# Patient Record
Sex: MALE | Race: WHITE | NOT HISPANIC OR LATINO | Employment: OTHER | URBAN - METROPOLITAN AREA
[De-identification: names, ages, dates, MRNs, and addresses within clinical notes are randomized per-mention and may not be internally consistent; named-entity substitution may affect disease eponyms.]

---

## 2017-03-13 PROBLEM — M17.0 PRIMARY OSTEOARTHRITIS OF BOTH KNEES: Status: ACTIVE | Noted: 2017-03-13

## 2017-03-22 DIAGNOSIS — I10 BENIGN ESSENTIAL HYPERTENSION: Primary | ICD-10-CM

## 2017-03-22 NOTE — TELEPHONE ENCOUNTER
FUROSEMIDE 20MG TAB      Last Written Prescription Date: ?  Last Fill Quantity: ?, # refills: ?  [place] [facility] [place] with [facility] or [facility] prescribing provider: 11/18/2016       Potassium   Date Value Ref Range Status   11/18/2016 4.5 3.4 - 5.3 mmol/L Final     Creatinine   Date Value Ref Range Status   11/18/2016 2.19 (H) 0.66 - 1.25 mg/dL Final     BP Readings from Last 3 Encounters:   11/18/16 111/62   10/13/16 111/58   09/29/16 117/58

## 2017-03-22 NOTE — TELEPHONE ENCOUNTER
Routing refill request to provider for review/approval because:  Labs/Historical  Carly Robins RN

## 2017-03-24 RX ORDER — FUROSEMIDE 20 MG
TABLET ORAL
Qty: 90 TABLET | Refills: 3 | Status: SHIPPED | OUTPATIENT
Start: 2017-03-24 | End: 2017-10-04

## 2017-07-18 ENCOUNTER — OFFICE VISIT (OUTPATIENT)
Dept: FAMILY MEDICINE | Facility: CLINIC | Age: 82
End: 2017-07-18
Payer: MEDICARE

## 2017-07-18 VITALS
WEIGHT: 170 LBS | HEIGHT: 69 IN | DIASTOLIC BLOOD PRESSURE: 55 MMHG | TEMPERATURE: 97.8 F | SYSTOLIC BLOOD PRESSURE: 132 MMHG | HEART RATE: 46 BPM | OXYGEN SATURATION: 97 % | BODY MASS INDEX: 25.18 KG/M2

## 2017-07-18 DIAGNOSIS — E78.5 HYPERLIPIDEMIA LDL GOAL <100: ICD-10-CM

## 2017-07-18 DIAGNOSIS — M1A.09X0 IDIOPATHIC CHRONIC GOUT OF MULTIPLE SITES WITHOUT TOPHUS: ICD-10-CM

## 2017-07-18 DIAGNOSIS — I10 BENIGN ESSENTIAL HYPERTENSION: ICD-10-CM

## 2017-07-18 DIAGNOSIS — I70.90 ATHEROSCLEROTIC VASCULAR DISEASE: ICD-10-CM

## 2017-07-18 DIAGNOSIS — M15.0 PRIMARY OSTEOARTHRITIS INVOLVING MULTIPLE JOINTS: ICD-10-CM

## 2017-07-18 DIAGNOSIS — N18.30 CKD (CHRONIC KIDNEY DISEASE) STAGE 3, GFR 30-59 ML/MIN (H): Primary | ICD-10-CM

## 2017-07-18 DIAGNOSIS — M51.379 DEGENERATION OF LUMBAR OR LUMBOSACRAL INTERVERTEBRAL DISC: ICD-10-CM

## 2017-07-18 DIAGNOSIS — S16.1XXA STRAIN OF NECK MUSCLE, INITIAL ENCOUNTER: ICD-10-CM

## 2017-07-18 DIAGNOSIS — I35.0 AORTIC VALVE STENOSIS, UNSPECIFIED ETIOLOGY: ICD-10-CM

## 2017-07-18 PROCEDURE — 99214 OFFICE O/P EST MOD 30 MIN: CPT | Performed by: INTERNAL MEDICINE

## 2017-07-18 RX ORDER — CYCLOBENZAPRINE HCL 10 MG
TABLET ORAL
Qty: 7 TABLET | Refills: 1 | Status: SHIPPED | OUTPATIENT
Start: 2017-07-18 | End: 2017-08-21

## 2017-07-18 RX ORDER — METHYLPREDNISOLONE 4 MG
TABLET, DOSE PACK ORAL
Qty: 21 TABLET | Refills: 0 | Status: SHIPPED | OUTPATIENT
Start: 2017-07-18 | End: 2017-08-21

## 2017-07-18 NOTE — MR AVS SNAPSHOT
After Visit Summary   7/18/2017    Trevor Soni    MRN: [id number]           Patient Information     Date Of Birth          9/3/1933        Visit Information        Provider Department      7/18/2017 11:30 AM Warren Lovelace MD [facility]        Today's Diagnoses     CKD (chronic kidney disease) stage 3, GFR 30-59 ml/min    -  1    Aortic valve stenosis, unspecified etiology        Hyperlipidemia LDL goal <100        Benign essential hypertension        Primary osteoarthritis involving multiple joints        Idiopathic chronic gout of multiple sites without tophus        Degeneration of lumbar or lumbosacral intervertebral disc        Strain of neck muscle, initial encounter        Atherosclerotic vascular disease           Follow-ups after your visit        Your next 10 appointments already scheduled     Aug 21, 2017 10:30 AM CDT   Office Visit with Warren Lovelace MD   [facility] ([facility])    [address] [address] [phone]           Bring a current list of meds and any records pertaining to this visit.  For Physicals, please bring immunization records and any forms needing to be filled out.  Please arrive 10 minutes early to complete paperwork.              Who to contact     If you have questions or need follow up information about today's clinic visit or your schedule please contact [facility] directly at [phone].  Normal or non-critical lab and imaging results will be communicated to you by MyChart, letter or phone within 4 business days after the clinic has received the results. If you do not hear from us within 7 days, please contact the clinic through MyChart or phone. If you have a critical or abnormal lab result, we will notify you by phone as soon as possible.  Submit refill requests through [employer] or call your pharmacy and they will forward the refill request to us. Please allow 3 business days for your  "refill to be completed.          Additional Information About Your Visit        [employer] Information     [employer] lets you send messages to your doctor, view your test results, renew your prescriptions, schedule appointments and more. To sign up, go to [email or web address] . Click on \"Log in\" on the left side of the screen, which will take you to the Welcome page. Then click on \"Sign up Now\" on the right side of the page.     You will be asked to enter the access code listed below, as well as some personal information. Please follow the directions to create your username and password.     Your access code is: [email or web address]  Expires: 10/17/2017  6:21 AM     Your access code will  in 90 days. If you need help or a new code, please call your [place] clinic or [phone].        Care EveryWhere ID     This is your Care EveryWhere ID. This could be used by other organizations to access your [place] medical records  [id number]        Your Vitals Were     Pulse Temperature Height Pulse Oximetry BMI (Body Mass Index)       46 97.8  F (36.6  C) (Oral) 5' 9\" (1.753 m) 97% 25.1 kg/m2        Blood Pressure from Last 3 Encounters:   17 132/55   16 111/62   10/13/16 111/58    Weight from Last 3 Encounters:   17 170 lb (77.1 kg)   16 167 lb (75.8 kg)   10/13/16 166 lb (75.3 kg)              Today, you had the following     No orders found for display         Today's Medication Changes          These changes are accurate as of: 17 11:59 PM.  If you have any questions, ask your nurse or doctor.               Start taking these medicines.        Dose/Directions    cyclobenzaprine 10 MG tablet   Commonly known as:  FLEXERIL   Used for:  Strain of neck muscle, initial encounter   Started by:  Warren Lovelace MD        Si/2 to 1 tab po qhs   Quantity:  7 tablet   Refills:  1       methylPREDNISolone 4 MG tablet   Commonly known as:  MEDROL DOSEPAK   Used for:  Degeneration of lumbar or " lumbosacral intervertebral disc   Started by:  Warren Lovelace MD        Follow package instructions   Quantity:  21 tablet   Refills:  0            Where to get your medicines      [facility] [address] [place] [address] [address] AT [address] [place] [address] & [facility]  [address] [place] [address]    Hours:  24-hours Phone:  [phone]     cyclobenzaprine 10 MG tablet    methylPREDNISolone 4 MG tablet                Primary Care Provider Office Phone # Fax #    Warren Lovelace [phone]       [facility] [address]  [facility] [address]        Equal Access to Services     [facility]: Hadii aad ku hadasho Soomaali, waaxda luqadaha, qaybta kaalmada adeegyada, waxay idiin hayjosen cesar andre . So [facility] [phone].    ATENCIÓN: Si habla español, tiene a mendoza disposición servicios gratuitos de asistencia lingüística. [facility] [phone].    We comply with applicable federal civil rights laws and [place] laws. We do not discriminate on the basis of race, color, national origin, age, disability sex, sexual orientation or gender identity.            Thank you!     Thank you for choosing [facility]  for your care. Our goal is always to provide you with excellent care. Hearing back from our patients is one way we can continue to improve our services. Please take a few minutes to complete the written survey that you may receive in the mail after your visit with us. Thank you!             Your Updated Medication List - Protect others around you: Learn how to safely use, store and throw away your medicines at www.disposemymeds.org.          This list is accurate as of: 7/18/17 11:59 PM.  Always use your most recent med list.                   Brand Name Dispense Instructions for use Diagnosis    allopurinol 100 MG tablet    ZYLOPRIM    180 tablet    Take 2 tablets by mouth  every day    Hyperlipidemia LDL goal <100, Benign  essential hypertension       amLODIPine 5 MG tablet    NORVASC    180 tablet    Take 1 tablet by mouth  twice a day    Hyperlipidemia LDL goal <100, Benign essential hypertension       ASPIRIN PO      Take 325 mg by mouth daily.        atenolol 50 MG tablet    TENORMIN    90 tablet    Take 1 tablet by mouth  every day    Hyperlipidemia LDL goal <100, Benign essential hypertension       atorvastatin 20 MG tablet    LIPITOR    90 tablet    Take 1 tablet by mouth  every day    Hyperlipidemia LDL goal <100, Benign essential hypertension       cyclobenzaprine 10 MG tablet    FLEXERIL    7 tablet    Si/2 to 1 tab po qhs    Strain of neck muscle, initial encounter       doxycycline 100 MG capsule    VIBRAMYCIN    14 capsule    Take 1 capsule (100 mg) by mouth every 12 hours    Community acquired pneumonia       fenofibrate 160 MG tablet     90 tablet    Take 1 tablet by mouth  every day    Hyperlipidemia LDL goal <100, Benign essential hypertension       furosemide 20 MG tablet    LASIX    90 tablet    Take 1 tablet by mouth  every day as directed    Benign essential hypertension       * HYDRALAZINE HCL PO      Take 25 mg by mouth 2 times daily Morning & Lunch        * HYDRALAZINE HCL PO      Take 50 mg by mouth 2 times daily Dinner and Bedtime        * hydrALAZINE 50 MG tablet    APRESOLINE    270 tablet    Take 1 tablet by mouth 3  times a day    Hyperlipidemia LDL goal <100, Benign essential hypertension       methylPREDNISolone 4 MG tablet    MEDROL DOSEPAK    21 tablet    Follow package instructions    Degeneration of lumbar or lumbosacral intervertebral disc       VITAMIN D3 PO      Take 1 tablet by mouth daily        ZANTAC PO      Take 150 mg by mouth daily as needed        * Notice:  This list has 3 medication(s) that are the same as other medications prescribed for you. Read the directions carefully, and ask your doctor or other care provider to review them with you.

## 2017-07-18 NOTE — NURSING NOTE
"Chief Complaint   Patient presents with     Recheck Medication       Initial /55 (BP Location: Right arm, Patient Position: Sitting, Cuff Size: Adult Regular)  Pulse (!) 46  Temp 97.8  F (36.6  C) (Oral)  Ht 5' 9\" (1.753 m)  Wt 170 lb (77.1 kg)  SpO2 97%  BMI 25.1 kg/m2 Estimated body mass index is 25.1 kg/(m^2) as calculated from the following:    Height as of this encounter: 5' 9\" (1.753 m).    Weight as of this encounter: 170 lb (77.1 kg).  Medication Reconciliation: complete   Lina Faust- GAGAN      "

## 2017-07-18 NOTE — PROGRESS NOTES
"  SUBJECTIVE:                                                    Trevor Soni is a 83 year old male who presents to clinic today for the following health issues:    Medication Followup of Atorvastatin and furosemide    Taking Medication as prescribed: yes    Side Effects:  Unsure- x 1 week ago had a rash all over upper torso and back.    Medication Helping Symptoms:  yes     Mr. Soni presents to the clinic for follow up on hyperlipidemia and hypertension. He notes possible medication side effect of pruritis     He notes constant left neck pain for the last 2 weeks. He notes lifting a 36-pack of soda and pain began later that day. He has been using a heat pack and Tylenol without help. Denies weakness, numbness and tingling. Patient states pain causes loss of sleep    Patient also complains of intermittent lower back pain with radiation on the medial right leg to the lower leg which has been present for more than 2 weeks. Aggravation of symptoms is caused by climbing stairs and walking. Deneis pain when getting out of a vehicle. When patient lays he notes pain decreases.    Patient also notes a \"lump\" on the left shoulder which increases     Problem list and histories reviewed & adjusted, as indicated.  Additional history: as documented    Current Outpatient Prescriptions   Medication Sig Dispense Refill     methylPREDNISolone (MEDROL DOSEPAK) 4 MG tablet Follow package instructions 21 tablet 0     cyclobenzaprine (FLEXERIL) 10 MG tablet Si/2 to 1 tab po qhs 7 tablet 1     furosemide (LASIX) 20 MG tablet Take 1 tablet by mouth  every day as directed 90 tablet 3     atorvastatin (LIPITOR) 20 MG tablet Take 1 tablet by mouth  every day 90 tablet 3     hydrALAZINE (APRESOLINE) 50 MG tablet Take 1 tablet by mouth 3  times a day 270 tablet 3     allopurinol (ZYLOPRIM) 100 MG tablet Take 2 tablets by mouth  every day 180 tablet 3     amLODIPine (NORVASC) 5 MG tablet Take 1 tablet by mouth  twice a day 180 tablet " "3     fenofibrate 160 MG tablet Take 1 tablet by mouth  every day 90 tablet 3     atenolol (TENORMIN) 50 MG tablet Take 1 tablet by mouth  every day 90 tablet 3     doxycycline (VIBRAMYCIN) 100 MG capsule Take 1 capsule (100 mg) by mouth every 12 hours 14 capsule 0     HYDRALAZINE HCL PO Take 50 mg by mouth 2 times daily Dinner and Bedtime       Cholecalciferol (VITAMIN D3 PO) Take 1 tablet by mouth daily       HYDRALAZINE HCL PO Take 25 mg by mouth 2 times daily Morning & Lunch       ASPIRIN PO Take 325 mg by mouth daily.       Ranitidine HCl (ZANTAC PO) Take 150 mg by mouth daily as needed        Allergies   Allergen Reactions     Avocado      Ciprofloxacin Itching     Penicillins Itching     Reviewed and updated as needed this visit by clinical staff  Tobacco  Allergies  Meds  Soc Hx      Reviewed and updated as needed this visit by Provider       ROS:  Constitutional, HEENT, cardiovascular, pulmonary, gi and gu systems are negative, except as otherwise noted.    This document serves as a record of the services and decisions personally performed and made by Warren Lovelace MD. It was created on his/her behalf by Alicja Prince, a trained medical scribe. The creation of this document is based the provider's statements to the medical scribe.  Scribgarrison Prince 11:42 AM, July 18, 2017     OBJECTIVE:     /55 (BP Location: Right arm, Patient Position: Sitting, Cuff Size: Adult Regular)  Pulse (!) 46  Temp 97.8  F (36.6  C) (Oral)  Ht 1.753 m (5' 9\")  Wt 77.1 kg (170 lb)  SpO2 97%  BMI 25.1 kg/m2  Body mass index is 25.1 kg/(m^2).  Neck was supple without adenopathy or thyromegaly his carotids were normal without bruits. Has tightness of the right trapezius. Shoulder is post surgical and has reduced internal and external rotation  Chest clear to auscultation and percussion  Cardiovascular S1 and S2 are physiologic without murmurs or gallops  Abdomen bowel sounds were normal.  There is no " palpable mass or organomegaly  Extremities nontender without any edema  Back Has tenderness of the lower lumbar spine at or below the areas of the SI joints. Straight leg raise negative. DTRs show absent patellar on the right and 2+ on the left. Achilles were absent bilaterally. Sensory normal to confrontation. Motor intact and bilaterally symmetrical   Pulses pedal pulses are as described otherwise his pulses are bilaterally symmetrical throughout without bruits  Skin without significant abnormality     ASSESSMENT/PLAN:   1. CKD (chronic kidney disease) stage 3, GFR 30-59 ml/min    2. Aortic valve stenosis, unspecified etiology    3. Hyperlipidemia LDL goal <100    4. Benign essential hypertension    5. Primary osteoarthritis involving multiple joints    6. Idiopathic chronic gout of multiple sites without tophus    7. Degeneration of lumbar or lumbosacral intervertebral disc  - methylPREDNISolone (MEDROL DOSEPAK) 4 MG tablet; Follow package instructions  Dispense: 21 tablet; Refill: 0    8. Strain of neck muscle, initial encounter  - cyclobenzaprine (FLEXERIL) 10 MG tablet; Si/2 to 1 tab po qhs  Dispense: 7 tablet; Refill: 1    He is planning on reevaluation his carotid stenosis with echo from one year ago and repeat his US for his aneurysmal dilation.    Follow up in one week with progress report or return to clinic depending on the outcome of his medicine trial.    Warren Lovelace MD  [facility]    The information in this document, created by the medical scribe for me, accurately reflects the services I personally performed and the decisions made by me. I have reviewed and approved this document for accuracy prior to leaving the patient care area.  Warren Lovelace MD  11:42 AM, 17

## 2017-08-18 NOTE — PROGRESS NOTES
SUBJECTIVE:   Trevor Soni is a 83 year old male who presents to clinic today for the following health issues:    Patient with history of CKD presents to the clinic for a follow up. He states that he feels better today than he did during his last office visit. He reports that this is due to his improved neck and back pain. He states he can now move his neck and denies any stiffness. He manages his pain with medication. Patients breathing is also going well. He does not do any regular walking for exercise, but does do yard work and other activities daily. His knees are still get sore, and it is exacerbated by climbing stairs. He denies angina and dyspnea.      Problem list and histories reviewed & adjusted, as indicated.  Additional history: as documented    Current Outpatient Prescriptions   Medication Sig Dispense Refill     furosemide (LASIX) 20 MG tablet Take 1 tablet by mouth  every day as directed 90 tablet 3     atorvastatin (LIPITOR) 20 MG tablet Take 1 tablet by mouth  every day 90 tablet 3     hydrALAZINE (APRESOLINE) 50 MG tablet Take 1 tablet by mouth 3  times a day 270 tablet 3     allopurinol (ZYLOPRIM) 100 MG tablet Take 2 tablets by mouth  every day 180 tablet 3     amLODIPine (NORVASC) 5 MG tablet Take 1 tablet by mouth  twice a day 180 tablet 3     fenofibrate 160 MG tablet Take 1 tablet by mouth  every day 90 tablet 3     atenolol (TENORMIN) 50 MG tablet Take 1 tablet by mouth  every day 90 tablet 3     Cholecalciferol (VITAMIN D3 PO) Take 1 tablet by mouth daily       ASPIRIN PO Take 325 mg by mouth daily.       Ranitidine HCl (ZANTAC PO) Take 150 mg by mouth daily as needed        doxycycline (VIBRAMYCIN) 100 MG capsule Take 1 capsule (100 mg) by mouth every 12 hours (Patient not taking: Reported on 8/21/2017) 14 capsule 0     [DISCONTINUED] HYDRALAZINE HCL PO Take 50 mg by mouth 2 times daily Dinner and Bedtime       [DISCONTINUED] HYDRALAZINE HCL PO Take 25 mg by mouth 2 times daily  "Morning & Lunch       Allergies   Allergen Reactions     Avocado      Ciprofloxacin Itching     Penicillins Itching       Reviewed and updated as needed this visit by clinical staff  Tobacco  Allergies  Meds  Problems  Med Hx  Surg Hx  Fam Hx  Soc Hx        Reviewed and updated as needed this visit by Provider         ROS:  Constitutional, HEENT, cardiovascular, pulmonary, gi and gu systems are negative, except as otherwise noted.    This document serves as a record of the services and decisions personally performed and made by Warren Lovelace MD. It was created on his/her behalf by Chemo Gibbs, a trained medical scribe. The creation of this document is based the provider's statements to the medical scribe.  Mor Gibbs 10:22 AM, August 21, 2017    OBJECTIVE:   /60  Pulse 55  Temp 98.1  F (36.7  C) (Oral)  Ht 1.753 m (5' 9\")  Wt 75.8 kg (167 lb)  SpO2 97%  BMI 24.66 kg/m2  Body mass index is 24.66 kg/(m^2).    Neck was supple without adenopathy or thyromegaly his carotids were normal without bruits  Chest clear to auscultation and percussion  Cardiovascular S1 and S2 are physiologic with grade 3/6 systolic ejection murmur radiating across the precordium to the carotids or gallops  Abdomen bowel sounds were normal.  There is no palpable mass or organomegaly  Extremities nontender without any edema  Pulses pedal pulses are as described otherwise his pulses are bilaterally symmetrical throughout without bruits  Skin without significant abnormality      Diagnostic Test Results:  No results found for this or any previous visit (from the past 24 hour(s)).    ASSESSMENT/PLAN:     1. CKD (chronic kidney disease) stage 3, GFR 30-59 ml/min  - Basic metabolic panel  - CBC with platelets    2. Abdominal aortic aneurysm (AAA) without rupture (H)  Schedule follow-up ultrasound  3. Aortic valve stenosis, unspecified etiology  -He is scheduled for an aortic ultrasound.    4. Hyperlipidemia LDL " goal <100  -Status quo    5. Benign essential hypertension  -Well managed with current therapies.  Encouraged increase activity including plans for the winter season    6. Primary osteoarthritis involving multiple joints    7. Idiopathic chronic gout of multiple sites without tophus    8. Bilateral carotid artery stenosis  Scheduled follow-up carotid ultrasound      The information in this document, created by the medical scribe for me, accurately reflects the services I personally performed and the decisions made by me. I have reviewed and approved this document for accuracy prior to leaving the patient care area.  Warren Lovelace MD  10:42 AM, 08/21/17    Warren Lovelace MD  [facility]

## 2017-08-21 ENCOUNTER — OFFICE VISIT (OUTPATIENT)
Dept: FAMILY MEDICINE | Facility: CLINIC | Age: 82
End: 2017-08-21
Payer: MEDICARE

## 2017-08-21 VITALS
BODY MASS INDEX: 24.73 KG/M2 | WEIGHT: 167 LBS | HEART RATE: 55 BPM | TEMPERATURE: 98.1 F | OXYGEN SATURATION: 97 % | SYSTOLIC BLOOD PRESSURE: 128 MMHG | DIASTOLIC BLOOD PRESSURE: 60 MMHG | HEIGHT: 69 IN

## 2017-08-21 DIAGNOSIS — I65.23 BILATERAL CAROTID ARTERY STENOSIS: ICD-10-CM

## 2017-08-21 DIAGNOSIS — M15.0 PRIMARY OSTEOARTHRITIS INVOLVING MULTIPLE JOINTS: ICD-10-CM

## 2017-08-21 DIAGNOSIS — I10 BENIGN ESSENTIAL HYPERTENSION: ICD-10-CM

## 2017-08-21 DIAGNOSIS — I71.40 ABDOMINAL AORTIC ANEURYSM (AAA) WITHOUT RUPTURE (H): ICD-10-CM

## 2017-08-21 DIAGNOSIS — M1A.09X0 IDIOPATHIC CHRONIC GOUT OF MULTIPLE SITES WITHOUT TOPHUS: ICD-10-CM

## 2017-08-21 DIAGNOSIS — E78.5 HYPERLIPIDEMIA LDL GOAL <100: ICD-10-CM

## 2017-08-21 DIAGNOSIS — I35.0 AORTIC VALVE STENOSIS, UNSPECIFIED ETIOLOGY: ICD-10-CM

## 2017-08-21 DIAGNOSIS — N18.30 CKD (CHRONIC KIDNEY DISEASE) STAGE 3, GFR 30-59 ML/MIN (H): Primary | ICD-10-CM

## 2017-08-21 LAB
ERYTHROCYTE [DISTWIDTH] IN BLOOD BY AUTOMATED COUNT: 14.4 % (ref 10–15)
HCT VFR BLD AUTO: 37.8 % (ref 40–53)
HGB BLD-MCNC: 12.4 G/DL (ref 13.3–17.7)
MCH RBC QN AUTO: 30.7 PG (ref 26.5–33)
MCHC RBC AUTO-ENTMCNC: 32.8 G/DL (ref 31.5–36.5)
MCV RBC AUTO: 94 FL (ref 78–100)
PLATELET # BLD AUTO: 217 10E9/L (ref 150–450)
RBC # BLD AUTO: 4.04 10E12/L (ref 4.4–5.9)
WBC # BLD AUTO: 7.6 10E9/L (ref 4–11)

## 2017-08-21 PROCEDURE — 36415 COLL VENOUS BLD VENIPUNCTURE: CPT | Performed by: INTERNAL MEDICINE

## 2017-08-21 PROCEDURE — 80048 BASIC METABOLIC PNL TOTAL CA: CPT | Performed by: INTERNAL MEDICINE

## 2017-08-21 PROCEDURE — 99214 OFFICE O/P EST MOD 30 MIN: CPT | Performed by: INTERNAL MEDICINE

## 2017-08-21 PROCEDURE — 85027 COMPLETE CBC AUTOMATED: CPT | Performed by: INTERNAL MEDICINE

## 2017-08-21 NOTE — MR AVS SNAPSHOT
"              After Visit Summary   8/21/2017    Trevor Soni    MRN: [id number]           Patient Information     Date Of Birth          9/3/1933        Visit Information        Provider Department      8/21/2017 10:30 AM Warren Lovelace MD [facility]        Today's Diagnoses     CKD (chronic kidney disease) stage 3, GFR 30-59 ml/min    -  1    Abdominal aortic aneurysm (AAA) without rupture (H)        Aortic valve stenosis, unspecified etiology        Hyperlipidemia LDL goal <100        Benign essential hypertension        Primary osteoarthritis involving multiple joints        Idiopathic chronic gout of multiple sites without tophus        Bilateral carotid artery stenosis           Follow-ups after your visit        Who to contact     If you have questions or need follow up information about today's clinic visit or your schedule please contact [facility] directly at [phone].  Normal or non-critical lab and imaging results will be communicated to you by MyChart, letter or phone within 4 business days after the clinic has received the results. If you do not hear from us within 7 days, please contact the clinic through MyChart or phone. If you have a critical or abnormal lab result, we will notify you by phone as soon as possible.  Submit refill requests through ScreenTag or call your pharmacy and they will forward the refill request to us. Please allow 3 business days for your refill to be completed.          Additional Information About Your Visit        MyChart Information     ScreenTag lets you send messages to your doctor, view your test results, renew your prescriptions, schedule appointments and more. To sign up, go to [email or web address] . Click on \"Log in\" on the left side of the screen, which will take you to the Welcome page. Then click on \"Sign up Now\" on the right side of the page.     You will be asked to enter the access code listed below, as well as some personal " "information. Please follow the directions to create your username and password.     Your access code is: [email or web address]  Expires: 10/17/2017  6:21 AM     Your access code will  in 90 days. If you need help or a new code, please call your [facility] clinic or [phone].        Care EveryWhere ID     This is your Care EveryWhere ID. This could be used by other organizations to access your [facility] medical records  [id number]        Your Vitals Were     Pulse Temperature Height Pulse Oximetry BMI (Body Mass Index)       55 98.1  F (36.7  C) (Oral) 5' 9\" (1.753 m) 97% 24.66 kg/m2        Blood Pressure from Last 3 Encounters:   17 128/60   17 132/55   16 111/62    Weight from Last 3 Encounters:   17 167 lb (75.8 kg)   17 170 lb (77.1 kg)   16 167 lb (75.8 kg)              We Performed the Following     Basic metabolic panel     CBC with platelets          Today's Medication Changes          These changes are accurate as of: 17 11:59 PM.  If you have any questions, ask your nurse or doctor.               These medicines have changed or have updated prescriptions.        Dose/Directions    hydrALAZINE 50 MG tablet   Commonly known as:  APRESOLINE   This may have changed:  Another medication with the same name was removed. Continue taking this medication, and follow the directions you see here.   Used for:  Hyperlipidemia LDL goal <100, Benign essential hypertension   Changed by:  Warren Lovelace MD        Take 1 tablet by mouth 3  times a day   Quantity:  270 tablet   Refills:  3         Stop taking these medicines if you haven't already. Please contact your care team if you have questions.     cyclobenzaprine 10 MG tablet   Commonly known as:  FLEXERIL   Stopped by:  Warren Lovelace MD           methylPREDNISolone 4 MG tablet   Commonly known as:  MEDROL DOSEPAK   Stopped by:  Warren Lovelace MD                    Primary Care Provider Office Phone # Fax #    Warren HAYES " MD [place] [phone]       [address] [facility] [address]  [facility] [address]        Equal Access to Services     [facility] : Hadii aad ku hadsarahikaren Charlinemukesh, wanathalyda luqadaha, qaybta kaalmada sade, mercedez aubriekirk fam lalauracarolyn christopher. So [facility] [phone].    ATENCIÓN: Si habla español, tiene a mendoza disposición servicios gratuitos de asistencia lingüística. Llame al [phone].    We comply with applicable federal civil rights laws and [place] laws. We do not discriminate on the basis of race, color, national origin, age, disability sex, sexual orientation or gender identity.            Thank you!     Thank you for choosing [facility]  for your care. Our goal is always to provide you with excellent care. Hearing back from our patients is one way we can continue to improve our services. Please take a few minutes to complete the written survey that you may receive in the mail after your visit with us. Thank you!             Your Updated Medication List - Protect others around you: Learn how to safely use, store and throw away your medicines at www.disposemymeds.org.          This list is accurate as of: 8/21/17 11:59 PM.  Always use your most recent med list.                   Brand Name Dispense Instructions for use Diagnosis    allopurinol 100 MG tablet    ZYLOPRIM    180 tablet    Take 2 tablets by mouth  every day    Hyperlipidemia LDL goal <100, Benign essential hypertension       amLODIPine 5 MG tablet    NORVASC    180 tablet    Take 1 tablet by mouth  twice a day    Hyperlipidemia LDL goal <100, Benign essential hypertension       ASPIRIN PO      Take 325 mg by mouth daily.        atenolol 50 MG tablet    TENORMIN    90 tablet    Take 1 tablet by mouth  every day    Hyperlipidemia LDL goal <100, Benign essential hypertension       atorvastatin 20 MG tablet    LIPITOR    90 tablet    Take 1 tablet by mouth  every day    Hyperlipidemia LDL goal <100, Benign essential  hypertension       doxycycline 100 MG capsule    VIBRAMYCIN    14 capsule    Take 1 capsule (100 mg) by mouth every 12 hours    Community acquired pneumonia       fenofibrate 160 MG tablet     90 tablet    Take 1 tablet by mouth  every day    Hyperlipidemia LDL goal <100, Benign essential hypertension       furosemide 20 MG tablet    LASIX    90 tablet    Take 1 tablet by mouth  every day as directed    Benign essential hypertension       hydrALAZINE 50 MG tablet    APRESOLINE    270 tablet    Take 1 tablet by mouth 3  times a day    Hyperlipidemia LDL goal <100, Benign essential hypertension       VITAMIN D3 PO      Take 1 tablet by mouth daily        ZANTAC PO      Take 150 mg by mouth daily as needed

## 2017-08-21 NOTE — NURSING NOTE
"Chief Complaint   Patient presents with     Follow Up For     CKD       Initial /60  Pulse 55  Temp 98.1  F (36.7  C) (Oral)  Ht 5' 9\" (1.753 m)  Wt 167 lb (75.8 kg)  SpO2 97%  BMI 24.66 kg/m2 Estimated body mass index is 24.66 kg/(m^2) as calculated from the following:    Height as of this encounter: 5' 9\" (1.753 m).    Weight as of this encounter: 167 lb (75.8 kg).  Medication Reconciliation: ambrose ALEX CMA      "

## 2017-08-22 ENCOUNTER — TELEPHONE (OUTPATIENT)
Dept: FAMILY MEDICINE | Facility: CLINIC | Age: 82
End: 2017-08-22

## 2017-08-22 DIAGNOSIS — I65.29 STENOSIS OF CAROTID ARTERY, UNSPECIFIED LATERALITY: ICD-10-CM

## 2017-08-22 DIAGNOSIS — I70.1 RENAL ARTERY STENOSIS (H): ICD-10-CM

## 2017-08-22 DIAGNOSIS — I71.40 ABDOMINAL AORTIC ANEURYSM (AAA) WITHOUT RUPTURE (H): Primary | ICD-10-CM

## 2017-08-22 LAB
ANION GAP SERPL CALCULATED.3IONS-SCNC: 8 MMOL/L (ref 3–14)
BUN SERPL-MCNC: 38 MG/DL (ref 7–30)
CALCIUM SERPL-MCNC: 9.1 MG/DL (ref 8.5–10.1)
CHLORIDE SERPL-SCNC: 108 MMOL/L (ref 94–109)
CO2 SERPL-SCNC: 23 MMOL/L (ref 20–32)
CREAT SERPL-MCNC: 2.33 MG/DL (ref 0.66–1.25)
GFR SERPL CREATININE-BSD FRML MDRD: 27 ML/MIN/1.7M2
GLUCOSE SERPL-MCNC: 104 MG/DL (ref 70–99)
POTASSIUM SERPL-SCNC: 4.2 MMOL/L (ref 3.4–5.3)
SODIUM SERPL-SCNC: 139 MMOL/L (ref 133–144)

## 2017-08-22 NOTE — PROGRESS NOTES
I called the patient and informed of results. The following changes were made to treatment:   Because of his changing renal function I recommended holding the Lasix and scheduled a renal artery duplex in addition to his abdominal aortic aneurysm ultrasound and his carotid artery ultrasound.  He will return to clinic in 5 days for follow-up of his pending reports and to reevaluate his blood pressure and renal function.    Patient voices understanding and will contact me with any further questions.     Warren Lovelace MD

## 2017-08-25 ENCOUNTER — HOSPITAL ENCOUNTER (OUTPATIENT)
Dept: ULTRASOUND IMAGING | Facility: CLINIC | Age: 82
End: 2017-08-25
Attending: INTERNAL MEDICINE
Payer: MEDICARE

## 2017-08-25 ENCOUNTER — HOSPITAL ENCOUNTER (OUTPATIENT)
Dept: ULTRASOUND IMAGING | Facility: CLINIC | Age: 82
Discharge: HOME | End: 2017-08-25
Attending: INTERNAL MEDICINE | Admitting: INTERNAL MEDICINE
Payer: MEDICARE

## 2017-08-25 DIAGNOSIS — I65.29 STENOSIS OF CAROTID ARTERY, UNSPECIFIED LATERALITY: ICD-10-CM

## 2017-08-25 DIAGNOSIS — I71.40 ABDOMINAL AORTIC ANEURYSM (AAA) WITHOUT RUPTURE (H): ICD-10-CM

## 2017-08-25 DIAGNOSIS — I70.1 RENAL ARTERY STENOSIS (H): ICD-10-CM

## 2017-08-25 PROCEDURE — 76775 US EXAM ABDO BACK WALL LIM: CPT

## 2017-08-25 PROCEDURE — 93880 EXTRACRANIAL BILAT STUDY: CPT | Mod: XS

## 2017-08-25 PROCEDURE — 93975 VASCULAR STUDY: CPT | Mod: TC

## 2017-08-25 NOTE — PROGRESS NOTES
SUBJECTIVE:   Trevor Soni is a 83 year old male who presents to clinic today for the following health issues:    Patient with a history of chronic renal failure and arthrosclerosis presents to the clinic for a follow up. Has not noticed any changes since discontinuing lasix, no increase in pretibial edema.  His labs were and scan were reviewed with the patient and they showed.  Right carotid shows some slight narrowing, between 50 and 60% narrowed which is in good range, left carotid shows greater than 70% narrowing which is unchanged  He has had stents placed in both of his renal arteries, good circulation and both kidneys are getting good blood flow, right kidney slightly smaller and has several simple cysts.    Goals:   Make sure he isn't taking any medications that can damage his kidneys      Problem list and histories reviewed & adjusted, as indicated.  Additional history: as documented    Current Outpatient Prescriptions   Medication Sig Dispense Refill     atorvastatin (LIPITOR) 20 MG tablet Take 1 tablet by mouth  every day 90 tablet 3     hydrALAZINE (APRESOLINE) 50 MG tablet Take 1 tablet by mouth 3  times a day 270 tablet 3     allopurinol (ZYLOPRIM) 100 MG tablet Take 2 tablets by mouth  every day 180 tablet 3     amLODIPine (NORVASC) 5 MG tablet Take 1 tablet by mouth  twice a day 180 tablet 3     fenofibrate 160 MG tablet Take 1 tablet by mouth  every day 90 tablet 3     atenolol (TENORMIN) 50 MG tablet Take 1 tablet by mouth  every day 90 tablet 3     doxycycline (VIBRAMYCIN) 100 MG capsule Take 1 capsule (100 mg) by mouth every 12 hours 14 capsule 0     Cholecalciferol (VITAMIN D3 PO) Take 1 tablet by mouth daily       ASPIRIN PO Take 325 mg by mouth daily.       Ranitidine HCl (ZANTAC PO) Take 150 mg by mouth daily as needed        furosemide (LASIX) 20 MG tablet Take 1 tablet by mouth  every day as directed (Patient not taking: Reported on 8/28/2017) 90 tablet 3     Allergies   Allergen  "Reactions     Avocado      Ciprofloxacin Itching     Penicillins Itching     Reviewed and updated as needed this visit by clinical staff  Tobacco  Allergies  Meds  Problems  Med Hx  Surg Hx  Fam Hx  Soc Hx        Reviewed and updated as needed this visit by Provider         ROS:  Constitutional, HEENT, cardiovascular, pulmonary, gi and gu systems are negative, except as otherwise noted.    This document serves as a record of the services and decisions personally performed and made by Warren Lovelace MD. It was created on his/her behalf by Chemo Gibbs, a trained medical scribe. The creation of this document is based the provider's statements to the medical scribe.  Scribgarrison Gibbs 10:11 AM, August 28, 2017    OBJECTIVE:   /63  Pulse 61  Temp 97.9  F (36.6  C) (Oral)  Ht 1.753 m (5' 9\")  Wt 76.2 kg (168 lb)  SpO2 97%  BMI 24.81 kg/m2  Body mass index is 24.81 kg/(m^2).    Neck was supple without adenopathy or thyromegaly his carotids were normal without bruits  Chest clear to auscultation and percussion  Cardiovascular S1 and S2 are physiologic with grade 3/6 systolic ejection murmur radiating across precordium to carotids  Abdomen bowel sounds were normal.  There is no palpable mass or organomegaly  Extremities nontender with trace to 1+ edema left greater than right, chronic venus stasis changes on the left  Pulses pedal pulses are as described otherwise his pulses are bilaterally symmetrical throughout without bruits  Skin without significant abnormality    Diagnostic Test Results:  No results found for this or any previous visit (from the past 24 hour(s)).    ASSESSMENT/PLAN:     1. CKD (chronic kidney disease) stage 3, GFR 30-59 ml/min  - Basic metabolic panel    2. Abdominal aortic aneurysm (AAA) without rupture (H)    3. Stenosis of carotid artery, unspecified laterality  -Waiting on final radiologist report, will follow up.    4. Idiopathic chronic gout of multiple sites " without tophus  - Uric acid    5. Primary osteoarthritis of both knees  -Status quo    6. Aortic valve stenosis, unspecified etiology    7. Benign essential hypertension  -Well managed with current therapies    8. Hyperlipidemia LDL goal <100  -Will be evaluated at preventive health visit.    -Call patient with final radiologist report of left carotid artery.    Warren Lovelace MD  [facility]    The information in this document, created by the medical scribe for me, accurately reflects the services I personally performed and the decisions made by me. I have reviewed and approved this document for accuracy prior to leaving the patient care area.  Warren Lovelace MD  10:22 AM, 08/28/17

## 2017-08-28 ENCOUNTER — OFFICE VISIT (OUTPATIENT)
Dept: FAMILY MEDICINE | Facility: CLINIC | Age: 82
End: 2017-08-28
Payer: MEDICARE

## 2017-08-28 VITALS
OXYGEN SATURATION: 97 % | TEMPERATURE: 97.9 F | WEIGHT: 168 LBS | BODY MASS INDEX: 24.88 KG/M2 | HEIGHT: 69 IN | HEART RATE: 61 BPM | SYSTOLIC BLOOD PRESSURE: 124 MMHG | DIASTOLIC BLOOD PRESSURE: 60 MMHG

## 2017-08-28 DIAGNOSIS — I10 BENIGN ESSENTIAL HYPERTENSION: ICD-10-CM

## 2017-08-28 DIAGNOSIS — E78.5 HYPERLIPIDEMIA LDL GOAL <100: ICD-10-CM

## 2017-08-28 DIAGNOSIS — N18.30 CKD (CHRONIC KIDNEY DISEASE) STAGE 3, GFR 30-59 ML/MIN (H): Primary | ICD-10-CM

## 2017-08-28 DIAGNOSIS — M15.0 PRIMARY OSTEOARTHRITIS INVOLVING MULTIPLE JOINTS: ICD-10-CM

## 2017-08-28 DIAGNOSIS — M17.0 PRIMARY OSTEOARTHRITIS OF BOTH KNEES: ICD-10-CM

## 2017-08-28 DIAGNOSIS — I65.29 STENOSIS OF CAROTID ARTERY, UNSPECIFIED LATERALITY: ICD-10-CM

## 2017-08-28 DIAGNOSIS — I71.40 ABDOMINAL AORTIC ANEURYSM (AAA) WITHOUT RUPTURE (H): ICD-10-CM

## 2017-08-28 DIAGNOSIS — M1A.09X0 IDIOPATHIC CHRONIC GOUT OF MULTIPLE SITES WITHOUT TOPHUS: ICD-10-CM

## 2017-08-28 DIAGNOSIS — I35.0 AORTIC VALVE STENOSIS, UNSPECIFIED ETIOLOGY: ICD-10-CM

## 2017-08-28 PROCEDURE — 99214 OFFICE O/P EST MOD 30 MIN: CPT | Performed by: INTERNAL MEDICINE

## 2017-08-28 PROCEDURE — 36415 COLL VENOUS BLD VENIPUNCTURE: CPT | Performed by: INTERNAL MEDICINE

## 2017-08-28 PROCEDURE — 84550 ASSAY OF BLOOD/URIC ACID: CPT | Performed by: INTERNAL MEDICINE

## 2017-08-28 PROCEDURE — 80048 BASIC METABOLIC PNL TOTAL CA: CPT | Performed by: INTERNAL MEDICINE

## 2017-08-28 NOTE — NURSING NOTE
"No chief complaint on file.      Initial /63  Pulse 61  Temp 97.9  F (36.6  C) (Oral)  Ht 5' 9\" (1.753 m)  Wt 168 lb (76.2 kg)  SpO2 97%  BMI 24.81 kg/m2 Estimated body mass index is 24.81 kg/(m^2) as calculated from the following:    Height as of this encounter: 5' 9\" (1.753 m).    Weight as of this encounter: 168 lb (76.2 kg).  Medication Reconciliation: ambrose ALEX CMA      "

## 2017-08-28 NOTE — MR AVS SNAPSHOT
"              After Visit Summary   8/28/2017    Trevor Soni    MRN: [id number]           Patient Information     Date Of Birth          9/3/1933        Visit Information        Provider Department      8/28/2017 10:00 AM Warren Lovelace MD [facility]        Today's Diagnoses     CKD (chronic kidney disease) stage 3, GFR 30-59 ml/min    -  1    Abdominal aortic aneurysm (AAA) without rupture (H)        Stenosis of carotid artery, unspecified laterality        Idiopathic chronic gout of multiple sites without tophus        Primary osteoarthritis of both knees        Aortic valve stenosis, unspecified etiology        Benign essential hypertension        Hyperlipidemia LDL goal <100        Primary osteoarthritis involving multiple joints           Follow-ups after your visit        Who to contact     If you have questions or need follow up information about today's clinic visit or your schedule please contact [facility] directly at [phone].  Normal or non-critical lab and imaging results will be communicated to you by MyChart, letter or phone within 4 business days after the clinic has received the results. If you do not hear from us within 7 days, please contact the clinic through MyChart or phone. If you have a critical or abnormal lab result, we will notify you by phone as soon as possible.  Submit refill requests through [employer] or call your pharmacy and they will forward the refill request to us. Please allow 3 business days for your refill to be completed.          Additional Information About Your Visit        MyChart Information     [employer] lets you send messages to your doctor, view your test results, renew your prescriptions, schedule appointments and more. To sign up, go to [email or web address] . Click on \"Log in\" on the left side of the screen, which will take you to the Welcome page. Then click on \"Sign up Now\" on the right side of the page.     You will be asked to " "enter the access code listed below, as well as some personal information. Please follow the directions to create your username and password.     Your access code is: [email or web address]  Expires: 10/17/2017  6:21 AM     Your access code will  in 90 days. If you need help or a new code, please call your [facility] clinic or [phone].        Care EveryWhere ID     This is your Care EveryWhere ID. This could be used by other organizations to access your [facility] medical records  [id number]        Your Vitals Were     Pulse Temperature Height Pulse Oximetry BMI (Body Mass Index)       61 97.9  F (36.6  C) (Oral) 5' 9\" (1.753 m) 97% 24.81 kg/m2        Blood Pressure from Last 3 Encounters:   17 124/60   17 128/60   17 132/55    Weight from Last 3 Encounters:   17 168 lb (76.2 kg)   17 167 lb (75.8 kg)   17 170 lb (77.1 kg)              We Performed the Following     Basic metabolic panel     Uric acid        Primary Care Provider Office Phone # Fax #    Warren Lovelace [phone] [address] [address]        Equal Access to Services     [facility]: Hadii aad ku hadasho Soomaali, waaxda luqadaha, qaybta kaalmada adeegyada, waxay nahid haymolly andre . So [facility] [phone].    ATENCIÓN: Si habla español, tiene a mendoza disposición servicios gratuitos de asistencia lingüística. Llame al [phone].    We comply with applicable federal civil rights laws and [place] laws. We do not discriminate on the basis of race, color, national origin, age, disability sex, sexual orientation or gender identity.            Thank you!     Thank you for choosing [facility]  for your care. Our goal is always to provide you with excellent care. Hearing back from our patients is one way we can continue to improve our services. Please take a few minutes to complete the written survey that you may receive in the mail after your " visit with us. Thank you!             Your Updated Medication List - Protect others around you: Learn how to safely use, store and throw away your medicines at www.disposemymeds.org.          This list is accurate as of: 8/28/17 11:59 PM.  Always use your most recent med list.                   Brand Name Dispense Instructions for use Diagnosis    allopurinol 100 MG tablet    ZYLOPRIM    180 tablet    Take 2 tablets by mouth  every day    Hyperlipidemia LDL goal <100, Benign essential hypertension       amLODIPine 5 MG tablet    NORVASC    180 tablet    Take 1 tablet by mouth  twice a day    Hyperlipidemia LDL goal <100, Benign essential hypertension       ASPIRIN PO      Take 325 mg by mouth daily.        atenolol 50 MG tablet    TENORMIN    90 tablet    Take 1 tablet by mouth  every day    Hyperlipidemia LDL goal <100, Benign essential hypertension       atorvastatin 20 MG tablet    LIPITOR    90 tablet    Take 1 tablet by mouth  every day    Hyperlipidemia LDL goal <100, Benign essential hypertension       doxycycline 100 MG capsule    VIBRAMYCIN    14 capsule    Take 1 capsule (100 mg) by mouth every 12 hours    Community acquired pneumonia       fenofibrate 160 MG tablet     90 tablet    Take 1 tablet by mouth  every day    Hyperlipidemia LDL goal <100, Benign essential hypertension       furosemide 20 MG tablet    LASIX    90 tablet    Take 1 tablet by mouth  every day as directed    Benign essential hypertension       hydrALAZINE 50 MG tablet    APRESOLINE    270 tablet    Take 1 tablet by mouth 3  times a day    Hyperlipidemia LDL goal <100, Benign essential hypertension       VITAMIN D3 PO      Take 1 tablet by mouth daily        ZANTAC PO      Take 150 mg by mouth daily as needed

## 2017-08-29 LAB
ANION GAP SERPL CALCULATED.3IONS-SCNC: 4 MMOL/L (ref 3–14)
BUN SERPL-MCNC: 22 MG/DL (ref 7–30)
CALCIUM SERPL-MCNC: 8.9 MG/DL (ref 8.5–10.1)
CHLORIDE SERPL-SCNC: 110 MMOL/L (ref 94–109)
CO2 SERPL-SCNC: 27 MMOL/L (ref 20–32)
CREAT SERPL-MCNC: 1.88 MG/DL (ref 0.66–1.25)
GFR SERPL CREATININE-BSD FRML MDRD: 34 ML/MIN/1.7M2
GLUCOSE SERPL-MCNC: 107 MG/DL (ref 70–99)
POTASSIUM SERPL-SCNC: 4.4 MMOL/L (ref 3.4–5.3)
SODIUM SERPL-SCNC: 141 MMOL/L (ref 133–144)
URATE SERPL-MCNC: 4.3 MG/DL (ref 3.5–7.2)

## 2017-08-29 NOTE — PROGRESS NOTES
I called the patient and informed of results. No changes made to treatment. Patient voices understanding and will contact me with any further questions.   RTC 6 wks.  Warren Lovelace MD

## 2017-10-04 ENCOUNTER — OFFICE VISIT (OUTPATIENT)
Dept: FAMILY MEDICINE | Facility: CLINIC | Age: 82
End: 2017-10-04
Payer: MEDICARE

## 2017-10-04 VITALS
TEMPERATURE: 97.8 F | BODY MASS INDEX: 25.18 KG/M2 | WEIGHT: 170 LBS | DIASTOLIC BLOOD PRESSURE: 60 MMHG | OXYGEN SATURATION: 97 % | SYSTOLIC BLOOD PRESSURE: 120 MMHG | HEART RATE: 52 BPM | HEIGHT: 69 IN

## 2017-10-04 DIAGNOSIS — E78.5 HYPERLIPIDEMIA LDL GOAL <100: ICD-10-CM

## 2017-10-04 DIAGNOSIS — I71.40 ABDOMINAL AORTIC ANEURYSM (AAA) WITHOUT RUPTURE (H): ICD-10-CM

## 2017-10-04 DIAGNOSIS — M15.0 PRIMARY OSTEOARTHRITIS INVOLVING MULTIPLE JOINTS: ICD-10-CM

## 2017-10-04 DIAGNOSIS — I65.29 STENOSIS OF CAROTID ARTERY, UNSPECIFIED LATERALITY: ICD-10-CM

## 2017-10-04 DIAGNOSIS — S56.912A STRAIN OF LEFT FOREARM, INITIAL ENCOUNTER: ICD-10-CM

## 2017-10-04 DIAGNOSIS — M1A.09X0 IDIOPATHIC CHRONIC GOUT OF MULTIPLE SITES WITHOUT TOPHUS: ICD-10-CM

## 2017-10-04 DIAGNOSIS — I10 BENIGN ESSENTIAL HYPERTENSION: ICD-10-CM

## 2017-10-04 DIAGNOSIS — Z23 NEED FOR PROPHYLACTIC VACCINATION AND INOCULATION AGAINST INFLUENZA: ICD-10-CM

## 2017-10-04 DIAGNOSIS — N18.30 CKD (CHRONIC KIDNEY DISEASE) STAGE 3, GFR 30-59 ML/MIN (H): Primary | ICD-10-CM

## 2017-10-04 DIAGNOSIS — I35.0 AORTIC VALVE STENOSIS, UNSPECIFIED ETIOLOGY: ICD-10-CM

## 2017-10-04 PROCEDURE — 36415 COLL VENOUS BLD VENIPUNCTURE: CPT | Performed by: INTERNAL MEDICINE

## 2017-10-04 PROCEDURE — 99214 OFFICE O/P EST MOD 30 MIN: CPT | Mod: 25 | Performed by: INTERNAL MEDICINE

## 2017-10-04 PROCEDURE — G0008 ADMIN INFLUENZA VIRUS VAC: HCPCS | Performed by: INTERNAL MEDICINE

## 2017-10-04 PROCEDURE — 90662 IIV NO PRSV INCREASED AG IM: CPT | Performed by: INTERNAL MEDICINE

## 2017-10-04 PROCEDURE — 80048 BASIC METABOLIC PNL TOTAL CA: CPT | Performed by: INTERNAL MEDICINE

## 2017-10-04 NOTE — NURSING NOTE
"Chief Complaint   Patient presents with     RECHECK       Initial /69 (BP Location: Right arm, Patient Position: Sitting, Cuff Size: Adult Regular)  Pulse 52  Temp 97.8  F (36.6  C) (Tympanic)  Ht 5' 9\" (1.753 m)  Wt 170 lb (77.1 kg)  SpO2 97%  BMI 25.1 kg/m2 Estimated body mass index is 25.1 kg/(m^2) as calculated from the following:    Height as of this encounter: 5' 9\" (1.753 m).    Weight as of this encounter: 170 lb (77.1 kg).  Medication Reconciliation: complete   Lina Lintoning- CMA      "

## 2017-10-04 NOTE — PROGRESS NOTES
SUBJECTIVE:   Trevor Soni is a 84 year old male who presents to clinic today for the following health issues:    Patient has a history of osteoarthitis and notes stiffness of multiple joints greatest in the hands. He reports having difficulty sleeping one bight due to pain which was resolved after Tylenol.    While patient weed-wacking, he notes developing severe left forearm pain which prevented him form finishing.    Patient has a history of CKD and reports improved, but continued ankle swelling. Patient also has a history of hypertension and monitors his blood pressure at home routinely with averages in the 130s    Chronic Kidney Disease Follow-up      Current NSAID use?  No    Amount of exercise or physical activity: 2-3 days/week for an average of 30mins yard work etc    Problems taking medications regularly: No    Medication side effects: none  Diet: regular (no restrictions) and low salt    Problem list and histories reviewed & adjusted, as indicated.  Additional history: as documented    Current Outpatient Prescriptions   Medication Sig Dispense Refill     atorvastatin (LIPITOR) 20 MG tablet Take 1 tablet by mouth  every day 90 tablet 3     hydrALAZINE (APRESOLINE) 50 MG tablet Take 1 tablet by mouth 3  times a day 270 tablet 3     allopurinol (ZYLOPRIM) 100 MG tablet Take 2 tablets by mouth  every day 180 tablet 3     amLODIPine (NORVASC) 5 MG tablet Take 1 tablet by mouth  twice a day 180 tablet 3     fenofibrate 160 MG tablet Take 1 tablet by mouth  every day 90 tablet 3     atenolol (TENORMIN) 50 MG tablet Take 1 tablet by mouth  every day 90 tablet 3     Cholecalciferol (VITAMIN D3 PO) Take 1 tablet by mouth daily       ASPIRIN PO Take 325 mg by mouth daily.       Ranitidine HCl (ZANTAC PO) Take 150 mg by mouth daily as needed        Allergies   Allergen Reactions     Avocado      Ciprofloxacin Itching     Penicillins Itching       Reviewed and updated as needed this visit by clinical  "staffTobacco  Allergies  Soc Hx      Reviewed and updated as needed this visit by Provider         ROS:  Constitutional, HEENT, cardiovascular, pulmonary, gi and gu systems are negative, except as otherwise noted.    This document serves as a record of the services and decisions personally performed and made by Warren Lovelace MD. It was created on his/her behalf by Alicja Prince, a trained medical scribe. The creation of this document is based the provider's statements to the medical scribe.  Scribe Alicja Prince 5:09 PM, October 4, 2017     OBJECTIVE:   /69 (BP Location: Right arm, Patient Position: Sitting, Cuff Size: Adult Regular)  Pulse 52  Temp 97.8  F (36.6  C) (Tympanic)  Ht 1.753 m (5' 9\")  Wt 77.1 kg (170 lb)  SpO2 97%  BMI 25.1 kg/m2  Body mass index is 25.1 kg/(m^2).  Neck was supple without adenopathy or thyromegaly his carotids were normal without bruits  Chest clear to auscultation and percussion  Cardiovascular S1 and S2 are physiologic without gallops. Grade 3/6 systolic ejection radiating across the precordium to he carotids.   Abdomen bowel sounds were normal.  There is no palpable mass or organomegaly. Aorta nontender.   Extremities nontender with 1+ pretibial edema  Pulses pedal pulses are as described otherwise his pulses are bilaterally symmetrical throughout without bruits  Skin without significant abnormality     ASSESSMENT/PLAN:   1. CKD (chronic kidney disease) stage 3, GFR 30-59 ml/min  - Basic metabolic panel    2. Abdominal aortic aneurysm (AAA) without rupture (H)    3. Aortic valve stenosis, unspecified etiology    4. Hyperlipidemia LDL goal <100    5. Benign essential hypertension  Adequate control repeat blood pressure 130/70  6. Primary osteoarthritis involving multiple joints    7. Idiopathic chronic gout of multiple sites without tophus    8. Stenosis of carotid artery, unspecified laterality    9. Need for prophylactic vaccination and inoculation against " influenza    - FLU VACCINE, INCREASED ANTIGEN, PRESV FREE, AGE 65+ [11511]  - ADMIN INFLUENZA (For MEDICARE Patients ONLY) []    10. Strain of left forearm, initial encounter  Suspect epicondylitis due to over vigorous over use while doing lawn work. Recommended wearing a forearm splint during the day.    Follow up dependent on labs unless his forearm is not better in 2-3 weeks    Warren Lovelace MD  [facility]    The information in this document, created by the medical scribe for me, accurately reflects the services I personally performed and the decisions made by me. I have reviewed and approved this document for accuracy prior to leaving the patient care area.  Warren Lovelace MD  5:05 PM, 10/04/17       Injectable Influenza Immunization Documentation    1.  Is the person to be vaccinated sick today?   No    2. Does the person to be vaccinated have an allergy to a component   of the vaccine?   No    3. Has the person to be vaccinated ever had a serious reaction   to influenza vaccine in the past?   No    4. Has the person to be vaccinated ever had Guillain-Barré syndrome?   No    Form completed by Lina Alfredo CMA  Prior to injection verified patient identity using patient's name and date of birth.  Lina Alfredo CMA

## 2017-10-04 NOTE — MR AVS SNAPSHOT
"              After Visit Summary   10/4/2017    Trevor Soni    MRN: [id number]           Patient Information     Date Of Birth          9/3/1933        Visit Information        Provider Department      10/4/2017 4:30 PM Warren Lovelace MD [facility]        Today's Diagnoses     CKD (chronic kidney disease) stage 3, GFR 30-59 ml/min    -  1    Abdominal aortic aneurysm (AAA) without rupture (H)        Aortic valve stenosis, unspecified etiology        Hyperlipidemia LDL goal <100        Benign essential hypertension        Primary osteoarthritis involving multiple joints        Idiopathic chronic gout of multiple sites without tophus        Stenosis of carotid artery, unspecified laterality        Need for prophylactic vaccination and inoculation against influenza        Strain of left forearm, initial encounter           Follow-ups after your visit        Who to contact     If you have questions or need follow up information about today's clinic visit or your schedule please contact [facility] directly at [phone].  Normal or non-critical lab and imaging results will be communicated to you by MyChart, letter or phone within 4 business days after the clinic has received the results. If you do not hear from us within 7 days, please contact the clinic through [employer] or phone. If you have a critical or abnormal lab result, we will notify you by phone as soon as possible.  Submit refill requests through [employer] or call your pharmacy and they will forward the refill request to us. Please allow 3 business days for your refill to be completed.          Additional Information About Your Visit        MyChart Information     [employer] lets you send messages to your doctor, view your test results, renew your prescriptions, schedule appointments and more. To sign up, go to [email or web address] . Click on \"Log in\" on the left side of the screen, which will take you to the Welcome page. Then " "click on \"Sign up Now\" on the right side of the page.     You will be asked to enter the access code listed below, as well as some personal information. Please follow the directions to create your username and password.     Your access code is: [email or web address]  Expires: 10/17/2017  6:21 AM     Your access code will  in 90 days. If you need help or a new code, please call your [facility] clinic or [phone].        Care EveryWhere ID     This is your Care EveryWhere ID. This could be used by other organizations to access your [facility] medical records  [id number]        Your Vitals Were     Pulse Temperature Height Pulse Oximetry BMI (Body Mass Index)       52 97.8  F (36.6  C) (Tympanic) 5' 9\" (1.753 m) 97% 25.1 kg/m2        Blood Pressure from Last 3 Encounters:   10/04/17 120/60   17 124/60   17 128/60    Weight from Last 3 Encounters:   10/04/17 170 lb (77.1 kg)   17 168 lb (76.2 kg)   17 167 lb (75.8 kg)              We Performed the Following     ADMIN INFLUENZA (For MEDICARE Patients ONLY) []     Basic metabolic panel     FLU VACCINE, INCREASED ANTIGEN, PRESV FREE, AGE 65+ [19850]        Primary Care Provider Office Phone # Fax #    Warren Lovelace [phone] [address] [address]        Equal Access to Services     [facility] AH: Hadii aad ku hadasho Soomaali, waaxda luqadaha, qaybta kaalmada adeegyada, mercedez andre . So [facility] [phone].    ATENCIÓN: Si habla español, tiene a mendoza disposición servicios gratuitos de asistencia lingüística. Llame al [phone].    We comply with applicable federal civil rights laws and [place] laws. We do not discriminate on the basis of race, color, national origin, age, disability, sex, sexual orientation, or gender identity.            Thank you!     Thank you for choosing [facility]  for your care. Our goal is always to provide you with excellent " care. Hearing back from our patients is one way we can continue to improve our services. Please take a few minutes to complete the written survey that you may receive in the mail after your visit with us. Thank you!             Your Updated Medication List - Protect others around you: Learn how to safely use, store and throw away your medicines at www.disposemymeds.org.          This list is accurate as of: 10/4/17 11:59 PM.  Always use your most recent med list.                   Brand Name Dispense Instructions for use Diagnosis    allopurinol 100 MG tablet    ZYLOPRIM    180 tablet    Take 2 tablets by mouth  every day    Hyperlipidemia LDL goal <100, Benign essential hypertension       amLODIPine 5 MG tablet    NORVASC    180 tablet    Take 1 tablet by mouth  twice a day    Hyperlipidemia LDL goal <100, Benign essential hypertension       ASPIRIN PO      Take 325 mg by mouth daily.        atenolol 50 MG tablet    TENORMIN    90 tablet    Take 1 tablet by mouth  every day    Hyperlipidemia LDL goal <100, Benign essential hypertension       atorvastatin 20 MG tablet    LIPITOR    90 tablet    Take 1 tablet by mouth  every day    Hyperlipidemia LDL goal <100, Benign essential hypertension       fenofibrate 160 MG tablet     90 tablet    Take 1 tablet by mouth  every day    Hyperlipidemia LDL goal <100, Benign essential hypertension       hydrALAZINE 50 MG tablet    APRESOLINE    270 tablet    Take 1 tablet by mouth 3  times a day    Hyperlipidemia LDL goal <100, Benign essential hypertension       VITAMIN D3 PO      Take 1 tablet by mouth daily        ZANTAC PO      Take 150 mg by mouth daily as needed

## 2017-10-05 LAB
ANION GAP SERPL CALCULATED.3IONS-SCNC: 10 MMOL/L (ref 3–14)
BUN SERPL-MCNC: 28 MG/DL (ref 7–30)
CALCIUM SERPL-MCNC: 9.3 MG/DL (ref 8.5–10.1)
CHLORIDE SERPL-SCNC: 107 MMOL/L (ref 94–109)
CO2 SERPL-SCNC: 23 MMOL/L (ref 20–32)
CREAT SERPL-MCNC: 2.11 MG/DL (ref 0.66–1.25)
GFR SERPL CREATININE-BSD FRML MDRD: 30 ML/MIN/1.7M2
GLUCOSE SERPL-MCNC: 94 MG/DL (ref 70–99)
POTASSIUM SERPL-SCNC: 4.6 MMOL/L (ref 3.4–5.3)
SODIUM SERPL-SCNC: 140 MMOL/L (ref 133–144)

## 2017-10-10 NOTE — PROGRESS NOTES
I called the patient and informed of results. The following changes were made to treatment:   Concerned about prerenal?Rec: 6-8 glasses of francisco j.RTC 1 wk.    Patient voices understanding and will contact me with any further questions.     Warren Lovelace MD

## 2017-10-18 ENCOUNTER — OFFICE VISIT (OUTPATIENT)
Dept: FAMILY MEDICINE | Facility: CLINIC | Age: 82
End: 2017-10-18
Payer: MEDICARE

## 2017-10-18 VITALS
OXYGEN SATURATION: 95 % | TEMPERATURE: 98 F | HEIGHT: 69 IN | DIASTOLIC BLOOD PRESSURE: 50 MMHG | SYSTOLIC BLOOD PRESSURE: 114 MMHG | BODY MASS INDEX: 25.62 KG/M2 | HEART RATE: 56 BPM | WEIGHT: 173 LBS

## 2017-10-18 DIAGNOSIS — M25.562 ACUTE PAIN OF LEFT KNEE: ICD-10-CM

## 2017-10-18 DIAGNOSIS — M1A.09X0 IDIOPATHIC CHRONIC GOUT OF MULTIPLE SITES WITHOUT TOPHUS: ICD-10-CM

## 2017-10-18 DIAGNOSIS — E78.5 HYPERLIPIDEMIA LDL GOAL <100: ICD-10-CM

## 2017-10-18 DIAGNOSIS — M17.32 POST-TRAUMATIC OSTEOARTHRITIS OF LEFT KNEE: ICD-10-CM

## 2017-10-18 DIAGNOSIS — N18.30 CKD (CHRONIC KIDNEY DISEASE) STAGE 3, GFR 30-59 ML/MIN (H): Primary | ICD-10-CM

## 2017-10-18 DIAGNOSIS — I35.0 AORTIC VALVE STENOSIS, UNSPECIFIED ETIOLOGY: ICD-10-CM

## 2017-10-18 DIAGNOSIS — M15.0 PRIMARY OSTEOARTHRITIS INVOLVING MULTIPLE JOINTS: ICD-10-CM

## 2017-10-18 DIAGNOSIS — I10 BENIGN ESSENTIAL HYPERTENSION: ICD-10-CM

## 2017-10-18 PROCEDURE — 99214 OFFICE O/P EST MOD 30 MIN: CPT | Mod: 25 | Performed by: INTERNAL MEDICINE

## 2017-10-18 PROCEDURE — 36415 COLL VENOUS BLD VENIPUNCTURE: CPT | Performed by: INTERNAL MEDICINE

## 2017-10-18 PROCEDURE — 80048 BASIC METABOLIC PNL TOTAL CA: CPT | Performed by: INTERNAL MEDICINE

## 2017-10-18 PROCEDURE — 20610 DRAIN/INJ JOINT/BURSA W/O US: CPT | Performed by: INTERNAL MEDICINE

## 2017-10-18 NOTE — NURSING NOTE
"Chief Complaint   Patient presents with     RECHECK     kidney function, patient has been trying to keep himself well hydrated since last visit     Knee left     might have sustained injury to L knee while mowing lawn over the weekend       Initial /50  Pulse 56  Temp 98  F (36.7  C) (Oral)  Ht 5' 9\" (1.753 m)  Wt 173 lb (78.5 kg)  SpO2 95%  BMI 25.55 kg/m2 Estimated body mass index is 25.55 kg/(m^2) as calculated from the following:    Height as of this encounter: 5' 9\" (1.753 m).    Weight as of this encounter: 173 lb (78.5 kg).  Medication Reconciliation: complete     Jorje rTevino, GAGAN     "

## 2017-10-18 NOTE — PROGRESS NOTES
SUBJECTIVE:   Trevor Soni is a 84 year old male who presents to clinic today for the following health issues:    Chief Complaint   Patient presents with     RECHECK     kidney function, patient has been trying to keep himself well hydrated since last visit     Knee left     might have sustained injury to L knee while mowing lawn over the weekend     Patient complains of 4 days of left knee pain, most significantly on the medial aspect, which radiates from the groin to the medial aspect of the mid-calf. Endorses swelling of the knee. patient has been taking 650 mg acetaminophen once with mild pain relief. Patient also used heat.    Patient has a history of CKD and was instructed to stay well hydrated at last appointment. As he was logging his daily drinking habits, he stated his routine was within the recommended amount of beverages and did not alter the amount of liquid he consumes.    He has a history of left lateral epicondylitis and reports he has been wearing an elbow brace, though he thinks he has strained his wrist as he is being cautious with his elbow. Denies swelling of the wrist and elbow.    Problem list and histories reviewed & adjusted, as indicated.  Additional history: as documented    Current Outpatient Prescriptions   Medication Sig Dispense Refill     atorvastatin (LIPITOR) 20 MG tablet Take 1 tablet by mouth  every day 90 tablet 3     hydrALAZINE (APRESOLINE) 50 MG tablet Take 1 tablet by mouth 3  times a day 270 tablet 3     allopurinol (ZYLOPRIM) 100 MG tablet Take 2 tablets by mouth  every day 180 tablet 3     amLODIPine (NORVASC) 5 MG tablet Take 1 tablet by mouth  twice a day 180 tablet 3     fenofibrate 160 MG tablet Take 1 tablet by mouth  every day 90 tablet 3     atenolol (TENORMIN) 50 MG tablet Take 1 tablet by mouth  every day 90 tablet 3     Cholecalciferol (VITAMIN D3 PO) Take 1 tablet by mouth daily       ASPIRIN PO Take 325 mg by mouth daily.       Ranitidine HCl (ZANTAC PO)  "Take 150 mg by mouth daily as needed        Allergies   Allergen Reactions     Avocado      Ciprofloxacin Itching     Penicillins Itching     Reviewed and updated as needed this visit by clinical staff  Tobacco  Allergies  Meds       Reviewed and updated as needed this visit by Provider       ROS:  Constitutional, HEENT, cardiovascular, pulmonary, gi and gu systems are negative, except as otherwise noted.    This document serves as a record of the services and decisions personally performed and made by Warren Lovelace MD. It was created on his/her behalf by Alicja Prince, a trained medical scribe. The creation of this document is based the provider's statements to the medical scribe.  Scribe Alicja Prince 8:04 AM, October 18, 2017     OBJECTIVE:   /50  Pulse 56  Temp 98  F (36.7  C) (Oral)  Ht 1.753 m (5' 9\")  Wt 78.5 kg (173 lb)  SpO2 95%  BMI 25.55 kg/m2  Body mass index is 25.55 kg/(m^2).  Neck was supple without adenopathy or thyromegaly his carotids were normal without bruits  Chest clear to auscultation and percussion  Cardiovascular S1 and S2 are physiologic without murmurs or gallops. Grade 3/9 systolic ejection radiating across the precordium to the carotids   Abdomen bowel sounds were normal.  There is no palpable mass or organomegaly  Extremities nontender with trace-1+ pretibial edema on the right, 1+ on the left.  Left knee moderate effusion including a Baker's cyst, his ligaments intact, ACL maneuver was negative, drawer sign negative   Pulses pedal pulses are as described otherwise his pulses are bilaterally symmetrical throughout without bruits  Skin without significant abnormality     Procedure:The superior lateral aspect of the left knee was sterilely prepped and we withdrew 20 cc of serosanguinous without difficulty. Using the same 21 gauge needle, we injected 60 mg of medrol with lido and marcaine without difficulty.    ASSESSMENT/PLAN:   1. CKD (chronic kidney disease) " stage 3, GFR 30-59 ml/min  Follow up on renal function.  - Basic metabolic panel    2. Aortic valve stenosis, unspecified etiology    3. Hyperlipidemia LDL goal <100  Under good control and I remain stable  4. Benign essential hypertension  Medications reviewed to avoid complications associated with number one and reviewed required floors per day  5. Primary osteoarthritis involving multiple joints  Monitoring avoidance and overuse  6. Idiopathic chronic gout of multiple sites without tophus  Continue same prophylaxis  7. Post-traumatic osteoarthritis of left knee  - METHYLPREDNISOLONE 80 MG INJ  - DRAIN/INJECT LARGE JOINT/BURSA    8. Acute pain of left knee  Maintaining a low level of activity. Recommended using heat on the left knee BID. Continue taking acetaminophen if needed.    Warren Lovelace MD  [facility]    The information in this document, created by the medical scribe for me, accurately reflects the services I personally performed and the decisions made by me. I have reviewed and approved this document for accuracy prior to leaving the patient care area.  Warren Lovelace MD  8:04 AM, 10/18/17

## 2017-10-18 NOTE — MR AVS SNAPSHOT
"              After Visit Summary   10/18/2017    Trevor Soni    MRN: [id number]           Patient Information     Date Of Birth          9/3/1933        Visit Information        Provider Department      10/18/2017 8:00 AM Warren Lovelace MD [facility]        Today's Diagnoses     CKD (chronic kidney disease) stage 3, GFR 30-59 ml/min    -  1    Aortic valve stenosis, unspecified etiology        Hyperlipidemia LDL goal <100        Benign essential hypertension        Primary osteoarthritis involving multiple joints        Idiopathic chronic gout of multiple sites without tophus        Post-traumatic osteoarthritis of left knee        Acute pain of left knee           Follow-ups after your visit        Who to contact     If you have questions or need follow up information about today's clinic visit or your schedule please contact [facility] directly at [phone].  Normal or non-critical lab and imaging results will be communicated to you by MyChart, letter or phone within 4 business days after the clinic has received the results. If you do not hear from us within 7 days, please contact the clinic through MyChart or phone. If you have a critical or abnormal lab result, we will notify you by phone as soon as possible.  Submit refill requests through [employer] or call your pharmacy and they will forward the refill request to us. Please allow 3 business days for your refill to be completed.          Additional Information About Your Visit        MyChart Information     [employer] lets you send messages to your doctor, view your test results, renew your prescriptions, schedule appointments and more. To sign up, go to [email or web address] . Click on \"Log in\" on the left side of the screen, which will take you to the Welcome page. Then click on \"Sign up Now\" on the right side of the page.     You will be asked to enter the access code listed below, as well as some personal information. Please " "follow the directions to create your username and password.     Your access code is: [email or web address]  Expires: 2018  4:51 AM     Your access code will  in 90 days. If you need help or a new code, please call your [facility] clinic or [phone].        Care EveryWhere ID     This is your Care EveryWhere ID. This could be used by other organizations to access your [facility] medical records  [id number]        Your Vitals Were     Pulse Temperature Height Pulse Oximetry BMI (Body Mass Index)       56 98  F (36.7  C) (Oral) 5' 9\" (1.753 m) 95% 25.55 kg/m2        Blood Pressure from Last 3 Encounters:   10/18/17 114/50   10/04/17 120/60   17 124/60    Weight from Last 3 Encounters:   10/18/17 173 lb (78.5 kg)   10/04/17 170 lb (77.1 kg)   17 168 lb (76.2 kg)              We Performed the Following     Basic metabolic panel     DRAIN/INJECT LARGE JOINT/BURSA     METHYLPREDNISOLONE 80 MG INJ        Primary Care Provider Office Phone # Fax #    Warren Lovelace [phone] [address] [address]        Equal Access to Services     [facility] : Hadii aad ku hadasho Soomaali, waaxda luqadaha, qaybta kaalmada adeegyada, waxay nahid haymolly andre . So [facility] [phone].    ATENCIÓN: Si habla español, tiene a mendoza disposición servicios gratuitos de asistencia lingüística. Llame al [phone].    We comply with applicable federal civil rights laws and [place] laws. We do not discriminate on the basis of race, color, national origin, age, disability, sex, sexual orientation, or gender identity.            Thank you!     Thank you for choosing [facility]  for your care. Our goal is always to provide you with excellent care. Hearing back from our patients is one way we can continue to improve our services. Please take a few minutes to complete the written survey that you may receive in the mail after your visit with us. Thank you!      "        Your Updated Medication List - Protect others around you: Learn how to safely use, store and throw away your medicines at www.disposemymeds.org.          This list is accurate as of: 10/18/17 11:59 PM.  Always use your most recent med list.                   Brand Name Dispense Instructions for use Diagnosis    allopurinol 100 MG tablet    ZYLOPRIM    180 tablet    Take 2 tablets by mouth  every day    Hyperlipidemia LDL goal <100, Benign essential hypertension       amLODIPine 5 MG tablet    NORVASC    180 tablet    Take 1 tablet by mouth  twice a day    Hyperlipidemia LDL goal <100, Benign essential hypertension       ASPIRIN PO      Take 325 mg by mouth daily.        atenolol 50 MG tablet    TENORMIN    90 tablet    Take 1 tablet by mouth  every day    Hyperlipidemia LDL goal <100, Benign essential hypertension       atorvastatin 20 MG tablet    LIPITOR    90 tablet    Take 1 tablet by mouth  every day    Hyperlipidemia LDL goal <100, Benign essential hypertension       fenofibrate 160 MG tablet     90 tablet    Take 1 tablet by mouth  every day    Hyperlipidemia LDL goal <100, Benign essential hypertension       hydrALAZINE 50 MG tablet    APRESOLINE    270 tablet    Take 1 tablet by mouth 3  times a day    Hyperlipidemia LDL goal <100, Benign essential hypertension       VITAMIN D3 PO      Take 1 tablet by mouth daily        ZANTAC PO      Take 150 mg by mouth daily as needed

## 2017-10-19 LAB
ANION GAP SERPL CALCULATED.3IONS-SCNC: 12 MMOL/L (ref 3–14)
BUN SERPL-MCNC: 30 MG/DL (ref 7–30)
CALCIUM SERPL-MCNC: 9.1 MG/DL (ref 8.5–10.1)
CHLORIDE SERPL-SCNC: 109 MMOL/L (ref 94–109)
CO2 SERPL-SCNC: 21 MMOL/L (ref 20–32)
CREAT SERPL-MCNC: 2 MG/DL (ref 0.66–1.25)
GFR SERPL CREATININE-BSD FRML MDRD: 32 ML/MIN/1.7M2
GLUCOSE SERPL-MCNC: 86 MG/DL (ref 70–99)
POTASSIUM SERPL-SCNC: 4.3 MMOL/L (ref 3.4–5.3)
SODIUM SERPL-SCNC: 142 MMOL/L (ref 133–144)

## 2017-11-01 ENCOUNTER — RADIANT APPOINTMENT (OUTPATIENT)
Dept: GENERAL RADIOLOGY | Facility: CLINIC | Age: 82
End: 2017-11-01
Attending: INTERNAL MEDICINE
Payer: MEDICARE

## 2017-11-01 ENCOUNTER — OFFICE VISIT (OUTPATIENT)
Dept: FAMILY MEDICINE | Facility: CLINIC | Age: 82
End: 2017-11-01
Payer: MEDICARE

## 2017-11-01 VITALS
HEART RATE: 53 BPM | DIASTOLIC BLOOD PRESSURE: 53 MMHG | WEIGHT: 170 LBS | HEIGHT: 69 IN | TEMPERATURE: 97.4 F | SYSTOLIC BLOOD PRESSURE: 115 MMHG | OXYGEN SATURATION: 95 % | BODY MASS INDEX: 25.18 KG/M2

## 2017-11-01 DIAGNOSIS — M1A.09X0 IDIOPATHIC CHRONIC GOUT OF MULTIPLE SITES WITHOUT TOPHUS: ICD-10-CM

## 2017-11-01 DIAGNOSIS — E78.5 HYPERLIPIDEMIA LDL GOAL <100: ICD-10-CM

## 2017-11-01 DIAGNOSIS — I10 BENIGN ESSENTIAL HYPERTENSION: ICD-10-CM

## 2017-11-01 DIAGNOSIS — M17.32 POST-TRAUMATIC OSTEOARTHRITIS OF LEFT KNEE: Primary | ICD-10-CM

## 2017-11-01 DIAGNOSIS — I71.40 ABDOMINAL AORTIC ANEURYSM (AAA) WITHOUT RUPTURE (H): ICD-10-CM

## 2017-11-01 DIAGNOSIS — M17.32 POST-TRAUMATIC OSTEOARTHRITIS OF LEFT KNEE: ICD-10-CM

## 2017-11-01 DIAGNOSIS — N18.30 CKD (CHRONIC KIDNEY DISEASE) STAGE 3, GFR 30-59 ML/MIN (H): ICD-10-CM

## 2017-11-01 DIAGNOSIS — I35.0 AORTIC VALVE STENOSIS, UNSPECIFIED ETIOLOGY: ICD-10-CM

## 2017-11-01 PROBLEM — N18.4 CHRONIC KIDNEY DISEASE, STAGE 4 (SEVERE) (H): Status: RESOLVED | Noted: 2017-11-01 | Resolved: 2017-11-01

## 2017-11-01 PROBLEM — N18.4 CHRONIC KIDNEY DISEASE, STAGE 4 (SEVERE) (H): Status: ACTIVE | Noted: 2017-11-01

## 2017-11-01 PROCEDURE — 90471 IMMUNIZATION ADMIN: CPT | Performed by: INTERNAL MEDICINE

## 2017-11-01 PROCEDURE — 90715 TDAP VACCINE 7 YRS/> IM: CPT | Performed by: INTERNAL MEDICINE

## 2017-11-01 PROCEDURE — 73560 X-RAY EXAM OF KNEE 1 OR 2: CPT | Mod: LT

## 2017-11-01 PROCEDURE — 99213 OFFICE O/P EST LOW 20 MIN: CPT | Mod: 25 | Performed by: INTERNAL MEDICINE

## 2017-11-01 NOTE — PROGRESS NOTES
SUBJECTIVE:   Trevor Soni is a 84 year old male who presents to clinic today for the following health issues:    Musculoskeletal problem/pain      Duration: ongoing, has been worse since cortisone injection    Description  Location: left knee    Intensity:  severe    Accompanying signs and symptoms: radiation of pain to shin    History  Previous similar problem: YES  Previous evaluation:  none    Precipitating or alleviating factors:  Trauma or overuse: no   Aggravating factors include: standing, walking and climbing stairs    Therapies tried and outcome: rest/inactivity, heat, ice and acetaminophen      Patient presents to the clinic to follow up on his left knee pain. He states that the symptoms began approximately one week ago. He describes the pain as a sharp pain that is exacerbated by movement such as walking, standing, and climbing stairs. The only time he does not have pain is when he is inactive. Heat alleviates the pain. He is also using ice and tylenol with limited improvement. He is unsure of any precipitating injuries. Of note, patient had a cortisone shot in the knee three weeks ago without any improvement of the knee.      Problem list and histories reviewed & adjusted, as indicated.  Additional history: as documented    Current Outpatient Prescriptions   Medication Sig Dispense Refill     atorvastatin (LIPITOR) 20 MG tablet Take 1 tablet by mouth  every day 90 tablet 3     hydrALAZINE (APRESOLINE) 50 MG tablet Take 1 tablet by mouth 3  times a day 270 tablet 3     allopurinol (ZYLOPRIM) 100 MG tablet Take 2 tablets by mouth  every day 180 tablet 3     amLODIPine (NORVASC) 5 MG tablet Take 1 tablet by mouth  twice a day 180 tablet 3     fenofibrate 160 MG tablet Take 1 tablet by mouth  every day 90 tablet 3     atenolol (TENORMIN) 50 MG tablet Take 1 tablet by mouth  every day 90 tablet 3     Cholecalciferol (VITAMIN D3 PO) Take 1 tablet by mouth daily       ASPIRIN PO Take 325 mg by mouth  "daily.       Ranitidine HCl (ZANTAC PO) Take 150 mg by mouth daily as needed        Allergies   Allergen Reactions     Avocado      Ciprofloxacin Itching     Penicillins Itching       Reviewed and updated as needed this visit by clinical staffTobacco  Allergies  Meds  Problems  Med Hx  Surg Hx  Fam Hx  Soc Hx        Reviewed and updated as needed this visit by Provider         ROS:  Constitutional, HEENT, cardiovascular, pulmonary, gi and gu systems are negative, except as otherwise noted.    This document serves as a record of the services and decisions personally performed and made by Warren Lovelace MD. It was created on his/her behalf by Chemo Gibbs, a trained medical scribe. The creation of this document is based the provider's statements to the medical scribe.  Scribgarrison Gibbs 8:32 AM, November 1, 2017    OBJECTIVE:   /53 (BP Location: Right arm, Cuff Size: Adult Regular)  Pulse 53  Temp 97.4  F (36.3  C) (Oral)  Ht 1.753 m (5' 9\")  Wt 77.1 kg (170 lb)  SpO2 95%  BMI 25.1 kg/m2  Body mass index is 25.1 kg/(m^2).    Left Knee: has a moderate effusion, he has tenderness along the medial joint line, the tendons were intact, his drawer's sign was negative, his ACL test was negative, he has a baker's cyst  Right knee: without effusion, and was non tender  Extremities: 1-2+ pretibial edema on the left, trace on the right    Procedure:  After sterily preparing his knee, from a lateral superior approach we withdrew 28 ccs of serosanguinous fluid, and injected 60 MG of medrol with marcaine and lidocaine  Diagnostic Test Results:  No results found for this or any previous visit (from the past 24 hour(s)).  Xray - Shows significant arthritic changes of his left knee.    ASSESSMENT/PLAN:     1. Post-traumatic osteoarthritis of left knee  - TDAP VACCINE (ADACEL)  - XR Knee Standing Left 2 Views; Future  - diclofenac (VOLTAREN) 1 % GEL topical gel; Apply 4 grams to knees  four times daily " using enclosed dosing card.  Dispense: 100 g; Refill: 3    -Recommended removing fluid in the joint and trying another cortisone shot. Will reevaluate the effectiveness of cortisone and discussed using Synvisc in the future if cortisone is ineffective.  -Recommended using topical Voltaren gel prn.  1. Post-traumatic osteoarthritis of left knee    - TDAP VACCINE (ADACEL)  - XR Knee Standing Left 2 Views; Future  - diclofenac (VOLTAREN) 1 % GEL topical gel; Apply 4 grams to knees  four times daily using enclosed dosing card.  Dispense: 100 g; Refill: 3    2. CKD (chronic kidney disease) stage 3, GFR 30-59 ml/min      3. Abdominal aortic aneurysm (AAA) without rupture (H)      4. Aortic valve stenosis, unspecified etiology      5. Hyperlipidemia LDL goal <100      6. Benign essential hypertension      7. Idiopathic chronic gout of multiple sites without tophus      Warren Lovelace MD  [facility]    The information in this document, created by the medical scribe for me, accurately reflects the services I personally performed and the decisions made by me. I have reviewed and approved this document for accuracy prior to leaving the patient care area.  Warren Lovelace MD  10:16 AM, 11/01/17

## 2017-11-01 NOTE — MR AVS SNAPSHOT
"              After Visit Summary   11/1/2017    Trevor Soni    MRN: [id number]           Patient Information     Date Of Birth          9/3/1933        Visit Information        Provider Department      11/1/2017 8:30 AM Warren Lovelace MD [facility]        Today's Diagnoses     Post-traumatic osteoarthritis of left knee    -  1    CKD (chronic kidney disease) stage 3, GFR 30-59 ml/min        Abdominal aortic aneurysm (AAA) without rupture (H)        Aortic valve stenosis, unspecified etiology        Hyperlipidemia LDL goal <100        Benign essential hypertension        Idiopathic chronic gout of multiple sites without tophus           Follow-ups after your visit        Who to contact     If you have questions or need follow up information about today's clinic visit or your schedule please contact [facility] directly at [phone].  Normal or non-critical lab and imaging results will be communicated to you by MyChart, letter or phone within 4 business days after the clinic has received the results. If you do not hear from us within 7 days, please contact the clinic through MyChart or phone. If you have a critical or abnormal lab result, we will notify you by phone as soon as possible.  Submit refill requests through [employer] or call your pharmacy and they will forward the refill request to us. Please allow 3 business days for your refill to be completed.          Additional Information About Your Visit        MyChart Information     [employer] lets you send messages to your doctor, view your test results, renew your prescriptions, schedule appointments and more. To sign up, go to [email or web address] . Click on \"Log in\" on the left side of the screen, which will take you to the Welcome page. Then click on \"Sign up Now\" on the right side of the page.     You will be asked to enter the access code listed below, as well as some personal information. Please follow the directions to " "create your username and password.     Your access code is: [email or web address]  Expires: 2018  3:51 AM     Your access code will  in 90 days. If you need help or a new code, please call your [facility] clinic or [phone].        Care EveryWhere ID     This is your Care EveryWhere ID. This could be used by other organizations to access your [facility] medical records  [id number]        Your Vitals Were     Pulse Temperature Height Pulse Oximetry BMI (Body Mass Index)       53 97.4  F (36.3  C) (Oral) 5' 9\" (1.753 m) 95% 25.1 kg/m2        Blood Pressure from Last 3 Encounters:   17 115/53   10/18/17 114/50   10/04/17 120/60    Weight from Last 3 Encounters:   17 170 lb (77.1 kg)   10/18/17 173 lb (78.5 kg)   10/04/17 170 lb (77.1 kg)              We Performed the Following     TDAP VACCINE (ADACEL)          Today's Medication Changes          These changes are accurate as of: 17 11:59 PM.  If you have any questions, ask your nurse or doctor.               Start taking these medicines.        Dose/Directions    diclofenac 1 % Gel topical gel   Commonly known as:  VOLTAREN   Used for:  Post-traumatic osteoarthritis of left knee   Started by:  Warren Lovelace MD        Apply 4 grams to knees  four times daily using enclosed dosing card.   Quantity:  100 g   Refills:  3            Where to get your medicines      These medications were sent to [facility] [address] [place] [address] [place], [place] - [address] AT [address] [place] [address]  [address] [place] [address]    Hours:  24-hours Phone:  [phone]     diclofenac 1 % Gel topical gel                Primary Care Provider Office Phone # Fax #    Warren Lovelace [phone] [address]  [place] [address]        Equal Access to Services     SHIRA BEST AH: Hadii tahira Lozano, waaxda lugraciela, qaybta kaalmada ademercedez hunt. [facility] [phone].    ATENCIÓN: Si " shawna aguirre, tiene a mendoza disposición servicios gratuitos de asistencia lingüística. Giuseppe castrejon [phone].    We comply with applicable federal civil rights laws and [place] laws. We do not discriminate on the basis of race, color, national origin, age, disability, sex, sexual orientation, or gender identity.            Thank you!     Thank you for choosing [facility]  for your care. Our goal is always to provide you with excellent care. Hearing back from our patients is one way we can continue to improve our services. Please take a few minutes to complete the written survey that you may receive in the mail after your visit with us. Thank you!             Your Updated Medication List - Protect others around you: Learn how to safely use, store and throw away your medicines at www.disposemymeds.org.          This list is accurate as of: 11/1/17 11:59 PM.  Always use your most recent med list.                   Brand Name Dispense Instructions for use Diagnosis    allopurinol 100 MG tablet    ZYLOPRIM    180 tablet    Take 2 tablets by mouth  every day    Hyperlipidemia LDL goal <100, Benign essential hypertension       amLODIPine 5 MG tablet    NORVASC    180 tablet    Take 1 tablet by mouth  twice a day    Hyperlipidemia LDL goal <100, Benign essential hypertension       ASPIRIN PO      Take 325 mg by mouth daily.        atenolol 50 MG tablet    TENORMIN    90 tablet    Take 1 tablet by mouth  every day    Hyperlipidemia LDL goal <100, Benign essential hypertension       atorvastatin 20 MG tablet    LIPITOR    90 tablet    Take 1 tablet by mouth  every day    Hyperlipidemia LDL goal <100, Benign essential hypertension       diclofenac 1 % Gel topical gel    VOLTAREN    100 g    Apply 4 grams to knees  four times daily using enclosed dosing card.    Post-traumatic osteoarthritis of left knee       fenofibrate 160 MG tablet     90 tablet    Take 1 tablet by mouth  every day    Hyperlipidemia LDL goal  <100, Benign essential hypertension       hydrALAZINE 50 MG tablet    APRESOLINE    270 tablet    Take 1 tablet by mouth 3  times a day    Hyperlipidemia LDL goal <100, Benign essential hypertension       VITAMIN D3 PO      Take 1 tablet by mouth daily        ZANTAC PO      Take 150 mg by mouth daily as needed

## 2018-03-29 DIAGNOSIS — E78.5 HYPERLIPIDEMIA LDL GOAL <100: ICD-10-CM

## 2018-03-29 DIAGNOSIS — I10 BENIGN ESSENTIAL HYPERTENSION: ICD-10-CM

## 2018-03-29 NOTE — TELEPHONE ENCOUNTER
Routing refill request to provider for review/approval because:  1) Fenofibrate - due for fasting lipids  2) Allopurinol - due for ALT; last uric acid and creatinine elevated   3) Hydralazine - no protocol information   4) Amlodipine - last creatinine elevated     PCP - do you want pt to come in for OV or does he just need fasting lab visit (would need orders in chart)?        Mckayla ALEX RN

## 2018-03-29 NOTE — TELEPHONE ENCOUNTER
"Requested Prescriptions   Pending Prescriptions Disp Refills     hydrALAZINE (APRESOLINE) 50 MG tablet [Pharmacy Med Name: HYDRALAZINE  50MG  TAB] 270 tablet      Sig: TAKE 1 TABLET BY MOUTH 3  TIMES A DAY    There is no refill protocol information for this order         Last Written Prescription Date:  2/08/17  Last Fill Quantity: 270 tablet,   # refills: 3  Last Office Visit: 11/01/2017 ([place])  Future Office visit:       Routing refill request to provider for review/approval because:  Drug not on the [facility], [facility] or [facility] refill protocol or controlled substance       allopurinol (ZYLOPRIM) 100 MG tablet [Pharmacy Med Name: ALLOPURINOL  100MG  TAB] 180 tablet      Sig: TAKE 2 TABLETS BY MOUTH  EVERY DAY    Gout Agents Protocol Failed    3/29/2018  4:27 AM       Failed - ALT on file in past 12 months    Recent Labs   Lab Test  06/10/16   1250   ALT  18            Failed - Uric acid greater than or equal to 6 on file in past 12 months    Recent Labs   Lab Test  08/28/17   1036   URIC  4.3     If level is 6mg/dL or greater, ok to refill one time and refer to provider.          Failed - Normal serum creatinine on file in the past 12 months    Recent Labs   Lab Test  10/18/17   0831   CR  2.00*            Passed - CBC on file in past 12 months    Recent Labs   Lab Test  08/21/17   1041   WBC  7.6   RBC  4.04*   HGB  12.4*   HCT  37.8*   PLT  217            Passed - Recent (12 mo) or future (30 days) visit within the authorizing provider's specialty    Patient had office visit in the last 12 months or has a visit in the next 30 days with authorizing provider or within the authorizing provider's specialty.  See \"Patient Info\" tab in inbasket, or \"Choose Columns\" in Meds & Orders section of the refill encounter.           Passed - Patient is age 18 or older     Last Written Prescription Date:  2/08/17  Last Fill Quantity: 180 tablet,  # refills: 3   Last office visit: 11/1/2017 with prescribing provider:  [place] " "  Future Office Visit:         fenofibrate 160 MG tablet [Pharmacy Med Name: FENOFIBRATE  160MG  TAB] 90 tablet      Sig: TAKE 1 TABLET BY MOUTH  EVERY DAY    Fibrates Failed    3/29/2018  4:27 AM       Failed - Lipid panel on file in past 12 months    Recent Labs   Lab Test 05/26/16   CHOL  97*   TRIG  86   HDL  30*   LDL  50   NHDL  67              Passed - No abnormal creatine kinase in past 12 months    No lab results found.            Passed - Recent (12 mo) or future (30 days) visit within the authorizing provider's specialty    Patient had office visit in the last 12 months or has a visit in the next 30 days with authorizing provider or within the authorizing provider's specialty.  See \"Patient Info\" tab in inbasket, or \"Choose Columns\" in Meds & Orders section of the refill encounter.           Passed - Patient is age 18 or older     Last Written Prescription Date:  2/08/17  Last Fill Quantity: 90 tablet,  # refills: 3   Last office visit: 11/1/2017 with prescribing provider:  [place]   Future Office Visit:         amLODIPine (NORVASC) 5 MG tablet [Pharmacy Med Name: AMLODIPINE  5MG  TAB] 180 tablet      Sig: TAKE 1 TABLET BY MOUTH  TWICE A DAY    Calcium Channel Blockers Protocol  Failed    3/29/2018  4:27 AM       Failed - Normal serum creatinine on file in past 12 months    Recent Labs   Lab Test  10/18/17   0831   CR  2.00*            Passed - Blood pressure under 140/90 in past 12 months    BP Readings from Last 3 Encounters:   11/01/17 115/53   10/18/17 114/50   10/04/17 120/60                Passed - Recent (12 mo) or future (30 days) visit within the authorizing provider's specialty    Patient had office visit in the last 12 months or has a visit in the next 30 days with authorizing provider or within the authorizing provider's specialty.  See \"Patient Info\" tab in inbasket, or \"Choose Columns\" in Meds & Orders section of the refill encounter.           Passed - Patient is age 18 or older     Last " Written Prescription Date:  2/08/17  Last Fill Quantity: 180 tablet,  # refills: 3   Last office visit: 11/1/2017 with prescribing provider:  [place] Office Visit:

## 2018-03-30 RX ORDER — HYDRALAZINE HYDROCHLORIDE 50 MG/1
TABLET, FILM COATED ORAL
Qty: 270 TABLET | Refills: 0 | Status: SHIPPED | OUTPATIENT
Start: 2018-03-30 | End: 2018-06-24

## 2018-03-30 RX ORDER — AMLODIPINE BESYLATE 5 MG/1
TABLET ORAL
Qty: 180 TABLET | Refills: 0 | Status: SHIPPED | OUTPATIENT
Start: 2018-03-30 | End: 2018-06-24

## 2018-03-30 RX ORDER — ALLOPURINOL 100 MG/1
TABLET ORAL
Qty: 180 TABLET | Refills: 0 | Status: SHIPPED | OUTPATIENT
Start: 2018-03-30 | End: 2018-06-24

## 2018-03-30 RX ORDER — FENOFIBRATE 160 MG/1
TABLET ORAL
Qty: 90 TABLET | Refills: 0 | Status: SHIPPED | OUTPATIENT
Start: 2018-03-30 | End: 2018-06-24

## 2018-04-25 ENCOUNTER — OFFICE VISIT (OUTPATIENT)
Dept: FAMILY MEDICINE | Facility: CLINIC | Age: 83
End: 2018-04-25
Payer: MEDICARE

## 2018-04-25 VITALS
HEIGHT: 69 IN | OXYGEN SATURATION: 97 % | HEART RATE: 46 BPM | SYSTOLIC BLOOD PRESSURE: 140 MMHG | TEMPERATURE: 97.3 F | BODY MASS INDEX: 25.03 KG/M2 | WEIGHT: 169 LBS | DIASTOLIC BLOOD PRESSURE: 60 MMHG

## 2018-04-25 DIAGNOSIS — I10 BENIGN ESSENTIAL HYPERTENSION: ICD-10-CM

## 2018-04-25 DIAGNOSIS — I71.40 ABDOMINAL AORTIC ANEURYSM (AAA) WITHOUT RUPTURE (H): ICD-10-CM

## 2018-04-25 DIAGNOSIS — R53.83 FATIGUE, UNSPECIFIED TYPE: ICD-10-CM

## 2018-04-25 DIAGNOSIS — I35.0 AORTIC VALVE STENOSIS, UNSPECIFIED ETIOLOGY: ICD-10-CM

## 2018-04-25 DIAGNOSIS — Z00.00 ENCOUNTER FOR ROUTINE ADULT HEALTH EXAMINATION WITHOUT ABNORMAL FINDINGS: Primary | ICD-10-CM

## 2018-04-25 DIAGNOSIS — E55.9 VITAMIN D DEFICIENCY: ICD-10-CM

## 2018-04-25 DIAGNOSIS — B35.4 TINEA CORPORIS: ICD-10-CM

## 2018-04-25 DIAGNOSIS — E78.5 HYPERLIPIDEMIA LDL GOAL <100: ICD-10-CM

## 2018-04-25 DIAGNOSIS — N18.30 CKD (CHRONIC KIDNEY DISEASE) STAGE 3, GFR 30-59 ML/MIN (H): ICD-10-CM

## 2018-04-25 DIAGNOSIS — M15.0 PRIMARY OSTEOARTHRITIS INVOLVING MULTIPLE JOINTS: ICD-10-CM

## 2018-04-25 DIAGNOSIS — M1A.09X0 IDIOPATHIC CHRONIC GOUT OF MULTIPLE SITES WITHOUT TOPHUS: ICD-10-CM

## 2018-04-25 LAB
KOH PREP SPEC: NORMAL
SPECIMEN SOURCE: NORMAL

## 2018-04-25 PROCEDURE — 87220 TISSUE EXAM FOR FUNGI: CPT | Performed by: INTERNAL MEDICINE

## 2018-04-25 NOTE — PROGRESS NOTES
SUBJECTIVE:   Trevor Soni is a 84 year old male who presents for Preventive Visit.    Are you in the first 12 months of your Medicare Part B coverage?  No    Healthy Habits:    Do you get at least three servings of calcium containing foods daily (dairy, green leafy vegetables, etc.)? yes    Amount of exercise or daily activities, outside of work: minimal    Problems taking medications regularly No    Medication side effects: No    Have you had an eye exam in the past two years? yes    Do you see a dentist twice per year? yes    Do you have sleep apnea, excessive snoring or daytime drowsiness?yes      Ability to successfully perform activities of daily living: Yes, no assistance needed    Home safety:  none identified     Hearing impairment: Yes, Feel that people are mumbling or not speaking clearly.    Fall risk:  Fallen 2 or more times in the past year?: No  Any fall with injury in the past year?: No      COGNITIVE SCREEN  1) Repeat 3 items (Banana, Sunrise, Chair)    2) Clock draw: NORMAL  3) 3 item recall: Recalls 3 objects  Results: 3 items recalled: COGNITIVE IMPAIRMENT LESS LIKELY    Mini-CogTM Copyright CARMEN Whitten. Licensed by the author for use in [facility] [employer]; reprinted with permission (meaghan@[facility]). All rights reserved.      The patient presents to the clinic for a wellness exam. He reports having difficulty falling asleep. He also has 3-4 episodes of nocturia a night. After urinating, he will have difficulty falling back asleep.     The patient reports feeling tired and needing to stop and rest after walking about 50 feet. After regaining his strength, he is able to walk another 50 feet. He also has bilateral knee pain which makes walking more difficult. He had been applying diclofenac gel to the affected area.     The patient has a history of hypertension managed with amlodipine and atenolol. He has been self monitoring his blood pressures and gets readings of 130s-170s/80s-90s. He notes  that his pulse is typically in the 40s.     The patient reports having heartburn symptoms about twice a week. He will take Zantac when he is having the symptoms. He also feels like food gets stuck when swallowing. He has this sensation just superior to his clavicle.     He denies vision changes, neck problems, back problems, headaches, sinus pressure, chest pain, chest discomfort, heart palpitations, stomach problems, hematochezia, diarrhea, constipation, urination problems, skin changes, rashes, bone pain, muscle pain, joint pain, and weight changes.      Reviewed and updated as needed this visit by clinical staff  Tobacco  Allergies  Meds  Problems  Med Hx  Surg Hx  Fam Hx  Soc Hx          Reviewed and updated as needed this visit by Provider        Social History   Substance Use Topics     Smoking status: Former Smoker     Types: Cigars     Quit date: 1/1/1980     Smokeless tobacco: Never Used     Alcohol use 0.0 oz/week     0 Standard drinks or equivalent per week      Comment: rarely       If you drink alcohol do you typically have >3 drinks per day or >7 drinks per week? No                        Today's PHQ-2 Score:   PHQ-2 ( 1999 [employer]) 4/25/2018 10/4/2017   Q1: Little interest or pleasure in doing things 0 0   Q2: Feeling down, depressed or hopeless 0 0   PHQ-2 Score 0 0       Do you feel safe in your environment - Yes    Do you have a Health Care Directive?: No: Advance care planning reviewed with patient; information given to patient to review.    Current providers sharing in care for this patient include:   Patient Care Team:  Warren Lovelace MD as PCP - General (Internal Medicine)    The following health maintenance items are reviewed in Epic and correct as of today:  Health Maintenance   Topic Date Due     ADVANCE DIRECTIVE PLANNING Q5 YRS  09/03/1988     FALL RISK ASSESSMENT  10/04/2018     TETANUS IMMUNIZATION (SYSTEM ASSIGNED)  11/01/2027     PNEUMOCOCCAL  Completed     INFLUENZA VACCINE   Completed     BP Readings from Last 3 Encounters:   04/25/18 134/68   11/01/17 115/53   10/18/17 114/50    Wt Readings from Last 3 Encounters:   04/25/18 76.7 kg (169 lb)   11/01/17 77.1 kg (170 lb)   10/18/17 78.5 kg (173 lb)                  Current Outpatient Prescriptions   Medication Sig Dispense Refill     allopurinol (ZYLOPRIM) 100 MG tablet TAKE 2 TABLETS BY MOUTH  EVERY  tablet 0     amLODIPine (NORVASC) 5 MG tablet TAKE 1 TABLET BY MOUTH  TWICE A  tablet 0     ASPIRIN PO Take 325 mg by mouth daily.       atenolol (TENORMIN) 50 MG tablet TAKE 1 TABLET BY MOUTH  EVERY DAY 90 tablet 2     atorvastatin (LIPITOR) 20 MG tablet TAKE 1 TABLET BY MOUTH  EVERY DAY 90 tablet 3     Cholecalciferol (VITAMIN D3 PO) Take 1 tablet by mouth daily       diclofenac (VOLTAREN) 1 % GEL topical gel Apply 4 grams to knees  four times daily using enclosed dosing card. 100 g 3     fenofibrate 160 MG tablet TAKE 1 TABLET BY MOUTH  EVERY DAY 90 tablet 0     hydrALAZINE (APRESOLINE) 50 MG tablet TAKE 1 TABLET BY MOUTH 3  TIMES A  tablet 0     Ranitidine HCl (ZANTAC PO) Take 150 mg by mouth daily as needed          ROS:  CONSTITUTIONAL: NEGATIVE for fever, chills, change in weight  INTEGUMENTARY/SKIN: NEGATIVE for worrisome rashes, moles or lesions  EYES: NEGATIVE for vision changes or irritation  ENT/MOUTH: NEGATIVE for ear, mouth and throat problems  RESP: NEGATIVE for significant cough or SOB  BREAST: NEGATIVE for masses, tenderness or discharge  CV: NEGATIVE for chest pain, palpitations or peripheral edema  GI: NEGATIVE for nausea, abdominal pain, heartburn, or change in bowel habits  : NEGATIVE for frequency, dysuria, or hematuria  MUSCULOSKELETAL: NEGATIVE for significant arthralgias or myalgia  NEURO: NEGATIVE for weakness, dizziness or paresthesias  ENDOCRINE: NEGATIVE for temperature intolerance, skin/hair changes  HEME: NEGATIVE for bleeding problems  PSYCHIATRIC: NEGATIVE for changes in mood or  "affect  POSITIVE Nocturia x3-4, knee pain, shoulder pain, heartburn    This document serves as a record of the services and decisions personally performed and made by Warren Lovelace MD. It was created on his behalf by Patricia Peoples, a trained medical scribe. The creation of this document is based the provider's statements to the medical scribe. 4/25/2018  OBJECTIVE:   /68 (BP Location: Left arm, Cuff Size: Adult Large)  Pulse (!) 46  Temp 97.3  F (36.3  C) (Oral)  Ht 1.753 m (5' 9\")  Wt 76.7 kg (169 lb)  SpO2 97%  BMI 24.96 kg/m2 Estimated body mass index is 24.96 kg/(m^2) as calculated from the following:    Height as of this encounter: 1.753 m (5' 9\").    Weight as of this encounter: 76.7 kg (169 lb).  EXAM:   GENERAL: healthy, alert and no distress  EYES: Eyes grossly normal to inspection, PERRL and conjunctivae and sclerae normal  HENT: TMs are dull without light reflex bilaterally, partially edentulous otherwise ear canals and TM's normal, nose and mouth without ulcers or lesions  NECK: no adenopathy, no asymmetry, masses, or scars and thyroid normal to palpation  RESP: lungs clear to auscultation - no rales, rhonchi or wheezes  BREAST: normal without masses, tenderness or nipple discharge and no palpable axillary masses or adenopathy  CV: regular rate and rhythm, normal S1 S2, no S3 or S4, grade 3/6 systolic ejection murmur radiating across the precordium to the carotids, no click or rub, no peripheral edema and peripheral pulses strong  ABDOMEN: soft, nontender, no hepatosplenomegaly, no masses and bowel sounds normal   (male): normal male genitalia without lesions or urethral discharge, no hernia  RECTAL: normal sphincter tone, no rectal masses, prostate 1+, smooth, nontender without nodules or masses  MS: no gross musculoskeletal defects noted, no edema  Right knee has minor flexion contracture, tenderness along the medial joint line  Left knee has crepitation with flexion and extension, " arthritic pain seems to be below the patella  Shoulders have normal flexion and extension, external rotation limited, internal rotation normal  SKIN: no suspicious lesions or rashes except eczematous rash of lower legs (KOH was negative)   NEURO: Normal strength and tone, mentation intact and speech normal  PSYCH: mentation appears normal, affect normal/bright  LYMPH: no cervical, supraclavicular, axillary, or inguinal adenopathy    Diagnostic Results:  Pending     Patient will followup with fasting labs     EKG:  HR 47  Marked sinus  Bradycardia  -First degree A-V block   Parvez = 280  -  Diffuse nonspecific T-abnormality.     ASSESSMENT / PLAN:   1. Encounter for routine adult health examination without abnormal findings  Patient will followup for fasting labs tomorrow Up to date on immunizations.    2. Abdominal aortic aneurysm (AAA) without rupture (H)    3. CKD (chronic kidney disease) stage 3, GFR 30-59 ml/min    4. Aortic valve stenosis, unspecified etiology  - EKG 12-lead complete w/read - Clinics    5. Hyperlipidemia LDL goal <100  Controlled with atorvastatin and fenofibrate.     6. Benign essential hypertension  Controlled with atenolol and amlodipine.     7. Primary osteoarthritis involving multiple joints    8. Idiopathic chronic gout of multiple sites without tophus  Controlled with allopurinol.     9. Tinea corporis  KOH negative.   - KOH prep (skin, hair or nails only)    End of Life Planning:  Patient currently has an advanced directive: No.  I have verified the patient's ablity to prepare an advanced directive/make health care decisions.  Literature was provided to assist patient in preparing an advanced directive.    COUNSELING:  Reviewed preventive health counseling, as reflected in patient instructions       Regular exercise       Healthy diet/nutrition       Vision screening       Hearing screening       Dental care    Estimated body mass index is 24.96 kg/(m^2) as calculated from the following:    " Height as of this encounter: 1.753 m (5' 9\").    Weight as of this encounter: 76.7 kg (169 lb).     reports that he quit smoking about 38 years ago. His smoking use included Cigars. He has never used smokeless tobacco.    Appropriate preventive services were discussed with this patient, including applicable screening as appropriate for cardiovascular disease, diabetes, osteopenia/osteoporosis, and glaucoma.  As appropriate for age/gender, discussed screening for colorectal cancer, prostate cancer, breast cancer, and cervical cancer. Checklist reviewing preventive services available has been given to the patient.    Reviewed patients plan of care and provided an AVS. The Basic Care Plan (routine screening as documented in Health Maintenance) for Trevor meets the Care Plan requirement. This Care Plan has been established and reviewed with the Patient.    Counseling Resources:  ATP IV Guidelines  Pooled Cohorts Equation Calculator  Breast Cancer Risk Calculator  FRAX Risk Assessment  ICSI Preventive Guidelines  Dietary Guidelines for Americans, 2010  [employer] MyPlate  ASA Prophylaxis  Lung CA Screening    Warren Lovelace MD  [facility]    The information in this document, created by the medical scribe for me, accurately reflects the services I personally performed and the decisions made by me. I have reviewed and approved this document for accuracy prior to leaving the patient care area.  Warren Lovelace MD  1:59 PM, 04/25/18    "

## 2018-04-25 NOTE — MR AVS SNAPSHOT
After Visit Summary   4/25/2018    Trevor Soni    MRN: [id number]           Patient Information     Date Of Birth          9/3/1933        Visit Information        Provider Department      4/25/2018 1:30 PM Warren Lovelace MD [facility]        Today's Diagnoses     Encounter for routine adult health examination without abnormal findings    -  1    Abdominal aortic aneurysm (AAA) without rupture (H)        CKD (chronic kidney disease) stage 3, GFR 30-59 ml/min        Aortic valve stenosis, unspecified etiology        Hyperlipidemia LDL goal <100        Benign essential hypertension        Primary osteoarthritis involving multiple joints        Idiopathic chronic gout of multiple sites without tophus        Tinea corporis        Vitamin D deficiency        Fatigue, unspecified type          Care Instructions      Preventive Health Recommendations:       Male Ages 65 and over    Yearly exam:             See your health care provider every year in order to  o   Review health changes.   o   Discuss preventive care.    o   Review your medicines if your doctor has prescribed any.    Talk with your health care provider about whether you should have a test to screen for prostate cancer (PSA).    Every 3 years, have a diabetes test (fasting glucose). If you are at risk for diabetes, you should have this test more often.    Every 5 years, have a cholesterol test. Have this test more often if you are at risk for high cholesterol or heart disease.     Every 10 years, have a colonoscopy. Or, have a yearly FIT test (stool test). These exams will check for colon cancer.    Talk to with your health care provider about screening for Abdominal Aortic Aneurysm if you have a family history of AAA or have a history of smoking.  Shots:     Get a flu shot each year.     Get a tetanus shot every 10 years.     Talk to your doctor about your pneumonia vaccines. There are now two you should receive - Pneumovax  "(PPSV 23) and Prevnar (PCV 13).    Talk to your doctor about a shingles vaccine.     Talk to your doctor about the hepatitis B vaccine.  Nutrition:     Eat at least 5 servings of fruits and vegetables each day.     Eat whole-grain bread, whole-wheat pasta and brown rice instead of white grains and rice.     Talk to your doctor about Calcium and Vitamin D.   Lifestyle    Exercise for at least 150 minutes a week (30 minutes a day, 5 days a week). This will help you control your weight and prevent disease.     Limit alcohol to one drink per day.     No smoking.     Wear sunscreen to prevent skin cancer.     See your dentist every six months for an exam and cleaning.     See your eye doctor every 1 to 2 years to screen for conditions such as glaucoma, macular degeneration and cataracts.          Follow-ups after your visit        Who to contact     If you have questions or need follow up information about today's clinic visit or your schedule please contact [facility] directly at [phone].  Normal or non-critical lab and imaging results will be communicated to you by Personal MedSystemshart, letter or phone within 4 business days after the clinic has received the results. If you do not hear from us within 7 days, please contact the clinic through [employer] or phone. If you have a critical or abnormal lab result, we will notify you by phone as soon as possible.  Submit refill requests through [employer] or call your pharmacy and they will forward the refill request to us. Please allow 3 business days for your refill to be completed.          Additional Information About Your Visit        FiPath Information     FiPath lets you send messages to your doctor, view your test results, renew your prescriptions, schedule appointments and more. To sign up, go to [email or web address] . Click on \"Log in\" on the left side of the screen, which will take you to the Welcome page. Then click on \"Sign up Now\" on the right side of the " "page.     You will be asked to enter the access code listed below, as well as some personal information. Please follow the directions to create your username and password.     Your access code is: [email or web address]  Expires: 2018  7:15 AM     Your access code will  in 90 days. If you need help or a new code, please call your [facility] clinic or [phone].        Care EveryWhere ID     This is your Care EveryWhere ID. This could be used by other organizations to access your [facility] medical records  [id number]        Your Vitals Were     Pulse Temperature Height Pulse Oximetry BMI (Body Mass Index)       46 97.3  F (36.3  C) (Oral) 5' 9\" (1.753 m) 97% 24.96 kg/m2        Blood Pressure from Last 3 Encounters:   18 140/60   17 115/53   10/18/17 114/50    Weight from Last 3 Encounters:   18 169 lb (76.7 kg)   17 170 lb (77.1 kg)   10/18/17 173 lb (78.5 kg)              We Performed the Following     EKG 12-lead complete w/read - Clinics     KOH prep (skin, hair or nails only)     OFFICE/OUTPT VISIT,EST,LEVL III     Uric acid        Primary Care Provider Office Phone # Fax #    Warren Lovelace [phone] [address]  [facility] [address]        Equal Access to Services     SHIRA BEST : Hadii aad ku hadasho Soomaali, waaxda luqadaha, qaybta kaalmada sade, mercedez andre . So [facility] [phone].    ATENCIÓN: Si lisala carla, tiene a mendoza disposición servicios gratuitos de asistencia lingüística. Llame al [phone].    We comply with applicable federal civil rights laws and [place] laws. We do not discriminate on the basis of race, color, national origin, age, disability, sex, sexual orientation, or gender identity.            Thank you!     Thank you for choosing [facility]  for your care. Our goal is always to provide you with excellent care. Hearing back from our patients is one way we can continue to " improve our services. Please take a few minutes to complete the written survey that you may receive in the mail after your visit with us. Thank you!             Your Updated Medication List - Protect others around you: Learn how to safely use, store and throw away your medicines at www.disposemymeds.org.          This list is accurate as of 4/25/18 11:59 PM.  Always use your most recent med list.                   Brand Name Dispense Instructions for use Diagnosis    allopurinol 100 MG tablet    ZYLOPRIM    180 tablet    TAKE 2 TABLETS BY MOUTH  EVERY DAY    Hyperlipidemia LDL goal <100, Benign essential hypertension       amLODIPine 5 MG tablet    NORVASC    180 tablet    TAKE 1 TABLET BY MOUTH  TWICE A DAY    Hyperlipidemia LDL goal <100, Benign essential hypertension       ASPIRIN PO      Take 325 mg by mouth daily.        atenolol 50 MG tablet    TENORMIN    90 tablet    TAKE 1 TABLET BY MOUTH  EVERY DAY    Hyperlipidemia LDL goal <100, Benign essential hypertension       atorvastatin 20 MG tablet    LIPITOR    90 tablet    TAKE 1 TABLET BY MOUTH  EVERY DAY    Hyperlipidemia LDL goal <100, Benign essential hypertension       diclofenac 1 % Gel topical gel    VOLTAREN    100 g    Apply 4 grams to knees  four times daily using enclosed dosing card.    Post-traumatic osteoarthritis of left knee       fenofibrate 160 MG tablet     90 tablet    TAKE 1 TABLET BY MOUTH  EVERY DAY    Hyperlipidemia LDL goal <100, Benign essential hypertension       hydrALAZINE 50 MG tablet    APRESOLINE    270 tablet    TAKE 1 TABLET BY MOUTH 3  TIMES A DAY    Hyperlipidemia LDL goal <100, Benign essential hypertension       VITAMIN D3 PO      Take 1 tablet by mouth daily        ZANTAC PO      Take 150 mg by mouth daily as needed

## 2018-04-25 NOTE — NURSING NOTE
"Chief Complaint   Patient presents with     Wellness Visit       Initial /68 (BP Location: Left arm, Cuff Size: Adult Large)  Pulse (!) 46  Temp 97.3  F (36.3  C) (Oral)  Ht 5' 9\" (1.753 m)  Wt 169 lb (76.7 kg)  SpO2 97%  BMI 24.96 kg/m2 Estimated body mass index is 24.96 kg/(m^2) as calculated from the following:    Height as of this encounter: 5' 9\" (1.753 m).    Weight as of this encounter: 169 lb (76.7 kg).  Medication Reconciliation: complete     Charisse Gupta MA    "

## 2018-04-26 ENCOUNTER — APPOINTMENT (OUTPATIENT)
Dept: LAB | Facility: CLINIC | Age: 83
End: 2018-04-26
Payer: MEDICARE

## 2018-04-26 LAB — URATE SERPL-MCNC: 4.9 MG/DL (ref 3.5–7.2)

## 2018-04-26 PROCEDURE — G0439 PPPS, SUBSEQ VISIT: HCPCS | Performed by: INTERNAL MEDICINE

## 2018-04-26 PROCEDURE — 84550 ASSAY OF BLOOD/URIC ACID: CPT | Performed by: INTERNAL MEDICINE

## 2018-04-26 PROCEDURE — 93000 ELECTROCARDIOGRAM COMPLETE: CPT | Performed by: INTERNAL MEDICINE

## 2018-04-26 PROCEDURE — 36415 COLL VENOUS BLD VENIPUNCTURE: CPT | Performed by: INTERNAL MEDICINE

## 2018-05-17 NOTE — NURSING NOTE
"Chief Complaint   Patient presents with     RECHECK     knee pain       Initial /53 (BP Location: Right arm, Cuff Size: Adult Regular)  Pulse 53  Temp 97.4  F (36.3  C) (Oral)  Ht 5' 9\" (1.753 m)  Wt 170 lb (77.1 kg)  SpO2 95%  BMI 25.1 kg/m2 Estimated body mass index is 25.1 kg/(m^2) as calculated from the following:    Height as of this encounter: 5' 9\" (1.753 m).    Weight as of this encounter: 170 lb (77.1 kg).  Medication Reconciliation: complete     Charisse Gupta MA    "
Screening Questionnaire for Adult Immunization    Are you sick today?   No   Do you have allergies to medications, food, a vaccine component or latex?   No   Have you ever had a serious reaction after receiving a vaccination?   No   Do you have a long-term health problem with heart disease, lung disease, asthma, kidney disease, metabolic disease (e.g. diabetes), anemia, or other blood disorder?   No   Do you have cancer, leukemia, HIV/AIDS, or any other immune system problem?   No   In the past 3 months, have you taken medications that affect  your immune system, such as prednisone, other steroids, or anticancer drugs; drugs for the treatment of rheumatoid arthritis, Crohn s disease, or psoriasis; or have you had radiation treatments?   No   Have you had a seizure, or a brain or other nervous system problem?   No   During the past year, have you received a transfusion of blood or blood     products, or been given immune (gamma) globulin or antiviral drug?   No   For women: Are you pregnant or is there a chance you could become        pregnant during the next month?   No   Have you received any vaccinations in the past 4 weeks?   No     Immunization questionnaire answers were all negative.        Per orders of Dr. Lovelace, injection of Tdap given by Charisse Corey. Patient instructed to remain in clinic for 15 minutes afterwards, and to report any adverse reaction to me immediately.       Screening performed by Charisse Corey on 11/1/2017 at 8:45 AM.  
ICU

## 2018-05-21 ENCOUNTER — OFFICE VISIT (OUTPATIENT)
Dept: FAMILY MEDICINE | Facility: CLINIC | Age: 83
End: 2018-05-21
Payer: MEDICARE

## 2018-05-21 VITALS
DIASTOLIC BLOOD PRESSURE: 60 MMHG | WEIGHT: 169 LBS | BODY MASS INDEX: 25.03 KG/M2 | OXYGEN SATURATION: 96 % | HEIGHT: 69 IN | HEART RATE: 50 BPM | TEMPERATURE: 97.8 F | SYSTOLIC BLOOD PRESSURE: 133 MMHG

## 2018-05-21 DIAGNOSIS — L30.9 DERMATITIS: ICD-10-CM

## 2018-05-21 DIAGNOSIS — M1A.09X0 IDIOPATHIC CHRONIC GOUT OF MULTIPLE SITES WITHOUT TOPHUS: ICD-10-CM

## 2018-05-21 DIAGNOSIS — I10 BENIGN ESSENTIAL HYPERTENSION: ICD-10-CM

## 2018-05-21 DIAGNOSIS — M17.0 PRIMARY OSTEOARTHRITIS OF BOTH KNEES: ICD-10-CM

## 2018-05-21 DIAGNOSIS — M15.0 PRIMARY OSTEOARTHRITIS INVOLVING MULTIPLE JOINTS: ICD-10-CM

## 2018-05-21 DIAGNOSIS — R53.83 FATIGUE, UNSPECIFIED TYPE: ICD-10-CM

## 2018-05-21 DIAGNOSIS — E55.9 VITAMIN D DEFICIENCY: ICD-10-CM

## 2018-05-21 DIAGNOSIS — I35.0 AORTIC VALVE STENOSIS, UNSPECIFIED ETIOLOGY: ICD-10-CM

## 2018-05-21 DIAGNOSIS — I71.40 ABDOMINAL AORTIC ANEURYSM (AAA) WITHOUT RUPTURE (H): ICD-10-CM

## 2018-05-21 DIAGNOSIS — N18.30 CKD (CHRONIC KIDNEY DISEASE) STAGE 3, GFR 30-59 ML/MIN (H): Primary | ICD-10-CM

## 2018-05-21 DIAGNOSIS — Z00.00 ENCOUNTER FOR ROUTINE ADULT HEALTH EXAMINATION WITHOUT ABNORMAL FINDINGS: ICD-10-CM

## 2018-05-21 DIAGNOSIS — M19.019 ACROMIOCLAVICULAR JOINT ARTHRITIS: ICD-10-CM

## 2018-05-21 DIAGNOSIS — I65.29 STENOSIS OF CAROTID ARTERY, UNSPECIFIED LATERALITY: ICD-10-CM

## 2018-05-21 DIAGNOSIS — E78.5 HYPERLIPIDEMIA LDL GOAL <100: ICD-10-CM

## 2018-05-21 LAB
ALBUMIN SERPL-MCNC: 4 G/DL (ref 3.4–5)
ALBUMIN UR-MCNC: 30 MG/DL
ALP SERPL-CCNC: 75 U/L (ref 40–150)
ALT SERPL W P-5'-P-CCNC: 22 U/L (ref 0–70)
ANION GAP SERPL CALCULATED.3IONS-SCNC: 12 MMOL/L (ref 3–14)
APPEARANCE UR: CLEAR
AST SERPL W P-5'-P-CCNC: 18 U/L (ref 0–45)
BILIRUB SERPL-MCNC: 0.5 MG/DL (ref 0.2–1.3)
BILIRUB UR QL STRIP: NEGATIVE
BUN SERPL-MCNC: 33 MG/DL (ref 7–30)
CALCIUM SERPL-MCNC: 9 MG/DL (ref 8.5–10.1)
CHLORIDE SERPL-SCNC: 108 MMOL/L (ref 94–109)
CHOLEST SERPL-MCNC: 88 MG/DL
CO2 SERPL-SCNC: 19 MMOL/L (ref 20–32)
COLOR UR AUTO: YELLOW
CREAT SERPL-MCNC: 2.12 MG/DL (ref 0.66–1.25)
DEPRECATED CALCIDIOL+CALCIFEROL SERPL-MC: 35 UG/L (ref 20–75)
ERYTHROCYTE [DISTWIDTH] IN BLOOD BY AUTOMATED COUNT: 14.5 % (ref 10–15)
GFR SERPL CREATININE-BSD FRML MDRD: 30 ML/MIN/1.7M2
GLUCOSE SERPL-MCNC: 106 MG/DL (ref 70–99)
GLUCOSE UR STRIP-MCNC: NEGATIVE MG/DL
HCT VFR BLD AUTO: 36.3 % (ref 40–53)
HDLC SERPL-MCNC: 33 MG/DL
HGB BLD-MCNC: 11.7 G/DL (ref 13.3–17.7)
HGB UR QL STRIP: NEGATIVE
KETONES UR STRIP-MCNC: NEGATIVE MG/DL
LDLC SERPL CALC-MCNC: 43 MG/DL
LEUKOCYTE ESTERASE UR QL STRIP: NEGATIVE
MCH RBC QN AUTO: 30.7 PG (ref 26.5–33)
MCHC RBC AUTO-ENTMCNC: 32.2 G/DL (ref 31.5–36.5)
MCV RBC AUTO: 95 FL (ref 78–100)
NITRATE UR QL: NEGATIVE
NONHDLC SERPL-MCNC: 55 MG/DL
PH UR STRIP: 5.5 PH (ref 5–7)
PLATELET # BLD AUTO: 222 10E9/L (ref 150–450)
POTASSIUM SERPL-SCNC: 4.5 MMOL/L (ref 3.4–5.3)
PROT SERPL-MCNC: 7.5 G/DL (ref 6.8–8.8)
RBC # BLD AUTO: 3.81 10E12/L (ref 4.4–5.9)
RBC #/AREA URNS AUTO: NORMAL /HPF
SODIUM SERPL-SCNC: 139 MMOL/L (ref 133–144)
SOURCE: ABNORMAL
SP GR UR STRIP: 1.02 (ref 1–1.03)
TRIGL SERPL-MCNC: 59 MG/DL
TSH SERPL DL<=0.005 MIU/L-ACNC: 2.07 MU/L (ref 0.4–4)
UROBILINOGEN UR STRIP-ACNC: 0.2 EU/DL (ref 0.2–1)
WBC # BLD AUTO: 7.5 10E9/L (ref 4–11)
WBC #/AREA URNS AUTO: NORMAL /HPF

## 2018-05-21 PROCEDURE — 20600 DRAIN/INJ JOINT/BURSA W/O US: CPT | Performed by: INTERNAL MEDICINE

## 2018-05-21 PROCEDURE — 81001 URINALYSIS AUTO W/SCOPE: CPT | Performed by: INTERNAL MEDICINE

## 2018-05-21 PROCEDURE — 80061 LIPID PANEL: CPT | Performed by: INTERNAL MEDICINE

## 2018-05-21 PROCEDURE — 99213 OFFICE O/P EST LOW 20 MIN: CPT | Mod: 25 | Performed by: INTERNAL MEDICINE

## 2018-05-21 PROCEDURE — 80053 COMPREHEN METABOLIC PANEL: CPT | Performed by: INTERNAL MEDICINE

## 2018-05-21 PROCEDURE — 36415 COLL VENOUS BLD VENIPUNCTURE: CPT | Performed by: INTERNAL MEDICINE

## 2018-05-21 PROCEDURE — 85027 COMPLETE CBC AUTOMATED: CPT | Performed by: INTERNAL MEDICINE

## 2018-05-21 PROCEDURE — 82306 VITAMIN D 25 HYDROXY: CPT | Performed by: INTERNAL MEDICINE

## 2018-05-21 PROCEDURE — 84443 ASSAY THYROID STIM HORMONE: CPT | Performed by: INTERNAL MEDICINE

## 2018-05-21 RX ORDER — TRIAMCINOLONE ACETONIDE 1 MG/G
CREAM TOPICAL
Qty: 30 G | Refills: 1 | Status: SHIPPED | OUTPATIENT
Start: 2018-05-21 | End: 2020-02-24

## 2018-05-21 NOTE — MR AVS SNAPSHOT
After Visit Summary   5/21/2018    Trevor Soni    MRN: [id number]           Patient Information     Date Of Birth          9/3/1933        Visit Information        Provider Department      5/21/2018 10:00 AM Warren Lovelace MD [facility]        Today's Diagnoses     CKD (chronic kidney disease) stage 3, GFR 30-59 ml/min    -  1    Abdominal aortic aneurysm (AAA) without rupture (H)        Aortic valve stenosis, unspecified etiology        Hyperlipidemia LDL goal <100        Benign essential hypertension        Primary osteoarthritis involving multiple joints        Primary osteoarthritis of both knees        Idiopathic chronic gout of multiple sites without tophus        Stenosis of carotid artery, unspecified laterality        Dermatitis        Encounter for routine adult health examination without abnormal findings        Fatigue, unspecified type        Vitamin D deficiency        Acromioclavicular joint arthritis           Follow-ups after your visit        Your next 10 appointments already scheduled     May 29, 2018  1:30 PM CDT   Office Visit with Warren Lovelace MD   [facility] ([facility])    [address] [facility] [address] [phone]           Bring a current list of meds and any records pertaining to this visit. For Physicals, please bring immunization records and any forms needing to be filled out. Please arrive 10 minutes early to complete paperwork.              Who to contact     If you have questions or need follow up information about today's clinic visit or your schedule please contact [facility] directly at [phone].  Normal or non-critical lab and imaging results will be communicated to you by MyChart, letter or phone within 4 business days after the clinic has received the results. If you do not hear from us within 7 days, please contact the clinic through MyChart or phone. If you have a critical or abnormal  "lab result, we will notify you by phone as soon as possible.  Submit refill requests through Hibernater or call your pharmacy and they will forward the refill request to us. Please allow 3 business days for your refill to be completed.          Additional Information About Your Visit        Eqvilibriahart Information     Hibernater lets you send messages to your doctor, view your test results, renew your prescriptions, schedule appointments and more. To sign up, go to [email or web address] . Click on \"Log in\" on the left side of the screen, which will take you to the Welcome page. Then click on \"Sign up Now\" on the right side of the page.     You will be asked to enter the access code listed below, as well as some personal information. Please follow the directions to create your username and password.     Your access code is: [email or web address]  Expires: 2018  7:15 AM     Your access code will  in 90 days. If you need help or a new code, please call your [facility] clinic or [phone].        Care EveryWhere ID     This is your Care EveryWhere ID. This could be used by other organizations to access your [facility] medical records  [id number]        Your Vitals Were     Pulse Temperature Height Pulse Oximetry BMI (Body Mass Index)       50 97.8  F (36.6  C) (Oral) 5' 9\" (1.753 m) 96% 24.96 kg/m2        Blood Pressure from Last 3 Encounters:   18 133/60   18 140/60   17 115/53    Weight from Last 3 Encounters:   18 169 lb (76.7 kg)   18 169 lb (76.7 kg)   17 170 lb (77.1 kg)              We Performed the Following     **CBC with platelets FUTURE anytime     **Comprehensive metabolic panel FUTURE anytime     **TSH with free T4 reflex FUTURE anytime     **UA reflex to Microscopic FUTURE anytime     **Vitamin D Deficiency FUTURE anytime     DRAIN/INJECT SMALL JOINT/BURSA     Lipid panel reflex to direct LDL Fasting     METHYLPREDNISOLONE 40 MG INJ     Urine Microscopic          Today's " Medication Changes          These changes are accurate as of 5/21/18 11:59 PM.  If you have any questions, ask your nurse or doctor.               Start taking these medicines.        Dose/Directions    triamcinolone 0.1 % cream   Commonly known as:  KENALOG   Used for:  Dermatitis   Started by:  Warren Lovelace MD        Apply sparingly to affected area three times daily for 14 days.   Quantity:  30 g   Refills:  1            Where to get your medicines      These medications were sent to [facility] [address] [place] [address] [address] AT [address] [place] [address] & [facility]  [address], [place] [address]    Hours:  24-hours Phone:  [phone]     triamcinolone 0.1 % cream                Primary Care Provider Office Phone # Fax #    Warren Lovelace [phone] [address]  [facility] [address]        Equal Access to Services     [facility] AH: Hadii aad ku hadasho Soomaali, waaxda luqadaha, qaybta kaalmada adeegyada, waxay aubriein hayjosen cesar andre . So [facility] [phone].    ATENCIÓN: Si shawna aguirre, tiene a mendoza disposición servicios gratuitos de asistencia lingüística. Llame al [phone].    We comply with applicable federal civil rights laws and [place] laws. We do not discriminate on the basis of race, color, national origin, age, disability, sex, sexual orientation, or gender identity.            Thank you!     Thank you for choosing [facility]  for your care. Our goal is always to provide you with excellent care. Hearing back from our patients is one way we can continue to improve our services. Please take a few minutes to complete the written survey that you may receive in the mail after your visit with us. Thank you!             Your Updated Medication List - Protect others around you: Learn how to safely use, store and throw away your medicines at www.disposemymeds.org.          This list is accurate as of 5/21/18 11:59 PM.  Always use  your most recent med list.                   Brand Name Dispense Instructions for use Diagnosis    allopurinol 100 MG tablet    ZYLOPRIM    180 tablet    TAKE 2 TABLETS BY MOUTH  EVERY DAY    Hyperlipidemia LDL goal <100, Benign essential hypertension       amLODIPine 5 MG tablet    NORVASC    180 tablet    TAKE 1 TABLET BY MOUTH  TWICE A DAY    Hyperlipidemia LDL goal <100, Benign essential hypertension       ASPIRIN PO      Take 325 mg by mouth daily.        atenolol 50 MG tablet    TENORMIN    90 tablet    TAKE 1 TABLET BY MOUTH  EVERY DAY    Hyperlipidemia LDL goal <100, Benign essential hypertension       atorvastatin 20 MG tablet    LIPITOR    90 tablet    TAKE 1 TABLET BY MOUTH  EVERY DAY    Hyperlipidemia LDL goal <100, Benign essential hypertension       diclofenac 1 % Gel topical gel    VOLTAREN    100 g    Apply 4 grams to knees  four times daily using enclosed dosing card.    Post-traumatic osteoarthritis of left knee       fenofibrate 160 MG tablet     90 tablet    TAKE 1 TABLET BY MOUTH  EVERY DAY    Hyperlipidemia LDL goal <100, Benign essential hypertension       hydrALAZINE 50 MG tablet    APRESOLINE    270 tablet    TAKE 1 TABLET BY MOUTH 3  TIMES A DAY    Hyperlipidemia LDL goal <100, Benign essential hypertension       triamcinolone 0.1 % cream    KENALOG    30 g    Apply sparingly to affected area three times daily for 14 days.    Dermatitis       VITAMIN D3 PO      Take 1 tablet by mouth daily        ZANTAC PO      Take 150 mg by mouth daily as needed

## 2018-05-21 NOTE — PROGRESS NOTES
SUBJECTIVE:   Trevor Soni is a 84 year old male who presents to clinic today for the following health issues:    Patient presents to the clinic to follow up on his lab reports. He states he needs a refill for his Kenalog cream. He is interested in other options for his knee pain. We discussed Synvisc.      Problem list and histories reviewed & adjusted, as indicated.  Additional history: as documented    Current Outpatient Prescriptions   Medication Sig Dispense Refill     allopurinol (ZYLOPRIM) 100 MG tablet TAKE 2 TABLETS BY MOUTH  EVERY  tablet 0     amLODIPine (NORVASC) 5 MG tablet TAKE 1 TABLET BY MOUTH  TWICE A  tablet 0     ASPIRIN PO Take 325 mg by mouth daily.       atenolol (TENORMIN) 50 MG tablet TAKE 1 TABLET BY MOUTH  EVERY DAY 90 tablet 2     atorvastatin (LIPITOR) 20 MG tablet TAKE 1 TABLET BY MOUTH  EVERY DAY 90 tablet 3     Cholecalciferol (VITAMIN D3 PO) Take 1 tablet by mouth daily       diclofenac (VOLTAREN) 1 % GEL topical gel Apply 4 grams to knees  four times daily using enclosed dosing card. 100 g 3     fenofibrate 160 MG tablet TAKE 1 TABLET BY MOUTH  EVERY DAY 90 tablet 0     hydrALAZINE (APRESOLINE) 50 MG tablet TAKE 1 TABLET BY MOUTH 3  TIMES A  tablet 0     Ranitidine HCl (ZANTAC PO) Take 150 mg by mouth daily as needed        Allergies   Allergen Reactions     Avocado      Ciprofloxacin Itching     Penicillins Itching       Reviewed and updated as needed this visit by clinical staff  Tobacco  Allergies  Meds  Problems  Med Hx  Surg Hx  Fam Hx  Soc Hx        Reviewed and updated as needed this visit by Provider         ROS:  Constitutional, HEENT, cardiovascular, pulmonary, gi and gu systems are negative, except as otherwise noted.    This document serves as a record of the services and decisions personally performed and made by Warren Lovelace MD. It was created on his/her behalf by Chemo Gibbs, a trained medical scribe. The creation of this  "document is based the provider's statements to the medical scribe.  Mor Sofiakain 10:07 AM, May 21, 2018    OBJECTIVE:     /60  Pulse 50  Temp 97.8  F (36.6  C) (Oral)  Ht 1.753 m (5' 9\")  Wt 76.7 kg (169 lb)  SpO2 96%  BMI 24.96 kg/m2  Body mass index is 24.96 kg/(m^2).    Shoulder: his coracoid clavicular joint had obvious swelling    Procedure:  The joint was clean and prepped, and with a  22 gauge needle 2 cc of serosanguinous fluid was removed and 40 MG of medrol with marcaine and lidocaine was injected without difficulty    Diagnostic Test Results:  No results found for this or any previous visit (from the past 24 hour(s)).    ASSESSMENT/PLAN:         -He will return next week to receive Synvisc injection and to follow up on his lab reports.  1. CKD (chronic kidney disease) stage 3, GFR 30-59 ml/min  - **Comprehensive metabolic panel FUTURE anytime  - Urine Microscopic    2. Abdominal aortic aneurysm (AAA) without rupture (H)      3. Aortic valve stenosis, unspecified etiology      4. Hyperlipidemia LDL goal <100    - **TSH with free T4 reflex FUTURE anytime    5. Benign essential hypertension    - **Comprehensive metabolic panel FUTURE anytime  - **CBC with platelets FUTURE anytime  - Lipid panel reflex to direct LDL Fasting    6. Primary osteoarthritis involving multiple joints  Acromioclavicular arthritis with slow resolution after activities over the last two weeks    7. Primary osteoarthritis of both knees      8. Idiopathic chronic gout of multiple sites without tophus      9. Stenosis of carotid artery, unspecified laterality      10. Dermatitis    - triamcinolone (KENALOG) 0.1 % cream; Apply sparingly to affected area three times daily for 14 days.  Dispense: 30 g; Refill: 1    11. Encounter for routine adult health examination without abnormal findings    - **Comprehensive metabolic panel FUTURE anytime  - **CBC with platelets FUTURE anytime  - Lipid panel reflex to direct LDL " Fasting  - **TSH with free T4 reflex FUTURE anytime  - **UA reflex to Microscopic FUTURE anytime  - **Vitamin D Deficiency FUTURE anytime    12. Fatigue, unspecified type  **TSH with free T4 reflex FUTURE anytime    13. Vitamin D deficiency    - **Vitamin D Deficiency FUTURE anytime    Warren Lovelace MD  [facility]    The information in this document, created by the medical scribe for me, accurately reflects the services I personally performed and the decisions made by me. I have reviewed and approved this document for accuracy prior to leaving the patient care area.  Warren Lovelace MD  10:45 AM, 05/21/18

## 2018-05-29 ENCOUNTER — OFFICE VISIT (OUTPATIENT)
Dept: FAMILY MEDICINE | Facility: CLINIC | Age: 83
End: 2018-05-29
Payer: MEDICARE

## 2018-05-29 VITALS
SYSTOLIC BLOOD PRESSURE: 116 MMHG | HEART RATE: 56 BPM | TEMPERATURE: 98 F | HEIGHT: 69 IN | DIASTOLIC BLOOD PRESSURE: 52 MMHG | WEIGHT: 167.5 LBS | BODY MASS INDEX: 24.81 KG/M2 | OXYGEN SATURATION: 98 %

## 2018-05-29 DIAGNOSIS — I10 BENIGN ESSENTIAL HYPERTENSION: ICD-10-CM

## 2018-05-29 DIAGNOSIS — M15.0 PRIMARY OSTEOARTHRITIS INVOLVING MULTIPLE JOINTS: ICD-10-CM

## 2018-05-29 DIAGNOSIS — I65.29 STENOSIS OF CAROTID ARTERY, UNSPECIFIED LATERALITY: ICD-10-CM

## 2018-05-29 DIAGNOSIS — E78.5 HYPERLIPIDEMIA LDL GOAL <100: ICD-10-CM

## 2018-05-29 DIAGNOSIS — M1A.09X0 IDIOPATHIC CHRONIC GOUT OF MULTIPLE SITES WITHOUT TOPHUS: ICD-10-CM

## 2018-05-29 DIAGNOSIS — N18.30 CKD (CHRONIC KIDNEY DISEASE) STAGE 3, GFR 30-59 ML/MIN (H): Primary | ICD-10-CM

## 2018-05-29 DIAGNOSIS — I35.0 AORTIC VALVE STENOSIS, UNSPECIFIED ETIOLOGY: ICD-10-CM

## 2018-05-29 DIAGNOSIS — M17.0 PRIMARY OSTEOARTHRITIS OF BOTH KNEES: ICD-10-CM

## 2018-05-29 DIAGNOSIS — I71.40 ABDOMINAL AORTIC ANEURYSM (AAA) WITHOUT RUPTURE (H): ICD-10-CM

## 2018-05-29 PROCEDURE — 99214 OFFICE O/P EST MOD 30 MIN: CPT | Mod: 25 | Performed by: INTERNAL MEDICINE

## 2018-05-29 PROCEDURE — 20610 DRAIN/INJ JOINT/BURSA W/O US: CPT | Performed by: INTERNAL MEDICINE

## 2018-05-29 NOTE — MR AVS SNAPSHOT
"              After Visit Summary   5/29/2018    Trevor Soni    MRN: [id number]           Patient Information     Date Of Birth          9/3/1933        Visit Information        Provider Department      5/29/2018 1:30 PM Warren Lovelace MD [facility]        Today's Diagnoses     CKD (chronic kidney disease) stage 3, GFR 30-59 ml/min    -  1    Abdominal aortic aneurysm (AAA) without rupture (H)        Aortic valve stenosis, unspecified etiology        Hyperlipidemia LDL goal <100        Benign essential hypertension        Primary osteoarthritis involving multiple joints        Idiopathic chronic gout of multiple sites without tophus        Stenosis of carotid artery, unspecified laterality        Primary osteoarthritis of both knees           Follow-ups after your visit        Who to contact     If you have questions or need follow up information about today's clinic visit or your schedule please contact [facility] directly at [phone].  Normal or non-critical lab and imaging results will be communicated to you by MyChart, letter or phone within 4 business days after the clinic has received the results. If you do not hear from us within 7 days, please contact the clinic through MyChart or phone. If you have a critical or abnormal lab result, we will notify you by phone as soon as possible.  Submit refill requests through [email or web address] or call your pharmacy and they will forward the refill request to us. Please allow 3 business days for your refill to be completed.          Additional Information About Your Visit        Care EveryWhere ID     This is your Care EveryWhere ID. This could be used by other organizations to access your [facility] medical records  [id number]        Your Vitals Were     Pulse Temperature Height Pulse Oximetry BMI (Body Mass Index)       56 98  F (36.7  C) (Oral) 5' 9\" (1.753 m) 98% 24.74 kg/m2        Blood Pressure from Last 3 Encounters:   05/29/18 116/52 "   05/21/18 133/60   04/25/18 140/60    Weight from Last 3 Encounters:   05/29/18 167 lb 8 oz (76 kg)   05/21/18 169 lb (76.7 kg)   04/25/18 169 lb (76.7 kg)              We Performed the Following     C SYNVISC PER 1 MG     C SYNVISC PER 1 MG        Primary Care Provider Office Phone # Fax #    Warren Lovelace [phone] [address] [facility] [address]  [facility] [address]        Equal Access to Services     [facility]: Hadii aad ku hadasho Soomaali, waaxda luqadaha, qaybta kaalmada adeegyada, mercedez andre . So [facility] [phone].    ATENCIÓN: Si habla español, tiene a mendoza disposición servicios gratuitos de asistencia lingüística. Ll[facility] [phone].    We comply with applicable federal civil rights laws and [place] laws. We do not discriminate on the basis of race, color, national origin, age, disability, sex, sexual orientation, or gender identity.            Thank you!     Thank you for choosing [facility]  for your care. Our goal is always to provide you with excellent care. Hearing back from our patients is one way we can continue to improve our services. Please take a few minutes to complete the written survey that you may receive in the mail after your visit with us. Thank you!             Your Updated Medication List - Protect others around you: Learn how to safely use, store and throw away your medicines at www.disposemymeds.org.          This list is accurate as of 5/29/18 11:59 PM.  Always use your most recent med list.                   Brand Name Dispense Instructions for use Diagnosis    allopurinol 100 MG tablet    ZYLOPRIM    180 tablet    TAKE 2 TABLETS BY MOUTH  EVERY DAY    Hyperlipidemia LDL goal <100, Benign essential hypertension       amLODIPine 5 MG tablet    NORVASC    180 tablet    TAKE 1 TABLET BY MOUTH  TWICE A DAY    Hyperlipidemia LDL goal <100, Benign essential hypertension       ASPIRIN PO      Take 325 mg by mouth daily.         atenolol 50 MG tablet    TENORMIN    90 tablet    TAKE 1 TABLET BY MOUTH  EVERY DAY    Hyperlipidemia LDL goal <100, Benign essential hypertension       atorvastatin 20 MG tablet    LIPITOR    90 tablet    TAKE 1 TABLET BY MOUTH  EVERY DAY    Hyperlipidemia LDL goal <100, Benign essential hypertension       diclofenac 1 % Gel topical gel    VOLTAREN    100 g    Apply 4 grams to knees  four times daily using enclosed dosing card.    Post-traumatic osteoarthritis of left knee       fenofibrate 160 MG tablet     90 tablet    TAKE 1 TABLET BY MOUTH  EVERY DAY    Hyperlipidemia LDL goal <100, Benign essential hypertension       hydrALAZINE 50 MG tablet    APRESOLINE    270 tablet    TAKE 1 TABLET BY MOUTH 3  TIMES A DAY    Hyperlipidemia LDL goal <100, Benign essential hypertension       triamcinolone 0.1 % cream    KENALOG    30 g    Apply sparingly to affected area three times daily for 14 days.    Dermatitis       VITAMIN D3 PO      Take 1 tablet by mouth daily        ZANTAC PO      Take 150 mg by mouth daily as needed

## 2018-05-29 NOTE — PROGRESS NOTES
SUBJECTIVE:   Trevor Soni is a 84 year old male who presents to clinic today for the following health issues:    Chief Complaint   Patient presents with     Knee Pain     Lab Result Notice     Patient presents to follow up on his lab reports from last week. We will also be giving patient Synvisc injections into both of his knees.      Problem list and histories reviewed & adjusted, as indicated.  Additional history: as documented    Current Outpatient Prescriptions   Medication Sig Dispense Refill     allopurinol (ZYLOPRIM) 100 MG tablet TAKE 2 TABLETS BY MOUTH  EVERY  tablet 0     amLODIPine (NORVASC) 5 MG tablet TAKE 1 TABLET BY MOUTH  TWICE A  tablet 0     ASPIRIN PO Take 325 mg by mouth daily.       atenolol (TENORMIN) 50 MG tablet TAKE 1 TABLET BY MOUTH  EVERY DAY 90 tablet 2     atorvastatin (LIPITOR) 20 MG tablet TAKE 1 TABLET BY MOUTH  EVERY DAY 90 tablet 3     Cholecalciferol (VITAMIN D3 PO) Take 1 tablet by mouth daily       diclofenac (VOLTAREN) 1 % GEL topical gel Apply 4 grams to knees  four times daily using enclosed dosing card. 100 g 3     fenofibrate 160 MG tablet TAKE 1 TABLET BY MOUTH  EVERY DAY 90 tablet 0     hydrALAZINE (APRESOLINE) 50 MG tablet TAKE 1 TABLET BY MOUTH 3  TIMES A  tablet 0     Ranitidine HCl (ZANTAC PO) Take 150 mg by mouth daily as needed        triamcinolone (KENALOG) 0.1 % cream Apply sparingly to affected area three times daily for 14 days. 30 g 1     Allergies   Allergen Reactions     Avocado      Ciprofloxacin Itching     Penicillins Itching       Reviewed and updated as needed this visit by clinical staff  Tobacco  Allergies  Meds       Reviewed and updated as needed this visit by Provider         ROS:  Constitutional, HEENT, cardiovascular, pulmonary, gi and gu systems are negative, except as otherwise noted.    This document serves as a record of the services and decisions personally performed and made by Warren Lovelace MD. It was  "created on his/her behalf by Chemo Gibbs, a trained medical scribe. The creation of this document is based the provider's statements to the medical scribe.  Mor Gibbs 2:01 PM, May 29, 2018    OBJECTIVE:     /52  Pulse 56  Temp 98  F (36.7  C) (Oral)  Ht 1.753 m (5' 9\")  Wt 76 kg (167 lb 8 oz)  SpO2 98%  BMI 24.74 kg/m2  Body mass index is 24.74 kg/(m^2).  Reviewed and discussed laboratory reports along with ongoing problem list  25 minutes  Procedure:  From a latera superior approach Synvisc was injected into his right and left knee without difficulty.    Diagnostic Test Results:  No results found for this or any previous visit (from the past 24 hour(s)).    ASSESSMENT/PLAN:     1. Hyperlipidemia LDL goal <100  -Encouraged patient do more aerobic activity that was easy on his knees.    2. Benign essential hypertension  -Well controlled with current therapies.    3. Primary osteoarthritis involving multiple joints  -Synvisc was injected into both of his knee joints.      -Recommend he better manage his carbohydrate intakes, also recommend he do some aerobic activity that is easy on his knees.    Warren Lovelace MD  [facility]    The information in this document, created by the medical scribe for me, accurately reflects the services I personally performed and the decisions made by me. I have reviewed and approved this document for accuracy prior to leaving the patient care area.  Warren Lovelace MD  2:24 PM, 05/29/18        "

## 2018-06-24 DIAGNOSIS — I10 BENIGN ESSENTIAL HYPERTENSION: ICD-10-CM

## 2018-06-24 DIAGNOSIS — E78.5 HYPERLIPIDEMIA LDL GOAL <100: ICD-10-CM

## 2018-06-25 NOTE — TELEPHONE ENCOUNTER
"Requested Prescriptions   Pending Prescriptions Disp Refills     hydrALAZINE (APRESOLINE) 50 MG tablet [Pharmacy Med Name: HYDRALAZINE  50MG  TAB] 270 tablet     Last Written Prescription Date:  3/30/18  Last Fill Quantity: 270,  # refills: 0   Last office visit: 5/29/2018 with prescribing provider:     Future Office Visit:   Next 5 appointments (look out 90 days)     Jun 27, 2018 12:00 PM CDT   [facility]    [address] [address]   [phone]                  Sig: TAKE 1 TABLET BY MOUTH 3  TIMES A DAY    There is no refill protocol information for this order        fenofibrate 160 MG tablet [Pharmacy Med Name: FENOFIBRATE  160MG  TAB] 90 tablet     Last Written Prescription Date:  3/30/18  Last Fill Quantity: 90,  # refills: 0   Last office visit: 5/29/2018 with prescribing provider:     Future Office Visit:   Next 5 appointments (look out 90 days)     Jun 27, 2018 12:00 PM CDT   [facility]    [address] [address]   [phone]                  Sig: TAKE 1 TABLET BY MOUTH  EVERY DAY    Fibrates Passed    6/24/2018  3:48 AM       Passed - Lipid panel on file in past 12 months    Recent Labs   Lab Test  05/21/18   1042   CHOL  88   TRIG  59   HDL  33*   LDL  43   NHDL  55              Passed - No abnormal creatine kinase in past 12 months    No lab results found.            Passed - Recent (12 mo) or future (30 days) visit within the authorizing provider's specialty    Patient had office visit in the last 12 months or has a visit in the next 30 days with authorizing provider or within the authorizing provider's specialty.  See \"Patient Info\" tab in inbasket, or \"Choose Columns\" in Meds & Orders section of the refill encounter.           Passed - Patient is age 18 or older        allopurinol (ZYLOPRIM) 100 MG tablet " "[Pharmacy Med Name: ALLOPURINOL  100MG  TAB] 180 tablet     Last Written Prescription Date:  3/30/18  Last Fill Quantity: 180,  # refills: 0   Last office visit: 5/29/2018 with prescribing provider:     Future Office Visit:   Next 5 appointments (look out 90 days)     Jun 27, 2018 12:00 PM CDT   Office Visit with Warren Lovelace MD   [facility]    [address] [facility] [address] [phone]                  Sig: TAKE 2 TABLETS BY MOUTH  EVERY DAY    Gout Agents Protocol Failed    6/24/2018  3:48 AM       Failed - Normal serum creatinine on file in the past 12 months    Recent Labs   Lab Test  05/21/18   1042   CR  2.12*            Passed - CBC on file in past 12 months    Recent Labs   Lab Test  05/21/18   1042   WBC  7.5   RBC  3.81*   HGB  11.7*   HCT  36.3*   PLT  222       For [facility] ONLY: [id number] = WBC, CGBM134 = RBC         Passed - ALT on file in past 12 months    Recent Labs   Lab Test  05/21/18   1042   ALT  22            Passed - Has Uric Acid on file in past 12 months and value is less than 6    Recent Labs   Lab Test  04/26/18   0927   URIC  4.9     If level is 6mg/dL or greater, ok to refill one time and refer to provider.          Passed - Recent (12 mo) or future (30 days) visit within the authorizing provider's specialty    Patient had office visit in the last 12 months or has a visit in the next 30 days with authorizing provider or within the authorizing provider's specialty.  See \"Patient Info\" tab in inbasket, or \"Choose Columns\" in Meds & Orders section of the refill encounter.           Passed - Patient is age 18 or older        amLODIPine (NORVASC) 5 MG tablet [Pharmacy Med Name: AMLODIPINE  5MG  TAB] 180 tablet     Last Written Prescription Date:  3/30/18  Last Fill Quantity: 180,  # refills: 0   Last office visit: 5/29/2018 with prescribing provider:     Future Office Visit:   Next 5 appointments (look out 90 days)     Jun 27, 2018 12:00 PM CDT " "  Office Visit with Warren Lovelace MD   [facility]    [address] [facility] [address] [phone]                  Sig: TAKE 1 TABLET BY MOUTH  TWICE A DAY    Calcium Channel Blockers Protocol  Failed    6/24/2018  3:48 AM       Failed - Normal serum creatinine on file in past 12 months    Recent Labs   Lab Test  05/21/18   1042   CR  2.12*            Passed - Blood pressure under 140/90 in past 12 months    BP Readings from Last 3 Encounters:   05/29/18 116/52   05/21/18 133/60   04/25/18 140/60                Passed - Recent (12 mo) or future (30 days) visit within the authorizing provider's specialty    Patient had office visit in the last 12 months or has a visit in the next 30 days with authorizing provider or within the authorizing provider's specialty.  See \"Patient Info\" tab in inbasket, or \"Choose Columns\" in Meds & Orders section of the refill encounter.           Passed - Patient is age 18 or older          "

## 2018-06-26 RX ORDER — AMLODIPINE BESYLATE 5 MG/1
TABLET ORAL
Qty: 180 TABLET | Refills: 3 | Status: SHIPPED | OUTPATIENT
Start: 2018-06-26 | End: 2019-05-23

## 2018-06-26 RX ORDER — FENOFIBRATE 160 MG/1
TABLET ORAL
Qty: 90 TABLET | Refills: 3 | Status: SHIPPED | OUTPATIENT
Start: 2018-06-26 | End: 2019-08-11

## 2018-06-26 RX ORDER — HYDRALAZINE HYDROCHLORIDE 50 MG/1
TABLET, FILM COATED ORAL
Qty: 270 TABLET | Refills: 3 | Status: SHIPPED | OUTPATIENT
Start: 2018-06-26 | End: 2019-08-28

## 2018-06-26 RX ORDER — ALLOPURINOL 100 MG/1
TABLET ORAL
Qty: 180 TABLET | Refills: 3 | Status: SHIPPED | OUTPATIENT
Start: 2018-06-26 | End: 2019-08-28

## 2018-06-26 NOTE — TELEPHONE ENCOUNTER
Routing refill request to provider for review/approval because:  Labs out of range     Is scheduled for apt tomorrow.  Please authorize if appropriate.  Thanks,  Kierra Anderson RN

## 2018-06-27 ENCOUNTER — RADIANT APPOINTMENT (OUTPATIENT)
Dept: GENERAL RADIOLOGY | Facility: CLINIC | Age: 83
End: 2018-06-27
Attending: INTERNAL MEDICINE
Payer: MEDICARE

## 2018-06-27 ENCOUNTER — OFFICE VISIT (OUTPATIENT)
Dept: FAMILY MEDICINE | Facility: CLINIC | Age: 83
End: 2018-06-27
Payer: MEDICARE

## 2018-06-27 VITALS
BODY MASS INDEX: 24.44 KG/M2 | OXYGEN SATURATION: 96 % | DIASTOLIC BLOOD PRESSURE: 50 MMHG | SYSTOLIC BLOOD PRESSURE: 118 MMHG | WEIGHT: 165 LBS | HEIGHT: 69 IN | TEMPERATURE: 98.2 F | HEART RATE: 48 BPM

## 2018-06-27 DIAGNOSIS — I65.29 STENOSIS OF CAROTID ARTERY, UNSPECIFIED LATERALITY: ICD-10-CM

## 2018-06-27 DIAGNOSIS — I71.40 ABDOMINAL AORTIC ANEURYSM (AAA) WITHOUT RUPTURE (H): ICD-10-CM

## 2018-06-27 DIAGNOSIS — N18.30 CKD (CHRONIC KIDNEY DISEASE) STAGE 3, GFR 30-59 ML/MIN (H): ICD-10-CM

## 2018-06-27 DIAGNOSIS — M17.0 PRIMARY OSTEOARTHRITIS OF BOTH KNEES: ICD-10-CM

## 2018-06-27 DIAGNOSIS — I10 BENIGN ESSENTIAL HYPERTENSION: ICD-10-CM

## 2018-06-27 DIAGNOSIS — E78.5 HYPERLIPIDEMIA LDL GOAL <100: ICD-10-CM

## 2018-06-27 DIAGNOSIS — M1A.09X0 IDIOPATHIC CHRONIC GOUT OF MULTIPLE SITES WITHOUT TOPHUS: ICD-10-CM

## 2018-06-27 DIAGNOSIS — M19.012 PRIMARY OSTEOARTHRITIS OF LEFT SHOULDER: ICD-10-CM

## 2018-06-27 DIAGNOSIS — I35.0 AORTIC VALVE STENOSIS, UNSPECIFIED ETIOLOGY: ICD-10-CM

## 2018-06-27 DIAGNOSIS — M19.012 PRIMARY OSTEOARTHRITIS OF LEFT SHOULDER: Primary | ICD-10-CM

## 2018-06-27 DIAGNOSIS — M15.0 PRIMARY OSTEOARTHRITIS INVOLVING MULTIPLE JOINTS: ICD-10-CM

## 2018-06-27 PROCEDURE — 99213 OFFICE O/P EST LOW 20 MIN: CPT | Mod: 25 | Performed by: INTERNAL MEDICINE

## 2018-06-27 PROCEDURE — 20610 DRAIN/INJ JOINT/BURSA W/O US: CPT | Mod: LT | Performed by: INTERNAL MEDICINE

## 2018-06-27 PROCEDURE — 73030 X-RAY EXAM OF SHOULDER: CPT | Mod: LT

## 2018-06-27 NOTE — PROGRESS NOTES
SUBJECTIVE:   Trevor Soni is a 84 year old male who presents to clinic today for the following health issues:    Lump on left shoulder      Duration: 1 year    Description (location/character/radiation): left shoulder    Intensity:  moderate    Accompanying signs and symptoms: None    History (similar episodes/previous evaluation): None    Precipitating or alleviating factors: None    Therapies tried and outcome: None     The patient has had a lump on his left shoulder for the past year. He notes that the lump is painful with complete flexion of the shoulder.       Problem list and histories reviewed & adjusted, as indicated.  Additional history: as documented    Current Outpatient Prescriptions   Medication Sig Dispense Refill     allopurinol (ZYLOPRIM) 100 MG tablet TAKE 2 TABLETS BY MOUTH  EVERY  tablet 3     amLODIPine (NORVASC) 5 MG tablet TAKE 1 TABLET BY MOUTH  TWICE A  tablet 3     ASPIRIN PO Take 325 mg by mouth daily.       atenolol (TENORMIN) 50 MG tablet TAKE 1 TABLET BY MOUTH  EVERY DAY 90 tablet 2     atorvastatin (LIPITOR) 20 MG tablet TAKE 1 TABLET BY MOUTH  EVERY DAY 90 tablet 3     Cholecalciferol (VITAMIN D3 PO) Take 1 tablet by mouth daily       diclofenac (VOLTAREN) 1 % GEL topical gel Apply 4 grams to knees  four times daily using enclosed dosing card. 100 g 3     fenofibrate 160 MG tablet TAKE 1 TABLET BY MOUTH  EVERY DAY 90 tablet 3     hydrALAZINE (APRESOLINE) 50 MG tablet TAKE 1 TABLET BY MOUTH 3  TIMES A  tablet 3     Ranitidine HCl (ZANTAC PO) Take 150 mg by mouth daily as needed        triamcinolone (KENALOG) 0.1 % cream Apply sparingly to affected area three times daily for 14 days. 30 g 1     BP Readings from Last 3 Encounters:   06/27/18 118/50   05/29/18 116/52   05/21/18 133/60    Wt Readings from Last 3 Encounters:   06/27/18 74.8 kg (165 lb)   05/29/18 76 kg (167 lb 8 oz)   05/21/18 76.7 kg (169 lb)               Reviewed and updated as needed this  "visit by clinical staff       Reviewed and updated as needed this visit by Provider         ROS:  Constitutional, HEENT, cardiovascular, pulmonary, GI, , musculoskeletal, neuro, skin, endocrine and psych systems are negative, except as otherwise noted.    This document serves as a record of the services and decisions personally performed and made by Warren Lovelace MD. It was created on his behalf by Patricia Peoples, a trained medical scribe. The creation of this document is based the provider's statements to the medical scribe. 6/27/2018  OBJECTIVE:   /50 (BP Location: Left arm, Patient Position: Sitting, Cuff Size: Adult Regular)  Pulse (!) 48  Temp 98.2  F (36.8  C) (Oral)  Ht 1.753 m (5' 9\")  Wt 74.8 kg (165 lb)  SpO2 96%  BMI 24.37 kg/m2  Body mass index is 24.37 kg/(m^2).  Neck was supple without adenopathy or thyromegaly his carotids were normal without bruits  Chest clear to auscultation and percussion  Cardiovascular S1 and S2 are physiologic without murmurs or gallops  Abdomen bowel sounds were normal.  There is no palpable mass or organomegaly  Extremities nontender without any edema  Left Shoulder: large nodular at the region of the coracoclavicular joint without effusion, good flexion and extension, difficulty with raising arm or abducting it, impingement sign was negative, external rotation limited to less that 45 degrees  Pulses pedal pulses are as described otherwise his pulses are bilaterally symmetrical throughout without bruits  Skin without significant abnormality    Procedure:  The left shoulder was cleaned and prepped. From a posterior approach, using sterile technique and a 21 gauge 1.5-in needle, 60 mg of medrol with lidocaine and marcaine were injected without difficulty.     Diagnostic Test Results:  X-Ray Left Shoulder: possible callus/spur at the coracoclavicular joint  ASSESSMENT/PLAN:   1. Primary osteoarthritis of left shoulder  Steroid injection given in the clinic. " Apply heat to the affected area for 20 minutes for three days to help with the absorption.  - XR Shoulder Left G/E 3 Views; Future    2. Primary osteoarthritis involving multiple joints    3. Idiopathic chronic gout of multiple sites without tophus  Controlled with allopurinol.     4. Hyperlipidemia LDL goal <100  Controlled with atorvastatin.     5. Abdominal aortic aneurysm (AAA) without rupture (H)    6. Aortic valve stenosis, unspecified etiology    7. Stenosis of carotid artery, unspecified laterality    8. Benign essential hypertension  Controlled with amlodipine, atenolol, and hydralazine.     9. Primary osteoarthritis of both knees    10. CKD (chronic kidney disease) stage 3, GFR 30-59 ml/min    Warren Lovelace MD  [facility]    The information in this document, created by the medical scribe for me, accurately reflects the services I personally performed and the decisions made by me. I have reviewed and approved this document for accuracy prior to leaving the patient care area.  Warren Lovelace MD  12:47 PM, 06/27/18

## 2018-06-27 NOTE — MR AVS SNAPSHOT
"              After Visit Summary   6/27/2018    Trevor Soni    MRN: [id number]           Patient Information     Date Of Birth          9/3/1933        Visit Information        Provider Department      6/27/2018 12:00 PM Warren Lovelace MD [facility]        Today's Diagnoses     Primary osteoarthritis of left shoulder    -  1    Primary osteoarthritis involving multiple joints        Idiopathic chronic gout of multiple sites without tophus        Hyperlipidemia LDL goal <100        Abdominal aortic aneurysm (AAA) without rupture (H)        Aortic valve stenosis, unspecified etiology        Stenosis of carotid artery, unspecified laterality        Benign essential hypertension        Primary osteoarthritis of both knees        CKD (chronic kidney disease) stage 3, GFR 30-59 ml/min           Follow-ups after your visit        Who to contact     If you have questions or need follow up information about today's clinic visit or your schedule please contact [facility] directly at [phone].  Normal or non-critical lab and imaging results will be communicated to you by MyChart, letter or phone within 4 business days after the clinic has received the results. If you do not hear from us within 7 days, please contact the clinic through MyChart or phone. If you have a critical or abnormal lab result, we will notify you by phone as soon as possible.  Submit refill requests through [employer] or call your pharmacy and they will forward the refill request to us. Please allow 3 business days for your refill to be completed.          Additional Information About Your Visit        Care EveryWhere ID     This is your Care EveryWhere ID. This could be used by other organizations to access your [facility] medical records  [id number]        Your Vitals Were     Pulse Temperature Height Pulse Oximetry BMI (Body Mass Index)       48 98.2  F (36.8  C) (Oral) 5' 9\" (1.753 m) 96% 24.37 kg/m2        Blood " Pressure from Last 3 Encounters:   06/27/18 118/50   05/29/18 116/52   05/21/18 133/60    Weight from Last 3 Encounters:   06/27/18 165 lb (74.8 kg)   05/29/18 167 lb 8 oz (76 kg)   05/21/18 169 lb (76.7 kg)              We Performed the Following     DRAIN/INJECT LARGE JOINT/BURSA     METHYLPREDNISOLONE 80 MG INJ        Primary Care Provider Office Phone # Fax #    Warren Lovelace [phone] [address] [facility] [address]  [facility] [address]        Equal Access to Services     [facility]: Hadii aad ku hadasho Soomaali, waaxda luqadaha, qaybta kaalmada adeegyada, mercedez andre . So [facility] [phone].    ATENCIÓN: Si habla español, tiene a mendoza disposición servicios gratuitos de asistencia lingüística. Llame al [phone].    We comply with applicable federal civil rights laws and [place] laws. We do not discriminate on the basis of race, color, national origin, age, disability, sex, sexual orientation, or gender identity.            Thank you!     Thank you for choosing [facility]  for your care. Our goal is always to provide you with excellent care. Hearing back from our patients is one way we can continue to improve our services. Please take a few minutes to complete the written survey that you may receive in the mail after your visit with us. Thank you!             Your Updated Medication List - Protect others around you: Learn how to safely use, store and throw away your medicines at www.disposemymeds.org.          This list is accurate as of 6/27/18 11:59 PM.  Always use your most recent med list.                   Brand Name Dispense Instructions for use Diagnosis    allopurinol 100 MG tablet    ZYLOPRIM    180 tablet    TAKE 2 TABLETS BY MOUTH  EVERY DAY    Hyperlipidemia LDL goal <100, Benign essential hypertension       amLODIPine 5 MG tablet    NORVASC    180 tablet    TAKE 1 TABLET BY MOUTH  TWICE A DAY    Hyperlipidemia LDL goal <100, Benign  essential hypertension       ASPIRIN PO      Take 325 mg by mouth daily.        atenolol 50 MG tablet    TENORMIN    90 tablet    TAKE 1 TABLET BY MOUTH  EVERY DAY    Hyperlipidemia LDL goal <100, Benign essential hypertension       atorvastatin 20 MG tablet    LIPITOR    90 tablet    TAKE 1 TABLET BY MOUTH  EVERY DAY    Hyperlipidemia LDL goal <100, Benign essential hypertension       diclofenac 1 % Gel topical gel    VOLTAREN    100 g    Apply 4 grams to knees  four times daily using enclosed dosing card.    Post-traumatic osteoarthritis of left knee       fenofibrate 160 MG tablet     90 tablet    TAKE 1 TABLET BY MOUTH  EVERY DAY    Hyperlipidemia LDL goal <100, Benign essential hypertension       hydrALAZINE 50 MG tablet    APRESOLINE    270 tablet    TAKE 1 TABLET BY MOUTH 3  TIMES A DAY    Hyperlipidemia LDL goal <100, Benign essential hypertension       triamcinolone 0.1 % cream    KENALOG    30 g    Apply sparingly to affected area three times daily for 14 days.    Dermatitis       VITAMIN D3 PO      Take 1 tablet by mouth daily        ZANTAC PO      Take 150 mg by mouth daily as needed

## 2018-09-20 ENCOUNTER — OFFICE VISIT (OUTPATIENT)
Dept: FAMILY MEDICINE | Facility: CLINIC | Age: 83
End: 2018-09-20
Payer: MEDICARE

## 2018-09-20 VITALS
BODY MASS INDEX: 24.14 KG/M2 | HEART RATE: 52 BPM | HEIGHT: 69 IN | OXYGEN SATURATION: 96 % | SYSTOLIC BLOOD PRESSURE: 128 MMHG | WEIGHT: 163 LBS | DIASTOLIC BLOOD PRESSURE: 58 MMHG | TEMPERATURE: 98 F

## 2018-09-20 DIAGNOSIS — M17.0 PRIMARY OSTEOARTHRITIS OF BOTH KNEES: ICD-10-CM

## 2018-09-20 DIAGNOSIS — I10 BENIGN ESSENTIAL HYPERTENSION: ICD-10-CM

## 2018-09-20 DIAGNOSIS — Z23 NEED FOR PROPHYLACTIC VACCINATION AND INOCULATION AGAINST INFLUENZA: Primary | ICD-10-CM

## 2018-09-20 DIAGNOSIS — I35.0 AORTIC VALVE STENOSIS, UNSPECIFIED ETIOLOGY: ICD-10-CM

## 2018-09-20 DIAGNOSIS — E78.5 HYPERLIPIDEMIA LDL GOAL <100: ICD-10-CM

## 2018-09-20 DIAGNOSIS — I71.40 ABDOMINAL AORTIC ANEURYSM (AAA) WITHOUT RUPTURE (H): ICD-10-CM

## 2018-09-20 PROCEDURE — 90662 IIV NO PRSV INCREASED AG IM: CPT | Performed by: INTERNAL MEDICINE

## 2018-09-20 PROCEDURE — 99214 OFFICE O/P EST MOD 30 MIN: CPT | Mod: 25 | Performed by: INTERNAL MEDICINE

## 2018-09-20 PROCEDURE — G0008 ADMIN INFLUENZA VIRUS VAC: HCPCS | Performed by: INTERNAL MEDICINE

## 2018-09-20 NOTE — PROGRESS NOTES
SUBJECTIVE:   Trevor Soni is a 85 year old male who presents to clinic today for the following health issues:    Follow-up on osteoarthritis/ Synvisc injection    Patient had synvisc injections done in both knee joints back in May 2018. The right knee has been bothering him lately. When the knee gets sore he also gets a pain in the right hip that radiates to the tailbone. At times the knees are very fine, he can walk around for a half hour, but then they go and cause him pain again. Walking around is the only exercise he can manage at this time. When he puts too much pressure on it they cause him more pain. He does note that getting up out of chairs has been easier since the injection.       Problem list and histories reviewed & adjusted, as indicated.  Additional history: as documented    Current Outpatient Prescriptions   Medication Sig Dispense Refill     allopurinol (ZYLOPRIM) 100 MG tablet TAKE 2 TABLETS BY MOUTH  EVERY  tablet 3     amLODIPine (NORVASC) 5 MG tablet TAKE 1 TABLET BY MOUTH  TWICE A  tablet 3     ASPIRIN PO Take 325 mg by mouth daily.       atenolol (TENORMIN) 50 MG tablet TAKE 1 TABLET BY MOUTH  EVERY DAY 90 tablet 2     atorvastatin (LIPITOR) 20 MG tablet TAKE 1 TABLET BY MOUTH  EVERY DAY 90 tablet 3     Cholecalciferol (VITAMIN D3 PO) Take 1 tablet by mouth daily       diclofenac (VOLTAREN) 1 % GEL topical gel Apply 4 grams to knees  four times daily using enclosed dosing card. 100 g 3     fenofibrate 160 MG tablet TAKE 1 TABLET BY MOUTH  EVERY DAY 90 tablet 3     hydrALAZINE (APRESOLINE) 50 MG tablet TAKE 1 TABLET BY MOUTH 3  TIMES A  tablet 3     Ranitidine HCl (ZANTAC PO) Take 150 mg by mouth daily as needed        triamcinolone (KENALOG) 0.1 % cream Apply sparingly to affected area three times daily for 14 days. 30 g 1     Allergies   Allergen Reactions     Avocado      Ciprofloxacin Itching     Penicillins Itching       Reviewed and updated as needed this visit  "by clinical staff       Reviewed and updated as needed this visit by Provider         ROS:  Constitutional, HEENT, cardiovascular, pulmonary, gi and gu systems are negative, except as otherwise noted.    This document serves as a record of the services and decisions personally performed and made by Warren Lovelace MD. It was created on his behalf by Chana Snider, a trained medical scribe. The creation of this document is based on the observations of the medical scribe, and the provider's statements to the medical scribe.  Chana Snider September 20, 2018 5:12 PM    OBJECTIVE:     /58 (BP Location: Right arm, Patient Position: Sitting, Cuff Size: Adult Regular)  Pulse 52  Temp 98  F (36.7  C) (Oral)  Ht 1.753 m (5' 9\")  Wt 73.9 kg (163 lb)  SpO2 96%  BMI 24.07 kg/m2  Body mass index is 24.07 kg/(m^2).  Neck was supple without adenopathy or thyromegaly his carotids were normal without bruits  Chest clear to auscultation and percussion  Cardiovascular S1 and S2 are physiologic he has a Grade 2/6 systolic ejection murmur radiating across the precordium to the carotids  Abdomen bowel sounds were normal.  There is no palpable mass or organomegaly. He had no tenderness and his AAA was not palpable.   Extremities nontender without any edema  His hips had good flexion bilaterally, external rot was >45 degrees bilaterally without pain.   Knees right knee had no tenderness along the medial joint line the ligaments were intact, drawer sign negative and he does have a small bakers cyst  Left knee no medial joint tenderness, ligaments intact, drawer sign negative  Pulses pedal pulses are as described otherwise his pulses are bilaterally symmetrical throughout without bruits  Skin without significant abnormality      Diagnostic Test Results:  No results found for this or any previous visit (from the past 24 hour(s)).    ASSESSMENT/PLAN:     There are no diagnoses linked to this encounter.  (Z23) Need for prophylactic " vaccination and inoculation against influenza  (primary encounter diagnosis)  Comment:   Plan: FLU VACCINE, INCREASED ANTIGEN, PRESV FREE, AGE        65+ [10279], ADMIN INFLUENZA (For MEDICARE         Patients ONLY) []            (I35.0) Aortic valve stenosis, unspecified etiology  Comment: Continue current therapy will reevaluate at wellness exam      (E78.5) Hyperlipidemia LDL goal <100  Comment: Continue same medications  (I10) Benign essential hypertension  Comment: Well-controlled on current treatment, follow-up at wellness exa    (M17.0) Primary osteoarthritis of both knees  Comment: Synvisc was not completely successful as patient is having pain 3-4 months after his injection.  Recommend trying a knee sleeve to evaluate improved comfort with activities.  Continue symptomatic treatment as per med list.      (I71.4) Abdominal aortic aneurysm (AAA) without rupture (H)  Comment scheduled for follow-up ultrasound   med    Warren Lovelace MD  [facility]    The information in this document, created by the medical scribe for me, accurately reflects the services I personally performed and the decisions made by me. I have reviewed and approved this document for accuracy prior to leaving the patient care area.  Warren Lovelace MD  5:38 PM September 20, 2018

## 2018-09-20 NOTE — MR AVS SNAPSHOT
"              After Visit Summary   9/20/2018    Trevor Soni    MRN: [id number]           Patient Information     Date Of Birth          9/3/1933        Visit Information        Provider Department      9/20/2018 4:30 PM Warren Lovelace MD [facility]        Today's Diagnoses     Need for prophylactic vaccination and inoculation against influenza    -  1    Aortic valve stenosis, unspecified etiology        Hyperlipidemia LDL goal <100        Benign essential hypertension        Primary osteoarthritis of both knees        Abdominal aortic aneurysm (AAA) without rupture (H)           Follow-ups after your visit        Who to contact     If you have questions or need follow up information about today's clinic visit or your schedule please contact [facility] directly at [phone].  Normal or non-critical lab and imaging results will be communicated to you by MyChart, letter or phone within 4 business days after the clinic has received the results. If you do not hear from us within 7 days, please contact the clinic through MyChart or phone. If you have a critical or abnormal lab result, we will notify you by phone as soon as possible.  Submit refill requests through [employer] or call your pharmacy and they will forward the refill request to us. Please allow 3 business days for your refill to be completed.          Additional Information About Your Visit        MyChart Information     [employer] lets you send messages to your doctor, view your test results, renew your prescriptions, schedule appointments and more. To sign up, go to [email or web address] . Click on \"Log in\" on the left side of the screen, which will take you to the Welcome page. Then click on \"Sign up Now\" on the right side of the page.     You will be asked to enter the access code listed below, as well as some personal information. Please follow the directions to create your username and password.     Your access code is: " "[email or web address]  Expires: 2018  4:21 AM     Your access code will  in 90 days. If you need help or a new code, please call your [place] clinic or [phone].        Care EveryWhere ID     This is your Care EveryWhere ID. This could be used by other organizations to access your [place] medical records  [id number]        Your Vitals Were     Pulse Temperature Height Pulse Oximetry BMI (Body Mass Index)       52 98  F (36.7  C) (Oral) 5' 9\" (1.753 m) 96% 24.07 kg/m2        Blood Pressure from Last 3 Encounters:   18 128/58   18 118/50   18 116/52    Weight from Last 3 Encounters:   18 163 lb (73.9 kg)   18 165 lb (74.8 kg)   18 167 lb 8 oz (76 kg)              We Performed the Following     ADMIN INFLUENZA (For MEDICARE Patients ONLY) []     FLU VACCINE, INCREASED ANTIGEN, PRESV FREE, AGE 65+ [45343]        Primary Care Provider Office Phone # Fax #    Warren Lovelace [phone] [address] [address]        Equal Access to Services     ZACH BEST : Hadii aad ku hadasho Soomaali, waaxda luqadaha, qaybta kaalmada adeegyada, mercedez andre . So [facility] [phone].    ATENCIÓN: Si habla español, tiene a mendoza disposición servicios gratuitos de asistencia lingüística. ame al [phone].    We comply with applicable federal civil rights laws and [place] laws. We do not discriminate on the basis of race, color, national origin, age, disability, sex, sexual orientation, or gender identity.            Thank you!     Thank you for choosing [facility]  for your care. Our goal is always to provide you with excellent care. Hearing back from our patients is one way we can continue to improve our services. Please take a few minutes to complete the written survey that you may receive in the mail after your visit with us. Thank you!             Your Updated Medication List - Protect others " around you: Learn how to safely use, store and throw away your medicines at www.disposemymeds.org.          This list is accurate as of 9/20/18 11:59 PM.  Always use your most recent med list.                   Brand Name Dispense Instructions for use Diagnosis    allopurinol 100 MG tablet    ZYLOPRIM    180 tablet    TAKE 2 TABLETS BY MOUTH  EVERY DAY    Hyperlipidemia LDL goal <100, Benign essential hypertension       amLODIPine 5 MG tablet    NORVASC    180 tablet    TAKE 1 TABLET BY MOUTH  TWICE A DAY    Hyperlipidemia LDL goal <100, Benign essential hypertension       ASPIRIN PO      Take 325 mg by mouth daily.        atenolol 50 MG tablet    TENORMIN    90 tablet    TAKE 1 TABLET BY MOUTH  EVERY DAY    Hyperlipidemia LDL goal <100, Benign essential hypertension       atorvastatin 20 MG tablet    LIPITOR    90 tablet    TAKE 1 TABLET BY MOUTH  EVERY DAY    Hyperlipidemia LDL goal <100, Benign essential hypertension       diclofenac 1 % Gel topical gel    VOLTAREN    100 g    Apply 4 grams to knees  four times daily using enclosed dosing card.    Post-traumatic osteoarthritis of left knee       fenofibrate 160 MG tablet     90 tablet    TAKE 1 TABLET BY MOUTH  EVERY DAY    Hyperlipidemia LDL goal <100, Benign essential hypertension       hydrALAZINE 50 MG tablet    APRESOLINE    270 tablet    TAKE 1 TABLET BY MOUTH 3  TIMES A DAY    Hyperlipidemia LDL goal <100, Benign essential hypertension       triamcinolone 0.1 % cream    KENALOG    30 g    Apply sparingly to affected area three times daily for 14 days.    Dermatitis       VITAMIN D3 PO      Take 1 tablet by mouth daily        ZANTAC PO      Take 150 mg by mouth daily as needed

## 2018-09-24 ENCOUNTER — TRANSFERRED RECORDS (OUTPATIENT)
Dept: HEALTH INFORMATION MANAGEMENT | Facility: CLINIC | Age: 83
End: 2018-09-24

## 2018-11-01 ENCOUNTER — TRANSFERRED RECORDS (OUTPATIENT)
Dept: HEALTH INFORMATION MANAGEMENT | Facility: CLINIC | Age: 83
End: 2018-11-01

## 2018-11-01 ENCOUNTER — OFFICE VISIT (OUTPATIENT)
Dept: FAMILY MEDICINE | Facility: CLINIC | Age: 83
End: 2018-11-01
Payer: MEDICARE

## 2018-11-01 VITALS
HEIGHT: 69 IN | TEMPERATURE: 97 F | DIASTOLIC BLOOD PRESSURE: 54 MMHG | BODY MASS INDEX: 24.29 KG/M2 | WEIGHT: 164 LBS | HEART RATE: 44 BPM | SYSTOLIC BLOOD PRESSURE: 136 MMHG | OXYGEN SATURATION: 97 %

## 2018-11-01 DIAGNOSIS — I71.40 ABDOMINAL AORTIC ANEURYSM (AAA) WITHOUT RUPTURE (H): ICD-10-CM

## 2018-11-01 DIAGNOSIS — M54.50 ACUTE RIGHT-SIDED LOW BACK PAIN WITHOUT SCIATICA: ICD-10-CM

## 2018-11-01 DIAGNOSIS — I35.0 AORTIC VALVE STENOSIS, UNSPECIFIED ETIOLOGY: ICD-10-CM

## 2018-11-01 DIAGNOSIS — M1A.09X0 IDIOPATHIC CHRONIC GOUT OF MULTIPLE SITES WITHOUT TOPHUS: ICD-10-CM

## 2018-11-01 DIAGNOSIS — I65.29 STENOSIS OF CAROTID ARTERY, UNSPECIFIED LATERALITY: ICD-10-CM

## 2018-11-01 DIAGNOSIS — N18.30 CKD (CHRONIC KIDNEY DISEASE) STAGE 3, GFR 30-59 ML/MIN (H): Primary | ICD-10-CM

## 2018-11-01 DIAGNOSIS — M15.0 PRIMARY OSTEOARTHRITIS INVOLVING MULTIPLE JOINTS: ICD-10-CM

## 2018-11-01 DIAGNOSIS — I10 BENIGN ESSENTIAL HYPERTENSION: ICD-10-CM

## 2018-11-01 DIAGNOSIS — E78.5 HYPERLIPIDEMIA LDL GOAL <100: ICD-10-CM

## 2018-11-01 PROCEDURE — 99214 OFFICE O/P EST MOD 30 MIN: CPT | Performed by: INTERNAL MEDICINE

## 2018-11-01 RX ORDER — CYCLOBENZAPRINE HCL 10 MG
TABLET ORAL
Qty: 7 TABLET | Refills: 1 | Status: ON HOLD | OUTPATIENT
Start: 2018-11-01 | End: 2020-06-21

## 2018-11-01 NOTE — PROGRESS NOTES
SUBJECTIVE:   Trevor Soni is a 85 year old male who presents to clinic today for the following health issues:      Patient presents to clinic to review results of recent ultra sound and for follow up on his bilateral knee pain.     Back pain  Patient mentions that he might have pulled on muscle in his lower right side of the back. He says that this has been going on for three to four days. Trevor notes that he put some heat on the area but that doesn't really resolve the pain. Patient reports that this pain is elevated when reaching up for things or racking the leaves. He says at night, this gives some discomfort       Other Issues  Trevor has a small cyst on his left palm for few days.       Problem list and histories reviewed & adjusted, as indicated.  Additional history: as documented    Current Outpatient Prescriptions   Medication Sig Dispense Refill     allopurinol (ZYLOPRIM) 100 MG tablet TAKE 2 TABLETS BY MOUTH  EVERY  tablet 3     amLODIPine (NORVASC) 5 MG tablet TAKE 1 TABLET BY MOUTH  TWICE A  tablet 3     ASPIRIN PO Take 325 mg by mouth daily.       atenolol (TENORMIN) 50 MG tablet TAKE 1 TABLET BY MOUTH  EVERY DAY 90 tablet 2     atorvastatin (LIPITOR) 20 MG tablet TAKE 1 TABLET BY MOUTH  EVERY DAY 90 tablet 3     Cholecalciferol (VITAMIN D3 PO) Take 1 tablet by mouth daily       fenofibrate 160 MG tablet TAKE 1 TABLET BY MOUTH  EVERY DAY 90 tablet 3     hydrALAZINE (APRESOLINE) 50 MG tablet TAKE 1 TABLET BY MOUTH 3  TIMES A  tablet 3     Ranitidine HCl (ZANTAC PO) Take 150 mg by mouth daily as needed        triamcinolone (KENALOG) 0.1 % cream Apply sparingly to affected area three times daily for 14 days. 30 g 1     Allergies   Allergen Reactions     Avocado      Ciprofloxacin Itching     Penicillins Itching       Reviewed and updated as needed this visit by clinical staff       Reviewed and updated as needed this visit by Provider         ROS:  CONSTITUTIONAL: NEGATIVE for  "fever, chills, change in weight  INTEGUMENTARY/SKIN: NEGATIVE for worrisome rashes, moles or lesions  EYES: NEGATIVE for vision changes or irritation  ENT/MOUTH: NEGATIVE for ear, mouth and throat problems  RESP: NEGATIVE for significant cough or SOB  BREAST: NEGATIVE for masses, tenderness or discharge  CV: NEGATIVE for chest pain, palpitations or peripheral edema  GI: NEGATIVE for nausea, abdominal pain, heartburn, or change in bowel habits  : NEGATIVE for frequency, dysuria, or hematuria  MUSCULOSKELETAL: NEGATIVE for significant arthralgias or myalgia  NEURO: NEGATIVE for weakness, dizziness or paresthesias  ENDOCRINE: NEGATIVE for temperature intolerance, skin/hair changes  HEME: NEGATIVE for bleeding problems  PSYCHIATRIC: NEGATIVE for changes in mood or affect    This document serves as a record of the services and decisions personally performed and made by Warren Lovelace MD. It was created on his behalf by Chandrika Mistry, a trained medical scribe. The creation of this document is based on the observations of the medical scribe, and the provider's statements to the medical scribe.  Chandrika Mistry November 1, 2018 10:37 AM  OBJECTIVE:     /54 (BP Location: Left arm, Patient Position: Sitting, Cuff Size: Adult Regular)  Pulse (!) 44  Temp 97  F (36.1  C) (Oral)  Ht 1.753 m (5' 9\")  Wt 74.4 kg (164 lb)  SpO2 97%  BMI 24.22 kg/m2  Body mass index is 24.22 kg/(m^2).     Neck was supple without adenopathy or thyromegaly his carotids were normal without bruits  Chest clear to auscultation and percussion  Cardiovascular S1 and S2 are physiologic without murmurs or gallops  Abdomen bowel sounds were normal.  There is no palpable mass or organomegaly  Extremities nontender without any edema  Back; spine was non tnder he had right paraspinous tenderness   Straight leg was negative, deep tender flexion including the petaller was normal, achilles were absent, and babinski were normal   Pulses pedal pulses are as " described otherwise his pulses are bilaterally symmetrical throughout without bruits  Skin without significant abnormality  Left hand small cystic area associated with the palmar tendons leading to the middle digit    LABS  His carotid ulterasound was unchanged his abd aortic aneurysm ulterasound was unchanged   Diagnostic Test Results:  No results found for this or any previous visit (from the past 24 hour(s)).    ASSESSMENT:     PLAN:       1. CKD (chronic kidney disease) stage 3, GFR 30-59 ml/min (H)  contniue the same medications     2. Aortic valve stenosis, unspecified etiology  Continue serial surveillance     3. Hyperlipidemia LDL goal <100      4. Benign essential hypertension  Controled     5. Primary osteoarthritis involving multiple joints      6. Idiopathic chronic gout of multiple sites without tophus      7. Abdominal aortic aneurysm (AAA) without rupture (H)  Repeat ultrasound in a year      8. Stenosis of carotid artery, unspecified laterality    9. Acute right-sided low back pain without sciatica  Right low back pain recommended using heat and tylenol , short walks and if it wasn't improving we need to reevalute further    - cyclobenzaprine (FLEXERIL) 10 MG tablet; Sig 1/2 po at bedtime prn  Dispense: 7 tablet; Refill: 1        The information in this document, created by the medical scribe for me, accurately reflects the services I personally performed and the decisions made by me. I have reviewed and approved this document for accuracy prior to leaving the patient care area.  Warren Lovelace MD  4:47 PM November 1, 2018      Warren Lovelace MD  [facility]

## 2018-11-01 NOTE — MR AVS SNAPSHOT
After Visit Summary   11/1/2018    Trevor Soni    MRN: [id number]           Patient Information     Date Of Birth          9/3/1933        Visit Information        Provider Department      11/1/2018 9:30 AM Warren Lovelace MD [facility]        Today's Diagnoses     CKD (chronic kidney disease) stage 3, GFR 30-59 ml/min (H)    -  1    Aortic valve stenosis, unspecified etiology        Hyperlipidemia LDL goal <100        Benign essential hypertension        Primary osteoarthritis involving multiple joints        Idiopathic chronic gout of multiple sites without tophus        Abdominal aortic aneurysm (AAA) without rupture (H)        Stenosis of carotid artery, unspecified laterality        Acute right-sided low back pain without sciatica           Follow-ups after your visit        Your next 10 appointments already scheduled     Dec 03, 2018 10:30 AM CST   [facility] with [facility] MD   [facility]    [address] [facility] [id number] [phone]           Bring a current list of meds and any records pertaining to this visit. For Physicals, please bring immunization records and any forms needing to be filled out. Please arrive 10 minutes early to complete paperwork.              Who to contact     If you have questions or need follow up information about today's clinic visit or your schedule please contact [facility] directly at [phone].  Normal or non-critical lab and imaging results will be communicated to you by MyChart, letter or phone within 4 business days after the clinic has received the results. If you do not hear from us within 7 days, please contact the clinic through MyChart or phone. If you have a critical or abnormal lab result, we will notify you by phone as soon as possible.  Submit refill requests through [employer] or call your pharmacy and they will forward the refill request to us. Please allow 3  "business days for your refill to be completed.          Additional Information About Your Visit        Care EveryWhere ID     This is your Care EveryWhere ID. This could be used by other organizations to access your [facility] medical records  [id number]        Your Vitals Were     Pulse Temperature Height Pulse Oximetry BMI (Body Mass Index)       44 97  F (36.1  C) (Oral) 5' 9\" (1.753 m) 97% 24.22 kg/m2        Blood Pressure from Last 3 Encounters:   11/01/18 136/54   09/20/18 128/58   06/27/18 118/50    Weight from Last 3 Encounters:   11/01/18 164 lb (74.4 kg)   09/20/18 163 lb (73.9 kg)   06/27/18 165 lb (74.8 kg)              Today, you had the following     No orders found for display         Today's Medication Changes          These changes are accurate as of 11/1/18 11:59 PM.  If you have any questions, ask your nurse or doctor.               Start taking these medicines.        Dose/Directions    cyclobenzaprine 10 MG tablet   Commonly known as:  FLEXERIL   Used for:  Acute right-sided low back pain without sciatica   Started by:  Warren Lovelace MD        Sig 1/2 po at bedtime prn   Quantity:  7 tablet   Refills:  1            Where to get your medicines      These medications were sent to [id number] - [place], [place] - [address] [place] [address]    Hours:  24-hours Phone:  [phone]     cyclobenzaprine 10 MG tablet                Primary Care Provider Office Phone # Fax #    Warren Lovelace [phone] [address]  [place] [address]        Equal Access to Services     [facility] ESTEPHANIA : Shaunna Lozano, teresita adams, devin cohenaltrenton stuart, mercedez christopher. So [facility] [phone].    ATENCIÓN: Si habla español, tiene a mendoza disposición servicios gratuitos de asistencia lingüística. Llame al [phone].    We comply with applicable federal civil rights laws " and [place] laws. We do not discriminate on the basis of race, color, national origin, age, disability, sex, sexual orientation, or gender identity.            Thank you!     Thank you for choosing [facility]  for your care. Our goal is always to provide you with excellent care. Hearing back from our patients is one way we can continue to improve our services. Please take a few minutes to complete the written survey that you may receive in the mail after your visit with us. Thank you!             Your Updated Medication List - Protect others around you: Learn how to safely use, store and throw away your medicines at www.disposemymeds.org.          This list is accurate as of 11/1/18 11:59 PM.  Always use your most recent med list.                   Brand Name Dispense Instructions for use Diagnosis    allopurinol 100 MG tablet    ZYLOPRIM    180 tablet    TAKE 2 TABLETS BY MOUTH  EVERY DAY    Hyperlipidemia LDL goal <100, Benign essential hypertension       amLODIPine 5 MG tablet    NORVASC    180 tablet    TAKE 1 TABLET BY MOUTH  TWICE A DAY    Hyperlipidemia LDL goal <100, Benign essential hypertension       ASPIRIN PO      Take 325 mg by mouth daily.        atenolol 50 MG tablet    TENORMIN    90 tablet    TAKE 1 TABLET BY MOUTH  EVERY DAY    Hyperlipidemia LDL goal <100, Benign essential hypertension       atorvastatin 20 MG tablet    LIPITOR    90 tablet    TAKE 1 TABLET BY MOUTH  EVERY DAY    Hyperlipidemia LDL goal <100, Benign essential hypertension       cyclobenzaprine 10 MG tablet    FLEXERIL    7 tablet    Sig 1/2 po at bedtime prn    Acute right-sided low back pain without sciatica       fenofibrate 160 MG tablet     90 tablet    TAKE 1 TABLET BY MOUTH  EVERY DAY    Hyperlipidemia LDL goal <100, Benign essential hypertension       hydrALAZINE 50 MG tablet    APRESOLINE    270 tablet    TAKE 1 TABLET BY MOUTH 3  TIMES A DAY    Hyperlipidemia LDL goal <100, Benign essential hypertension        triamcinolone 0.1 % cream    KENALOG    30 g    Apply sparingly to affected area three times daily for 14 days.    Dermatitis       VITAMIN D3 PO      Take 1 tablet by mouth daily        ZANTAC PO      Take 150 mg by mouth daily as needed

## 2018-11-08 DIAGNOSIS — E78.5 HYPERLIPIDEMIA LDL GOAL <100: ICD-10-CM

## 2018-11-08 DIAGNOSIS — I10 BENIGN ESSENTIAL HYPERTENSION: ICD-10-CM

## 2018-11-08 RX ORDER — ATENOLOL 50 MG/1
TABLET ORAL
Qty: 90 TABLET | Refills: 3 | Status: SHIPPED | OUTPATIENT
Start: 2018-11-08 | End: 2019-09-09

## 2018-11-08 NOTE — TELEPHONE ENCOUNTER
"Last Written Prescription Date:  2/12/18  Last Fill Quantity: 90 tablet,  # refills: 2   Last office visit: 11/1/2018 with prescribing provider:  [place]   Future Office Visit:   Next 5 appointments (look out 90 days)     Dec 03, 2018 10:30 AM CST   [facility] Visit with [facility] Lo[facility]ace MD   [facility]    [address] [facility] [address]   [phone]                 Requested Prescriptions   Pending Prescriptions Disp Refills     atenolol (TENORMIN) 50 MG tablet [Pharmacy Med Name: ATENOLOL  50MG  TAB] 90 tablet      Sig: TAKE 1 TABLET BY MOUTH  EVERY DAY    Beta-Blockers Protocol Passed    11/8/2018  4:06 AM       Passed - Blood pressure under 140/90 in past 12 months    BP Readings from Last 3 Encounters:   11/01/18 136/54   09/20/18 128/58   06/27/18 118/50                Passed - Patient is age 6 or older       Passed - Recent (12 mo) or future (30 days) visit within the authorizing provider's specialty    Patient had office visit in the last 12 months or has a visit in the next 30 days with authorizing provider or within the authorizing provider's specialty.  See \"Patient Info\" tab in inbasket, or \"Choose Columns\" in Meds & Orders section of the refill encounter.                "

## 2018-11-08 NOTE — TELEPHONE ENCOUNTER
Prescription approved per [facility] Refill Protocol.  Mckayla ALEX RN

## 2018-11-29 ENCOUNTER — OFFICE VISIT (OUTPATIENT)
Dept: FAMILY MEDICINE | Facility: CLINIC | Age: 83
End: 2018-11-29
Payer: MEDICARE

## 2018-11-29 VITALS
BODY MASS INDEX: 24.29 KG/M2 | SYSTOLIC BLOOD PRESSURE: 144 MMHG | OXYGEN SATURATION: 97 % | DIASTOLIC BLOOD PRESSURE: 55 MMHG | WEIGHT: 164 LBS | TEMPERATURE: 96.4 F | HEIGHT: 69 IN | HEART RATE: 54 BPM

## 2018-11-29 DIAGNOSIS — E78.5 HYPERLIPIDEMIA LDL GOAL <100: ICD-10-CM

## 2018-11-29 DIAGNOSIS — I35.0 AORTIC VALVE STENOSIS, UNSPECIFIED ETIOLOGY: ICD-10-CM

## 2018-11-29 DIAGNOSIS — M1A.09X0 IDIOPATHIC CHRONIC GOUT OF MULTIPLE SITES WITHOUT TOPHUS: ICD-10-CM

## 2018-11-29 DIAGNOSIS — I10 BENIGN ESSENTIAL HYPERTENSION: ICD-10-CM

## 2018-11-29 DIAGNOSIS — M17.0 PRIMARY OSTEOARTHRITIS OF BOTH KNEES: Primary | ICD-10-CM

## 2018-11-29 DIAGNOSIS — N18.30 CKD (CHRONIC KIDNEY DISEASE) STAGE 3, GFR 30-59 ML/MIN (H): ICD-10-CM

## 2018-11-29 DIAGNOSIS — M15.0 PRIMARY OSTEOARTHRITIS INVOLVING MULTIPLE JOINTS: ICD-10-CM

## 2018-11-29 DIAGNOSIS — I71.40 ABDOMINAL AORTIC ANEURYSM (AAA) WITHOUT RUPTURE (H): ICD-10-CM

## 2018-11-29 DIAGNOSIS — I65.29 STENOSIS OF CAROTID ARTERY, UNSPECIFIED LATERALITY: ICD-10-CM

## 2018-11-29 NOTE — PROGRESS NOTES
SUBJECTIVE:   Trevor Soni is a 85 year old male who presents to clinic today for the following health issues:      HPI  Patient is here today for a recheck of his knee. He notes that they are improving but there are good and bad days. Trevor reports that he doesn't wear any brace for his knees. Also, patient notes that the lump on his shoulder has decrease in size. He notes that he can't left things that are around 50 lbs or more. His last injection to his should was approximally six months ago, which helped with the pain.      Problem list and histories reviewed & adjusted, as indicated.  Additional history: as documented    Current Outpatient Prescriptions   Medication Sig Dispense Refill     allopurinol (ZYLOPRIM) 100 MG tablet TAKE 2 TABLETS BY MOUTH  EVERY  tablet 3     amLODIPine (NORVASC) 5 MG tablet TAKE 1 TABLET BY MOUTH  TWICE A  tablet 3     ASPIRIN PO Take 325 mg by mouth daily.       atenolol (TENORMIN) 50 MG tablet TAKE 1 TABLET BY MOUTH  EVERY DAY 90 tablet 3     atorvastatin (LIPITOR) 20 MG tablet TAKE 1 TABLET BY MOUTH  EVERY DAY 90 tablet 3     Cholecalciferol (VITAMIN D3 PO) Take 1 tablet by mouth daily       cyclobenzaprine (FLEXERIL) 10 MG tablet Sig 1/2 po at bedtime prn 7 tablet 1     fenofibrate 160 MG tablet TAKE 1 TABLET BY MOUTH  EVERY DAY 90 tablet 3     hydrALAZINE (APRESOLINE) 50 MG tablet TAKE 1 TABLET BY MOUTH 3  TIMES A  tablet 3     Ranitidine HCl (ZANTAC PO) Take 150 mg by mouth daily as needed        triamcinolone (KENALOG) 0.1 % cream Apply sparingly to affected area three times daily for 14 days. 30 g 1     Allergies   Allergen Reactions     Avocado      Ciprofloxacin Itching     Penicillins Itching       Reviewed and updated as needed this visit by clinical staff  Tobacco  Allergies  Soc Hx      Reviewed and updated as needed this visit by Provider         ROS:  CONSTITUTIONAL: NEGATIVE for fever, chills, change in weight  INTEGUMENTARY/SKIN:  "NEGATIVE for worrisome rashes, moles or lesions  EYES: NEGATIVE for vision changes or irritation  ENT/MOUTH: NEGATIVE for ear, mouth and throat problems  RESP: NEGATIVE for significant cough or SOB  BREAST: NEGATIVE for masses, tenderness or discharge  CV: NEGATIVE for chest pain, palpitations or peripheral edema  GI: NEGATIVE for nausea, abdominal pain, heartburn, or change in bowel habits  : NEGATIVE for frequency, dysuria, or hematuria  MUSCULOSKELETAL: NEGATIVE for significant arthralgias or myalgia  NEURO: NEGATIVE for weakness, dizziness or paresthesias  ENDOCRINE: NEGATIVE for temperature intolerance, skin/hair changes  HEME: NEGATIVE for bleeding problems  PSYCHIATRIC: NEGATIVE for changes in mood or affect    OBJECTIVE:     /55 (BP Location: Left arm, Patient Position: Sitting, Cuff Size: Adult Regular)  Pulse 54  Temp 96.4  F (35.8  C) (Tympanic)  Ht 1.753 m (5' 9\")  Wt 74.4 kg (164 lb)  SpO2 97%  BMI 24.22 kg/m2  Body mass index is 24.22 kg/(m^2).   EXAM   Neck was supple without adenopathy or thyromegaly his carotids were normal without bruits  Chest clear to auscultation and percussion  Cardiovascular S1 and S2 are physiologic without murmurs or gallops  Abdomen bowel sounds were normal.  There is no palpable mass or organomegaly  Extremities nontender without any edema  Shoulders normal flexion and extension internal rotation of the right is 60 degrees with 60 degrees of external rotation left flexion and extension were normal internal and external rotation were 45 degrees without pain  Knees no significant effusion bilaterally medial joint line is nontender no Baker's cyst, ligaments are intact  Pulses pedal pulses are as described otherwise his pulses are bilaterally symmetrical throughout without bruits  Skin without significant abnormality      Diagnostic Test Results:  No results found for this or any previous visit (from the past 24 hour(s)).    ASSESSMENT:       PLAN:   Trevor was " seen today for recheck.    Diagnoses and all orders for this visit:    Primary osteoarthritis of both knees    Primary osteoarthritis involving multiple joints    Benign essential hypertension    Hyperlipidemia LDL goal <100    Aortic valve stenosis, unspecified etiology    CKD (chronic kidney disease) stage 3, GFR 30-59 ml/min (H)    Idiopathic chronic gout of multiple sites without tophus    Abdominal aortic aneurysm (AAA) without rupture (H)    Stenosis of carotid artery, unspecified laterality                Warren Lovelace MD  [facility]

## 2019-02-11 ENCOUNTER — OFFICE VISIT (OUTPATIENT)
Dept: FAMILY MEDICINE | Facility: CLINIC | Age: 84
End: 2019-02-11
Payer: MEDICARE

## 2019-02-11 VITALS
TEMPERATURE: 97.2 F | HEIGHT: 69 IN | DIASTOLIC BLOOD PRESSURE: 62 MMHG | BODY MASS INDEX: 24.73 KG/M2 | HEART RATE: 58 BPM | SYSTOLIC BLOOD PRESSURE: 153 MMHG | OXYGEN SATURATION: 97 % | WEIGHT: 167 LBS

## 2019-02-11 DIAGNOSIS — M25.562 ARTHRALGIA OF BOTH KNEES: Primary | ICD-10-CM

## 2019-02-11 DIAGNOSIS — G89.29 CHRONIC BILATERAL LOW BACK PAIN WITHOUT SCIATICA: ICD-10-CM

## 2019-02-11 DIAGNOSIS — M54.50 CHRONIC BILATERAL LOW BACK PAIN WITHOUT SCIATICA: ICD-10-CM

## 2019-02-11 DIAGNOSIS — G89.29 CHRONIC PAIN OF BOTH KNEES: ICD-10-CM

## 2019-02-11 DIAGNOSIS — M25.561 CHRONIC PAIN OF BOTH KNEES: ICD-10-CM

## 2019-02-11 DIAGNOSIS — M25.562 CHRONIC PAIN OF BOTH KNEES: ICD-10-CM

## 2019-02-11 DIAGNOSIS — M25.561 ARTHRALGIA OF BOTH KNEES: Primary | ICD-10-CM

## 2019-02-11 PROCEDURE — 99214 OFFICE O/P EST MOD 30 MIN: CPT | Performed by: INTERNAL MEDICINE

## 2019-02-11 ASSESSMENT — MIFFLIN-ST. JEOR: SCORE: 1432.89

## 2019-02-12 NOTE — PROGRESS NOTES
[facility]opedic [facility]   Clinic Visit s Feb 13, 2019    PCP: Warren Lovelace      Trevor is a 85 year old male who is seen as self referral for   Chief Complaint   Patient presents with     Right Leg - Pain     Lower Back - Pain       Injury: Reports insidious onset without acute precipitating event.       Location of Pain: right leg and low back, radiating to foot   Duration of Pain: 4 month(s)  Rating of Pain at worst: 7/10  Rating of Pain Currently: 2/10  Pain is better with: lying down   Pain is worse with: walking   Treatment so far consists of: heat and Pain medication: analgesic heat rub (IcyHot/Aspercreme/Bengay), T#3  Associated symptoms: no distal numbness or tingling; denies swelling or warmth  Recent imaging completed: No recent imaging completed.  Prior History of related problems: chronic LBP     No trouble sleeping.    Social History: retired     Past Medical History:   Diagnosis Date     Arthritis     rhuematoid     Coronary artery disease     triple bypass 2001     CRF (chronic renal failure)      Gastro-oesophageal reflux disease      Gout      Hyperlipidemia      Hypertension      Nocturia        Patient Active Problem List    Diagnosis Date Noted     Post-traumatic osteoarthritis of left knee 10/18/2017     Priority: Medium     Abdominal aortic aneurysm (AAA) without rupture (H) 08/22/2017     Priority: Medium     Stenosis of carotid artery, unspecified laterality 08/22/2017     Priority: Medium     Idiopathic chronic gout of multiple sites without tophus 07/18/2017     Priority: Medium     Primary osteoarthritis of both knees 03/13/2017     Priority: Medium     Chronic rhinitis 12/04/2016     Priority: Medium     CKD (chronic kidney disease) stage 3, GFR 30-59 ml/min (H) 11/18/2016     Priority: Medium     Aortic valve stenosis, unspecified etiology 11/18/2016     Priority: Medium     Hyperlipidemia LDL goal <100 11/18/2016     Priority: Medium     Benign essential  "hypertension 2016     Priority: Medium     Primary osteoarthritis involving multiple joints 2016     Priority: Medium     Pneumonia 2016     Priority: Medium     Abdominal pain 06/10/2016     Priority: Medium     Stiffness of joint, not elsewhere classified,  shoulder region 2014     Priority: Medium     FMHX denies sig illnesses.      Social History     Socioeconomic History     Marital status:                                                            Tobacco Use     Smoking status: Former Smoker     Types: Cigars     Last attempt to quit: 1980     Years since quittin.1     Smokeless tobacco: Never Used   Substance and Sexual Activity     Alcohol use: Yes     Alcohol/week: 0.0 oz     Comment: rarely     Past Surgical History:   Procedure Laterality Date     CARDIAC SURGERY       CHOLECYSTECTOMY       COLONOSCOPY       ORTHOPEDIC SURGERY       PHACOEMULSIFICATION CLEAR CORNEA WITH TORIC INTRAOCULAR LENS IMPLANT  2012    Procedure:PHACOEMULSIFICATION CLEAR CORNEA WITH TORIC INTRAOCULAR LENS IMPLANT; LEFT PHACOEMULSIFICATION CLEAR CORNEA WITH DELUXE  TORIC INTRAOCULAR LENS IMPLANT ; Surgeon:BRANDO HIGHTOWER; Location:[facility]     PHACOEMULSIFICATION CLEAR CORNEA WITH TORIC INTRAOCULAR LENS IMPLANT  2012    Procedure:PHACOEMULSIFICATION CLEAR CORNEA WITH TORIC INTRAOCULAR LENS IMPLANT; RIGHT PHACOEMULSIFICATION CLEAR CORNEA WITH TORIC INTRAOCULAR LENS IMPLANT ; Surgeon:BRANDO HIGHTOWER; Location:[facility]     VASCULAR SURGERY               Review of Systems   Musculoskeletal: Positive for back pain and myalgias.   Neurological: Negative for tingling, sensory change and focal weakness.   All other systems reviewed and are negative.        Physical Exam  /51   Ht 1.753 m (5' 9\")   Wt 75.8 kg (167 lb)   BMI 24.66 kg/m    Constitutional:well-developed, well-nourished, and in no distress.   Cardiovascular: Intact distal pulses.    Neurological: alert. Gait Abnormal:   " Gait with shuffling steps  Skin: Skin is warm and dry.   Psychiatric: Mood and affect normal.   Respiratory: unlabored, speaks in full sentences  Lymph: no LAD, no lymphangitis        Back Exam     Tenderness   The patient is experiencing no tenderness.     Range of Motion   Extension: normal   Flexion: 60 (With discomfort)   Lateral bend right: normal   Lateral bend left: normal   Rotation right: 70   Rotation left: 70     Tests   Straight leg raise right: positive  Straight leg raise left: negative    Other   Toe walk: normal  Heel walk: normal  Sensation: normal  Gait: abnormal   Erythema: no back redness  Scars: absent            ASSESSMENT/PLAN    ICD-10-CM    1. Lumbar radiculopathy M54.16 methylPREDNISolone (MEDROL DOSEPAK) 4 MG tablet therapy pack     DISCONTINUED: methylPREDNISolone (MEDROL DOSEPAK) 4 MG tablet therapy pack   2. CKD (chronic kidney disease) stage 3, GFR 30-59 ml/min (H) N18.3    3. Aortic valve stenosis, unspecified etiology I35.0    4. Benign essential hypertension I10      Radicular pain without overt numbness tingling or weakness.  His treatment thus far has been minimal, but he is unable to take NSAIDs due to his other medical conditions, most notably, chronic renal disease.  Other options discussed, which for treatment would be more appropriate than proceeding directly to advanced imaging.  This includes physical therapy, oral steroids, and he has had a Medrol Dosepak in the past and would be comfortable taking this again.  No evidence of blood sugar issues.  If this does not resolve symptoms in 2 weeks, I recommended physical therapy.  Patient is comfortable with plan.  Follow-up as needed.

## 2019-02-13 ENCOUNTER — OFFICE VISIT (OUTPATIENT)
Dept: ORTHOPEDICS | Facility: CLINIC | Age: 84
End: 2019-02-13
Payer: MEDICARE

## 2019-02-13 VITALS
BODY MASS INDEX: 24.73 KG/M2 | DIASTOLIC BLOOD PRESSURE: 51 MMHG | WEIGHT: 167 LBS | HEIGHT: 69 IN | SYSTOLIC BLOOD PRESSURE: 131 MMHG

## 2019-02-13 DIAGNOSIS — I10 BENIGN ESSENTIAL HYPERTENSION: ICD-10-CM

## 2019-02-13 DIAGNOSIS — M54.16 LUMBAR RADICULOPATHY: Primary | ICD-10-CM

## 2019-02-13 DIAGNOSIS — N18.30 CKD (CHRONIC KIDNEY DISEASE) STAGE 3, GFR 30-59 ML/MIN (H): ICD-10-CM

## 2019-02-13 DIAGNOSIS — I35.0 AORTIC VALVE STENOSIS, UNSPECIFIED ETIOLOGY: ICD-10-CM

## 2019-02-13 PROCEDURE — 99204 OFFICE O/P NEW MOD 45 MIN: CPT | Performed by: FAMILY MEDICINE

## 2019-02-13 RX ORDER — METHYLPREDNISOLONE 4 MG
TABLET, DOSE PACK ORAL
Qty: 21 TABLET | Refills: 0 | Status: SHIPPED | OUTPATIENT
Start: 2019-02-13 | End: 2019-04-10

## 2019-02-13 RX ORDER — METHYLPREDNISOLONE 4 MG
TABLET, DOSE PACK ORAL
Qty: 21 TABLET | Refills: 0 | Status: SHIPPED | OUTPATIENT
Start: 2019-02-13 | End: 2019-02-13

## 2019-02-13 ASSESSMENT — ENCOUNTER SYMPTOMS
SENSORY CHANGE: 0
FOCAL WEAKNESS: 0
TINGLING: 0
MYALGIAS: 1
BACK PAIN: 1

## 2019-02-13 ASSESSMENT — MIFFLIN-ST. JEOR: SCORE: 1432.89

## 2019-02-13 NOTE — LETTER
2/13/2019         RE: Trevor Soni  [address] [facility] [id number]        Dear Colleague,    Thank you for referring your patient, Trevor Soni, to the  SPORTS MEDICINE. Please see a copy of my visit note below.    [facility]   Clinic Visit s Feb 13, 2019    PCP: Warren Lovelace      Trevor is a 85 year old male who is seen as self referral for   Chief Complaint   Patient presents with     Right Leg - Pain     Lower Back - Pain       Injury: Reports insidious onset without acute precipitating event.       Location of Pain: right leg and low back, radiating to foot   Duration of Pain: 4 month(s)  Rating of Pain at worst: 7/10  Rating of Pain Currently: 2/10  Pain is better with: lying down   Pain is worse with: walking   Treatment so far consists of: heat and Pain medication: analgesic heat rub (IcyHot/Aspercreme/Bengay), T#3  Associated symptoms: no distal numbness or tingling; denies swelling or warmth  Recent imaging completed: No recent imaging completed.  Prior History of related problems: chronic LBP     No trouble sleeping.    Social History: retired     Past Medical History:   Diagnosis Date     Arthritis     rhuematoid     Coronary artery disease     triple bypass 2001     CRF (chronic renal failure)      Gastro-oesophageal reflux disease      Gout      Hyperlipidemia      Hypertension      Nocturia        Patient Active Problem List    Diagnosis Date Noted     Post-traumatic osteoarthritis of left knee 10/18/2017     Priority: Medium     Abdominal aortic aneurysm (AAA) without rupture (H) 08/22/2017     Priority: Medium     Stenosis of carotid artery, unspecified laterality 08/22/2017     Priority: Medium     Idiopathic chronic gout of multiple sites without tophus 07/18/2017     Priority: Medium     Primary osteoarthritis of both knees 03/13/2017     Priority: Medium     Chronic rhinitis 12/04/2016     Priority: Medium     CKD (chronic kidney  disease) stage 3, GFR 30-59 ml/min (H) 2016     Priority: Medium     Aortic valve stenosis, unspecified etiology 2016     Priority: Medium     Hyperlipidemia LDL goal <100 2016     Priority: Medium     Benign essential hypertension 2016     Priority: Medium     Primary osteoarthritis involving multiple joints 2016     Priority: Medium     Pneumonia 2016     Priority: Medium     Abdominal pain 06/10/2016     Priority: Medium     Stiffness of joint, not elsewhere classified,  shoulder region 2014     Priority: Medium     FMHX denies sig illnesses.      Social History     Socioeconomic History     Marital status:                                                            Tobacco Use     Smoking status: Former Smoker     Types: Cigars     Last attempt to quit: 1980     Years since quittin.1     Smokeless tobacco: Never Used   Substance and Sexual Activity     Alcohol use: Yes     Alcohol/week: 0.0 oz     Comment: rarely     Past Surgical History:   Procedure Laterality Date     CARDIAC SURGERY       CHOLECYSTECTOMY       COLONOSCOPY       ORTHOPEDIC SURGERY       PHACOEMULSIFICATION CLEAR CORNEA WITH TORIC INTRAOCULAR LENS IMPLANT  2012    Procedure:PHACOEMULSIFICATION CLEAR CORNEA WITH TORIC INTRAOCULAR LENS IMPLANT; LEFT PHACOEMULSIFICATION CLEAR CORNEA WITH DELUXE  TORIC INTRAOCULAR LENS IMPLANT ; Surgeon:BRANDO HIGHTOWER; Location:[facility]     PHACOEMULSIFICATION CLEAR CORNEA WITH TORIC INTRAOCULAR LENS IMPLANT  2012    Procedure:PHACOEMULSIFICATION CLEAR CORNEA WITH TORIC INTRAOCULAR LENS IMPLANT; RIGHT PHACOEMULSIFICATION CLEAR CORNEA WITH TORIC INTRAOCULAR LENS IMPLANT ; Surgeon:BRANDO HIGHTOWER; Location:[facility]     VASCULAR SURGERY               Review of Systems   Musculoskeletal: Positive for back pain and myalgias.   Neurological: Negative for tingling, sensory change and focal weakness.   All other systems reviewed and are  "negative.        Physical Exam  /51   Ht 1.753 m (5' 9\")   Wt 75.8 kg (167 lb)   BMI 24.66 kg/m     Constitutional:well-developed, well-nourished, and in no distress.   Cardiovascular: Intact distal pulses.    Neurological: alert. Gait Abnormal:   Gait with shuffling steps  Skin: Skin is warm and dry.   Psychiatric: Mood and affect normal.   Respiratory: unlabored, speaks in full sentences  Lymph: no LAD, no lymphangitis        Back Exam     Tenderness   The patient is experiencing no tenderness.     Range of Motion   Extension: normal   Flexion: 60 (With discomfort)   Lateral bend right: normal   Lateral bend left: normal   Rotation right: 70   Rotation left: 70     Tests   Straight leg raise right: positive  Straight leg raise left: negative    Other   Toe walk: normal  Heel walk: normal  Sensation: normal  Gait: abnormal   Erythema: no back redness  Scars: absent            ASSESSMENT/PLAN    ICD-10-CM    1. Lumbar radiculopathy M54.16 methylPREDNISolone (MEDROL DOSEPAK) 4 MG tablet therapy pack     DISCONTINUED: methylPREDNISolone (MEDROL DOSEPAK) 4 MG tablet therapy pack   2. CKD (chronic kidney disease) stage 3, GFR 30-59 ml/min (H) N18.3    3. Aortic valve stenosis, unspecified etiology I35.0    4. Benign essential hypertension I10      Radicular pain without overt numbness tingling or weakness.  His treatment thus far has been minimal, but he is unable to take NSAIDs due to his other medical conditions, most notably, chronic renal disease.  Other options discussed, which for treatment would be more appropriate than proceeding directly to advanced imaging.  This includes physical therapy, oral steroids, and he has had a Medrol Dosepak in the past and would be comfortable taking this again.  No evidence of blood sugar issues.  If this does not resolve symptoms in 2 weeks, I recommended physical therapy.  Patient is comfortable with plan.  Follow-up as needed.    Again, thank you for allowing me to " participate in the care of your patient.        Sincerely,        Anthony John MD

## 2019-03-14 ENCOUNTER — OFFICE VISIT (OUTPATIENT)
Dept: FAMILY MEDICINE | Facility: CLINIC | Age: 84
End: 2019-03-14
Payer: MEDICARE

## 2019-03-14 VITALS
DIASTOLIC BLOOD PRESSURE: 55 MMHG | HEIGHT: 69 IN | HEART RATE: 50 BPM | SYSTOLIC BLOOD PRESSURE: 150 MMHG | BODY MASS INDEX: 24.87 KG/M2 | OXYGEN SATURATION: 94 % | WEIGHT: 167.9 LBS | TEMPERATURE: 96.6 F

## 2019-03-14 DIAGNOSIS — M54.16 LUMBAR RADICULOPATHY: ICD-10-CM

## 2019-03-14 DIAGNOSIS — M17.32 POST-TRAUMATIC OSTEOARTHRITIS OF LEFT KNEE: Primary | ICD-10-CM

## 2019-03-14 DIAGNOSIS — I35.0 AORTIC VALVE STENOSIS, UNSPECIFIED ETIOLOGY: ICD-10-CM

## 2019-03-14 DIAGNOSIS — N18.30 CKD (CHRONIC KIDNEY DISEASE) STAGE 3, GFR 30-59 ML/MIN (H): ICD-10-CM

## 2019-03-14 DIAGNOSIS — E78.5 HYPERLIPIDEMIA LDL GOAL <100: ICD-10-CM

## 2019-03-14 DIAGNOSIS — I71.40 ABDOMINAL AORTIC ANEURYSM (AAA) WITHOUT RUPTURE (H): ICD-10-CM

## 2019-03-14 DIAGNOSIS — M17.0 PRIMARY OSTEOARTHRITIS OF BOTH KNEES: ICD-10-CM

## 2019-03-14 DIAGNOSIS — M1A.09X0 IDIOPATHIC CHRONIC GOUT OF MULTIPLE SITES WITHOUT TOPHUS: ICD-10-CM

## 2019-03-14 DIAGNOSIS — I10 BENIGN ESSENTIAL HYPERTENSION: ICD-10-CM

## 2019-03-14 PROCEDURE — 99214 OFFICE O/P EST MOD 30 MIN: CPT | Performed by: INTERNAL MEDICINE

## 2019-03-14 ASSESSMENT — MIFFLIN-ST. JEOR: SCORE: 1436.97

## 2019-03-14 NOTE — PROGRESS NOTES
SUBJECTIVE:   Trevor Soni is a 85 year old male who presents to clinic today for the following health issues:      Knee and back pain  Patient presented to clinic for knee pain follow up and notes worsened back pain.    Notes he saw Crystal Nolen MD 02/11/19 and was given tylenol with codeine daily to manage back pain. Dr. Nolen referred him to sports medicine clinic. Patient visited Anthony John MD (Sports Medicine) on 02/13. Dr. John prescribed medrol dose pack. Patient notes medrol only alleviated pain temporarily. He presents today with worsened back pain. Knee pain has improved with tylenol with codeine.     He denies shoveling or other activities to aggravate pain. Pain radiates to hip and calf. Reports intermittent numbness on lateral side of calf and lateral side of right foot, no accompanying weakness. Does not experience pain while he sleeps. Pain worsens with activity.       Recent Fall  A few weeks ago he fell on ice, landing on his left side. Reports bruising on hip and left side chest pain when coughing. Notes symptoms have improved and returned to baseline.    Problem list and histories reviewed & adjusted, as indicated.  Additional history:     Current Outpatient Medications   Medication Sig Dispense Refill     allopurinol (ZYLOPRIM) 100 MG tablet TAKE 2 TABLETS BY MOUTH  EVERY  tablet 3     amLODIPine (NORVASC) 5 MG tablet TAKE 1 TABLET BY MOUTH  TWICE A  tablet 3     ASPIRIN PO Take 325 mg by mouth daily.       atenolol (TENORMIN) 50 MG tablet TAKE 1 TABLET BY MOUTH  EVERY DAY 90 tablet 3     atorvastatin (LIPITOR) 20 MG tablet TAKE 1 TABLET BY MOUTH  EVERY DAY 90 tablet 1     Cholecalciferol (VITAMIN D3 PO) Take 1 tablet by mouth daily       cyclobenzaprine (FLEXERIL) 10 MG tablet Sig 1/2 po at bedtime prn 7 tablet 1     fenofibrate 160 MG tablet TAKE 1 TABLET BY MOUTH  EVERY DAY 90 tablet 3     hydrALAZINE (APRESOLINE) 50 MG tablet TAKE 1 TABLET BY MOUTH 3  TIMES A   "tablet 3     methylPREDNISolone (MEDROL DOSEPAK) 4 MG tablet therapy pack Follow package instructions 21 tablet 0     Ranitidine HCl (ZANTAC PO) Take 150 mg by mouth daily as needed        triamcinolone (KENALOG) 0.1 % cream Apply sparingly to affected area three times daily for 14 days. 30 g 1     Allergies   Allergen Reactions     Avocado      Ciprofloxacin Itching     Penicillins Itching       Reviewed and updated as needed this visit by clinical staff  Tobacco  Allergies  Meds  Problems  Med Hx  Surg Hx  Fam Hx       Reviewed and updated as needed this visit by Provider         ROS:  Constitutional, HEENT, cardiovascular, pulmonary, gi and gu systems are negative, except as otherwise noted.    This document serves as a record of the services and decisions personally performed and made by Warren Lovelace MD. It was created on her behalf by Jen Coleman, a trained medical scribe. The creation of this document is based on the provider's statements to the medical scribe.  Jen Coleman 3:32 PM 03/14/2019    OBJECTIVE:     /55 (BP Location: Right arm, Patient Position: Sitting, Cuff Size: Adult Regular)   Pulse 50   Temp 96.6  F (35.9  C) (Oral)   Ht 1.753 m (5' 9\")   Wt 76.2 kg (167 lb 14.4 oz)   SpO2 94%   BMI 24.79 kg/m    Body mass index is 24.79 kg/m .  GENERAL: healthy, alert and no distress  EYES: Eyes grossly normal to inspection, PERRL and conjunctivae and sclerae normal  HENT: ear canals and TM's normal, nose and mouth without ulcers or lesions  NECK: no adenopathy, no asymmetry, masses, or scars and thyroid normal to palpation  RESP: lungs clear to auscultation - no rales, rhonchi or wheezes  CV: regular rate and rhythm, normal S1 S2, no S3 or S4, no murmur, click or rub, no peripheral edema and peripheral pulses strong  ABDOMEN: soft, nontender, no hepatosplenomegaly, no masses and bowel sounds normal  MS: no gross musculoskeletal defects noted, no edema  SKIN: no suspicious lesions " or rashes  NEURO: Normal strength and tone, mentation intact and speech normal,   Back pain not reproduceable with pressure  Lying down with internal and external rotation, I reproduced paraesthesias in lateral aspect of foot, hip flexion progressed parasthesias through lateral aspect of calf, straight leg raise on right was positive at 20 degrees, negative left straight leg rais  Motor completely intact  DTR patellar reflexes normal bilaterally (by pt report, right patellar reflex absent since 02/04)  Achilles reflexes absent bilaterally  PSYCH: mentation appears normal, affect normal/bright    Diagnostic Test Results:  Referred to radiology for MRI    ASSESSMENT/PLAN:       1. Post-traumatic osteoarthritis of left knee  Knee arthritis adequately controlled with current therapy and being for shadowed by his ongoing back pain    2. Idiopathic chronic gout of multiple sites without tophus  Currently asymptomatic continue with same medications    3. CKD (chronic kidney disease) stage 3, GFR 30-59 ml/min (H)  Managing relatively well and avoiding nephrotoxic drugs.    4. Benign essential hypertension  Reevaluate blood pressure with next office exam    5. Abdominal aortic aneurysm (AAA) without rupture (H)  Continue with routine biannual surveillance.    6. Primary osteoarthritis of both knees  Pain improving with tylenol #3.    7. Aortic valve stenosis, unspecified etiology  Managing relatively well with current therapy    8. Hyperlipidemia LDL goal <100  Continue with same diet and medications with routine follow-up    9. Lumbar radiculopathy  Referred to radiology for MRI. Pending results, re-evaluate to see if epidural steroid injections appropriate        The information in this document, created by the medical scribe, Jen Coleman, for me, accurately reflects the services I personally performed and the decisions made by me. I have reviewed and approved this document for accuracy prior to leaving the patient  care area.      Warren Lovelace MD  [facility]

## 2019-03-15 ENCOUNTER — TRANSFERRED RECORDS (OUTPATIENT)
Dept: HEALTH INFORMATION MANAGEMENT | Facility: CLINIC | Age: 84
End: 2019-03-15

## 2019-03-22 ENCOUNTER — TELEPHONE (OUTPATIENT)
Dept: FAMILY MEDICINE | Facility: CLINIC | Age: 84
End: 2019-03-22

## 2019-03-22 NOTE — TELEPHONE ENCOUNTER
Reason for Call:  Request for results:    Name of test or procedure: MRI results    Date of test of procedure: a couple days ago    Location of the test or procedure: [facility]    OK to leave the result message on voice mail or with a family member? YES    Phone number Patient can be reached at:  Home number on file [phone] (home)    Additional comments: Pt calling to get MRI results    Call taken on 3/22/2019 at 3:20 PM by Tarah Lamb

## 2019-04-10 ENCOUNTER — OFFICE VISIT (OUTPATIENT)
Dept: FAMILY MEDICINE | Facility: CLINIC | Age: 84
End: 2019-04-10
Payer: MEDICARE

## 2019-04-10 VITALS
BODY MASS INDEX: 24.73 KG/M2 | TEMPERATURE: 97.7 F | OXYGEN SATURATION: 98 % | SYSTOLIC BLOOD PRESSURE: 150 MMHG | HEART RATE: 56 BPM | HEIGHT: 69 IN | DIASTOLIC BLOOD PRESSURE: 60 MMHG | WEIGHT: 167 LBS

## 2019-04-10 DIAGNOSIS — G89.29 CHRONIC BILATERAL LOW BACK PAIN, WITH SCIATICA PRESENCE UNSPECIFIED: ICD-10-CM

## 2019-04-10 DIAGNOSIS — M15.0 PRIMARY OSTEOARTHRITIS INVOLVING MULTIPLE JOINTS: ICD-10-CM

## 2019-04-10 DIAGNOSIS — I71.40 ABDOMINAL AORTIC ANEURYSM (AAA) WITHOUT RUPTURE (H): ICD-10-CM

## 2019-04-10 DIAGNOSIS — N18.30 CKD (CHRONIC KIDNEY DISEASE) STAGE 3, GFR 30-59 ML/MIN (H): Primary | ICD-10-CM

## 2019-04-10 DIAGNOSIS — E78.5 HYPERLIPIDEMIA LDL GOAL <100: ICD-10-CM

## 2019-04-10 DIAGNOSIS — I10 ESSENTIAL HYPERTENSION, BENIGN: ICD-10-CM

## 2019-04-10 DIAGNOSIS — M54.5 CHRONIC BILATERAL LOW BACK PAIN, WITH SCIATICA PRESENCE UNSPECIFIED: ICD-10-CM

## 2019-04-10 DIAGNOSIS — I35.0 AORTIC VALVE STENOSIS, UNSPECIFIED ETIOLOGY: ICD-10-CM

## 2019-04-10 DIAGNOSIS — M1A.09X0 IDIOPATHIC CHRONIC GOUT OF MULTIPLE SITES WITHOUT TOPHUS: ICD-10-CM

## 2019-04-10 PROBLEM — M54.50 CHRONIC BILATERAL LOW BACK PAIN: Status: ACTIVE | Noted: 2019-04-10

## 2019-04-10 PROCEDURE — 99213 OFFICE O/P EST LOW 20 MIN: CPT | Performed by: INTERNAL MEDICINE

## 2019-04-10 ASSESSMENT — MIFFLIN-ST. JEOR: SCORE: 1432.89

## 2019-04-10 NOTE — PROGRESS NOTES
SUBJECTIVE:   Trevor Soni is a 85 year old male who presents to clinic today for the following   health issues:    MRI  Patient presents to clinic to discuss recent MRI results. Trevor reports that his leg feels numb and tingly on the medial part of the calf to the top of his toes.      Additional history: as documented    Reviewed  and updated as needed this visit by clinical staff  Tobacco  Allergies  Meds         Reviewed and updated as needed this visit by Provider         Patient Active Problem List   Diagnosis     Stiffness of joint, not elsewhere classified,  shoulder region     Abdominal pain     Pneumonia     CKD (chronic kidney disease) stage 3, GFR 30-59 ml/min (H)     Aortic valve stenosis, unspecified etiology     Hyperlipidemia LDL goal <100     Essential hypertension, benign     Primary osteoarthritis involving multiple joints     Chronic rhinitis     Primary osteoarthritis of both knees     Idiopathic chronic gout of multiple sites without tophus     Abdominal aortic aneurysm (AAA) without rupture (H)     Stenosis of carotid artery, unspecified laterality     Post-traumatic osteoarthritis of left knee     Chronic bilateral low back pain     Past Surgical History:   Procedure Laterality Date     CARDIAC SURGERY       CHOLECYSTECTOMY       COLONOSCOPY       ORTHOPEDIC SURGERY       PHACOEMULSIFICATION CLEAR CORNEA WITH TORIC INTRAOCULAR LENS IMPLANT  1/30/2012    Procedure:PHACOEMULSIFICATION CLEAR CORNEA WITH TORIC INTRAOCULAR LENS IMPLANT; LEFT PHACOEMULSIFICATION CLEAR CORNEA WITH DELUXE  TORIC INTRAOCULAR LENS IMPLANT ; Surgeon:BRANDO HIGHTOWER; Location:[facility]     PHACOEMULSIFICATION CLEAR CORNEA WITH TORIC INTRAOCULAR LENS IMPLANT  2/13/2012    Procedure:PHACOEMULSIFICATION CLEAR CORNEA WITH TORIC INTRAOCULAR LENS IMPLANT; RIGHT PHACOEMULSIFICATION CLEAR CORNEA WITH TORIC INTRAOCULAR LENS IMPLANT ; Surgeon:BRANDO HIGHTOWER; Location:[facility]     VASCULAR SURGERY         Social History      Tobacco Use     Smoking status: Former Smoker     Types: Cigars     Last attempt to quit: 1980     Years since quittin.2     Smokeless tobacco: Never Used   Substance Use Topics     Alcohol use: Yes     Alcohol/week: 0.0 oz     Comment: rarely     No family history on file.      Current Outpatient Medications   Medication Sig Dispense Refill     acetaminophen-codeine (TYLENOL #3) 300-30 MG tablet        allopurinol (ZYLOPRIM) 100 MG tablet TAKE 2 TABLETS BY MOUTH  EVERY  tablet 3     amLODIPine (NORVASC) 5 MG tablet TAKE 1 TABLET BY MOUTH  TWICE A  tablet 3     ASPIRIN PO Take 325 mg by mouth daily.       atenolol (TENORMIN) 50 MG tablet TAKE 1 TABLET BY MOUTH  EVERY DAY 90 tablet 3     atorvastatin (LIPITOR) 20 MG tablet TAKE 1 TABLET BY MOUTH  EVERY DAY 90 tablet 1     Cholecalciferol (VITAMIN D3 PO) Take 1 tablet by mouth daily       cyclobenzaprine (FLEXERIL) 10 MG tablet Sig 1/2 po at bedtime prn 7 tablet 1     fenofibrate 160 MG tablet TAKE 1 TABLET BY MOUTH  EVERY DAY 90 tablet 3     hydrALAZINE (APRESOLINE) 50 MG tablet TAKE 1 TABLET BY MOUTH 3  TIMES A  tablet 3     Ranitidine HCl (ZANTAC PO) Take 150 mg by mouth daily as needed        triamcinolone (KENALOG) 0.1 % cream Apply sparingly to affected area three times daily for 14 days. 30 g 1     Labs reviewed in EPIC    ROS:  CONSTITUTIONAL: NEGATIVE for fever, chills, change in weight  INTEGUMENTARY/SKIN: NEGATIVE for worrisome rashes, moles or lesions  EYES: NEGATIVE for vision changes or irritation  ENT/MOUTH: NEGATIVE for ear, mouth and throat problems  RESP: NEGATIVE for significant cough or SOB  BREAST: NEGATIVE for masses, tenderness or discharge  CV: NEGATIVE for chest pain, palpitations or peripheral edema  GI: NEGATIVE for nausea, abdominal pain, heartburn, or change in bowel habits  : NEGATIVE for frequency, dysuria, or hematuria  MUSCULOSKELETAL: NEGATIVE for significant arthralgias or myalgia  NEURO: NEGATIVE  "for weakness, dizziness or paresthesias  ENDOCRINE: NEGATIVE for temperature intolerance, skin/hair changes  HEME: NEGATIVE for bleeding problems  PSYCHIATRIC: NEGATIVE for changes in mood or affect  This document serves as a record of the services and decisions personally performed and made by Joey Lopez MD. It was created on his behalf by Matthew Gutierrez, a trained medical scribe. The creation of this document is based the provider's statements to the medical scribe.  Scribe Matthew Gutierrez 4:36 PM, April 10, 2019    OBJECTIVE:   /60   Pulse 56   Temp 97.7  F (36.5  C) (Oral)   Ht 1.753 m (5' 9\")   Wt 75.8 kg (167 lb)   SpO2 98%   BMI 24.66 kg/m    Body mass index is 24.66 kg/m .  No exam given.    MRI results were reviewed and discussed. A referral to [facility] was given for lumbar epidural.    ASSESSMENT/PLAN:   Trevor was seen today for recheck.    Diagnoses and all orders for this visit:    CKD (chronic kidney disease) stage 3, GFR 30-59 ml/min (H)    Primary osteoarthritis involving multiple joints    Abdominal aortic aneurysm (AAA) without rupture (H)    Chronic bilateral low back pain, with sciatica presence unspecified    Essential hypertension, benign  Recommended to hold aspirin -- consider Tylenol as an alternative. Also to take 1.5 tablets of Amlodipine. Anticipating better control of BP by next week.     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   Epidural procedure recommended at [facility].     The information in this document, created by the medical scribe for me, accurately reflects the services I personally performed and the decisions made by me. I have reviewed and approved this document for accuracy prior to leaving the patient care area.  4:36 PM, 04/10/19    Warren Lovelace MD  [facility]        "

## 2019-04-16 ENCOUNTER — TRANSFERRED RECORDS (OUTPATIENT)
Dept: HEALTH INFORMATION MANAGEMENT | Facility: CLINIC | Age: 84
End: 2019-04-16

## 2019-04-25 ENCOUNTER — OFFICE VISIT (OUTPATIENT)
Dept: FAMILY MEDICINE | Facility: CLINIC | Age: 84
End: 2019-04-25
Payer: MEDICARE

## 2019-04-25 VITALS
WEIGHT: 160 LBS | OXYGEN SATURATION: 97 % | SYSTOLIC BLOOD PRESSURE: 142 MMHG | DIASTOLIC BLOOD PRESSURE: 59 MMHG | BODY MASS INDEX: 23.7 KG/M2 | TEMPERATURE: 97 F | HEIGHT: 69 IN | HEART RATE: 52 BPM

## 2019-04-25 DIAGNOSIS — I71.40 ABDOMINAL AORTIC ANEURYSM (AAA) WITHOUT RUPTURE (H): ICD-10-CM

## 2019-04-25 DIAGNOSIS — E78.5 HYPERLIPIDEMIA LDL GOAL <100: ICD-10-CM

## 2019-04-25 DIAGNOSIS — N18.4 CHRONIC KIDNEY DISEASE, STAGE 4 (SEVERE) (H): ICD-10-CM

## 2019-04-25 DIAGNOSIS — I10 ESSENTIAL HYPERTENSION, BENIGN: ICD-10-CM

## 2019-04-25 DIAGNOSIS — I35.0 AORTIC VALVE STENOSIS, UNSPECIFIED ETIOLOGY: Primary | ICD-10-CM

## 2019-04-25 DIAGNOSIS — M15.0 PRIMARY OSTEOARTHRITIS INVOLVING MULTIPLE JOINTS: ICD-10-CM

## 2019-04-25 PROCEDURE — 99214 OFFICE O/P EST MOD 30 MIN: CPT | Performed by: INTERNAL MEDICINE

## 2019-04-25 ASSESSMENT — ANXIETY QUESTIONNAIRES
7. FEELING AFRAID AS IF SOMETHING AWFUL MIGHT HAPPEN: NOT AT ALL
IF YOU CHECKED OFF ANY PROBLEMS ON THIS QUESTIONNAIRE, HOW DIFFICULT HAVE THESE PROBLEMS MADE IT FOR YOU TO DO YOUR WORK, TAKE CARE OF THINGS AT HOME, OR GET ALONG WITH OTHER PEOPLE: NOT DIFFICULT AT ALL
GAD7 TOTAL SCORE: 0
5. BEING SO RESTLESS THAT IT IS HARD TO SIT STILL: NOT AT ALL
2. NOT BEING ABLE TO STOP OR CONTROL WORRYING: NOT AT ALL
1. FEELING NERVOUS, ANXIOUS, OR ON EDGE: NOT AT ALL
3. WORRYING TOO MUCH ABOUT DIFFERENT THINGS: NOT AT ALL
6. BECOMING EASILY ANNOYED OR IRRITABLE: NOT AT ALL

## 2019-04-25 ASSESSMENT — MIFFLIN-ST. JEOR: SCORE: 1401.14

## 2019-04-25 ASSESSMENT — PATIENT HEALTH QUESTIONNAIRE - PHQ9
5. POOR APPETITE OR OVEREATING: NOT AT ALL
SUM OF ALL RESPONSES TO PHQ QUESTIONS 1-9: 0

## 2019-04-25 NOTE — PROGRESS NOTES
SUBJECTIVE:   Trevor Soni is a 85 year old male who presents to clinic today for the following   health issues:      Hypertension Follow-up      Outpatient blood pressures are being checked at home.  Results are 168-130's/72-59.    Low Salt Diet: low salt    Amount of exercise or physical activity: yard work;     Problems taking medications regularly: No    Medication side effects: none    Diet: low salt    Hydralazine 4 times daily(25/25/50/50)  Amlodipine 7.5 twice daily  Lasix half a tablet daily      Left shoulder pain        Additional history: as documented    Reviewed  and updated as needed this visit by clinical staff  Allergies  Meds         Reviewed and updated as needed this visit by Provider         Current Outpatient Medications   Medication Sig Dispense Refill     acetaminophen-codeine (TYLENOL #3) 300-30 MG tablet        allopurinol (ZYLOPRIM) 100 MG tablet TAKE 2 TABLETS BY MOUTH  EVERY  tablet 3     amLODIPine (NORVASC) 5 MG tablet TAKE 1 TABLET BY MOUTH  TWICE A  tablet 3     ASPIRIN PO Take 325 mg by mouth daily.       atenolol (TENORMIN) 50 MG tablet TAKE 1 TABLET BY MOUTH  EVERY DAY 90 tablet 3     atorvastatin (LIPITOR) 20 MG tablet TAKE 1 TABLET BY MOUTH  EVERY DAY 90 tablet 1     Cholecalciferol (VITAMIN D3 PO) Take 1 tablet by mouth daily       cyclobenzaprine (FLEXERIL) 10 MG tablet Sig 1/2 po at bedtime prn 7 tablet 1     fenofibrate 160 MG tablet TAKE 1 TABLET BY MOUTH  EVERY DAY 90 tablet 3     hydrALAZINE (APRESOLINE) 50 MG tablet TAKE 1 TABLET BY MOUTH 3  TIMES A  tablet 3     Ranitidine HCl (ZANTAC PO) Take 150 mg by mouth daily as needed        triamcinolone (KENALOG) 0.1 % cream Apply sparingly to affected area three times daily for 14 days. 30 g 1     Allergies   Allergen Reactions     Avocado      Ciprofloxacin Itching     Penicillins Itching       ROS:  Constitutional, HEENT, cardiovascular, pulmonary, GI, , musculoskeletal, neuro, skin, endocrine  "and psych systems are negative, except as otherwise noted.  Pseudoclaudication on the right   OBJECTIVE:     /59 (BP Location: Left arm, Patient Position: Sitting, Cuff Size: Adult Regular)   Pulse 52   Temp 97  F (36.1  C) (Oral)   Ht 1.753 m (5' 9\")   Wt 72.6 kg (160 lb)   SpO2 97%   BMI 23.63 kg/m    Body mass index is 23.63 kg/m .  GENERAL: healthy, alert and no distress  NECK: no adenopathy, no asymmetry, masses, or scars and thyroid normal to palpation  RESP: lungs clear to auscultation - no rales, rhonchi or wheezes  CV: regular rate and rhythm, normal S1 S2, no S3 or S4, grade 3/6 systolic ejection murmur, click or rub, no peripheral edema and peripheral pulses strong  ABDOMEN: soft, nontender, no hepatosplenomegaly, no masses and bowel sounds normal  MS: no gross musculoskeletal defects noted, no edema  Back nontender to straight leg raise negative, motor and sensory intact to confrontation      ASSESSMENT/PLAN:             1. Aortic valve stenosis, unspecified etiology      2. Abdominal aortic aneurysm (AAA) without rupture (H)  Continue twice yearly screening, due in September    3. Primary osteoarthritis involving multiple joints  No joints out of proportion of the other ones continue symptomatic care    4. Essential hypertension, benign  -Blood pressure borderline follow-up laboratory studies prior to changing medications  - **Basic metabolic panel FUTURE anytime; Future    5. Hyperlipidemia LDL goal <100      6. Chronic kidney disease, stage 4 (severe) (H)  Continue to monitor closely avoiding nephrotoxic drugs  - **Basic metabolic panel FUTURE anytime; Future        Warren Lovelace MD  [facility]        "

## 2019-04-26 ASSESSMENT — ANXIETY QUESTIONNAIRES: GAD7 TOTAL SCORE: 0

## 2019-05-01 ENCOUNTER — TRANSFERRED RECORDS (OUTPATIENT)
Dept: HEALTH INFORMATION MANAGEMENT | Facility: CLINIC | Age: 84
End: 2019-05-01

## 2019-05-06 ENCOUNTER — TELEPHONE (OUTPATIENT)
Dept: FAMILY MEDICINE | Facility: CLINIC | Age: 84
End: 2019-05-06

## 2019-05-06 NOTE — TELEPHONE ENCOUNTER
S: Ongoing elevated BP readings and increased Lower extremity edema     B: Increased Norvasc to 7.5mg at last OV on 4/25    A:     BP: 166/72    On average: 150-160's     Denies CP  Denies SOB  Denies HA  Denies Vision Changes     Ankles swollen at the end of the day last night    Sitting with dangling legs and working out in the yard     Edema  Bilateral, but R>L   Denies calf pain  Denies redness or warmth     Able to ambulate     R: Taking noon dose of hdyralazine as scheduled. Will route and huddle with PCP. F/u sooner or any changes before appt on 5/22    Ruth REILLY RN

## 2019-05-06 NOTE — TELEPHONE ENCOUNTER
Reason for call:  Patient reporting a symptom    Symptom or request: 166/72 swollen ankles    Duration (how long have symptoms been present): on going    Have you been treated for this before? Yes    Additional comments: transferred to triage     Phone Number patient can be reached at:  Home number on file [phone] (home)    Best Time:  any    Can we leave a detailed message on this number:  YES    Call taken on 5/6/2019 at 11:10 AM by Kellen Allison

## 2019-05-22 ENCOUNTER — OFFICE VISIT (OUTPATIENT)
Dept: FAMILY MEDICINE | Facility: CLINIC | Age: 84
End: 2019-05-22
Payer: MEDICARE

## 2019-05-22 VITALS
HEIGHT: 69 IN | SYSTOLIC BLOOD PRESSURE: 120 MMHG | HEART RATE: 47 BPM | DIASTOLIC BLOOD PRESSURE: 60 MMHG | WEIGHT: 167 LBS | TEMPERATURE: 97.4 F | BODY MASS INDEX: 24.73 KG/M2 | OXYGEN SATURATION: 96 %

## 2019-05-22 DIAGNOSIS — E78.5 HYPERLIPIDEMIA LDL GOAL <100: ICD-10-CM

## 2019-05-22 DIAGNOSIS — I71.40 ABDOMINAL AORTIC ANEURYSM (AAA) WITHOUT RUPTURE (H): ICD-10-CM

## 2019-05-22 DIAGNOSIS — N18.4 CHRONIC KIDNEY DISEASE, STAGE 4 (SEVERE) (H): ICD-10-CM

## 2019-05-22 DIAGNOSIS — M1A.09X0 IDIOPATHIC CHRONIC GOUT OF MULTIPLE SITES WITHOUT TOPHUS: ICD-10-CM

## 2019-05-22 DIAGNOSIS — M54.5 CHRONIC BILATERAL LOW BACK PAIN, WITH SCIATICA PRESENCE UNSPECIFIED: ICD-10-CM

## 2019-05-22 DIAGNOSIS — M15.0 PRIMARY OSTEOARTHRITIS INVOLVING MULTIPLE JOINTS: ICD-10-CM

## 2019-05-22 DIAGNOSIS — M17.0 PRIMARY OSTEOARTHRITIS OF BOTH KNEES: ICD-10-CM

## 2019-05-22 DIAGNOSIS — Z00.00 ROUTINE GENERAL MEDICAL EXAMINATION AT A HEALTH CARE FACILITY: ICD-10-CM

## 2019-05-22 DIAGNOSIS — I10 ESSENTIAL HYPERTENSION, BENIGN: ICD-10-CM

## 2019-05-22 DIAGNOSIS — I10 BENIGN ESSENTIAL HYPERTENSION: ICD-10-CM

## 2019-05-22 DIAGNOSIS — G89.29 CHRONIC BILATERAL LOW BACK PAIN, WITH SCIATICA PRESENCE UNSPECIFIED: ICD-10-CM

## 2019-05-22 DIAGNOSIS — I35.0 AORTIC VALVE STENOSIS, UNSPECIFIED ETIOLOGY: Primary | ICD-10-CM

## 2019-05-22 PROCEDURE — 99214 OFFICE O/P EST MOD 30 MIN: CPT | Performed by: INTERNAL MEDICINE

## 2019-05-22 ASSESSMENT — MIFFLIN-ST. JEOR: SCORE: 1432.89

## 2019-05-22 NOTE — PROGRESS NOTES
"Subjective     Trevor Soni is a 85 year old male who presents to clinic today for the following health issues:    HPI     States he has pain from the back of his R back down into his leg. Is better after having 2nd injection. Injection lasted a \"few days\". Notes that some days it is good and other times not. Leg has been preventing him from exercising.     He is concerned about his BP and HR. Checks BP at home and are normally 140-150's. No difference between AM and PM. Is a little better in the middle of the day and might get in the 110's.     Taking hydralazine 1/2 at breakfast, 1/2 at lunch, whole at dinner and whole prior to bed.     Zantac prn prior to trigger foods for acid.         Follow up     Patient Active Problem List   Diagnosis     Stiffness of joint, not elsewhere classified,  shoulder region     Abdominal pain     Pneumonia     Hyperlipidemia LDL goal <100     Essential hypertension, benign     Primary osteoarthritis involving multiple joints     Chronic rhinitis     Primary osteoarthritis of both knees     Idiopathic chronic gout of multiple sites without tophus     Abdominal aortic aneurysm (AAA) without rupture (H)     Stenosis of carotid artery, unspecified laterality     Post-traumatic osteoarthritis of left knee     Chronic bilateral low back pain     Nonrheumatic aortic valve stenosis     Past Surgical History:   Procedure Laterality Date     CARDIAC SURGERY       CHOLECYSTECTOMY       COLONOSCOPY       ORTHOPEDIC SURGERY       PHACOEMULSIFICATION CLEAR CORNEA WITH TORIC INTRAOCULAR LENS IMPLANT  1/30/2012    Procedure:PHACOEMULSIFICATION CLEAR CORNEA WITH TORIC INTRAOCULAR LENS IMPLANT; LEFT PHACOEMULSIFICATION CLEAR CORNEA WITH DELUXE  TORIC INTRAOCULAR LENS IMPLANT ; Surgeon:BRANDO HIGHTOWER; Location:[facility]     PHACOEMULSIFICATION CLEAR CORNEA WITH TORIC INTRAOCULAR LENS IMPLANT  2/13/2012    Procedure:PHACOEMULSIFICATION CLEAR CORNEA WITH TORIC INTRAOCULAR LENS IMPLANT; RIGHT " PHACOEMULSIFICATION CLEAR CORNEA WITH TORIC INTRAOCULAR LENS IMPLANT ; Surgeon:CAMPBELL, BRANDO EVANGELISTA; Location:[facility]     VASCULAR SURGERY         Social History     Tobacco Use     Smoking status: Former Smoker     Types: Cigars     Last attempt to quit: 1980     Years since quittin.4     Smokeless tobacco: Never Used   Substance Use Topics     Alcohol use: Yes     Alcohol/week: 0.0 oz     Comment: rarely     History reviewed. No pertinent family history.      Current Outpatient Medications   Medication Sig Dispense Refill     acetaminophen-codeine (TYLENOL #3) 300-30 MG tablet        allopurinol (ZYLOPRIM) 100 MG tablet TAKE 2 TABLETS BY MOUTH  EVERY  tablet 3     amLODIPine (NORVASC) 5 MG tablet TAKE 1 TABLET BY MOUTH  TWICE A  tablet 3     ASPIRIN PO Take 325 mg by mouth daily.       atenolol (TENORMIN) 50 MG tablet TAKE 1 TABLET BY MOUTH  EVERY DAY 90 tablet 3     atorvastatin (LIPITOR) 20 MG tablet TAKE 1 TABLET BY MOUTH  EVERY DAY 90 tablet 1     Cholecalciferol (VITAMIN D3 PO) Take 1 tablet by mouth daily       cyclobenzaprine (FLEXERIL) 10 MG tablet Sig 1/2 po at bedtime prn 7 tablet 1     fenofibrate 160 MG tablet TAKE 1 TABLET BY MOUTH  EVERY DAY 90 tablet 3     hydrALAZINE (APRESOLINE) 50 MG tablet TAKE 1 TABLET BY MOUTH 3  TIMES A  tablet 3     Ranitidine HCl (ZANTAC PO) Take 150 mg by mouth daily as needed        triamcinolone (KENALOG) 0.1 % cream Apply sparingly to affected area three times daily for 14 days. 30 g 1     Allergies   Allergen Reactions     Avocado      Ciprofloxacin Itching     Penicillins Itching     BP Readings from Last 3 Encounters:   19 120/60   19 142/59   04/10/19 150/60    Wt Readings from Last 3 Encounters:   19 75.8 kg (167 lb)   19 72.6 kg (160 lb)   04/10/19 75.8 kg (167 lb)                    Reviewed and updated as needed this visit by Provider         Review of Systems   ROS COMP: Constitutional, HEENT, cardiovascular,  "pulmonary, GI, , musculoskeletal, neuro, skin, endocrine and psych systems are negative, except as otherwise noted.      Objective    /60 (BP Location: Left arm, Patient Position: Sitting, Cuff Size: Adult Regular)   Pulse (!) 47   Temp 97.4  F (36.3  C) (Oral)   Ht 1.753 m (5' 9\")   Wt 75.8 kg (167 lb)   SpO2 96%   BMI 24.66 kg/m    Body mass index is 24.66 kg/m .  Physical Exam   GENERAL: healthy, alert and no distress  EYES: Eyes grossly normal to inspection, PERRL and conjunctivae and sclerae normal  HENT: ear canals and TM's normal, nose and mouth without ulcers or lesions  NECK: no adenopathy, no asymmetry, masses, or scars and thyroid normal to palpation  RESP: lungs clear to auscultation - no rales, rhonchi or wheezes  CV: regular rate and rhythm, normal S1 S2, no S3 or S4, 3/6 systolic ejection murmur radiating across the precordium and aortic valve crisp, click or rub, no peripheral edema and peripheral pulses strong  ABDOMEN: soft, nontender, no hepatosplenomegaly, no masses and bowel sounds normal  MS: no gross musculoskeletal defects noted, 1+ pretibial edema L>R  SKIN: no suspicious lesions or rashes  NEURO: Normal strength and tone, mentation intact and speech normal; patellar reflexes 1+; L was 2+ achillis absent bilaterally and R L raise was +  PSYCH: mentation appears normal, affect normal/bright    Diagnostic Test Results:  Labs reviewed in Epic        Assessment & Plan     1. Aortic valve stenosis, unspecified etiology  Continue to monitor.     2. Chronic kidney disease, stage 4 (severe) (H)  Continue to monitor. Last labs 5/21 GFR 30    3. Essential hypertension, benign  Initially elevated in clinic but resolved. Will switch to 5 MG in AM and 10 MG in PM and see if morning BP improves.     4. Primary osteoarthritis involving multiple joints  Adequately controlled with current routine  5. Primary osteoarthritis of both knees  Associated pain is dwarfed by his sciatica  6. " Hyperlipidemia LDL goal <100  Well-controlled with current routine  7. Abdominal aortic aneurysm (AAA) without rupture (H)  Continue with current routine annual screening which is due with his next wellness exam  8. Idiopathic chronic gout of multiple sites without tophus  No recent exacerbations and will continue his current prophylaxis  9. Chronic bilateral low back pain, with sciatica presence unspecified  Had 2nd injection at [facility] but did not last. Will [facility] back to [facility] for 3rd lumbar steroid injection in back. If the relief lasts, will send for physical therapy to strengthen muscles. If not, would send for 4th injection.     10. Routine general medical examination at a health care facility        Return in about 1 month (around 6/22/2019) for Follow up.    The information in this document, created by the medical scribe for me, accurately reflects the services I personally performed and the decisions made by me. I have reviewed and approved this document for accuracy prior to leaving the patient care area.  Warren Lovelace MD  [facility]

## 2019-05-23 DIAGNOSIS — I10 ESSENTIAL HYPERTENSION, BENIGN: ICD-10-CM

## 2019-05-23 DIAGNOSIS — N18.4 CHRONIC KIDNEY DISEASE, STAGE 4 (SEVERE) (H): ICD-10-CM

## 2019-05-23 LAB
ANION GAP SERPL CALCULATED.3IONS-SCNC: 9 MMOL/L (ref 3–14)
BUN SERPL-MCNC: 34 MG/DL (ref 7–30)
CALCIUM SERPL-MCNC: 9.7 MG/DL (ref 8.5–10.1)
CHLORIDE SERPL-SCNC: 109 MMOL/L (ref 94–109)
CO2 SERPL-SCNC: 22 MMOL/L (ref 20–32)
CREAT SERPL-MCNC: 2.07 MG/DL (ref 0.66–1.25)
GFR SERPL CREATININE-BSD FRML MDRD: 28 ML/MIN/{1.73_M2}
GLUCOSE SERPL-MCNC: 105 MG/DL (ref 70–99)
POTASSIUM SERPL-SCNC: 4.4 MMOL/L (ref 3.4–5.3)
SODIUM SERPL-SCNC: 140 MMOL/L (ref 133–144)

## 2019-05-23 PROCEDURE — 80048 BASIC METABOLIC PNL TOTAL CA: CPT | Performed by: INTERNAL MEDICINE

## 2019-05-23 PROCEDURE — 36415 COLL VENOUS BLD VENIPUNCTURE: CPT | Performed by: INTERNAL MEDICINE

## 2019-05-23 RX ORDER — AMLODIPINE BESYLATE 5 MG/1
TABLET ORAL
Qty: 270 TABLET | Refills: 3 | Status: SHIPPED | OUTPATIENT
Start: 2019-05-23 | End: 2019-08-28

## 2019-05-28 ENCOUNTER — TRANSFERRED RECORDS (OUTPATIENT)
Dept: HEALTH INFORMATION MANAGEMENT | Facility: CLINIC | Age: 84
End: 2019-05-28

## 2019-07-03 DIAGNOSIS — I10 BENIGN ESSENTIAL HYPERTENSION: ICD-10-CM

## 2019-07-03 DIAGNOSIS — E78.5 HYPERLIPIDEMIA LDL GOAL <100: ICD-10-CM

## 2019-07-03 NOTE — TELEPHONE ENCOUNTER
"atorvastatin (LIPITOR) 20 MG tablet 90 tablet 1 1/15/2019  No   Sig: TAKE 1 TABLET BY MOUTH  EVERY DAY     Last Written Prescription Date:  1-  Last Fill Quantity: 90,  # refills: 1   Last office visit: 5/22/2019 with prescribing provider:     Future Office Visit:  Unknown    Requested Prescriptions   Pending Prescriptions Disp Refills     atorvastatin (LIPITOR) 20 MG tablet [Pharmacy Med Name: ATORVASTATIN  20MG  TAB] 90 tablet 1     Sig: TAKE 1 TABLET BY MOUTH  EVERY DAY       Statins Protocol Failed - 7/3/2019 11:22 AM        Failed - LDL on file in past 12 months     Recent Labs   Lab Test 05/21/18  1042   LDL 43             Passed - No abnormal creatine kinase in past 12 months     No lab results found.             Passed - Recent (12 mo) or future (30 days) visit within the authorizing provider's specialty     Patient had office visit in the last 12 months or has a visit in the next 30 days with authorizing provider or within the authorizing provider's specialty.  See \"Patient Info\" tab in inbasket, or \"Choose Columns\" in Meds & Orders section of the refill encounter.              Passed - Medication is active on med list        Passed - Patient is age 18 or older            "

## 2019-07-05 RX ORDER — ATORVASTATIN CALCIUM 20 MG/1
TABLET, FILM COATED ORAL
Qty: 90 TABLET | Refills: 1 | Status: SHIPPED | OUTPATIENT
Start: 2019-07-05 | End: 2019-09-09

## 2019-07-26 DIAGNOSIS — M15.0 PRIMARY OSTEOARTHRITIS INVOLVING MULTIPLE JOINTS: Primary | ICD-10-CM

## 2019-07-26 NOTE — TELEPHONE ENCOUNTER
Patient is requesting refill for Tylenol w/ codeine and due to new laws effective 7/1/19, we will need a new prescription if it has been more than 30 days since the patients last refill date. Please fax new order to pharmacy, our fax number is [phone]. Thank you!

## 2019-07-26 NOTE — TELEPHONE ENCOUNTER
Routing refill request to provider for review/approval because:  Medication is reported/historical  Controlled substance    Please review and authorize if appropriate,     Thank you,   Ruth GALDAMEZ RN     Controlled Substance Refill Request for acetaminophen-codeine (TYLENOL #3) 300-30 MG tablet     Last refill: 4/30/19    Last clinic visit: 5/22/19    Clinic visit frequency required: Q 3 months  Next appt:    Controlled substance agreement on file: No.    Documentation in problem list reviewed:  No    Processing:  Fax Rx to [facility] pharmacy    [place] reviewed:  reviewed- no concerns  [place]:  [email or web address]

## 2019-08-11 DIAGNOSIS — E78.5 HYPERLIPIDEMIA LDL GOAL <100: ICD-10-CM

## 2019-08-11 DIAGNOSIS — I10 BENIGN ESSENTIAL HYPERTENSION: ICD-10-CM

## 2019-08-12 NOTE — TELEPHONE ENCOUNTER
"Routing refill request to provider for review/approval because:  Labs not current:  Yearly lipids needed  Maggie ANDERSON RN    Last Written Prescription Date:  6/26/2018  Last Fill Quantity: 90,  # refills: 3   Last office visit: 5/22/2019 with prescribing provider:     Future Office Visit:    Requested Prescriptions   Pending Prescriptions Disp Refills     fenofibrate (TRIGLIDE/LOFIBRA) 160 MG tablet [Pharmacy Med Name: FENOFIBRATE  160MG  TAB] 90 tablet 3     Sig: TAKE 1 TABLET BY MOUTH  EVERY DAY       Fibrates Failed - 8/11/2019  9:55 AM        Failed - Lipid panel on file in past 12 months     Recent Labs   Lab Test 05/21/18  1042   CHOL 88   TRIG 59   HDL 33*   LDL 43   NHDL 55               Passed - No abnormal creatine kinase in past 12 months     No lab results found.             Passed - Recent (12 mo) or future (30 days) visit within the authorizing provider's specialty     Patient had office visit in the last 12 months or has a visit in the next 30 days with authorizing provider or within the authorizing provider's specialty.  See \"Patient Info\" tab in inbasket, or \"Choose Columns\" in Meds & Orders section of the refill encounter.              Passed - Medication is active on med list        Passed - Patient is age 18 or older          "

## 2019-08-14 RX ORDER — FENOFIBRATE 160 MG/1
TABLET ORAL
Qty: 90 TABLET | Refills: 3 | Status: SHIPPED | OUTPATIENT
Start: 2019-08-14 | End: 2019-08-14

## 2019-08-14 RX ORDER — FENOFIBRATE 160 MG/1
160 TABLET ORAL DAILY
Qty: 30 TABLET | Refills: 0 | Status: SHIPPED | OUTPATIENT
Start: 2019-08-14 | End: 2019-09-09

## 2019-08-14 NOTE — TELEPHONE ENCOUNTER
Pt called states [facility] told him fenofibrate script came to them as the brand name, which is odd because PCP did send as generic     Pt is OUT of medication, so sent a 30 day supply to local [facility]     Called [facility] -spoke with pharmacist, unfortunately our system automatically puts the brands in parentheses and [facility] won't allow this    Gave verbal ok to dispense generic    Mckayla ALEX RN

## 2019-08-28 DIAGNOSIS — E78.5 HYPERLIPIDEMIA LDL GOAL <100: ICD-10-CM

## 2019-08-28 DIAGNOSIS — I10 BENIGN ESSENTIAL HYPERTENSION: ICD-10-CM

## 2019-08-28 NOTE — TELEPHONE ENCOUNTER
Pending Prescriptions:                       Disp   Refills    amLODIPine (NORVASC) 5 MG tablet [Pharmac*180 ta*             Sig: TAKE 1 TABLET BY MOUTH  TWICE A DAY    allopurinol (ZYLOPRIM) 100 MG tablet [Pha*180 ta*3            Sig: TAKE 2 TABLETS BY MOUTH  EVERY DAY    hydrALAZINE (APRESOLINE) 50 MG tablet [Ph*270 ta*3            Sig: TAKE 1 TABLET BY MOUTH 3  TIMES A DAY    norvasc  Last Written Prescription Date:  5/3/19  Last Fill Quantity: 270,  # refills: 3   Last office visit: 5/22/2019 with prescribing provider:     Future Office Visit:      Allopurinol  Last Written Prescription Date:  6/26/18  Last Fill Quantity: 180,  # refills: 3   Last office visit: 5/22/2019 with prescribing provider:     Future Office Visit:      Apresolin  Last Written Prescription Date:  6/26/18  Last Fill Quantity: 270,  # refills: 3   Last office visit: 5/22/2019 with prescribing provider:     Future Office Visit:

## 2019-08-29 RX ORDER — HYDRALAZINE HYDROCHLORIDE 50 MG/1
TABLET, FILM COATED ORAL
Qty: 270 TABLET | Refills: 2 | Status: SHIPPED | OUTPATIENT
Start: 2019-08-29 | End: 2019-09-09

## 2019-08-29 RX ORDER — ALLOPURINOL 100 MG/1
200 TABLET ORAL DAILY
Qty: 60 TABLET | Refills: 0 | Status: SHIPPED | OUTPATIENT
Start: 2019-08-29 | End: 2019-09-09

## 2019-08-29 RX ORDER — AMLODIPINE BESYLATE 5 MG/1
5 TABLET ORAL EVERY MORNING
Qty: 270 TABLET | Refills: 2 | Status: SHIPPED | OUTPATIENT
Start: 2019-08-29 | End: 2019-09-09

## 2019-08-29 NOTE — TELEPHONE ENCOUNTER
Routing refill request to provider for review/approval because:  Allopurinol:  Several labs overdue:  CBC, CMP and Uric Acid labs pended.    Patient notified to schedule lab appointment.     Gout Agents Protocol Failed8/29 4:06 PM   CBC on file in past 12 months    ALT on file in past 12 months    Has Uric Acid on file in past 12 months and value is less than 6    Normal serum creatinine on file in the past 12 months       Elin WILKS RN,BSN

## 2019-09-09 ENCOUNTER — OFFICE VISIT (OUTPATIENT)
Dept: FAMILY MEDICINE | Facility: CLINIC | Age: 84
End: 2019-09-09
Payer: MEDICARE

## 2019-09-09 VITALS
WEIGHT: 164.7 LBS | HEIGHT: 69 IN | OXYGEN SATURATION: 95 % | HEART RATE: 59 BPM | DIASTOLIC BLOOD PRESSURE: 54 MMHG | BODY MASS INDEX: 24.4 KG/M2 | TEMPERATURE: 97.5 F | SYSTOLIC BLOOD PRESSURE: 118 MMHG

## 2019-09-09 DIAGNOSIS — M54.5 CHRONIC BILATERAL LOW BACK PAIN, WITH SCIATICA PRESENCE UNSPECIFIED: ICD-10-CM

## 2019-09-09 DIAGNOSIS — N18.4 CHRONIC KIDNEY DISEASE, STAGE 4 (SEVERE) (H): ICD-10-CM

## 2019-09-09 DIAGNOSIS — M25.561 CHRONIC PAIN OF BOTH KNEES: ICD-10-CM

## 2019-09-09 DIAGNOSIS — I10 BENIGN ESSENTIAL HYPERTENSION: ICD-10-CM

## 2019-09-09 DIAGNOSIS — K21.9 GASTROESOPHAGEAL REFLUX DISEASE WITHOUT ESOPHAGITIS: ICD-10-CM

## 2019-09-09 DIAGNOSIS — R30.0 DYSURIA: Primary | ICD-10-CM

## 2019-09-09 DIAGNOSIS — E78.5 HYPERLIPIDEMIA LDL GOAL <100: ICD-10-CM

## 2019-09-09 DIAGNOSIS — I35.0 AORTIC VALVE STENOSIS, UNSPECIFIED ETIOLOGY: ICD-10-CM

## 2019-09-09 DIAGNOSIS — M1A.09X0 IDIOPATHIC CHRONIC GOUT OF MULTIPLE SITES WITHOUT TOPHUS: ICD-10-CM

## 2019-09-09 DIAGNOSIS — M17.0 PRIMARY OSTEOARTHRITIS OF BOTH KNEES: ICD-10-CM

## 2019-09-09 DIAGNOSIS — G89.29 CHRONIC PAIN OF BOTH KNEES: ICD-10-CM

## 2019-09-09 DIAGNOSIS — M15.0 PRIMARY OSTEOARTHRITIS INVOLVING MULTIPLE JOINTS: ICD-10-CM

## 2019-09-09 DIAGNOSIS — E55.9 VITAMIN D DEFICIENCY: ICD-10-CM

## 2019-09-09 DIAGNOSIS — I71.40 ABDOMINAL AORTIC ANEURYSM (AAA) WITHOUT RUPTURE (H): ICD-10-CM

## 2019-09-09 DIAGNOSIS — G89.29 CHRONIC BILATERAL LOW BACK PAIN, WITH SCIATICA PRESENCE UNSPECIFIED: ICD-10-CM

## 2019-09-09 DIAGNOSIS — I65.29 STENOSIS OF CAROTID ARTERY, UNSPECIFIED LATERALITY: ICD-10-CM

## 2019-09-09 DIAGNOSIS — M25.562 CHRONIC PAIN OF BOTH KNEES: ICD-10-CM

## 2019-09-09 LAB
ERYTHROCYTE [DISTWIDTH] IN BLOOD BY AUTOMATED COUNT: 14.5 % (ref 10–15)
HCT VFR BLD AUTO: 36.2 % (ref 40–53)
HGB BLD-MCNC: 11.7 G/DL (ref 13.3–17.7)
MCH RBC QN AUTO: 31.2 PG (ref 26.5–33)
MCHC RBC AUTO-ENTMCNC: 32.3 G/DL (ref 31.5–36.5)
MCV RBC AUTO: 97 FL (ref 78–100)
PLATELET # BLD AUTO: 226 10E9/L (ref 150–450)
RBC # BLD AUTO: 3.75 10E12/L (ref 4.4–5.9)
WBC # BLD AUTO: 10.1 10E9/L (ref 4–11)

## 2019-09-09 PROCEDURE — 36415 COLL VENOUS BLD VENIPUNCTURE: CPT | Performed by: INTERNAL MEDICINE

## 2019-09-09 PROCEDURE — 99214 OFFICE O/P EST MOD 30 MIN: CPT | Performed by: INTERNAL MEDICINE

## 2019-09-09 PROCEDURE — 85027 COMPLETE CBC AUTOMATED: CPT | Performed by: INTERNAL MEDICINE

## 2019-09-09 RX ORDER — UBIDECARENONE 30 MG
1 CAPSULE ORAL DAILY
COMMUNITY
End: 2022-08-08

## 2019-09-09 RX ORDER — ATENOLOL 50 MG/1
50 TABLET ORAL DAILY
Qty: 90 TABLET | Refills: 3 | Status: SHIPPED | OUTPATIENT
Start: 2019-09-09 | End: 2020-06-09

## 2019-09-09 RX ORDER — ALLOPURINOL 100 MG/1
200 TABLET ORAL DAILY
Qty: 180 TABLET | Refills: 3 | Status: ON HOLD | OUTPATIENT
Start: 2019-09-09 | End: 2020-06-21

## 2019-09-09 RX ORDER — SULINDAC 200 MG/1
200 TABLET ORAL 2 TIMES DAILY WITH MEALS
Qty: 60 TABLET | Refills: 3 | Status: SHIPPED | OUTPATIENT
Start: 2019-09-09 | End: 2019-09-09

## 2019-09-09 RX ORDER — HYDRALAZINE HYDROCHLORIDE 50 MG/1
TABLET, FILM COATED ORAL
Qty: 270 TABLET | Refills: 2 | Status: SHIPPED | OUTPATIENT
Start: 2019-09-09 | End: 2020-03-24

## 2019-09-09 RX ORDER — SULFAMETHOXAZOLE/TRIMETHOPRIM 800-160 MG
1 TABLET ORAL 2 TIMES DAILY
Qty: 20 TABLET | Refills: 0 | Status: SHIPPED | OUTPATIENT
Start: 2019-09-09 | End: 2019-09-09

## 2019-09-09 RX ORDER — AMLODIPINE BESYLATE 5 MG/1
5 TABLET ORAL EVERY MORNING
Qty: 270 TABLET | Refills: 2 | Status: SHIPPED | OUTPATIENT
Start: 2019-09-09 | End: 2019-11-04

## 2019-09-09 RX ORDER — ATORVASTATIN CALCIUM 20 MG/1
20 TABLET, FILM COATED ORAL DAILY
Qty: 90 TABLET | Refills: 3 | Status: SHIPPED | OUTPATIENT
Start: 2019-09-09 | End: 2020-01-15

## 2019-09-09 RX ORDER — FENOFIBRATE 160 MG/1
160 TABLET ORAL DAILY
Qty: 30 TABLET | Refills: 0 | Status: SHIPPED | OUTPATIENT
Start: 2019-09-09 | End: 2019-09-09

## 2019-09-09 ASSESSMENT — MIFFLIN-ST. JEOR: SCORE: 1417.45

## 2019-09-09 NOTE — PROGRESS NOTES
Subjective     Trevor Soni is a 86 year old male who presents to clinic today for the following health issues:    HPI   Follow up     Lab concerns, reports he needs his blood work rechecked to receive new Rx    Pt reports that his back pain has improved. Denies that pain radiates down his leg anymore from his lower back.     Reports mild intermittent arthritic pain of multiple joints such as his fingers.     Reports mild edema of both ankles. Notes that he recently started cutting back on the amount of salt in his diet.     Patient Active Problem List   Diagnosis     Stiffness of joint, not elsewhere classified,  shoulder region     Abdominal pain     Pneumonia     Hyperlipidemia LDL goal <100     Essential hypertension, benign     Primary osteoarthritis involving multiple joints     Chronic rhinitis     Primary osteoarthritis of both knees     Idiopathic chronic gout of multiple sites without tophus     Abdominal aortic aneurysm (AAA) without rupture (H)     Stenosis of carotid artery, unspecified laterality     Post-traumatic osteoarthritis of left knee     Chronic bilateral low back pain     Nonrheumatic aortic valve stenosis     Past Surgical History:   Procedure Laterality Date     CARDIAC SURGERY       CHOLECYSTECTOMY       COLONOSCOPY       ORTHOPEDIC SURGERY       PHACOEMULSIFICATION CLEAR CORNEA WITH TORIC INTRAOCULAR LENS IMPLANT  1/30/2012    Procedure:PHACOEMULSIFICATION CLEAR CORNEA WITH TORIC INTRAOCULAR LENS IMPLANT; LEFT PHACOEMULSIFICATION CLEAR CORNEA WITH DELUXE  TORIC INTRAOCULAR LENS IMPLANT ; Surgeon:BRANDO HIGHTOWER; Location:[facility]     PHACOEMULSIFICATION CLEAR CORNEA WITH TORIC INTRAOCULAR LENS IMPLANT  2/13/2012    Procedure:PHACOEMULSIFICATION CLEAR CORNEA WITH TORIC INTRAOCULAR LENS IMPLANT; RIGHT PHACOEMULSIFICATION CLEAR CORNEA WITH TORIC INTRAOCULAR LENS IMPLANT ; Surgeon:BRANDO HIGHTOWER; Location:[facility]     VASCULAR SURGERY         Social History     Tobacco Use     Smoking  status: Former Smoker     Types: Cigars     Last attempt to quit: 1980     Years since quittin.7     Smokeless tobacco: Never Used   Substance Use Topics     Alcohol use: Yes     Alcohol/week: 0.0 oz     Comment: rarely     History reviewed. No pertinent family history.      Current Outpatient Medications   Medication Sig Dispense Refill     acetaminophen-codeine (TYLENOL #3) 300-30 MG tablet Take one tablet by mouth two times daily as needed for severe pain. 60 tablet 0     allopurinol (ZYLOPRIM) 100 MG tablet Take 2 tablets (200 mg) by mouth daily Due for lab appointment. Please schedule: [phone] 180 tablet 3     amLODIPine (NORVASC) 5 MG tablet Take 1 tablet (5 mg) by mouth every morning And 2 tablets (10 mg) by mouth every evening. 270 tablet 2     ASPIRIN PO Take 325 mg by mouth daily.       atenolol (TENORMIN) 50 MG tablet Take 1 tablet (50 mg) by mouth daily 90 tablet 3     atorvastatin (LIPITOR) 20 MG tablet Take 1 tablet (20 mg) by mouth daily 90 tablet 3     Cholecalciferol (VITAMIN D3 PO) Take 1 tablet by mouth daily       cyclobenzaprine (FLEXERIL) 10 MG tablet Sig 1/2 po at bedtime prn 7 tablet 1     fenofibrate (TRIGLIDE/LOFIBRA) 160 MG tablet Take 1 tablet (160 mg) by mouth daily 30 tablet 0     hydrALAZINE (APRESOLINE) 50 MG tablet TAKE 1 TABLET BY MOUTH 3  TIMES A  tablet 2     triamcinolone (KENALOG) 0.1 % cream Apply sparingly to affected area three times daily for 14 days. 30 g 1     Allergies   Allergen Reactions     Avocado      Ciprofloxacin Itching     Penicillins Itching       Reviewed and updated as needed this visit by Provider         Review of Systems   ROS COMP: Constitutional, HEENT, cardiovascular, pulmonary, gi and gu systems are negative, except as otherwise noted.    This document serves as a record of the services and decisions personally performed and made by Warren Lovelace MD. It was created on his behalf by Richi Ferrell, a trained medical scribe. The  "creation of this document is based on the provider's statements to the medical scribe.  Richi Ferrell September 9, 2019 9:37 AM          Objective    /54 (BP Location: Right arm, Patient Position: Sitting, Cuff Size: Adult Regular)   Pulse 59   Temp 97.5  F (36.4  C) (Tympanic)   Ht 1.753 m (5' 9\")   Wt 74.7 kg (164 lb 11.2 oz)   SpO2 95%   BMI 24.32 kg/m    Body mass index is 24.32 kg/m .     Physical Exam   Neck was supple without adenopathy or thyromegaly his carotids were normal without bruits  Chest clear to auscultation and percussion  Cardiovascular S1 and S2 are physiologic with a grade 3/6 systolic ejection murmur radiating across the precordium to the carotids, no gallops  Abdomen bowel sounds were normal.  There is no palpable mass or organomegaly  Extremities nontender without any edema  Pulses pedal pulses are as described otherwise his pulses are bilaterally symmetrical throughout without bruits  Skin without significant abnormality      Diagnostic Test Results:  Labs reviewed in Epic  No results found for this or any previous visit (from the past 24 hour(s)).        Assessment & Plan     Chronic bilateral low back pain, with sciatica presence unspecified  Improved with current therapies and declines PT referral for regular back exercises.    Aortic valve stenosis, unspecified etiology      Primary osteoarthritis of both knees  Improved     Hyperlipidemia LDL goal <100    - hydrALAZINE (APRESOLINE) 50 MG tablet  Dispense: 270 tablet; Refill: 2  - fenofibrate (TRIGLIDE/LOFIBRA) 160 MG tablet  Dispense: 30 tablet; Refill: 0  - atorvastatin (LIPITOR) 20 MG tablet  Dispense: 90 tablet; Refill: 3  - atenolol (TENORMIN) 50 MG tablet  Dispense: 90 tablet; Refill: 3  - amLODIPine (NORVASC) 5 MG tablet  Dispense: 270 tablet; Refill: 2  - allopurinol (ZYLOPRIM) 100 MG tablet  Dispense: 180 tablet; Refill: 3  - Lipid panel reflex to direct LDL Fasting    Chronic pain of both knees  Improved    Vitamin " D deficiency      Benign essential hypertension  Well controlled with current therapies.   - hydrALAZINE (APRESOLINE) 50 MG tablet  Dispense: 270 tablet; Refill: 2  - fenofibrate (TRIGLIDE/LOFIBRA) 160 MG tablet  Dispense: 30 tablet; Refill: 0  - atorvastatin (LIPITOR) 20 MG tablet  Dispense: 90 tablet; Refill: 3  - atenolol (TENORMIN) 50 MG tablet  Dispense: 90 tablet; Refill: 3  - amLODIPine (NORVASC) 5 MG tablet  Dispense: 270 tablet; Refill: 2  - allopurinol (ZYLOPRIM) 100 MG tablet  Dispense: 180 tablet; Refill: 3  - CBC with platelets    Chronic kidney disease, stage 4 (severe) (H)      Abdominal aortic aneurysm (AAA) without rupture (H)  [facility]     Idiopathic chronic gout of multiple sites without tophus  No current sx's     Stenosis of carotid artery, unspecified laterality      Dysuria  No current sx's    Gastroesophageal reflux disease without esophagitis  No current sx's     Primary osteoarthritis involving multiple joints  Noted and stable            FUTURE APPOINTMENTS:       - Follow-up visit in 3 months     No follow-ups on file.    The information in this document, created by the medical scribe for me, accurately reflects the services I personally performed and the decisions made by me. I have reviewed and approved this document for accuracy prior to leaving the patient care area.  September 9, 2019 10:21 AM      Warren Lovelace MD  [facility]

## 2019-09-09 NOTE — TELEPHONE ENCOUNTER
"Requested Prescriptions   Pending Prescriptions Disp Refills     sulindac (CLINORIL) 200 MG tablet [Pharmacy Med Name: SULINDAC 200MG TABLETS] 180 tablet 3     Sig: TAKE 1 TABLET(200 MG) BY MOUTH TWICE DAILY WITH MEALS  NOT ON CURRENT MED LIST  Last Written Prescription Date:  NA  Last Fill Quantity: NA,  # refills: NA   Last Office Visit: 09/09/2019 [facility]  Future Office Visit:            NSAID Medications Failed - 9/9/2019 10:32 AM        Failed - Normal ALT on file in past 12 months     Recent Labs   Lab Test 05/21/18  1042   ALT 22             Failed - Normal AST on file in past 12 months     Recent Labs   Lab Test 05/21/18  1042   AST 18             Failed - Patient is age 6-64 years        Failed - Normal CBC on file in past 12 months     Recent Labs   Lab Test 09/09/19  1028   WBC 10.1   RBC 3.75*   HGB 11.7*   HCT 36.2*                    Failed - Medication is active on med list        Failed - Normal serum creatinine on file in past 12 months     Recent Labs   Lab Test 05/23/19  0851   CR 2.07*             Passed - Blood pressure under 140/90 in past 12 months     BP Readings from Last 3 Encounters:   09/09/19 118/54   05/22/19 120/60   04/25/19 142/59                 Passed - Recent (12 mo) or future (30 days) visit within the authorizing provider's specialty     Patient had office visit in the last 12 months or has a visit in the next 30 days with authorizing provider or within the authorizing provider's specialty.  See \"Patient Info\" tab in inbasket, or \"Choose Columns\" in Meds & Orders section of the refill encounter.         ________________________________________________________________________       ranitidine (ZANTAC) 150 MG tablet [Pharmacy Med Name: RANITIDINE 150MG TABLETS] 90 tablet 3     Sig: TAKE 1 TABLET(150 MG) BY MOUTH AT BEDTIME  NOT ON CURRENT MED LIST  Last Written Prescription Date:  NA  Last Fill Quantity: NA,  # refills: NA   [facility] " "Office Visit:            H2 Blockers Protocol Failed - 9/9/2019 10:32 AM        Failed - Medication is active on med list        Passed - Patient is age 12 or older        Passed - Recent (12 mo) or future (30 days) visit within the authorizing provider's specialty     Patient had office visit in the last 12 months or has a visit in the next 30 days with authorizing provider or within the authorizing provider's specialty.  See \"Patient Info\" tab in inbasket, or \"Choose Columns\" in Meds & Orders section of the refill encounter.         ________________________________________________________________________       fenofibrate (TRIGLIDE/LOFIBRA) 160 MG tablet [Pharmacy Med Name: FENOFIBRATE 160MG TABLETS] 90 tablet 0     Sig: TAKE 1 TABLET(160 MG) BY MOUTH DAILY  Last Written Prescription Date:  09/09/2019  Last Fill Quantity: 30 tablet,  # refills: 0   Last Office Visit: 09/09/2019 [place]  Future Office Visit:            Fibrates Failed - 9/9/2019 10:32 AM        Failed - Lipid panel on file in past 12 months     Recent Labs   Lab Test 05/21/18  1042   CHOL 88   TRIG 59   HDL 33*   LDL 43   NHDL 55               Passed - No abnormal creatine kinase in past 12 months     No lab results found.             Passed - Recent (12 mo) or future (30 days) visit within the authorizing provider's specialty     Patient had office visit in the last 12 months or has a visit in the next 30 days with authorizing provider or within the authorizing provider's specialty.  See \"Patient Info\" tab in inbasket, or \"Choose Columns\" in Meds & Orders section of the refill encounter.              Passed - Medication is active on med list        Passed - Patient is age 18 or older        "

## 2019-09-10 RX ORDER — FENOFIBRATE 160 MG/1
TABLET ORAL
Qty: 90 TABLET | Refills: 0 | Status: SHIPPED | OUTPATIENT
Start: 2019-09-10 | End: 2019-12-03

## 2019-09-10 NOTE — TELEPHONE ENCOUNTER
To PCP:     RX requests for sulindac and ranitidine     These appear to have been discontinued yesterday at [facility]     Please review and authorize if appropriate,     Thank you,   Ruth GALDAMEZ RN

## 2019-09-11 RX ORDER — SULINDAC 200 MG/1
TABLET ORAL
Qty: 180 TABLET | Refills: 3 | Status: SHIPPED | OUTPATIENT
Start: 2019-09-11 | End: 2019-11-18

## 2019-09-12 ENCOUNTER — TRANSFERRED RECORDS (OUTPATIENT)
Dept: HEALTH INFORMATION MANAGEMENT | Facility: CLINIC | Age: 84
End: 2019-09-12

## 2019-09-16 ENCOUNTER — TELEPHONE (OUTPATIENT)
Dept: FAMILY MEDICINE | Facility: CLINIC | Age: 84
End: 2019-09-16

## 2019-09-16 ENCOUNTER — HOSPITAL ENCOUNTER (EMERGENCY)
Facility: CLINIC | Age: 84
Discharge: HOME | End: 2019-09-16
Attending: EMERGENCY MEDICINE | Admitting: EMERGENCY MEDICINE
Payer: MEDICARE

## 2019-09-16 VITALS
WEIGHT: 164 LBS | BODY MASS INDEX: 24.29 KG/M2 | RESPIRATION RATE: 12 BRPM | HEIGHT: 69 IN | HEART RATE: 64 BPM | SYSTOLIC BLOOD PRESSURE: 145 MMHG | DIASTOLIC BLOOD PRESSURE: 55 MMHG | OXYGEN SATURATION: 98 %

## 2019-09-16 DIAGNOSIS — N17.9 ACUTE KIDNEY INJURY (H): ICD-10-CM

## 2019-09-16 LAB
ALBUMIN SERPL-MCNC: 3.9 G/DL (ref 3.4–5)
ALBUMIN UR-MCNC: 30 MG/DL
ALP SERPL-CCNC: 58 U/L (ref 40–150)
ALT SERPL W P-5'-P-CCNC: 23 U/L (ref 0–70)
ANION GAP SERPL CALCULATED.3IONS-SCNC: 9 MMOL/L (ref 3–14)
APPEARANCE UR: CLEAR
AST SERPL W P-5'-P-CCNC: 25 U/L (ref 0–45)
BASOPHILS # BLD AUTO: 0 10E9/L (ref 0–0.2)
BASOPHILS NFR BLD AUTO: 0.4 %
BILIRUB SERPL-MCNC: 0.5 MG/DL (ref 0.2–1.3)
BILIRUB UR QL STRIP: NEGATIVE
BUN SERPL-MCNC: 43 MG/DL (ref 7–30)
CALCIUM SERPL-MCNC: 8.8 MG/DL (ref 8.5–10.1)
CHLORIDE SERPL-SCNC: 107 MMOL/L (ref 94–109)
CO2 SERPL-SCNC: 20 MMOL/L (ref 20–32)
COLOR UR AUTO: YELLOW
CREAT SERPL-MCNC: 3.24 MG/DL (ref 0.66–1.25)
DIFFERENTIAL METHOD BLD: ABNORMAL
EOSINOPHIL # BLD AUTO: 0.2 10E9/L (ref 0–0.7)
EOSINOPHIL NFR BLD AUTO: 1.9 %
ERYTHROCYTE [DISTWIDTH] IN BLOOD BY AUTOMATED COUNT: 14.9 % (ref 10–15)
GFR SERPL CREATININE-BSD FRML MDRD: 16 ML/MIN/{1.73_M2}
GLUCOSE SERPL-MCNC: 110 MG/DL (ref 70–99)
GLUCOSE UR STRIP-MCNC: NEGATIVE MG/DL
HCT VFR BLD AUTO: 30.9 % (ref 40–53)
HGB BLD-MCNC: 10.2 G/DL (ref 13.3–17.7)
HGB UR QL STRIP: NEGATIVE
IMM GRANULOCYTES # BLD: 0 10E9/L (ref 0–0.4)
IMM GRANULOCYTES NFR BLD: 0.4 %
INTERPRETATION ECG - MUSE: NORMAL
KETONES UR STRIP-MCNC: NEGATIVE MG/DL
LEUKOCYTE ESTERASE UR QL STRIP: NEGATIVE
LYMPHOCYTES # BLD AUTO: 0.8 10E9/L (ref 0.8–5.3)
LYMPHOCYTES NFR BLD AUTO: 10.1 %
MCH RBC QN AUTO: 30.5 PG (ref 26.5–33)
MCHC RBC AUTO-ENTMCNC: 33 G/DL (ref 31.5–36.5)
MCV RBC AUTO: 93 FL (ref 78–100)
MONOCYTES # BLD AUTO: 1 10E9/L (ref 0–1.3)
MONOCYTES NFR BLD AUTO: 12.1 %
NEUTROPHILS # BLD AUTO: 6.2 10E9/L (ref 1.6–8.3)
NEUTROPHILS NFR BLD AUTO: 75.1 %
NITRATE UR QL: NEGATIVE
PH UR STRIP: 5.5 PH (ref 5–7)
PLATELET # BLD AUTO: 200 10E9/L (ref 150–450)
POTASSIUM SERPL-SCNC: 4.4 MMOL/L (ref 3.4–5.3)
PROT SERPL-MCNC: 7.1 G/DL (ref 6.8–8.8)
RBC # BLD AUTO: 3.34 10E12/L (ref 4.4–5.9)
RBC #/AREA URNS AUTO: 0 /HPF (ref 0–2)
SODIUM SERPL-SCNC: 136 MMOL/L (ref 133–144)
SOURCE: ABNORMAL
SP GR UR STRIP: 1.02 (ref 1–1.03)
UROBILINOGEN UR STRIP-MCNC: NORMAL MG/DL (ref 0–2)
WBC # BLD AUTO: 8.3 10E9/L (ref 4–11)
WBC #/AREA URNS AUTO: 0 /HPF (ref 0–5)

## 2019-09-16 PROCEDURE — 85025 COMPLETE CBC W/AUTO DIFF WBC: CPT | Performed by: EMERGENCY MEDICINE

## 2019-09-16 PROCEDURE — 99284 EMERGENCY DEPT VISIT MOD MDM: CPT

## 2019-09-16 PROCEDURE — 80053 COMPREHEN METABOLIC PANEL: CPT | Performed by: EMERGENCY MEDICINE

## 2019-09-16 PROCEDURE — 81001 URINALYSIS AUTO W/SCOPE: CPT | Performed by: EMERGENCY MEDICINE

## 2019-09-16 PROCEDURE — 93005 ELECTROCARDIOGRAM TRACING: CPT

## 2019-09-16 ASSESSMENT — MIFFLIN-ST. JEOR: SCORE: 1414.28

## 2019-09-16 ASSESSMENT — ENCOUNTER SYMPTOMS
WEAKNESS: 1
APPETITE CHANGE: 1
FATIGUE: 1
SHORTNESS OF BREATH: 0

## 2019-09-16 NOTE — TELEPHONE ENCOUNTER
Pt calling with sx, asking about medication  Sx ongoing since Friday.    Sx one sided blurred vision/left, slurred speech so bad sounds intoxicated on phone, wife says she hasn't noticed because he hasn't said anything all weekend hardly he just sits. Neither are patient's norm.  Confirmed with MA here in clinic who knows pt.   Pt also reports feeling very exhausted all weekend.      Is alert and oriented at this time, able to give , wifes name, Favorite doctor, Month. Denies one sided weakness  Wife denies any one sided smile sx.      Sx going on 3 days no worsening, pt takes 325 aspirin daily, advised urgently to go to ER for sx to rule out stroke. Wife agrees with plan, and will either call 911 or have children drive.

## 2019-09-16 NOTE — ED PROVIDER NOTES
History     Chief Complaint:  Fatigue    HPI   Trevor Soni is a 86 year old male with a history of hypertension, hyperlipidemia, CAD and aortic valve stenosis who presents to the emergency department for evaluation of fatigue. The patient was recently seen on 09/09/2019 at [facility] by Dr. Lovelace; he was prescribed Sulindac for his arthritis as well as Bactrim but he's not clear as to why he was prescribed this medication. The patient reports that over the last few days he has been feeling fatigued and weak.  The patient's wife notes that he did not read the paper yesterday which is non-normal behaviour for him. The patient's wife also mentions that the patient has had a loss of appetite recently, but will still eat. Today she reports that the patient complained about a transient visual disturbance while driving. Then, when they called the primary care's office, they had concern that he was slurring his words. The pt states he was having trouble speaking on the phone as his dentures were falling out. The patient denies any chest pain, or shortness of breath, fever, abd pain, n/v, urinary sxs.    Allergies:  Ciprofloxacin  Penicillins    Medications:    Zyloprim  Norvasc  Tenormin  Aspirin  Lipitor  Flexeril  Fenofibrate  Apresoline  Zantac  Clinoril    Past Medical History:    Arthritis  CAD  CRF  GERD  Gout  Hyperlipidemia  Hypertension  Nocturia  Aortic valve stenosis    Past Surgical History:    Cardiac surgery  Cholecystectomy  Colonoscopy  Ortho surgery  Cornea clearing x2  Vascular surger    Family History:    No past pertinent family history.    Social History:  The patient was accompanied to the ED by his wife.  Smoking Status: Former  Smokeless Tobacco: Never  Alcohol Use: Yes  Drug Use: No  Marital Status:        Review of Systems   Constitutional: Positive for appetite change and fatigue.   Eyes: Positive for visual disturbance.   Respiratory: Negative for shortness of breath.   "  Cardiovascular: Negative for chest pain.   Neurological: Positive for weakness.   All other systems reviewed and are negative.      Physical Exam   Vitals:  Patient Vitals for the past 24 hrs:   BP Pulse Resp SpO2 Height Weight   09/16/19 1447 (!) 145/55 52 16 96 % 1.753 m (5' 9\") 74.4 kg (164 lb)     Physical Exam  General/Appearance: appears stated age, well-groomed, appears comfortable  Eyes: EOMI, no scleral injection, no icterus  ENT: MMM  Neck: supple, nl ROM, no stiffness  Cardiovascular: eduarda but regular, nl S1S2, no m/r/g, 2+ pulses in all 4 extremities, cap refill <2sec  Respiratory: CTAB, good air movement throughout, no wheezes/rhonchi/rales, no increased WOB, no retractions  Back: no lesions  GI: abd soft, non-distended, nttp,  no HSM, no rebound, no guarding, nl BS  MSK: SIDDIQI, good tone, no bony abnormality  Skin: warm and well-perfused, no rash, no edema, no ecchymosis, nl turgor  Neuro: GCS 15, alert and oriented, no gross focal neuro deficits  Psych: interacts appropriately  Heme: no petechia, no purpura, no active bleeding        Emergency Department Course   ECG:  Indication: Fatigue  Completed at 1652.  Read at 1740.   Rate 52 bpm. MT interval 304. QRS duration 124. QT/QTc 590/548. P-R-T axes * 25 10.  Sinus bradycardia with 1st degree AV block  Left bundle branch block  Abnormal ECG.    Laboratory:  CBC: HGB 10.2 (L) o/w WNL. (WBC 8.3, )   CMP: Glucose 110 (H) BUN 43 (H) Creatinine 3.24 (H) GFR 16 (L) o/w AWNL   UA with micro: Protein Albumin 30  o/w negative    Emergency Department Course:  Nursing notes and vitals reviewed. 1625 I performed an exam of the patient as documented above.     Blood drawn. This was sent to the lab for further testing, results above.    The patient provided a urine sample here in the emergency department. This was sent for laboratory testing, findings above.     1728 I rechecked the patient and discussed the results of his workup thus far.     Findings and " plan explained to the Patient. Patient discharged home with instructions regarding supportive care, medications, and reasons to return. The importance of close follow-up was reviewed.     I personally reviewed the laboratory results with the Patient and answered all related questions prior to discharge.    Impression & Plan      Medical Decision Making:  This patient is an 86-year-old male who presents with increased fatigue and weakness over the past couple days.  He has no other focal symptoms.  He does state he was on the phone with his clinic earlier and they felt his speech to be slightly slurry.  He states he forgot to tell them that he was having trouble keeping his dentures and at the time, which he feels is why his speech was slurred.  He does endorse a very transient right central vision loss while driving today but otherwise neurologic exam is unremarkable.  Here he is neurologically intact.  There are signs or symptoms concerning for stroke.  What I suspect more as a cause for his weakness is related to the recent medications he was started.  When checking his blood work his creatinine, which baseline is around 2, is around 3.5.  This is probably secondary to the Bactrim.  He is not clear why he was started on this medication and from chart review I am unable to appreciate the reason.  Either way I appropriate for him to stop it.  I offered him to stay overnight in the hospital as I think it is important we monitor this creatinine, give him IV fluids, ensure that it self resolves.  The patient would rather be discharged.  I think this is reasonable as long as he follows up with his primary in the next 1 to 2 days to have his kidney function rechecked.  I recommended, until its back to his baseline, that he also does not take the NSAID as this is metabolized by the kidney.  Otherwise he is not having lightheadedness, chest pain, shortness of breath, EKG changes to suggest a dysrhythmia, ACS.  He does not  have fever, shortness of breath, GI or  symptoms to just an infection.  Electrolytes are not altered with his elevated creatinine.  I think, per his wishes, that it is reasonable for him to be discharged again as long as he follows up with his PCP.  Diagnosis:    ICD-10-CM    1. Acute kidney injury (H) N17.9        Disposition:  discharged to home    I, Bob Acevedo, am serving as a scribe on 9/16/2019 at 5:04 PM to personally document services performed by Patricia Chapman* based on my observations and the provider's statements to me.     Bob Acevedo  9/16/2019    EMERGENCY DEPARTMENT       Patricia Chapman MD  09/16/19 0409

## 2019-09-16 NOTE — TELEPHONE ENCOUNTER
Reason for Call:  Other Medication concern    Detailed comments: The last 2 meds that the patient was prescribed makes him very sleepy and makes his Left eye blurry for a few minutes  And he has no appetite    Phone Number Patient can be reached at: Home number on file [phone] (home)    Best Time: Transferred to RN    Can we leave a detailed message on this number? YES    Call taken on 9/16/2019 at 1:59 PM by Supriya Cano

## 2019-09-16 NOTE — ED AVS SNAPSHOT
Emergency Department  [address] [place] [id number]  Phone:  [phone]  Fax:  [phone]                                    Trevor Snoi   MRN: [id number]    Department:   Emergency Department   Date of Visit:  9/16/2019           After Visit Summary Signature Page    I have received my discharge instructions, and my questions have been answered. I have discussed any challenges I see with this plan with the nurse or doctor.    ..........................................................................................................................................  Patient/Patient Representative Signature      ..........................................................................................................................................  Patient Representative Print Name and Relationship to Patient    ..................................................               ................................................  Date                                   Time    ..........................................................................................................................................  Reviewed by Signature/Title    ...................................................              ..............................................  Date                                               Time          22EPIC Rev 08/18

## 2019-09-16 NOTE — ED TRIAGE NOTES
Patient states he has been fatigued over the past 2 days.  Pt has been taking an abx and a new arthritis pill for the past 6 days.  Pt not certain why, has been feeling weak since taking them

## 2019-09-16 NOTE — DISCHARGE INSTRUCTIONS
MAKE SURE YOU HAVE YOUR KIDNEY FUNCTION RECHECKED BY YOUR PRIMARY DOCTOR WITHIN THE NEXT ONE OR TWO DAYS.  DISCONTINUE THE TWO NEW MEDICATIONS YOU JUST STARTED ON September 9TH.

## 2019-09-17 ENCOUNTER — OFFICE VISIT (OUTPATIENT)
Dept: FAMILY MEDICINE | Facility: CLINIC | Age: 84
End: 2019-09-17
Payer: MEDICARE

## 2019-09-17 VITALS
SYSTOLIC BLOOD PRESSURE: 138 MMHG | HEART RATE: 53 BPM | HEIGHT: 69 IN | TEMPERATURE: 99 F | BODY MASS INDEX: 24.41 KG/M2 | WEIGHT: 164.8 LBS | DIASTOLIC BLOOD PRESSURE: 60 MMHG | OXYGEN SATURATION: 93 %

## 2019-09-17 DIAGNOSIS — M1A.09X0 IDIOPATHIC CHRONIC GOUT OF MULTIPLE SITES WITHOUT TOPHUS: ICD-10-CM

## 2019-09-17 DIAGNOSIS — G89.29 CHRONIC PAIN OF BOTH KNEES: ICD-10-CM

## 2019-09-17 DIAGNOSIS — K21.9 GASTROESOPHAGEAL REFLUX DISEASE WITHOUT ESOPHAGITIS: ICD-10-CM

## 2019-09-17 DIAGNOSIS — G89.29 CHRONIC BILATERAL LOW BACK PAIN WITHOUT SCIATICA: ICD-10-CM

## 2019-09-17 DIAGNOSIS — M25.561 CHRONIC PAIN OF BOTH KNEES: ICD-10-CM

## 2019-09-17 DIAGNOSIS — M17.32 POST-TRAUMATIC OSTEOARTHRITIS OF LEFT KNEE: ICD-10-CM

## 2019-09-17 DIAGNOSIS — I35.0 AORTIC VALVE STENOSIS, UNSPECIFIED ETIOLOGY: ICD-10-CM

## 2019-09-17 DIAGNOSIS — M17.0 PRIMARY OSTEOARTHRITIS OF BOTH KNEES: Primary | ICD-10-CM

## 2019-09-17 DIAGNOSIS — M25.562 CHRONIC PAIN OF BOTH KNEES: ICD-10-CM

## 2019-09-17 DIAGNOSIS — E55.9 VITAMIN D DEFICIENCY: ICD-10-CM

## 2019-09-17 DIAGNOSIS — I71.40 ABDOMINAL AORTIC ANEURYSM (AAA) WITHOUT RUPTURE (H): ICD-10-CM

## 2019-09-17 DIAGNOSIS — N17.9 ACUTE RENAL FAILURE, UNSPECIFIED ACUTE RENAL FAILURE TYPE (H): ICD-10-CM

## 2019-09-17 DIAGNOSIS — M54.50 CHRONIC BILATERAL LOW BACK PAIN WITHOUT SCIATICA: ICD-10-CM

## 2019-09-17 PROCEDURE — 99214 OFFICE O/P EST MOD 30 MIN: CPT | Performed by: INTERNAL MEDICINE

## 2019-09-17 ASSESSMENT — MIFFLIN-ST. JEOR: SCORE: 1417.91

## 2019-09-17 NOTE — PROGRESS NOTES
Subjective     Trevor Soni is a 86 year old male who presents to clinic today for the following health issues:    HPI   ED/UC Followup:    Facility:   ED  Date of visit: 09/16/2019  Reason for visit:     Acute kidney injury (H)    Current Status: patient states he is feeling tired     The pt presents to the clinic for a ED f/u. The pt was admitted to the ED on 09/16/2019 for an acute kidney injury.     Before being admitted to the ED the pt called the clinic complaining of lethargy and that his dentures were not fitting properly so he was instructed to go to the ER for possible CVA/TIA.     Today he is feeling fatigued. Notes that he was not able to sleep last night. He claims to be eating enough and drinking plenty of fluids.     Patient Active Problem List   Diagnosis     Stiffness of joint, not elsewhere classified,  shoulder region     Abdominal pain     Pneumonia     Hyperlipidemia LDL goal <100     Essential hypertension, benign     Primary osteoarthritis involving multiple joints     Chronic rhinitis     Primary osteoarthritis of both knees     Idiopathic chronic gout of multiple sites without tophus     Abdominal aortic aneurysm (AAA) without rupture (H)     Stenosis of carotid artery, unspecified laterality     Post-traumatic osteoarthritis of left knee     Chronic bilateral low back pain     Nonrheumatic aortic valve stenosis     Past Surgical History:   Procedure Laterality Date     CARDIAC SURGERY       CHOLECYSTECTOMY       COLONOSCOPY       ORTHOPEDIC SURGERY       PHACOEMULSIFICATION CLEAR CORNEA WITH TORIC INTRAOCULAR LENS IMPLANT  1/30/2012    Procedure:PHACOEMULSIFICATION CLEAR CORNEA WITH TORIC INTRAOCULAR LENS IMPLANT; LEFT PHACOEMULSIFICATION CLEAR CORNEA WITH DELUXE  TORIC INTRAOCULAR LENS IMPLANT ; Surgeon:BRANDO HIGHTOWER; Location:[facility]     PHACOEMULSIFICATION CLEAR CORNEA WITH TORIC INTRAOCULAR LENS IMPLANT  2/13/2012    Procedure:PHACOEMULSIFICATION CLEAR CORNEA WITH TORIC  INTRAOCULAR LENS IMPLANT; RIGHT PHACOEMULSIFICATION CLEAR CORNEA WITH TORIC INTRAOCULAR LENS IMPLANT ; Surgeon:CAMPBELL, BRANDO EVANGELISTA; [facility]     VASCULAR SURGERY         Social History     Tobacco Use     Smoking status: Former Smoker     Types: Cigars     Last attempt to quit: 1980     Years since quittin.7     Smokeless tobacco: Never Used   Substance Use Topics     Alcohol use: Yes     Alcohol/week: 0.0 oz     Comment: rarely     History reviewed. No pertinent family history.      Current Outpatient Medications   Medication Sig Dispense Refill     acetaminophen-codeine (TYLENOL #3) 300-30 MG tablet Take one tablet by mouth two times daily as needed for severe pain. 60 tablet 0     allopurinol (ZYLOPRIM) 100 MG tablet Take 2 tablets (200 mg) by mouth daily Due for lab appointment. Please schedule: [phone] 180 tablet 3     amLODIPine (NORVASC) 5 MG tablet Take 1 tablet (5 mg) by mouth every morning And 2 tablets (10 mg) by mouth every evening. 270 tablet 2     ASPIRIN PO Take 325 mg by mouth daily.       atenolol (TENORMIN) 50 MG tablet Take 1 tablet (50 mg) by mouth daily 90 tablet 3     atorvastatin (LIPITOR) 20 MG tablet Take 1 tablet (20 mg) by mouth daily 90 tablet 3     Cholecalciferol (VITAMIN D3 PO) Take 1 tablet by mouth daily       coenzyme Q-10 capsule Take 1 capsule by mouth daily       cyclobenzaprine (FLEXERIL) 10 MG tablet Sig 1/2 po at bedtime prn 7 tablet 1     fenofibrate (TRIGLIDE/LOFIBRA) 160 MG tablet TAKE 1 TABLET(160 MG) BY MOUTH DAILY 90 tablet 0     hydrALAZINE (APRESOLINE) 50 MG tablet TAKE 1 TABLET BY MOUTH 3  TIMES A  tablet 2     ranitidine (ZANTAC) 150 MG tablet TAKE 1 TABLET(150 MG) BY MOUTH AT BEDTIME 90 tablet 3     sulindac (CLINORIL) 200 MG tablet TAKE 1 TABLET(200 MG) BY MOUTH TWICE DAILY WITH MEALS 180 tablet 3     triamcinolone (KENALOG) 0.1 % cream Apply sparingly to affected area three times daily for 14 days. 30 g 1     Allergies   Allergen Reactions  "    Avocado      Ciprofloxacin Itching     Penicillins Itching       Reviewed and updated as needed this visit by Provider         Review of Systems   ROS COMP: Constitutional, HEENT, cardiovascular, pulmonary, gi and gu systems are negative, except as otherwise noted.    This document serves as a record of the services and decisions personally performed and made by Warren Lovelace MD. It was created on his behalf by Richi Ferrell, a trained medical scribe. The creation of this document is based on the provider's statements to the medical scribe.  Richi Ferrell September 17, 2019 4:22 PM          Objective    /60 (BP Location: Right arm, Patient Position: Sitting, Cuff Size: Adult Regular)   Pulse 53   Temp 99  F (37.2  C) (Tympanic)   Ht 1.753 m (5' 9\")   Wt 74.8 kg (164 lb 12.8 oz)   SpO2 93%   BMI 24.34 kg/m    Body mass index is 24.34 kg/m .     BP Recheck   138/60    Physical Exam   Frail, 80 year old in no acute distress, alert, but disheveled   Neck was supple without adenopathy or thyromegaly his carotids were normal without bruits  Chest clear to auscultation and percussion  Cardiovascular grade 3/6 systolic ejection murmur radiating across the precordium to the carotids, S1 and S2 are physiologic without murmurs or gallops  Abdomen bowel sounds were normal.  There is no palpable mass or organomegaly  Extremities nontender with 2+ pretibial edema bilaterally   Pulses pedal pulses are as described otherwise his pulses are bilaterally symmetrical throughout without bruits  Skin without significant abnormality      Diagnostic Test Results:  Labs reviewed in Epic  Results for orders placed or performed during the hospital encounter of 09/16/19 (from the past 24 hour(s))   CBC with platelets differential   Result Value Ref Range    WBC 8.3 4.0 - 11.0 10e9/L    RBC Count 3.34 (L) 4.4 - 5.9 10e12/L    Hemoglobin 10.2 (L) 13.3 - 17.7 g/dL    Hematocrit 30.9 (L) 40.0 - 53.0 %    MCV 93 78 - 100 fl    MCH " 30.5 26.5 - 33.0 pg    MCHC 33.0 31.5 - 36.5 g/dL    RDW 14.9 10.0 - 15.0 %    Platelet Count 200 150 - 450 10e9/L    Diff Method Automated Method     % Neutrophils 75.1 %    % Lymphocytes 10.1 %    % Monocytes 12.1 %    % Eosinophils 1.9 %    % Basophils 0.4 %    % Immature Granulocytes 0.4 %    Absolute Neutrophil 6.2 1.6 - 8.3 10e9/L    Absolute Lymphocytes 0.8 0.8 - 5.3 10e9/L    Absolute Monocytes 1.0 0.0 - 1.3 10e9/L    Absolute Eosinophils 0.2 0.0 - 0.7 10e9/L    Absolute Basophils 0.0 0.0 - 0.2 10e9/L    Abs Immature Granulocytes 0.0 0 - 0.4 10e9/L   Comprehensive metabolic panel   Result Value Ref Range    Sodium 136 133 - 144 mmol/L    Potassium 4.4 3.4 - 5.3 mmol/L    Chloride 107 94 - 109 mmol/L    Carbon Dioxide 20 20 - 32 mmol/L    Anion Gap 9 3 - 14 mmol/L    Glucose 110 (H) 70 - 99 mg/dL    Urea Nitrogen 43 (H) 7 - 30 mg/dL    Creatinine 3.24 (H) 0.66 - 1.25 mg/dL    GFR Estimate 16 (L) >60 mL/min/[1.73_m2]    GFR Estimate If Black 19 (L) >60 mL/min/[1.73_m2]    Calcium 8.8 8.5 - 10.1 mg/dL    Bilirubin Total 0.5 0.2 - 1.3 mg/dL    Albumin 3.9 3.4 - 5.0 g/dL    Protein Total 7.1 6.8 - 8.8 g/dL    Alkaline Phosphatase 58 40 - 150 U/L    ALT 23 0 - 70 U/L    AST 25 0 - 45 U/L   UA with Microscopic   Result Value Ref Range    Color Urine Yellow     Appearance Urine Clear     Glucose Urine Negative NEG^Negative mg/dL    Bilirubin Urine Negative NEG^Negative    Ketones Urine Negative NEG^Negative mg/dL    Specific Gravity Urine 1.017 1.003 - 1.035    Blood Urine Negative NEG^Negative    pH Urine 5.5 5.0 - 7.0 pH    Protein Albumin Urine 30 (A) NEG^Negative mg/dL    Urobilinogen mg/dL Normal 0.0 - 2.0 mg/dL    Nitrite Urine Negative NEG^Negative    Leukocyte Esterase Urine Negative NEG^Negative    Source Midstream Urine     WBC Urine 0 0 - 5 /HPF    RBC Urine 0 0 - 2 /HPF   EKG 12-lead, tracing only   Result Value Ref Range    Interpretation ECG Click View Image link to view waveform and result             Assessment & Plan     Pt should weigh himself every day for the next couple of days until his f/u. Pt should contact the clinic if he gains >2 lbs in a day    Primary osteoarthritis of both knees  Ongoing chronic knee pain without associated swelling and previously not controlled with acetaminophen.  Avoid NSAIDs    Idiopathic chronic gout of multiple sites without tophus  No associated recent outbreaks    Aortic valve stenosis, unspecified etiology      Chronic bilateral low back pain without sciatica      Gastroesophageal reflux disease without esophagitis  Well-controlled with current therapy    Chronic pain of both knees      Vitamin D deficiency      Post-traumatic osteoarthritis of left knee      Abdominal aortic aneurysm (AAA) without rupture (H)  Recent reevaluation shows the aneurysm is stable and unchanged from 1 year ago    Acute renal failure, unspecified acute renal failure type (H)  Most likely related to the addition of a NSAID to his medication routine. Discontinued and we reviewed all of his other medications to avoid nephrotoxic drugs           FUTURE APPOINTMENTS:       - Follow-up visit on Monday to recheck labs     No follow-ups on file.    The information in this document, created by the medical scribe for me, accurately reflects the services I personally performed and the decisions made by me. I have reviewed and approved this document for accuracy prior to leaving the patient care area.  September 17, 2019 4:23 PM    Warren Lovelace MD  [facility]

## 2019-09-23 ENCOUNTER — OFFICE VISIT (OUTPATIENT)
Dept: FAMILY MEDICINE | Facility: CLINIC | Age: 84
End: 2019-09-23
Payer: MEDICARE

## 2019-09-23 VITALS
TEMPERATURE: 98 F | BODY MASS INDEX: 23.43 KG/M2 | HEIGHT: 69 IN | HEART RATE: 53 BPM | DIASTOLIC BLOOD PRESSURE: 44 MMHG | WEIGHT: 158.2 LBS | SYSTOLIC BLOOD PRESSURE: 123 MMHG | OXYGEN SATURATION: 95 %

## 2019-09-23 DIAGNOSIS — K21.9 GASTROESOPHAGEAL REFLUX DISEASE WITHOUT ESOPHAGITIS: ICD-10-CM

## 2019-09-23 DIAGNOSIS — I35.0 NONRHEUMATIC AORTIC VALVE STENOSIS: ICD-10-CM

## 2019-09-23 DIAGNOSIS — Z23 NEED FOR PROPHYLACTIC VACCINATION AND INOCULATION AGAINST INFLUENZA: Primary | ICD-10-CM

## 2019-09-23 DIAGNOSIS — I10 ESSENTIAL HYPERTENSION, BENIGN: ICD-10-CM

## 2019-09-23 DIAGNOSIS — M19.012 PRIMARY OSTEOARTHRITIS OF LEFT SHOULDER: ICD-10-CM

## 2019-09-23 DIAGNOSIS — N18.4 CKD (CHRONIC KIDNEY DISEASE) STAGE 4, GFR 15-29 ML/MIN (H): ICD-10-CM

## 2019-09-23 DIAGNOSIS — M1A.09X0 IDIOPATHIC CHRONIC GOUT OF MULTIPLE SITES WITHOUT TOPHUS: ICD-10-CM

## 2019-09-23 DIAGNOSIS — N17.9 ACUTE RENAL FAILURE, UNSPECIFIED ACUTE RENAL FAILURE TYPE (H): ICD-10-CM

## 2019-09-23 LAB
ERYTHROCYTE [DISTWIDTH] IN BLOOD BY AUTOMATED COUNT: 14.9 % (ref 10–15)
HCT VFR BLD AUTO: 32.6 % (ref 40–53)
HGB BLD-MCNC: 10.8 G/DL (ref 13.3–17.7)
MCH RBC QN AUTO: 31.2 PG (ref 26.5–33)
MCHC RBC AUTO-ENTMCNC: 33.1 G/DL (ref 31.5–36.5)
MCV RBC AUTO: 94 FL (ref 78–100)
PLATELET # BLD AUTO: 281 10E9/L (ref 150–450)
RBC # BLD AUTO: 3.46 10E12/L (ref 4.4–5.9)
WBC # BLD AUTO: 8.8 10E9/L (ref 4–11)

## 2019-09-23 PROCEDURE — 90662 IIV NO PRSV INCREASED AG IM: CPT | Performed by: INTERNAL MEDICINE

## 2019-09-23 PROCEDURE — 36415 COLL VENOUS BLD VENIPUNCTURE: CPT | Performed by: INTERNAL MEDICINE

## 2019-09-23 PROCEDURE — 99214 OFFICE O/P EST MOD 30 MIN: CPT | Mod: 25 | Performed by: INTERNAL MEDICINE

## 2019-09-23 PROCEDURE — 80048 BASIC METABOLIC PNL TOTAL CA: CPT | Performed by: INTERNAL MEDICINE

## 2019-09-23 PROCEDURE — G0008 ADMIN INFLUENZA VIRUS VAC: HCPCS | Performed by: INTERNAL MEDICINE

## 2019-09-23 PROCEDURE — 85027 COMPLETE CBC AUTOMATED: CPT | Performed by: INTERNAL MEDICINE

## 2019-09-23 ASSESSMENT — MIFFLIN-ST. JEOR: SCORE: 1387.97

## 2019-09-23 NOTE — LETTER
[address]  [address]  [place]  [address]  Tel: [phone]    September 30, 2019    Trevor DOWLING Nae  [address] [facility] [address]        Dear Mr. SoniLorenzo,    Enclosed your lab reports from your recent office exam.  Your minerals and electrolytes were normal and your kidney function test is back to your usual value at 1.99.  The CBC showed that your hemoglobin is slightly better than it was 2 weeks ago but not completely back to normal.    I anticipate seeing him back in the office in approximately 1 month, if you have any questions regarding these labs please let me know and if you are having any problems I will hear back from you sooner than in 1 month    If you have any further questions or problems, please contact our office.      Sincerely,    Warren Lovelace MD/ Danii Fernández CMA  Results for orders placed or performed in visit on 09/23/19   CBC with platelets   Result Value Ref Range    WBC 8.8 4.0 - 11.0 10e9/L    RBC Count 3.46 (L) 4.4 - 5.9 10e12/L    Hemoglobin 10.8 (L) 13.3 - 17.7 g/dL    Hematocrit 32.6 (L) 40.0 - 53.0 %    MCV 94 78 - 100 fl    MCH 31.2 26.5 - 33.0 pg    MCHC 33.1 31.5 - 36.5 g/dL    RDW 14.9 10.0 - 15.0 %    Platelet Count 281 150 - 450 10e9/L   Basic metabolic panel   Result Value Ref Range    Sodium 139 133 - 144 mmol/L    Potassium 4.2 3.4 - 5.3 mmol/L    Chloride 111 (H) 94 - 109 mmol/L    Carbon Dioxide 19 (L) 20 - 32 mmol/L    Anion Gap 9 3 - 14 mmol/L    Glucose 110 (H) 70 - 99 mg/dL    Urea Nitrogen 32 (H) 7 - 30 mg/dL    Creatinine 1.99 (H) 0.66 - 1.25 mg/dL    GFR Estimate 30 (L) >60 mL/min/[1.73_m2]    GFR Estimate If Black 34 (L) >60 mL/min/[1.73_m2]    Calcium 8.8 8.5 - 10.1 mg/dL               Enclosure: Lab Results

## 2019-09-23 NOTE — PROGRESS NOTES
Subjective     Trevor Soni is a 86 year old male who presents to clinic today for the following health issues:    HPI   ED/UC Followup:    Facility:   ED  Date of visit: 09/16/2019  Reason for visit:     Acute kidney injury (H)    Current Status: patient states he is doing a lot better since discharge     Pt notes that he has a lot more energy and that he has been losing water weight. Notes his AGUILAR has decreased.     Patient denies any headaches, vision changes, back or neck pain, dyspnea, chest pain, palpitations, bowel changes, hematochezia, urinary issues, nocturia, musculoskeletal issues, or skin changes.    Patient Active Problem List   Diagnosis     Stiffness of joint, not elsewhere classified,  shoulder region     Abdominal pain     Pneumonia     Hyperlipidemia LDL goal <100     Essential hypertension, benign     Primary osteoarthritis involving multiple joints     Chronic rhinitis     Primary osteoarthritis of both knees     Idiopathic chronic gout of multiple sites without tophus     Abdominal aortic aneurysm (AAA) without rupture (H)     Stenosis of carotid artery, unspecified laterality     Post-traumatic osteoarthritis of left knee     Chronic bilateral low back pain     Nonrheumatic aortic valve stenosis     CKD (chronic kidney disease) stage 4, GFR 15-29 ml/min (H)     Past Surgical History:   Procedure Laterality Date     CARDIAC SURGERY       CHOLECYSTECTOMY       COLONOSCOPY       ORTHOPEDIC SURGERY       PHACOEMULSIFICATION CLEAR CORNEA WITH TORIC INTRAOCULAR LENS IMPLANT  1/30/2012    Procedure:PHACOEMULSIFICATION CLEAR CORNEA WITH TORIC INTRAOCULAR LENS IMPLANT; LEFT PHACOEMULSIFICATION CLEAR CORNEA WITH DELUXE  TORIC INTRAOCULAR LENS IMPLANT ; Surgeon:BRANDO HIGHTOWER; Location:[facility]     PHACOEMULSIFICATION CLEAR CORNEA WITH TORIC INTRAOCULAR LENS IMPLANT  2/13/2012    Procedure:PHACOEMULSIFICATION CLEAR CORNEA WITH TORIC INTRAOCULAR LENS IMPLANT; RIGHT PHACOEMULSIFICATION CLEAR CORNEA  WITH TORIC INTRAOCULAR LENS IMPLANT ; Surgeon:BRANDO HIGHTOWER; Location:[facility]     VASCULAR SURGERY         Social History     Tobacco Use     Smoking status: Former Smoker     Types: Cigars     Last attempt to quit: 1980     Years since quittin.7     Smokeless tobacco: Never Used   Substance Use Topics     Alcohol use: Yes     Alcohol/week: 0.0 standard drinks     Comment: rarely     History reviewed. No pertinent family history.      Current Outpatient Medications   Medication Sig Dispense Refill     acetaminophen-codeine (TYLENOL #3) 300-30 MG tablet Take one tablet by mouth two times daily as needed for severe pain. 60 tablet 0     allopurinol (ZYLOPRIM) 100 MG tablet Take 2 tablets (200 mg) by mouth daily Due for lab appointment. Please schedule: [phone] 180 tablet 3     amLODIPine (NORVASC) 5 MG tablet Take 1 tablet (5 mg) by mouth every morning And 2 tablets (10 mg) by mouth every evening. 270 tablet 2     ASPIRIN PO Take 325 mg by mouth daily.       atenolol (TENORMIN) 50 MG tablet Take 1 tablet (50 mg) by mouth daily 90 tablet 3     atorvastatin (LIPITOR) 20 MG tablet Take 1 tablet (20 mg) by mouth daily 90 tablet 3     Cholecalciferol (VITAMIN D3 PO) Take 1 tablet by mouth daily       coenzyme Q-10 capsule Take 1 capsule by mouth daily       cyclobenzaprine (FLEXERIL) 10 MG tablet Sig 1/2 po at bedtime prn 7 tablet 1     fenofibrate (TRIGLIDE/LOFIBRA) 160 MG tablet TAKE 1 TABLET(160 MG) BY MOUTH DAILY 90 tablet 0     hydrALAZINE (APRESOLINE) 50 MG tablet TAKE 1 TABLET BY MOUTH 3  TIMES A  tablet 2     ranitidine (ZANTAC) 150 MG tablet TAKE 1 TABLET(150 MG) BY MOUTH AT BEDTIME 90 tablet 3     sulindac (CLINORIL) 200 MG tablet TAKE 1 TABLET(200 MG) BY MOUTH TWICE DAILY WITH MEALS 180 tablet 3     triamcinolone (KENALOG) 0.1 % cream Apply sparingly to affected area three times daily for 14 days. 30 g 1     Allergies   Allergen Reactions     Avocado      Ciprofloxacin Itching      "Penicillins Itching       Reviewed and updated as needed this visit by Provider         Review of Systems   ROS COMP: Constitutional, HEENT, cardiovascular, pulmonary, gi POSITIVE for heartburn/acid indigestion and gu systems are negative, except as otherwise noted.    This document serves as a record of the services and decisions personally performed and made by Warren Lovelace MD. It was created on his behalf by Richi Ferrell, a trained medical scribe. The creation of this document is based on the provider's statements to the medical scribe.  Ricih Ferrell September 23, 2019 12:03 PM          Objective    /44 (BP Location: Right arm, Patient Position: Sitting, Cuff Size: Adult Regular)   Pulse 53   Temp 98  F (36.7  C) (Oral)   Ht 1.753 m (5' 9\")   Wt 71.8 kg (158 lb 3.2 oz)   SpO2 95%   BMI 23.36 kg/m    Body mass index is 23.36 kg/m .     Physical Exam   Neck was supple without adenopathy or thyromegaly his carotids were normal without bruits  Chest clear to auscultation and percussion  Cardiovascular S1 and S2 are physiologic, grade 3/6 systolic ejection murmur, without gallops  Abdomen bowel sounds were normal.  There is no palpable mass or organomegaly  Extremities nontender with 1+ pretibial edema with minor venous stasis changes   Pulses pedal pulses are as described otherwise his pulses are bilaterally symmetrical throughout without bruits  Skin without significant abnormality      Diagnostic Test Results:  Labs reviewed in Epic  No results found for this or any previous visit (from the past 24 hour(s)).        Assessment & Plan     Acute renal failure, unspecified acute renal failure type (H)  Most likely related to NSAIDs which has subsequently been discontinued  - CBC with platelets  - Basic metabolic panel    CKD (chronic kidney disease) stage 4, GFR 15-29 ml/min (H)  Much improved today. Will be rechecking kidney function today in lab.   - CBC with platelets  - Basic metabolic panel    Primary " osteoarthritis of left shoulder  Arthritic pain is much improved and will continue using Tylenol    Nonrheumatic aortic valve stenosis  Continue close monitoring and not critical at this point    Essential hypertension, benign  Well controlled with current therapies     Idiopathic chronic gout of multiple sites without tophus  No current sx's     Gastroesophageal reflux disease without esophagitis  Well controlled with Tums PRN     Need for prophylactic vaccination and inoculation against influenza    - INFLUENZA (HIGH DOSE) 3 VALENT VACCINE [69829]  - Vaccine Administration, Initial [56625]  - ADMIN INFLUENZA (For MEDICARE Patients ONLY) []           FUTURE APPOINTMENTS:       - Follow-up visit in 1 mo.    No follow-ups on file.     The information in this document, created by the medical scribe for me, accurately reflects the services I personally performed and the decisions made by me. I have reviewed and approved this document for accuracy prior to leaving the patient care area.  September 23, 2019 12:09 PM    Warren Lovelace MD  [facility]

## 2019-09-24 LAB
ANION GAP SERPL CALCULATED.3IONS-SCNC: 9 MMOL/L (ref 3–14)
BUN SERPL-MCNC: 32 MG/DL (ref 7–30)
CALCIUM SERPL-MCNC: 8.8 MG/DL (ref 8.5–10.1)
CHLORIDE SERPL-SCNC: 111 MMOL/L (ref 94–109)
CO2 SERPL-SCNC: 19 MMOL/L (ref 20–32)
CREAT SERPL-MCNC: 1.99 MG/DL (ref 0.66–1.25)
GFR SERPL CREATININE-BSD FRML MDRD: 30 ML/MIN/{1.73_M2}
GLUCOSE SERPL-MCNC: 110 MG/DL (ref 70–99)
POTASSIUM SERPL-SCNC: 4.2 MMOL/L (ref 3.4–5.3)
SODIUM SERPL-SCNC: 139 MMOL/L (ref 133–144)

## 2019-10-24 ENCOUNTER — TELEPHONE (OUTPATIENT)
Dept: FAMILY MEDICINE | Facility: CLINIC | Age: 84
End: 2019-10-24

## 2019-10-24 NOTE — TELEPHONE ENCOUNTER
Reason for Call:  Other Prior Auth    Detailed comments: amLODIPine (NORVASC) 5 MG tablet    Phone Number Pharmacy can be reached at: [facility]  Call back # [phone]  Order # [id number]    Best Time: anytime    Can we leave a detailed message on this number? YES    Call taken on 10/24/2019 at 2:28 PM by Supriya Cano

## 2019-10-25 NOTE — TELEPHONE ENCOUNTER
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

## 2019-10-25 NOTE — TELEPHONE ENCOUNTER
Prior Authorization Not Needed per Insurance    Medication: Amlodipine 5 mg  Insurance Company: [employer] ([facility]) - Phone [phone] Fax [phone]  Expected CoPay:      Pharmacy Filling the Rx: [id number] - [place], [place] - [address]  Pharmacy Notified: Yes  Patient Notified: Yes **Instructed pharmacy to notify patient when script is ready to /ship.**

## 2019-10-25 NOTE — TELEPHONE ENCOUNTER
Central Prior Authorization Team   Phone: [phone]      PA Initiation    Medication: Amlodipine 5 mg  Insurance Company: [employer] ([facility]) - Phone [phone] Fax [phone]  Pharmacy Filling the Rx: [facility] #[id number] - [place], [place] - [address]  Filling Pharmacy Phone: [phone]  Filling Pharmacy Fax:    Start Date: 10/25/2019

## 2019-11-04 ENCOUNTER — OFFICE VISIT (OUTPATIENT)
Dept: FAMILY MEDICINE | Facility: CLINIC | Age: 84
End: 2019-11-04
Payer: MEDICARE

## 2019-11-04 VITALS
SYSTOLIC BLOOD PRESSURE: 134 MMHG | OXYGEN SATURATION: 97 % | BODY MASS INDEX: 24.25 KG/M2 | HEIGHT: 68 IN | WEIGHT: 160 LBS | HEART RATE: 57 BPM | DIASTOLIC BLOOD PRESSURE: 60 MMHG | TEMPERATURE: 97 F

## 2019-11-04 DIAGNOSIS — E78.5 HYPERLIPIDEMIA LDL GOAL <100: ICD-10-CM

## 2019-11-04 DIAGNOSIS — I71.40 ABDOMINAL AORTIC ANEURYSM (AAA) WITHOUT RUPTURE (H): Primary | ICD-10-CM

## 2019-11-04 DIAGNOSIS — M15.0 PRIMARY OSTEOARTHRITIS INVOLVING MULTIPLE JOINTS: ICD-10-CM

## 2019-11-04 DIAGNOSIS — N18.4 CKD (CHRONIC KIDNEY DISEASE) STAGE 4, GFR 15-29 ML/MIN (H): ICD-10-CM

## 2019-11-04 DIAGNOSIS — I10 ESSENTIAL HYPERTENSION, BENIGN: ICD-10-CM

## 2019-11-04 DIAGNOSIS — M17.32 POST-TRAUMATIC OSTEOARTHRITIS OF LEFT KNEE: ICD-10-CM

## 2019-11-04 DIAGNOSIS — I65.29 STENOSIS OF CAROTID ARTERY, UNSPECIFIED LATERALITY: ICD-10-CM

## 2019-11-04 DIAGNOSIS — I10 BENIGN ESSENTIAL HYPERTENSION: ICD-10-CM

## 2019-11-04 DIAGNOSIS — I35.0 NONRHEUMATIC AORTIC VALVE STENOSIS: ICD-10-CM

## 2019-11-04 DIAGNOSIS — M1A.09X0 IDIOPATHIC CHRONIC GOUT OF MULTIPLE SITES WITHOUT TOPHUS: ICD-10-CM

## 2019-11-04 PROCEDURE — 36415 COLL VENOUS BLD VENIPUNCTURE: CPT | Performed by: INTERNAL MEDICINE

## 2019-11-04 PROCEDURE — 84550 ASSAY OF BLOOD/URIC ACID: CPT | Performed by: INTERNAL MEDICINE

## 2019-11-04 PROCEDURE — 80048 BASIC METABOLIC PNL TOTAL CA: CPT | Performed by: INTERNAL MEDICINE

## 2019-11-04 PROCEDURE — 99214 OFFICE O/P EST MOD 30 MIN: CPT | Performed by: INTERNAL MEDICINE

## 2019-11-04 RX ORDER — AMLODIPINE BESYLATE 5 MG/1
5 TABLET ORAL EVERY MORNING
Qty: 270 TABLET | Refills: 3 | Status: SHIPPED | OUTPATIENT
Start: 2019-11-04 | End: 2020-06-26

## 2019-11-04 ASSESSMENT — MIFFLIN-ST. JEOR: SCORE: 1380.26

## 2019-11-04 NOTE — PROGRESS NOTES
Subjective     Trevor Soni is a 86 year old male who presents to clinic today for the following health issues:    HPI   Pt presents to the clinic for a f/u exam. He presents a letter from [employer] stating that they need more information to approve the pt's Norvasc Rx.     The pt reports an increase in his AGUILAR since his LOV. He complains of stiffness/pain in his knees bilaterally since the temperature has decreased. The pt states that he only takes a few tablets of Tylenol weekly. To relieve the pain he mostly applies heat to his knees. He is relieved to say that his right sided sciatica has much improved since receiving an injection. He also reports minor stiffness in his fingers.     He notes that he is having trouble sleeping within the past few weeks because of thumping sounds coming from his neck.     Patient Active Problem List   Diagnosis     Stiffness of joint, not elsewhere classified,  shoulder region     Abdominal pain     Pneumonia     Hyperlipidemia LDL goal <100     Essential hypertension, benign     Primary osteoarthritis involving multiple joints     Chronic rhinitis     Primary osteoarthritis of both knees     Idiopathic chronic gout of multiple sites without tophus     Abdominal aortic aneurysm (AAA) without rupture (H)     Stenosis of carotid artery, unspecified laterality     Post-traumatic osteoarthritis of left knee     Chronic bilateral low back pain     Nonrheumatic aortic valve stenosis     CKD (chronic kidney disease) stage 4, GFR 15-29 ml/min (H)     Past Surgical History:   Procedure Laterality Date     CARDIAC SURGERY       CHOLECYSTECTOMY       COLONOSCOPY       ORTHOPEDIC SURGERY       PHACOEMULSIFICATION CLEAR CORNEA WITH TORIC INTRAOCULAR LENS IMPLANT  1/30/2012    Procedure:PHACOEMULSIFICATION CLEAR CORNEA WITH TORIC INTRAOCULAR LENS IMPLANT; LEFT PHACOEMULSIFICATION CLEAR CORNEA WITH DELUXE  TORIC INTRAOCULAR LENS IMPLANT ; Surgeon:BRANDO HIGHTOWER; Location:[facility]      PHACOEMULSIFICATION CLEAR CORNEA WITH TORIC INTRAOCULAR LENS IMPLANT  2012    Procedure:PHACOEMULSIFICATION CLEAR CORNEA WITH TORIC INTRAOCULAR LENS IMPLANT; RIGHT PHACOEMULSIFICATION CLEAR CORNEA WITH TORIC INTRAOCULAR LENS IMPLANT ; Surgeon:CAMPBELL, BRANDO EVANGELISTA; Location:[facility]     VASCULAR SURGERY         Social History     Tobacco Use     Smoking status: Former Smoker     Types: Cigars     Last attempt to quit: 1980     Years since quittin.8     Smokeless tobacco: Never Used   Substance Use Topics     Alcohol use: Yes     Alcohol/week: 0.0 standard drinks     Comment: rarely     No family history on file.      Current Outpatient Medications   Medication Sig Dispense Refill     acetaminophen-codeine (TYLENOL #3) 300-30 MG tablet Take one tablet by mouth two times daily as needed for severe pain. 60 tablet 0     allopurinol (ZYLOPRIM) 100 MG tablet Take 2 tablets (200 mg) by mouth daily Due for lab appointment. Please schedule: [phone] 180 tablet 3     amLODIPine (NORVASC) 5 MG tablet Take 1 tablet (5 mg) by mouth every morning And 2 tablets (10 mg) by mouth every evening. 270 tablet 3     ASPIRIN PO Take 325 mg by mouth daily.       atenolol (TENORMIN) 50 MG tablet Take 1 tablet (50 mg) by mouth daily 90 tablet 3     atorvastatin (LIPITOR) 20 MG tablet Take 1 tablet (20 mg) by mouth daily 90 tablet 3     Cholecalciferol (VITAMIN D3 PO) Take 1 tablet by mouth daily       coenzyme Q-10 capsule Take 1 capsule by mouth daily       cyclobenzaprine (FLEXERIL) 10 MG tablet Sig 1/2 po at bedtime prn 7 tablet 1     fenofibrate (TRIGLIDE/LOFIBRA) 160 MG tablet TAKE 1 TABLET(160 MG) BY MOUTH DAILY 90 tablet 0     hydrALAZINE (APRESOLINE) 50 MG tablet TAKE 1 TABLET BY MOUTH 3  TIMES A  tablet 2     ranitidine (ZANTAC) 150 MG tablet TAKE 1 TABLET(150 MG) BY MOUTH AT BEDTIME 90 tablet 3     sulindac (CLINORIL) 200 MG tablet TAKE 1 TABLET(200 MG) BY MOUTH TWICE DAILY WITH MEALS 180 tablet 3      "triamcinolone (KENALOG) 0.1 % cream Apply sparingly to affected area three times daily for 14 days. 30 g 1     Allergies   Allergen Reactions     Avocado      Ciprofloxacin Itching     Penicillins Itching       Reviewed and updated as needed this visit by Provider         Review of Systems   ROS COMP: Constitutional, HEENT, cardiovascular, pulmonary, gi and gu systems are negative, except as otherwise noted.    This document serves as a record of the services and decisions personally performed and made by Warren Lovelace MD. It was created on his behalf by Richi Ferrell, a trained medical scribe. The creation of this document is based on the provider's statements to the medical scribe.  Richi Ferrell November 4, 2019 10:36 AM          Objective    /60 (BP Location: Right arm, Patient Position: Sitting, Cuff Size: Adult Regular)   Pulse 57   Temp 97  F (36.1  C) (Oral)   Ht 1.727 m (5' 8\")   Wt 72.6 kg (160 lb)   SpO2 97%   BMI 24.33 kg/m    Body mass index is 24.33 kg/m .     BP Recheck   134/60    Physical Exam   TM's normal  HEENT throat normal   Neck was supple without adenopathy or thyromegaly his carotids were normal without bruits  Chest clear to auscultation and percussion  Cardiovascular S1 and S2 are physiologic with grade 3/6 ELANA radiating to the carotids from the precordium, without gallops  Abdomen bowel sounds were normal.  There is no palpable mass or organomegaly  Extremities nontender with 1+ pretibial edema  Small effusion of his right knee with an associated Baker's cyst  Left knee trivial effusion with a small Baker's cyst  Pulses pedal pulses are as described otherwise his pulses are bilaterally symmetrical throughout without bruits  Skin without significant abnormality      Diagnostic Test Results:  Labs reviewed in Epic  No results found for this or any previous visit (from the past 24 hour(s)).        Assessment & Plan     Abdominal aortic aneurysm (AAA) without rupture (H)  Continue " ongoing surveillance, stable and unchanged in September    Stenosis of carotid artery, unspecified laterality  Continue ongoing periodic evaluation    CKD (chronic kidney disease) stage 4, GFR 15-29 ml/min (H)  F/u on renal function, managing medications appropriately  - Uric acid  - Basic metabolic panel    Idiopathic chronic gout of multiple sites without tophus  Rechecking the pt's uric acid levels today.   - Uric acid    Essential hypertension, benign  Recommended that the pt check his blood pressure in the AM and PM daily until he returns to the clinic for a f/u. The pt should record his BP's to present at his f/u.   - Uric acid  - Basic metabolic panel    Hyperlipidemia LDL goal <100  Continue with aggressive management of his lipids  - amLODIPine (NORVASC) 5 MG tablet  Dispense: 270 tablet; Refill: 3    Primary osteoarthritis involving multiple joints  Plan to re-inject Synvisc due to the success a year ago.  We will contact the patient when available    Post-traumatic osteoarthritis of left knee      Nonrheumatic aortic valve stenosis      Benign essential hypertension    - amLODIPine (NORVASC) 5 MG tablet  Dispense: 270 tablet; Refill: 3           FUTURE APPOINTMENTS:       - Follow-up visit in 1 week     No follow-ups on file.    The information in this document, created by the medical scribe for me, accurately reflects the services I personally performed and the decisions made by me. I have reviewed and approved this document for accuracy prior to leaving the patient care area.  November 4, 2019 11:00 AM    Warren Lovelace MD  [facility]

## 2019-11-05 LAB
ANION GAP SERPL CALCULATED.3IONS-SCNC: 9 MMOL/L (ref 3–14)
BUN SERPL-MCNC: 29 MG/DL (ref 7–30)
CALCIUM SERPL-MCNC: 9.2 MG/DL (ref 8.5–10.1)
CHLORIDE SERPL-SCNC: 109 MMOL/L (ref 94–109)
CO2 SERPL-SCNC: 23 MMOL/L (ref 20–32)
CREAT SERPL-MCNC: 1.72 MG/DL (ref 0.66–1.25)
GFR SERPL CREATININE-BSD FRML MDRD: 35 ML/MIN/{1.73_M2}
GLUCOSE SERPL-MCNC: 94 MG/DL (ref 70–99)
POTASSIUM SERPL-SCNC: 4 MMOL/L (ref 3.4–5.3)
SODIUM SERPL-SCNC: 141 MMOL/L (ref 133–144)
URATE SERPL-MCNC: 4.1 MG/DL (ref 3.5–7.2)

## 2019-11-18 ENCOUNTER — OFFICE VISIT (OUTPATIENT)
Dept: FAMILY MEDICINE | Facility: CLINIC | Age: 84
End: 2019-11-18
Payer: MEDICARE

## 2019-11-18 VITALS
DIASTOLIC BLOOD PRESSURE: 61 MMHG | SYSTOLIC BLOOD PRESSURE: 142 MMHG | TEMPERATURE: 96.8 F | HEART RATE: 50 BPM | OXYGEN SATURATION: 97 % | HEIGHT: 68 IN | WEIGHT: 160 LBS | BODY MASS INDEX: 24.25 KG/M2

## 2019-11-18 DIAGNOSIS — M1A.09X0 IDIOPATHIC CHRONIC GOUT OF MULTIPLE SITES WITHOUT TOPHUS: ICD-10-CM

## 2019-11-18 DIAGNOSIS — I71.40 ABDOMINAL AORTIC ANEURYSM (AAA) WITHOUT RUPTURE (H): Primary | ICD-10-CM

## 2019-11-18 DIAGNOSIS — I65.29 STENOSIS OF CAROTID ARTERY, UNSPECIFIED LATERALITY: ICD-10-CM

## 2019-11-18 DIAGNOSIS — I35.0 NONRHEUMATIC AORTIC VALVE STENOSIS: ICD-10-CM

## 2019-11-18 DIAGNOSIS — E78.5 HYPERLIPIDEMIA LDL GOAL <100: ICD-10-CM

## 2019-11-18 DIAGNOSIS — N18.4 CKD (CHRONIC KIDNEY DISEASE) STAGE 4, GFR 15-29 ML/MIN (H): ICD-10-CM

## 2019-11-18 DIAGNOSIS — M17.11 PRIMARY OSTEOARTHRITIS OF RIGHT KNEE: ICD-10-CM

## 2019-11-18 DIAGNOSIS — M17.12 PRIMARY OSTEOARTHRITIS OF LEFT KNEE: ICD-10-CM

## 2019-11-18 DIAGNOSIS — M17.0 PRIMARY OSTEOARTHRITIS OF BOTH KNEES: ICD-10-CM

## 2019-11-18 DIAGNOSIS — I10 ESSENTIAL HYPERTENSION, BENIGN: ICD-10-CM

## 2019-11-18 PROCEDURE — 99214 OFFICE O/P EST MOD 30 MIN: CPT | Mod: 25 | Performed by: INTERNAL MEDICINE

## 2019-11-18 PROCEDURE — 20610 DRAIN/INJ JOINT/BURSA W/O US: CPT | Performed by: INTERNAL MEDICINE

## 2019-11-18 ASSESSMENT — MIFFLIN-ST. JEOR: SCORE: 1380.26

## 2019-11-18 NOTE — PROGRESS NOTES
Subjective     Trevor Soni is a 86 year old male who presents to clinic today for the following health issues:    HPI   The pt presents to the clinic for a f/u of his knee pain. He notes that he has increased swelling in his right knee and his ankles bilaterally. The pt has had other steroid injections in his knees bilaterally with little pain relief. The pt has tried to alleviate his knee pain with at home exercises which has provided little relief as well.     The pt regularly checks his BP around 8:00 am and is typically 140/66 - 162/70. The pt also presents a weight log and his weight has been  consistently in a stable range.     Patient Active Problem List   Diagnosis     Stiffness of joint, not elsewhere classified,  shoulder region     Abdominal pain     Pneumonia     Hyperlipidemia LDL goal <100     Essential hypertension, benign     Primary osteoarthritis involving multiple joints     Chronic rhinitis     Primary osteoarthritis of both knees     Idiopathic chronic gout of multiple sites without tophus     Abdominal aortic aneurysm (AAA) without rupture (H)     Stenosis of carotid artery, unspecified laterality     Post-traumatic osteoarthritis of left knee     Chronic bilateral low back pain     Nonrheumatic aortic valve stenosis     CKD (chronic kidney disease) stage 4, GFR 15-29 ml/min (H)     Past Surgical History:   Procedure Laterality Date     CARDIAC SURGERY       CHOLECYSTECTOMY       COLONOSCOPY       ORTHOPEDIC SURGERY       PHACOEMULSIFICATION CLEAR CORNEA WITH TORIC INTRAOCULAR LENS IMPLANT  1/30/2012    Procedure:PHACOEMULSIFICATION CLEAR CORNEA WITH TORIC INTRAOCULAR LENS IMPLANT; LEFT PHACOEMULSIFICATION CLEAR CORNEA WITH DELUXE  TORIC INTRAOCULAR LENS IMPLANT ; Surgeon:BRANDO HIGHTOWER; Location:[facility]     PHACOEMULSIFICATION CLEAR CORNEA WITH TORIC INTRAOCULAR LENS IMPLANT  2/13/2012    Procedure:PHACOEMULSIFICATION CLEAR CORNEA WITH TORIC INTRAOCULAR LENS IMPLANT; RIGHT  PHACOEMULSIFICATION CLEAR CORNEA WITH TORIC INTRAOCULAR LENS IMPLANT ; Surgeon:CAMPBELL, BRANDO EVANGELISTA; Location:[facility]     VASCULAR SURGERY         Social History     Tobacco Use     Smoking status: Former Smoker     Types: Cigars     Last attempt to quit: 1980     Years since quittin.9     Smokeless tobacco: Never Used   Substance Use Topics     Alcohol use: Yes     Alcohol/week: 0.0 standard drinks     Comment: rarely     No family history on file.      Current Outpatient Medications   Medication Sig Dispense Refill     acetaminophen-codeine (TYLENOL #3) 300-30 MG tablet Take one tablet by mouth two times daily as needed for severe pain. 60 tablet 0     allopurinol (ZYLOPRIM) 100 MG tablet Take 2 tablets (200 mg) by mouth daily Due for lab appointment. Please schedule: [phone] 180 tablet 3     amLODIPine (NORVASC) 5 MG tablet Take 1 tablet (5 mg) by mouth every morning And 2 tablets (10 mg) by mouth every evening. 270 tablet 3     ASPIRIN PO Take 325 mg by mouth daily.       atenolol (TENORMIN) 50 MG tablet Take 1 tablet (50 mg) by mouth daily 90 tablet 3     atorvastatin (LIPITOR) 20 MG tablet Take 1 tablet (20 mg) by mouth daily 90 tablet 3     Cholecalciferol (VITAMIN D3 PO) Take 1 tablet by mouth daily       coenzyme Q-10 capsule Take 1 capsule by mouth daily       cyclobenzaprine (FLEXERIL) 10 MG tablet Sig 1/2 po at bedtime prn 7 tablet 1     fenofibrate (TRIGLIDE/LOFIBRA) 160 MG tablet TAKE 1 TABLET(160 MG) BY MOUTH DAILY 90 tablet 0     hydrALAZINE (APRESOLINE) 50 MG tablet TAKE 1 TABLET BY MOUTH 3  TIMES A  tablet 2     ranitidine (ZANTAC) 150 MG tablet TAKE 1 TABLET(150 MG) BY MOUTH AT BEDTIME 90 tablet 3     triamcinolone (KENALOG) 0.1 % cream Apply sparingly to affected area three times daily for 14 days. 30 g 1     Allergies   Allergen Reactions     Avocado      Ciprofloxacin Itching     Penicillins Itching         Reviewed and updated as needed this visit by Provider         Review of  "Systems   ROS COMP: Constitutional, HEENT, cardiovascular, pulmonary, gi and gu systems are negative, except as otherwise noted.    This document serves as a record of the services and decisions personally performed and made by Warren Lovelace MD. It was created on his behalf by Richi Ferrell, a trained medical scribe. The creation of this document is based on the provider's statements to the medical scribe.  Richi Ferrell November 18, 2019 12:33 PM          Objective    BP (!) 142/61 (BP Location: Left arm, Patient Position: Chair, Cuff Size: Adult Large)   Pulse 50   Temp 96.8  F (36  C) (Oral)   Ht 1.727 m (5' 8\")   Wt 72.6 kg (160 lb)   SpO2 97%   BMI 24.33 kg/m    Body mass index is 24.33 kg/m .     Physical Exam   Neck was supple without adenopathy or thyromegaly his carotids were normal without bruits  Chest clear to auscultation and percussion  Cardiovascular S1 and S2 are physiologic without murmurs or gallops  Abdomen bowel sounds were normal.  There is no palpable mass or organomegaly  Extremities nontender with 1-2+ pretibial edema, R>L   Pulses pedal pulses are as described otherwise his pulses are bilaterally symmetrical throughout without bruits  Skin without significant abnormality    Multiple modalities for controlling the patient's arthritis of his knees including the previous use of nonsteroidal anti-inflammatory drugs which has been complicated by progressive renal failure and subsequently not a choice, longstanding use of intermittent cortisone injections which have lost their effectiveness.  The patient is not a candidate for total knee arthroplasty.  He underwent Synvisc  injections greater than 6 months ago with a good response and because of this we are recommending repeat Synvisc injections in both knees.  Procedure Note: Right Knee   The risks and benefits were discussed with the patient.  The skin was sterily prepped. A 21 gauge needle was placed in the joint using sterile technique. 20 " cc's of fluid were removed from the right knee without difficulty. 6 mL of Synvisc One injected without difficulty.     Procedure Note: Left Knee   The risks and benefits were discussed with the patient.  The skin was sterily prepped. A 21 gauge needle was placed in the joint using sterile technique. 0 cc's of fluid were removed without difficulty and 6 mL of Synvisc One injected without difficulty.       Diagnostic Test Results:  Labs reviewed in Epic  none         Assessment & Plan     Abdominal aortic aneurysm (AAA) without rupture (H)  Continue ongoing periodic surveillance as scheduled    Essential hypertension, benign  Recommended that the pt start taking hydralazine 1 full tablet TID to help lower the pt's BP. Pt should check his BP regularly at home and return to clinic in 2 weeks.     Primary osteoarthritis of both knees  See procedure notes. Recommended that the pt apply heat to the two injection sites for 20 minutes daily and to avoid excessive activity for the next couple of days to allow Synvisc One to take affect.     Stenosis of carotid artery, unspecified laterality      Nonrheumatic aortic valve stenosis  Continue serial follow-up as previously planned    Idiopathic chronic gout of multiple sites without tophus  Follow-up lab studies to ensure that his recent arthritic complaints are not related to acute gout    CKD (chronic kidney disease) stage 4, GFR 15-29 ml/min (H)  Continue with close follow-up, avoiding nephrotoxic drugs, maintaining hydration and close follow-up of hypertension    Hyperlipidemia LDL goal <100              FUTURE APPOINTMENTS:       - Follow-up visit in 2 weeks     Return in about 2 weeks (around 12/2/2019) for Follow Up.    The information in this document, created by the medical scribe for me, accurately reflects the services I personally performed and the decisions made by me. I have reviewed and approved this document for accuracy prior to leaving the patient care  area.  November 18, 2019 1:00 PM  Prior to his knee injections greater than 28 minutes were spent during this encounter including counseling and coordinating care  Warren Lovelace MD  [facility]

## 2019-11-18 NOTE — PROGRESS NOTES
"Subjective     Trevor Soni is a 86 year old male who presents to clinic today for the following health issues:    HPI   {SUPERLIST (Optional):357713}  {additonal problems for provider to add (Optional):751368}    {HIST REVIEW/ LINKS 2 (Optional):590203}    Reviewed and updated as needed this visit by Provider         Review of Systems   {ROS COMP (Optional):410297}      Objective    There were no vitals taken for this visit.  There is no height or weight on file to calculate BMI.  Physical Exam   {Exam List (Optional):059402}    {Diagnostic Test Results (Optional):434281::\"Diagnostic Test Results:\",\"Labs reviewed in Epic\"}        {PROVIDER CHARTING PREFERENCE:818472}    "

## 2019-12-03 ENCOUNTER — OFFICE VISIT (OUTPATIENT)
Dept: FAMILY MEDICINE | Facility: CLINIC | Age: 84
End: 2019-12-03
Payer: MEDICARE

## 2019-12-03 VITALS
BODY MASS INDEX: 24.4 KG/M2 | TEMPERATURE: 98 F | OXYGEN SATURATION: 96 % | WEIGHT: 161 LBS | HEART RATE: 56 BPM | SYSTOLIC BLOOD PRESSURE: 149 MMHG | HEIGHT: 68 IN | DIASTOLIC BLOOD PRESSURE: 73 MMHG

## 2019-12-03 DIAGNOSIS — G89.29 CHRONIC BILATERAL LOW BACK PAIN, UNSPECIFIED WHETHER SCIATICA PRESENT: ICD-10-CM

## 2019-12-03 DIAGNOSIS — I35.0 NONRHEUMATIC AORTIC VALVE STENOSIS: ICD-10-CM

## 2019-12-03 DIAGNOSIS — I71.40 ABDOMINAL AORTIC ANEURYSM (AAA) WITHOUT RUPTURE (H): ICD-10-CM

## 2019-12-03 DIAGNOSIS — M17.0 PRIMARY OSTEOARTHRITIS OF BOTH KNEES: ICD-10-CM

## 2019-12-03 DIAGNOSIS — N18.4 CKD (CHRONIC KIDNEY DISEASE) STAGE 4, GFR 15-29 ML/MIN (H): ICD-10-CM

## 2019-12-03 DIAGNOSIS — I10 ESSENTIAL HYPERTENSION, BENIGN: ICD-10-CM

## 2019-12-03 DIAGNOSIS — I65.29 STENOSIS OF CAROTID ARTERY, UNSPECIFIED LATERALITY: ICD-10-CM

## 2019-12-03 DIAGNOSIS — I10 BENIGN ESSENTIAL HYPERTENSION: ICD-10-CM

## 2019-12-03 DIAGNOSIS — M15.0 PRIMARY OSTEOARTHRITIS INVOLVING MULTIPLE JOINTS: Primary | ICD-10-CM

## 2019-12-03 DIAGNOSIS — M54.50 CHRONIC BILATERAL LOW BACK PAIN, UNSPECIFIED WHETHER SCIATICA PRESENT: ICD-10-CM

## 2019-12-03 DIAGNOSIS — M1A.09X0 IDIOPATHIC CHRONIC GOUT OF MULTIPLE SITES WITHOUT TOPHUS: ICD-10-CM

## 2019-12-03 DIAGNOSIS — E78.5 HYPERLIPIDEMIA LDL GOAL <100: ICD-10-CM

## 2019-12-03 DIAGNOSIS — K21.9 GASTROESOPHAGEAL REFLUX DISEASE WITHOUT ESOPHAGITIS: ICD-10-CM

## 2019-12-03 PROCEDURE — 99214 OFFICE O/P EST MOD 30 MIN: CPT | Performed by: INTERNAL MEDICINE

## 2019-12-03 RX ORDER — SPIRONOLACTONE 25 MG/1
25 TABLET ORAL 2 TIMES DAILY
Qty: 60 TABLET | Refills: 3 | Status: SHIPPED | OUTPATIENT
Start: 2019-12-03 | End: 2020-06-01

## 2019-12-03 RX ORDER — SPIRONOLACTONE 25 MG/1
25 TABLET ORAL 2 TIMES DAILY
Qty: 60 TABLET | Refills: 1 | Status: SHIPPED | OUTPATIENT
Start: 2019-12-03 | End: 2019-12-23

## 2019-12-03 RX ORDER — FAMOTIDINE 20 MG/1
20 TABLET, FILM COATED ORAL AT BEDTIME
Qty: 30 TABLET | Refills: 1 | Status: ON HOLD | OUTPATIENT
Start: 2019-12-03 | End: 2020-06-21

## 2019-12-03 RX ORDER — FENOFIBRATE 160 MG/1
TABLET ORAL
Qty: 90 TABLET | Refills: 3 | Status: SHIPPED | OUTPATIENT
Start: 2019-12-03 | End: 2021-01-14

## 2019-12-03 RX ORDER — FAMOTIDINE 20 MG/1
20 TABLET, FILM COATED ORAL AT BEDTIME
Qty: 30 TABLET | Refills: 1 | Status: SHIPPED | OUTPATIENT
Start: 2019-12-03 | End: 2020-02-24

## 2019-12-03 ASSESSMENT — MIFFLIN-ST. JEOR: SCORE: 1384.79

## 2019-12-03 NOTE — PROGRESS NOTES
Subjective     Trevor Soni is a 86 year old male who presents to clinic today for the following health issues:    HPI   Patient presents to clinic for follow up on his Blood pressure and bilateral knee pain.  Modest improvement in his knee pain, left greater than the right    Hypertension follow-up    Current Outpatient Medications   Medication Sig Dispense Refill     acetaminophen-codeine (TYLENOL #3) 300-30 MG tablet Take one tablet by mouth two times daily as needed for severe pain. 60 tablet 0     allopurinol (ZYLOPRIM) 100 MG tablet Take 2 tablets (200 mg) by mouth daily Due for lab appointment. Please schedule: [phone] 180 tablet 3     amLODIPine (NORVASC) 5 MG tablet Take 1 tablet (5 mg) by mouth every morning And 2 tablets (10 mg) by mouth every evening. 270 tablet 3     ASPIRIN PO Take 325 mg by mouth daily.       atenolol (TENORMIN) 50 MG tablet Take 1 tablet (50 mg) by mouth daily 90 tablet 3     Cholecalciferol (VITAMIN D3 PO) Take 1 tablet by mouth daily       coenzyme Q-10 capsule Take 1 capsule by mouth daily       cyclobenzaprine (FLEXERIL) 10 MG tablet Sig 1/2 po at bedtime prn 7 tablet 1     famotidine (PEPCID) 20 MG tablet Take 1 tablet (20 mg) by mouth At Bedtime 30 tablet 1     fenofibrate (TRIGLIDE/LOFIBRA) 160 MG tablet TAKE 1 TABLET(160 MG) BY MOUTH DAILY 90 tablet 3     spironolactone (ALDACTONE) 25 MG tablet Take 1 tablet (25 mg) by mouth 2 times daily 60 tablet 3     allopurinol (ZYLOPRIM) 100 MG tablet TAKE 2 TABLETS BY MOUTH  DAILY 180 tablet 2     atorvastatin (LIPITOR) 20 MG tablet TAKE 1 TABLET BY MOUTH  EVERY DAY 90 tablet 2     diclofenac (VOLTAREN) 1 % topical gel Place 4 g onto the skin 4 times daily 300 g 3     hydrALAZINE (APRESOLINE) 50 MG tablet TAKE 1 TABLET BY MOUTH 3  TIMES A  tablet 2     triamcinolone (KENALOG) 0.1 % external cream Apply sparingly to affected area three times daily for 14 days. 30 g 1     Allergies   Allergen Reactions     Avocado       "Ciprofloxacin Itching     Penicillins Itching       Reviewed and updated as needed this visit by Provider         Review of Systems   Constitutional, HEENT, cardiovascular, pulmonary, gi and gu systems are negative, except as otherwise noted.      Objective    BP (!) 149/73 (BP Location: Left arm, Patient Position: Sitting, Cuff Size: Adult Regular)   Pulse 56   Temp 98  F (36.7  C) (Oral)   Ht 1.727 m (5' 8\")   Wt 73 kg (161 lb)   SpO2 96%   BMI 24.48 kg/m    Body mass index is 24.48 kg/m .  Physical Exam   GENERAL: healthy, alert and no distress  NECK: no adenopathy, no asymmetry, masses, or scars and thyroid normal to palpation  RESP: lungs clear to auscultation - no rales, rhonchi or wheezes  CV: regular rate and rhythm, normal S1 S2, no S3 or S4, no murmur, click or rub, no peripheral edema and peripheral pulses strong  ABDOMEN: soft, nontender, no hepatosplenomegaly, no masses and bowel sounds normal  MS: no gross musculoskeletal defects noted, 1-2+ bilateral pretibial edema right greater than the left  Left knee was without any palpable fluid or tenderness, right knee has a small amount of joint fluid without tenderness          Assessment & Plan     1. Abdominal aortic aneurysm (AAA) without rupture (H)  Continue ongoing periodic surveillance    2. CKD (chronic kidney disease) stage 4, GFR 15-29 ml/min (H)  Monitoring closely, avoiding NSAIDs and other nephrotoxic drugs and managing his fluid balance closely    3. Primary osteoarthritis involving multiple joints      4. Primary osteoarthritis of both knees  Modest improvement with Synvisc, continue serial follow-up    5. Essential hypertension, benign  Recommend increasing activity as able and monitoring his fluid intake and continuing serial blood pressure checks at home    6. Chronic bilateral low back pain, unspecified whether sciatica present      7. Hyperlipidemia LDL goal <100    - fenofibrate (TRIGLIDE/LOFIBRA) 160 MG tablet; TAKE 1 TABLET(160 " MG) BY MOUTH DAILY  Dispense: 90 tablet; Refill: 3    8. Stenosis of carotid artery, unspecified laterality      9. Nonrheumatic aortic valve stenosis      10. Idiopathic chronic gout of multiple sites without tophus      11. Benign essential hypertension    - fenofibrate (TRIGLIDE/LOFIBRA) 160 MG tablet; TAKE 1 TABLET(160 MG) BY MOUTH DAILY  Dispense: 90 tablet; Refill: 3  - spironolactone (ALDACTONE) 25 MG tablet; Take 1 tablet (25 mg) by mouth 2 times daily  Dispense: 60 tablet; Refill: 3    12. Gastroesophageal reflux disease without esophagitis    - famotidine (PEPCID) 20 MG tablet; Take 1 tablet (20 mg) by mouth At Bedtime  Dispense: 30 tablet; Refill: 1       FUTURE APPOINTMENTS:       - Follow-up visit in 3 mo    No follow-ups on file.    Warren Lovelace MD  [facility]

## 2019-12-06 DIAGNOSIS — E78.5 HYPERLIPIDEMIA LDL GOAL <100: ICD-10-CM

## 2019-12-06 DIAGNOSIS — I10 BENIGN ESSENTIAL HYPERTENSION: ICD-10-CM

## 2019-12-06 RX ORDER — ALLOPURINOL 100 MG/1
TABLET ORAL
Qty: 180 TABLET | Refills: 2 | Status: SHIPPED | OUTPATIENT
Start: 2019-12-06 | End: 2020-06-25

## 2019-12-06 NOTE — TELEPHONE ENCOUNTER
"allopurinol (ZYLOPRIM) 100 MG tablet 180 tablet 3 9/9/2019     Last Written Prescription Date:  9/9/2019  Last Fill Quantity: 180,  # refills: 3   Last office visit: No previous visit found with prescribing provider:  Unknown    Future Office Visit:   Next 5 appointments (look out 90 days)    Dec 23, 2019 11:30 AM CST  Office Visit with Warren Lovelace MD  [facility] ([facility]) [address] [facility] [address] [phone]         Requested Prescriptions   Pending Prescriptions Disp Refills     allopurinol (ZYLOPRIM) 100 MG tablet [Pharmacy Med Name: ALLOPURINOL  100MG  TAB] 60 tablet 0     Sig: TAKE 2 TABLETS BY MOUTH  DAILY       Gout Agents Protocol Failed - 12/6/2019 11:23 AM        Failed - Normal serum creatinine on file in the past 12 months     Recent Labs   Lab Test 11/04/19  1102   CR 1.72*             Passed - CBC on file in past 12 months     Recent Labs   Lab Test 09/23/19  1204   WBC 8.8   RBC 3.46*   HGB 10.8*   HCT 32.6*                    Passed - ALT on file in past 12 months     Recent Labs   Lab Test 09/16/19  1648   ALT 23             Passed - Has Uric Acid on file in past 12 months and value is less than 6     Recent Labs   Lab Test 11/04/19  1102   URIC 4.1     If level is 6mg/dL or greater, ok to refill one time and refer to provider.           Passed - Recent (12 mo) or future (30 days) visit within the authorizing provider's specialty     Patient has had an office visit with the authorizing provider or a provider within the authorizing providers department within the previous 12 mos or has a future within next 30 days. See \"Patient Info\" tab in inbasket, or \"Choose Columns\" in Meds & Orders section of the refill encounter.              Passed - Medication is active on med list        Passed - Patient is age 18 or older          "

## 2019-12-23 ENCOUNTER — OFFICE VISIT (OUTPATIENT)
Dept: FAMILY MEDICINE | Facility: CLINIC | Age: 84
End: 2019-12-23
Payer: MEDICARE

## 2019-12-23 VITALS
BODY MASS INDEX: 24.4 KG/M2 | WEIGHT: 161 LBS | DIASTOLIC BLOOD PRESSURE: 60 MMHG | HEART RATE: 53 BPM | HEIGHT: 68 IN | SYSTOLIC BLOOD PRESSURE: 130 MMHG | OXYGEN SATURATION: 97 % | TEMPERATURE: 97 F

## 2019-12-23 DIAGNOSIS — I35.0 NONRHEUMATIC AORTIC VALVE STENOSIS: ICD-10-CM

## 2019-12-23 DIAGNOSIS — M17.0 PRIMARY OSTEOARTHRITIS OF BOTH KNEES: Primary | ICD-10-CM

## 2019-12-23 DIAGNOSIS — G89.29 CHRONIC BILATERAL LOW BACK PAIN, UNSPECIFIED WHETHER SCIATICA PRESENT: ICD-10-CM

## 2019-12-23 DIAGNOSIS — M54.50 CHRONIC BILATERAL LOW BACK PAIN, UNSPECIFIED WHETHER SCIATICA PRESENT: ICD-10-CM

## 2019-12-23 DIAGNOSIS — I10 BENIGN ESSENTIAL HYPERTENSION: ICD-10-CM

## 2019-12-23 DIAGNOSIS — N18.4 CKD (CHRONIC KIDNEY DISEASE) STAGE 4, GFR 15-29 ML/MIN (H): ICD-10-CM

## 2019-12-23 DIAGNOSIS — M15.0 PRIMARY OSTEOARTHRITIS INVOLVING MULTIPLE JOINTS: ICD-10-CM

## 2019-12-23 DIAGNOSIS — I71.40 ABDOMINAL AORTIC ANEURYSM (AAA) WITHOUT RUPTURE (H): ICD-10-CM

## 2019-12-23 DIAGNOSIS — M1A.09X0 IDIOPATHIC CHRONIC GOUT OF MULTIPLE SITES WITHOUT TOPHUS: ICD-10-CM

## 2019-12-23 DIAGNOSIS — I65.29 STENOSIS OF CAROTID ARTERY, UNSPECIFIED LATERALITY: ICD-10-CM

## 2019-12-23 PROCEDURE — 99214 OFFICE O/P EST MOD 30 MIN: CPT | Performed by: INTERNAL MEDICINE

## 2019-12-23 ASSESSMENT — MIFFLIN-ST. JEOR: SCORE: 1384.79

## 2019-12-23 NOTE — PROGRESS NOTES
Subjective     Trevor Soni is a 86 year old male who presents to clinic today for the following health issues:    HPI   The pt presents to the clinic for a f/u exam. Recall the pt received a Synvisc injection on 11/18/2019 in his right knee. He reports that his knee has improved, but he still has discomfort with walking.     The pt complains of increase AGUILAR in the PM. He presents an at home BP log with associated HR and weight. These BP readings are position specific (sitting, standing, laying).     Patient Active Problem List   Diagnosis     Stiffness of joint, not elsewhere classified,  shoulder region     Abdominal pain     Pneumonia     Hyperlipidemia LDL goal <100     Essential hypertension, benign     Primary osteoarthritis involving multiple joints     Chronic rhinitis     Primary osteoarthritis of both knees     Idiopathic chronic gout of multiple sites without tophus     Abdominal aortic aneurysm (AAA) without rupture (H)     Stenosis of carotid artery, unspecified laterality     Post-traumatic osteoarthritis of left knee     Chronic bilateral low back pain     Nonrheumatic aortic valve stenosis     CKD (chronic kidney disease) stage 4, GFR 15-29 ml/min (H)     Past Surgical History:   Procedure Laterality Date     CARDIAC SURGERY       CHOLECYSTECTOMY       COLONOSCOPY       ORTHOPEDIC SURGERY       PHACOEMULSIFICATION CLEAR CORNEA WITH TORIC INTRAOCULAR LENS IMPLANT  1/30/2012    Procedure:PHACOEMULSIFICATION CLEAR CORNEA WITH TORIC INTRAOCULAR LENS IMPLANT; LEFT PHACOEMULSIFICATION CLEAR CORNEA WITH DELUXE  TORIC INTRAOCULAR LENS IMPLANT ; Surgeon:BRANDO HIGHTOWER; Location:[facility]     PHACOEMULSIFICATION CLEAR CORNEA WITH TORIC INTRAOCULAR LENS IMPLANT  2/13/2012    Procedure:PHACOEMULSIFICATION CLEAR CORNEA WITH TORIC INTRAOCULAR LENS IMPLANT; RIGHT PHACOEMULSIFICATION CLEAR CORNEA WITH TORIC INTRAOCULAR LENS IMPLANT ; Surgeon:BRANDO HIGHTOWER; Location:[facility]     VASCULAR SURGERY         Social  History     Tobacco Use     Smoking status: Former Smoker     Types: Cigars     Last attempt to quit: 1980     Years since quittin.0     Smokeless tobacco: Never Used   Substance Use Topics     Alcohol use: Yes     Alcohol/week: 0.0 standard drinks     Comment: rarely     History reviewed. No pertinent family history.      Current Outpatient Medications   Medication Sig Dispense Refill     acetaminophen-codeine (TYLENOL #3) 300-30 MG tablet Take one tablet by mouth two times daily as needed for severe pain. 60 tablet 0     allopurinol (ZYLOPRIM) 100 MG tablet TAKE 2 TABLETS BY MOUTH  DAILY 180 tablet 2     allopurinol (ZYLOPRIM) 100 MG tablet Take 2 tablets (200 mg) by mouth daily Due for lab appointment. Please schedule: [phone] 180 tablet 3     amLODIPine (NORVASC) 5 MG tablet Take 1 tablet (5 mg) by mouth every morning And 2 tablets (10 mg) by mouth every evening. 270 tablet 3     ASPIRIN PO Take 325 mg by mouth daily.       atenolol (TENORMIN) 50 MG tablet Take 1 tablet (50 mg) by mouth daily 90 tablet 3     atorvastatin (LIPITOR) 20 MG tablet Take 1 tablet (20 mg) by mouth daily 90 tablet 3     Cholecalciferol (VITAMIN D3 PO) Take 1 tablet by mouth daily       coenzyme Q-10 capsule Take 1 capsule by mouth daily       cyclobenzaprine (FLEXERIL) 10 MG tablet Sig 1/2 po at bedtime prn 7 tablet 1     famotidine (PEPCID) 20 MG tablet Take 1 tablet (20 mg) by mouth At Bedtime 30 tablet 1     famotidine (PEPCID) 20 MG tablet Take 1 tablet (20 mg) by mouth At Bedtime 30 tablet 1     fenofibrate (TRIGLIDE/LOFIBRA) 160 MG tablet TAKE 1 TABLET(160 MG) BY MOUTH DAILY 90 tablet 3     hydrALAZINE (APRESOLINE) 50 MG tablet TAKE 1 TABLET BY MOUTH 3  TIMES A  tablet 2     ranitidine (ZANTAC) 150 MG tablet TAKE 1 TABLET(150 MG) BY MOUTH AT BEDTIME 90 tablet 3     spironolactone (ALDACTONE) 25 MG tablet Take 1 tablet (25 mg) by mouth 2 times daily 60 tablet 3     triamcinolone (KENALOG) 0.1 % cream Apply  "sparingly to affected area three times daily for 14 days. 30 g 1     Allergies   Allergen Reactions     Avocado      Ciprofloxacin Itching     Penicillins Itching         Reviewed and updated as needed this visit by Provider         Review of Systems   ROS COMP: Constitutional, HEENT, cardiovascular, pulmonary, gi and gu systems are negative, except as otherwise noted.    This document serves as a record of the services and decisions personally performed and made by Warren Lovelace MD. It was created on his behalf by Richi Ferrell, a trained medical scribe. The creation of this document is based on the provider's statements to the medical scribe.  Richi Ferrell December 23, 2019 11:41 AM          Objective    /60 (BP Location: Left arm, Patient Position: Sitting, Cuff Size: Adult Regular)   Pulse 53   Temp 97  F (36.1  C) (Oral)   Ht 1.727 m (5' 8\")   Wt 73 kg (161 lb)   SpO2 97%   BMI 24.48 kg/m    Body mass index is 24.48 kg/m .     BP Recheck   130/80 Right Arm   130/60 Left Arm     Physical Exam   Neck was supple without adenopathy or thyromegaly his carotids were normal without bruits  Chest clear to auscultation and percussion  Cardiovascular S1 and S2 are physiologic with 3/6 ELANA radiating across the precordium into the carotids, without gallops  Abdomen bowel sounds were normal.  There is no palpable mass or organomegaly  Extremities nontender with 1+ pretibial edema, probable Baker's cyst bilaterally  Pulses pedal pulses are as described otherwise his pulses are bilaterally symmetrical throughout without bruits  Skin without significant abnormality      Diagnostic Test Results:  Labs reviewed in Epic  No results found for this or any previous visit (from the past 24 hour(s)).        Assessment & Plan     Primary osteoarthritis of both knees  Reevaluate in 2 weeks if is not noticing progress and may try another cortisone injection    Abdominal aortic aneurysm (AAA) without rupture (H)  Continue " serial follow-up    CKD (chronic kidney disease) stage 4, GFR 15-29 ml/min (H)  Continue to monitor closely including avoidance of nonsteroidal anti-inflammatory drugs  - Basic metabolic panel    Nonrheumatic aortic valve stenosis  Continue ongoing serial follow-up    Stenosis of carotid artery, unspecified laterality      Idiopathic chronic gout of multiple sites without tophus    Well managed with current therapy  Chronic bilateral low back pain, unspecified whether sciatica present      Benign essential hypertension  The pt should start checking his BP 3x once daily. He should average the 3 readings and record.     Primary osteoarthritis involving multiple joints  F/u in 2 weeks            FUTURE APPOINTMENTS:       - Follow-up visit in 2 weeks     Return in about 2 weeks (around 1/6/2020), or if symptoms worsen or fail to improve, for Follow Up.    The information in this document, created by the medical scribe for me, accurately reflects the services I personally performed and the decisions made by me. I have reviewed and approved this document for accuracy prior to leaving the patient care area.  December 23, 2019 12:19 PM    Warren Lovelace MD  [facility]

## 2020-01-15 DIAGNOSIS — E78.5 HYPERLIPIDEMIA LDL GOAL <100: ICD-10-CM

## 2020-01-15 DIAGNOSIS — I10 BENIGN ESSENTIAL HYPERTENSION: ICD-10-CM

## 2020-01-15 RX ORDER — ATORVASTATIN CALCIUM 20 MG/1
TABLET, FILM COATED ORAL
Qty: 90 TABLET | Refills: 2 | Status: SHIPPED | OUTPATIENT
Start: 2020-01-15 | End: 2021-01-27

## 2020-01-15 NOTE — TELEPHONE ENCOUNTER
Filled per [facility] protocol. Pharmacy change    MELO PerlaN, RN  [employer] [facility]

## 2020-01-15 NOTE — TELEPHONE ENCOUNTER
" Disp Refills Start End GUDELIA   atorvastatin (LIPITOR) 20 MG tablet 90 tablet 3 9/9/2019  No     Last office visit: 12/23/2019 with prescribing provider:  pcp   Future Office Visit:   Requested Prescriptions   Pending Prescriptions Disp Refills     atorvastatin (LIPITOR) 20 MG tablet [Pharmacy Med Name: ATORVASTATIN  20MG  TAB] 90 tablet 3     Sig: TAKE 1 TABLET BY MOUTH  EVERY DAY       Statins Protocol Failed - 1/15/2020  4:13 AM        Failed - LDL on file in past 12 months     Recent Labs   Lab Test 05/21/18  1042   LDL 43             Passed - No abnormal creatine kinase in past 12 months     No lab results found.             Passed - Recent (12 mo) or future (30 days) visit within the authorizing provider's specialty     Patient has had an office visit with the authorizing provider or a provider within the authorizing providers department within the previous 12 mos or has a future within next 30 days. See \"Patient Info\" tab in inbasket, or \"Choose Columns\" in Meds & Orders section of the refill encounter.              Passed - Medication is active on med list        Passed - Patient is age 18 or older      Future refills by PCP Dr. Warren Lovelace with phone number [phone].   "

## 2020-02-24 ENCOUNTER — ANCILLARY PROCEDURE (OUTPATIENT)
Dept: GENERAL RADIOLOGY | Facility: CLINIC | Age: 85
End: 2020-02-24
Attending: INTERNAL MEDICINE
Payer: MEDICARE

## 2020-02-24 ENCOUNTER — OFFICE VISIT (OUTPATIENT)
Dept: FAMILY MEDICINE | Facility: CLINIC | Age: 85
End: 2020-02-24
Payer: MEDICARE

## 2020-02-24 VITALS
HEIGHT: 68 IN | SYSTOLIC BLOOD PRESSURE: 153 MMHG | WEIGHT: 160.7 LBS | TEMPERATURE: 97.6 F | DIASTOLIC BLOOD PRESSURE: 70 MMHG | BODY MASS INDEX: 24.35 KG/M2 | OXYGEN SATURATION: 97 % | HEART RATE: 50 BPM

## 2020-02-24 DIAGNOSIS — M15.0 PRIMARY OSTEOARTHRITIS INVOLVING MULTIPLE JOINTS: ICD-10-CM

## 2020-02-24 DIAGNOSIS — I35.0 NONRHEUMATIC AORTIC VALVE STENOSIS: ICD-10-CM

## 2020-02-24 DIAGNOSIS — L30.9 DERMATITIS: ICD-10-CM

## 2020-02-24 DIAGNOSIS — I71.40 ABDOMINAL AORTIC ANEURYSM (AAA) WITHOUT RUPTURE (H): ICD-10-CM

## 2020-02-24 DIAGNOSIS — M17.32 POST-TRAUMATIC OSTEOARTHRITIS OF LEFT KNEE: ICD-10-CM

## 2020-02-24 DIAGNOSIS — M19.012 PRIMARY OSTEOARTHRITIS OF LEFT SHOULDER: ICD-10-CM

## 2020-02-24 DIAGNOSIS — M1A.09X0 IDIOPATHIC CHRONIC GOUT OF MULTIPLE SITES WITHOUT TOPHUS: ICD-10-CM

## 2020-02-24 DIAGNOSIS — I65.29 STENOSIS OF CAROTID ARTERY, UNSPECIFIED LATERALITY: ICD-10-CM

## 2020-02-24 DIAGNOSIS — N18.4 CKD (CHRONIC KIDNEY DISEASE) STAGE 4, GFR 15-29 ML/MIN (H): Primary | ICD-10-CM

## 2020-02-24 DIAGNOSIS — I10 ESSENTIAL HYPERTENSION, BENIGN: ICD-10-CM

## 2020-02-24 DIAGNOSIS — M17.0 PRIMARY OSTEOARTHRITIS OF BOTH KNEES: ICD-10-CM

## 2020-02-24 PROCEDURE — 73030 X-RAY EXAM OF SHOULDER: CPT | Mod: LT

## 2020-02-24 PROCEDURE — 99214 OFFICE O/P EST MOD 30 MIN: CPT | Mod: 25 | Performed by: INTERNAL MEDICINE

## 2020-02-24 PROCEDURE — 20610 DRAIN/INJ JOINT/BURSA W/O US: CPT | Performed by: INTERNAL MEDICINE

## 2020-02-24 RX ORDER — TRIAMCINOLONE ACETONIDE 1 MG/G
CREAM TOPICAL
Qty: 30 G | Refills: 1 | Status: ON HOLD | OUTPATIENT
Start: 2020-02-24 | End: 2020-06-21

## 2020-02-24 ASSESSMENT — MIFFLIN-ST. JEOR: SCORE: 1383.43

## 2020-02-24 NOTE — PROGRESS NOTES
Subjective     Trevor Soni is a 86 year old male who presents to clinic today for the following health issues:    HPI   Knee follow up   Pt presents to the clinic for a f/u exam. The pt reports that he is continuing to experience bilateral knee pain, R>L. He also complains of left shoulder pain which inhibits him from sleeping on his right side. Recall the pt had a Synvisc injection on 11/18/2019 in his right knee. For relief he takes 1 tablet of Tylenol (650 mg) daily.     The pt presents an at home BP log. His typical AM BP's 151-130/60-50 and his PM BP's are 157-139/60's. He notes having minor AGUILAR in the PM and none in the AM.     Patient Active Problem List   Diagnosis     Stiffness of joint, not elsewhere classified,  shoulder region     Abdominal pain     Pneumonia     Hyperlipidemia LDL goal <100     Essential hypertension, benign     Primary osteoarthritis involving multiple joints     Chronic rhinitis     Primary osteoarthritis of both knees     Idiopathic chronic gout of multiple sites without tophus     Abdominal aortic aneurysm (AAA) without rupture (H)     Stenosis of carotid artery, unspecified laterality     Post-traumatic osteoarthritis of left knee     Chronic bilateral low back pain     Nonrheumatic aortic valve stenosis     CKD (chronic kidney disease) stage 4, GFR 15-29 ml/min (H)     Past Surgical History:   Procedure Laterality Date     CARDIAC SURGERY       CHOLECYSTECTOMY       COLONOSCOPY       ORTHOPEDIC SURGERY       PHACOEMULSIFICATION CLEAR CORNEA WITH TORIC INTRAOCULAR LENS IMPLANT  1/30/2012    Procedure:PHACOEMULSIFICATION CLEAR CORNEA WITH TORIC INTRAOCULAR LENS IMPLANT; LEFT PHACOEMULSIFICATION CLEAR CORNEA WITH DELUXE  TORIC INTRAOCULAR LENS IMPLANT ; Surgeon:BRANDO HIGHTOWER; Location:[facility]     PHACOEMULSIFICATION CLEAR CORNEA WITH TORIC INTRAOCULAR LENS IMPLANT  2/13/2012    Procedure:PHACOEMULSIFICATION CLEAR CORNEA WITH TORIC INTRAOCULAR LENS IMPLANT; RIGHT  PHACOEMULSIFICATION CLEAR CORNEA WITH TORIC INTRAOCULAR LENS IMPLANT ; Surgeon:CAMPBELL, BRANDO EVANGELISTA; Location:[facility]     VASCULAR SURGERY         Social History     Tobacco Use     Smoking status: Former Smoker     Types: Cigars     Last attempt to quit: 1980     Years since quittin.1     Smokeless tobacco: Never Used   Substance Use Topics     Alcohol use: Yes     Alcohol/week: 0.0 standard drinks     Comment: rarely     No family history on file.      Current Outpatient Medications   Medication Sig Dispense Refill     acetaminophen-codeine (TYLENOL #3) 300-30 MG tablet Take one tablet by mouth two times daily as needed for severe pain. 60 tablet 0     allopurinol (ZYLOPRIM) 100 MG tablet TAKE 2 TABLETS BY MOUTH  DAILY 180 tablet 2     allopurinol (ZYLOPRIM) 100 MG tablet Take 2 tablets (200 mg) by mouth daily Due for lab appointment. Please schedule: [phone] 180 tablet 3     amLODIPine (NORVASC) 5 MG tablet Take 1 tablet (5 mg) by mouth every morning And 2 tablets (10 mg) by mouth every evening. 270 tablet 3     ASPIRIN PO Take 325 mg by mouth daily.       atenolol (TENORMIN) 50 MG tablet Take 1 tablet (50 mg) by mouth daily 90 tablet 3     atorvastatin (LIPITOR) 20 MG tablet TAKE 1 TABLET BY MOUTH  EVERY DAY 90 tablet 2     Cholecalciferol (VITAMIN D3 PO) Take 1 tablet by mouth daily       coenzyme Q-10 capsule Take 1 capsule by mouth daily       cyclobenzaprine (FLEXERIL) 10 MG tablet Sig 1/2 po at bedtime prn 7 tablet 1     famotidine (PEPCID) 20 MG tablet Take 1 tablet (20 mg) by mouth At Bedtime 30 tablet 1     fenofibrate (TRIGLIDE/LOFIBRA) 160 MG tablet TAKE 1 TABLET(160 MG) BY MOUTH DAILY 90 tablet 3     hydrALAZINE (APRESOLINE) 50 MG tablet TAKE 1 TABLET BY MOUTH 3  TIMES A  tablet 2     spironolactone (ALDACTONE) 25 MG tablet Take 1 tablet (25 mg) by mouth 2 times daily 60 tablet 3     triamcinolone (KENALOG) 0.1 % external cream Apply sparingly to affected area three times daily for 14  "days. 30 g 1     Allergies   Allergen Reactions     Avocado      Ciprofloxacin Itching     Penicillins Itching       Reviewed and updated as needed this visit by Provider         Review of Systems   ROS COMP: Constitutional, HEENT, cardiovascular, pulmonary, gi and gu systems are negative, except as otherwise noted.    This document serves as a record of the services and decisions personally performed and made by Warren Lovelace MD. It was created on his behalf by Richi Ferrell, a trained medical scribe. The creation of this document is based on the provider's statements to the medical scribe.  Richi Ferrell February 24, 2020 2:52 PM          Objective    BP (!) 153/70 (BP Location: Left arm, Patient Position: Sitting, Cuff Size: Adult Regular)   Pulse 50   Temp 97.6  F (36.4  C) (Tympanic)   Ht 1.727 m (5' 8\")   Wt 72.9 kg (160 lb 11.2 oz)   SpO2 97%   BMI 24.43 kg/m    Body mass index is 24.43 kg/m .     BP Recheck   Supine:   Sitting:   Standing:     Physical Exam   Neck was supple without adenopathy or thyromegaly his carotids were normal without bruits  Chest clear to auscultation and percussion  Cardiovascular S1 and S2 are physiologic with a grade 3/6 ELANA radiating across the precordium, without gallops  Abdomen bowel sounds were normal.  There is no palpable mass or organomegaly  Extremities nontender without any edema  Left shoulder soft tissue swelling over the acromioclavicular joint, normal flexion and extension, slightly diminished internal rotation at 105 degrees, significant external rotation to 45 degrees   Pulses pedal pulses are as described otherwise his pulses are bilaterally symmetrical throughout without bruits  Skin without significant abnormality    Procedure Note: Left Shoulder   The risks and benefits were discussed with the patient.  The skin was sterily prepped with Betadine and alcohol. A 21 gauge needle was placed in the joint using sterile technique. 60mg of medrol with lido and " marcaine injected without difficulty.     Diagnostic Test Results:  Labs reviewed in Epic  Xray - Significant arthritis     Reevaluate abdominal aortic aneurysm, carotid arteries and other lab with upcoming wellness exam to be scheduled in April    Assessment & Plan     CKD (chronic kidney disease) stage 4, GFR 15-29 ml/min (H)  Stable continue close monitoring to avoid progression.    Abdominal aortic aneurysm (AAA) without rupture (H)    Evaluating with wellness exam  Essential hypertension, benign  Continue to monitor regularly. Will f/u in 1 month     Primary osteoarthritis of both knees  Recommended Tylenol arthritis 2 tablets TID regularly. If the pain is not being managed well with this dosage he can substitute 1 tablet of Tylenol arthritis with a tablet of Tylenol #3. The pt should wear knee sleeve on both knees.   Trial of Voltaren gel  Stenosis of carotid artery, unspecified laterality      Nonrheumatic aortic valve stenosis      Primary osteoarthritis involving multiple joints  See procedure notes. XR shows significant OA of the left shoulder.  - XR Shoulder Left G/E 3 Views    Idiopathic chronic gout of multiple sites without tophus      Post-traumatic osteoarthritis of left knee      Dermatitis    - triamcinolone (KENALOG) 0.1 % external cream  Dispense: 30 g; Refill: 1          FUTURE APPOINTMENTS:       - Follow-up visit in 1 month     Return in about 1 month (around 3/24/2020) for Follow Up.    The information in this document, created by the medical scribe for me, accurately reflects the services I personally performed and the decisions made by me. I have reviewed and approved this document for accuracy prior to leaving the patient care area.  February 24, 2020 4:25 PM  30 minutes were spent during this encounter prior to the procedure with greater than 50% of the time spent in counseling and coordinating care  Warren Lovelace MD  [facility]

## 2020-03-24 ENCOUNTER — VIRTUAL VISIT (OUTPATIENT)
Dept: FAMILY MEDICINE | Facility: CLINIC | Age: 85
End: 2020-03-24
Payer: MEDICARE

## 2020-03-24 DIAGNOSIS — N18.4 CKD (CHRONIC KIDNEY DISEASE) STAGE 4, GFR 15-29 ML/MIN (H): Primary | ICD-10-CM

## 2020-03-24 DIAGNOSIS — G89.29 CHRONIC BILATERAL LOW BACK PAIN, UNSPECIFIED WHETHER SCIATICA PRESENT: ICD-10-CM

## 2020-03-24 DIAGNOSIS — M1A.09X0 IDIOPATHIC CHRONIC GOUT OF MULTIPLE SITES WITHOUT TOPHUS: ICD-10-CM

## 2020-03-24 DIAGNOSIS — I71.40 ABDOMINAL AORTIC ANEURYSM (AAA) WITHOUT RUPTURE (H): ICD-10-CM

## 2020-03-24 DIAGNOSIS — I10 BENIGN ESSENTIAL HYPERTENSION: ICD-10-CM

## 2020-03-24 DIAGNOSIS — E78.5 HYPERLIPIDEMIA LDL GOAL <100: ICD-10-CM

## 2020-03-24 DIAGNOSIS — I35.0 NONRHEUMATIC AORTIC VALVE STENOSIS: ICD-10-CM

## 2020-03-24 DIAGNOSIS — M15.0 PRIMARY OSTEOARTHRITIS INVOLVING MULTIPLE JOINTS: ICD-10-CM

## 2020-03-24 DIAGNOSIS — M54.50 CHRONIC BILATERAL LOW BACK PAIN, UNSPECIFIED WHETHER SCIATICA PRESENT: ICD-10-CM

## 2020-03-24 DIAGNOSIS — I10 ESSENTIAL HYPERTENSION, BENIGN: ICD-10-CM

## 2020-03-24 DIAGNOSIS — I65.29 STENOSIS OF CAROTID ARTERY, UNSPECIFIED LATERALITY: ICD-10-CM

## 2020-03-24 PROCEDURE — G2012 BRIEF CHECK IN BY MD/QHP: HCPCS | Performed by: INTERNAL MEDICINE

## 2020-03-24 RX ORDER — HYDRALAZINE HYDROCHLORIDE 50 MG/1
TABLET, FILM COATED ORAL
Qty: 270 TABLET | Refills: 2 | COMMUNITY
Start: 2020-03-24 | End: 2020-04-13

## 2020-03-24 NOTE — PROGRESS NOTES
"Trevor Soni is a 86 year old male who is being evaluated via a telephone visit.      The patient has been notified of following (by KILEY Gross CMA on 3/24/2020 at 10:06 AM       \"We have found that certain health care needs can be provided without the need for a physical exam.  This service lets us provide the care you need with a short phone conversation.  If a prescription is necessary we can send it directly to your pharmacy.  If lab work is needed we can place an order for that and you can then stop by our lab to have the test done at a later time.    This telephone visit will be conducted via 3 way call with the you (the patient) , the physician/provider, and a me all on the line at the same time.  This allows your physician/provider to have the phone conversation with you while I will be taking notes for your medical record.  We will have full access to your [place] medical record during this entire phone call.    Since this is like an office visit,  will bill your insurance company for this service.  Please check with your medical insurance if this type of telephone/virtual is covered . You may be responsible for the cost of this service if insurance coverage is denied.  The typical cost is $30 (10min), $59(11-20min) and $85 (21-30min)     If during the course of the call the physician/provider feels a telephone visit is not appropriate, you will not be charged for this service\"    Consent has been obtained for this service by care team member: yes.  See the scanned image in the medical record.    S: The history as provided by the patient to the provider during this 3 way call include:  Hypertension  Osteoarthritis of the knees  CKD4  Abdominal aortic aneurysm    Patient was applying Voltaren gel to his knee however he had to discontinue this because of what sounds like an allergic reaction to his skin.  He subsequently has been using Tylenol with good relief.  Recommended using " triamcinolone cream to the skin rash and once this is resolved he can add the knee brace.    Blood pressure checks regularly today his blood pressure was 110/68 with a heart rate of 55.  Trying to maintain his activities and was raking leaves yesterday without any difficulty.  Noted bilateral peripheral edema with minimal swelling of his ankles.  He is not checking his weight  Medications updated    Pertinent parts of the the patient's medical history reviewed and confirmed by the provider.    Total time of call between patient and provider was 12 minutes     GLTONJA (MD signature)  ===================================================    I have reviewed the note as documented above.  This accurately captures the substance of my conversation with the patient,    Additional provider notes:    Assessment/Plan:  (N18.4) CKD (chronic kidney disease) stage 4, GFR 15-29 ml/min (H)  (primary encounter diagnosis)  Comment: No changes, reviewed medications to avoid nephrotoxicity  Plan:     (I65.29) Stenosis of carotid artery, unspecified laterality  Comment:   Plan:     (I71.4) Abdominal aortic aneurysm (AAA) without rupture (H) follow up abdominal aortic ultrasound in approximately 1 month  Comment:   Plan:     (I35.0) Nonrheumatic aortic valve stenosis  Comment:   Plan:     (M54.5,  G89.29) Chronic bilateral low back pain, unspecified whether sciatica present  Comment:   Plan:     (M15.0) Primary osteoarthritis involving multiple joints  Comment:   Plan:     (M1A.09X0) Idiopathic chronic gout of multiple sites without tophus  Comment:   Plan:     (I10) Essential hypertension, benign  Comment:   Plan: Well-controlled with current therapy

## 2020-03-24 NOTE — PROGRESS NOTES
"Trevor Soni is a 86 year old male who is being evaluated via a telephone visit.      The patient has been notified of following (by M.A) ***     \"We have found that certain health care needs can be provided without the need for a physical exam.  This service lets us provide the care you need with a short phone conversation.  If a prescription is necessary we can send it directly to your pharmacy.  If lab work is needed we can place an order for that and you can then stop by our lab to have the test done at a later time.    This telephone visit will be conducted via 3 way call with the you (the patient) , the physician/provider, and a me all on the line at the same time.  This allows your physician/provider to have the phone conversation with you while I will be taking notes for your medical record.  We will have full access to your [place] medical record during this entire phone call.    Since this is like an office visit,  will bill your insurance company for this service.  Please check with your medical insurance if this type of telephone/virtual is covered . You may be responsible for the cost of this service if insurance coverage is denied.  The typical cost is $30 (10min), $59(11-20min) and $85 (21-30min)     If during the course of the call the physician/provider feels a telephone visit is not appropriate, you will not be charged for this service\"    Consent has been obtained for this service by care team member: {[id number]/[id number]}.  See the scanned image in the medical record.    S: The history as provided by the patient to the provider during this 3 way call include ***    Pertinent parts of the the patient's medical history reviewed and confirmed by the provider included : ***     Total time of call between patient and provider was *** minutes     *** (MD signature)  ===================================================    I have reviewed the note as documented above.  This accurately captures the " substance of my conversation with the patient,    Additional provider notes:***    Assessment/Plan:  (N18.4) CKD (chronic kidney disease) stage 4, GFR 15-29 ml/min (H)  (primary encounter diagnosis)  Comment: ***  Plan: ***    (I65.29) Stenosis of carotid artery, unspecified laterality  Comment: ***  Plan: ***    (I71.4) Abdominal aortic aneurysm (AAA) without rupture (H)  Comment: ***  Plan: ***    (I35.0) Nonrheumatic aortic valve stenosis  Comment: ***  Plan: ***    (M54.5,  G89.29) Chronic bilateral low back pain, unspecified whether sciatica present  Comment: ***  Plan: ***    (M15.0) Primary osteoarthritis involving multiple joints  Comment: ***  Plan: ***    (M1A.09X0) Idiopathic chronic gout of multiple sites without tophus  Comment: ***  Plan: ***    (I10) Essential hypertension, benign  Comment: ***  Plan: ***

## 2020-04-10 DIAGNOSIS — I10 BENIGN ESSENTIAL HYPERTENSION: ICD-10-CM

## 2020-04-10 DIAGNOSIS — E78.5 HYPERLIPIDEMIA LDL GOAL <100: ICD-10-CM

## 2020-04-10 NOTE — TELEPHONE ENCOUNTER
"hydrALAZINE (APRESOLINE) 50 MG tablet    Last Written Prescription Date:  03/24/2020  Last Fill Quantity: 270,  # refills: 2   Last office visit: 3/24/2020 with prescribing provider:  [place]   Future Office Visit:  Unknown     Requested Prescriptions   Pending Prescriptions Disp Refills     hydrALAZINE (APRESOLINE) 50 MG tablet [Pharmacy Med Name: HYDRALAZINE  50MG  TAB] 270 tablet 2     Sig: TAKE 1 TABLET BY MOUTH 3  TIMES A DAY       Vasodilators Failed - 4/10/2020  3:47 AM        Failed - Most recent BP less than 140/90 on record     BP Readings from Last 3 Encounters:   02/24/20 (!) 153/70   12/23/19 130/60   12/03/19 (!) 149/73                 Passed - Most recent encounter is not a hospital encounter. Patient has recent (12 mos) or future (1 mos) visit with authorizing provider's specialty     Patient's most recent encounter is NOT a hospital encounter and has had an office visit in the last 12 months or has a visit in the next 30 days with authorizing provider or within the authorizing provider's specialty.      See \"Patient Info\" tab in inbasket, or \"Choose Columns\" in Meds & Orders section of the refill encounter.      If most recent encounter is a hospital encounter AND the patient does NOT have an appointment scheduled with the authorizing provider or authorizing provider's specialty within the next 30 days, forward refill to authorizing provider for medication review.          Passed - Medication is active on med list        Passed - Patient is of age 18 years or older             "

## 2020-04-13 RX ORDER — HYDRALAZINE HYDROCHLORIDE 50 MG/1
TABLET, FILM COATED ORAL
Qty: 270 TABLET | Refills: 2 | Status: SHIPPED | OUTPATIENT
Start: 2020-04-13 | End: 2020-06-01

## 2020-06-01 DIAGNOSIS — E78.5 HYPERLIPIDEMIA LDL GOAL <100: ICD-10-CM

## 2020-06-01 DIAGNOSIS — I10 BENIGN ESSENTIAL HYPERTENSION: ICD-10-CM

## 2020-06-01 RX ORDER — HYDRALAZINE HYDROCHLORIDE 50 MG/1
TABLET, FILM COATED ORAL
Qty: 360 TABLET | Refills: 3 | Status: SHIPPED | OUTPATIENT
Start: 2020-06-01 | End: 2021-01-27

## 2020-06-01 RX ORDER — SPIRONOLACTONE 25 MG/1
25 TABLET ORAL EVERY MORNING
Qty: 90 TABLET | Refills: 3 | Status: ON HOLD | OUTPATIENT
Start: 2020-06-01 | End: 2020-07-05

## 2020-06-01 RX ORDER — FUROSEMIDE 20 MG
10 TABLET ORAL DAILY
Qty: 45 TABLET | Refills: 3 | Status: SHIPPED | OUTPATIENT
Start: 2020-06-01 | End: 2021-01-27

## 2020-06-01 NOTE — TELEPHONE ENCOUNTER
Dr. Lovelace: see pended med requests     Called patient - he is requesting 3 BP/diuretic meds     1) Lasix - reports takes 1/2 of 20mg tablet daily - not Rx'd since 2017 (that was the date on his last bottle) and states it was temporarily stopped then resumed later at 1/2 dose (1/2 tab)     2) Spironolactone - reports he takes it daily rather than BID as it's on med list (25mg daily)      3) Hydralazine - requesting new Rx and says he was to increase from TID to QID - new Rx pended     Last OV 3/24/2020 - Virtual visit     Mckayla Maldonado, RN on 6/1/2020 at 3:07 PM

## 2020-06-01 NOTE — TELEPHONE ENCOUNTER
Reason for Call:  Medication or medication refill:    Do you use a [facility]?  Name of the pharmacy and phone number for the current request:       [facility] - [address]      Name of the medication requested:  furosemide    Other request:  Pt has questions about if he should be still taking these ?     Can we leave a detailed message on this number? YES    Phone number patient can be reached at: Home number on file [phone] (home)    Best Time:  any    Call taken on 6/1/2020 at 2:48 PM by Tre Chan

## 2020-06-08 ENCOUNTER — TELEPHONE (OUTPATIENT)
Dept: FAMILY MEDICINE | Facility: CLINIC | Age: 85
End: 2020-06-08

## 2020-06-08 DIAGNOSIS — I10 BENIGN ESSENTIAL HYPERTENSION: ICD-10-CM

## 2020-06-08 DIAGNOSIS — E78.5 HYPERLIPIDEMIA LDL GOAL <100: ICD-10-CM

## 2020-06-09 RX ORDER — ATENOLOL 50 MG/1
50 TABLET ORAL DAILY
Qty: 90 TABLET | Refills: 2 | Status: SHIPPED | OUTPATIENT
Start: 2020-06-09 | End: 2020-11-06

## 2020-06-09 NOTE — TELEPHONE ENCOUNTER
Prescription approved per [facility] Refill Protocol.  Maggie ANDERSON RN

## 2020-06-10 RX ORDER — ATENOLOL 50 MG/1
50 TABLET ORAL DAILY
Qty: 10 TABLET | Refills: 0 | Status: ON HOLD | OUTPATIENT
Start: 2020-06-10 | End: 2020-06-21

## 2020-06-10 NOTE — TELEPHONE ENCOUNTER
Pt called and is reporting that he is completely out of this medication. He says that with the mail order pharmacy it takes a few days to get to him. He is asking if we could give him a short term supply to get him to next week. He would like it sent to    [id number] - [place], [place] - [address] AT [address] [place] [address]

## 2020-06-12 ENCOUNTER — TELEPHONE (OUTPATIENT)
Dept: FAMILY MEDICINE | Facility: CLINIC | Age: 85
End: 2020-06-12

## 2020-06-12 DIAGNOSIS — M54.16 LUMBAR RADICULOPATHY: Primary | ICD-10-CM

## 2020-06-12 NOTE — TELEPHONE ENCOUNTER
Reason for call:  Patient reporting a symptom    Symptom or request:  Back pain     Duration (how long have symptoms been present):  ongoing    Have you been treated for this before? Yes    Additional comments:  Pt is calling back stating that his back pain has returned and seeking other treatment options. Pain rx and back injection have been ineffective . Please advise     Phone Number patient can be reached at:  Home number on file [phone] (home)    Best Time:   any    Can we leave a detailed message on this number:  YES    Call taken on 6/12/2020 at 3:06 PM by Tre Chan

## 2020-06-12 NOTE — TELEPHONE ENCOUNTER
"Dr. Lovelace,    Pt says his injection for sciatica stopped working.  He says a couple of days ago he turned and injection wore off and pain started.  Pain is in middle lower back  Rated pain 10/10 initially.  Has been using T3 and \"takes away most of the pain\" but pain returns.  Pt wanting to know what you recommend, wanting to wait for your response on monday instead of having another provider address.    He is not needing more pain medication just wanting to speak to you and get your recommendations going forward.    Please advise.  Thanks,  Kierra Anderson RN    Encouraged calling us over the weekend if questions/concerns or worsening.  "

## 2020-06-15 NOTE — TELEPHONE ENCOUNTER
Patient developed low back pain associated with paresthesias down his right leg to his instep 1 week ago after doing yard work.  This is on very similar to the problem that he 1 year ago which was successfully treated with epidural injections.  The pain is been helped with taking Tylenol 3 and frequently.    Recommended scheduling him for a repeat epidural and to continue taking Tylenol 3 2 tablets every 4-6 hours as needed.  Scheduled at [facility] on Thursday, June 18 at 930.

## 2020-06-18 ENCOUNTER — TRANSFERRED RECORDS (OUTPATIENT)
Dept: HEALTH INFORMATION MANAGEMENT | Facility: CLINIC | Age: 85
End: 2020-06-18

## 2020-06-21 ENCOUNTER — ANESTHESIA (OUTPATIENT)
Dept: SURGERY | Facility: CLINIC | Age: 85
DRG: 336 | End: 2020-06-21
Payer: MEDICARE

## 2020-06-21 ENCOUNTER — APPOINTMENT (OUTPATIENT)
Dept: CT IMAGING | Facility: CLINIC | Age: 85
DRG: 336 | End: 2020-06-21
Attending: EMERGENCY MEDICINE
Payer: MEDICARE

## 2020-06-21 ENCOUNTER — HOSPITAL ENCOUNTER (INPATIENT)
Facility: CLINIC | Age: 85
LOS: 3 days | Discharge: HOME | DRG: 336 | End: 2020-06-24
Attending: EMERGENCY MEDICINE | Admitting: INTERNAL MEDICINE
Payer: MEDICARE

## 2020-06-21 ENCOUNTER — ANESTHESIA EVENT (OUTPATIENT)
Dept: SURGERY | Facility: CLINIC | Age: 85
DRG: 336 | End: 2020-06-21
Payer: MEDICARE

## 2020-06-21 DIAGNOSIS — R10.9 RIGHT SIDED ABDOMINAL PAIN: ICD-10-CM

## 2020-06-21 DIAGNOSIS — K55.019: ICD-10-CM

## 2020-06-21 DIAGNOSIS — K55.9 ISCHEMIC BOWEL DISEASE (H): Primary | ICD-10-CM

## 2020-06-21 PROBLEM — M10.9 GOUT: Status: ACTIVE | Noted: 2020-06-21

## 2020-06-21 PROBLEM — I50.32 CHRONIC DIASTOLIC HEART FAILURE (H): Status: ACTIVE | Noted: 2020-06-21

## 2020-06-21 PROBLEM — N18.4 CKD (CHRONIC KIDNEY DISEASE) STAGE 4, GFR 15-29 ML/MIN (H): Status: RESOLVED | Noted: 2019-09-23 | Resolved: 2020-06-21

## 2020-06-21 PROBLEM — N18.4 CRF (CHRONIC RENAL FAILURE), STAGE 4 (SEVERE) (H): Status: ACTIVE | Noted: 2020-06-21

## 2020-06-21 PROBLEM — I10 HTN (HYPERTENSION): Status: ACTIVE | Noted: 2020-06-21

## 2020-06-21 PROBLEM — I25.10 CAD (CORONARY ARTERY DISEASE): Status: ACTIVE | Noted: 2020-06-21

## 2020-06-21 LAB
ALBUMIN SERPL-MCNC: 3.9 G/DL (ref 3.4–5)
ALP SERPL-CCNC: 67 U/L (ref 40–150)
ALT SERPL W P-5'-P-CCNC: 22 U/L (ref 0–70)
ANION GAP SERPL CALCULATED.3IONS-SCNC: 8 MMOL/L (ref 3–14)
AST SERPL W P-5'-P-CCNC: 32 U/L (ref 0–45)
BASOPHILS # BLD AUTO: 0 10E9/L (ref 0–0.2)
BASOPHILS NFR BLD AUTO: 0 %
BILIRUB SERPL-MCNC: 0.5 MG/DL (ref 0.2–1.3)
BUN SERPL-MCNC: 61 MG/DL (ref 7–30)
CALCIUM SERPL-MCNC: 8.9 MG/DL (ref 8.5–10.1)
CHLORIDE SERPL-SCNC: 108 MMOL/L (ref 94–109)
CO2 SERPL-SCNC: 20 MMOL/L (ref 20–32)
CREAT SERPL-MCNC: 2.01 MG/DL (ref 0.66–1.25)
CREAT SERPL-MCNC: 2.17 MG/DL (ref 0.66–1.25)
DIFFERENTIAL METHOD BLD: ABNORMAL
EOSINOPHIL # BLD AUTO: 0 10E9/L (ref 0–0.7)
EOSINOPHIL NFR BLD AUTO: 0 %
ERYTHROCYTE [DISTWIDTH] IN BLOOD BY AUTOMATED COUNT: 13.5 % (ref 10–15)
GFR SERPL CREATININE-BSD FRML MDRD: 26 ML/MIN/{1.73_M2}
GFR SERPL CREATININE-BSD FRML MDRD: 29 ML/MIN/{1.73_M2}
GLUCOSE SERPL-MCNC: 156 MG/DL (ref 70–99)
HCT VFR BLD AUTO: 33.8 % (ref 40–53)
HGB BLD-MCNC: 11.1 G/DL (ref 13.3–17.7)
IMM GRANULOCYTES # BLD: 0.1 10E9/L (ref 0–0.4)
IMM GRANULOCYTES NFR BLD: 0.6 %
INTERPRETATION ECG - MUSE: NORMAL
LACTATE BLD-SCNC: 2.2 MMOL/L (ref 0.7–2)
LIPASE SERPL-CCNC: 485 U/L (ref 73–393)
LYMPHOCYTES # BLD AUTO: 1.6 10E9/L (ref 0.8–5.3)
LYMPHOCYTES NFR BLD AUTO: 8.3 %
MCH RBC QN AUTO: 31.3 PG (ref 26.5–33)
MCHC RBC AUTO-ENTMCNC: 32.8 G/DL (ref 31.5–36.5)
MCV RBC AUTO: 95 FL (ref 78–100)
MONOCYTES # BLD AUTO: 0.2 10E9/L (ref 0–1.3)
MONOCYTES NFR BLD AUTO: 0.8 %
NEUTROPHILS # BLD AUTO: 17.8 10E9/L (ref 1.6–8.3)
NEUTROPHILS NFR BLD AUTO: 90.3 %
NRBC # BLD AUTO: 0 10*3/UL
NRBC BLD AUTO-RTO: 0 /100
PLATELET # BLD AUTO: 243 10E9/L (ref 150–450)
PLATELET # BLD AUTO: 298 10E9/L (ref 150–450)
POTASSIUM SERPL-SCNC: 4.8 MMOL/L (ref 3.4–5.3)
PROT SERPL-MCNC: 7.6 G/DL (ref 6.8–8.8)
RBC # BLD AUTO: 3.55 10E12/L (ref 4.4–5.9)
SARS-COV-2 PCR COMMENT: NORMAL
SARS-COV-2 RNA SPEC QL NAA+PROBE: NEGATIVE
SARS-COV-2 RNA SPEC QL NAA+PROBE: NORMAL
SODIUM SERPL-SCNC: 136 MMOL/L (ref 133–144)
SPECIMEN SOURCE: NORMAL
SPECIMEN SOURCE: NORMAL
WBC # BLD AUTO: 19.7 10E9/L (ref 4–11)

## 2020-06-21 PROCEDURE — 25000125 ZZHC RX 250: Performed by: NURSE ANESTHETIST, CERTIFIED REGISTERED

## 2020-06-21 PROCEDURE — 85049 AUTOMATED PLATELET COUNT: CPT | Performed by: SURGERY

## 2020-06-21 PROCEDURE — 0DN80ZZ RELEASE SMALL INTESTINE, OPEN APPROACH: ICD-10-PCS | Performed by: SURGERY

## 2020-06-21 PROCEDURE — C9803 HOPD COVID-19 SPEC COLLECT: HCPCS

## 2020-06-21 PROCEDURE — 96361 HYDRATE IV INFUSION ADD-ON: CPT

## 2020-06-21 PROCEDURE — 82565 ASSAY OF CREATININE: CPT | Performed by: SURGERY

## 2020-06-21 PROCEDURE — 37000008 ZZH ANESTHESIA TECHNICAL FEE, 1ST 30 MIN: Performed by: SURGERY

## 2020-06-21 PROCEDURE — 96376 TX/PRO/DX INJ SAME DRUG ADON: CPT

## 2020-06-21 PROCEDURE — 80053 COMPREHEN METABOLIC PANEL: CPT | Performed by: EMERGENCY MEDICINE

## 2020-06-21 PROCEDURE — 25000125 ZZHC RX 250: Performed by: SURGERY

## 2020-06-21 PROCEDURE — 27210794 ZZH OR GENERAL SUPPLY STERILE: Performed by: SURGERY

## 2020-06-21 PROCEDURE — 25800030 ZZH RX IP 258 OP 636: Performed by: EMERGENCY MEDICINE

## 2020-06-21 PROCEDURE — 83690 ASSAY OF LIPASE: CPT | Performed by: EMERGENCY MEDICINE

## 2020-06-21 PROCEDURE — 40000170 ZZH STATISTIC PRE-PROCEDURE ASSESSMENT II: Performed by: SURGERY

## 2020-06-21 PROCEDURE — 25800025 ZZH RX 258: Performed by: SURGERY

## 2020-06-21 PROCEDURE — 93005 ELECTROCARDIOGRAM TRACING: CPT

## 2020-06-21 PROCEDURE — 25000132 ZZH RX MED GY IP 250 OP 250 PS 637: Mod: GY | Performed by: INTERNAL MEDICINE

## 2020-06-21 PROCEDURE — C1765 ADHESION BARRIER: HCPCS | Performed by: SURGERY

## 2020-06-21 PROCEDURE — 25000128 H RX IP 250 OP 636: Performed by: ANESTHESIOLOGY

## 2020-06-21 PROCEDURE — 85025 COMPLETE CBC W/AUTO DIFF WBC: CPT | Performed by: EMERGENCY MEDICINE

## 2020-06-21 PROCEDURE — 25000128 H RX IP 250 OP 636: Performed by: NURSE ANESTHETIST, CERTIFIED REGISTERED

## 2020-06-21 PROCEDURE — 96375 TX/PRO/DX INJ NEW DRUG ADDON: CPT

## 2020-06-21 PROCEDURE — 44050 REDUCE BOWEL OBSTRUCTION: CPT | Performed by: SURGERY

## 2020-06-21 PROCEDURE — U0003 INFECTIOUS AGENT DETECTION BY NUCLEIC ACID (DNA OR RNA); SEVERE ACUTE RESPIRATORY SYNDROME CORONAVIRUS 2 (SARS-COV-2) (CORONAVIRUS DISEASE [COVID-19]), AMPLIFIED PROBE TECHNIQUE, MAKING USE OF HIGH THROUGHPUT TECHNOLOGIES AS DESCRIBED BY CMS-2020-01-R: HCPCS | Performed by: EMERGENCY MEDICINE

## 2020-06-21 PROCEDURE — 36000063 ZZH SURGERY LEVEL 4 EA 15 ADDTL MIN: Performed by: SURGERY

## 2020-06-21 PROCEDURE — 74176 CT ABD & PELVIS W/O CONTRAST: CPT

## 2020-06-21 PROCEDURE — 83605 ASSAY OF LACTIC ACID: CPT | Performed by: EMERGENCY MEDICINE

## 2020-06-21 PROCEDURE — 36415 COLL VENOUS BLD VENIPUNCTURE: CPT | Performed by: SURGERY

## 2020-06-21 PROCEDURE — 99221 1ST HOSP IP/OBS SF/LOW 40: CPT | Mod: 57 | Performed by: SURGERY

## 2020-06-21 PROCEDURE — 71000013 ZZH RECOVERY PHASE 1 LEVEL 1 EA ADDTL HR: Performed by: SURGERY

## 2020-06-21 PROCEDURE — 71000012 ZZH RECOVERY PHASE 1 LEVEL 1 FIRST HR: Performed by: SURGERY

## 2020-06-21 PROCEDURE — 36000093 ZZH SURGERY LEVEL 4 1ST 30 MIN: Performed by: SURGERY

## 2020-06-21 PROCEDURE — 25800030 ZZH RX IP 258 OP 636: Performed by: ANESTHESIOLOGY

## 2020-06-21 PROCEDURE — 25000128 H RX IP 250 OP 636: Performed by: EMERGENCY MEDICINE

## 2020-06-21 PROCEDURE — 99285 EMERGENCY DEPT VISIT HI MDM: CPT | Mod: 25

## 2020-06-21 PROCEDURE — 37000009 ZZH ANESTHESIA TECHNICAL FEE, EACH ADDTL 15 MIN: Performed by: SURGERY

## 2020-06-21 PROCEDURE — 99223 1ST HOSP IP/OBS HIGH 75: CPT | Mod: AI | Performed by: INTERNAL MEDICINE

## 2020-06-21 PROCEDURE — 12000000 ZZH R&B MED SURG/OB

## 2020-06-21 PROCEDURE — 96367 TX/PROPH/DG ADDL SEQ IV INF: CPT

## 2020-06-21 PROCEDURE — 25800030 ZZH RX IP 258 OP 636: Performed by: NURSE ANESTHETIST, CERTIFIED REGISTERED

## 2020-06-21 PROCEDURE — 96365 THER/PROPH/DIAG IV INF INIT: CPT

## 2020-06-21 PROCEDURE — 25000566 ZZH SEVOFLURANE, EA 15 MIN: Performed by: SURGERY

## 2020-06-21 RX ORDER — HYDRALAZINE HYDROCHLORIDE 20 MG/ML
2.5-5 INJECTION INTRAMUSCULAR; INTRAVENOUS EVERY 10 MIN PRN
Status: DISCONTINUED | OUTPATIENT
Start: 2020-06-21 | End: 2020-06-21 | Stop reason: HOSPADM

## 2020-06-21 RX ORDER — MORPHINE SULFATE 4 MG/ML
4 INJECTION, SOLUTION INTRAMUSCULAR; INTRAVENOUS
Status: DISCONTINUED | OUTPATIENT
Start: 2020-06-21 | End: 2020-06-21

## 2020-06-21 RX ORDER — NALOXONE HYDROCHLORIDE 0.4 MG/ML
.1-.4 INJECTION, SOLUTION INTRAMUSCULAR; INTRAVENOUS; SUBCUTANEOUS
Status: DISCONTINUED | OUTPATIENT
Start: 2020-06-21 | End: 2020-06-24 | Stop reason: HOSPADM

## 2020-06-21 RX ORDER — SODIUM CHLORIDE, SODIUM LACTATE, POTASSIUM CHLORIDE, CALCIUM CHLORIDE 600; 310; 30; 20 MG/100ML; MG/100ML; MG/100ML; MG/100ML
INJECTION, SOLUTION INTRAVENOUS CONTINUOUS
Status: DISCONTINUED | OUTPATIENT
Start: 2020-06-21 | End: 2020-06-21 | Stop reason: HOSPADM

## 2020-06-21 RX ORDER — AMLODIPINE BESYLATE 5 MG/1
10 TABLET ORAL EVERY EVENING
Status: DISCONTINUED | OUTPATIENT
Start: 2020-06-22 | End: 2020-06-24 | Stop reason: HOSPADM

## 2020-06-21 RX ORDER — DEXAMETHASONE SODIUM PHOSPHATE 4 MG/ML
INJECTION, SOLUTION INTRA-ARTICULAR; INTRALESIONAL; INTRAMUSCULAR; INTRAVENOUS; SOFT TISSUE PRN
Status: DISCONTINUED | OUTPATIENT
Start: 2020-06-21 | End: 2020-06-21

## 2020-06-21 RX ORDER — SODIUM CHLORIDE 9 MG/ML
1000 INJECTION, SOLUTION INTRAVENOUS CONTINUOUS
Status: DISCONTINUED | OUTPATIENT
Start: 2020-06-21 | End: 2020-06-21

## 2020-06-21 RX ORDER — LIDOCAINE HYDROCHLORIDE 20 MG/ML
INJECTION, SOLUTION INFILTRATION; PERINEURAL PRN
Status: DISCONTINUED | OUTPATIENT
Start: 2020-06-21 | End: 2020-06-21

## 2020-06-21 RX ORDER — ONDANSETRON 2 MG/ML
4 INJECTION INTRAMUSCULAR; INTRAVENOUS EVERY 30 MIN PRN
Status: DISCONTINUED | OUTPATIENT
Start: 2020-06-21 | End: 2020-06-21

## 2020-06-21 RX ORDER — FENOFIBRATE 160 MG/1
160 TABLET ORAL DAILY
Status: DISCONTINUED | OUTPATIENT
Start: 2020-06-22 | End: 2020-06-24 | Stop reason: HOSPADM

## 2020-06-21 RX ORDER — AMLODIPINE BESYLATE 5 MG/1
5 TABLET ORAL EVERY MORNING
Status: DISCONTINUED | OUTPATIENT
Start: 2020-06-22 | End: 2020-06-24 | Stop reason: HOSPADM

## 2020-06-21 RX ORDER — ONDANSETRON 2 MG/ML
4 INJECTION INTRAMUSCULAR; INTRAVENOUS EVERY 30 MIN PRN
Status: DISCONTINUED | OUTPATIENT
Start: 2020-06-21 | End: 2020-06-21 | Stop reason: HOSPADM

## 2020-06-21 RX ORDER — FENTANYL CITRATE 50 UG/ML
25-50 INJECTION, SOLUTION INTRAMUSCULAR; INTRAVENOUS
Status: DISCONTINUED | OUTPATIENT
Start: 2020-06-21 | End: 2020-06-21 | Stop reason: HOSPADM

## 2020-06-21 RX ORDER — ONDANSETRON 2 MG/ML
INJECTION INTRAMUSCULAR; INTRAVENOUS PRN
Status: DISCONTINUED | OUTPATIENT
Start: 2020-06-21 | End: 2020-06-21

## 2020-06-21 RX ORDER — FAMOTIDINE 20 MG/1
20 TABLET, FILM COATED ORAL DAILY PRN
COMMUNITY
End: 2020-07-21

## 2020-06-21 RX ORDER — LIDOCAINE 40 MG/G
CREAM TOPICAL
Status: DISCONTINUED | OUTPATIENT
Start: 2020-06-21 | End: 2020-06-24 | Stop reason: HOSPADM

## 2020-06-21 RX ORDER — ATORVASTATIN CALCIUM 10 MG/1
20 TABLET, FILM COATED ORAL DAILY
Status: DISCONTINUED | OUTPATIENT
Start: 2020-06-22 | End: 2020-06-24 | Stop reason: HOSPADM

## 2020-06-21 RX ORDER — ALLOPURINOL 100 MG/1
200 TABLET ORAL DAILY
Status: DISCONTINUED | OUTPATIENT
Start: 2020-06-22 | End: 2020-06-24 | Stop reason: HOSPADM

## 2020-06-21 RX ORDER — HYDROMORPHONE HYDROCHLORIDE 1 MG/ML
.3-.5 INJECTION, SOLUTION INTRAMUSCULAR; INTRAVENOUS; SUBCUTANEOUS EVERY 5 MIN PRN
Status: DISCONTINUED | OUTPATIENT
Start: 2020-06-21 | End: 2020-06-21 | Stop reason: HOSPADM

## 2020-06-21 RX ORDER — NALOXONE HYDROCHLORIDE 0.4 MG/ML
.1-.4 INJECTION, SOLUTION INTRAMUSCULAR; INTRAVENOUS; SUBCUTANEOUS
Status: DISCONTINUED | OUTPATIENT
Start: 2020-06-21 | End: 2020-06-21

## 2020-06-21 RX ORDER — ONDANSETRON 4 MG/1
4 TABLET, ORALLY DISINTEGRATING ORAL EVERY 30 MIN PRN
Status: DISCONTINUED | OUTPATIENT
Start: 2020-06-21 | End: 2020-06-21 | Stop reason: HOSPADM

## 2020-06-21 RX ORDER — FENTANYL CITRATE 50 UG/ML
INJECTION, SOLUTION INTRAMUSCULAR; INTRAVENOUS PRN
Status: DISCONTINUED | OUTPATIENT
Start: 2020-06-21 | End: 2020-06-21

## 2020-06-21 RX ORDER — SPIRONOLACTONE 25 MG/1
25 TABLET ORAL EVERY MORNING
Status: DISCONTINUED | OUTPATIENT
Start: 2020-06-22 | End: 2020-06-24 | Stop reason: HOSPADM

## 2020-06-21 RX ORDER — FAMOTIDINE 20 MG/1
20 TABLET, FILM COATED ORAL DAILY PRN
Status: DISCONTINUED | OUTPATIENT
Start: 2020-06-21 | End: 2020-06-24 | Stop reason: HOSPADM

## 2020-06-21 RX ORDER — ONDANSETRON 4 MG/1
4 TABLET, ORALLY DISINTEGRATING ORAL EVERY 6 HOURS PRN
Status: DISCONTINUED | OUTPATIENT
Start: 2020-06-21 | End: 2020-06-24 | Stop reason: HOSPADM

## 2020-06-21 RX ORDER — PROPOFOL 10 MG/ML
INJECTION, EMULSION INTRAVENOUS PRN
Status: DISCONTINUED | OUTPATIENT
Start: 2020-06-21 | End: 2020-06-21

## 2020-06-21 RX ORDER — SODIUM CHLORIDE 9 MG/ML
INJECTION, SOLUTION INTRAVENOUS CONTINUOUS
Status: DISCONTINUED | OUTPATIENT
Start: 2020-06-21 | End: 2020-06-21 | Stop reason: HOSPADM

## 2020-06-21 RX ORDER — ONDANSETRON 2 MG/ML
4 INJECTION INTRAMUSCULAR; INTRAVENOUS EVERY 6 HOURS PRN
Status: DISCONTINUED | OUTPATIENT
Start: 2020-06-21 | End: 2020-06-24 | Stop reason: HOSPADM

## 2020-06-21 RX ORDER — MAGNESIUM HYDROXIDE 1200 MG/15ML
LIQUID ORAL PRN
Status: DISCONTINUED | OUTPATIENT
Start: 2020-06-21 | End: 2020-06-21 | Stop reason: HOSPADM

## 2020-06-21 RX ORDER — HYDRALAZINE HYDROCHLORIDE 10 MG/1
10 TABLET, FILM COATED ORAL 4 TIMES DAILY
Status: DISCONTINUED | OUTPATIENT
Start: 2020-06-21 | End: 2020-06-21

## 2020-06-21 RX ORDER — BUPIVACAINE HYDROCHLORIDE 5 MG/ML
INJECTION, SOLUTION PERINEURAL PRN
Status: DISCONTINUED | OUTPATIENT
Start: 2020-06-21 | End: 2020-06-21 | Stop reason: HOSPADM

## 2020-06-21 RX ORDER — LABETALOL HYDROCHLORIDE 5 MG/ML
10 INJECTION, SOLUTION INTRAVENOUS
Status: DISCONTINUED | OUTPATIENT
Start: 2020-06-21 | End: 2020-06-21 | Stop reason: HOSPADM

## 2020-06-21 RX ORDER — HYDRALAZINE HYDROCHLORIDE 50 MG/1
50 TABLET, FILM COATED ORAL 4 TIMES DAILY
Status: DISCONTINUED | OUTPATIENT
Start: 2020-06-21 | End: 2020-06-24 | Stop reason: HOSPADM

## 2020-06-21 RX ADMIN — DEXAMETHASONE SODIUM PHOSPHATE 4 MG: 4 INJECTION, SOLUTION INTRA-ARTICULAR; INTRALESIONAL; INTRAMUSCULAR; INTRAVENOUS; SOFT TISSUE at 06:59

## 2020-06-21 RX ADMIN — METRONIDAZOLE 500 MG: 500 INJECTION, SOLUTION INTRAVENOUS at 04:53

## 2020-06-21 RX ADMIN — FENTANYL CITRATE 50 MCG: 50 INJECTION, SOLUTION INTRAMUSCULAR; INTRAVENOUS at 07:29

## 2020-06-21 RX ADMIN — Medication: at 09:15

## 2020-06-21 RX ADMIN — HYDROMORPHONE HYDROCHLORIDE 0.5 MG: 1 INJECTION, SOLUTION INTRAMUSCULAR; INTRAVENOUS; SUBCUTANEOUS at 09:44

## 2020-06-21 RX ADMIN — SODIUM CHLORIDE 1000 ML: 9 INJECTION, SOLUTION INTRAVENOUS at 02:57

## 2020-06-21 RX ADMIN — MORPHINE SULFATE 4 MG: 4 INJECTION INTRAVENOUS at 02:57

## 2020-06-21 RX ADMIN — SODIUM CHLORIDE 1000 ML: 9 INJECTION, SOLUTION INTRAVENOUS at 04:20

## 2020-06-21 RX ADMIN — SUCCINYLCHOLINE CHLORIDE 100 MG: 20 INJECTION, SOLUTION INTRAMUSCULAR; INTRAVENOUS; PARENTERAL at 06:52

## 2020-06-21 RX ADMIN — MORPHINE SULFATE 4 MG: 4 INJECTION INTRAVENOUS at 04:49

## 2020-06-21 RX ADMIN — DEXTROSE AND SODIUM CHLORIDE: 5; 450 INJECTION, SOLUTION INTRAVENOUS at 10:53

## 2020-06-21 RX ADMIN — FENTANYL CITRATE 50 MCG: 50 INJECTION, SOLUTION INTRAMUSCULAR; INTRAVENOUS at 06:51

## 2020-06-21 RX ADMIN — SODIUM CHLORIDE, POTASSIUM CHLORIDE, SODIUM LACTATE AND CALCIUM CHLORIDE: 600; 310; 30; 20 INJECTION, SOLUTION INTRAVENOUS at 06:18

## 2020-06-21 RX ADMIN — HYDRALAZINE HYDROCHLORIDE 50 MG: 50 TABLET, FILM COATED ORAL at 22:45

## 2020-06-21 RX ADMIN — ROCURONIUM BROMIDE 50 MG: 10 INJECTION INTRAVENOUS at 07:11

## 2020-06-21 RX ADMIN — SODIUM CHLORIDE, POTASSIUM CHLORIDE, SODIUM LACTATE AND CALCIUM CHLORIDE: 600; 310; 30; 20 INJECTION, SOLUTION INTRAVENOUS at 09:50

## 2020-06-21 RX ADMIN — PHENYLEPHRINE HYDROCHLORIDE 100 MCG: 10 INJECTION INTRAVENOUS at 07:13

## 2020-06-21 RX ADMIN — ONDANSETRON 4 MG: 2 INJECTION INTRAMUSCULAR; INTRAVENOUS at 02:57

## 2020-06-21 RX ADMIN — LIDOCAINE HYDROCHLORIDE 60 MG: 20 INJECTION, SOLUTION INFILTRATION; PERINEURAL at 06:51

## 2020-06-21 RX ADMIN — DEXTROSE AND SODIUM CHLORIDE: 5; 450 INJECTION, SOLUTION INTRAVENOUS at 20:22

## 2020-06-21 RX ADMIN — ONDANSETRON 4 MG: 2 INJECTION INTRAMUSCULAR; INTRAVENOUS at 08:00

## 2020-06-21 RX ADMIN — SODIUM CHLORIDE: 9 INJECTION, SOLUTION INTRAVENOUS at 06:44

## 2020-06-21 RX ADMIN — PROPOFOL 100 MG: 10 INJECTION, EMULSION INTRAVENOUS at 06:51

## 2020-06-21 RX ADMIN — SODIUM CHLORIDE: 9 INJECTION, SOLUTION INTRAVENOUS at 06:36

## 2020-06-21 RX ADMIN — FENTANYL CITRATE 50 MCG: 50 INJECTION, SOLUTION INTRAMUSCULAR; INTRAVENOUS at 07:24

## 2020-06-21 RX ADMIN — HYDROMORPHONE HYDROCHLORIDE 0.5 MG: 1 INJECTION, SOLUTION INTRAMUSCULAR; INTRAVENOUS; SUBCUTANEOUS at 09:51

## 2020-06-21 RX ADMIN — CEFEPIME HYDROCHLORIDE 2 G: 2 INJECTION, POWDER, FOR SOLUTION INTRAVENOUS at 04:20

## 2020-06-21 RX ADMIN — ONDANSETRON 4 MG: 2 INJECTION INTRAMUSCULAR; INTRAVENOUS at 04:49

## 2020-06-21 RX ADMIN — SUGAMMADEX 200 MG: 100 INJECTION, SOLUTION INTRAVENOUS at 08:11

## 2020-06-21 ASSESSMENT — ACTIVITIES OF DAILY LIVING (ADL)
ADLS_ACUITY_SCORE: 13

## 2020-06-21 ASSESSMENT — LIFESTYLE VARIABLES: TOBACCO_USE: 1

## 2020-06-21 ASSESSMENT — MIFFLIN-ST. JEOR: SCORE: 1380.26

## 2020-06-21 NOTE — ED TRIAGE NOTES
Patient complaints of abdominal pain.  Belching.  No nausea or vomiting.  Pain started after dinner tonight.

## 2020-06-21 NOTE — ED PROVIDER NOTES
History     Chief Complaint:  Abdominal Pain    HPI   Trevor Soni is a 86 year old male with a history of hypertension, hyperlipidemia, coronary artery disease, chronic kidney disease, chronic renal failure, gastroesophageal reflux disease, and abdominal aortic aneurysm among others who presents to the emergency department today for evaluation of abdominal pain that started after he ate dinner tonight and is gotten progressively worse and quite severe.  He describes as a epigastric and right-sided abdominal pain.  Ate dinner around 7 PM and it got worse after that.  He feels nauseated but has not had any vomiting.  No diarrhea or constipation issues.  He has had his gallbladder removed.  No history of obstruction.  He denies any fever, cough, rashes.  No known exposure to COVID.    Allergies:  Avocado  Ciprofloxacin  Penicillins     Medications:    Allopurinol   Amlodipine   Aspirin  Atenolol   Atorvastatin   Coenzyme Q-10 capsule  Cyclobenzaprine   Diclofenac  Famotidine  Fenofibrate   Furosemide  Hydralazine   Spironolactone   Triamcinolone     Past Medical History:    Chronic kidney disease   Nonrheumatic aortic valve stenosis   Chronic bilateral low back pain   Post-traumatic osteoarthritis of left knee   Abdominal aortic aneurysm  Stenosis of carotid artery, unspecified laterality   Idiopathic chronic gout of multiple sites without tophus   Primary osteoarthritis of both knees   Chronic rhinitis   Hyperlipidemia  Essential hypertension, benign   Primary osteoarthritis involving multiple joints   Pneumonia   Abdominal pain   Stiffness of joint, not elsewhere classified,  shoulder region   Artritis  Coronary artery disease   Chronic renal failure  Gastroesophageal reflux disease   Gout  Nocturia    Past Surgical History:    Cardiac surgery  Cholecystectomy  Orthopedic surgery   Phacoemulsification clear cornea with toric intraocular lens implant x2  Vascular surgery    Family History:    Family history  "reviewed. No pertinent family history.    Social History:  The patient presents to the ED alone.   Smoking Status: Former smoker   Type:cigars   Years since quittin.4  Smokeless Tobacco: Never Used  Alcohol Use: Positive (rarely)  Drug Use: Negative  PCP: Warren Lovelace  Marital Status:        Review of Systems  As noted per HPI.  Remainder of a 10 point review of systems was negative.    Physical Exam   First Vitals:  Patient Vitals for the past 24 hrs:   BP Temp Temp src Pulse Resp SpO2 Height Weight   20 0530 135/54 -- -- 64 -- 92 % -- --   20 0500 136/59 -- -- 63 -- 91 % -- --   20 0430 (!) 142/56 -- -- 57 -- 92 % -- --   20 0400 (!) 142/58 -- -- 62 -- 94 % -- --   20 0330 (!) 152/64 -- -- 66 -- 96 % -- --   20 0300 (!) 145/52 -- -- 56 -- 97 % -- --   20 0207 (!) 170/70 97.8  F (36.6  C) Oral 51 14 -- 1.727 m (5' 8\") 72.6 kg (160 lb)     Physical Exam  General: Well-nourished, appears to be good.    Eyes: PERRL, conjunctivae pink no scleral icterus or conjunctival injection  ENT:  Moist mucus membranes, posterior oropharynx clear without erythema or exudates  Respiratory:  Lungs clear to auscultation bilaterally, no crackles/rubs/wheezes.  Good air movement  CV: Normal rate and rhythm, no murmurs/rubs/gallops  GI:  Abdomen soft and non-distended.  Severe right sided tenderness and moderate epigastric tenderness.   Skin: Warm, dry.  No rashes or petechiae  Musculoskeletal: No peripheral edema or calf tenderness  Neuro: Alert and oriented to person/place/time  Psychiatric: Normal affect    Emergency Department Course     ECG:  ECG taken at 0250  Sinus rhythm with first degree AV block  Nonspecific intraventricular conduction delay  Borderline ECG  No significant change compared to EKG dated 19  Rate 66 bpm. AK interval 266 ms. QRS duration 122 ms. QT/QTc 518/543 ms. P-R-T axes 87 -23 56.    Imaging:  Radiology findings were communicated with the patient " who voiced understanding of the findings.    Abd/pelvis CT no contrast - Stone Protocol  1.  Findings suspicious for ischemic enteritis ileal loop right lower quadrant.  2.  Portal venous gas.  3.  No CT evidence of appendicitis.  4.  Extensive atherosclerosis.  5.  Focal aneurysmal dilatation distal abdominal aorta.  6.  Previous cholecystectomy.  7.  Multiple bilateral renal cysts.  8.  Sigmoid diverticulosis.  Reading per radiology     Laboratory:  Laboratory findings were communicated with the patient who voiced understanding of the findings.    CBC: WBC 19.7 (H), HGB 11.1(L),   CMP: glucose 156 (H), BUN 61 (H), creatinine 2.17 (H), GFR 26 (L) o/w WNL  Lipase: 485 (H)    Lactic Acid (Resulted: 0308): 2.2 (H)    Asymptomatic COVID-19 Virus: pending    Interventions:  0257 NS 1000 mL IV  0257 morphine 4 mg IV  0257 zofran 4 mg IV  0420 NS 1000 mL IV  0420 maxipime 2 g IV  0449 morphine 4 mg IV  0449 zofran 4 mg IV  0453 flagyl 500 mg IV     Emergency Department Course:    0211 IV was inserted and blood was drawn for laboratory testing, results above.    0227 Nursing notes and vitals reviewed. I performed an exam of the patient as documented above.     0253 Blood was drawn for laboratory testing, results above.    0321 The patient was sent for a CT while in the emergency department, results above.     0345  I spoke with the radiologist regarding findings on the CT scan.    0410  I spoke with the vascular surgeon regarding the patient's findings.    0455 I spoke with the patient's daughter.     0457 A nasopharyngeal sample was obtained for laboratory testing as documented above.    Repeat lactic acid level obtained as noted above.     I spoke with Dr. Capellan of the hospitalist service from [facility] regarding patient's presentation, findings, and plan of care.    Prior to admit, I personally reviewed the results with the patient daughter and all related questions were answered. The patient daughter  verbalized understanding and is amenable to plan.     Findings and plan explained to the patient who consents to admission. Discussed the patient with Dr. Capellan, who will admit the patient to an adult med/surg bed for further monitoring, evaluation, and treatment.    Impression & Plan      CMS Diagnoses: The Lactic acid level is elevated due to ischemic enteritis, at this time there is no sign of severe sepsis or septic shock. and None    Medical Decision Making:  Trevor Soni is a 86 year old male who presents to the emergency department today for evaluation of abdominal pain on the right side that started after eating.  He is very tender on examination and is in severe pain.  He has a history of extensive atherosclerosis as well as an aortic aneurysm.  White count was elevated.  He has chronic renal insufficiency and so noncontrast CT was obtained.  The CT is limited by the lack of contrast but is concerning for the possibility of ischemic enteritis.  He is in a normal sinus rhythm on his EKG.  Lactic was very slightly elevated 2.2 and is also concerning for tissue malperfusion in the setting of diet got.  He was treated with broad-spectrum antibiotics.  He was given IV fluids.  I did not give him the full 30 mL/kg bolus given concerns about the possibility of an underlying cardiac issue given his age as well as wishing to avoid development of abdominal compartment syndrome.  His blood pressure remained stable and acceptable.  I spoke with vascular surgery as well as Dr. Reed with general surgery.  They patient will go to the OR for further evaluation.  The patient understands this and is wishing to have surgery.  He lives independently and is quite active.  I updated his wife as well as his daughter.    Diagnosis:    ICD-10-CM    1. Acute ischemic enteritis (H)  K55.019 Asymptomatic COVID-19 Virus (Coronavirus) by PCR   2. Right sided abdominal pain  R10.9        Disposition:   The patient is admitted into  the care of Dr. Capellan.    Scribe Disclosure:  I, Cristine Fenton, am serving as a scribe at 2:50 AM on 6/21/2020 to document services personally performed by Simona Casillas MD based on my observations and the provider's statements to me.    Scribe Disclosure:  I, Rica Carney, am serving as a scribe at 2:53 AM on 6/21/2020 to document services personally performed by Simona Casillas MD based on my observations and the provider's statements to me.         Simona Casillas MD  06/21/20 0556

## 2020-06-21 NOTE — PLAN OF CARE
A/O x 4.  POD 0. Satting low 90's on 4L O2 oxymask.  Other vitals are stable.  Capnography in place.  Rates pain RLQ abdominal pain 2/10, Dilaudid PCA in place, minimal use.  Midline incison CRYSTAL, surgical dressing CDI.  BS not audible.  Farooq catheter, good urine output.  NG set to LIS, small amount of brown output. Plan to possibly remove NG tube tomorrow.  Continue to monitor for pain.

## 2020-06-21 NOTE — PROVIDER NOTIFICATION
Brief note:    Patient presented to the ER with abdominal pain, CT demonstrated findings concerning for mesenteric enteritis    Patient going directly to the OR for exploratory laparotomy with Dr. Reed.     Hospitalist service contacted for admission, and will be seen after operative intervention.    Philip Capellan MD  6:03 AM

## 2020-06-21 NOTE — ANESTHESIA POSTPROCEDURE EVALUATION
Patient: Trevor Soni    Procedure(s):  EXPLORATORY LAPAROTOMY WITH LYSIS OF ADHESIONS    Diagnosis:Ischemia, bowel (H) [K55.9]  Diagnosis Additional Information: No value filed.    Anesthesia Type:  General    Note:  Anesthesia Post Evaluation    Patient location during evaluation: bedside  Patient participation: Able to fully participate in evaluation  Level of consciousness: awake and alert  Pain management: adequate  Airway patency: patent  Cardiovascular status: acceptable  Respiratory status: acceptable  Hydration status: acceptable  PONV: none and controlled     Anesthetic complications: None          Last vitals:  Vitals:    06/21/20 1042 06/21/20 1106 06/21/20 1207   BP: (!) 140/52  130/49   Pulse: 59 58    Resp: 17 15 16   Temp: 36.3  C (97.3  F)  36.4  C (97.5  F)   SpO2: 91% 93% 92%         Electronically Signed By: Rodo Tyson MD  June 21, 2020  1:51 PM

## 2020-06-21 NOTE — ED NOTES
Called pre-op to give report and no-one available. Called main OR who stated they have room ready for the patient. Patient transported to OR with ED tech and Dr. Reed

## 2020-06-21 NOTE — BRIEF OP NOTE
[facility] Brief Operative Note    Pre-operative diagnosis: Ischemia, bowel (H) [K55.9]   Post-operative diagnosis same   Procedure: Procedure(s):  EXPLORATORY LAPAROTOMY WITH LYSIS OF ADHESIONS   Surgeon(s): Surgeon(s) and Role:     * Jose Reed MD - Primary     * Paul Green MD - Assisting     * Charisse Pitt MD - Resident - Assisting   Estimated blood loss: 5 mL    Specimens: None   Findings: Mesenteric bruising, 15 cm section of small bowel with hemorrhagic ischemia, blood supply good. No perforation, no full thickness injury. Adhesion lysed. Skin closed with monocryl. NG placement in OR, confirmed intraop    Plan:   NPO, IVF, will likely have ileus  NG to suction overnight, may remove 6/22  No need for further antibiotics      STAFF: Agree with above  .     Jose Reed MD

## 2020-06-21 NOTE — ANESTHESIA PREPROCEDURE EVALUATION
Anesthesia Pre-Procedure Evaluation    Patient: Trevor Soni   MRN: [id number] : 9/3/1933          Preoperative Diagnosis: Ischemia, bowel (H) [K55.9]    Procedure(s):  EXPOLORATORY LAPAROTOMY FOR ISCHEMIC BOWEL    Past Medical History:   Diagnosis Date     Arthritis     rhuematoid     Coronary artery disease     triple bypass      CRF (chronic renal failure)      Gastro-oesophageal reflux disease      Gout      Hyperlipidemia      Hypertension      Nocturia      Past Surgical History:   Procedure Laterality Date     CARDIAC SURGERY       CHOLECYSTECTOMY       COLONOSCOPY       ORTHOPEDIC SURGERY       PHACOEMULSIFICATION CLEAR CORNEA WITH TORIC INTRAOCULAR LENS IMPLANT  2012    Procedure:PHACOEMULSIFICATION CLEAR CORNEA WITH TORIC INTRAOCULAR LENS IMPLANT; LEFT PHACOEMULSIFICATION CLEAR CORNEA WITH DELUXE  TORIC INTRAOCULAR LENS IMPLANT ; Surgeon:BRANDO HIGHTOWER; Location:[facility]     PHACOEMULSIFICATION CLEAR CORNEA WITH TORIC INTRAOCULAR LENS IMPLANT  2012    Procedure:PHACOEMULSIFICATION CLEAR CORNEA WITH TORIC INTRAOCULAR LENS IMPLANT; RIGHT PHACOEMULSIFICATION CLEAR CORNEA WITH TORIC INTRAOCULAR LENS IMPLANT ; Surgeon:BRANDO HIGHTOWER; Location:[facility]     VASCULAR SURGERY         Anesthesia Evaluation     . Pt has had prior anesthetic. Type: General    No history of anesthetic complications          ROS/MED HX    ENT/Pulmonary:     (+)tobacco use, Past use , recent URI . .    Neurologic:       Cardiovascular: Comment: AAA (3.4 cm)    (+) Dyslipidemia, hypertension-Peripheral Vascular Disease-- Carotid Stenosis, CAD, -CABG-. : . CHF etiology: Diastolic Last EF: 68 date: 16 . . :. valvular problems/murmurs type: AS and AI Mild AS, AI, and TR:. Previous cardiac testing Echodate:16results:Normal EF; Grade 1-2 Diastolic dysfunction; Mild aortic stenosis (NANDA 1.1 cm2), mild aortic insufficiency, mild TRdate: results:ECG reviewed date:20 results:NSR date: results:          METS/Exercise  "Tolerance:  1 - Eating, dressing   Hematologic:         Musculoskeletal:   (+) arthritis,  other musculoskeletal- Lumbago      GI/Hepatic:     (+) GERD       Renal/Genitourinary:     (+) chronic renal disease (Stage 4), type: CRI,       Endo:         Psychiatric:         Infectious Disease:         Malignancy:         Other:                                 Lab Results   Component Value Date    WBC 19.7 (H) 06/21/2020    HGB 11.1 (L) 06/21/2020    HCT 33.8 (L) 06/21/2020     06/21/2020     06/21/2020    POTASSIUM 4.8 06/21/2020    CHLORIDE 108 06/21/2020    CO2 20 06/21/2020    BUN 61 (H) 06/21/2020    CR 2.17 (H) 06/21/2020     (H) 06/21/2020    AGUSTÍN 8.9 06/21/2020    MAG 2.2 09/10/2016    ALBUMIN 3.9 06/21/2020    PROTTOTAL 7.6 06/21/2020    ALT 22 06/21/2020    AST 32 06/21/2020    ALKPHOS 67 06/21/2020    BILITOTAL 0.5 06/21/2020    LIPASE 485 (H) 06/21/2020    PTT 34 05/06/2011    INR 1.06 05/06/2011    TSH 2.07 05/21/2018       Preop Vitals  BP Readings from Last 3 Encounters:   06/21/20 (!) 142/58   02/24/20 (!) 153/70   12/23/19 130/60    Pulse Readings from Last 3 Encounters:   06/21/20 62   02/24/20 50   12/23/19 53      Resp Readings from Last 3 Encounters:   06/21/20 14   09/16/19 12   09/11/16 18    SpO2 Readings from Last 3 Encounters:   06/21/20 94%   02/24/20 97%   12/23/19 97%      Temp Readings from Last 1 Encounters:   06/21/20 36.6  C (97.8  F) (Oral)    Ht Readings from Last 1 Encounters:   06/21/20 1.727 m (5' 8\")      Wt Readings from Last 1 Encounters:   06/21/20 72.6 kg (160 lb)    Estimated body mass index is 24.33 kg/m  as calculated from the following:    Height as of this encounter: 1.727 m (5' 8\").    Weight as of this encounter: 72.6 kg (160 lb).       Anesthesia Plan      History & Physical Review  History and physical reviewed and following examination; no interval change.    ASA Status:  3 emergent.    NPO Status:  > 8 hours    Plan for General with Intravenous and " Propofol induction. Maintenance will be Balanced.    PONV prophylaxis:  Ondansetron (or other 5HT-3)  Additional equipment: 2nd IV Precedex pushes prn        Postoperative Care  Postoperative pain management:  IV analgesics and Multi-modal analgesia.      Consents  Anesthetic plan, risks, benefits and alternatives discussed with:  Patient..                 Brian Taylor MD

## 2020-06-21 NOTE — ANESTHESIA CARE TRANSFER NOTE
Patient: Trevor Soni    Procedure(s):  EXPLORATORY LAPAROTOMY WITH LYSIS OF ADHESIONS    Diagnosis: Ischemia, bowel (H) [K55.9]  Diagnosis Additional Information: No value filed.    Anesthesia Type:   General     Note:  Airway :Face Mask  Patient transferred to:PACU  Comments: BP: 135/58  Pulse: 52  Resp: 19  SpO2: 97 %  Temp: 36.5  C (97.7  F)    Spontaneous resp with tidal volume >400ml.. Followed commands et purposeful. Strong tongue thrust. Extubated with cuff down. Pt maintains resp. O2 sat > 97%. Simple face mask on with 10l O2.To PACU: VSS, placed on monitors with alarms on. Report given to RN.;       Vitals: (Last set prior to Anesthesia Care Transfer)    CRNA VITALS  6/21/2020 0819 - 6/21/2020 0909      6/21/2020             SpO2:  96 %    Resp Rate (set):  10                Electronically Signed By: JUAN C Douglas CRNA  June 21, 2020  9:09 AM

## 2020-06-21 NOTE — PROGRESS NOTES
Surgery Post-op check    Trevor is an 86 yoM PSHx cholecystectomy who presented with a abdominal pain and loop of ischemic bowel and associated portal venous gas. Possible closed-loop obstruction. He was taken for emergent ex lap and lysis of adhesions. Bruising was noted in mesentery and segment of small bowel was ischemic but viable appearing. No bowel resection was performed. His abdomen was closed.     He is doing well post-operatively. His pain has greatly improved. He denies fevers, chills, nausea, or vomiting.     He is HDS mildly hypertensive but improving and not tachycardic, oxygen requirement reducing. Scant amount of dark green bilious fluid from NG tube    Elderly male, non-toxic appearing  Abdomen is soft, appropriately tender. Midline laparotomy incision dressing c/d/i and abdominal binder in place    Plan:   NPO overnight, IVF, will likely have ileus  Pain management  NG to suction overnight, might remove 6/22  No need for further antibiotics  Out of bed activity as tolerates  Okay for DVT prophylaxis    Charisse Pitt MD  Surgery Resident, PGY-4  Pager [phone]    STAFF: Agree with above.  Patient clinically improving.       Jose Reed MD

## 2020-06-21 NOTE — CONSULTS
[facility]    CC: Right lower quadrant abdominal pain    HPI: 86-year-old patient developed abdominal pain after eating dinner last evening.  This is become progressively worse and quite severe primarily in the right lower quadrant.  Patient came to the ED for evaluation.  Has had no nausea vomiting.            Presented to the ED and evaluated by .  Found to have very severe pain on exam in the right lower quadrant.  CT scan with out contrast due to renal insufficiency was performed.        PMH: Medications: Norvasc, Tenormin, Apresoline, Lipitor, Pepcid, fenofibrate, Lasix, Aldactone, Zyloprim             Medical: Hypertension                         CKI                          Hyperlipidemia                          AAA                          GERD    Exam: Patient is alert and appropriate.  Does live independently.             Temperature= 97.8 blood pressure 142/58.  Pulse 60             Chest= clear              Cardiovascular= regular rate              Abdomen= thin, tenderness of right lower quadrant             Extremities= unremarkable        CT scan was reviewed.  There is a loop of thickened bowel in the right lower quadrant.  Some small amount of portal venous gas is noted.  No evidence of appendicitis.  Prior cholecystectomy.  Multiple bilateral renal cyst and sigmoid diverticulosis but no diverticulitis.                     Aortic aneurysm with calcification measuring 3.5 cm.  Celiac and SMA arteries have calcification but appear to be patent as well there is no contrast.      Laboratory: WBC= 19.7   Hgb= 11.1 lipase= 485 lactic acid= 2.2                     Potassium= 4.8   SCr= 2.17   Albumin= 3.9    Impression: Sepsis with abnormal distal small bowel loops raising the possibility ischemic enteritis.  Feel exploratory laparotomy indicated.  Discussed with patient.         Jose Reed MD

## 2020-06-21 NOTE — ED NOTES
"[facility]  ED Nurse Handoff Report    ED Chief complaint: Abdominal Pain      ED Diagnosis:   Final diagnoses:   None       Code Status: Full Code    Allergies:   Allergies   Allergen Reactions     Avocado      Ciprofloxacin Itching     Penicillins Itching       Patient Story: 87yo male here for abdominal pain that started after he ate dinner tonight and has gotten progressively worse and quite severe  Focused Assessment:  A/O x4, diffuse RLQ pain, no distension noted, some nausea present but no vomiting, no fever. Denies any covid-type symptoms    Treatments and/or interventions provided: NS bolus and infusion, cefipime with flagyl to follow, morphine and zofran  Medications   0.9% sodium chloride BOLUS (0 mLs Intravenous Stopped 6/21/20 0357)     Followed by   sodium chloride 0.9% infusion (1,000 mLs Intravenous New Bag 6/21/20 0420)   morphine (PF) injection 4 mg (4 mg Intravenous Given 6/21/20 0257)   ondansetron (ZOFRAN) injection 4 mg (4 mg Intravenous Given 6/21/20 0257)   ceFEPIme (MAXIPIME) 2 g vial to attach to  ml bag for ADULTS or 50 ml bag for PEDS (2 g Intravenous New Bag 6/21/20 0420)   metroNIDAZOLE (FLAGYL) infusion 500 mg (has no administration in time range)       Patient's response to treatments and/or interventions: resting comfortably    To be done/followed up on inpatient unit:  general surgery    Does this patient have any cognitive concerns?: none    Activity level - Baseline/Home:  Independent  Activity Level - Current:   Independent    Patient's Preferred language: English   Needed?: No    Isolation: None  Infection: Not Applicable- asymptomatic covid test ordered  Bariatric?: No    Vital Signs:   Vitals:    06/21/20 0207 06/21/20 0300 06/21/20 0400   BP: (!) 170/70 (!) 145/52 (!) 142/58   Pulse: 51 56 62   Resp: 14     Temp: 97.8  F (36.6  C)     TempSrc: Oral     SpO2:  97% 94%   Weight: 72.6 kg (160 lb)     Height: 1.727 m (5' 8\")         Cardiac " Rhythm:     Was the PSS-3 completed:   Yes  What interventions are required if any?               Family Comments: daughter available by phone  OBS brochure/video discussed/provided to patient/family: No              Name of person given brochure if not patient:               Relationship to patient:     For the majority of the shift this patient's behavior was Green.   Behavioral interventions performed were.    ED NURSE PHONE NUMBER: *[phone]

## 2020-06-21 NOTE — OP NOTE
Procedure Date: 06/21/2020      PREOPERATIVE DIAGNOSIS:  Ischemic small bowel.      POSTOPERATIVE DIAGNOSIS:  Ischemic small bowel due to internal hernia band closed loop obstruction.      PROCEDURE:   1.  Exploratory laparotomy.   a.  Lysis of internal hernia band.      SURGEON:  Jose Reed MD      :  Charisse Pitt MD ([facility] Surgery Resident)      ANESTHESIA:  General.      PREOPERATIVE MEDICATIONS:  Flagyl and Maxipime.      HISTORY:  An 86-year-old patient with a prior laparoscopic cholecystectomy developed worsening abdominal pain starting yesterday evening after supper.  He has marked tenderness in the right lower quadrant.  Leukocytosis with left shift is noted.  CT scan revealed a swollen distal segment of the ileum with evidence of portal air.  He does have significant calcification of his aorta and celiac-SMA, but these appear to be patent.  It was felt that emergent exploratory laparotomy was indicated.      PROCEDURE IN DETAIL:  The patient was brought to the operating room, induced under general anesthesia, orally intubated without difficulty.  Due to COVID-19 restrictions, we left the room for 14 minutes.  Calf pneumatic compression boots were used, and pillows placed under his knees.  The patient was relatively thin and had no abdominal distention.  No hernias.  Abdomen was prepped and draped.  Timeout was called and the sites were identified.      EXPLORATORY LAPAROTOMY:  Incision was made from above the umbilicus and continued below the umbilicus.  Fascia was opened.  Peritoneum was opened.  There was noted to be no turbid peritoneal fluid.  We easily identified an erythematous, swollen loop of distal jejunum approximately 20 cm from the ileocecal valve.  As we brought this into the anterior abdomen, we did not identify an obstructing band, but likely this was the cause.  The adjacent omentum was irritated.  This bowel was somewhat hemorrhagic, but not necrotic.  We ran the  small bowel from the ligament of Treitz to the ileocecal valve with no other abnormalities being noted.  The proximal bowel was very decompressed.  NG tube was placed and position confirmed in the stomach.  The rest of the exploration was unremarkable.      We had good Doppler signals within the mesentery, particularly the involved segment.  We watched this for approximately 15 minutes and noticed improvement of the involved jejunal segment that was approximately 15 cm in length.  We did not feel a resection was indicated since it did improve.      We placed multiple sheets of Seprafilm over the irritated area of the bowel and also the mesentery.  Bowel contents were returned to normal anatomical position.  The omentum was brought over this.  The peritoneum was closed with running 2-0 Vicryl.  Fascia was closed with running looped 0 PDS suture.  Subcutaneous tissue was irrigated and infiltrated with 0.5% Marcaine for post analgesia.  Skin was closed with 4-0 Monocryl in subcuticular fashion followed by sterile dressing.      The patient tolerated the procedure well.      ESTIMATED BLOOD LOSS:  Less than 5 mL.      COMPLICATIONS:  None.      OPERATIVE FINDINGS:  Ischemic segment of distal ileum, most likely due to a closed loop internal hernia band.  Though we did not identify the band, this obviously had been removed.  Bowel was viable and no resection was required.         MANUEL THOMAS MD             D: 2020   T: 2020   MT: VIANCA      Name:     VIRGINIA LAMBERT   MRN:      [id number]        Account:        [id number]   :      1933           Procedure Date: 2020      Document: [id number]       cc: Manuel Thomas MD

## 2020-06-22 LAB
ANION GAP SERPL CALCULATED.3IONS-SCNC: 6 MMOL/L (ref 3–14)
BUN SERPL-MCNC: 48 MG/DL (ref 7–30)
CALCIUM SERPL-MCNC: 8.1 MG/DL (ref 8.5–10.1)
CHLORIDE SERPL-SCNC: 113 MMOL/L (ref 94–109)
CO2 SERPL-SCNC: 19 MMOL/L (ref 20–32)
CREAT SERPL-MCNC: 1.88 MG/DL (ref 0.66–1.25)
ERYTHROCYTE [DISTWIDTH] IN BLOOD BY AUTOMATED COUNT: 14 % (ref 10–15)
GFR SERPL CREATININE-BSD FRML MDRD: 31 ML/MIN/{1.73_M2}
GLUCOSE BLDC GLUCOMTR-MCNC: 122 MG/DL (ref 70–99)
GLUCOSE SERPL-MCNC: 146 MG/DL (ref 70–99)
HCT VFR BLD AUTO: 31.4 % (ref 40–53)
HGB BLD-MCNC: 10.3 G/DL (ref 13.3–17.7)
LACTATE BLD-SCNC: 1.2 MMOL/L (ref 0.7–2)
MCH RBC QN AUTO: 31.5 PG (ref 26.5–33)
MCHC RBC AUTO-ENTMCNC: 32.8 G/DL (ref 31.5–36.5)
MCV RBC AUTO: 96 FL (ref 78–100)
PLATELET # BLD AUTO: 203 10E9/L (ref 150–450)
POTASSIUM SERPL-SCNC: 4.5 MMOL/L (ref 3.4–5.3)
RBC # BLD AUTO: 3.27 10E12/L (ref 4.4–5.9)
SODIUM SERPL-SCNC: 138 MMOL/L (ref 133–144)
WBC # BLD AUTO: 12.8 10E9/L (ref 4–11)

## 2020-06-22 PROCEDURE — 83605 ASSAY OF LACTIC ACID: CPT | Performed by: SURGERY

## 2020-06-22 PROCEDURE — 12000000 ZZH R&B MED SURG/OB

## 2020-06-22 PROCEDURE — 25000132 ZZH RX MED GY IP 250 OP 250 PS 637: Mod: GY | Performed by: INTERNAL MEDICINE

## 2020-06-22 PROCEDURE — 00000146 ZZHCL STATISTIC GLUCOSE BY METER IP

## 2020-06-22 PROCEDURE — 36415 COLL VENOUS BLD VENIPUNCTURE: CPT | Performed by: INTERNAL MEDICINE

## 2020-06-22 PROCEDURE — 36415 COLL VENOUS BLD VENIPUNCTURE: CPT | Performed by: SURGERY

## 2020-06-22 PROCEDURE — 99207 ZZC BUNDLED PROCEDURE IN GLOBAL PKG: CPT | Performed by: INTERNAL MEDICINE

## 2020-06-22 PROCEDURE — 25800025 ZZH RX 258: Performed by: SURGERY

## 2020-06-22 PROCEDURE — 99024 POSTOP FOLLOW-UP VISIT: CPT | Performed by: INTERNAL MEDICINE

## 2020-06-22 PROCEDURE — 25000128 H RX IP 250 OP 636: Performed by: SURGERY

## 2020-06-22 PROCEDURE — 85027 COMPLETE CBC AUTOMATED: CPT | Performed by: INTERNAL MEDICINE

## 2020-06-22 PROCEDURE — 80048 BASIC METABOLIC PNL TOTAL CA: CPT | Performed by: INTERNAL MEDICINE

## 2020-06-22 RX ORDER — HYDROCODONE BITARTRATE AND ACETAMINOPHEN 5; 325 MG/1; MG/1
1-2 TABLET ORAL EVERY 6 HOURS PRN
Status: DISCONTINUED | OUTPATIENT
Start: 2020-06-22 | End: 2020-06-24 | Stop reason: HOSPADM

## 2020-06-22 RX ADMIN — HYDRALAZINE HYDROCHLORIDE 50 MG: 50 TABLET, FILM COATED ORAL at 12:48

## 2020-06-22 RX ADMIN — ATORVASTATIN CALCIUM 20 MG: 10 TABLET, FILM COATED ORAL at 08:53

## 2020-06-22 RX ADMIN — AMLODIPINE BESYLATE 10 MG: 5 TABLET ORAL at 19:03

## 2020-06-22 RX ADMIN — DEXTROSE AND SODIUM CHLORIDE: 5; 450 INJECTION, SOLUTION INTRAVENOUS at 06:32

## 2020-06-22 RX ADMIN — AMLODIPINE BESYLATE 5 MG: 5 TABLET ORAL at 08:53

## 2020-06-22 RX ADMIN — HYDRALAZINE HYDROCHLORIDE 50 MG: 50 TABLET, FILM COATED ORAL at 08:53

## 2020-06-22 RX ADMIN — SPIRONOLACTONE 25 MG: 25 TABLET ORAL at 08:53

## 2020-06-22 RX ADMIN — ALLOPURINOL 200 MG: 100 TABLET ORAL at 08:53

## 2020-06-22 RX ADMIN — HYDRALAZINE HYDROCHLORIDE 50 MG: 50 TABLET, FILM COATED ORAL at 18:10

## 2020-06-22 RX ADMIN — HYDRALAZINE HYDROCHLORIDE 50 MG: 50 TABLET, FILM COATED ORAL at 21:24

## 2020-06-22 RX ADMIN — FENOFIBRATE 160 MG: 160 TABLET ORAL at 08:53

## 2020-06-22 RX ADMIN — DEXTROSE AND SODIUM CHLORIDE: 5; 450 INJECTION, SOLUTION INTRAVENOUS at 19:03

## 2020-06-22 RX ADMIN — ENOXAPARIN SODIUM 30 MG: 30 INJECTION SUBCUTANEOUS at 12:48

## 2020-06-22 ASSESSMENT — ACTIVITIES OF DAILY LIVING (ADL)
ADLS_ACUITY_SCORE: 16
ADLS_ACUITY_SCORE: 16
ADLS_ACUITY_SCORE: 12
ADLS_ACUITY_SCORE: 12
ADLS_ACUITY_SCORE: 16
ADLS_ACUITY_SCORE: 12

## 2020-06-22 NOTE — H&P
[facility]    History and Physical  Hospitalist       Date of Admission:  6/21/2020    Assessment & Plan   86 year old male with past medical hx of HTN and CRF stage 4, who presents to ER with abdominal pain and underwent surgery today with Dr. Reed:    Summary:    Principal Problem:    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/21/20   -- continue postop care    Active Problems:    Mild aortic stenosis -- Echo 2016      CRF (chronic renal failure), stage 4      Gout      CAD -- S/P CABG x 3 in 2001      HTN (hypertension)      Chronic diastolic CHF -- Grade 1-2 Dysfunction Echo 2016       Plan: Continue Postop care, check CBC and BMP in AM     DVT Prophylaxis: Pneumatic Compression Devices  Code Status: Full Code    Disposition: Expected discharge in 3-4 days, may need TCU    Missael Echavarria MD  Pager: [phone]  Cell Phone:  [phone]     Primary Care Physician   Warren Lovelace    Chief Complaint   Abd pain     History is obtained from Patient and ER Physician     History of Present Illness   86 year old male with a history of hypertension, hyperlipidemia, coronary artery disease, chronic kidney disease, chronic renal failure, gastroesophageal reflux disease, and abdominal aortic aneurysm among others who presents to the emergency department today for evaluation of abdominal pain that started after he ate dinner tonight and is gotten progressively worse and quite severe.  He describes as a epigastric and right-sided abdominal pain.  Ate dinner around 7 PM and it got worse after that.  He feels nauseated but has not had any vomiting.  No diarrhea or constipation issues.  He has had his gallbladder removed.  No history of obstruction.  He denies any fever, cough, rashes.  No known exposure to COVID.    Seen in ER, labs with WBC 19.7, Hgb 11.1, BUN 61 with Creat 2.17, Lipase 485 -- and abdominal CT concerning for ischemic colitis so taken directly to OR for exploratory  lap, and see afterwards and abdominal pain less after a closed loop was corrected with take down of adhesions.      PAST MEDICAL HISTORY    Past Medical History:   Diagnosis Date     Arthritis     rhuematoid     Coronary artery disease     triple bypass 2001     CRF (chronic renal failure)      Gastro-oesophageal reflux disease      Gout      Hyperlipidemia      Hypertension      Nocturia         PAST SURGICAL HISTORY    Past Surgical History:   Procedure Laterality Date     CARDIAC SURGERY       CHOLECYSTECTOMY       COLONOSCOPY       LAPAROTOMY, LYSIS ADHESIONS, COMBINED N/A 6/21/2020    Preliminary Information:  Procedure: EXPLORATORY LAPAROTOMY WITH LYSIS OF ADHESIONS;  Surgeon: Jose Reed MD;  Location:  OR     ORTHOPEDIC SURGERY       PHACOEMULSIFICATION CLEAR CORNEA WITH TORIC INTRAOCULAR LENS IMPLANT  1/30/2012    Procedure:PHACOEMULSIFICATION CLEAR CORNEA WITH TORIC INTRAOCULAR LENS IMPLANT; LEFT PHACOEMULSIFICATION CLEAR CORNEA WITH DELUXE  TORIC INTRAOCULAR LENS IMPLANT ; Surgeon:BRANDO HIGHTOWER; [place] [place]     PHACOEMULSIFICATION CLEAR CORNEA WITH TORIC INTRAOCULAR LENS IMPLANT  2/13/2012    Procedure:PHACOEMULSIFICATION CLEAR CORNEA WITH TORIC INTRAOCULAR LENS IMPLANT; RIGHT PHACOEMULSIFICATION CLEAR CORNEA WITH TORIC INTRAOCULAR LENS IMPLANT ; Surgeon:BRANDO HIGHTOWER; [place] [place]     VASCULAR SURGERY          Prior to Admission Medications   Prior to Admission Medications   Prescriptions Last Dose Informant Patient Reported? Taking?   ASPIRIN PO 6/20/2020 at Unknown time Self Yes Yes   Sig: Take 325 mg by mouth daily.   Cholecalciferol (VITAMIN D3 PO) 6/20/2020 at Unknown time Self Yes Yes   Sig: Take 1 tablet by mouth daily   allopurinol (ZYLOPRIM) 100 MG tablet 6/20/2020 at Unknown time  No Yes   Sig: TAKE 2 TABLETS BY MOUTH  DAILY   amLODIPine (NORVASC) 5 MG tablet 6/20/2020 at 0800  No Yes   Sig: Take 1 tablet (5 mg) by mouth every morning And 2 tablets (10 mg) by mouth  every evening.   atenolol (TENORMIN) 50 MG tablet 2020 at 0800  No Yes   Sig: Take 1 tablet (50 mg) by mouth daily   atorvastatin (LIPITOR) 20 MG tablet 2020 at Unknown time  No Yes   Sig: TAKE 1 TABLET BY MOUTH  EVERY DAY   coenzyme Q-10 capsule 2020 at Unknown time  Yes Yes   Sig: Take 1 capsule by mouth daily   diclofenac (VOLTAREN) 1 % topical gel prn  No Yes   Sig: Place 4 g onto the skin 4 times daily   Patient taking differently: Place 4 g onto the skin 4 times daily as needed for moderate pain    famotidine (PEPCID) 20 MG tablet prn  Yes Yes   Sig: Take 20 mg by mouth daily as needed (dyspepsia)   fenofibrate (TRIGLIDE/LOFIBRA) 160 MG tablet 2020 at Unknown time  No Yes   Sig: TAKE 1 TABLET(160 MG) BY MOUTH DAILY   furosemide (LASIX) 20 MG tablet 2020 at 0800  No Yes   Sig: Take 0.5 tablets (10 mg) by mouth daily   hydrALAZINE (APRESOLINE) 50 MG tablet 2020 at 2100  No Yes   Sig: TAKE 1 TABLET BY MOUTH 4  TIMES A DAY   spironolactone (ALDACTONE) 25 MG tablet 2020 at Unknown time  No Yes   Sig: Take 1 tablet (25 mg) by mouth every morning      Facility-Administered Medications: None     Allergies   Allergies   Allergen Reactions     Avocado      Ciprofloxacin Itching     Penicillins Itching       SOCIAL HISTORY    Social History     Social History Narrative     Not on file      Social History     Tobacco Use     Smoking status: Former Smoker     Types: Cigars     Last attempt to quit: 1980     Years since quittin.4     Smokeless tobacco: Never Used   Substance Use Topics     Alcohol use: Yes     Alcohol/week: 0.0 standard drinks     Comment: rarely     Drug use: No        FAMILY HISTORY    History reviewed. No pertinent family history.     Review of Systems   The 10 point Review of Systems is negative other than noted in the HPI or here.       PHYSICAL EXAM     Temp: 98.9  F (37.2  C) Temp src: Axillary BP: 139/52 Pulse: 58 Heart Rate: 57 Resp: 24 SpO2: 92 % O2  Device: Nasal cannula Oxygen Delivery: 4 LPM  Vital Signs with Ranges  Temp:  [97.3  F (36.3  C)-98.9  F (37.2  C)] 98.9  F (37.2  C)  Pulse:  [51-66] 58  Heart Rate:  [51-61] 57  Resp:  [13-29] 24  BP: (130-170)/(49-70) 139/52  SpO2:  [90 %-99 %] 92 %  160 lbs 0 oz    Constitutional: Awake, alert, cooperative, no apparent distress.  Eyes: Conjunctiva and pupils examined and normal.  HEENT: Moist mucous membranes, normal dentition.  Respiratory: Clear to auscultation bilaterally, no crackles or wheezing.  Cardiovascular: Regular rate and rhythm, normal S1 and S2, 3/6 systolic murmur over entire chest, no carotid bruits.  No ankle edema.   GI: Soft, mild mid abdominal tenderness and no bowel sounds, but just out of surgery about 1 hour ago.   Lymph/Hematologic: No anterior cervical, supraclavicular or axillary adenopathy.  Skin: No rashes, no cyanosis.   Musculoskeletal: No joint swelling, erythema or tenderness.  Neurologic: Alert, Ox3, Cranial nerves 2-12 intact, no focal weakness or numbness  Psychiatric:  No obvious anxiety or depression.    Data      Recent Labs   Lab 06/21/20  1102 06/21/20  0211   WBC  --  19.7*   HGB  --  11.1*   MCV  --  95    298   NA  --  136   POTASSIUM  --  4.8   CHLORIDE  --  108   CO2  --  20   BUN  --  61*   CR 2.01* 2.17*   ANIONGAP  --  8   AGUSTÍN  --  8.9   GLC  --  156*   ALBUMIN  --  3.9   PROTTOTAL  --  7.6   BILITOTAL  --  0.5   ALKPHOS  --  67   ALT  --  22   AST  --  32   LIPASE  --  485*       Imaging:  Recent Results (from the past 24 hour(s))   Abd/pelvis CT no contrast - Stone Protocol    Narrative    EXAM: CT ABDOMEN PELVIS W/O CONTRAST  LOCATION: [facility]  DATE/TIME: 6/21/2020 3:20 AM    INDICATION: Right-sided abdominal pain. Leukocytosis.  COMPARISON: None.  TECHNIQUE: CT scan of the abdomen and pelvis was performed without IV contrast. Multiplanar reformats were obtained. Dose reduction techniques were used.  CONTRAST: None.    FINDINGS:   LOWER  CHEST: Aortic valvular calcification. Small amount of bilateral lower lung atelectasis or fibrosis. Minimal bronchiectasis both lung bases. Coronary artery calcifications.    HEPATOBILIARY: Small amount of portal venous gas image 28 series 3 and coronal image 37.    PANCREAS: Unremarkable.    SPLEEN: Normal in size. Small adjacent splenule.    ADRENAL GLANDS: No adrenal nodule.    KIDNEYS/BLADDER: No hydronephrosis. Numerous bilateral renal cysts. No hydroureter. Bladder partially filled.    BOWEL: Small fluid-filled hiatal hernia. Stomach filled with fluid. Moderate amount of intraluminal fecal debris. Appendix normal in caliber. Abnormally thickened ileal loop right lower quadrant image 140 series 3 and coronal image 31. There is adjacent   inflammatory change in the right lower quadrant. Scattered sigmoid diverticulosis.    LYMPH NODES: No adenopathy.    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 Remote limited calcified dissection distal aorta. Atherosclerosis involving the iliacs. Bilateral renal stents.    PELVIC ORGANS: Prostate is enlarged.    MUSCULOSKELETAL: Thoracolumbar degenerative changes. There is 4 mm of anterolisthesis of L4 with respect L5. Multilevel facet arthritis. Median sternotomy.      Impression    IMPRESSION:   1.  Findings suspicious for ischemic enteritis ileal loop right lower quadrant.  2.  Portal venous gas.  3.  No CT evidence of appendicitis.  4.  Extensive atherosclerosis.  5.  Focal aneurysmal dilatation distal abdominal aorta.  6.  Previous cholecystectomy.  7.  Multiple bilateral renal cysts.  8.  Sigmoid diverticulosis.    Report called to Dr. Casillas at 0344 hours.

## 2020-06-22 NOTE — PLAN OF CARE
AOx4. POD 2 VSS on 1 L NC, wean as able. Capno discontinued. C/o abd discomfort, declined intervention, PCA in place. BS: faint, no flatus. Midline incision covered with gauze, abd binder in place. Farooq removed, voiding well in BR. NG removed. Clear liquid diet, tolerating well. Up SBA. Amublated wong x3. Chair for meals. Plan discharge in 1-2 days pending bowel function. Continue to monitor.

## 2020-06-22 NOTE — PLAN OF CARE
Patient is A&Ox4. POD1 of Exploratory Laparotomy and Lysis of Adhesions. VSS ex hypertensive. On 3L oxygen via NC. C/o some abdominal pain, has Dilaudid PCA. Midline incision covered with gauze. Bowel sounds faint. Farooq with good urine output. NG to low intermittent suction with dark brown output, flushed NG d/t tenacious output in NG. No flatus. Calls appropriately.

## 2020-06-22 NOTE — PROGRESS NOTES
Vascular Surgery Progress Note    S: Overall very comfortable.  No nausea.  Pain is very well controlled.       Was up walking last evening.    O:   Vitals:  BP  Min: 130/49  Max: 157/54  Temp  Av.9  F (36.6  C)  Min: 97.3  F (36.3  C)  Max: 98.9  F (37.2  C)  Pulse  Av.9  Min: 51  Max: 59  I/O last 3 completed shifts:  In: 2742 [I.V.:2742]  Out:  [Urine:2000; Emesis/NG output:75; Blood:5]    Physical Exam: Alert and appropriate.  Very comfortable.                            Chest = clear                            Abdomen= nondistended.  Abdominal binder on.                                  Minimal NG output.                                  Good urine output.    Laboratory: K= 4.5   serum creatinine= 1.88 (improved from baseline CKI)                      WBC= 12.8 (improved)   Hgb= 10.3                    COVID-19 testing negative        Assessment/Plan: Doing very well for ischemic bowel secondary to suspected proceeds to obstruction from abdominal adhesion.  Bowel is very viable.  Will remove NG tube and Farooq.  Gradually increase diet.  Continue with ambulation.      Wm. Brianna MD

## 2020-06-22 NOTE — PROGRESS NOTES
[facility]    Medicine Progress Note - Hospitalist Service       Date of Admission:  6/21/2020  Assessment & Plan   Trevor Soni is a 86 year old male with past medical hx of HTN and CRF stage 4, who presents to ER with abdominal pain and underwent surgery today with Dr. Reed:     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   POD #1 Lysis of Adhesions   Patient presented with abdominal pain.  Imaging concerning for loop of ischemic bowel with possible closed loop obstruction.  Taken to the OR on 6/21 with repair and doing well post-op  - Vascular surgery following and appreciate their assistance.  Defer post-op cares to surgery     Active Problems:    Mild aortic stenosis -- Echo 2016       CRF (chronic renal failure), stage 4       Gout       CAD -- S/P CABG x 3 in 2001       HTN (hypertension)       Chronic diastolic CHF -- Grade 1-2 Dysfunction Echo 2016       Diet: Clear Liquid Diet    DVT Prophylaxis: Enoxaparin (Lovenox) SQ  Farooq Catheter: not present  Code Status: Full Code           Disposition Plan   Expected discharge: TBD, likely home after bowel function improved, tolerating diet and cleared by surgery.  Entered: Jean Carlos Ramey DO 06/22/2020, 11:00 AM       The patient's care was discussed with the Bedside Nurse, Care Coordinator/ and Patient.    Jean Carlos Ramey DO  Hospitalist Service  [facility]    ______________________________________________________________________    Interval History   Patient seen and examined.  No acute events over night.  No fevers or chills.  Abdominal pain is well controlled at this time.  No bowel movement yet.  No nausea or vomiting    Data reviewed today: I reviewed all medications, new labs and imaging results over the last 24 hours. I personally reviewed no images or EKG's today.    Physical Exam   Vital Signs: Temp: 98.1  F (36.7  C) Temp src: Oral BP: (!) 140/50 Pulse: 58 Heart Rate: 62 Resp: 25 SpO2: 92 % O2 Device: Nasal  cannula Oxygen Delivery: 2 LPM  Weight: 160 lbs 0 oz  General Appearance: Resting comfortably in chair. NAD   Respiratory: Clear to auscultation.  No respiratory distress  Cardiovascular: RRR.  3/6 murmur noted  GI: Bowel sounds absent.  Non-distended.  Soft  Skin: No rashes.  No cyanosis  Other: No edema.  No calf tenderness      Data   Recent Labs   Lab 06/22/20  0713 06/21/20  1102 06/21/20  0211   WBC 12.8*  --  19.7*   HGB 10.3*  --  11.1*   MCV 96  --  95    243 298     --  136   POTASSIUM 4.5  --  4.8   CHLORIDE 113*  --  108   CO2 19*  --  20   BUN 48*  --  61*   CR 1.88* 2.01* 2.17*   ANIONGAP 6  --  8   AGUSTÍN 8.1*  --  8.9   *  --  156*   ALBUMIN  --   --  3.9   PROTTOTAL  --   --  7.6   BILITOTAL  --   --  0.5   ALKPHOS  --   --  67   ALT  --   --  22   AST  --   --  32   LIPASE  --   --  485*     No results found for this or any previous visit (from the past 24 hour(s)).

## 2020-06-22 NOTE — PHARMACY-ADMISSION MEDICATION HISTORY
Pharmacy Medication History  Admission medication history interview status for the 6/21/2020  admission is complete. See EPIC admission navigator for prior to admission medications     Medication history sources: Patient and Surescripts  Medication history source reliability: Moderate, pt a little unsure of his meds  Adherence assessment: Good    Significant changes made to the medication list:  Removed tyl #3, flexeril, TAC. Changed pepcid and voltaren to prn      Additional medication history information:   none    Medication reconciliation completed by provider prior to medication history? No    Time spent in this activity: 15 minutes      Prior to Admission medications    Medication Sig Last Dose Taking? Auth Provider   allopurinol (ZYLOPRIM) 100 MG tablet TAKE 2 TABLETS BY MOUTH  DAILY 6/20/2020 at Unknown time Yes Warren Lovelace MD   amLODIPine (NORVASC) 5 MG tablet Take 1 tablet (5 mg) by mouth every morning And 2 tablets (10 mg) by mouth every evening. 6/20/2020 at 0800 Yes Warren Lovelace MD   ASPIRIN PO Take 325 mg by mouth daily. 6/20/2020 at Unknown time Yes Reported, Patient   atenolol (TENORMIN) 50 MG tablet Take 1 tablet (50 mg) by mouth daily 6/20/2020 at 0800 Yes Warren Lovelace MD   atorvastatin (LIPITOR) 20 MG tablet TAKE 1 TABLET BY MOUTH  EVERY DAY 6/20/2020 at Unknown time Yes Warren Lovelace MD   Cholecalciferol (VITAMIN D3 PO) Take 1 tablet by mouth daily 6/20/2020 at Unknown time Yes Unknown, Entered By History   coenzyme Q-10 capsule Take 1 capsule by mouth daily 6/20/2020 at Unknown time Yes Reported, Patient   diclofenac (VOLTAREN) 1 % topical gel Place 4 g onto the skin 4 times daily  Patient taking differently: Place 4 g onto the skin 4 times daily as needed for moderate pain  prn Yes Warren Lovelace MD   famotidine (PEPCID) 20 MG tablet Take 20 mg by mouth daily as needed (dyspepsia) prn Yes Unknown, Entered By History   fenofibrate (TRIGLIDE/LOFIBRA) 160 MG tablet TAKE 1  TABLET(160 MG) BY MOUTH DAILY 6/20/2020 at Unknown time Yes Warren Lovelace MD   furosemide (LASIX) 20 MG tablet Take 0.5 tablets (10 mg) by mouth daily 6/20/2020 at 0800 Yes Warren Lovelace MD   hydrALAZINE (APRESOLINE) 50 MG tablet TAKE 1 TABLET BY MOUTH 4  TIMES A DAY 6/20/2020 at 2100 Yes Warren Lovelace MD   spironolactone (ALDACTONE) 25 MG tablet Take 1 tablet (25 mg) by mouth every morning 6/20/2020 at Unknown time Yes Warren Lovelace MD

## 2020-06-23 PROCEDURE — 25000132 ZZH RX MED GY IP 250 OP 250 PS 637: Mod: GY | Performed by: INTERNAL MEDICINE

## 2020-06-23 PROCEDURE — 25000128 H RX IP 250 OP 636: Performed by: SURGERY

## 2020-06-23 PROCEDURE — 25800025 ZZH RX 258: Performed by: SURGERY

## 2020-06-23 PROCEDURE — 12000000 ZZH R&B MED SURG/OB

## 2020-06-23 PROCEDURE — 99024 POSTOP FOLLOW-UP VISIT: CPT | Performed by: INTERNAL MEDICINE

## 2020-06-23 RX ORDER — ACETAMINOPHEN 325 MG/1
325 TABLET ORAL EVERY 4 HOURS PRN
Status: DISCONTINUED | OUTPATIENT
Start: 2020-06-23 | End: 2020-06-24 | Stop reason: HOSPADM

## 2020-06-23 RX ADMIN — AMLODIPINE BESYLATE 5 MG: 5 TABLET ORAL at 07:48

## 2020-06-23 RX ADMIN — HYDRALAZINE HYDROCHLORIDE 50 MG: 50 TABLET, FILM COATED ORAL at 17:40

## 2020-06-23 RX ADMIN — ALLOPURINOL 200 MG: 100 TABLET ORAL at 07:48

## 2020-06-23 RX ADMIN — SPIRONOLACTONE 25 MG: 25 TABLET ORAL at 07:48

## 2020-06-23 RX ADMIN — ENOXAPARIN SODIUM 30 MG: 30 INJECTION SUBCUTANEOUS at 11:59

## 2020-06-23 RX ADMIN — HYDRALAZINE HYDROCHLORIDE 50 MG: 50 TABLET, FILM COATED ORAL at 21:00

## 2020-06-23 RX ADMIN — AMLODIPINE BESYLATE 10 MG: 5 TABLET ORAL at 19:30

## 2020-06-23 RX ADMIN — ATORVASTATIN CALCIUM 20 MG: 10 TABLET, FILM COATED ORAL at 07:48

## 2020-06-23 RX ADMIN — FENOFIBRATE 160 MG: 160 TABLET ORAL at 07:48

## 2020-06-23 RX ADMIN — DEXTROSE AND SODIUM CHLORIDE: 5; 450 INJECTION, SOLUTION INTRAVENOUS at 20:59

## 2020-06-23 RX ADMIN — DEXTROSE AND SODIUM CHLORIDE: 5; 450 INJECTION, SOLUTION INTRAVENOUS at 08:08

## 2020-06-23 RX ADMIN — HYDRALAZINE HYDROCHLORIDE 50 MG: 50 TABLET, FILM COATED ORAL at 11:59

## 2020-06-23 RX ADMIN — HYDRALAZINE HYDROCHLORIDE 50 MG: 50 TABLET, FILM COATED ORAL at 07:49

## 2020-06-23 ASSESSMENT — ACTIVITIES OF DAILY LIVING (ADL)
ADLS_ACUITY_SCORE: 14

## 2020-06-23 NOTE — PROGRESS NOTES
[facility]    Medicine Progress Note - Hospitalist Service       Date of Admission:  6/21/2020  Assessment & Plan   Trevor Soni is a 86 year old male with past medical hx of HTN and CRF stage 4, who presents to ER with abdominal pain and underwent surgery today with Dr. Reed:     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   POD #2 Lysis of Adhesions   Patient presented with abdominal pain.  Imaging concerning for loop of ischemic bowel with possible closed loop obstruction.  Taken to the OR on 6/21 with repair and doing well post-op  - Vascular surgery following and appreciate their assistance.  Defer post-op cares to surgery.  Would like to keep one more day and continue to monitor      Active Problems:    Mild aortic stenosis -- Echo 2016       CRF (chronic renal failure), stage 4       Gout       CAD -- S/P CABG x 3 in 2001       HTN (hypertension)       Chronic diastolic CHF -- Grade 1-2 Dysfunction Echo 2016         Diet: Full Liquid Diet    DVT Prophylaxis: Enoxaparin (Lovenox) SQ and Pneumatic Compression Devices  Farooq Catheter: not present  Code Status: Full Code           Disposition Plan   Expected discharge: Tomorrow, recommended to prior living arrangement once cleared by surgery.  Entered: Jean Carlos Ramey DO 06/23/2020, 11:43 AM       The patient's care was discussed with the Bedside Nurse, Care Coordinator/ and Patient.    Jean Carlos Ramey DO  Hospitalist Service  [facility]    ______________________________________________________________________    Interval History   Patient seen and examined.  No acute events over night.  No fevers or chills.  No     Data reviewed today: I reviewed all medications, new labs and imaging results over the last 24 hours. I personally reviewed no images or EKG's today.    Physical Exam   Vital Signs: Temp: 98.4  F (36.9  C) Temp src: Oral BP: 121/51   Heart Rate: 81 Resp: 18 SpO2: 93 % O2 Device: Nasal cannula Oxygen  Delivery: 1 LPM  Weight: 160 lbs 0 oz  General Appearance: Resting comfortably in chair.  NAD  Respiratory: Clear to auscultation.  No respiratory distress  Cardiovascular: RRR.  No obvious murmurs   GI: Abdominal binder in place.  Unable to appreciate bowel sounds due to the binder  Skin: No rashes.  No cyanosis  Other: No edema.  No calf tenderness      Data   Recent Labs   Lab 06/22/20  0713 06/21/20  1102 06/21/20  0211   WBC 12.8*  --  19.7*   HGB 10.3*  --  11.1*   MCV 96  --  95    243 298     --  136   POTASSIUM 4.5  --  4.8   CHLORIDE 113*  --  108   CO2 19*  --  20   BUN 48*  --  61*   CR 1.88* 2.01* 2.17*   ANIONGAP 6  --  8   AGUSTÍN 8.1*  --  8.9   *  --  156*   ALBUMIN  --   --  3.9   PROTTOTAL  --   --  7.6   BILITOTAL  --   --  0.5   ALKPHOS  --   --  67   ALT  --   --  22   AST  --   --  32   LIPASE  --   --  485*     No results found for this or any previous visit (from the past 24 hour(s)).

## 2020-06-23 NOTE — PLAN OF CARE
POD 3. A&Ox4, [place]. [place]. VSS. O2 @ 1-2 liters; 2 liters overnight d/t desating. Denied any pain overnight. Dilaudid PCA in place; did not use overnight. IVF infusing @ 75 mL/hr. Midline ABD incision covered with dressing, c/d/I. ABD binder in place. BS faint. No reports of flatus yet. Getting up with SBA, ambulating well to bathroom. Good UOP overnight. Discharge in 1-2 days.

## 2020-06-23 NOTE — PLAN OF CARE
Patient is A&Ox4. POD3. VSS on 1L oxygen via NC. Denies pain, Dilaudid PCA in place, unused. Midline incision covered, CDI. Abdominal binder in place. Voiding well in bathroom. Up SBA. Discharge in 1-2 days. Calls appropriately.

## 2020-06-23 NOTE — PLAN OF CARE
POD 3. AOx4, [place]. VSS on RA sating low 90's. Slight abd discomfort, declined intervention. PCA discontinued, oral meds available. Midline ABD incision covered with dressing, CDI. ABD binder in place. BS faint, passing gas. Slight tenderness on palpation. Up SBA, ambulated wong x3. Voiding adequately in BR. Full liquid diet, fair appetite, tolerating well. R PIV infusing @75. Discharge possibly tomorrow. Continue to monitor.

## 2020-06-23 NOTE — PROGRESS NOTES
Surgery Progress Note    S: Overall very comfortable though still some abdominal tenderness.  Tolerating liquids well.  Several episodes of flatus this morning.  Was up walking without difficulty yesterday.    O:   Vitals:  BP  Min: 117/43  Max: 167/61  Temp  Av.2  F (36.8  C)  Min: 97.8  F (36.6  C)  Max: 98.5  F (36.9  C)  I/O last 3 completed shifts:  In: 2506 [P.O.:680; I.V.:1826]  Out: 350 [Urine:350]    Physical Exam: Alert and appropriate.  Comfortable.                           Chest= clear                            Abdomen= still somewhat tender with palpation right lower                                                 quadrant.  Bowel sounds are normal.                              Wd=A      Assessment/Plan: Continues to improve.  Will gradually increase diet to  full liquids.  We will keep on IV fluids.  DC PCA.  Continue with ambulation.  Potentially home tomorrow.  Discussed with patient and staff.      Wm. Brianna MD

## 2020-06-24 VITALS
WEIGHT: 160 LBS | HEIGHT: 68 IN | TEMPERATURE: 98 F | BODY MASS INDEX: 24.25 KG/M2 | RESPIRATION RATE: 18 BRPM | HEART RATE: 82 BPM | SYSTOLIC BLOOD PRESSURE: 147 MMHG | OXYGEN SATURATION: 90 % | DIASTOLIC BLOOD PRESSURE: 54 MMHG

## 2020-06-24 DIAGNOSIS — E78.5 HYPERLIPIDEMIA LDL GOAL <100: ICD-10-CM

## 2020-06-24 DIAGNOSIS — I10 BENIGN ESSENTIAL HYPERTENSION: ICD-10-CM

## 2020-06-24 LAB
CREAT SERPL-MCNC: 1.58 MG/DL (ref 0.66–1.25)
GFR SERPL CREATININE-BSD FRML MDRD: 39 ML/MIN/{1.73_M2}
PLATELET # BLD AUTO: 234 10E9/L (ref 150–450)

## 2020-06-24 PROCEDURE — 25000132 ZZH RX MED GY IP 250 OP 250 PS 637: Mod: GY | Performed by: INTERNAL MEDICINE

## 2020-06-24 PROCEDURE — 36415 COLL VENOUS BLD VENIPUNCTURE: CPT | Performed by: SURGERY

## 2020-06-24 PROCEDURE — 99238 HOSP IP/OBS DSCHRG MGMT 30/<: CPT | Performed by: INTERNAL MEDICINE

## 2020-06-24 PROCEDURE — 85049 AUTOMATED PLATELET COUNT: CPT | Performed by: SURGERY

## 2020-06-24 PROCEDURE — 82565 ASSAY OF CREATININE: CPT | Performed by: SURGERY

## 2020-06-24 RX ORDER — ONDANSETRON 4 MG/1
4 TABLET, ORALLY DISINTEGRATING ORAL EVERY 6 HOURS PRN
Qty: 30 TABLET | Refills: 0 | Status: SHIPPED | OUTPATIENT
Start: 2020-06-24 | End: 2021-04-28

## 2020-06-24 RX ADMIN — ATORVASTATIN CALCIUM 20 MG: 10 TABLET, FILM COATED ORAL at 08:54

## 2020-06-24 RX ADMIN — HYDRALAZINE HYDROCHLORIDE 50 MG: 50 TABLET, FILM COATED ORAL at 08:54

## 2020-06-24 RX ADMIN — AMLODIPINE BESYLATE 5 MG: 5 TABLET ORAL at 08:54

## 2020-06-24 RX ADMIN — SPIRONOLACTONE 25 MG: 25 TABLET ORAL at 08:54

## 2020-06-24 RX ADMIN — ALLOPURINOL 200 MG: 100 TABLET ORAL at 08:54

## 2020-06-24 RX ADMIN — HYDRALAZINE HYDROCHLORIDE 50 MG: 50 TABLET, FILM COATED ORAL at 13:23

## 2020-06-24 RX ADMIN — FENOFIBRATE 160 MG: 160 TABLET ORAL at 08:54

## 2020-06-24 ASSESSMENT — ACTIVITIES OF DAILY LIVING (ADL)
ADLS_ACUITY_SCORE: 14
ADLS_ACUITY_SCORE: 14
ADLS_ACUITY_SCORE: 15
ADLS_ACUITY_SCORE: 16

## 2020-06-24 NOTE — DISCHARGE SUMMARY
[facility]  Hospitalist Discharge Summary      Date of Admission:  6/21/2020  Date of Discharge:  6/24/2020  Discharging Provider: Jean Carlos Ramey DO      Discharge Diagnoses   1. Ischemic bowel s/p ex lap with lysis of internal hernia band   2. Mild AS   3. CKD stage IV   4. Gout  5. H/o CAD s/p CABG  6. HTN   7. HFpEF, not decompensated    Follow-ups Needed After Discharge   Follow-up Appointments     Follow Up      Please call the [facility] ([facility]) at [phone] to   schedule a virtual follow-up appointment with Dr. Reed  in 1-2 weeks for   follow-up. This is also the number you can call for any vascular surgery   questions or concerns.         Follow-up and recommended labs and tests       Follow up with primary care provider, Warren Lovelace, within 1-2 weeks,   for hospital follow- up. No follow up labs or test are needed.           Unresulted Labs Ordered in the Past 30 Days of this Admission     No orders found from 5/22/2020 to 6/22/2020.        Discharge Disposition   Discharged to home  Condition at discharge: Stable    Hospital Course    Trevor Soni is a 86 year old male with past medical hx of HTN and CRF stage 4, who presents to ER with abdominal pain and underwent surgery today with Dr. Reed:     Ischemic bowel  Patient presented with abdominal pain.  Imaging concerning for loop of ischemic bowel with possible closed loop obstruction.  Taken to the OR on 6/21 with repair and doing well post-op  - Vascular surgery following and appreciate their assistance.  Defer post-op cares to surgery.  Able to advance diet and okay with discharge today.  Will follow up with patient in clinic      Consultations This Hospital Stay   CARE TRANSITION RN/SW IP CONSULT    Code Status   Full Code    Time Spent on this Encounter   I, Jean Carlos Ramey DO, personally saw the patient today and spent less than or equal to 30 minutes discharging this patient.       Jean Carlos RODRIGUEZ  Tanvir, DO  [facility]  ______________________________________________________________________    Physical Exam   Vital Signs: Temp: 98  F (36.7  C) Temp src: Oral BP: (!) 147/54 Pulse: 82 Heart Rate: 74 Resp: 18 SpO2: 90 % O2 Device: None (Room air) Oxygen Delivery: 1.5 LPM  Weight: 160 lbs 0 oz  General Appearance: Resting comfortably.  NAD   Respiratory: Clear to auscultation.  No respiratory distress  Cardiovascular: RRR.  3/6 systolic murmur  GI: Bowel sounds present.  Non-distended  Skin: No rashes.  No cyanosis  Other: No edema.  No calf tendernss         Primary Care Physician   Warren Lovelace    Discharge Orders      Follow Up    Please call the [place] [facility] ([facility]) at [phone] to schedule a virtual follow-up appointment with Dr. Reed  in 1-2 weeks for follow-up. This is also the number you can call for any vascular surgery questions or concerns.     Discharge Instructions    Your incision was closed using surgical sutures that will dissolve on their own. You can leave the sutured incision open to air 24 hours after surgery as long as there is no active drainage from the site. If there is any active drainage from the site, keep a dressing over the site until the drainage has stopped for 24 hours. There are no specific cares needed aside from keeping the area clean and dry, but you can use an over the counter ointment such as bacitracin or neosporin on the incisions if you prefer (as long as there is no active drainage). You may shower normally once the site has been dry for 24 hours (otherwise keep site dry), but avoid scrubbing that area or using harsh soaps. Pat dry after shower. Do not soak in the tub, swim, or otherwise submerge your surgical site in water until your incision is well healed.  If at the incision site you start having new or different discharge/drainage, foul smell, or new redness and warmth to the touch, you should call the vascular   surgery  office. Also call the office if you have a fever of 100.5F or greater.     Reason for your hospital stay    Bowel ischemia     Follow-up and recommended labs and tests     Follow up with primary care provider, Warren Lovelace, within 1-2 weeks, for hospital follow- up. No follow up labs or test are needed.     Activity    Your activity upon discharge: activity as tolerated and no driving for today     Diet    Follow this diet upon discharge: Orders Placed This Encounter      Regular Diet Adult       Significant Results and Procedures   Most Recent 3 CBC's:  Recent Labs   Lab Test 06/24/20  0720 06/22/20  0713 06/21/20  1102 06/21/20  0211 09/23/19  1204   WBC  --  12.8*  --  19.7* 8.8   HGB  --  10.3*  --  11.1* 10.8*   MCV  --  96  --  95 94    203 243 298 281     Most Recent 3 BMP's:  Recent Labs   Lab Test 06/24/20  0720 06/22/20  0713 06/21/20  1102 06/21/20  0211 11/04/19  1102   NA  --  138  --  136 141   POTASSIUM  --  4.5  --  4.8 4.0   CHLORIDE  --  113*  --  108 109   CO2  --  19*  --  20 23   BUN  --  48*  --  61* 29   CR 1.58* 1.88* 2.01* 2.17* 1.72*   ANIONGAP  --  6  --  8 9   AGUSTÍN  --  8.1*  --  8.9 9.2   GLC  --  146*  --  156* 94   ,   Results for orders placed or performed during the hospital encounter of 06/21/20   Abd/pelvis CT no contrast - [place] Protocol    Narrative    EXAM: CT ABDOMEN PELVIS W/O CONTRAST  LOCATION: [facility]  DATE/TIME: 6/21/2020 3:20 AM    INDICATION: Right-sided abdominal pain. Leukocytosis.  COMPARISON: None.  TECHNIQUE: CT scan of the abdomen and pelvis was performed without IV contrast. Multiplanar reformats were obtained. Dose reduction techniques were used.  CONTRAST: None.    FINDINGS:   LOWER CHEST: Aortic valvular calcification. Small amount of bilateral lower lung atelectasis or fibrosis. Minimal bronchiectasis both lung bases. Coronary artery calcifications.    HEPATOBILIARY: Small amount of portal venous gas image 28 series 3 and coronal  image 37.    PANCREAS: Unremarkable.    SPLEEN: Normal in size. Small adjacent splenule.    ADRENAL GLANDS: No adrenal nodule.    KIDNEYS/BLADDER: No hydronephrosis. Numerous bilateral renal cysts. No hydroureter. Bladder partially filled.    BOWEL: Small fluid-filled hiatal hernia. Stomach filled with fluid. Moderate amount of intraluminal fecal debris. Appendix normal in caliber. Abnormally thickened ileal loop right lower quadrant image 140 series 3 and coronal image 31. There is adjacent   inflammatory change in the right lower quadrant. Scattered sigmoid diverticulosis.    LYMPH NODES: No adenopathy.    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 Remote limited calcified dissection distal aorta. Atherosclerosis involving the iliacs. Bilateral renal stents.    PELVIC ORGANS: Prostate is enlarged.    MUSCULOSKELETAL: Thoracolumbar degenerative changes. There is 4 mm of anterolisthesis of L4 with respect L5. Multilevel facet arthritis. Median sternotomy.      Impression    IMPRESSION:   1.  Findings suspicious for ischemic enteritis ileal loop right lower quadrant.  2.  Portal venous gas.  3.  No CT evidence of appendicitis.  4.  Extensive atherosclerosis.  5.  Focal aneurysmal dilatation distal abdominal aorta.  6.  Previous cholecystectomy.  7.  Multiple bilateral renal cysts.  8.  Sigmoid diverticulosis.    Report called to Dr. Casillas at 0344 hours.           Discharge Medications   Current Discharge Medication List      START taking these medications    Details   ondansetron (ZOFRAN-ODT) 4 MG ODT tab Take 1 tablet (4 mg) by mouth every 6 hours as needed for nausea or vomiting  Qty: 30 tablet, Refills: 0    Comments: Future refills by PCP Dr. Warren Lovelace with phone number [phone].  Associated Diagnoses: Ischemic bowel disease (H)         CONTINUE these medications which have NOT CHANGED    Details   allopurinol (ZYLOPRIM) 100 MG  tablet TAKE 2 TABLETS BY MOUTH  DAILY  Qty: 180 tablet, Refills: 2    Associated Diagnoses: Hyperlipidemia LDL goal <100; Benign essential hypertension      amLODIPine (NORVASC) 5 MG tablet Take 1 tablet (5 mg) by mouth every morning And 2 tablets (10 mg) by mouth every evening.  Qty: 270 tablet, Refills: 3    Associated Diagnoses: Hyperlipidemia LDL goal <100; Benign essential hypertension      ASPIRIN PO Take 325 mg by mouth daily.      atenolol (TENORMIN) 50 MG tablet Take 1 tablet (50 mg) by mouth daily  Qty: 90 tablet, Refills: 2    Associated Diagnoses: Hyperlipidemia LDL goal <100; Benign essential hypertension      atorvastatin (LIPITOR) 20 MG tablet TAKE 1 TABLET BY MOUTH  EVERY DAY  Qty: 90 tablet, Refills: 2    Associated Diagnoses: Hyperlipidemia LDL goal <100; Benign essential hypertension      Cholecalciferol (VITAMIN D3 PO) Take 1 tablet by mouth daily      coenzyme Q-10 capsule Take 1 capsule by mouth daily      diclofenac (VOLTAREN) 1 % topical gel Place 4 g onto the skin 4 times daily  Qty: 300 g, Refills: 3    Associated Diagnoses: Primary osteoarthritis of both knees; Primary osteoarthritis involving multiple joints      famotidine (PEPCID) 20 MG tablet Take 20 mg by mouth daily as needed (dyspepsia)      fenofibrate (TRIGLIDE/LOFIBRA) 160 MG tablet TAKE 1 TABLET(160 MG) BY MOUTH DAILY  Qty: 90 tablet, Refills: 3    Associated Diagnoses: Hyperlipidemia LDL goal <100; Benign essential hypertension      furosemide (LASIX) 20 MG tablet Take 0.5 tablets (10 mg) by mouth daily  Qty: 45 tablet, Refills: 3    Associated Diagnoses: Benign essential hypertension      hydrALAZINE (APRESOLINE) 50 MG tablet TAKE 1 TABLET BY MOUTH 4  TIMES A DAY  Qty: 360 tablet, Refills: 3    Associated Diagnoses: Hyperlipidemia LDL goal <100; Benign essential hypertension      spironolactone (ALDACTONE) 25 MG tablet Take 1 tablet (25 mg) by mouth every morning  Qty: 90 tablet, Refills: 3    Associated Diagnoses: Benign  essential hypertension           Allergies   Allergies   Allergen Reactions     Avocado      Ciprofloxacin Itching     Penicillins Itching

## 2020-06-24 NOTE — PLAN OF CARE
Patient is POD3. Patient is A&Ox4, [place]. VSS ex hypertensive. Denies pain. Abdominal incision covered with dressing, CDI. Passing gas. Some bowel sounds. Up SBA to bathroom. R PIV infusing IVF at 75 ml/hr. Possible discharge to home 6/24. Calls appropriately.

## 2020-06-24 NOTE — PROGRESS NOTES
"VASCULAR SURGERY PROGRESS NOTE    Subjective:  Overall very comfortable though still some abdominal tenderness.  Tolerating liquids well without nausea or vomiting, but complained about being up a lot during the night to void because he had so much. No BM, but passing gas. Pt ambulating well.     Objective:    Intake/Output Summary (Last 24 hours) at 6/24/2020 0807  Last data filed at 6/24/2020 0557  Gross per 24 hour   Intake 3326.25 ml   Output 1150 ml   Net 2176.25 ml     Labs:  ROUTINE IP LABS (Last four results)  BMP  Recent Labs   Lab 06/24/20  0720 06/22/20  0713 06/21/20  1102 06/21/20  0211   NA  --  138  --  136   POTASSIUM  --  4.5  --  4.8   CHLORIDE  --  113*  --  108   AGUSTÍN  --  8.1*  --  8.9   CO2  --  19*  --  20   BUN  --  48*  --  61*   CR 1.58* 1.88* 2.01* 2.17*   GLC  --  146*  --  156*     CBC  Recent Labs   Lab 06/24/20  0720 06/22/20  0713 06/21/20  1102 06/21/20  0211   WBC  --  12.8*  --  19.7*   RBC  --  3.27*  --  3.55*   HGB  --  10.3*  --  11.1*   HCT  --  31.4*  --  33.8*   MCV  --  96  --  95   MCH  --  31.5  --  31.3   MCHC  --  32.8  --  32.8   RDW  --  14.0  --  13.5    203 243 298     INRNo lab results found in last 7 days.  PHYSICAL EXAM:  /58 (BP Location: Left arm)   Pulse 82   Temp 98.1  F (36.7  C) (Oral)   Resp 18   Ht 1.727 m (5' 8\")   Wt 72.6 kg (160 lb)   SpO2 94%   BMI 24.33 kg/m    General: The patient is alert and oriented. Appropriate. No acute distress  Psych: pleasant affect, answers questions appropriately  Skin: Color appropriate for race, warm, dry.  Respiratory: The patient does not require supplemental oxygen. Breathing unlabored  GI:  Abdomen soft, non-distended, mildly tender to light palpation, dressing C/D/I, abdominal binder in place  Extremities: extremities warm and well-perfused        ASSESSMENT:  86M S/P Exploratory laparotomy and Lysis of internal hernia band 6/21/2020 with Dr. Reed. Continues to improve    PLAN:  -Regular " diet  -Continue to ambulate  -Continue abdominal binder  -OK for home later today if tolerating regular diet well    Sonya Gamez PA-C   Division of Vascular Surgery   Pager: [phone]      STAFF: Sitting up in chair.  Had clear liquid breakfast.  Passing flatus.  No abdominal pain.  Abdominal incision is healing well.  Wearing abdominal binder    Patient may go home today with his family.  Gradually increase his diet.  He may restart his aspirin.  Phone follow-up with me next week.    Patient had no further questions.      Jose Reed MD

## 2020-06-24 NOTE — PLAN OF CARE
POD 4. A/Ox4, [place]. VSS on 2L overnight, weaned this AM. Denies pain. Full liquid diet, denies nausea. Up w/ SBA + W to BR. Continent, adequate urine output overnight. Midline incision covered w/ surgical dressing CDI, abdominal binder in place. BS active and passing flatus. R PIV infusing D5 1/2NS at 75 mL/hr. Possible discharge home today.

## 2020-06-24 NOTE — PROGRESS NOTES
Patient discharged at 1:52 PM to home.  IV was discontinued. Pain at time of discharge was 0. Belongings returned to patient.  Discharge instructions and medications reviewed with patient.  Patient verbalized understanding and all questions were answered.  Prescriptions given to patient.  At time of discharge, patient condition was stable and left the unit escorted by nursing assistant.

## 2020-06-25 NOTE — TELEPHONE ENCOUNTER
Routing refill request to provider for review/approval because:  Labs out of range:  Creat  Please authorize if appropriate.  Thanks,  Kierra Anderson RN

## 2020-06-26 ENCOUNTER — TELEPHONE (OUTPATIENT)
Dept: FAMILY MEDICINE | Facility: CLINIC | Age: 85
End: 2020-06-26

## 2020-06-26 RX ORDER — AMLODIPINE BESYLATE 5 MG/1
TABLET ORAL
Qty: 270 TABLET | Refills: 3 | Status: SHIPPED | OUTPATIENT
Start: 2020-06-26 | End: 2021-01-27

## 2020-06-26 RX ORDER — ALLOPURINOL 100 MG/1
TABLET ORAL
Qty: 180 TABLET | Refills: 3 | Status: SHIPPED | OUTPATIENT
Start: 2020-06-26 | End: 2021-01-27

## 2020-06-26 NOTE — TELEPHONE ENCOUNTER
"ED / Discharge Outreach Protocol    Patient Contact    Attempt # 1    Was call answered?  Yes.  \"May I please speak with <Trevor>\"  Is patient available?   Yes      ED for acute condition Discharge Protocol    \"Hi, my name is Ruth Castaneda RN, a registered nurse, and I am calling from [facility].  I am calling to follow up and see how things are going for you after your recent emergency visit.\"    Tell me how you are doing now that you are home?\"     Denies concerns with infections     BM yesterday- good sized and formed, passing gas           Discharge Instructions    \"Let's review your discharge instructions.  What is/are the follow-up recommendations?  Pt. Response: F/U with Dr. Reed- scheduled already     F/u with PCP 1-2 weeks-- scheduled     \"Has an appointment with your primary care provider been scheduled?\"  Yes. (confirm and remind to bring meds)    Medications    \"Tell me what changed about your medicines when you discharged?\"    Zofran started, no other changes     \"What questions do you have about your medications?\"   None        Call Summary    \"What questions or concerns do you have about your recent visit and your follow-up care?\"     none    \"If you have questions or things don't continue to improve, we encourage you contact us through the main clinic number (give number).  Even if the clinic is not open, triage nurses are available 24/7 to help you.     We would like you to know that our clinic has extended hours (provide information).  We also have urgent care (provide details on closest location and hours/contact info)\"    \"Thank you for your time and take care!\"                  "

## 2020-06-26 NOTE — TELEPHONE ENCOUNTER
Chief Complaint: Acute Ischemic Enteritis (H), Ischemic Bowel Disease (H),  WED 24-JUN-2020 1 / 1    [phone] (home) NONE (work)

## 2020-06-30 ENCOUNTER — APPOINTMENT (OUTPATIENT)
Dept: CT IMAGING | Facility: CLINIC | Age: 85
DRG: 329 | End: 2020-06-30
Attending: EMERGENCY MEDICINE
Payer: MEDICARE

## 2020-06-30 ENCOUNTER — ANESTHESIA (OUTPATIENT)
Dept: SURGERY | Facility: CLINIC | Age: 85
DRG: 329 | End: 2020-06-30
Payer: MEDICARE

## 2020-06-30 ENCOUNTER — ANESTHESIA EVENT (OUTPATIENT)
Dept: SURGERY | Facility: CLINIC | Age: 85
DRG: 329 | End: 2020-06-30
Payer: MEDICARE

## 2020-06-30 ENCOUNTER — HOSPITAL ENCOUNTER (INPATIENT)
Facility: CLINIC | Age: 85
LOS: 5 days | Discharge: HOME | DRG: 329 | End: 2020-07-05
Attending: EMERGENCY MEDICINE | Admitting: SURGERY
Payer: MEDICARE

## 2020-06-30 DIAGNOSIS — K63.1 PERFORATED BOWEL (H): ICD-10-CM

## 2020-06-30 PROBLEM — Z98.890 STATUS POST EXPLORATORY LAPAROTOMY: Status: ACTIVE | Noted: 2020-06-30

## 2020-06-30 LAB
ABO + RH BLD: NORMAL
ABO + RH BLD: NORMAL
ALBUMIN SERPL-MCNC: 3.1 G/DL (ref 3.4–5)
ALP SERPL-CCNC: 79 U/L (ref 40–150)
ALT SERPL W P-5'-P-CCNC: 26 U/L (ref 0–70)
ANION GAP SERPL CALCULATED.3IONS-SCNC: 10 MMOL/L (ref 3–14)
AST SERPL W P-5'-P-CCNC: 17 U/L (ref 0–45)
BASOPHILS # BLD AUTO: 0 10E9/L (ref 0–0.2)
BASOPHILS NFR BLD AUTO: 0.1 %
BILIRUB SERPL-MCNC: 0.5 MG/DL (ref 0.2–1.3)
BLD GP AB SCN SERPL QL: NORMAL
BLOOD BANK CMNT PATIENT-IMP: NORMAL
BUN SERPL-MCNC: 39 MG/DL (ref 7–30)
CALCIUM SERPL-MCNC: 8.8 MG/DL (ref 8.5–10.1)
CHLORIDE SERPL-SCNC: 108 MMOL/L (ref 94–109)
CO2 SERPL-SCNC: 19 MMOL/L (ref 20–32)
CREAT BLD-MCNC: 2.2 MG/DL (ref 0.66–1.25)
CREAT SERPL-MCNC: 2.01 MG/DL (ref 0.66–1.25)
DIFFERENTIAL METHOD BLD: ABNORMAL
EOSINOPHIL # BLD AUTO: 0.1 10E9/L (ref 0–0.7)
EOSINOPHIL NFR BLD AUTO: 0.3 %
ERYTHROCYTE [DISTWIDTH] IN BLOOD BY AUTOMATED COUNT: 14 % (ref 10–15)
GFR SERPL CREATININE-BSD FRML MDRD: 29 ML/MIN/{1.73_M2}
GFR SERPL CREATININE-BSD FRML MDRD: 29 ML/MIN/{1.73_M2}
GLUCOSE SERPL-MCNC: 150 MG/DL (ref 70–99)
HCT VFR BLD AUTO: 33.7 % (ref 40–53)
HGB BLD-MCNC: 10.8 G/DL (ref 13.3–17.7)
IMM GRANULOCYTES # BLD: 0.1 10E9/L (ref 0–0.4)
IMM GRANULOCYTES NFR BLD: 0.6 %
LIPASE SERPL-CCNC: 219 U/L (ref 73–393)
LYMPHOCYTES # BLD AUTO: 1 10E9/L (ref 0.8–5.3)
LYMPHOCYTES NFR BLD AUTO: 5.4 %
MCH RBC QN AUTO: 30.8 PG (ref 26.5–33)
MCHC RBC AUTO-ENTMCNC: 32 G/DL (ref 31.5–36.5)
MCV RBC AUTO: 96 FL (ref 78–100)
MONOCYTES # BLD AUTO: 0.5 10E9/L (ref 0–1.3)
MONOCYTES NFR BLD AUTO: 2.9 %
NEUTROPHILS # BLD AUTO: 15.8 10E9/L (ref 1.6–8.3)
NEUTROPHILS NFR BLD AUTO: 90.7 %
NRBC # BLD AUTO: 0 10*3/UL
NRBC BLD AUTO-RTO: 0 /100
PLATELET # BLD AUTO: 356 10E9/L (ref 150–450)
POTASSIUM SERPL-SCNC: 4.7 MMOL/L (ref 3.4–5.3)
PROT SERPL-MCNC: 6.8 G/DL (ref 6.8–8.8)
RADIOLOGIST FLAGS: ABNORMAL
RBC # BLD AUTO: 3.51 10E12/L (ref 4.4–5.9)
SARS-COV-2 PCR COMMENT: NORMAL
SARS-COV-2 RNA SPEC QL NAA+PROBE: NEGATIVE
SARS-COV-2 RNA SPEC QL NAA+PROBE: NORMAL
SODIUM SERPL-SCNC: 137 MMOL/L (ref 133–144)
SPECIMEN EXP DATE BLD: NORMAL
SPECIMEN SOURCE: NORMAL
SPECIMEN SOURCE: NORMAL
WBC # BLD AUTO: 17.4 10E9/L (ref 4–11)

## 2020-06-30 PROCEDURE — 25000566 ZZH SEVOFLURANE, EA 15 MIN: Performed by: SURGERY

## 2020-06-30 PROCEDURE — C9803 HOPD COVID-19 SPEC COLLECT: HCPCS

## 2020-06-30 PROCEDURE — 88307 TISSUE EXAM BY PATHOLOGIST: CPT | Mod: 26 | Performed by: SURGERY

## 2020-06-30 PROCEDURE — 96375 TX/PRO/DX INJ NEW DRUG ADDON: CPT

## 2020-06-30 PROCEDURE — 25000125 ZZHC RX 250: Performed by: PHYSICIAN ASSISTANT

## 2020-06-30 PROCEDURE — 25800030 ZZH RX IP 258 OP 636: Performed by: REGISTERED NURSE

## 2020-06-30 PROCEDURE — 99285 EMERGENCY DEPT VISIT HI MDM: CPT | Mod: 25

## 2020-06-30 PROCEDURE — 74176 CT ABD & PELVIS W/O CONTRAST: CPT

## 2020-06-30 PROCEDURE — 25800030 ZZH RX IP 258 OP 636: Performed by: ANESTHESIOLOGY

## 2020-06-30 PROCEDURE — 36000063 ZZH SURGERY LEVEL 4 EA 15 ADDTL MIN: Performed by: SURGERY

## 2020-06-30 PROCEDURE — 25000128 H RX IP 250 OP 636: Performed by: REGISTERED NURSE

## 2020-06-30 PROCEDURE — 87070 CULTURE OTHR SPECIMN AEROBIC: CPT | Performed by: SURGERY

## 2020-06-30 PROCEDURE — 37000009 ZZH ANESTHESIA TECHNICAL FEE, EACH ADDTL 15 MIN: Performed by: SURGERY

## 2020-06-30 PROCEDURE — 85049 AUTOMATED PLATELET COUNT: CPT | Performed by: PHYSICIAN ASSISTANT

## 2020-06-30 PROCEDURE — 85025 COMPLETE CBC W/AUTO DIFF WBC: CPT | Performed by: EMERGENCY MEDICINE

## 2020-06-30 PROCEDURE — 86850 RBC ANTIBODY SCREEN: CPT | Performed by: ANESTHESIOLOGY

## 2020-06-30 PROCEDURE — 0DBB0ZZ EXCISION OF ILEUM, OPEN APPROACH: ICD-10-PCS | Performed by: SURGERY

## 2020-06-30 PROCEDURE — 25000125 ZZHC RX 250: Performed by: REGISTERED NURSE

## 2020-06-30 PROCEDURE — 27210794 ZZH OR GENERAL SUPPLY STERILE: Performed by: SURGERY

## 2020-06-30 PROCEDURE — 96365 THER/PROPH/DIAG IV INF INIT: CPT

## 2020-06-30 PROCEDURE — 96376 TX/PRO/DX INJ SAME DRUG ADON: CPT

## 2020-06-30 PROCEDURE — 82565 ASSAY OF CREATININE: CPT

## 2020-06-30 PROCEDURE — 25000128 H RX IP 250 OP 636: Performed by: ANESTHESIOLOGY

## 2020-06-30 PROCEDURE — 87106 FUNGI IDENTIFICATION YEAST: CPT | Performed by: SURGERY

## 2020-06-30 PROCEDURE — 71000012 ZZH RECOVERY PHASE 1 LEVEL 1 FIRST HR: Performed by: SURGERY

## 2020-06-30 PROCEDURE — 93005 ELECTROCARDIOGRAM TRACING: CPT

## 2020-06-30 PROCEDURE — 88307 TISSUE EXAM BY PATHOLOGIST: CPT | Performed by: SURGERY

## 2020-06-30 PROCEDURE — 80053 COMPREHEN METABOLIC PANEL: CPT | Performed by: EMERGENCY MEDICINE

## 2020-06-30 PROCEDURE — 36000093 ZZH SURGERY LEVEL 4 1ST 30 MIN: Performed by: SURGERY

## 2020-06-30 PROCEDURE — 93010 ELECTROCARDIOGRAM REPORT: CPT | Performed by: INTERNAL MEDICINE

## 2020-06-30 PROCEDURE — 25000128 H RX IP 250 OP 636: Performed by: PHYSICIAN ASSISTANT

## 2020-06-30 PROCEDURE — 83690 ASSAY OF LIPASE: CPT | Performed by: EMERGENCY MEDICINE

## 2020-06-30 PROCEDURE — 12000000 ZZH R&B MED SURG/OB

## 2020-06-30 PROCEDURE — 99222 1ST HOSP IP/OBS MODERATE 55: CPT | Mod: 57 | Performed by: SURGERY

## 2020-06-30 PROCEDURE — 87076 CULTURE ANAEROBE IDENT EACH: CPT | Performed by: SURGERY

## 2020-06-30 PROCEDURE — 86900 BLOOD TYPING SEROLOGIC ABO: CPT | Performed by: ANESTHESIOLOGY

## 2020-06-30 PROCEDURE — 71000013 ZZH RECOVERY PHASE 1 LEVEL 1 EA ADDTL HR: Performed by: SURGERY

## 2020-06-30 PROCEDURE — U0003 INFECTIOUS AGENT DETECTION BY NUCLEIC ACID (DNA OR RNA); SEVERE ACUTE RESPIRATORY SYNDROME CORONAVIRUS 2 (SARS-COV-2) (CORONAVIRUS DISEASE [COVID-19]), AMPLIFIED PROBE TECHNIQUE, MAKING USE OF HIGH THROUGHPUT TECHNOLOGIES AS DESCRIBED BY CMS-2020-01-R: HCPCS | Performed by: EMERGENCY MEDICINE

## 2020-06-30 PROCEDURE — 40000170 ZZH STATISTIC PRE-PROCEDURE ASSESSMENT II: Performed by: SURGERY

## 2020-06-30 PROCEDURE — 25000128 H RX IP 250 OP 636: Performed by: EMERGENCY MEDICINE

## 2020-06-30 PROCEDURE — 87075 CULTR BACTERIA EXCEPT BLOOD: CPT | Performed by: SURGERY

## 2020-06-30 PROCEDURE — 87186 SC STD MICRODIL/AGAR DIL: CPT | Performed by: SURGERY

## 2020-06-30 PROCEDURE — 86901 BLOOD TYPING SEROLOGIC RH(D): CPT | Performed by: ANESTHESIOLOGY

## 2020-06-30 PROCEDURE — 25000125 ZZHC RX 250: Performed by: SURGERY

## 2020-06-30 PROCEDURE — 37000008 ZZH ANESTHESIA TECHNICAL FEE, 1ST 30 MIN: Performed by: SURGERY

## 2020-06-30 PROCEDURE — 25800030 ZZH RX IP 258 OP 636: Performed by: PHYSICIAN ASSISTANT

## 2020-06-30 PROCEDURE — 87077 CULTURE AEROBIC IDENTIFY: CPT | Performed by: SURGERY

## 2020-06-30 PROCEDURE — 25800030 ZZH RX IP 258 OP 636: Performed by: EMERGENCY MEDICINE

## 2020-06-30 RX ORDER — HYDROMORPHONE HYDROCHLORIDE 1 MG/ML
.3-.5 INJECTION, SOLUTION INTRAMUSCULAR; INTRAVENOUS; SUBCUTANEOUS
Status: DISCONTINUED | OUTPATIENT
Start: 2020-06-30 | End: 2020-06-30 | Stop reason: HOSPADM

## 2020-06-30 RX ORDER — PROCHLORPERAZINE MALEATE 5 MG
5 TABLET ORAL EVERY 6 HOURS PRN
Status: DISCONTINUED | OUTPATIENT
Start: 2020-06-30 | End: 2020-07-05 | Stop reason: HOSPADM

## 2020-06-30 RX ORDER — ONDANSETRON 2 MG/ML
4 INJECTION INTRAMUSCULAR; INTRAVENOUS EVERY 30 MIN PRN
Status: DISCONTINUED | OUTPATIENT
Start: 2020-06-30 | End: 2020-06-30 | Stop reason: HOSPADM

## 2020-06-30 RX ORDER — NEOSTIGMINE METHYLSULFATE 1 MG/ML
VIAL (ML) INJECTION PRN
Status: DISCONTINUED | OUTPATIENT
Start: 2020-06-30 | End: 2020-06-30

## 2020-06-30 RX ORDER — MAGNESIUM HYDROXIDE 1200 MG/15ML
LIQUID ORAL PRN
Status: DISCONTINUED | OUTPATIENT
Start: 2020-06-30 | End: 2020-06-30 | Stop reason: HOSPADM

## 2020-06-30 RX ORDER — DIPHENHYDRAMINE HYDROCHLORIDE 50 MG/ML
12.5 INJECTION INTRAMUSCULAR; INTRAVENOUS EVERY 6 HOURS PRN
Status: DISCONTINUED | OUTPATIENT
Start: 2020-06-30 | End: 2020-07-05 | Stop reason: HOSPADM

## 2020-06-30 RX ORDER — HYDROMORPHONE HYDROCHLORIDE 1 MG/ML
0.5 INJECTION, SOLUTION INTRAMUSCULAR; INTRAVENOUS; SUBCUTANEOUS
Status: COMPLETED | OUTPATIENT
Start: 2020-06-30 | End: 2020-07-01

## 2020-06-30 RX ORDER — METOPROLOL TARTRATE 1 MG/ML
5 INJECTION, SOLUTION INTRAVENOUS EVERY 6 HOURS
Status: DISCONTINUED | OUTPATIENT
Start: 2020-07-01 | End: 2020-07-01

## 2020-06-30 RX ORDER — DIPHENHYDRAMINE HCL 12.5MG/5ML
12.5 LIQUID (ML) ORAL EVERY 6 HOURS PRN
Status: DISCONTINUED | OUTPATIENT
Start: 2020-06-30 | End: 2020-07-05 | Stop reason: HOSPADM

## 2020-06-30 RX ORDER — HYDROMORPHONE HYDROCHLORIDE 1 MG/ML
.3-.5 INJECTION, SOLUTION INTRAMUSCULAR; INTRAVENOUS; SUBCUTANEOUS EVERY 5 MIN PRN
Status: DISCONTINUED | OUTPATIENT
Start: 2020-06-30 | End: 2020-06-30 | Stop reason: HOSPADM

## 2020-06-30 RX ORDER — FENTANYL CITRATE 50 UG/ML
25-50 INJECTION, SOLUTION INTRAMUSCULAR; INTRAVENOUS
Status: DISCONTINUED | OUTPATIENT
Start: 2020-06-30 | End: 2020-06-30 | Stop reason: HOSPADM

## 2020-06-30 RX ORDER — LORAZEPAM 2 MG/ML
1-2 INJECTION INTRAMUSCULAR EVERY 6 HOURS PRN
Status: DISCONTINUED | OUTPATIENT
Start: 2020-06-30 | End: 2020-07-05 | Stop reason: HOSPADM

## 2020-06-30 RX ORDER — FAMOTIDINE 20 MG/1
20 TABLET, FILM COATED ORAL AT BEDTIME
Status: DISCONTINUED | OUTPATIENT
Start: 2020-06-30 | End: 2020-07-05 | Stop reason: HOSPADM

## 2020-06-30 RX ORDER — SODIUM CHLORIDE, SODIUM LACTATE, POTASSIUM CHLORIDE, CALCIUM CHLORIDE 600; 310; 30; 20 MG/100ML; MG/100ML; MG/100ML; MG/100ML
INJECTION, SOLUTION INTRAVENOUS CONTINUOUS
Status: DISCONTINUED | OUTPATIENT
Start: 2020-06-30 | End: 2020-06-30 | Stop reason: HOSPADM

## 2020-06-30 RX ORDER — FENTANYL CITRATE 50 UG/ML
INJECTION, SOLUTION INTRAMUSCULAR; INTRAVENOUS PRN
Status: DISCONTINUED | OUTPATIENT
Start: 2020-06-30 | End: 2020-06-30

## 2020-06-30 RX ORDER — PROPOFOL 10 MG/ML
INJECTION, EMULSION INTRAVENOUS PRN
Status: DISCONTINUED | OUTPATIENT
Start: 2020-06-30 | End: 2020-06-30

## 2020-06-30 RX ORDER — SODIUM CHLORIDE 9 MG/ML
INJECTION, SOLUTION INTRAVENOUS CONTINUOUS PRN
Status: DISCONTINUED | OUTPATIENT
Start: 2020-06-30 | End: 2020-06-30

## 2020-06-30 RX ORDER — HEPARIN SODIUM 5000 [USP'U]/.5ML
5000 INJECTION, SOLUTION INTRAVENOUS; SUBCUTANEOUS EVERY 12 HOURS
Status: DISCONTINUED | OUTPATIENT
Start: 2020-07-01 | End: 2020-07-05 | Stop reason: HOSPADM

## 2020-06-30 RX ORDER — DEXAMETHASONE SODIUM PHOSPHATE 4 MG/ML
INJECTION, SOLUTION INTRA-ARTICULAR; INTRALESIONAL; INTRAMUSCULAR; INTRAVENOUS; SOFT TISSUE PRN
Status: DISCONTINUED | OUTPATIENT
Start: 2020-06-30 | End: 2020-06-30

## 2020-06-30 RX ORDER — ONDANSETRON 4 MG/1
4 TABLET, ORALLY DISINTEGRATING ORAL EVERY 30 MIN PRN
Status: DISCONTINUED | OUTPATIENT
Start: 2020-06-30 | End: 2020-06-30 | Stop reason: HOSPADM

## 2020-06-30 RX ORDER — LIDOCAINE 40 MG/G
CREAM TOPICAL
Status: DISCONTINUED | OUTPATIENT
Start: 2020-06-30 | End: 2020-07-05 | Stop reason: HOSPADM

## 2020-06-30 RX ORDER — LIDOCAINE HYDROCHLORIDE 20 MG/ML
INJECTION, SOLUTION INFILTRATION; PERINEURAL PRN
Status: DISCONTINUED | OUTPATIENT
Start: 2020-06-30 | End: 2020-06-30

## 2020-06-30 RX ORDER — SODIUM CHLORIDE 9 MG/ML
INJECTION, SOLUTION INTRAVENOUS CONTINUOUS
Status: DISCONTINUED | OUTPATIENT
Start: 2020-06-30 | End: 2020-07-02

## 2020-06-30 RX ORDER — ONDANSETRON 2 MG/ML
4 INJECTION INTRAMUSCULAR; INTRAVENOUS EVERY 6 HOURS PRN
Status: DISCONTINUED | OUTPATIENT
Start: 2020-06-30 | End: 2020-07-05 | Stop reason: HOSPADM

## 2020-06-30 RX ORDER — ONDANSETRON 2 MG/ML
4 INJECTION INTRAMUSCULAR; INTRAVENOUS EVERY 6 HOURS PRN
Status: DISCONTINUED | OUTPATIENT
Start: 2020-06-30 | End: 2020-07-03

## 2020-06-30 RX ORDER — NALOXONE HYDROCHLORIDE 0.4 MG/ML
.1-.4 INJECTION, SOLUTION INTRAMUSCULAR; INTRAVENOUS; SUBCUTANEOUS
Status: DISCONTINUED | OUTPATIENT
Start: 2020-06-30 | End: 2020-06-30

## 2020-06-30 RX ORDER — ONDANSETRON 2 MG/ML
INJECTION INTRAMUSCULAR; INTRAVENOUS PRN
Status: DISCONTINUED | OUTPATIENT
Start: 2020-06-30 | End: 2020-06-30

## 2020-06-30 RX ORDER — BUPIVACAINE HYDROCHLORIDE AND EPINEPHRINE 5; 5 MG/ML; UG/ML
INJECTION, SOLUTION PERINEURAL PRN
Status: DISCONTINUED | OUTPATIENT
Start: 2020-06-30 | End: 2020-06-30 | Stop reason: HOSPADM

## 2020-06-30 RX ORDER — NALOXONE HYDROCHLORIDE 0.4 MG/ML
.1-.4 INJECTION, SOLUTION INTRAMUSCULAR; INTRAVENOUS; SUBCUTANEOUS
Status: DISCONTINUED | OUTPATIENT
Start: 2020-06-30 | End: 2020-07-05 | Stop reason: HOSPADM

## 2020-06-30 RX ORDER — ONDANSETRON 4 MG/1
4 TABLET, ORALLY DISINTEGRATING ORAL EVERY 6 HOURS PRN
Status: DISCONTINUED | OUTPATIENT
Start: 2020-06-30 | End: 2020-07-05 | Stop reason: HOSPADM

## 2020-06-30 RX ORDER — EPHEDRINE SULFATE 50 MG/ML
INJECTION, SOLUTION INTRAMUSCULAR; INTRAVENOUS; SUBCUTANEOUS PRN
Status: DISCONTINUED | OUTPATIENT
Start: 2020-06-30 | End: 2020-06-30

## 2020-06-30 RX ORDER — GLYCOPYRROLATE 0.2 MG/ML
INJECTION, SOLUTION INTRAMUSCULAR; INTRAVENOUS PRN
Status: DISCONTINUED | OUTPATIENT
Start: 2020-06-30 | End: 2020-06-30

## 2020-06-30 RX ORDER — HYDROMORPHONE HYDROCHLORIDE 1 MG/ML
.3-.5 INJECTION, SOLUTION INTRAMUSCULAR; INTRAVENOUS; SUBCUTANEOUS
Status: DISCONTINUED | OUTPATIENT
Start: 2020-06-30 | End: 2020-07-05 | Stop reason: HOSPADM

## 2020-06-30 RX ADMIN — FAMOTIDINE 20 MG: 10 INJECTION, SOLUTION INTRAVENOUS at 21:11

## 2020-06-30 RX ADMIN — FENTANYL CITRATE 25 MCG: 50 INJECTION, SOLUTION INTRAMUSCULAR; INTRAVENOUS at 10:13

## 2020-06-30 RX ADMIN — DEXAMETHASONE SODIUM PHOSPHATE 4 MG: 4 INJECTION, SOLUTION INTRA-ARTICULAR; INTRALESIONAL; INTRAMUSCULAR; INTRAVENOUS; SOFT TISSUE at 09:45

## 2020-06-30 RX ADMIN — ROCURONIUM BROMIDE 50 MG: 10 INJECTION INTRAVENOUS at 09:38

## 2020-06-30 RX ADMIN — SODIUM CHLORIDE: 9 INJECTION, SOLUTION INTRAVENOUS at 18:25

## 2020-06-30 RX ADMIN — PROPOFOL 90 MG: 10 INJECTION, EMULSION INTRAVENOUS at 09:37

## 2020-06-30 RX ADMIN — HYDROMORPHONE HYDROCHLORIDE 0.5 MG: 1 INJECTION, SOLUTION INTRAMUSCULAR; INTRAVENOUS; SUBCUTANEOUS at 15:11

## 2020-06-30 RX ADMIN — SODIUM CHLORIDE, POTASSIUM CHLORIDE, SODIUM LACTATE AND CALCIUM CHLORIDE: 600; 310; 30; 20 INJECTION, SOLUTION INTRAVENOUS at 08:41

## 2020-06-30 RX ADMIN — SODIUM CHLORIDE 1000 ML: 9 INJECTION, SOLUTION INTRAVENOUS at 07:43

## 2020-06-30 RX ADMIN — HYDROMORPHONE HYDROCHLORIDE 0.5 MG: 1 INJECTION, SOLUTION INTRAMUSCULAR; INTRAVENOUS; SUBCUTANEOUS at 12:31

## 2020-06-30 RX ADMIN — ERTAPENEM SODIUM 500 MG: 1 INJECTION, POWDER, LYOPHILIZED, FOR SOLUTION INTRAMUSCULAR; INTRAVENOUS at 08:48

## 2020-06-30 RX ADMIN — HYDROMORPHONE HYDROCHLORIDE 0.5 MG: 1 INJECTION, SOLUTION INTRAMUSCULAR; INTRAVENOUS; SUBCUTANEOUS at 18:26

## 2020-06-30 RX ADMIN — ROCURONIUM BROMIDE 20 MG: 10 INJECTION INTRAVENOUS at 10:13

## 2020-06-30 RX ADMIN — FENTANYL CITRATE 50 MCG: 50 INJECTION, SOLUTION INTRAMUSCULAR; INTRAVENOUS at 09:37

## 2020-06-30 RX ADMIN — METRONIDAZOLE 500 MG: 500 INJECTION, SOLUTION INTRAVENOUS at 07:43

## 2020-06-30 RX ADMIN — Medication 5 MG: at 11:04

## 2020-06-30 RX ADMIN — NEOSTIGMINE METHYLSULFATE 3 MG: 1 INJECTION, SOLUTION INTRAVENOUS at 11:32

## 2020-06-30 RX ADMIN — SODIUM CHLORIDE: 9 INJECTION, SOLUTION INTRAVENOUS at 09:41

## 2020-06-30 RX ADMIN — LIDOCAINE HYDROCHLORIDE 100 MG: 20 INJECTION, SOLUTION INFILTRATION; PERINEURAL at 09:37

## 2020-06-30 RX ADMIN — PHENYLEPHRINE HYDROCHLORIDE 100 MCG: 10 INJECTION INTRAVENOUS at 09:47

## 2020-06-30 RX ADMIN — HYDROMORPHONE HYDROCHLORIDE 0.5 MG: 1 INJECTION, SOLUTION INTRAMUSCULAR; INTRAVENOUS; SUBCUTANEOUS at 10:48

## 2020-06-30 RX ADMIN — Medication 10 MG: at 09:51

## 2020-06-30 RX ADMIN — GLYCOPYRROLATE 0.4 MG: 0.2 INJECTION, SOLUTION INTRAMUSCULAR; INTRAVENOUS at 11:30

## 2020-06-30 RX ADMIN — Medication 10 MG: at 10:08

## 2020-06-30 RX ADMIN — PHENYLEPHRINE HYDROCHLORIDE 100 MCG: 10 INJECTION INTRAVENOUS at 09:44

## 2020-06-30 RX ADMIN — FENTANYL CITRATE 50 MCG: 50 INJECTION, SOLUTION INTRAMUSCULAR; INTRAVENOUS at 10:23

## 2020-06-30 RX ADMIN — ONDANSETRON 4 MG: 2 INJECTION INTRAMUSCULAR; INTRAVENOUS at 11:10

## 2020-06-30 RX ADMIN — FENTANYL CITRATE 25 MCG: 50 INJECTION, SOLUTION INTRAMUSCULAR; INTRAVENOUS at 10:20

## 2020-06-30 RX ADMIN — HYDROMORPHONE HYDROCHLORIDE 0.5 MG: 1 INJECTION, SOLUTION INTRAMUSCULAR; INTRAVENOUS; SUBCUTANEOUS at 06:21

## 2020-06-30 RX ADMIN — HYDROMORPHONE HYDROCHLORIDE 0.5 MG: 1 INJECTION, SOLUTION INTRAMUSCULAR; INTRAVENOUS; SUBCUTANEOUS at 07:08

## 2020-06-30 ASSESSMENT — ACTIVITIES OF DAILY LIVING (ADL)
ADLS_ACUITY_SCORE: 14
ADLS_ACUITY_SCORE: 14

## 2020-06-30 ASSESSMENT — MIFFLIN-ST. JEOR: SCORE: 1353.04

## 2020-06-30 ASSESSMENT — ENCOUNTER SYMPTOMS
DIARRHEA: 0
VOMITING: 0
ABDOMINAL PAIN: 1

## 2020-06-30 ASSESSMENT — LIFESTYLE VARIABLES: TOBACCO_USE: 1

## 2020-06-30 NOTE — H&P
General Surgery H&P  We are asked by Dr. Chad Trierweiler to see Trevor Soni in consultation regarding intra-abdominal free air.    Trevor Soni  YOB: 1933    Age: 86 year old  MRN#: [id number]    Date of Admission: 6/30/2020  Surgeon:   Bull Rachel MD                  Chief Complaint:   Abdominal pain         History of Present Illness:   This patient is a 86 year old male with multiple medical issues who underwent exploratory laparotomy and lysis of internal hernia band with Dr. Reed on 6/21/20. The patient recovered appropriately after surgery and was discharged home 6 days ago. He was doing well at home until yesterday when he developed sudden onset of abdominal pain. He had previously been free of pain, free of nausea, tolerating a regular diet, having regular soft BMs. The pain developed after eating dinner last night. He reports that the pain is worst in his right lower quadrant. He has had a couple loose stools since onset of the pain. The pain was initially intermittent but developed into constant pain, prompting his presentation to the ED. Trevor denies any nausea currently. In the ED this morning he was found to have stable vital signs. White count 17.4 with absolute neutrophil count 15.8. CT was performed which revealed free air concerning for bowel perforation.     I discussed the patient with Dr. Reed and reviewed his op note. Intraoperatively on 6/21 an ischemic segment of distal ileum was seen, likely due to the closed loop internal hernia band. The bowel appeared viable and no resection was required. Multiple sheets of seprafilm were placed over the irritated area of bowel and mesentery.   History today is obtained from the patient and chart. No personal history of severe anesthesia reaction. He does not take any blood thinning medications other than aspirin.         Past Medical History:    has a past medical history of Arthritis, Coronary artery disease, CRF (chronic renal  failure), Gastro-oesophageal reflux disease, Gout, Hyperlipidemia, Hypertension, and Nocturia.          Past Surgical History:     Past Surgical History:   Procedure Laterality Date     CARDIAC SURGERY       CHOLECYSTECTOMY       COLONOSCOPY       LAPAROTOMY, LYSIS ADHESIONS, COMBINED N/A 6/21/2020    Procedure: EXPLORATORY LAPAROTOMY WITH LYSIS OF ADHESIONS;  Surgeon: Jose Reed MD;  Location:  OR     ORTHOPEDIC SURGERY       PHACOEMULSIFICATION CLEAR CORNEA WITH TORIC INTRAOCULAR LENS IMPLANT  1/30/2012    Procedure:PHACOEMULSIFICATION CLEAR CORNEA WITH TORIC INTRAOCULAR LENS IMPLANT; LEFT PHACOEMULSIFICATION CLEAR CORNEA WITH DELUXE  TORIC INTRAOCULAR LENS IMPLANT ; Surgeon:BRANDO HIGHTOWER; Location:[facility]     PHACOEMULSIFICATION CLEAR CORNEA WITH TORIC INTRAOCULAR LENS IMPLANT  2/13/2012    Procedure:PHACOEMULSIFICATION CLEAR CORNEA WITH TORIC INTRAOCULAR LENS IMPLANT; RIGHT PHACOEMULSIFICATION CLEAR CORNEA WITH TORIC INTRAOCULAR LENS IMPLANT ; Surgeon:BRANDO HIGHTOWER; Location:[facility]     VASCULAR SURGERY              Medications:     Prior to Admission medications    Medication Sig Start Date End Date Taking? Authorizing Provider   allopurinol (ZYLOPRIM) 100 MG tablet TAKE 2 TABLETS BY MOUTH  DAILY 6/26/20  Yes Warren Lovelace MD   amLODIPine (NORVASC) 5 MG tablet TAKE 1 TABLET BY MOUTH  EVERY MORNING AND 2 TABLETS EVERY EVENING 6/26/20  Yes Warren Lovelace MD   ASPIRIN PO Take 325 mg by mouth daily.   Yes Reported, Patient   atenolol (TENORMIN) 50 MG tablet Take 1 tablet (50 mg) by mouth daily 6/9/20  Yes Warren Lovelace MD   atorvastatin (LIPITOR) 20 MG tablet TAKE 1 TABLET BY MOUTH  EVERY DAY 1/15/20  Yes Warren Lovelace MD   Cholecalciferol (VITAMIN D3 PO) Take 1 tablet by mouth daily   Yes Unknown, Entered By History   coenzyme Q-10 capsule Take 1 capsule by mouth daily   Yes Reported, Patient   fenofibrate (TRIGLIDE/LOFIBRA) 160 MG tablet TAKE 1 TABLET(160 MG) BY MOUTH DAILY 12/3/19   "Yes Warren Lovelace MD   furosemide (LASIX) 20 MG tablet Take 0.5 tablets (10 mg) by mouth daily 20  Yes Warren Lovelace MD   hydrALAZINE (APRESOLINE) 50 MG tablet TAKE 1 TABLET BY MOUTH 4  TIMES A DAY 20  Yes Warren Lovelace MD   spironolactone (ALDACTONE) 25 MG tablet Take 1 tablet (25 mg) by mouth every morning 20  Yes Warren Lovelace MD   famotidine (PEPCID) 20 MG tablet Take 20 mg by mouth daily as needed (dyspepsia)    Unknown, Entered By History   ondansetron (ZOFRAN-ODT) 4 MG ODT tab Take 1 tablet (4 mg) by mouth every 6 hours as needed for nausea or vomiting 20   Jean Carlos Ramey,             Allergies:     Allergies   Allergen Reactions     Avocado      Ciprofloxacin Itching     Penicillins Itching            Social History:     Social History     Tobacco Use     Smoking status: Former Smoker     Types: Cigars     Last attempt to quit: 1980     Years since quittin.5     Smokeless tobacco: Never Used   Substance Use Topics     Alcohol use: Yes     Alcohol/week: 0.0 standard drinks     Comment: rarely             Family History:   This patient has no pertinent family history.          Review of Systems:   Brief ROS is negative other than noted in the HPI.  Constitutional: NEGATIVE for fever, chills, change in weight  Respiratory: NEGATIVE for significant cough or SOB  Cardiovascular: NEGATIVE for chest pain, palpitations or peripheral edema  Gastrointestinal: POSITIVE for abdominal pain, NEGATIVE for nausea, vomiting         Physical Exam:   Blood pressure 133/64, pulse 87, temperature 98.2  F (36.8  C), temperature source Oral, resp. rate 15, height 1.727 m (5' 8\"), weight 69.9 kg (154 lb), SpO2 95 %.  No intake/output data recorded.    General - This is a well developed, well nourished male appears somewhat uncomfortable. Appears stated age  Psychiatric - alert and oriented x 3. Mood and affect normal, appropriate  Head - normocephalic, atraumatic  Eyes - pupils " equally round and reactive to light, no scleral icterus, extraocular movements intact  Neck - supple without masses, trachea midline, no lymphadenopathy or thyromegaly  Respiratory: lungs clear to auscultation bilaterally without wheezes, rales or rhonchi   Cardiovascular: regular rate and rhythm; S1 and S2 distinct without murmur   Abdomen - tense abdomen, diffusely tender to palpation, focal tenderness right lower quadrant. + guarding. Midline incision healing well without erythema or drainage. Healing ecchymosis.  Extremities - Moves all extremities. Warm without edema. No calf tenderness  Neurologic - Nonfocal          Data:   Labs:  Recent Labs   Lab Test 06/30/20 0551 06/24/20  0720 06/22/20  0713  06/21/20 0211   WBC 17.4*  --  12.8*  --  19.7*   HGB 10.8*  --  10.3*  --  11.1*   HCT 33.7*  --  31.4*  --  33.8*    234 203   < > 298    < > = values in this interval not displayed.     Recent Labs   Lab Test 06/30/20 0551 06/24/20 0720 06/22/20 0713 06/21/20 0211   POTASSIUM 4.7  --  4.5  --  4.8   CHLORIDE 108  --  113*  --  108   CO2 19*  --  19*  --  20   BUN 39*  --  48*  --  61*   CR 2.01* 1.58* 1.88*   < > 2.17*    < > = values in this interval not displayed.     Recent Labs   Lab Test 06/30/20 0551 06/21/20 0211 09/16/19  1648  06/10/16  1250   BILITOTAL 0.5 0.5 0.5   < > 0.7   ALT 26 22 23   < > 18   AST 17 32 25   < > 21   ALKPHOS 79 67 58   < > 58   LIPASE 219 485*  --   --  102    < > = values in this interval not displayed.     Recent Labs   Lab Test 06/30/20 0551 06/22/20  0713 06/21/20  0211  09/10/16  0727   AGUSTÍN 8.8 8.1* 8.9   < > 8.9   MAG  --   --   --   --  2.2    < > = values in this interval not displayed.     Recent Labs   Lab Test 06/30/20 0551 06/22/20 0713 06/21/20  0211  09/16/19  1648  05/21/18  1042   ANIONGAP 10 6 8   < > 9   < > 12   PROTEIN  --   --   --   --  30*  --  30*   ALBUMIN 3.1*  --  3.9  --  3.9  --  4.0    < > = values in this interval not displayed.        CT scan of the abdomen:   FINDINGS:   LOWER CHEST: Normal.     HEPATOBILIARY: Resolution of recent portal venous gas as seen on prior  CT. Prior cholecystectomy. Liver is currently within normal limits.     PANCREAS: Normal.     SPLEEN: Normal.     ADRENAL GLANDS: Normal.     KIDNEYS/BLADDER: Multiple bilateral renal cortical cysts are present,  more numerous in the right kidney. No hydronephrosis or urinary tract  calculi. The bladder is unremarkable.     BOWEL: Postsurgical changes noted in the right lower quadrant, but new  free intraperitoneal air is noted along the anterior abdominal wall  with additional trace air-fluid levels in the right lower quadrant.  Findings are concerning for bowel perforation. Colon is decompressed.  No diverticulitis. Moderate-sized hiatal hernia is noted.     LYMPH NODES: No enlarged abdominal or pelvic lymph nodes.     VASCULATURE: Aorta is densely calcified along with branch arteries and  measures 3.6 cm on image 99 of series 3 consistent with a small  abdominal aortic aneurysm.     PELVIC ORGANS: The bladder, prostate and rectum are unremarkable.     OTHER: None.     MUSCULOSKELETAL: Degenerative spine changes are noted.                                                                      IMPRESSION:   1.  Free intraperitoneal air and scattered air-fluid levels right  lower quadrant and additional scattered foci of air upper abdomen.  Findings are concerning for bowel perforation. No evidence of  obstruction.     2.  Resolution of previously noted portal venous gas. Liver is  otherwise unremarkable. Prior cholecystectomy.     3.  Stable bilateral renal cortical cysts. No hydronephrosis or  urinary tract calculi.     4.  3.6 cm infrarenal abdominal aortic aneurysm. No evidence of  retroperitoneal hemorrhage.           Assessment:   Trevor Soni is a 86 year old male with intra-abdominal free air, most likely perforated small bowel         Plan:   - To OR this morning for  open abdominal exploration. Discussed surgical details including risks, benefits, alternatives, potential complications with patient. Dr. Rachel to discuss further. Patient would like to proceed with surgery.  - NPO, IV fluids, Invanz. IV pain meds and anti-emetics prn. PCDs while resting  - Admit to inpatient. Consult hospitalist for medical co-management       I have discussed the history, physical, and plan with Dr. Rachel who will independently interview and examine the patient and update the stated plan as indicated.        Mckayla Rowland PA-C  [facility]  [phone]

## 2020-06-30 NOTE — ANESTHESIA POSTPROCEDURE EVALUATION
Patient: Trevor Soni    Procedure(s):  EXPLORATORY LAPAROTOMY, BOWEL RESECTION    Diagnosis:Perforated bowel (H) [K63.1]  Diagnosis Additional Information: No value filed.    Anesthesia Type:  General    Note:  Anesthesia Post Evaluation    Patient location during evaluation: PACU  Patient participation: Able to fully participate in evaluation  Level of consciousness: awake and alert  Pain management: adequate  Airway patency: patent  Cardiovascular status: acceptable and hemodynamically stable  Respiratory status: acceptable  Hydration status: euvolemic  PONV: none     Anesthetic complications: None          Last vitals:  Vitals:    06/30/20 1404 06/30/20 1431 06/30/20 1509   BP: 108/57 102/50 105/58   Pulse:  84 84   Resp: 16 16 16   Temp: 36.8  C (98.2  F) 36.8  C (98.2  F)    SpO2: 95% 96% 95%         Electronically Signed By: Erik Nunez MD  June 30, 2020  4:41 PM

## 2020-06-30 NOTE — ANESTHESIA CARE TRANSFER NOTE
Patient: Trevor Soni    Procedure(s):  EXPLORATORY LAPAROTOMY, BOWEL RESECTION    Diagnosis: Perforated bowel (H) [K63.1]  Diagnosis Additional Information: No value filed.    Anesthesia Type:   General     Note:  Airway :Face Mask  Patient transferred to:PACU  Comments: Spontaneously breathing with adequate vT, sats 98 - 100%, TOF 4/4 with sustained tetany. Purposeful movement, following commands with 100% O2 at 15 L/min, suctioned x2, Anesthesiologist notified.  Extubated.  To PACU with O2 via SFM at 10 L/minute, VSS. Monitors on, report to RN.Handoff Report: Identifed the Patient, Identified the Reponsible Provider, Reviewed the pertinent medical history, Discussed the surgical course, Reviewed Intra-OP anesthesia mangement and issues during anesthesia, Set expectations for post-procedure period and Allowed opportunity for questions and acknowledgement of understanding      Vitals: (Last set prior to Anesthesia Care Transfer)    CRNA VITALS  6/30/2020 1128 - 6/30/2020 1203      6/30/2020             NIBP:  135/62    Pulse:  86    NIBP Mean:  91    SpO2:  98 %    Resp Rate (set):  10                Electronically Signed By: JUAN C Pickering CRNA  June 30, 2020  12:03 PM

## 2020-06-30 NOTE — ED PROVIDER NOTES
History     Chief Complaint:  Abdominal Pain    HPI   Trevor Soni is a 86 year old male, who was recently discharged from the hospital 6 days ago following lysis of adhesions due to an ischemic bowel 2nd to closed loop, with a history of hypertension, hyperlipidemia, abdominal aortic aneurysm, and chronic diastolic congestive heart failure who presents for evaluation of right lower quadrant abdominal pain that began yesterday evening. The patient presented to this ED 9 days ago for evaluation of progressively worsening abdominal pain and was admitted to the hospital for lysis of adhesions due to an ischemic bowel 2nd to closed loop. He was discharged 6 days ago and has felt baseline since. Yesterday evening after eating dinner he had right lower quadrant abdominal pain as initially intermittent in presentation. He initially attributed the pain to needing to have a bowel movement, but his pain did not alleviate after two normal but more lose stool. Since initial onset, his pain has become more severe and waxes and wanes in intensity between 5 and 10/10 in severity. Due to his progressively worsening pain, he presented for evaluation.     Here, the patient states the pain has only been on the right side of his abdomen. He denies any diarrhea, emesis, or other symptoms prompting his presentation. He has also not had another bowel movement since last night.     Allergies:  Ciprofloxacin  Penicillins      Medications:    Zyloprim  Norvasc  Aspirin   Tenormin  Lipitor  Pepcid  Triglide  Lasix  Apresoline   Zofran   Aldactone      Past Medical History:    Arthritis   Coronary artery disease  Chronic renal failure  GERD  Gout  Hyperlipidemia  Hypertension  Nocturia   Pneumonia   Abdominal aortic aneurysm   Osteoarthritis   Chronic bilateral low back pain  Chronic diastolic congestive heart failure      Past Surgical History:    Cardiac surgery, coronary artery bypass graft  Cholecystectomy  Colonoscopy   Laparotomy,  "Lysis addesions  Orthopedic surgery  Phacoemulsification clear cornea with toric intraocular lens implant, bilateral   Vascular surgery     Family History:    The patient denies any relevant family medical history.     Social History:  The patient was accompanied to the ED by daughter.  Smoking Status: Former, 1980  Smokeless tobacco: Never Used  Alcohol Use: Yes  Drug Use: No   Marital Status:   [2]     Review of Systems   Gastrointestinal: Positive for abdominal pain. Negative for diarrhea and vomiting.   All other systems reviewed and are negative.    Physical Exam     Patient Vitals for the past 24 hrs:   BP Temp Temp src Pulse Heart Rate Resp SpO2 Height Weight   06/30/20 0715 115/60 -- -- 78 78 25 95 % -- --   06/30/20 0700 -- -- -- 77 75 26 -- -- --   06/30/20 0645 -- -- -- 74 -- -- -- -- --   06/30/20 0630 116/71 -- -- 65 71 24 96 % -- --   06/30/20 0615 (!) 164/37 -- -- 68 65 28 98 % -- --   06/30/20 0600 (!) 141/58 -- -- 73 70 24 98 % -- --   06/30/20 0545 138/62 -- -- -- -- -- -- -- --   06/30/20 0534 111/40 98.2  F (36.8  C) Oral -- 74 20 98 % 1.727 m (5' 8\") 69.9 kg (154 lb)     Physical Exam  Eye:  Pupils are equal, round, and reactive.  Extraocular movements intact.    ENT:  No rhinorrhea.  Moist mucus membranes.  Normal tongue and tonsil.    Cardiac:  Regular rate and rhythm.  No murmurs, gallops, or rubs.    Pulmonary:  Clear to auscultation bilaterally.  No wheezes, rales, or rhonchi.    Abdomen:  The abdomen is tense with involuntary guarding. Infraumbilical incision appears clean and dry. Diffuse tenderness, maximal in the right pelvis.    Musculoskeletal:  Normal movement of all extremities without evidence for deficit.    Skin:  Warm and dry without rashes.    Neurologic:  Non-focal exam without asymmetric weakness or numbness.     Psychiatric:  Normal affect with appropriate interaction with examiner.     Emergency Department Course   Imaging:  Radiology findings were communicated with " the patient and Admitting MD who voiced understanding of the findings.    CT Abdomen Pelvis w/o Contrast  IMPRESSION:   1.  Free intraperitoneal air and scattered air-fluid levels right  lower quadrant and additional scattered foci of air upper abdomen.  Findings are concerning for bowel perforation. No evidence of  obstruction.  2.  Resolution of previously noted portal venous gas. Liver is  otherwise unremarkable. Prior cholecystectomy.  3.  Stable bilateral renal cortical cysts. No hydronephrosis or  urinary tract calculi.  4.  3.6 cm infrarenal abdominal aortic aneurysm. No evidence of  retroperitoneal hemorrhage.  Reading per radiology.      Laboratory:  Laboratory findings were communicated with the patient and Admitting MD who voiced understanding of the findings.    CBC: WBC 17.4 (H), HGB 10.8 (L) o/w WNL ()   CMP: Carbon dioxide 19 (L), Glucose 150 (H), Bun 39 (H), Creatinine 2.01 (H), GFR 29 (L), Albumin 3.1 (L) o/w WNL   Creatinine POCT: Creatinine 2.2 (H) ,GFR 29 (L)  Lipase: 219     UA with Microscopic: Ordered and Pending.     COVID-19 Virus (Coronavirus) by PCR: Pending.      Interventions:  0621 Dilaudid 0.5 mg IV   0708 Dilaudid 0.5 mg IV   0743 Flagyl 500 mg IV  0743 0.9% NaCl bolus 1000 mL IV     Emergency Department Course:  Past medical records, nursing notes, and vitals reviewed.  The patient was sent for a CT Abdomen Pelvis w/o Contrast while in the emergency department, results above.    IV was inserted and blood was drawn for laboratory testing, results above.   The patient provided a urine sample here in the emergency department. This was sent for laboratory testing, findings above.   A nasal swab was obtained for laboratory testing, findings above.      (0610)   I performed an exam of the patient as documented above. History obtained from patient and daughter.     (1917)   I spoke with Dr. Medeiros of the Radiology service regarding patient's presentation, findings, and plan of care.  He told me there was free air in the patient's abdomen.     (5128)   I rechecked the patient and discussed results and plan of care.     (0079)   I spoke with MICHELLE Block for Dr. Rachel of the General Surgery service regarding patient's presentation, findings, and plan of care.     Findings and plan explained to the Patient who consents to admission. Discussed the patient with MICHELLE Block for Dr. Rachel, who will admit the patient to a med/surg bed for further monitoring, evaluation, and treatment.      I personally reviewed the laboratory and imaging results with the Patient and answered all related questions prior to admission.     Impression & Plan     Medical Decision Making:  This unfortunate 86-year-old who is 9 days status post laparotomy for an internal hernia with associated ischemic bowel returns to us with right lower quadrant pain that began last night.  He notes the pain began mildly with some cramping that is now progressed to a constant pain is very severe with any walking or moving.    I was immediately concerned with his exam, showing a rigid abdomen.  Surgical incision appeared clean.  Patient has a significant leukocytosis.  CT of the abdomen shows a perforated bowel, likely small intestine, in the right lower quadrant.  With this free air noted on CT, surgery was immediately consulted.  I spoke with the physician assistant for Dr. Rachel who will initiate a bed for the patient for emergent surgery.  Invanz and Flagyl were ordered.  The patient is otherwise feeling much improved with the above interventions.  Resuscitation was initiated.  Blood pressure and heart rate are reassuring.  I do not feel he requires a central line or more aggressive intervention at this juncture.  The patient was updated on these concerning findings on a CT and he understands the plan for emergent surgery.      Diagnosis:    ICD-10-CM    1. Perforated bowel (H)  K63.1      Disposition:  Admitted to a med/surg bed under the  care of Dr. Rachel.     Scribe Disclosure:  I, Jean Carlos Rakan, am serving as a scribe at 6:09 AM on 6/30/2020 to document services personally performed by Trierweiler, Chad A, MD based on my observations and the provider's statements to me.    6/30/2020    EMERGENCY DEPARTMENT       Trierweiler, Chad A, MD  06/30/20 0811

## 2020-06-30 NOTE — PHARMACY-ADMISSION MEDICATION HISTORY
Pharmacy Medication History  Admission medication history interview status for the 6/30/2020  admission is complete. See EPIC admission navigator for prior to admission medications     Medication history sources: Patient, Pts Daughter - Recent discharge summary from 6/25/20  Medication history source reliability: Good  Adherence assessment: Good    Significant changes made to the medication list:  none      Additional medication history information:   none    Medication reconciliation completed by provider prior to medication history? No    Time spent in this activity:15 minutes      Prior to Admission medications    Medication Sig Last Dose Taking? Auth Provider   allopurinol (ZYLOPRIM) 100 MG tablet TAKE 2 TABLETS BY MOUTH  DAILY 6/29/2020 at Unknown time Yes Warren Lovelace MD   amLODIPine (NORVASC) 5 MG tablet TAKE 1 TABLET BY MOUTH  EVERY MORNING AND 2 TABLETS EVERY EVENING 6/29/2020 at Unknown time Yes Warren Lovelace MD   ASPIRIN PO Take 325 mg by mouth daily. 6/29/2020 at Unknown time Yes Reported, Patient   atenolol (TENORMIN) 50 MG tablet Take 1 tablet (50 mg) by mouth daily 6/29/2020 at Unknown time Yes Warren Lovelace MD   atorvastatin (LIPITOR) 20 MG tablet TAKE 1 TABLET BY MOUTH  EVERY DAY 6/29/2020 at Unknown time Yes Warren Lovelace MD   Cholecalciferol (VITAMIN D3 PO) Take 1 tablet by mouth daily 6/29/2020 at Unknown time Yes Unknown, Entered By History   coenzyme Q-10 capsule Take 1 capsule by mouth daily 6/29/2020 at Unknown time Yes Reported, Patient   fenofibrate (TRIGLIDE/LOFIBRA) 160 MG tablet TAKE 1 TABLET(160 MG) BY MOUTH DAILY 6/29/2020 at Unknown time Yes Warren Lovelace MD   furosemide (LASIX) 20 MG tablet Take 0.5 tablets (10 mg) by mouth daily 6/29/2020 at Unknown time Yes Warren Lovelace MD   hydrALAZINE (APRESOLINE) 50 MG tablet TAKE 1 TABLET BY MOUTH 4  TIMES A DAY 6/29/2020 at Unknown time Yes Warren Lovelace MD   spironolactone (ALDACTONE) 25 MG tablet Take 1 tablet (25 mg)  by mouth every morning 6/29/2020 at Unknown time Yes Warren Lovelace MD   famotidine (PEPCID) 20 MG tablet Take 20 mg by mouth daily as needed (dyspepsia) PRN  Unknown, Entered By History   ondansetron (ZOFRAN-ODT) 4 MG ODT tab Take 1 tablet (4 mg) by mouth every 6 hours as needed for nausea or vomiting PRN  Jean Carlos Ramey, DO

## 2020-06-30 NOTE — PLAN OF CARE
Patient A/Ox4. VSS, on 3LO2 via nasal cannula. Strict NPO except for ice chips. NG to LIS no output this shift. Bunch at 67. Denies N/V. Pain managed with IV dilaudid.  Mini wound vac to midline abdominal incision. Dressing WDL. BS hypo, no gas yet. Farooq in place with adequate output. IVF infusing. Continue to monitor.

## 2020-06-30 NOTE — ED NOTES
[facility]  ED Nurse Handoff Report    ED Chief complaint: Abdominal Pain      ED Diagnosis:   Final diagnoses:   None       Code Status: Full Code and MD to address    Allergies:   Allergies   Allergen Reactions     Avocado      Ciprofloxacin Itching     Penicillins Itching       Patient Story:  Patient admit on 6/21 due to ischemic bowel.  Had exploratory abdominal surgery.  Abdominal pain, diffuse, tender to palpate.   Focused Assessment:  Abdominal pain across abdomen.  Tender to palpation.  Reports normal BM, last BM yesterday.  Denies nausea.  Pain comes & goes in severity.  Earlier with 1st dose of pain medication rates pain 6/10.  After CT scan, patient had call light on, pain is now 9/10.  2nd dose of 0.5 mg Dilaudid given.     Treatments and/or interventions provided:  IV insertion.  Pain medication. Flagyl , NS   Patient's response to treatments and/or interventions:  Little to no pain relief.     To be done/followed up on inpatient unit:  [facility] of care    Does this patient have any cognitive concerns?: Alert & oriented    Activity level - Baseline/Home:  Independent, states used walker yesterday & today.   Activity Level - Current:   Stand with Assist    Patient's Preferred language: English   Needed?: No    Isolation: None  Infection: Not Applicable  Bariatric?: No    Vital Signs:   Vitals:    06/30/20 0615 06/30/20 0630 06/30/20 0645 06/30/20 0700   BP: (!) 164/37 116/71     Pulse: 68 65 74 77   Resp: 28 24  26   Temp:       TempSrc:       SpO2: 98% 96%     Weight:       Height:           Cardiac Rhythm:     Was the PSS-3 completed:   Yes  What interventions are required if any?               Family Comments: Daughter with patient.  Wife at home.   OBS brochure/video discussed/provided to patient/family: N/A              Name of person given brochure if not patient:               Relationship to patient:     For the majority of the shift this patient's behavior was  Green.   Behavioral interventions performed were .    ED NURSE PHONE NUMBER: *[phone]

## 2020-06-30 NOTE — ANESTHESIA PREPROCEDURE EVALUATION
Anesthesia Pre-Procedure Evaluation    Patient: Trevor Soni   MRN: [id number] : 9/3/1933          Preoperative Diagnosis: Ischemia, bowel (H) [K55.9]    Procedure(s):  EXPOLORATORY LAPAROTOMY FOR ISCHEMIC BOWEL    Past Medical History:   Diagnosis Date     Arthritis     rhuematoid     Coronary artery disease     triple bypass      CRF (chronic renal failure)      Gastro-oesophageal reflux disease      Gout      Hyperlipidemia      Hypertension      Nocturia      Past Surgical History:   Procedure Laterality Date     CARDIAC SURGERY       CHOLECYSTECTOMY       COLONOSCOPY       LAPAROTOMY, LYSIS ADHESIONS, COMBINED N/A 2020    Procedure: EXPLORATORY LAPAROTOMY WITH LYSIS OF ADHESIONS;  Surgeon: Jose Reed MD;  Location:  OR     ORTHOPEDIC SURGERY       PHACOEMULSIFICATION CLEAR CORNEA WITH TORIC INTRAOCULAR LENS IMPLANT  2012    Procedure:PHACOEMULSIFICATION CLEAR CORNEA WITH TORIC INTRAOCULAR LENS IMPLANT; LEFT PHACOEMULSIFICATION CLEAR CORNEA WITH DELUXE  TORIC INTRAOCULAR LENS IMPLANT ; Surgeon:BRANDO HIGHTOWER; Location:[facility]     PHACOEMULSIFICATION CLEAR CORNEA WITH TORIC INTRAOCULAR LENS IMPLANT  2012    Procedure:PHACOEMULSIFICATION CLEAR CORNEA WITH TORIC INTRAOCULAR LENS IMPLANT; RIGHT PHACOEMULSIFICATION CLEAR CORNEA WITH TORIC INTRAOCULAR LENS IMPLANT ; Surgeon:BRANDO HIGHTOWER; Location:[facility]     VASCULAR SURGERY         Anesthesia Evaluation     . Pt has had prior anesthetic. Type: General    No history of anesthetic complications          ROS/MED HX    ENT/Pulmonary:     (+)tobacco use, Past use , recent URI . .    Neurologic:       Cardiovascular: Comment: AAA (3.4 cm)    (+) Dyslipidemia, hypertension-Peripheral Vascular Disease-- Carotid Stenosis, CAD, -CABG-. : . CHF etiology: Diastolic Last EF: 68 date: 16 . . :. valvular problems/murmurs type: AS and AI Mild AS, AI, and TR:. Previous cardiac testing Echodate:16results:Normal EF; Grade 1-2 Diastolic  "dysfunction; Mild aortic stenosis (NANDA 1.1 cm2), mild aortic insufficiency, mild TRdate: results:ECG reviewed date:6/21/20 results:NSR date: results:          METS/Exercise Tolerance:  1 - Eating, dressing   Hematologic:     (+) Anemia, -      Musculoskeletal:   (+) arthritis,  other musculoskeletal- Lumbago      GI/Hepatic:     (+) GERD       Renal/Genitourinary:     (+) chronic renal disease (Stage 4), type: CRI,       Endo:         Psychiatric:         Infectious Disease:   (+) Other Infectious Disease leukocytosis      Malignancy:         Other:                            Physical Exam  Normal systems: dental    Airway   Mallampati: II  TM distance: >3 FB  Neck ROM: full    Dental     Cardiovascular   Rhythm and rate: regular      Pulmonary             Lab Results   Component Value Date    WBC 17.4 (H) 06/30/2020    HGB 10.8 (L) 06/30/2020    HCT 33.7 (L) 06/30/2020     06/30/2020     06/30/2020    POTASSIUM 4.7 06/30/2020    CHLORIDE 108 06/30/2020    CO2 19 (L) 06/30/2020    BUN 39 (H) 06/30/2020    CR 2.01 (H) 06/30/2020     (H) 06/30/2020    AGUSTÍN 8.8 06/30/2020    MAG 2.2 09/10/2016    ALBUMIN 3.1 (L) 06/30/2020    PROTTOTAL 6.8 06/30/2020    ALT 26 06/30/2020    AST 17 06/30/2020    ALKPHOS 79 06/30/2020    BILITOTAL 0.5 06/30/2020    LIPASE 219 06/30/2020    PTT 34 05/06/2011    INR 1.06 05/06/2011    TSH 2.07 05/21/2018       Preop Vitals  BP Readings from Last 3 Encounters:   06/30/20 133/64   06/24/20 (!) 147/54   02/24/20 (!) 153/70    Pulse Readings from Last 3 Encounters:   06/30/20 87   06/24/20 82   02/24/20 50      Resp Readings from Last 3 Encounters:   06/30/20 15   06/24/20 18   09/16/19 12    SpO2 Readings from Last 3 Encounters:   06/30/20 95%   06/24/20 90%   02/24/20 97%      Temp Readings from Last 1 Encounters:   06/30/20 36.8  C (98.2  F) (Oral)    Ht Readings from Last 1 Encounters:   06/30/20 1.727 m (5' 8\")      Wt Readings from Last 1 Encounters:   06/30/20 69.9 kg " "(154 lb)    Estimated body mass index is 23.42 kg/m  as calculated from the following:    Height as of an earlier encounter on 6/30/20: 1.727 m (5' 8\").    Weight as of an earlier encounter on 6/30/20: 69.9 kg (154 lb).       Anesthesia Plan      History & Physical Review  History and physical reviewed and following examination; no interval change.    ASA Status:  3 .    NPO Status:  > 8 hours    Plan for General with Intravenous and Propofol induction. Maintenance will be Balanced.    PONV prophylaxis:  Ondansetron (or other 5HT-3)  Additional equipment: 2nd IV        Postoperative Care  Postoperative pain management:  IV analgesics.      Consents  Anesthetic plan, risks, benefits and alternatives discussed with:  Patient..                   Erik Nunez MD  "

## 2020-06-30 NOTE — OP NOTE
General Surgery Operative Note    PREOPERATIVE DIAGNOSIS:  Perforated bowel (H) [K63.1]    POSTOPERATIVE DIAGNOSIS:  Perforated ileum causing peritonitis     Procedure(s):  EXPLORATORY LAPAROTOMY, BOWEL RESECTION, side to side ileal anastomosis    ANESTHESIA:  General.      SURGEON:  Bull Rachel MD    ASSISTANT:  Mckayla Rowland PA-C. The physician assistant was medically necessary for their expertise in suctioning, and retraction    INDICATIONS: The patient has severe abdominal pain and CT findings worrisome for a bowel perforation.  He has free air and an infection apparent on the right side of the abdomen.  He had an exploration 9 days ago or bowel was observed and left in place.  The risks of bleeding infection, anesthesia, stoma formation, recurrent problems were discussed with him.  He appeared to understand and wished to proceed    PROCEDURE:  The patient was taken to the operating suite.  The operative area was prepped and draped in a sterile fashion.  Surgeon initiated timeout was acknowledged.  The previously made midline incision was opened taking out the old sutures there was bowel contents obvious in the abdomen.  These were suctioned out as much as possible.  We examined the ileum and found an area of perforation and a very thickened irritated piece of bowel.  We ran the bowel entirely proximally and found some bruising but no other signs of perforation or serious bowel abnormality.  We selected viable bowel proximal and distal to the irritated area.  We divided the mesentery with 2-0 Vicryl ties and 3-0 Vicryl suture ligatures.  We stapled each end getting good tissue several centimeters away from the irritated perforated area.  We divided these with a blue load of the stapler.  We then irrigated the abdomen thoroughly in all quadrants with multiple liters of saline above and the liver and spleen and in the pelvis along with both gutters.  We then created a side-to-side ileal anastomosis using a 75 blue  load the defect was closed with 2 layers 1 of running 3-0 Vicryl in 1 of interrupted 3-0 Vicryl.  Corner was reinforced with 3-0 Vicryl.  Mesentery was closed with interrupted 3-0 Vicryl.  Bowel was laid in normal pattern sponge count was correct omentum was laid in front.  Fascia was closed with 0 Maxon coming from each end with a 0 Vicryl at the confluence.  Wound was irrigated thoroughly and the skin closed loosely with staples.  The patient was  awakened and taken to the PACU in stable condition.  At the conclusion of the case, all counts were correct.        INTRAOPERATIVE FINDINGS: Peritonitis from perforated ileum    Bull Rachel MD

## 2020-07-01 ENCOUNTER — APPOINTMENT (OUTPATIENT)
Dept: GENERAL RADIOLOGY | Facility: CLINIC | Age: 85
DRG: 329 | End: 2020-07-01
Attending: PHYSICIAN ASSISTANT
Payer: MEDICARE

## 2020-07-01 LAB
ALBUMIN SERPL-MCNC: 2.3 G/DL (ref 3.4–5)
ANION GAP SERPL CALCULATED.3IONS-SCNC: 8 MMOL/L (ref 3–14)
BASOPHILS # BLD AUTO: 0 10E9/L (ref 0–0.2)
BASOPHILS NFR BLD AUTO: 0.1 %
BUN SERPL-MCNC: 40 MG/DL (ref 7–30)
CALCIUM SERPL-MCNC: 7.9 MG/DL (ref 8.5–10.1)
CHLORIDE SERPL-SCNC: 116 MMOL/L (ref 94–109)
CO2 SERPL-SCNC: 17 MMOL/L (ref 20–32)
COPATH REPORT: NORMAL
CREAT SERPL-MCNC: 2.03 MG/DL (ref 0.66–1.25)
DIFFERENTIAL METHOD BLD: ABNORMAL
EOSINOPHIL # BLD AUTO: 0 10E9/L (ref 0–0.7)
EOSINOPHIL NFR BLD AUTO: 0 %
ERYTHROCYTE [DISTWIDTH] IN BLOOD BY AUTOMATED COUNT: 14.3 % (ref 10–15)
GFR SERPL CREATININE-BSD FRML MDRD: 29 ML/MIN/{1.73_M2}
GLUCOSE SERPL-MCNC: 130 MG/DL (ref 70–99)
HCT VFR BLD AUTO: 30.2 % (ref 40–53)
HGB BLD-MCNC: 9.8 G/DL (ref 13.3–17.7)
IMM GRANULOCYTES # BLD: 0.1 10E9/L (ref 0–0.4)
IMM GRANULOCYTES NFR BLD: 0.3 %
LYMPHOCYTES # BLD AUTO: 0.5 10E9/L (ref 0.8–5.3)
LYMPHOCYTES NFR BLD AUTO: 2.7 %
MCH RBC QN AUTO: 31.5 PG (ref 26.5–33)
MCHC RBC AUTO-ENTMCNC: 32.5 G/DL (ref 31.5–36.5)
MCV RBC AUTO: 97 FL (ref 78–100)
MONOCYTES # BLD AUTO: 0.5 10E9/L (ref 0–1.3)
MONOCYTES NFR BLD AUTO: 2.9 %
NEUTROPHILS # BLD AUTO: 17.1 10E9/L (ref 1.6–8.3)
NEUTROPHILS NFR BLD AUTO: 94 %
NRBC # BLD AUTO: 0 10*3/UL
NRBC BLD AUTO-RTO: 0 /100
PLATELET # BLD AUTO: 287 10E9/L (ref 150–450)
POTASSIUM SERPL-SCNC: 4.9 MMOL/L (ref 3.4–5.3)
RBC # BLD AUTO: 3.11 10E12/L (ref 4.4–5.9)
SODIUM SERPL-SCNC: 141 MMOL/L (ref 133–144)
WBC # BLD AUTO: 18.2 10E9/L (ref 4–11)

## 2020-07-01 PROCEDURE — 25000128 H RX IP 250 OP 636: Performed by: EMERGENCY MEDICINE

## 2020-07-01 PROCEDURE — 85025 COMPLETE CBC W/AUTO DIFF WBC: CPT | Performed by: PHYSICIAN ASSISTANT

## 2020-07-01 PROCEDURE — 25000125 ZZHC RX 250: Performed by: PHYSICIAN ASSISTANT

## 2020-07-01 PROCEDURE — 82040 ASSAY OF SERUM ALBUMIN: CPT | Performed by: PHYSICIAN ASSISTANT

## 2020-07-01 PROCEDURE — 99207 ZZC CONSULT E&M CHANGED TO INITIAL LEVEL: CPT | Performed by: PHYSICIAN ASSISTANT

## 2020-07-01 PROCEDURE — 40000986 XR ABDOMEN PORT 1 VW

## 2020-07-01 PROCEDURE — 36415 COLL VENOUS BLD VENIPUNCTURE: CPT | Performed by: PHYSICIAN ASSISTANT

## 2020-07-01 PROCEDURE — 25000128 H RX IP 250 OP 636: Performed by: PHYSICIAN ASSISTANT

## 2020-07-01 PROCEDURE — 25800030 ZZH RX IP 258 OP 636: Performed by: PHYSICIAN ASSISTANT

## 2020-07-01 PROCEDURE — 99222 1ST HOSP IP/OBS MODERATE 55: CPT | Performed by: PHYSICIAN ASSISTANT

## 2020-07-01 PROCEDURE — 12000000 ZZH R&B MED SURG/OB

## 2020-07-01 PROCEDURE — 80048 BASIC METABOLIC PNL TOTAL CA: CPT | Performed by: PHYSICIAN ASSISTANT

## 2020-07-01 RX ORDER — ALLOPURINOL 100 MG/1
200 TABLET ORAL DAILY
Status: DISCONTINUED | OUTPATIENT
Start: 2020-07-01 | End: 2020-07-05 | Stop reason: HOSPADM

## 2020-07-01 RX ORDER — ATORVASTATIN CALCIUM 20 MG/1
20 TABLET, FILM COATED ORAL DAILY
Status: DISCONTINUED | OUTPATIENT
Start: 2020-07-01 | End: 2020-07-05 | Stop reason: HOSPADM

## 2020-07-01 RX ORDER — FUROSEMIDE 20 MG/1
10 TABLET ORAL DAILY
Status: DISCONTINUED | OUTPATIENT
Start: 2020-07-01 | End: 2020-07-05 | Stop reason: HOSPADM

## 2020-07-01 RX ORDER — SPIRONOLACTONE 25 MG/1
25 TABLET ORAL EVERY MORNING
Status: DISCONTINUED | OUTPATIENT
Start: 2020-07-01 | End: 2020-07-05 | Stop reason: HOSPADM

## 2020-07-01 RX ORDER — FAMOTIDINE 20 MG/1
20 TABLET, FILM COATED ORAL DAILY PRN
Status: DISCONTINUED | OUTPATIENT
Start: 2020-07-01 | End: 2020-07-01

## 2020-07-01 RX ORDER — AMLODIPINE BESYLATE 10 MG/1
10 TABLET ORAL AT BEDTIME
Status: DISCONTINUED | OUTPATIENT
Start: 2020-07-01 | End: 2020-07-05 | Stop reason: HOSPADM

## 2020-07-01 RX ORDER — ASPIRIN 325 MG
325 TABLET ORAL DAILY
Status: DISCONTINUED | OUTPATIENT
Start: 2020-07-01 | End: 2020-07-05 | Stop reason: HOSPADM

## 2020-07-01 RX ORDER — METOPROLOL TARTRATE 1 MG/ML
2.5 INJECTION, SOLUTION INTRAVENOUS EVERY 6 HOURS
Status: DISCONTINUED | OUTPATIENT
Start: 2020-07-01 | End: 2020-07-03

## 2020-07-01 RX ORDER — HYDRALAZINE HYDROCHLORIDE 50 MG/1
50 TABLET, FILM COATED ORAL 4 TIMES DAILY
Status: DISCONTINUED | OUTPATIENT
Start: 2020-07-01 | End: 2020-07-05 | Stop reason: HOSPADM

## 2020-07-01 RX ORDER — ATENOLOL 50 MG/1
50 TABLET ORAL DAILY
Status: DISCONTINUED | OUTPATIENT
Start: 2020-07-01 | End: 2020-07-05 | Stop reason: HOSPADM

## 2020-07-01 RX ORDER — AMLODIPINE BESYLATE 5 MG/1
5 TABLET ORAL EVERY MORNING
Status: DISCONTINUED | OUTPATIENT
Start: 2020-07-01 | End: 2020-07-05 | Stop reason: HOSPADM

## 2020-07-01 RX ORDER — HYDRALAZINE HYDROCHLORIDE 20 MG/ML
10 INJECTION INTRAMUSCULAR; INTRAVENOUS EVERY 4 HOURS PRN
Status: DISCONTINUED | OUTPATIENT
Start: 2020-07-01 | End: 2020-07-05 | Stop reason: HOSPADM

## 2020-07-01 RX ADMIN — FAMOTIDINE 20 MG: 10 INJECTION, SOLUTION INTRAVENOUS at 21:27

## 2020-07-01 RX ADMIN — HYDROMORPHONE HYDROCHLORIDE 0.5 MG: 1 INJECTION, SOLUTION INTRAMUSCULAR; INTRAVENOUS; SUBCUTANEOUS at 07:51

## 2020-07-01 RX ADMIN — HYDROMORPHONE HYDROCHLORIDE 0.5 MG: 1 INJECTION, SOLUTION INTRAMUSCULAR; INTRAVENOUS; SUBCUTANEOUS at 05:20

## 2020-07-01 RX ADMIN — SODIUM CHLORIDE: 9 INJECTION, SOLUTION INTRAVENOUS at 00:23

## 2020-07-01 RX ADMIN — HYDROMORPHONE HYDROCHLORIDE 0.5 MG: 1 INJECTION, SOLUTION INTRAMUSCULAR; INTRAVENOUS; SUBCUTANEOUS at 09:59

## 2020-07-01 RX ADMIN — METOPROLOL TARTRATE 2.5 MG: 5 INJECTION INTRAVENOUS at 21:28

## 2020-07-01 RX ADMIN — HYDROMORPHONE HYDROCHLORIDE 0.5 MG: 1 INJECTION, SOLUTION INTRAMUSCULAR; INTRAVENOUS; SUBCUTANEOUS at 05:39

## 2020-07-01 RX ADMIN — ERTAPENEM SODIUM 500 MG: 1 INJECTION, POWDER, LYOPHILIZED, FOR SOLUTION INTRAMUSCULAR; INTRAVENOUS at 09:55

## 2020-07-01 RX ADMIN — SODIUM CHLORIDE 75 ML/HR: 9 INJECTION, SOLUTION INTRAVENOUS at 22:05

## 2020-07-01 RX ADMIN — HYDROMORPHONE HYDROCHLORIDE 0.5 MG: 1 INJECTION, SOLUTION INTRAMUSCULAR; INTRAVENOUS; SUBCUTANEOUS at 18:10

## 2020-07-01 RX ADMIN — HYDROMORPHONE HYDROCHLORIDE 0.5 MG: 1 INJECTION, SOLUTION INTRAMUSCULAR; INTRAVENOUS; SUBCUTANEOUS at 02:55

## 2020-07-01 RX ADMIN — SODIUM CHLORIDE: 9 INJECTION, SOLUTION INTRAVENOUS at 09:59

## 2020-07-01 RX ADMIN — METOPROLOL TARTRATE 2.5 MG: 5 INJECTION INTRAVENOUS at 17:06

## 2020-07-01 RX ADMIN — METOPROLOL TARTRATE 5 MG: 5 INJECTION INTRAVENOUS at 09:55

## 2020-07-01 RX ADMIN — SODIUM CHLORIDE, POTASSIUM CHLORIDE, SODIUM LACTATE AND CALCIUM CHLORIDE 500 ML: 600; 310; 30; 20 INJECTION, SOLUTION INTRAVENOUS at 10:24

## 2020-07-01 RX ADMIN — HEPARIN SODIUM 5000 UNITS: 5000 INJECTION, SOLUTION INTRAVENOUS; SUBCUTANEOUS at 21:29

## 2020-07-01 RX ADMIN — HEPARIN SODIUM 5000 UNITS: 5000 INJECTION, SOLUTION INTRAVENOUS; SUBCUTANEOUS at 09:55

## 2020-07-01 RX ADMIN — HYDROMORPHONE HYDROCHLORIDE 0.5 MG: 1 INJECTION, SOLUTION INTRAMUSCULAR; INTRAVENOUS; SUBCUTANEOUS at 00:24

## 2020-07-01 ASSESSMENT — ACTIVITIES OF DAILY LIVING (ADL)
ADLS_ACUITY_SCORE: 16
ADLS_ACUITY_SCORE: 15

## 2020-07-01 NOTE — PLAN OF CARE
POD1 exploratory lap, & bowel resection. A&Ox4. VSS on 3L NC. NPO w/ ice chips. Dangled at the side of the bed w/ A1, GB. C/o 4-7/10 incision pain that decreases w/ PRN dilaudid. Has NS infusing at 100ml/hr. Midline incision is covered w/ wound vac that is intact w/ minimal output. Farooq patent w/ adequate output. NG patent to low intermediate suction. Discharge pending progress. Will continue to monitor.

## 2020-07-01 NOTE — PROVIDER NOTIFICATION
MD Notification    Notified Person: PA    Notified Person Name: Anthony    Notification Date/Time: 7/1/20 1444    Notification Interaction: web-based paging    Purpose of Notification: positive abdomen fluid culture collected on 06/30/20 at 1023 - light growth lactose of fermenting gram neg rods and light growth of clostridium pertium. Thank you.    Orders Received:     Comments:

## 2020-07-01 NOTE — PROVIDER NOTIFICATION
"Paged surgery regarding \"positive abdomen fluid culture collected on 06/30/20 at 1023 - light growth lactose of fermenting gram neg rods and light growth of clostridium pertium.\"    Surgery call back: pt is already on [facility], if lab calls back please make sure lab is running sensitivities, if lab needs a new order, please feel free to page surgery back to place order.   "

## 2020-07-01 NOTE — PROGRESS NOTES
"General Surgery Progress Note    Admission Date: 6/30/2020  Today's Date: 7/1/2020         Assessment:      Trevor Soni is a 86 year old male POD 1 s/p exploratory laparotomy, small bowel resection         Plan:   - Continue NPO with ice chips, NG tube to LIS. Await return of bowel function  - XR to confirm NG tube placement given minimal output  - Give gentle fluid bolus now. CKD with likely some mild SHANICE. Avoid nephrotoxic medications  - IV dilaudid prn pain. Anti-emetics available  - Electrolytes - likely pseudohypocalcemia in setting of low albumin. Continue to monitor  - Hospitalist consulted for medical management, appreciate assistance  - Continue nunez for now, monitor urine output  - PCDs, heparin for dvt ppx. Ambulate as able. Pepcid IV while NPO  - Continue Invanz  - Recheck labs tomorrow        Interval History:   Afebrile overnight, pulse normal. Blood pressures ok, a bit soft. Intermittently needing O2. Fair UOP. Pain mild, mainly with movement. Minimal NG output since surgery. Patient denies nausea, tolerating ice chips. Denies chest pain, shortness of breath, numbness, tingling.          Physical Exam:   /61 (BP Location: Left arm)   Pulse 87   Temp 97.3  F (36.3  C) (Oral)   Resp 16   Ht 1.727 m (5' 8\")   Wt 69.9 kg (154 lb)   SpO2 94%   BMI 23.42 kg/m    I/O last 3 completed shifts:  In: 1100 [I.V.:1100]  Out: 447 [Urine:445; Blood:2]  General: NAD, pleasant, alert and oriented x3. NG in place with thin green fluid in tubing. <10cc in canister  Respiratory: non-labored breathing   Abdomen: mildly distended but fairly soft, appropriately tender around incision  Incision: clean, dry, and intact. No surrounding erythema or drainage. Prevena wound system in place  Extremities: no lower extremity edema, no calf tenderness. Wearing PCDs    LABS:  Recent Labs   Lab Test 07/01/20  0710 06/30/20  0551 06/24/20  0720 06/22/20  0713   WBC 18.2* 17.4*  --  12.8*   HGB 9.8* 10.8*  --  10.3* "   MCV 97 96  --  96    356 234 203      Recent Labs   Lab Test 07/01/20  0710 06/30/20  0551 06/24/20  0720 06/22/20  0713   POTASSIUM 4.9 4.7  --  4.5   CHLORIDE 116* 108  --  113*   CO2 17* 19*  --  19*   BUN 40* 39*  --  48*   CR 2.03* 2.01* 1.58* 1.88*   ANIONGAP 8 10  --  6   Na: 141  Glucose: 130  Ca: 7.9        Recent Labs   Lab Test 06/30/20  0551 06/21/20  0211 09/16/19  1648   ALBUMIN 3.1* 3.9 3.9   BILITOTAL 0.5 0.5 0.5   ALT 26 22 23   AST 17 32 25   ALKPHOS 79 67 58      -------------------------------    Mckayla Rowland PA-C  [facility]  [phone]

## 2020-07-01 NOTE — CONSULTS
[facility]  Consult Note - Hospitalist Service     Date of Admission:  6/30/2020  Consult Requested by: General surgery  Reason for Consult: Medical comanagement    Assessment & Plan   Tervor Soni is a 86 year old male with PMH significant for CAD s/p CABG, HFpEF, HLD, HTN, GERD, CKD4 who was admitted by the general surgery service on 6/30/2020 with perforated ileum requiring exploratory laparotomy, bowel resection, and side-to-side ileal anastomosis after recent ischemic bowel s/p exp lap with lysis of internal hernia band on 6/24/20.     Ileal perforation s/p Exp lap, bowel resection, and side-to-side ileal anastomosis 6/30/20   Recent ischemic bowel s/p ex lap with lysis of internal hernia band 6/24/20  Continues with leukocytosis, IV abx in place and postop as above.  - Post operative cares per primary service, general surgery, including pain control, antibiotics, DVT prophylaxis  - Encourage frequent I-S use  - NGT in place to LIS, management per Gen Surg, AROBF  - Advance diet only per general surgery, currently NPO  - No PO Meds per d/w gen surg on 7/1/20  - CBC/BMP in AM  - Disposition per primary service    HFpEF, not decompensated  CAD s/p CABG 3v 2001  Mild nonrheumatic aortic stenosis  Hyperlipidemia, Hypertension  ECHO 2016 with grade 1-2 diastolic dysfunction, EF 66%, no RWMA.  [PTA amlodipine 5 mg every morning, 10 mg nightly, aspirin 325 mg daily, atenolol 50 mg daily, atorvastatin 20 mg daily, fenofibrate 160 mg daily, furosemide 10 mg daily, hydralazine 50 mg 4 times daily, spironolactone 25 mg daily]  - Hold PTA loop diuretic and K-sparing diuretic for now  - If becomes hypoxic (currently on RA) or worsening peripheral edema/rales would give small dose of IV Lasix  - Decrease MIVF to 75 ml/hr (25 ml/kg), decrease 500mL bolus to 250mL now that BPs are improved, monitor closely  - Monitor fluid status closely, avoid over hydration as above given diastolic dysfunction**  -  Resume PTA statin when able to take PO  - Hold PTA antihypertensives until BPs improve, then resume as able with hold parameters - CCB, BB, vasodilator  - Scheduled low dose IV metoprolol 2.5mg Q 6 hours while NPO, increase as needed   - PRN IV hydralazine for SBP >170  - Hold PTA aspirin, continue when able per gen surg pending Hgb  - Defer repeat ECHO to PCP on discharge, given systolic murmur would recommend patient have a repeat ECHO in the next 6 months as able.    Chronic kidney disease, stabe IV  Baseline creatinine ranges 1.8-2.1.  Currently at a high end of baseline.   - Monitor renal function closely, trend creatinine and electrolytes  - Hold PTA spironolactone  - Avoid nephrotoxins including NSAIDs/Toradol  - Agree with Dilaudid for pain control  - Hold PTA diuretics for now    Acute on chronic anemia  Baseline hemoglobin 10 range.  9.8 postoperatively. Minimal EBL per d/w gen surg.  - Trend Hgb daily  - Monitor for bleeding  - Holding PTA ASA 325mg/d    Recent Labs   Lab 07/01/20  0710 06/30/20  0551   HGB 9.8* 10.8*       GERD  [PTA famotidine 20mg/d PRN]  - IV Pepcid while NPO per gen surg     Other chronic stable diagnoses, managed in outpatient setting:  Abdominal aortic aneurysm, 3.6cm infrarenal  Carotid artery stenosis  Chronic bilateral low back pain  Primary osteoarthritis  Idiopathic chronic gout - holding PTA allopurinol while NPO    Diet: NPO for Medical/Clinical Reasons Except for: Ice Chips    DVT Prophylaxis: Heparin SQ and Pneumatic Compression Devices  Farooq Catheter: in place, indication:    Code Status: Full Code      The patient's care was discussed with the Attending Physician, Dr. De La Torre, Bedside Nurse, Patient and Primary team.    Damaris CalzadaArreola), PA-C  Hospitalist AD  [facility]    ______________________________________________________________________    Chief Complaint   Abdominal pain    History is obtained from the patient and electronic health  record    History of Present Illness   Trevor Soni is a 86 year old male with PMH significant for CAD s/p CABG, HFpEF, HLD, HTN, GERD, CKD4 who was admitted to [facility] on 6/30/2020 by general surgery service with perforated ileum requiring exploratory laparotomy, bowel resection, and side-to-side ileal anastomosis after recent ischemic bowel s/p exp lap with lysis of internal hernia band on 6/21/20. The patient was doing well after leaving the hospital on 6/24/20 until th day prior to admission when developed a sudden onset of abdominal pain at the right lower quadrant associated with loose stool and progressed to constant pain.  Since discharge he has been pain-free without nausea and tolerating regular diet with regular bowel movements.  He was noted to have white blood cell count 17.4 and elevation of absolute neutrophils to 15.9.  CT was performed emergency department which revealed free air concerning for bowel perforation.  He was taken directly to the operating room by general surgery and underwent exploratory laparotomy as above requiring bowel resection and side to side anastomosis    Day of consult he is POD #1, he is well appearing. Denies N/V. Endorses dizziness after sitting up but BPs not orthostatic with recheck. Abd pain minimal, well controlled and minimally TTP. Small Abd wound vac in place. No CP, palpitations, or SOB. No lightheadedness. NGT in place with minimal output.     Review of Systems   The 10 point Review of Systems is negative other than noted in the HPI or here.     Past Medical History    I have reviewed this patient's medical history and updated it with pertinent information if needed.   Past Medical History:   Diagnosis Date     Arthritis     rhuematoid     Coronary artery disease     triple bypass 2001     CRF (chronic renal failure)      Gastro-oesophageal reflux disease      Gout      Hyperlipidemia      Hypertension      Nocturia        Past Surgical  History   I have reviewed this patient's surgical history and updated it with pertinent information if needed.  Past Surgical History:   Procedure Laterality Date     CARDIAC SURGERY       CHOLECYSTECTOMY       COLONOSCOPY       LAPAROTOMY EXPLORATORY N/A 2020    Procedure: EXPLORATORY LAPAROTOMY, BOWEL RESECTION;  Surgeon: Bull Rachel MD;  Location:  OR     LAPAROTOMY, LYSIS ADHESIONS, COMBINED N/A 2020    Procedure: EXPLORATORY LAPAROTOMY WITH LYSIS OF ADHESIONS;  Surgeon: Jose Reed MD;  Location:  OR     ORTHOPEDIC SURGERY       PHACOEMULSIFICATION CLEAR CORNEA WITH TORIC INTRAOCULAR LENS IMPLANT  2012    Procedure:PHACOEMULSIFICATION CLEAR CORNEA WITH TORIC INTRAOCULAR LENS IMPLANT; LEFT PHACOEMULSIFICATION CLEAR CORNEA WITH DELUXE  TORIC INTRAOCULAR LENS IMPLANT ; Surgeon:BRANDO HIGHTOWER; Location:[facility]     PHACOEMULSIFICATION CLEAR CORNEA WITH TORIC INTRAOCULAR LENS IMPLANT  2012    Procedure:PHACOEMULSIFICATION CLEAR CORNEA WITH TORIC INTRAOCULAR LENS IMPLANT; RIGHT PHACOEMULSIFICATION CLEAR CORNEA WITH TORIC INTRAOCULAR LENS IMPLANT ; Surgeon:BRANDO HIGHTOWER; Location:[facility]     VASCULAR SURGERY         Social History   I have reviewed this patient's social history and updated it with pertinent information if needed.  Social History     Tobacco Use     Smoking status: Former Smoker     Types: Cigars     Last attempt to quit: 1980     Years since quittin.5     Smokeless tobacco: Never Used   Substance Use Topics     Alcohol use: Yes     Alcohol/week: 0.0 standard drinks     Comment: rarely     Drug use: No       Family History   I have reviewed this patient's family history and updated it with pertinent information if needed.   No family history on file.    Medications   I have reviewed this patient's current medications  Medications Prior to Admission   Medication Sig Dispense Refill Last Dose     allopurinol (ZYLOPRIM) 100 MG tablet TAKE 2 TABLETS BY  MOUTH  DAILY 180 tablet 3 6/29/2020 at Unknown time     amLODIPine (NORVASC) 5 MG tablet TAKE 1 TABLET BY MOUTH  EVERY MORNING AND 2 TABLETS EVERY EVENING 270 tablet 3 6/29/2020 at Unknown time     ASPIRIN PO Take 325 mg by mouth daily.   6/29/2020 at Unknown time     atenolol (TENORMIN) 50 MG tablet Take 1 tablet (50 mg) by mouth daily 90 tablet 2 6/29/2020 at Unknown time     atorvastatin (LIPITOR) 20 MG tablet TAKE 1 TABLET BY MOUTH  EVERY DAY 90 tablet 2 6/29/2020 at Unknown time     Cholecalciferol (VITAMIN D3 PO) Take 1 tablet by mouth daily   6/29/2020 at Unknown time     coenzyme Q-10 capsule Take 1 capsule by mouth daily   6/29/2020 at Unknown time     fenofibrate (TRIGLIDE/LOFIBRA) 160 MG tablet TAKE 1 TABLET(160 MG) BY MOUTH DAILY 90 tablet 3 6/29/2020 at Unknown time     furosemide (LASIX) 20 MG tablet Take 0.5 tablets (10 mg) by mouth daily 45 tablet 3 6/29/2020 at Unknown time     hydrALAZINE (APRESOLINE) 50 MG tablet TAKE 1 TABLET BY MOUTH 4  TIMES A  tablet 3 6/29/2020 at Unknown time     spironolactone (ALDACTONE) 25 MG tablet Take 1 tablet (25 mg) by mouth every morning 90 tablet 3 6/29/2020 at Unknown time     famotidine (PEPCID) 20 MG tablet Take 20 mg by mouth daily as needed (dyspepsia)   PRN     ondansetron (ZOFRAN-ODT) 4 MG ODT tab Take 1 tablet (4 mg) by mouth every 6 hours as needed for nausea or vomiting 30 tablet 0 PRN       Allergies   Allergies   Allergen Reactions     Avocado      Ciprofloxacin Itching     Penicillins Itching       Physical Exam   Vital Signs: Temp: 97.3  F (36.3  C) Temp src: Oral BP: 130/64 Pulse: 83 Heart Rate: 81 Resp: 16 SpO2: 93 % O2 Device: None (Room air) Oxygen Delivery: 3 LPM  Weight: 154 lbs 0 oz    General: Awake, alert, elderly gentleman who appears stated age. Looks comfortable sitting up in bed. No acute distress.  HEENT: Normocephalic, atraumatic. Extraocular movements intact.    Respiratory: Clear to auscultation bilaterally to upper fields.  Bilateral bases with course rhonchi ~1/4 to 1/3 up. Breathing comfortably on RA.   Cardiovascular: Regular rate and rhythm, +S1 and S2, 3/6 crescendo/decresendo systolic murmur auscultated loudest at LUSB. 1+ peripheral edema, RLE>LLE.   Gastrointestinal: Soft, minimally tender to palpation, non-distended. Hypoactive bowel sounds, small abd wound vac in place.   Skin: Warm, dry. No obvious rashes or lesions on exposed skin. Dorsalis pedis pulses palpable bilaterally.  Musculoskeletal: No joint swelling, erythema or tenderness. Moves all extremities equally.  Neurologic: AAO x3. Cranial nerves 2-12 grossly intact, normal strength and sensation.  Psychiatric: Appropriate mood and affect. No obvious anxiety or depression.    Data   Results for orders placed or performed during the hospital encounter of 06/30/20 (from the past 24 hour(s))   Basic metabolic panel   Result Value Ref Range    Sodium 141 133 - 144 mmol/L    Potassium 4.9 3.4 - 5.3 mmol/L    Chloride 116 (H) 94 - 109 mmol/L    Carbon Dioxide 17 (L) 20 - 32 mmol/L    Anion Gap 8 3 - 14 mmol/L    Glucose 130 (H) 70 - 99 mg/dL    Urea Nitrogen 40 (H) 7 - 30 mg/dL    Creatinine 2.03 (H) 0.66 - 1.25 mg/dL    GFR Estimate 29 (L) >60 mL/min/[1.73_m2]    GFR Estimate If Black 33 (L) >60 mL/min/[1.73_m2]    Calcium 7.9 (L) 8.5 - 10.1 mg/dL   CBC with platelets differential   Result Value Ref Range    WBC 18.2 (H) 4.0 - 11.0 10e9/L    RBC Count 3.11 (L) 4.4 - 5.9 10e12/L    Hemoglobin 9.8 (L) 13.3 - 17.7 g/dL    Hematocrit 30.2 (L) 40.0 - 53.0 %    MCV 97 78 - 100 fl    MCH 31.5 26.5 - 33.0 pg    MCHC 32.5 31.5 - 36.5 g/dL    RDW 14.3 10.0 - 15.0 %    Platelet Count 287 150 - 450 10e9/L    Diff Method Automated Method     % Neutrophils 94.0 %    % Lymphocytes 2.7 %    % Monocytes 2.9 %    % Eosinophils 0.0 %    % Basophils 0.1 %    % Immature Granulocytes 0.3 %    Nucleated RBCs 0 0 /100    Absolute Neutrophil 17.1 (H) 1.6 - 8.3 10e9/L    Absolute Lymphocytes 0.5 (L)  0.8 - 5.3 10e9/L    Absolute Monocytes 0.5 0.0 - 1.3 10e9/L    Absolute Eosinophils 0.0 0.0 - 0.7 10e9/L    Absolute Basophils 0.0 0.0 - 0.2 10e9/L    Abs Immature Granulocytes 0.1 0 - 0.4 10e9/L    Absolute Nucleated RBC 0.0    Albumin level   Result Value Ref Range    Albumin 2.3 (L) 3.4 - 5.0 g/dL

## 2020-07-01 NOTE — PLAN OF CARE
A/O x4. AVSS on RA, O2 sats 92-93%. C/o ab pain, IV dilaudid given x1. NG to low-intermittent suction, minimal green output. Wound vac on abdomen incision, minimal output. BS hypoactive, no flatus. LS crackles at bases. Assist x1-2, attempted to walk this afternoon, only able to ambulate 2ft. NPO ex ice chips. Farooq patent, good output. Abdominal fluid culture positive, MD aware - see note. Discharge pending, will continue to monitor.

## 2020-07-02 ENCOUNTER — APPOINTMENT (OUTPATIENT)
Dept: PHYSICAL THERAPY | Facility: CLINIC | Age: 85
DRG: 329 | End: 2020-07-02
Attending: PHYSICIAN ASSISTANT
Payer: MEDICARE

## 2020-07-02 LAB
ALBUMIN SERPL-MCNC: 2.2 G/DL (ref 3.4–5)
ALP SERPL-CCNC: 65 U/L (ref 40–150)
ALT SERPL W P-5'-P-CCNC: 21 U/L (ref 0–70)
ANION GAP SERPL CALCULATED.3IONS-SCNC: 9 MMOL/L (ref 3–14)
AST SERPL W P-5'-P-CCNC: 24 U/L (ref 0–45)
BACTERIA SPEC CULT: ABNORMAL
BILIRUB SERPL-MCNC: 0.5 MG/DL (ref 0.2–1.3)
BUN SERPL-MCNC: 38 MG/DL (ref 7–30)
CALCIUM SERPL-MCNC: 8.3 MG/DL (ref 8.5–10.1)
CHLORIDE SERPL-SCNC: 120 MMOL/L (ref 94–109)
CO2 SERPL-SCNC: 15 MMOL/L (ref 20–32)
CREAT SERPL-MCNC: 1.65 MG/DL (ref 0.66–1.25)
ERYTHROCYTE [DISTWIDTH] IN BLOOD BY AUTOMATED COUNT: 14.3 % (ref 10–15)
GFR SERPL CREATININE-BSD FRML MDRD: 37 ML/MIN/{1.73_M2}
GLUCOSE SERPL-MCNC: 92 MG/DL (ref 70–99)
HCT VFR BLD AUTO: 27.9 % (ref 40–53)
HGB BLD-MCNC: 9 G/DL (ref 13.3–17.7)
MCH RBC QN AUTO: 31.1 PG (ref 26.5–33)
MCHC RBC AUTO-ENTMCNC: 32.3 G/DL (ref 31.5–36.5)
MCV RBC AUTO: 97 FL (ref 78–100)
PLATELET # BLD AUTO: 289 10E9/L (ref 150–450)
POTASSIUM SERPL-SCNC: 4.2 MMOL/L (ref 3.4–5.3)
PROT SERPL-MCNC: 5.6 G/DL (ref 6.8–8.8)
RBC # BLD AUTO: 2.89 10E12/L (ref 4.4–5.9)
SODIUM SERPL-SCNC: 144 MMOL/L (ref 133–144)
SPECIMEN SOURCE: ABNORMAL
WBC # BLD AUTO: 15.6 10E9/L (ref 4–11)

## 2020-07-02 PROCEDURE — 97116 GAIT TRAINING THERAPY: CPT | Mod: GP

## 2020-07-02 PROCEDURE — 25800025 ZZH RX 258: Performed by: PHYSICIAN ASSISTANT

## 2020-07-02 PROCEDURE — 97161 PT EVAL LOW COMPLEX 20 MIN: CPT | Mod: GP

## 2020-07-02 PROCEDURE — 99232 SBSQ HOSP IP/OBS MODERATE 35: CPT | Performed by: INTERNAL MEDICINE

## 2020-07-02 PROCEDURE — 36415 COLL VENOUS BLD VENIPUNCTURE: CPT | Performed by: PHYSICIAN ASSISTANT

## 2020-07-02 PROCEDURE — 80053 COMPREHEN METABOLIC PANEL: CPT | Performed by: PHYSICIAN ASSISTANT

## 2020-07-02 PROCEDURE — 25000128 H RX IP 250 OP 636: Performed by: PHYSICIAN ASSISTANT

## 2020-07-02 PROCEDURE — 25000125 ZZHC RX 250: Performed by: PHYSICIAN ASSISTANT

## 2020-07-02 PROCEDURE — 85027 COMPLETE CBC AUTOMATED: CPT | Performed by: PHYSICIAN ASSISTANT

## 2020-07-02 PROCEDURE — 25800030 ZZH RX IP 258 OP 636: Performed by: PHYSICIAN ASSISTANT

## 2020-07-02 PROCEDURE — 12000000 ZZH R&B MED SURG/OB

## 2020-07-02 PROCEDURE — 97530 THERAPEUTIC ACTIVITIES: CPT | Mod: GP

## 2020-07-02 RX ADMIN — ERTAPENEM SODIUM 500 MG: 1 INJECTION, POWDER, LYOPHILIZED, FOR SOLUTION INTRAMUSCULAR; INTRAVENOUS at 08:06

## 2020-07-02 RX ADMIN — DEXTROSE AND SODIUM CHLORIDE: 5; 450 INJECTION, SOLUTION INTRAVENOUS at 10:24

## 2020-07-02 RX ADMIN — HEPARIN SODIUM 5000 UNITS: 5000 INJECTION, SOLUTION INTRAVENOUS; SUBCUTANEOUS at 21:47

## 2020-07-02 RX ADMIN — METOPROLOL TARTRATE 2.5 MG: 5 INJECTION INTRAVENOUS at 21:47

## 2020-07-02 RX ADMIN — HYDROMORPHONE HYDROCHLORIDE 0.5 MG: 1 INJECTION, SOLUTION INTRAMUSCULAR; INTRAVENOUS; SUBCUTANEOUS at 08:06

## 2020-07-02 RX ADMIN — FAMOTIDINE 20 MG: 10 INJECTION, SOLUTION INTRAVENOUS at 21:47

## 2020-07-02 RX ADMIN — DEXTROSE AND SODIUM CHLORIDE: 5; 450 INJECTION, SOLUTION INTRAVENOUS at 21:59

## 2020-07-02 RX ADMIN — HEPARIN SODIUM 5000 UNITS: 5000 INJECTION, SOLUTION INTRAVENOUS; SUBCUTANEOUS at 10:11

## 2020-07-02 RX ADMIN — METOPROLOL TARTRATE 2.5 MG: 5 INJECTION INTRAVENOUS at 15:10

## 2020-07-02 RX ADMIN — METOPROLOL TARTRATE 2.5 MG: 5 INJECTION INTRAVENOUS at 10:11

## 2020-07-02 RX ADMIN — METOPROLOL TARTRATE 2.5 MG: 5 INJECTION INTRAVENOUS at 03:55

## 2020-07-02 ASSESSMENT — ACTIVITIES OF DAILY LIVING (ADL)
ADLS_ACUITY_SCORE: 15
ADLS_ACUITY_SCORE: 16
ADLS_ACUITY_SCORE: 15
ADLS_ACUITY_SCORE: 16
ADLS_ACUITY_SCORE: 15
ADLS_ACUITY_SCORE: 16

## 2020-07-02 NOTE — PROGRESS NOTES
[facility]    Medicine Progress Note - Hospitalist Service        Date of Admission:  6/30/2020  5:36 AM    Assessment & Plan:   Trevro Soni is a 86 year old male with PMH significant for CAD s/p CABG, HFpEF, HLD, HTN, GERD, CKD4 who was admitted by the general surgery service on 6/30/2020 with perforated ileum requiring exploratory laparotomy, bowel resection, and side-to-side ileal anastomosis after recent ischemic bowel s/p exp lap with lysis of internal hernia band on 6/24/20.     Ileal perforation s/p Exp lap, bowel resection, and side-to-side ileal anastomosis 6/30/20   Recent ischemic bowel s/p ex lap with lysis of internal hernia band 6/24/20  - Post operative cares per primary service, general surgery, including pain control, antibiotics, DVT prophylaxis  - continue Invanz  - Encourage frequent I-S use  - NGT removed today  - Advance diet per general surgery  - disposition per primary service     HFpEF, not decompensated  CAD s/p CABG 3v 2001  Mild nonrheumatic aortic stenosis  Hyperlipidemia, Hypertension  ECHO 2016 with grade 1-2 diastolic dysfunction, EF 66%, no RWMA.  [PTA amlodipine 5 mg every morning, 10 mg nightly, aspirin 325 mg daily, atenolol 50 mg daily, atorvastatin 20 mg daily, fenofibrate 160 mg daily, furosemide 10 mg daily, hydralazine 50 mg 4 times daily, spironolactone 25 mg daily]  - Hold PTA loop diuretic and K-sparing diuretic for now  - If becomes hypoxic (currently on RA) or worsening peripheral edema/rales would give small dose of IV Lasix  - Monitor fluid status closely, avoid over hydration as above given diastolic dysfunction  - Resume PTA statin when able to take PO  - Scheduled low dose IV metoprolol 2.5mg Q 6 hours while NPO, increase as needed  - Resume PTA anti-hypertensive once able to take PO   - PRN IV hydralazine for SBP >170  - Hold PTA aspirin, continue when able per gen surg pending Hgb  - Defer repeat ECHO to PCP on discharge, given systolic  "murmur would recommend patient have a repeat ECHO in the next 6 months as able.     Chronic kidney disease, stabe IV  Baseline creatinine ranges 1.8-2.1.    - Monitor renal function closely, Cr improved at 1.65 today  - Hold PTA spironolactone  - Avoid nephrotoxins including NSAIDs/Toradol  - Hold PTA diuretics for now     Acute on chronic anemia  Baseline hemoglobin 10 range.  9.8 postoperatively. Minimal EBL per d/w gen surg.  - Hb slightly low but stable    GERD  [PTA famotidine 20mg/d PRN]  - IV Pepcid while NPO per gen surg      Other chronic stable diagnoses, managed in outpatient setting:  Abdominal aortic aneurysm, 3.6cm infrarenal  Carotid artery stenosis  Chronic bilateral low back pain  Primary osteoarthritis  Idiopathic chronic gout - holding PTA allopurinol while     Diet: NPO for Medical/Clinical Reasons Except for: Ice Chips     DVT Prophylaxis: Defer to primary service   Farooq Catheter: in place, indication: /GI/GYN Pelvic Procedure  Code Status: Full Code     Disposition Plan    Expected discharge: 2 - 3 days, recommended to TBD  Entered: Saul De La Torre MD 07/02/2020, 8:28 AM        The patient's care was discussed with the Bedside Nurse and Patient.    Saul De La Torre MD  Hospitalist Service  [facility]    ______________________________________________________________________    Interval History   Pain abdomen 4/10 at its worse. No n/v. Afebrile    Data reviewed today: I reviewed all medications, new labs and imaging results over the last 24 hours. I personally reviewed no images or EKG's today.    Physical Exam   Vital signs:  Temp: 98.2  F (36.8  C) Temp src: Oral BP: 137/59 Pulse: 84 Heart Rate: 74 Resp: 16 SpO2: 91 % O2 Device: None (Room air) Oxygen Delivery: 3 LPM Height: 172.7 cm (5' 8\") Weight: 69.9 kg (154 lb)  Estimated body mass index is 23.42 kg/m  as calculated from the following:    Height as of this encounter: 1.727 m (5' 8\").    Weight as of this encounter: " 69.9 kg (154 lb).      Wt Readings from Last 2 Encounters:   06/30/20 69.9 kg (154 lb)   06/21/20 72.6 kg (160 lb)       Gen: AAOX2-3, NAD  HEENT:  no pallor  Resp: CTA B/L, normal WOB  CVS: RRR, no murmur  Abd/GI: Soft, tender around incision, BS- normoactive.    Skin: Warm, dry no rashes  MSK:  no pedal edema  Neuro- CN- intact. No focal deficits.       Data   Recent Labs   Lab 07/02/20  0735 07/01/20  0710 06/30/20  0551   WBC 15.6* 18.2* 17.4*   HGB 9.0* 9.8* 10.8*   MCV 97 97 96    287 356    141 137   POTASSIUM 4.2 4.9 4.7   CHLORIDE 120* 116* 108   CO2 PENDING 17* 19*   BUN PENDING 40* 39*   CR PENDING 2.03* 2.01*   ANIONGAP PENDING 8 10   AGUSTÍN PENDING 7.9* 8.8   GLC PENDING 130* 150*   ALBUMIN PENDING 2.3* 3.1*   PROTTOTAL PENDING  --  6.8   BILITOTAL PENDING  --  0.5   ALKPHOS PENDING  --  79   ALT PENDING  --  26   AST PENDING  --  17   LIPASE  --   --  219       Recent Results (from the past 24 hour(s))   XR Abdomen Port 1 View    Narrative    ABDOMEN ONE VIEW PORTABLE  7/1/2020 2:37 PM     HISTORY: Portable XR to confirm NG tube placement.    COMPARISON: CT dated 6/30/2020       Impression    IMPRESSION: An NG tube terminates in the stomach. Multiple air-filled  loops of bowel are noted, some of which may represent minimally  distended small bowel loops. No definite intraperitoneal free air,  though evaluation is slightly limited by technique and positioning.  Cholecystectomy clips are present. There are sternotomy suture wires.  Aortic calcification is present. There are surgical staples over the  pelvis.    LESTER J FAHRNER, MD     Medications     sodium chloride 75 mL/hr (07/01/20 2205)       [Held by provider] allopurinol  200 mg Oral Daily     [Held by provider] amLODIPine  10 mg Oral At Bedtime     [Held by provider] amLODIPine  5 mg Oral QAM     [Held by provider] aspirin  325 mg Oral Daily     [Held by provider] atenolol  50 mg Oral Daily     [Held by provider] atorvastatin  20 mg  Oral Daily     ertapenem (INVanz) IV  500 mg Intravenous Q24H     famotidine  20 mg Oral At Bedtime    Or     famotidine  20 mg Intravenous At Bedtime     [Held by provider] furosemide  10 mg Oral Daily     heparin ANTICOAGULANT  5,000 Units Subcutaneous Q12H     [Held by provider] hydrALAZINE  50 mg Oral 4x Daily     metoprolol  2.5 mg Intravenous Q6H     sodium chloride (PF)  3 mL Intracatheter Q8H     [Held by provider] spironolactone  25 mg Oral QAM

## 2020-07-02 NOTE — PLAN OF CARE
Rested well overnight. NG to LIS. Minimal output. Denies nausea and pain. VSS. Room air. Farooq in place with adequate urine output. Abd incision with wound VAC in place, CDI. A&O. Ambulating Ax1 with walker. IVF infusing. On IV antibiotics.

## 2020-07-02 NOTE — PROGRESS NOTES
07/02/20 1552   Quick Adds   Type of Visit Initial PT Evaluation   Living Environment   Lives With spouse   Living Arrangements house   Home Accessibility stairs to enter home;stairs within home   Number of Stairs, Main Entrance 3   Stair Railings, Main Entrance railing on left side (ascending)   Number of Stairs, Within Home, Primary   (flight to basement laundry)   Stair Railings, Within Home, Primary railing on left side (ascending)   Living Environment Comment Bathroom includes tub shower with grab bar. SC available (spouse's) but pt does not use.    Self-Care   Usual Activity Tolerance good   Current Activity Tolerance moderate   Equipment Currently Used at Home none   Activity/Exercise/Self-Care Comment Has walker, cane, shower chair- does not use any   Functional Level Prior   Ambulation 0-->independent   Transferring 0-->independent   Toileting 0-->independent   Bathing 0-->independent   Fall history within last six months no   Prior Functional Level Comment Pt reports he used a walker 1 day at home after recent surgery,then no AD. Pt ambulates ind without AD at baseline. Pt quite independent. Ind with all ADLS and IADLs including driving.    General Information   Onset of Illness/Injury or Date of Surgery - Date 06/30/20   Referring Physician Mckayla Rowland PA-C    Patient/Family Goals Statement to go home   Pertinent History of Current Problem (include personal factors and/or comorbidities that impact the POC) Patient admitted 6/30 with abdominal pain after recent surgery. Pt had exlap and lysis of internal hernia band on 6/21, was dc home    Precautions/Limitations fall precautions   General Info Comments Activity: Up with assist   Cognitive Status Examination   Orientation orientation to person, place and time   Level of Consciousness alert   Follows Commands and Answers Questions 100% of the time;able to follow multistep instructions   Personal Safety and Judgment intact   Pain Assessment    Patient Currently in Pain Yes, see Vital Sign flowsheet  (1/10)   Posture    Posture Forward head position;Protracted shoulders   Posture Comments elevated shoulders with ambulation   Range of Motion (ROM)   ROM Comment ROM appears WFL with bed mobility and transfers, able to reach to manage pull-up at toilet   Strength   Strength Comments did not perform MMT due to increased abdominal pain. Pt demos soem functional weakness with bed mobility and transfer- slow to rise, but does not need physical assist   Bed Mobility   Bed Mobility Comments  Supine>sit with HOB slightly elevated, use of rail and SBA.   Transfer Skills   Transfer Comments Sit>stand with FWW and SBA, slow to rise   Gait   Gait Comments Ambulated in wong with FWW and SBA with moderate reliance on FWW support, elevated shoulders, flexed posture, short steps, moderate pace   Balance   Balance Comments Needs B UE support on walker for safe dynamic mobility   Sensory Examination   Sensory Perception Comments denies numbness/tingling   Modality Interventions   Planned Modality Interventions Cryotherapy   General Therapy Interventions   Planned Therapy Interventions balance training;bed mobility training;gait training;neuromuscular re-education;strengthening;transfer training;home program guidelines;progressive activity/exercise   Clinical Impression   Criteria for Skilled Therapeutic Intervention yes, treatment indicated   PT Diagnosis difficulty ambulating   Influenced by the following impairments pain, decreased activity tolerance, decreased strength, impaired balance   Functional limitations due to impairments difficulty with bed mobility, transfers, ambulation, stairs   Clinical Presentation Stable/Uncomplicated   Clinical Presentation Rationale clinical judgement   Clinical Decision Making (Complexity) Low complexity   Therapy Frequency Daily   Predicted Duration of Therapy Intervention (days/wks) 4 days   Anticipated Equipment Needs at Discharge  "  (pt owns FWW, SEC)   Anticipated Discharge Disposition Home with Assist  (assist for household tasks/laundry)   Risk & Benefits of therapy have been explained Yes   Patient, Family & other staff in agreement with plan of care Yes   Clinical Impression Comments Patient moving fairly well, with decreased activity tolerance, increased pain with bed mobility. Anticipate with continued therapy pt will progress in order for safe discharge home. Pt will benefit from assist for household tasks due to decreased activity tolerance.   [facility] AM-PAC TM \"6 Clicks\"   2016, Trustees of [facility], under license to [employer].  All rights reserved.   6 Clicks Short Forms Basic Mobility Inpatient Short Form   [facility] AM-PAC  \"6 Clicks\" V.2 Basic Mobility Inpatient Short Form   1. Turning from your back to your side while in a flat bed without using bedrails? 3 - A Little   2. Moving from lying on your back to sitting on the side of a flat bed without using bedrails? 3 - A Little   3. Moving to and from a bed to a chair (including a wheelchair)? 3 - A Little   4. Standing up from a chair using your arms (e.g., wheelchair, or bedside chair)? 3 - A Little   5. To walk in hospital room? 3 - A Little   6. Climbing 3-5 steps with a railing? 3 - A Little   Basic Mobility Raw Score (Score out of 24.Lower scores equate to lower levels of function) 18   Total Evaluation Time   Total Evaluation Time (Minutes) 12     "

## 2020-07-02 NOTE — PLAN OF CARE
Discharge Planner PT   Patient plan for discharge: Home with spouse  Current status: PT orders received, eval completed, treatment initiated. Patient admitted 6/30 with abdominal pain after recent surgery 6/21. Pt had exlap and lysis of internal hernia band on 6/21, was home 5 days before abdominal pain started and returned to [facility]. Pt now POD #2 ex lap with bowel resection& side to side ileal anastomosis. Pt lives with spouse in house with 3 steps to enter, all needs met main level except basement laundry. Pt previously independent with all mobility without AD and ADLs/IADLs. Pt reports he used walker 1 day only at home after recent surgery.    Currently, pt received supine, agreeable to PT. Edu on role of PT, POC. Supine>sit with HOB slightly elevated, use of rail and SBA. Sit>stand with FWW and SBA. Ambulated 150' with FWW and SBA with 1 standing rest break. Some shortness of breath ambulating, SpO2 87 upon return to room, quickly recovering to 90. Upon return to room, stand<>sit at toilet with grab bar and SBA. Sit>supine with SBA, increased pain.     Barriers to return to prior living situation: pain, stairs to enter  Recommendations for discharge: Home with use of FWW for mobility, assist for household tasks, SBA for stairs    Rationale for recommendations: Patient moving fairly well, with decreased activity tolerance, increased pain with bed mobility. Anticipate with continued therapy pt will progress in order for safe discharge home. Pt will benefit from assist for household tasks due to decreased activity tolerance.         Entered by: Ashwini García 07/02/2020 4:28 PM

## 2020-07-02 NOTE — PLAN OF CARE
Patient Ax4, VSS on room air, lungs clear.  He is 90-92% on room air, encouraged IS use.  NPO with ice chips, scant drainage from NG at LIS.  BS+ no flatus.  Given IV dilaudid x 1 for pain rated 4/10, he was able to ambulate in wnog after and did well.  Midline incision with wound vac in place, no drainage at site or from vac.  +1 bilateral LE edema.  Farooq with 300 ml clear yellow urine.

## 2020-07-02 NOTE — PROGRESS NOTES
"General Surgery Progress Note    Admission Date: 6/30/2020  Today's Date: 7/2/2020         Assessment:      Trevor Soni is a 86 year old male POD 2 s/p exploratory laparotomy, small bowel resection         Plan:   - Continue NPO, await return of bowel function. NG output minimal - will discuss continuation vs removal with Dr. Rachel  - Cl 120, Na 144. Will change IV fluids to D5 1/2NS  - Medical management per hospitalist. Greatly appreciate the assistance  - Pain meds and anti-emetics available prn  - Remove nunez  - PCDs, heparin, work on out of bed today. Will order PT consult  - Pepcid IV while NPO  - Continue Invanz, white count trending down        Interval History:   Afebrile, VSS on room air. Denies flatus or BM. Belching some today. Reports mild abdominal pain, improving. Denies nausea.          Physical Exam:   /59   Pulse 84   Temp 98.2  F (36.8  C) (Oral)   Resp 16   Ht 1.727 m (5' 8\")   Wt 69.9 kg (154 lb)   SpO2 91%   BMI 23.42 kg/m    I/O last 3 completed shifts:  In: 660 [P.O.:60; I.V.:600]  Out: 850 [Urine:850]  General: NAD, pleasant, alert and oriented x3. NG in place with thin watery fluid in tubing  Respiratory: non-labored breathing   Abdomen: mildly distended but soft, appropriately tender around incision  Incision: clean, dry, and intact. No surrounding erythema or drainage. Prevena wound system in place  Extremities: no lower extremity edema, no calf tenderness. Wearing PCDs    LABS:  Recent Labs   Lab Test 07/02/20  0735 07/01/20  0710 06/30/20  0551   WBC 15.6* 18.2* 17.4*   HGB 9.0* 9.8* 10.8*   MCV 97 97 96    287 356      Recent Labs   Lab Test 07/02/20  0735 07/01/20  0710 06/30/20  0551   POTASSIUM 4.2 4.9 4.7   CHLORIDE 120* 116* 108   CO2 15* 17* 19*   BUN 38* 40* 39*   CR 1.65* 2.03* 2.01*   ANIONGAP 9 8 10      Recent Labs   Lab Test 07/02/20  0735 07/01/20  0710 06/30/20  0551 06/21/20  0211   ALBUMIN 2.2* 2.3* 3.1* 3.9   BILITOTAL 0.5  --  0.5 0.5   ALT 21 "  --  26 22   AST 24  --  17 32   ALKPHOS 65  --  79 67     -------------------------------    Mckayla Rowland PA-C  [facility]  [phone]       EOMI; PERRL; no drainage or redness

## 2020-07-03 ENCOUNTER — APPOINTMENT (OUTPATIENT)
Dept: PHYSICAL THERAPY | Facility: CLINIC | Age: 85
DRG: 329 | End: 2020-07-03
Attending: PHYSICIAN ASSISTANT
Payer: MEDICARE

## 2020-07-03 LAB
ANION GAP SERPL CALCULATED.3IONS-SCNC: 8 MMOL/L (ref 3–14)
BUN SERPL-MCNC: 31 MG/DL (ref 7–30)
CALCIUM SERPL-MCNC: 8.4 MG/DL (ref 8.5–10.1)
CHLORIDE SERPL-SCNC: 118 MMOL/L (ref 94–109)
CO2 SERPL-SCNC: 18 MMOL/L (ref 20–32)
CREAT SERPL-MCNC: 1.31 MG/DL (ref 0.66–1.25)
ERYTHROCYTE [DISTWIDTH] IN BLOOD BY AUTOMATED COUNT: 14.1 % (ref 10–15)
GFR SERPL CREATININE-BSD FRML MDRD: 49 ML/MIN/{1.73_M2}
GLUCOSE SERPL-MCNC: 139 MG/DL (ref 70–99)
HCT VFR BLD AUTO: 28.1 % (ref 40–53)
HGB BLD-MCNC: 9.1 G/DL (ref 13.3–17.7)
INTERPRETATION ECG - MUSE: NORMAL
MCH RBC QN AUTO: 30.7 PG (ref 26.5–33)
MCHC RBC AUTO-ENTMCNC: 32.4 G/DL (ref 31.5–36.5)
MCV RBC AUTO: 95 FL (ref 78–100)
PLATELET # BLD AUTO: 286 10E9/L (ref 150–450)
POTASSIUM SERPL-SCNC: 3.9 MMOL/L (ref 3.4–5.3)
RBC # BLD AUTO: 2.96 10E12/L (ref 4.4–5.9)
SODIUM SERPL-SCNC: 144 MMOL/L (ref 133–144)
WBC # BLD AUTO: 10.5 10E9/L (ref 4–11)

## 2020-07-03 PROCEDURE — 97116 GAIT TRAINING THERAPY: CPT | Mod: GP

## 2020-07-03 PROCEDURE — 36415 COLL VENOUS BLD VENIPUNCTURE: CPT | Performed by: PHYSICIAN ASSISTANT

## 2020-07-03 PROCEDURE — 12000000 ZZH R&B MED SURG/OB

## 2020-07-03 PROCEDURE — 99232 SBSQ HOSP IP/OBS MODERATE 35: CPT | Performed by: INTERNAL MEDICINE

## 2020-07-03 PROCEDURE — 25000128 H RX IP 250 OP 636: Performed by: PHYSICIAN ASSISTANT

## 2020-07-03 PROCEDURE — 97530 THERAPEUTIC ACTIVITIES: CPT | Mod: GP

## 2020-07-03 PROCEDURE — 80048 BASIC METABOLIC PNL TOTAL CA: CPT | Performed by: PHYSICIAN ASSISTANT

## 2020-07-03 PROCEDURE — 85027 COMPLETE CBC AUTOMATED: CPT | Performed by: PHYSICIAN ASSISTANT

## 2020-07-03 PROCEDURE — 25800030 ZZH RX IP 258 OP 636: Performed by: PHYSICIAN ASSISTANT

## 2020-07-03 PROCEDURE — 25000132 ZZH RX MED GY IP 250 OP 250 PS 637: Mod: GY | Performed by: PHYSICIAN ASSISTANT

## 2020-07-03 PROCEDURE — 25000125 ZZHC RX 250: Performed by: PHYSICIAN ASSISTANT

## 2020-07-03 RX ADMIN — HEPARIN SODIUM 5000 UNITS: 5000 INJECTION, SOLUTION INTRAVENOUS; SUBCUTANEOUS at 10:16

## 2020-07-03 RX ADMIN — FAMOTIDINE 20 MG: 20 TABLET ORAL at 22:02

## 2020-07-03 RX ADMIN — METOPROLOL TARTRATE 2.5 MG: 5 INJECTION INTRAVENOUS at 04:17

## 2020-07-03 RX ADMIN — ERTAPENEM SODIUM 500 MG: 1 INJECTION, POWDER, LYOPHILIZED, FOR SOLUTION INTRAMUSCULAR; INTRAVENOUS at 09:54

## 2020-07-03 RX ADMIN — HEPARIN SODIUM 5000 UNITS: 5000 INJECTION, SOLUTION INTRAVENOUS; SUBCUTANEOUS at 22:02

## 2020-07-03 ASSESSMENT — ACTIVITIES OF DAILY LIVING (ADL)
ADLS_ACUITY_SCORE: 15

## 2020-07-03 NOTE — PROGRESS NOTES
[facility]    Medicine Progress Note - Hospitalist Service        Date of Admission:  6/30/2020  5:36 AM    Assessment & Plan:   Trevor Soni is a 86 year old male with PMH significant for CAD s/p CABG, HFpEF, HLD, HTN, GERD, CKD4 who was admitted by the general surgery service on 6/30/2020 with perforated ileum requiring exploratory laparotomy, bowel resection, and side-to-side ileal anastomosis after recent ischemic bowel s/p exp lap with lysis of internal hernia band on 6/24/20.     Ileal perforation s/p Exp lap, bowel resection, and side-to-side ileal anastomosis 6/30/20   Recent ischemic bowel s/p ex lap with lysis of internal hernia band 6/24/20  - Post operative cares per primary service, general surgery, including pain control, antibiotics, DVT prophylaxis  - continue Invanz  - Advance diet per general surgery, currently on clear liquid diet  - disposition per primary service     HFpEF, not decompensated  CAD s/p CABG 3v 2001  Mild nonrheumatic aortic stenosis  Hyperlipidemia, Hypertension  ECHO 2016 with grade 1-2 diastolic dysfunction, EF 66%, no RWMA.  [PTA amlodipine 5 mg every morning, 10 mg nightly, aspirin 325 mg daily, atenolol 50 mg daily, atorvastatin 20 mg daily, fenofibrate 160 mg daily, furosemide 10 mg daily, hydralazine 50 mg 4 times daily, spironolactone 25 mg daily]  - Hold PTA loop diuretic and K-sparing diuretic for now  - If becomes hypoxic (currently on RA) or worsening peripheral edema/rales would give small dose of IV Lasix  - Monitor fluid status closely, avoid over hydration as above given diastolic dysfunction  - Resume PTA statin when able to take PO  - Resume PTA atenolol today, hold Lasix, hydralazine, spironolactone and amlodipine  - PRN IV hydralazine for SBP >170  - Hold PTA aspirin, resume when okay with general surgery  - Defer repeat ECHO to PCP on discharge, given systolic murmur would recommend patient have a repeat ECHO in the next 6 months as  "able.     Chronic kidney disease, stabe IV  Baseline creatinine ranges 1.8-2.1.    - Monitor renal function closely, Cr stable at 1.3  - Hold PTA spironolactone  - Avoid nephrotoxins including NSAIDs/Toradol  - Hold PTA diuretics for now     Acute on chronic anemia  Baseline hemoglobin 10 range.   -Stable between 9-10.    GERD  [PTA famotidine 20mg/d PRN]  - IV Pepcid for today     Other chronic stable diagnoses, managed in outpatient setting:  Abdominal aortic aneurysm, 3.6cm infrarenal  Carotid artery stenosis  Chronic bilateral low back pain  Primary osteoarthritis  Idiopathic chronic gout - holding PTA allopurinol while     Diet: Clear Liquid Diet     DVT Prophylaxis: Defer to primary service   Farooq Catheter: not present  Code Status: Full Code     Disposition Plan    Expected discharge: 2-3 days  Entered: Saul De La Torre MD 07/03/2020, 9:19 AM        The patient's care was discussed with the Bedside Nurse and Patient.    Saul De La Torre MD  Hospitalist Service  [facility]    ______________________________________________________________________    Interval History   Pain adequately controlled.  Passing flatus.  No bowel movement.  Started on clear liquid diet.    Data reviewed today: I reviewed all medications, new labs and imaging results over the last 24 hours. I personally reviewed no images or EKG's today.    Physical Exam   Vital signs:  Temp: 98.1  F (36.7  C) Temp src: Oral BP: 124/67 Pulse: 78 Heart Rate: 93 Resp: 16 SpO2: 93 % O2 Device: None (Room air) Oxygen Delivery: 3 LPM Height: 172.7 cm (5' 8\") Weight: 69.9 kg (154 lb)  Estimated body mass index is 23.42 kg/m  as calculated from the following:    Height as of this encounter: 1.727 m (5' 8\").    Weight as of this encounter: 69.9 kg (154 lb).      Wt Readings from Last 2 Encounters:   06/30/20 69.9 kg (154 lb)   06/21/20 72.6 kg (160 lb)       Gen: AAOX2-3, NAD  Resp: CTA B/L, normal WOB  CVS: RRR, no murmur  Abd/GI: Soft, " tender around incision, BS- normoactive.    Skin: Warm, dry no rashes  MSK:  no pedal edema  Neuro- CN- intact. No focal deficits.       Data   Recent Labs   Lab 07/03/20  0746 07/02/20  0735 07/01/20  0710 06/30/20  0551   WBC 10.5 15.6* 18.2* 17.4*   HGB 9.1* 9.0* 9.8* 10.8*   MCV 95 97 97 96    289 287 356    144 141 137   POTASSIUM 3.9 4.2 4.9 4.7   CHLORIDE 118* 120* 116* 108   CO2 18* 15* 17* 19*   BUN 31* 38* 40* 39*   CR 1.31* 1.65* 2.03* 2.01*   ANIONGAP 8 9 8 10   AGUSTÍN 8.4* 8.3* 7.9* 8.8   * 92 130* 150*   ALBUMIN  --  2.2* 2.3* 3.1*   PROTTOTAL  --  5.6*  --  6.8   BILITOTAL  --  0.5  --  0.5   ALKPHOS  --  65  --  79   ALT  --  21  --  26   AST  --  24  --  17   LIPASE  --   --   --  219       No results found for this or any previous visit (from the past 24 hour(s)).  Medications     dextrose 5% and 0.45% NaCl 75 mL/hr at 07/02/20 2159       [Held by provider] allopurinol  200 mg Oral Daily     [Held by provider] amLODIPine  10 mg Oral At Bedtime     [Held by provider] amLODIPine  5 mg Oral QAM     [Held by provider] aspirin  325 mg Oral Daily     atenolol  50 mg Oral Daily     [Held by provider] atorvastatin  20 mg Oral Daily     ertapenem (INVanz) IV  500 mg Intravenous Q24H     famotidine  20 mg Oral At Bedtime    Or     famotidine  20 mg Intravenous At Bedtime     [Held by provider] furosemide  10 mg Oral Daily     heparin ANTICOAGULANT  5,000 Units Subcutaneous Q12H     [Held by provider] hydrALAZINE  50 mg Oral 4x Daily     sodium chloride (PF)  3 mL Intracatheter Q8H     [Held by provider] spironolactone  25 mg Oral QAM

## 2020-07-03 NOTE — PLAN OF CARE
A/Ox4. VSS on RA. BS active,-flatus, -bm.  LS clear. Skin: abdominal incision UTV, wound vac in place. CMS intact. Pain: denies. Up with 1+GB and walker. Tolerating NPO + sips/chips diet. Voiding.

## 2020-07-03 NOTE — PLAN OF CARE
Alert and oriented x4. Vital signs stable on room air. Assist of 1 + gait belt and walker. Tolerating ice chips and sips of water. Lung sounds clear. Bowel sounds active, denies flatus, no BM since 6/29. Farooq out at 1045, adequate urine output. NG-tube removed. Portable wound vac on abdominal incision, CDI. Pain managed with PRN dilaudid. Denies nausea.

## 2020-07-03 NOTE — PROGRESS NOTES
"General Surgery Progress Note    Admission Date: 6/30/2020  Today's Date: 7/3/2020         Assessment:      Trevor Soni is a 86 year old male POD 3 s/p exploratory laparotomy, small bowel resection         Plan:   - Passing gas, OK for some sips of clear liquids today when more awake. Await return of bowel function before advancing diet further  - Work on out of bed today, PT consult placed  - DVT ppx with heparin, Pepcid IV while NPO  - Continue Invanz  - Continue IV fluids. Lab recheck today pending  - Pain meds and anti-emetics available prn  - Continue Prevena wound system. Will remove on or before POD 7  - Medical management per hospitalist, appreciate assistance        Interval History:   Afebrile, vitals stable on room air. Voiding adequately since nunez removal. Belching some and passing a little bit of gas this morning. No BM yet. NG removed yesterday, no nausea. Tolerating some ice chips and water.          Physical Exam:   /67 (BP Location: Right arm)   Pulse 78   Temp 98.1  F (36.7  C) (Oral)   Resp 16   Ht 1.727 m (5' 8\")   Wt 69.9 kg (154 lb)   SpO2 93%   BMI 23.42 kg/m    I/O last 3 completed shifts:  In: 1452 [P.O.:530; I.V.:922]  Out: 1965 [Urine:1950; Emesis/NG output:15]  General: NAD, pleasant, alert and oriented  Respiratory: non-labored breathing   Abdomen: mildly distended but soft, appropriately tender around incision  Incision: clean, dry, and intact. No surrounding erythema or drainage. Prevena wound system in place  Extremities: no lower extremity edema, no calf tenderness. Wearing PCDs    LABS:  Recent Labs   Lab Test 07/03/20  0746 07/02/20  0735 07/01/20  0710 06/30/20  0551   WBC  --  15.6* 18.2* 17.4*   HGB  --  9.0* 9.8* 10.8*   MCV  --  97 97 96    289 287 356      Recent Labs   Lab Test 07/02/20  0735 07/01/20  0710 06/30/20  0551   POTASSIUM 4.2 4.9 4.7   CHLORIDE 120* 116* 108   CO2 15* 17* 19*   BUN 38* 40* 39*   CR 1.65* 2.03* 2.01*   ANIONGAP 9 8 10    "     -------------------------------    Mckayla Rowland PA-C  [facility]  [phone]

## 2020-07-03 NOTE — PLAN OF CARE
A/Ox4. VSS on RA. BS active,small BM.  LS clear. Skin: abdominal incision UTV, wound vac in place. CMS intact. Pain: denies. Up with 1+GB and walker. Tolerating full liquid diet. Denies pain. Daughter Bianka updated with plan of care.

## 2020-07-03 NOTE — PLAN OF CARE
Discharge Planner PT   Patient plan for discharge: Home with spouse  Current status: Sit>stand with FWW and SBA and stand pivot transfer with FWW and SBA. Ambulated 125' x 2 with FWW and SBA with 1 standing rest break.    Barriers to return to prior living situation: pain, stairs to enter    Recommendations for discharge: Home with use of FWW for mobility, assist for household tasks, SBA for stairs    Rationale for recommendations: Patient moving fairly well. Anticipate with continued therapy pt will progress in order for safe discharge home. Pt will benefit from assist for household tasks due to decreased activity tolerance.         Entered by: Linda Vital 07/03/2020 3:17 PM

## 2020-07-03 NOTE — PROGRESS NOTES
Flatus but no BM. Afebrile. Pain moderate. Incision suction device in place. Doing well. Advance diet. No nausea.

## 2020-07-04 PROCEDURE — 25000128 H RX IP 250 OP 636: Performed by: SURGERY

## 2020-07-04 PROCEDURE — 25000132 ZZH RX MED GY IP 250 OP 250 PS 637: Mod: GY | Performed by: PHYSICIAN ASSISTANT

## 2020-07-04 PROCEDURE — 25000128 H RX IP 250 OP 636: Performed by: PHYSICIAN ASSISTANT

## 2020-07-04 PROCEDURE — 99232 SBSQ HOSP IP/OBS MODERATE 35: CPT | Performed by: INTERNAL MEDICINE

## 2020-07-04 PROCEDURE — 99207 ZZC CDG-MDM COMPONENT: MEETS MODERATE - UP CODED: CPT | Performed by: INTERNAL MEDICINE

## 2020-07-04 PROCEDURE — 12000000 ZZH R&B MED SURG/OB

## 2020-07-04 RX ORDER — ERTAPENEM 1 G/1
1 INJECTION, POWDER, LYOPHILIZED, FOR SOLUTION INTRAMUSCULAR; INTRAVENOUS EVERY 24 HOURS
Status: DISCONTINUED | OUTPATIENT
Start: 2020-07-04 | End: 2020-07-05

## 2020-07-04 RX ADMIN — ATENOLOL 50 MG: 50 TABLET ORAL at 08:16

## 2020-07-04 RX ADMIN — HEPARIN SODIUM 5000 UNITS: 5000 INJECTION, SOLUTION INTRAVENOUS; SUBCUTANEOUS at 21:22

## 2020-07-04 RX ADMIN — ERTAPENEM SODIUM 1 G: 1 INJECTION, POWDER, LYOPHILIZED, FOR SOLUTION INTRAMUSCULAR; INTRAVENOUS at 10:51

## 2020-07-04 RX ADMIN — FAMOTIDINE 20 MG: 20 TABLET ORAL at 21:22

## 2020-07-04 RX ADMIN — HEPARIN SODIUM 5000 UNITS: 5000 INJECTION, SOLUTION INTRAVENOUS; SUBCUTANEOUS at 10:51

## 2020-07-04 ASSESSMENT — ACTIVITIES OF DAILY LIVING (ADL)
ADLS_ACUITY_SCORE: 15
ADLS_ACUITY_SCORE: 15
ADLS_ACUITY_SCORE: 13
ADLS_ACUITY_SCORE: 15

## 2020-07-04 NOTE — PROGRESS NOTES
[facility]    Medicine Progress Note - Hospitalist Service        Date of Admission:  6/30/2020  5:36 AM    Assessment & Plan:   Trevor Soni is a 86 year old male with PMH significant for CAD s/p CABG, HFpEF, HLD, HTN, GERD, CKD4 who was admitted by the general surgery service on 6/30/2020 with perforated ileum requiring exploratory laparotomy, bowel resection, and side-to-side ileal anastomosis after recent ischemic bowel s/p exp lap with lysis of internal hernia band on 6/24/20.     Ileal perforation s/p Exp lap, bowel resection, and side-to-side ileal anastomosis 6/30/20   Recent ischemic bowel s/p ex lap with lysis of internal hernia band 6/24/20  - Post operative cares per primary service, general surgery, including pain control, antibiotics, DVT prophylaxis  - continue Invanz; defer antibiotic decision regarding duration and choice to surgery  - Advance diet per general surgery, currently diet advanced to regular diet  - disposition per primary service     HFpEF, not decompensated  CAD s/p CABG 3v 2001  Mild nonrheumatic aortic stenosis  Hyperlipidemia, Hypertension  ECHO 2016 with grade 1-2 diastolic dysfunction, EF 66%, no RWMA.  [PTA amlodipine 5 mg every morning, 10 mg nightly, aspirin 325 mg daily, atenolol 50 mg daily, atorvastatin 20 mg daily, fenofibrate 160 mg daily, furosemide 10 mg daily, hydralazine 50 mg 4 times daily, spironolactone 25 mg daily]  -Continue PTA atenolol, hold Lasix, hydralazine, spironolactone and amlodipine, anticipate resuming 7/5  - PRN IV hydralazine for SBP >170  - Hold PTA aspirin, resume when okay with general surgery  - Defer repeat ECHO to PCP on discharge, given systolic murmur would recommend patient have a repeat ECHO in the next 6 months as able.     Chronic kidney disease, stabe IV  Baseline creatinine ranges 1.8-2.1.    - Monitor renal function closely, Cr stable at 1.3     Acute on chronic anemia  Baseline hemoglobin 10 range.   -Stable  "between 9-10.    GERD  [PTA famotidine 20mg/d PRN]  - Continue p.o. Pepcid     Other chronic stable diagnoses, managed in outpatient setting:  Abdominal aortic aneurysm, 3.6cm infrarenal  Carotid artery stenosis  Chronic bilateral low back pain  Primary osteoarthritis  Idiopathic chronic gout - holding PTA allopurinol while     Diet: Regular Diet Adult     DVT Prophylaxis: Defer to primary service   Farooq Catheter: not present  Code Status: Full Code     Disposition Plan    Expected discharge: Likely 7/5; per primary service  Entered: Saul De La Torre MD 07/04/2020, 1:16 PM        The patient's care was discussed with the Bedside Nurse and Patient.    Saul De La Torre MD  Hospitalist Service  [facility] [place]    ______________________________________________________________________    Interval History   Doing better.  Pain is well controlled.  Diet advanced to regular diet.  Had a bowel movement earlier today.    Data reviewed today: I reviewed all medications, new labs and imaging results over the last 24 hours. I personally reviewed no images or EKG's today.    Physical Exam   Vital signs:  Temp: 97.4  F (36.3  C) Temp src: Oral BP: 135/60 Pulse: 93 Heart Rate: 68 Resp: 12 SpO2: 96 % O2 Device: None (Room air) Oxygen Delivery: 3 LPM Height: 172.7 cm (5' 8\") Weight: 69.9 kg (154 lb)  Estimated body mass index is 23.42 kg/m  as calculated from the following:    Height as of this encounter: 1.727 m (5' 8\").    Weight as of this encounter: 69.9 kg (154 lb).      Wt Readings from Last 2 Encounters:   06/30/20 69.9 kg (154 lb)   06/21/20 72.6 kg (160 lb)       Gen: AAOX2-3, NAD  Resp: CTA B/L, normal WOB  CVS: RRR, no murmur  Abd/GI: Soft,  BS- normoactive.    Skin: Warm, dry no rashes  MSK:  no pedal edema  Neuro- CN- intact. No focal deficits.       Data   Recent Labs   Lab 07/03/20  0746 07/02/20  0735 07/01/20  0710 06/30/20  0551   WBC 10.5 15.6* 18.2* 17.4*   HGB 9.1* 9.0* 9.8* 10.8*   MCV 95 97 97 96 "    289 287 356    144 141 137   POTASSIUM 3.9 4.2 4.9 4.7   CHLORIDE 118* 120* 116* 108   CO2 18* 15* 17* 19*   BUN 31* 38* 40* 39*   CR 1.31* 1.65* 2.03* 2.01*   ANIONGAP 8 9 8 10   AGUSTÍN 8.4* 8.3* 7.9* 8.8   * 92 130* 150*   ALBUMIN  --  2.2* 2.3* 3.1*   PROTTOTAL  --  5.6*  --  6.8   BILITOTAL  --  0.5  --  0.5   ALKPHOS  --  65  --  79   ALT  --  21  --  26   AST  --  24  --  17   LIPASE  --   --   --  219       No results found for this or any previous visit (from the past 24 hour(s)).  Medications       [Held by provider] allopurinol  200 mg Oral Daily     [Held by provider] amLODIPine  10 mg Oral At Bedtime     [Held by provider] amLODIPine  5 mg Oral QAM     [Held by provider] aspirin  325 mg Oral Daily     atenolol  50 mg Oral Daily     [Held by provider] atorvastatin  20 mg Oral Daily     ertapenem (INVanz) IV  1 g Intravenous Q24H     famotidine  20 mg Oral At Bedtime    Or     famotidine  20 mg Intravenous At Bedtime     [Held by provider] furosemide  10 mg Oral Daily     heparin ANTICOAGULANT  5,000 Units Subcutaneous Q12H     [Held by provider] hydrALAZINE  50 mg Oral 4x Daily     sodium chloride (PF)  3 mL Intracatheter Q8H     [Held by provider] spironolactone  25 mg Oral QAM

## 2020-07-04 NOTE — PROGRESS NOTES
[facility]    General Surgery  Daily Post-Op Note       Assessment and Plan:   Trevor Soni is a 86 year old male S/P Procedure(s):  EXPLORATORY LAPAROTOMY, BOWEL RESECTION, 4 Days Post-Op    - Having BM's. Regular diet ordered for this morning.   - Work on out of bed, PT consulted  - DVT ppx with heparin  - Day 5 of Invanz. CBC wnl. Vitals wnl.  - Pain meds and anti-emetics available prn  - Continue Prevena wound system. Will remove on or before POD 7- or prior to discharge.  - Medical management per hospitalist, appreciate assistance    If tolerates regular diet today, could discharge as early as this afternoon from a surgical standpoint. Appreciate hospitalist team assistance in medication reconciliation.    **ADDENDUM**  Prevena removed by Dr. Naylor. Plan for discharge tomorrow if tolerates regular diet today.        Interval History:   Trevor Soni is seen this morning on surgical rounds. He is not having nausea with the liquid diet and diet was advanced to regular this morning. He had a couple BM's yesterday. Pain is controlled with oral analgesia. Prevena VAC remains to suction.         Physical Exam:   Temp: 98.4  F (36.9  C) Temp src: Oral BP: 128/66 Pulse: 93 Heart Rate: 103 Resp: 12 SpO2: 94 % O2 Device: None (Room air)      I/O last 3 completed shifts:  In: 120 [P.O.:120]  Out: -     PHYSICAL EXAM:  GENERAL: VS reviewed, alert, oriented, no acute distress  LUNGS: Normal respiratory effort, no wheezing  ABDOMEN:  Soft, nontender, non-distended and good bowel sounds  INCISION: Prevena in place.  EXTREMITIES: Moving all extremities, no gross deformities, well perfused, no lower extremity edema or tenderness  NEUROLOGICAL: Grossly non-focal, mood & affect appropriate      Data   Recent Labs   Lab 07/03/20  0746 07/02/20  0735 07/01/20  0710 06/30/20  0551   WBC 10.5 15.6* 18.2* 17.4*   HGB 9.1* 9.0* 9.8* 10.8*   MCV 95 97 97 96    289 287 356    144 141 137    POTASSIUM 3.9 4.2 4.9 4.7   CHLORIDE 118* 120* 116* 108   CO2 18* 15* 17* 19*   BUN 31* 38* 40* 39*   CR 1.31* 1.65* 2.03* 2.01*   ANIONGAP 8 9 8 10   AGUSTÍN 8.4* 8.3* 7.9* 8.8   * 92 130* 150*   ALBUMIN  --  2.2* 2.3* 3.1*   PROTTOTAL  --  5.6*  --  6.8   BILITOTAL  --  0.5  --  0.5   ALKPHOS  --  65  --  79   ALT  --  21  --  26   AST  --  24  --  17       Briseida Liz PA-C

## 2020-07-04 NOTE — PLAN OF CARE
A/Ox4. VSS on RA. BS active, BM x 3 today.  LS clear. Abdominal incision covered with dressing. CDI. CMS intact. Denies pain. Up with 1+GB and walker. Tolerating regular diet.Plan for discharge to home on Sunday.  Daughter Bianka will provide transportation home.

## 2020-07-04 NOTE — PLAN OF CARE
Pt is A+Ox4, VSS. Bowel sounds active. Passing gas. Having bowel movements. Pain controlled with scheduled tylenol. Up standby assist. Saline locked. Denies nausea. Wound vac intact.

## 2020-07-05 VITALS
DIASTOLIC BLOOD PRESSURE: 63 MMHG | HEART RATE: 85 BPM | SYSTOLIC BLOOD PRESSURE: 131 MMHG | RESPIRATION RATE: 12 BRPM | HEIGHT: 68 IN | OXYGEN SATURATION: 98 % | TEMPERATURE: 97.5 F | BODY MASS INDEX: 23.34 KG/M2 | WEIGHT: 154 LBS

## 2020-07-05 PROCEDURE — 25000132 ZZH RX MED GY IP 250 OP 250 PS 637: Mod: GY | Performed by: PHYSICIAN ASSISTANT

## 2020-07-05 PROCEDURE — 25000128 H RX IP 250 OP 636: Performed by: PHYSICIAN ASSISTANT

## 2020-07-05 PROCEDURE — 99232 SBSQ HOSP IP/OBS MODERATE 35: CPT | Performed by: INTERNAL MEDICINE

## 2020-07-05 RX ADMIN — HEPARIN SODIUM 5000 UNITS: 5000 INJECTION, SOLUTION INTRAVENOUS; SUBCUTANEOUS at 09:13

## 2020-07-05 RX ADMIN — ATENOLOL 50 MG: 50 TABLET ORAL at 09:13

## 2020-07-05 RX ADMIN — AMOXICILLIN AND CLAVULANATE POTASSIUM 1 TABLET: 875; 125 TABLET, FILM COATED ORAL at 09:37

## 2020-07-05 ASSESSMENT — ACTIVITIES OF DAILY LIVING (ADL)
ADLS_ACUITY_SCORE: 15
ADLS_ACUITY_SCORE: 13
ADLS_ACUITY_SCORE: 13

## 2020-07-05 NOTE — PROGRESS NOTES
[facility]    Medicine Progress Note - Hospitalist Service        Date of Admission:  6/30/2020  5:36 AM    Assessment & Plan:   Trevor Soni is a 86 year old male with PMH significant for CAD s/p CABG, HFpEF, HLD, HTN, GERD, CKD4 who was admitted by the general surgery service on 6/30/2020 with perforated ileum requiring exploratory laparotomy, bowel resection, and side-to-side ileal anastomosis after recent ischemic bowel s/p exp lap with lysis of internal hernia band on 6/24/20.     Ileal perforation s/p Exp lap, bowel resection, and side-to-side ileal anastomosis 6/30/20   Recent ischemic bowel s/p ex lap with lysis of internal hernia band 6/24/20  - Post operative cares per primary service, general surgery, including pain control, antibiotics, DVT prophylaxis  - Antibiotics changed to Augmentin per general surgery  - regular diet, tolerating well  - disposition per primary service     HFpEF, not decompensated  CAD s/p CABG 3v 2001  Mild nonrheumatic aortic stenosis  Hyperlipidemia, Hypertension  ECHO 2016 with grade 1-2 diastolic dysfunction, EF 66%, no RWMA.  [PTA amlodipine 5 mg every morning, 10 mg nightly, aspirin 325 mg daily, atenolol 50 mg daily, atorvastatin 20 mg daily, fenofibrate 160 mg daily, furosemide 10 mg daily, hydralazine 50 mg 4 times daily, spironolactone 25 mg daily]  -Continue PTA atenolol, resume Lasix, hydralazine and amlodipine  -Hold Aldactone at discharge.  Recheck BMP in 1 week, resume as clinically indicated as outpatient  -Resume prior to admission echo  -Defer repeat ECHO to PCP on discharge, given systolic murmur would recommend patient have a repeat ECHO in the next 6 months as able.     Chronic kidney disease, stabe IV  Baseline creatinine ranges 1.8-2.1.    -Cr stable at 1.3     Acute on chronic anemia  Baseline hemoglobin 10 range.   -Stable between 9-10.    GERD  [PTA famotidine 20mg/d PRN]  - Continue p.o. Pepcid     Other chronic stable diagnoses,  "managed in outpatient setting:  Abdominal aortic aneurysm, 3.6cm infrarenal  Carotid artery stenosis  Chronic bilateral low back pain  Primary osteoarthritis  Idiopathic chronic gout -resume prior to admission allopurinol    Diet: Regular Diet Adult  Diet     DVT Prophylaxis: Defer to primary service   Farooq Catheter: not present  Code Status: Full Code     Disposition Plan    Expected discharge: Today per general surgery  Entered: Saul De La Torre MD 07/05/2020, 10:36 AM        The patient's care was discussed with the Bedside Nurse and Patient.    Saul De La Torre MD  Hospitalist Service  [facility]    ______________________________________________________________________    Interval History   Overall doing much better.  Pain well controlled.  Tolerating regular diet.  Denies chest pain or dyspnea.    Data reviewed today: I reviewed all medications, new labs and imaging results over the last 24 hours. I personally reviewed no images or EKG's today.    Physical Exam   Vital signs:  Temp: 98.1  F (36.7  C) Temp src: Oral BP: 138/67 Pulse: 85 Heart Rate: 91 Resp: 12 SpO2: 95 % O2 Device: None (Room air) Oxygen Delivery: 3 LPM Height: 172.7 cm (5' 8\") Weight: 69.9 kg (154 lb)  Estimated body mass index is 23.42 kg/m  as calculated from the following:    Height as of this encounter: 1.727 m (5' 8\").    Weight as of this encounter: 69.9 kg (154 lb).      Wt Readings from Last 2 Encounters:   06/30/20 69.9 kg (154 lb)   06/21/20 72.6 kg (160 lb)       Gen: AAOX2-3, NAD  Resp: CTA B/L, normal WOB  CVS: RRR, no murmur  Abd/GI: Soft,  BS- normoactive.    Neuro- CN- intact. No focal deficits.       Data   Recent Labs   Lab 07/03/20  0746 07/02/20  0735 07/01/20  0710 06/30/20  0551   WBC 10.5 15.6* 18.2* 17.4*   HGB 9.1* 9.0* 9.8* 10.8*   MCV 95 97 97 96    289 287 356    144 141 137   POTASSIUM 3.9 4.2 4.9 4.7   CHLORIDE 118* 120* 116* 108   CO2 18* 15* 17* 19*   BUN 31* 38* 40* 39*   CR " 1.31* 1.65* 2.03* 2.01*   ANIONGAP 8 9 8 10   AGUSTÍN 8.4* 8.3* 7.9* 8.8   * 92 130* 150*   ALBUMIN  --  2.2* 2.3* 3.1*   PROTTOTAL  --  5.6*  --  6.8   BILITOTAL  --  0.5  --  0.5   ALKPHOS  --  65  --  79   ALT  --  21  --  26   AST  --  24  --  17   LIPASE  --   --   --  219       No results found for this or any previous visit (from the past 24 hour(s)).  Medications       [Held by provider] allopurinol  200 mg Oral Daily     [Held by provider] amLODIPine  10 mg Oral At Bedtime     [Held by provider] amLODIPine  5 mg Oral QAM     amoxicillin-clavulanate  1 tablet Oral Q12H     [Held by provider] aspirin  325 mg Oral Daily     atenolol  50 mg Oral Daily     [Held by provider] atorvastatin  20 mg Oral Daily     famotidine  20 mg Oral At Bedtime    Or     famotidine  20 mg Intravenous At Bedtime     [Held by provider] furosemide  10 mg Oral Daily     heparin ANTICOAGULANT  5,000 Units Subcutaneous Q12H     [Held by provider] hydrALAZINE  50 mg Oral 4x Daily     sodium chloride (PF)  3 mL Intracatheter Q8H     [Held by provider] spironolactone  25 mg Oral QAM

## 2020-07-05 NOTE — DISCHARGE SUMMARY
[facility] Discharge Summary    Trevor Soni MRN# [id number]   Age: 86 year old YOB: 1933     Date of Admission:  6/30/2020  Date of Discharge:  7/5/2020  Admitting Provider:  Bull Rachel MD  Discharge Provider:  Briseida Liz PA-C  Discharging Service: General Surgery     Primary Provider: Warren Lovelace  Primary Care Physician Phone Number: [phone]          Admission Diagnoses:   Principle Diagnosis: Perforated bowel (H) [K63.1]  Secondary Diagnoses:  HFpEF, not decompensated  CAD s/p CABG 3v 2001  Mild nonrheumatic aortic stenosis  Hyperlipidemia, Hypertension  Chronic kidney disease, stage IV  Acute on chronic anemia  GERD          Discharge Diagnosis:     Perforated bowel (H) [K63.1]          Procedures:     Procedure(s): Exploratory laparotomy, small bowel resection            Discharge Medications:     Current Discharge Medication List      START taking these medications    Details   amoxicillin-clavulanate (AUGMENTIN) 875-125 MG tablet Take 1 tablet by mouth every 12 hours for 5 days  Qty: 10 tablet, Refills: 0    Associated Diagnoses: Perforated bowel (H)         CONTINUE these medications which have NOT CHANGED    Details   allopurinol (ZYLOPRIM) 100 MG tablet TAKE 2 TABLETS BY MOUTH  DAILY  Qty: 180 tablet, Refills: 3    Associated Diagnoses: Hyperlipidemia LDL goal <100; Benign essential hypertension      amLODIPine (NORVASC) 5 MG tablet TAKE 1 TABLET BY MOUTH  EVERY MORNING AND 2 TABLETS EVERY EVENING  Qty: 270 tablet, Refills: 3    Associated Diagnoses: Hyperlipidemia LDL goal <100; Benign essential hypertension      ASPIRIN PO Take 325 mg by mouth daily.      atenolol (TENORMIN) 50 MG tablet Take 1 tablet (50 mg) by mouth daily  Qty: 90 tablet, Refills: 2    Associated Diagnoses: Hyperlipidemia LDL goal <100; Benign essential hypertension      atorvastatin (LIPITOR) 20 MG tablet TAKE 1 TABLET BY MOUTH  EVERY DAY  Qty: 90 tablet, Refills: 2    Associated  Diagnoses: Hyperlipidemia LDL goal <100; Benign essential hypertension      Cholecalciferol (VITAMIN D3 PO) Take 1 tablet by mouth daily      coenzyme Q-10 capsule Take 1 capsule by mouth daily      fenofibrate (TRIGLIDE/LOFIBRA) 160 MG tablet TAKE 1 TABLET(160 MG) BY MOUTH DAILY  Qty: 90 tablet, Refills: 3    Associated Diagnoses: Hyperlipidemia LDL goal <100; Benign essential hypertension      furosemide (LASIX) 20 MG tablet Take 0.5 tablets (10 mg) by mouth daily  Qty: 45 tablet, Refills: 3    Associated Diagnoses: Benign essential hypertension      hydrALAZINE (APRESOLINE) 50 MG tablet TAKE 1 TABLET BY MOUTH 4  TIMES A DAY  Qty: 360 tablet, Refills: 3    Associated Diagnoses: Hyperlipidemia LDL goal <100; Benign essential hypertension      spironolactone (ALDACTONE) 25 MG tablet Take 1 tablet (25 mg) by mouth every morning  Qty: 90 tablet, Refills: 3    Associated Diagnoses: Benign essential hypertension      famotidine (PEPCID) 20 MG tablet Take 20 mg by mouth daily as needed (dyspepsia)      ondansetron (ZOFRAN-ODT) 4 MG ODT tab Take 1 tablet (4 mg) by mouth every 6 hours as needed for nausea or vomiting  Qty: 30 tablet, Refills: 0    Comments: Future refills by PCP Dr. Warren Lovelace with phone number [phone].  Associated Diagnoses: Ischemic bowel disease (H)                 Allergies:         Allergies   Allergen Reactions     Avocado      Ciprofloxacin Itching     Penicillins Itching             Brief History of Illness:    Reason for your hospital stay      Perforated small bowel and surgical recovery           Trevor Soni is a 86 year old male with multiple medical issues who underwent exploratory laparotomy and lysis of internal hernia band with Dr. Reed on 6/21/20. Intraoperatively there was an ischemic segment of distal ileum was seen, likely due to the closed loop internal hernia band. The bowel appeared viable and no resection was required. The patient had a normal post-operative course  "in the hospital and was discharged on POD 6.     Trevor presented to the ED on 6/30 for new abdominal pain and loose stools. Labs revealed leukocytosis of 17.4 and CT visualized free air concerning for bowel perforation. After discussing the risks, benefits, and possible complications, informed consent was obtained and the patient underwent exploratory laparotomy and small bowel resection for ileum perforation at likely the section of small bowel that was previously compromised by the internal hernia band. There were no complications.      Please see the Operative Report for full details.           Hospital Course:   Trevor Soni was admitted post-operatively. He had return of bowel function and NG was removed 7/2. He tolerated diet advancement and his pain was controlled without narcotic medications. Prevena VAC was removed on 7/4 and incision is healing appropriately without signs or symptoms of infection. Trevor completed 5 days of IV Invanz and will discharge with 5 days of Augmentin. He will follow up with [facility] in approximately 1 week for staple removal.     Hospitalist team was consulted for medical management and management of his chronic medical conditions including HFpEF, not decompensated, CAD s/p CABG 3v 2001, mild nonrheumatic aortic stenosis, hyperlipidemia, hypertension, and chronic kidney disease, stage IV.    On the date of discharge, the patient was discharged to home in stable condition. He verbalized understanding of all discharge instructions and felt comfortable with the discharge plan.  He was asked to call with any further questions or concerns.         Condition on Discharge:        Discharge condition: Stable   Discharge vitals: Blood pressure 138/67, pulse 85, temperature 98.1  F (36.7  C), temperature source Oral, resp. rate 12, height 1.727 m (5' 8\"), weight 69.9 kg (154 lb), SpO2 95 %.           Discharge Disposition:     Discharged to home          Discharge " Instructions and Follow-Up:      Trevor Soni was asked to follow up with surgical team in 1 week for staple removal.      Briseida Liz PA-C  [facility]  [phone]

## 2020-07-05 NOTE — PROGRESS NOTES
[facility]    General Surgery  Daily Post-Op Note       Assessment and Plan:   Trevor Soni is a 86 year old male S/P Procedure(s):  EXPLORATORY LAPAROTOMY, BOWEL RESECTION, 5 Days Post-Op    - Day 5 of Invanz, will discharge with 5 days of oral antibiotics. Pharm to assist with antibiotics due to antibiotic allergies.  - Prevena removed yesterday. Will remove staples at follow up appointment.    Discharge today. Greatly appreciate assistance and discharge medical reconciliation by hospitalist team.        Interval History:   Trevor Soni is seen this morning on surgical rounds. He is tolerating regular diet and having bowel movements. His pain controlled without any medications. He would prefer tylenol at discharge. He does not remember his reactions to antibiotics. Trevor is hoping to get out of the hospital today.         Physical Exam:   Temp: 97.9  F (36.6  C) Temp src: Oral BP: 123/63 Pulse: 85 Heart Rate: 99 Resp: 12 SpO2: 95 % O2 Device: None (Room air)      I/O last 3 completed shifts:  In: 360 [P.O.:360]  Out: -     PHYSICAL EXAM:  GENERAL: VS reviewed, alert, oriented, no acute distress  LUNGS: Normal respiratory effort, no wheezing  ABDOMEN:  Soft, nontender, non-distended and good bowel sounds  INCISION: Eschar at central incision, rest of incision is CDI. No surrounding erythema or induration.  EXTREMITIES: Moving all extremities, no gross deformities  NEUROLOGICAL: Grossly non-focal, mood & affect appropriate      Data   Recent Labs   Lab 07/03/20  0746 07/02/20  0735 07/01/20  0710 06/30/20  0551   WBC 10.5 15.6* 18.2* 17.4*   HGB 9.1* 9.0* 9.8* 10.8*   MCV 95 97 97 96    289 287 356    144 141 137   POTASSIUM 3.9 4.2 4.9 4.7   CHLORIDE 118* 120* 116* 108   CO2 18* 15* 17* 19*   BUN 31* 38* 40* 39*   CR 1.31* 1.65* 2.03* 2.01*   ANIONGAP 8 9 8 10   AGUSTÍN 8.4* 8.3* 7.9* 8.8   * 92 130* 150*   ALBUMIN  --  2.2* 2.3* 3.1*   PROTTOTAL  --  5.6*  --  6.8    BILITOTAL  --  0.5  --  0.5   ALKPHOS  --  65  --  79   ALT  --  21  --  26   AST  --  24  --  17       Briseida Liz PA-C

## 2020-07-05 NOTE — PLAN OF CARE
Physical Therapy Discharge Summary    Reason for therapy discharge:    Discharged to home.    Progress towards therapy goal(s). See goals on Care Plan in [facility] electronic health record for goal details.  Goals not met.  Barriers to achieving goals:   discharge from facility.    Therapy recommendation(s):    Continue home exercise program.

## 2020-07-05 NOTE — PLAN OF CARE
Pt is A+Ox4, VSS. Pt having frequent loose stools. Up standby assist with walker. Dressing is clean dry and intact. Bowel sounds normoactive. Passing gas. Anticipating discharge today.

## 2020-07-05 NOTE — PLAN OF CARE
Pt VSS, O2 RA. Pt states understanding of discharge instructions. Spoke with daughter Bianka over the phone, she states understanding of discharge. Prescription filled and sent with pt. No further needs noted.

## 2020-07-05 NOTE — PHARMACY-CONSULT NOTE
Prescriber Notification Note    The pharmacist has communicated with this patient's provider regarding a concern or therapy recommendation.    Notified Person: Briseida Liz PA-C  Date/Time of Notification: 7/5/2020 @0900  Interaction: phone  Consult/Discussion Details: Consult placed for antibiotic recs for perforated bowel given pt has reported cipro and penicillin allergy. D/w Briseida that patient isn't sure what his reaction as to these antibiotics, thinking they were itching, but doesn't remember anything severe.    Recommendations: Recommend either ID curbside consult for alternative antibiotic options (typically would recommend ciprofloxacin or levofloxacin + metronidazole or Augmentin alone) or could try one of these antibiotics while inpatient to determine if he tolerates/has a reaction.    Isela Lance, PharmD, BCPS

## 2020-07-05 NOTE — DISCHARGE INSTRUCTIONS
[facility] - [facility]  Discharge Instructions: Post-Operative Bowel Surgery    ACTIVITY    Expect to feel tired after your surgery.  This will gradually resolve.      Take frequent, short walks and increase your activity gradually.      Avoid strenuous physical activity or heavy lifting greater than 15 lbs. for 4 weeks.  You may climb stairs.      You may drive without restrictions when you are not using any prescription pain medication and feel comfortable in a car.    You may return to work/school when you are comfortable without any prescription pain medication.    You may wear an abdominal binder for comfort for 2-3 weeks from your surgery.  You can wash the abdominal binder and dry it on low heat in the dryer.    WOUND CARE    You may shower after the surgery.  Pat your incisions dry and leave them open to air.  Re-apply dressing (Band-Aids or gauze/tape) as needed for comfort or drainage.    You have staples that will be removed at your next office visit.    Do not soak your incisions in a tub or pool for 2 weeks.     Do not apply any lotions, creams, or ointments to your incisions.    A ridge under your incisions is normal and will gradually resolve.    DIET    Stay on a low fiber diet until your follow up appointment.  Avoid heavy, spicy, greasy meals and gas forming foods, such as cabbage, broccoli, and onions.      You may find your appetite to be diminished briefly after surgery.  You may take nutritional supplement shakes if you are able.     Drink plenty of fluids to stay hydrated.    PAIN    Expect some tenderness and discomfort at the incision site(s).  Use the prescribed pain medication at your discretion.  Expect gradual resolution of your pain over several days.    You may take ibuprofen with food (unless you have been told not to) instead of or in addition to your prescribed pain medication.  If you are taking Norco or Percocet, do not take any additional  acetaminophen/APAP/Tylenol.    Do not drink alcohol or drive while you are taking pain medications.    You may apply ice to your incisions in 20 minute intervals as needed for the next 24-48 hours.  After that time, consider switching to heat if you prefer.    EXPECTATIONS    Pain medications can cause constipation.  Limit use when possible.  Take over the counter stool softener/stimulant, such as Colace or Senna, 1-2 times a day with plenty of water.  You may take a mild over the counter laxative, such as Miralax or a suppository, as needed.  You may discontinue these medications once you are having regular bowel movements and/or are no longer taking your narcotic pain medication.    For laparoscopic surgery, you may have shoulder or upper back discomfort due to the gas used in surgery.  This is temporary and should resolve in 48-72 hours.  Short, frequent walks may help with this.      RETURN APPOINTMENT    Follow up with your surgeon in 7-10 days.  Please call our office at [phone] to schedule your appointment.  We are located at [address] [place] [address].    CALL OUR OFFICE [phone] IF YOU HAVE:     Chills or fever above 101  F.    Increased redness, warmth, or drainage at your incisions.    Significant bleeding.    Pain not relieved by your pain medication or rest.    Increasing pain after the first 48 hours.    Any other concerns or questions.    Revised January 2018

## 2020-07-06 ENCOUNTER — TELEPHONE (OUTPATIENT)
Dept: FAMILY MEDICINE | Facility: CLINIC | Age: 85
End: 2020-07-06

## 2020-07-06 ENCOUNTER — PATIENT OUTREACH (OUTPATIENT)
Dept: CARE COORDINATION | Facility: CLINIC | Age: 85
End: 2020-07-06

## 2020-07-06 ENCOUNTER — PATIENT OUTREACH (OUTPATIENT)
Dept: NURSING | Facility: CLINIC | Age: 85
End: 2020-07-06
Payer: MEDICARE

## 2020-07-06 NOTE — PROGRESS NOTES
Clinic Care Coordination Contact  Community Health Worker Initial Outreach    Spoke with patient    CHW introduced self and role with CC  Patient states he is doing alright since being home, he is a little sore but getting around.   Patient denies any questions or concerns regarding his discharge or instructions.   CHW reminded patient to schedule appointment with surgical consultants in one week for staple removal. CHW reminded patient of follow up with PCP on 7/8  Patient states that he is not in need of any support or resources at this time.   CHW gave patient contact information for any future concerns.     Patient accepts CC: No, patient is not in need of any support or resources at this time. Patient will be sent Care Coordination introduction letter for future reference.     FELIX Egan   Clinic Care Coordination  [facility] Clinics:  [place], [place], [place], [place], and [place]  Phone: [phone]

## 2020-07-06 NOTE — TELEPHONE ENCOUNTER
See care coordination encounter.  This encounter closed    [facility]     Rose Baxter  RN Care Coordinator   [facility] / [facility]   Phone: [phone]  Email :  [email or web address]

## 2020-07-07 NOTE — PROGRESS NOTES
Subjective     Trevor Soni is a 86 year old male who presents to clinic today for the following health issues:    HPI   Developed acute abdominal pain prompting visit to the emergency room where he is noted to have free air and an apparent small bowel obstruction.  He is initially treated with lysis of adhesions and an area of perforation was not noted.  However, he required a second surgery to reevaluate the perforation which was found and a partial sigmoid resection was performed.  He was discharged to home on oral antibiotics with minor oozing from his abdominal wall incision.    His course was complicated by excessive fatigue postoperatively associated with venous stasis and weight loss in spite of the venous stasis.  His appetite is improving and his activities are increasing slowly      Hospital Follow-up Visit:    Hospital/Nursing Home/IP Rehab Facility: [facility]  Date of Admission: 6/30/2020  Date of Discharge: 7/5/2020  Reason(s) for Admission:  Perforated small bowel and surgical recovery         Was your hospitalization related to COVID-19? No   Problems taking medications regularly:  None  Medication changes since discharge: None  Problems adhering to non-medication therapy:  None    Summary of hospitalization:  [facility] discharge summary reviewed  Diagnostic Tests/Treatments reviewed.  Follow up needed: none  Other Healthcare Providers Involved in Patient s Care:         None  Update since discharge: improved. Post Discharge Medication Reconciliation: discharge medications reconciled, continue medications without change.  Plan of care communicated with patient              Current Outpatient Medications   Medication Sig Dispense Refill     allopurinol (ZYLOPRIM) 100 MG tablet TAKE 2 TABLETS BY MOUTH  DAILY 180 tablet 3     amLODIPine (NORVASC) 5 MG tablet TAKE 1 TABLET BY MOUTH  EVERY MORNING AND 2 TABLETS EVERY EVENING 270 tablet 3     ASPIRIN PO Take 325 mg by mouth  "daily.       atenolol (TENORMIN) 50 MG tablet Take 1 tablet (50 mg) by mouth daily 90 tablet 2     atorvastatin (LIPITOR) 20 MG tablet TAKE 1 TABLET BY MOUTH  EVERY DAY 90 tablet 2     Cholecalciferol (VITAMIN D3 PO) Take 1 tablet by mouth daily       coenzyme Q-10 capsule Take 1 capsule by mouth daily       famotidine (PEPCID) 20 MG tablet Take 20 mg by mouth daily as needed (dyspepsia)       fenofibrate (TRIGLIDE/LOFIBRA) 160 MG tablet TAKE 1 TABLET(160 MG) BY MOUTH DAILY 90 tablet 3     furosemide (LASIX) 20 MG tablet Take 0.5 tablets (10 mg) by mouth daily 45 tablet 3     hydrALAZINE (APRESOLINE) 50 MG tablet TAKE 1 TABLET BY MOUTH 4  TIMES A  tablet 3     ondansetron (ZOFRAN-ODT) 4 MG ODT tab Take 1 tablet (4 mg) by mouth every 6 hours as needed for nausea or vomiting 30 tablet 0     Allergies   Allergen Reactions     Avocado      Ciprofloxacin Itching     Penicillins Itching       Reviewed and updated as needed this visit by Provider         Review of Systems   Constitutional, HEENT, cardiovascular, pulmonary, GI, , musculoskeletal, neuro, skin, endocrine and psych systems are negative, except as otherwise noted.      Objective    /66 (Patient Position: Sitting)   Pulse 69   Temp 98.2  F (36.8  C) (Tympanic)   Ht 1.727 m (5' 8\")   Wt 67.6 kg (149 lb)   SpO2 98%   BMI 22.66 kg/m    Body mass index is 22.66 kg/m .  Physical Exam   GENERAL: Fatigued, pale, alert and no distress  NECK: no adenopathy, no asymmetry, masses, or scars and thyroid normal to palpation  RESP: lungs clear to auscultation - no rales, rhonchi or wheezes  CV: regular rate and rhythm, grade 2/6 systolic ejection murmur, click or rub, 2+ pretibial peripheral edema and peripheral pulses strong  ABDOMEN: soft, nontender, no hepatosplenomegaly, no masses and bowel sounds normal  MS: no gross musculoskeletal defects noted,             Assessment & Plan     1. Status post exploratory laparotomy      2. Ischemic bowel 2nd to " Closed Loop -- S/P Lysis of Adhes 6/21/20    - CBC with platelets  - Basic metabolic panel    3. CRF (chronic renal failure), stage 4 (severe) (H)  Reevaluate renal function prior to more aggressive diuresis  - CBC with platelets  - Basic metabolic panel    4. Abdominal aortic aneurysm (AAA) without rupture (H)      5. Chronic diastolic CHF -- Grade 1-2 Dysfunction Echo 2016  Follow-up diuresis dependent on renal function.  Otherwise reviewed dietary salt and liquid restriction  - BNP-N terminal pro  - Albumin level    6. Coronary artery disease involving native coronary artery of native heart without angina pectoris  Quiesced sent    7. Mild aortic stenosis -- Echo 2016      8. Primary osteoarthritis of both knees      9. Hyperlipidemia LDL goal <100      10. Idiopathic chronic gout of multiple sites without tophus      11. Stenosis of carotid artery, unspecified laterality      12. Chronic gout due to renal impairment of multiple sites without tophus           FUTURE APPOINTMENTS:       - Follow-up visit in 1 mo    No follow-ups on file.    Warren Lovelace MD  [facility]

## 2020-07-08 ENCOUNTER — OFFICE VISIT (OUTPATIENT)
Dept: FAMILY MEDICINE | Facility: CLINIC | Age: 85
End: 2020-07-08
Payer: MEDICARE

## 2020-07-08 VITALS
HEART RATE: 69 BPM | TEMPERATURE: 98.2 F | SYSTOLIC BLOOD PRESSURE: 112 MMHG | BODY MASS INDEX: 22.58 KG/M2 | DIASTOLIC BLOOD PRESSURE: 66 MMHG | WEIGHT: 149 LBS | OXYGEN SATURATION: 98 % | HEIGHT: 68 IN

## 2020-07-08 DIAGNOSIS — I35.0 MILD AORTIC STENOSIS: ICD-10-CM

## 2020-07-08 DIAGNOSIS — I25.10 CORONARY ARTERY DISEASE INVOLVING NATIVE CORONARY ARTERY OF NATIVE HEART WITHOUT ANGINA PECTORIS: ICD-10-CM

## 2020-07-08 DIAGNOSIS — I65.29 STENOSIS OF CAROTID ARTERY, UNSPECIFIED LATERALITY: ICD-10-CM

## 2020-07-08 DIAGNOSIS — M1A.09X0 IDIOPATHIC CHRONIC GOUT OF MULTIPLE SITES WITHOUT TOPHUS: ICD-10-CM

## 2020-07-08 DIAGNOSIS — M1A.39X0 CHRONIC GOUT DUE TO RENAL IMPAIRMENT OF MULTIPLE SITES WITHOUT TOPHUS: ICD-10-CM

## 2020-07-08 DIAGNOSIS — K55.9 ISCHEMIC BOWEL DISEASE (H): ICD-10-CM

## 2020-07-08 DIAGNOSIS — E78.5 HYPERLIPIDEMIA LDL GOAL <100: ICD-10-CM

## 2020-07-08 DIAGNOSIS — I50.32 CHRONIC DIASTOLIC HEART FAILURE (H): ICD-10-CM

## 2020-07-08 DIAGNOSIS — M17.0 PRIMARY OSTEOARTHRITIS OF BOTH KNEES: ICD-10-CM

## 2020-07-08 DIAGNOSIS — Z98.890 STATUS POST EXPLORATORY LAPAROTOMY: Primary | ICD-10-CM

## 2020-07-08 DIAGNOSIS — N18.4 CRF (CHRONIC RENAL FAILURE), STAGE 4 (SEVERE) (H): ICD-10-CM

## 2020-07-08 DIAGNOSIS — I71.40 ABDOMINAL AORTIC ANEURYSM (AAA) WITHOUT RUPTURE (H): ICD-10-CM

## 2020-07-08 LAB
ERYTHROCYTE [DISTWIDTH] IN BLOOD BY AUTOMATED COUNT: 14 % (ref 10–15)
HCT VFR BLD AUTO: 31.6 % (ref 40–53)
HGB BLD-MCNC: 10.2 G/DL (ref 13.3–17.7)
MCH RBC QN AUTO: 31.2 PG (ref 26.5–33)
MCHC RBC AUTO-ENTMCNC: 32.3 G/DL (ref 31.5–36.5)
MCV RBC AUTO: 97 FL (ref 78–100)
PLATELET # BLD AUTO: 398 10E9/L (ref 150–450)
RBC # BLD AUTO: 3.27 10E12/L (ref 4.4–5.9)
WBC # BLD AUTO: 11.3 10E9/L (ref 4–11)

## 2020-07-08 PROCEDURE — 36415 COLL VENOUS BLD VENIPUNCTURE: CPT | Performed by: INTERNAL MEDICINE

## 2020-07-08 PROCEDURE — 82040 ASSAY OF SERUM ALBUMIN: CPT | Performed by: INTERNAL MEDICINE

## 2020-07-08 PROCEDURE — 99495 TRANSJ CARE MGMT MOD F2F 14D: CPT | Performed by: INTERNAL MEDICINE

## 2020-07-08 PROCEDURE — 85027 COMPLETE CBC AUTOMATED: CPT | Performed by: INTERNAL MEDICINE

## 2020-07-08 PROCEDURE — 80048 BASIC METABOLIC PNL TOTAL CA: CPT | Performed by: INTERNAL MEDICINE

## 2020-07-08 PROCEDURE — 83880 ASSAY OF NATRIURETIC PEPTIDE: CPT | Performed by: INTERNAL MEDICINE

## 2020-07-08 ASSESSMENT — MIFFLIN-ST. JEOR: SCORE: 1330.36

## 2020-07-09 LAB
BACTERIA SPEC CULT: ABNORMAL
Lab: ABNORMAL
NT-PROBNP SERPL-MCNC: ABNORMAL PG/ML (ref 0–450)
SPECIMEN SOURCE: ABNORMAL

## 2020-07-10 LAB
ALBUMIN SERPL-MCNC: 2.6 G/DL (ref 3.4–5)
ANION GAP SERPL CALCULATED.3IONS-SCNC: 6 MMOL/L (ref 3–14)
BUN SERPL-MCNC: 26 MG/DL (ref 7–30)
CALCIUM SERPL-MCNC: 8.7 MG/DL (ref 8.5–10.1)
CHLORIDE SERPL-SCNC: 112 MMOL/L (ref 94–109)
CO2 SERPL-SCNC: 23 MMOL/L (ref 20–32)
CREAT SERPL-MCNC: 1.82 MG/DL (ref 0.66–1.25)
GFR SERPL CREATININE-BSD FRML MDRD: 33 ML/MIN/{1.73_M2}
GLUCOSE SERPL-MCNC: 95 MG/DL (ref 70–99)
POTASSIUM SERPL-SCNC: 5.4 MMOL/L (ref 3.4–5.3)
SODIUM SERPL-SCNC: 141 MMOL/L (ref 133–144)

## 2020-07-14 NOTE — RESULT ENCOUNTER NOTE
I called the patient and informed of results. The following changes were made to treatment:   His strength is improving and his dyspnea on exertion has improved.  His lab studies show that his CKD is staying stable, hemoglobin is improving and his diastolic heart failure needs to be treated more aggressively.  I recommended increasing his Lasix to 10 alternating with 20 mg every other day and reevaluating his renal functions in 1 week.    Patient voices understanding and will contact me with any further questions.     Warren Lovelace MD

## 2020-07-15 ENCOUNTER — OFFICE VISIT (OUTPATIENT)
Dept: SURGERY | Facility: CLINIC | Age: 85
End: 2020-07-15
Payer: MEDICARE

## 2020-07-15 DIAGNOSIS — Z09 SURGERY FOLLOW-UP EXAMINATION: Primary | ICD-10-CM

## 2020-07-15 PROCEDURE — 99024 POSTOP FOLLOW-UP VISIT: CPT | Performed by: SURGERY

## 2020-07-15 NOTE — PROGRESS NOTES
Doing well after operation. Pathology reviewed. Wounds OK, discussed restrictions, all questions answered. Staples removed  Plan:  call PRN    Copy to PCP    Bull Rachel MD

## 2020-07-21 ENCOUNTER — OFFICE VISIT (OUTPATIENT)
Dept: FAMILY MEDICINE | Facility: CLINIC | Age: 85
End: 2020-07-21
Payer: MEDICARE

## 2020-07-21 VITALS
WEIGHT: 151 LBS | DIASTOLIC BLOOD PRESSURE: 54 MMHG | HEART RATE: 57 BPM | BODY MASS INDEX: 22.88 KG/M2 | OXYGEN SATURATION: 98 % | SYSTOLIC BLOOD PRESSURE: 120 MMHG | TEMPERATURE: 97.5 F | HEIGHT: 68 IN

## 2020-07-21 DIAGNOSIS — I50.32 CHRONIC DIASTOLIC HEART FAILURE (H): ICD-10-CM

## 2020-07-21 DIAGNOSIS — M15.0 PRIMARY OSTEOARTHRITIS INVOLVING MULTIPLE JOINTS: ICD-10-CM

## 2020-07-21 DIAGNOSIS — I10 ESSENTIAL HYPERTENSION, BENIGN: ICD-10-CM

## 2020-07-21 DIAGNOSIS — M1A.09X0 IDIOPATHIC CHRONIC GOUT OF MULTIPLE SITES WITHOUT TOPHUS: ICD-10-CM

## 2020-07-21 DIAGNOSIS — I71.40 ABDOMINAL AORTIC ANEURYSM (AAA) WITHOUT RUPTURE (H): ICD-10-CM

## 2020-07-21 DIAGNOSIS — K21.9 GASTROESOPHAGEAL REFLUX DISEASE WITHOUT ESOPHAGITIS: ICD-10-CM

## 2020-07-21 DIAGNOSIS — M17.0 PRIMARY OSTEOARTHRITIS OF BOTH KNEES: ICD-10-CM

## 2020-07-21 DIAGNOSIS — I35.0 MILD AORTIC STENOSIS: ICD-10-CM

## 2020-07-21 DIAGNOSIS — I65.29 STENOSIS OF CAROTID ARTERY, UNSPECIFIED LATERALITY: ICD-10-CM

## 2020-07-21 DIAGNOSIS — N18.4 CRF (CHRONIC RENAL FAILURE), STAGE 4 (SEVERE) (H): Primary | ICD-10-CM

## 2020-07-21 LAB
ERYTHROCYTE [DISTWIDTH] IN BLOOD BY AUTOMATED COUNT: 14.7 % (ref 10–15)
HCT VFR BLD AUTO: 28.4 % (ref 40–53)
HGB BLD-MCNC: 9 G/DL (ref 13.3–17.7)
MCH RBC QN AUTO: 31 PG (ref 26.5–33)
MCHC RBC AUTO-ENTMCNC: 31.7 G/DL (ref 31.5–36.5)
MCV RBC AUTO: 98 FL (ref 78–100)
NT-PROBNP SERPL-MCNC: 6215 PG/ML (ref 0–450)
PLATELET # BLD AUTO: 235 10E9/L (ref 150–450)
RBC # BLD AUTO: 2.9 10E12/L (ref 4.4–5.9)
WBC # BLD AUTO: 6.5 10E9/L (ref 4–11)

## 2020-07-21 PROCEDURE — 99214 OFFICE O/P EST MOD 30 MIN: CPT | Performed by: INTERNAL MEDICINE

## 2020-07-21 PROCEDURE — 85027 COMPLETE CBC AUTOMATED: CPT | Performed by: INTERNAL MEDICINE

## 2020-07-21 PROCEDURE — 83880 ASSAY OF NATRIURETIC PEPTIDE: CPT | Performed by: INTERNAL MEDICINE

## 2020-07-21 PROCEDURE — 36415 COLL VENOUS BLD VENIPUNCTURE: CPT | Performed by: INTERNAL MEDICINE

## 2020-07-21 PROCEDURE — 80048 BASIC METABOLIC PNL TOTAL CA: CPT | Performed by: INTERNAL MEDICINE

## 2020-07-21 PROCEDURE — 82040 ASSAY OF SERUM ALBUMIN: CPT | Performed by: INTERNAL MEDICINE

## 2020-07-21 RX ORDER — FAMOTIDINE 20 MG/1
20 TABLET, FILM COATED ORAL AT BEDTIME
Qty: 30 TABLET | Refills: 3 | Status: SHIPPED | OUTPATIENT
Start: 2020-07-21 | End: 2021-01-27

## 2020-07-21 ASSESSMENT — MIFFLIN-ST. JEOR: SCORE: 1339.43

## 2020-07-21 NOTE — PROGRESS NOTES
Subjective     Trevor Soni is a 86 year old male who presents to clinic today for the following health issues:    HPI   Patient is getting stronger, he obviously has more energy and does not look nearly as fatigued as he did at his last visit.  His abdominal wound is healing.  Bowel movements are regular.  Weight gain noted.  Rectal sore is improved but not resolved    Knees painful  Venous stasis improved but not resolved          Current Outpatient Medications   Medication Sig Dispense Refill     allopurinol (ZYLOPRIM) 100 MG tablet TAKE 2 TABLETS BY MOUTH  DAILY 180 tablet 3     amLODIPine (NORVASC) 5 MG tablet TAKE 1 TABLET BY MOUTH  EVERY MORNING AND 2 TABLETS EVERY EVENING 270 tablet 3     ASPIRIN PO Take 325 mg by mouth daily.       atenolol (TENORMIN) 50 MG tablet Take 1 tablet (50 mg) by mouth daily 90 tablet 2     atorvastatin (LIPITOR) 20 MG tablet TAKE 1 TABLET BY MOUTH  EVERY DAY 90 tablet 2     Cholecalciferol (VITAMIN D3 PO) Take 1 tablet by mouth daily       coenzyme Q-10 capsule Take 1 capsule by mouth daily       famotidine (PEPCID) 20 MG tablet Take 1 tablet (20 mg) by mouth At Bedtime 30 tablet 3     fenofibrate (TRIGLIDE/LOFIBRA) 160 MG tablet TAKE 1 TABLET(160 MG) BY MOUTH DAILY 90 tablet 3     furosemide (LASIX) 20 MG tablet Take 0.5 tablets (10 mg) by mouth daily 45 tablet 3     hydrALAZINE (APRESOLINE) 50 MG tablet TAKE 1 TABLET BY MOUTH 4  TIMES A  tablet 3     ondansetron (ZOFRAN-ODT) 4 MG ODT tab Take 1 tablet (4 mg) by mouth every 6 hours as needed for nausea or vomiting (Patient not taking: Reported on 7/21/2020) 30 tablet 0     Allergies   Allergen Reactions     Avocado      Ciprofloxacin Itching     Penicillins Itching       Reviewed and updated as needed this visit by Provider         Review of Systems   Constitutional, HEENT, cardiovascular, pulmonary, gi and gu nocturia x2-3 systems are negative, except as otherwise noted.  Poor sleep      Objective    /54    "Pulse 57   Temp 97.5  F (36.4  C) (Skin)   Ht 1.727 m (5' 8\")   Wt 68.5 kg (151 lb)   SpO2 98%   BMI 22.96 kg/m    Body mass index is 22.96 kg/m .  Physical Exam   GENERAL: healthy, alert and no distress  NECK: no adenopathy, no asymmetry, masses, or scars and thyroid normal to palpation  RESP: lungs clear to auscultation - no rales, rhonchi or wheezes  CV: regular rate and rhythm, grade 2/6 systolic ejection murmur murmur, click or rub, 2+ pretibial peripheral edema and peripheral pulses strong  ABDOMEN: soft, nontender, no hepatosplenomegaly, no masses and bowel sounds normal  Rectal exam in the upper right anal crease he has a small eschar of the skin without ulceration  MS: no gross musculoskeletal defects noted,   Neck veins flat          Assessment & Plan     1. CRF (chronic renal failure), stage 4 (severe) (H)  Follow-up lab studies, monitoring renal function closely to avoid prerenal azotemia  - Basic metabolic panel  - CBC with platelets  - Albumin level    2. Chronic diastolic CHF -- Grade 1-2 Dysfunction Echo 2016  Monitoring closely with noted aggressive diuretic therapy  - Basic metabolic panel  - CBC with platelets  - BNP-N terminal pro  - Albumin level    3. Abdominal aortic aneurysm (AAA) without rupture (H)  Stable from last evaluation    4. Primary osteoarthritis of both knees  Ongoing arthralgias    5. Stenosis of carotid artery, unspecified laterality      6. Mild aortic stenosis -- Echo 2016      7. Primary osteoarthritis involving multiple joints  Ongoing arthralgias    8. Idiopathic chronic gout of multiple sites without tophus  Following closely    9. Essential hypertension, benign  Well-controlled with current therapy  - Albumin level    10. Gastroesophageal reflux disease without esophagitis  Under good control with current therapy  - famotidine (PEPCID) 20 MG tablet; Take 1 tablet (20 mg) by mouth At Bedtime  Dispense: 30 tablet; Refill: 3       FUTURE APPOINTMENTS:       - Follow-up " visit in 3  wks    Return in about 3 weeks (around 8/11/2020).    Warren Lovelace MD  [facility]

## 2020-07-22 LAB
ALBUMIN SERPL-MCNC: 2.8 G/DL (ref 3.4–5)
ANION GAP SERPL CALCULATED.3IONS-SCNC: 7 MMOL/L (ref 3–14)
BUN SERPL-MCNC: 26 MG/DL (ref 7–30)
CALCIUM SERPL-MCNC: 8.5 MG/DL (ref 8.5–10.1)
CHLORIDE SERPL-SCNC: 112 MMOL/L (ref 94–109)
CO2 SERPL-SCNC: 23 MMOL/L (ref 20–32)
CREAT SERPL-MCNC: 2.02 MG/DL (ref 0.66–1.25)
GFR SERPL CREATININE-BSD FRML MDRD: 29 ML/MIN/{1.73_M2}
GLUCOSE SERPL-MCNC: 137 MG/DL (ref 70–99)
POTASSIUM SERPL-SCNC: 4.3 MMOL/L (ref 3.4–5.3)
SODIUM SERPL-SCNC: 142 MMOL/L (ref 133–144)

## 2020-07-27 ENCOUNTER — TELEPHONE (OUTPATIENT)
Dept: FAMILY MEDICINE | Facility: CLINIC | Age: 85
End: 2020-07-27

## 2020-07-27 NOTE — TELEPHONE ENCOUNTER
Reason for Call:  Request for results:    Name of test or procedure: labs    Date of test of procedure: 07.24.2020    Location of the test or procedure: [facility]    OK to leave the result message on voice mail or with a family member? YES    Phone number Patient can be reached at:  Cell number on file:    Telephone Information:   Mobile [phone]     Additional comments:     Call taken on 7/27/2020 at 8:54 AM by Jo Berrios

## 2020-07-27 NOTE — TELEPHONE ENCOUNTER
Multiple calls from last week and patient finally got the message.  Recommend returning to clinic this week to follow-up on his kidney function to ensure that were not dehydrating his kidneys causing progression of his renal dysfunction.  Scheduled for 1230 on Wednesday.

## 2020-07-27 NOTE — TELEPHONE ENCOUNTER
To PCP:     Pt requests lab results from 7/21.     Please advise,     Thank you,   Ruth REILLY RN

## 2020-07-29 ENCOUNTER — OFFICE VISIT (OUTPATIENT)
Dept: FAMILY MEDICINE | Facility: CLINIC | Age: 85
End: 2020-07-29
Payer: MEDICARE

## 2020-07-29 VITALS
HEIGHT: 68 IN | OXYGEN SATURATION: 97 % | HEART RATE: 56 BPM | SYSTOLIC BLOOD PRESSURE: 124 MMHG | WEIGHT: 152 LBS | DIASTOLIC BLOOD PRESSURE: 55 MMHG | BODY MASS INDEX: 23.04 KG/M2 | TEMPERATURE: 97.3 F

## 2020-07-29 DIAGNOSIS — M54.50 CHRONIC BILATERAL LOW BACK PAIN WITHOUT SCIATICA: ICD-10-CM

## 2020-07-29 DIAGNOSIS — I71.40 ABDOMINAL AORTIC ANEURYSM (AAA) WITHOUT RUPTURE (H): ICD-10-CM

## 2020-07-29 DIAGNOSIS — I65.29 STENOSIS OF CAROTID ARTERY, UNSPECIFIED LATERALITY: ICD-10-CM

## 2020-07-29 DIAGNOSIS — I35.0 MILD AORTIC STENOSIS: ICD-10-CM

## 2020-07-29 DIAGNOSIS — I50.32 CHRONIC DIASTOLIC HEART FAILURE (H): Primary | ICD-10-CM

## 2020-07-29 DIAGNOSIS — N18.4 CRF (CHRONIC RENAL FAILURE), STAGE 4 (SEVERE) (H): ICD-10-CM

## 2020-07-29 DIAGNOSIS — G89.29 CHRONIC BILATERAL LOW BACK PAIN WITHOUT SCIATICA: ICD-10-CM

## 2020-07-29 DIAGNOSIS — M1A.39X0 CHRONIC GOUT DUE TO RENAL IMPAIRMENT OF MULTIPLE SITES WITHOUT TOPHUS: ICD-10-CM

## 2020-07-29 DIAGNOSIS — M1A.09X0 IDIOPATHIC CHRONIC GOUT OF MULTIPLE SITES WITHOUT TOPHUS: ICD-10-CM

## 2020-07-29 DIAGNOSIS — M17.32 POST-TRAUMATIC OSTEOARTHRITIS OF LEFT KNEE: ICD-10-CM

## 2020-07-29 LAB — NT-PROBNP SERPL-MCNC: 5383 PG/ML (ref 0–450)

## 2020-07-29 PROCEDURE — 99214 OFFICE O/P EST MOD 30 MIN: CPT | Performed by: INTERNAL MEDICINE

## 2020-07-29 PROCEDURE — 36415 COLL VENOUS BLD VENIPUNCTURE: CPT | Performed by: INTERNAL MEDICINE

## 2020-07-29 PROCEDURE — 83880 ASSAY OF NATRIURETIC PEPTIDE: CPT | Performed by: INTERNAL MEDICINE

## 2020-07-29 PROCEDURE — 80048 BASIC METABOLIC PNL TOTAL CA: CPT | Performed by: INTERNAL MEDICINE

## 2020-07-29 ASSESSMENT — MIFFLIN-ST. JEOR: SCORE: 1343.97

## 2020-07-29 NOTE — PROGRESS NOTES
Subjective     Trevor Soni is a 86 year old male who presents to clinic today for the following health issues:    HPI   Chief Complaint   Patient presents with     RECHECK     F/U on Kidney function       Chronic diastolic heart failure and CKD 4 post hospitalization for small bowel obstruction with localized small bowel resection.    Patient notes that his strength is improving, his weight is stable and his peripheral edema seems to be improved.  He notes that his generalized strength is improving and he is beginning to increase his activities.    No changes in his orthopnea, no PND, no angina or cardiac dysrhythmias          Lasix 10 mg alternating with 20 mg q. OD  Current Outpatient Medications   Medication Sig Dispense Refill     allopurinol (ZYLOPRIM) 100 MG tablet TAKE 2 TABLETS BY MOUTH  DAILY 180 tablet 3     amLODIPine (NORVASC) 5 MG tablet TAKE 1 TABLET BY MOUTH  EVERY MORNING AND 2 TABLETS EVERY EVENING 270 tablet 3     ASPIRIN PO Take 325 mg by mouth daily.       atenolol (TENORMIN) 50 MG tablet Take 1 tablet (50 mg) by mouth daily 90 tablet 2     atorvastatin (LIPITOR) 20 MG tablet TAKE 1 TABLET BY MOUTH  EVERY DAY 90 tablet 2     Cholecalciferol (VITAMIN D3 PO) Take 1 tablet by mouth daily       coenzyme Q-10 capsule Take 1 capsule by mouth daily       famotidine (PEPCID) 20 MG tablet Take 1 tablet (20 mg) by mouth At Bedtime 30 tablet 3     fenofibrate (TRIGLIDE/LOFIBRA) 160 MG tablet TAKE 1 TABLET(160 MG) BY MOUTH DAILY 90 tablet 3     furosemide (LASIX) 20 MG tablet Take 0.5 tablets (10 mg) by mouth daily 45 tablet 3     hydrALAZINE (APRESOLINE) 50 MG tablet TAKE 1 TABLET BY MOUTH 4  TIMES A  tablet 3     ondansetron (ZOFRAN-ODT) 4 MG ODT tab Take 1 tablet (4 mg) by mouth every 6 hours as needed for nausea or vomiting 30 tablet 0     Allergies   Allergen Reactions     Avocado      Ciprofloxacin Itching     Penicillins Itching       Reviewed and updated as needed this visit by  "Provider         Review of Systems   Constitutional, HEENT, cardiovascular, pulmonary, gi and gu systems are negative, except as otherwise noted.      Objective    There were no vitals taken for this visit.  There is no height or weight on file to calculate BMI.  Physical Exam /55   Pulse 56   Temp 97.3  F (36.3  C) (Skin)   Ht 1.727 m (5' 8\")   Wt 68.9 kg (152 lb)   SpO2 97%   BMI 23.11 kg/m      GENERAL: healthy, alert and no distress  NECK: no adenopathy, no asymmetry, masses, or scars and thyroid normal to palpation  RESP: lungs clear to auscultation - no rales, rhonchi or wheezes  CV: regular rate and rhythm, grade 3/6 systolic ejection murmur, click or rub, 1-2+ pretibial peripheral edema and peripheral pulses strong  ABDOMEN: soft, nontender, no hepatosplenomegaly, no masses and bowel sounds normal, periumbilical incision is well-healed and dry without palpable fluctuance  MS: no gross musculoskeletal defects noted,             Assessment & Plan     1. Chronic diastolic CHF -- Grade 1-2 Dysfunction Echo 2016  Follow-up venous stasis/congestive heart failure as well as is CKD  - Basic metabolic panel  - BNP-N terminal pro    2. Abdominal aortic aneurysm (AAA) without rupture (H)  Continue routine follow-up    3. Post-traumatic osteoarthritis of left knee      4. Idiopathic chronic gout of multiple sites without tophus      5. Mild aortic stenosis -- Echo 2016  Reevaluate echo with wellness exam    6. CRF (chronic renal failure), stage 4 (severe) (H)  Close monitoring to avoid prerenal azotemia with the titration of his congestive heart failure/diuretics  7. Chronic gout due to renal impairment of multiple sites without tophus      8. Stenosis of carotid artery, unspecified laterality  Continue same serial follow-up    9. Chronic bilateral low back pain without sciatica           FUTURE APPOINTMENTS:       - Follow-up visit in 1 mo    No follow-ups on file.    Warren Lovelace MD  [facility] " [facility]

## 2020-07-29 NOTE — LETTER
August 4, 2020      Trevor Soni  [address] [facility] [address]        Dear ,    We are writing to inform you of your test results.    Enclosed your lab reports from your recent office exam.   The basic metabolic panel showed that your minerals and electrolytes were all normal and your kidney function tests was improved from the last time we had checked it and in an adequate range.   The BNP test is a sensitive indicator of fluid balance and it shows that your fluid balance was improving as compared to 2 or 3 weeks ago.  I recommend we continue the diuretics the same which will help to get rid of the extra fluid out of your system and it seems that your kidney function tests are managing well on this dose of Lasix.  You should plan on coming back to see me the end of August.  If you have any questions regarding these reports please let me know.     Resulted Orders   Basic metabolic panel   Result Value Ref Range    Sodium 144 133 - 144 mmol/L    Potassium 4.0 3.4 - 5.3 mmol/L    Chloride 112 (H) 94 - 109 mmol/L    Carbon Dioxide 22 20 - 32 mmol/L    Anion Gap 10 3 - 14 mmol/L    Glucose 120 (H) 70 - 99 mg/dL    Urea Nitrogen 23 7 - 30 mg/dL    Creatinine 1.76 (H) 0.66 - 1.25 mg/dL    GFR Estimate 34 (L) >60 mL/min/[1.73_m2]      Comment:      Non  GFR Calc  Starting 12/18/2018, serum creatinine based estimated GFR (eGFR) will be   calculated using the Chronic Kidney Disease Epidemiology Collaboration   (CKD-EPI) equation.      GFR Estimate If Black 39 (L) >60 mL/min/[1.73_m2]      Comment:       GFR Calc  Starting 12/18/2018, serum creatinine based estimated GFR (eGFR) will be   calculated using the Chronic Kidney Disease Epidemiology Collaboration   (CKD-EPI) equation.      Calcium 8.6 8.5 - 10.1 mg/dL   BNP-N terminal pro   Result Value Ref Range    N-Terminal Pro Bnp 5,383 (H) 0 - 450 pg/mL      Comment:         Reference range shown and results  flagged as abnormal are for the outpatient,   non acute settings. Establishing a baseline value for each individual patient   is useful for follow-up.  Suggested inpatient cut points for confirming diagnosis of CHF in an acute   setting are:   >450 pg/mL (age 18 to less than 50)   >900 pg/mL (age 50 to less than 75)   >1800 pg/mL (75 yrs and older)  An inpatient or emergency department NT-proPBNP <300 pg/mL effectively rules   out acute CHF, with 99% negative predictive value.          If you have any questions or concerns, please call the clinic at the number listed above.       Sincerely,        Warren Lovelace MD/ [facility]

## 2020-07-30 LAB
ANION GAP SERPL CALCULATED.3IONS-SCNC: 10 MMOL/L (ref 3–14)
BUN SERPL-MCNC: 23 MG/DL (ref 7–30)
CALCIUM SERPL-MCNC: 8.6 MG/DL (ref 8.5–10.1)
CHLORIDE SERPL-SCNC: 112 MMOL/L (ref 94–109)
CO2 SERPL-SCNC: 22 MMOL/L (ref 20–32)
CREAT SERPL-MCNC: 1.76 MG/DL (ref 0.66–1.25)
GFR SERPL CREATININE-BSD FRML MDRD: 34 ML/MIN/{1.73_M2}
GLUCOSE SERPL-MCNC: 120 MG/DL (ref 70–99)
POTASSIUM SERPL-SCNC: 4 MMOL/L (ref 3.4–5.3)
SODIUM SERPL-SCNC: 144 MMOL/L (ref 133–144)

## 2020-08-04 ENCOUNTER — TELEPHONE (OUTPATIENT)
Dept: FAMILY MEDICINE | Facility: CLINIC | Age: 85
End: 2020-08-04

## 2020-08-04 NOTE — TELEPHONE ENCOUNTER
Letter was mailed earlier today.   I called pt with the highlights =        Th basic metabolic panel showed that your minerals and electrolytes were all normal and your   kidney function tests was improved from the last time we had checked it and in an adequate range.    The BNP test is a sensitive indicator of fluid balance and it shows that your fluid balance was improving as compared to 2 or 3 weeks ago.      I recommend we continue the diuretics the same which will help to get rid of the extra fluid out of your system and it seems that your kidney function tests are managing well on this dose of Lasix.      You should plan on coming back to see me the end of August.

## 2020-08-04 NOTE — TELEPHONE ENCOUNTER
Test Results  Who is calling?:  Trevor the patient  Who ordered the test:  Dr Lovelace  Type of test: Lab  Date of test:  7/29  Where was the test performed:  [facility]  What are your questions/concerns?:  Patient is just anxious.  Okay to leave a detailed message?:  No

## 2020-08-20 ENCOUNTER — DOCUMENTATION ONLY (OUTPATIENT)
Dept: FAMILY MEDICINE | Facility: CLINIC | Age: 85
End: 2020-08-20

## 2020-08-20 ENCOUNTER — OFFICE VISIT (OUTPATIENT)
Dept: FAMILY MEDICINE | Facility: CLINIC | Age: 85
End: 2020-08-20
Payer: MEDICARE

## 2020-08-20 VITALS
OXYGEN SATURATION: 96 % | HEART RATE: 59 BPM | HEIGHT: 68 IN | SYSTOLIC BLOOD PRESSURE: 135 MMHG | DIASTOLIC BLOOD PRESSURE: 65 MMHG | TEMPERATURE: 97.1 F | BODY MASS INDEX: 22.48 KG/M2 | WEIGHT: 148.3 LBS

## 2020-08-20 DIAGNOSIS — M25.562 CHRONIC PAIN OF BOTH KNEES: ICD-10-CM

## 2020-08-20 DIAGNOSIS — G89.29 CHRONIC PAIN OF BOTH KNEES: ICD-10-CM

## 2020-08-20 DIAGNOSIS — K21.9 GASTROESOPHAGEAL REFLUX DISEASE WITHOUT ESOPHAGITIS: ICD-10-CM

## 2020-08-20 DIAGNOSIS — I10 BENIGN ESSENTIAL HYPERTENSION: ICD-10-CM

## 2020-08-20 DIAGNOSIS — I35.0 MILD AORTIC STENOSIS: ICD-10-CM

## 2020-08-20 DIAGNOSIS — N18.4 CRF (CHRONIC RENAL FAILURE), STAGE 4 (SEVERE) (H): Primary | ICD-10-CM

## 2020-08-20 DIAGNOSIS — K55.9 ISCHEMIC BOWEL DISEASE (H): Primary | ICD-10-CM

## 2020-08-20 DIAGNOSIS — N18.4 CRF (CHRONIC RENAL FAILURE), STAGE 4 (SEVERE) (H): ICD-10-CM

## 2020-08-20 DIAGNOSIS — I25.10 CORONARY ARTERY DISEASE INVOLVING NATIVE CORONARY ARTERY OF NATIVE HEART WITHOUT ANGINA PECTORIS: ICD-10-CM

## 2020-08-20 DIAGNOSIS — I65.29 STENOSIS OF CAROTID ARTERY, UNSPECIFIED LATERALITY: ICD-10-CM

## 2020-08-20 DIAGNOSIS — I50.32 CHRONIC DIASTOLIC HEART FAILURE (H): ICD-10-CM

## 2020-08-20 DIAGNOSIS — I71.40 ABDOMINAL AORTIC ANEURYSM (AAA) WITHOUT RUPTURE (H): ICD-10-CM

## 2020-08-20 DIAGNOSIS — M25.561 CHRONIC PAIN OF BOTH KNEES: ICD-10-CM

## 2020-08-20 PROCEDURE — G0439 PPPS, SUBSEQ VISIT: HCPCS | Performed by: INTERNAL MEDICINE

## 2020-08-20 ASSESSMENT — MIFFLIN-ST. JEOR: SCORE: 1327.18

## 2020-08-20 ASSESSMENT — ACTIVITIES OF DAILY LIVING (ADL): CURRENT_FUNCTION: NO ASSISTANCE NEEDED

## 2020-10-01 ENCOUNTER — OFFICE VISIT (OUTPATIENT)
Dept: FAMILY MEDICINE | Facility: CLINIC | Age: 85
End: 2020-10-01
Payer: MEDICARE

## 2020-10-01 VITALS
OXYGEN SATURATION: 97 % | BODY MASS INDEX: 22.66 KG/M2 | HEART RATE: 51 BPM | DIASTOLIC BLOOD PRESSURE: 60 MMHG | TEMPERATURE: 96.4 F | SYSTOLIC BLOOD PRESSURE: 131 MMHG | WEIGHT: 149 LBS

## 2020-10-01 DIAGNOSIS — I10 BENIGN ESSENTIAL HYPERTENSION: ICD-10-CM

## 2020-10-01 DIAGNOSIS — Z23 NEED FOR PROPHYLACTIC VACCINATION AND INOCULATION AGAINST INFLUENZA: Primary | ICD-10-CM

## 2020-10-01 DIAGNOSIS — I50.32 CHRONIC DIASTOLIC HEART FAILURE (H): ICD-10-CM

## 2020-10-01 DIAGNOSIS — N18.4 CRF (CHRONIC RENAL FAILURE), STAGE 4 (SEVERE) (H): ICD-10-CM

## 2020-10-01 DIAGNOSIS — M17.32 POST-TRAUMATIC OSTEOARTHRITIS OF LEFT KNEE: ICD-10-CM

## 2020-10-01 DIAGNOSIS — I25.10 CORONARY ARTERY DISEASE INVOLVING NATIVE CORONARY ARTERY OF NATIVE HEART WITHOUT ANGINA PECTORIS: ICD-10-CM

## 2020-10-01 LAB
ERYTHROCYTE [DISTWIDTH] IN BLOOD BY AUTOMATED COUNT: 16.3 % (ref 10–15)
HCT VFR BLD AUTO: 31.1 % (ref 40–53)
HGB BLD-MCNC: 9.9 G/DL (ref 13.3–17.7)
MCH RBC QN AUTO: 28.5 PG (ref 26.5–33)
MCHC RBC AUTO-ENTMCNC: 31.8 G/DL (ref 31.5–36.5)
MCV RBC AUTO: 90 FL (ref 78–100)
NT-PROBNP SERPL-MCNC: 6525 PG/ML (ref 0–450)
PLATELET # BLD AUTO: 326 10E9/L (ref 150–450)
RBC # BLD AUTO: 3.47 10E12/L (ref 4.4–5.9)
WBC # BLD AUTO: 9.1 10E9/L (ref 4–11)

## 2020-10-01 PROCEDURE — 80048 BASIC METABOLIC PNL TOTAL CA: CPT | Performed by: INTERNAL MEDICINE

## 2020-10-01 PROCEDURE — 83880 ASSAY OF NATRIURETIC PEPTIDE: CPT | Performed by: INTERNAL MEDICINE

## 2020-10-01 PROCEDURE — 36415 COLL VENOUS BLD VENIPUNCTURE: CPT | Performed by: INTERNAL MEDICINE

## 2020-10-01 PROCEDURE — G0008 ADMIN INFLUENZA VIRUS VAC: HCPCS | Performed by: INTERNAL MEDICINE

## 2020-10-01 PROCEDURE — 90662 IIV NO PRSV INCREASED AG IM: CPT | Performed by: INTERNAL MEDICINE

## 2020-10-01 PROCEDURE — 85027 COMPLETE CBC AUTOMATED: CPT | Performed by: INTERNAL MEDICINE

## 2020-10-01 PROCEDURE — 99214 OFFICE O/P EST MOD 30 MIN: CPT | Mod: 25 | Performed by: INTERNAL MEDICINE

## 2020-10-01 NOTE — PROGRESS NOTES
Subjective     Trevor Soni is a 87 year old male who presents to clinic today for the following health issues:    HPI    Since the patient's last hospitalization he has noticed increasing swelling of his legs.  His activities are minimal secondary to his knee pain.  No changes in his orthopnea, no PND, angina or cardiac dysrhythmias.    Left shoulder pain     Recheck -- knees and shoulder    Poor sleep secondary to pain  Review of Systems   Constitutional, HEENT, cardiovascular, pulmonary, gi and gu systems are negative, except as otherwise noted.      Objective    /60   Pulse 51   Temp 96.4  F (35.8  C) (Tympanic)   Wt 67.6 kg (149 lb)   SpO2 97%   BMI 22.66 kg/m    Body mass index is 22.66 kg/m .  Physical Exam   GENERAL: healthy, alert and no distress  NECK: no adenopathy, no asymmetry, masses, or scars and thyroid normal to palpation  RESP: lungs clear to auscultation - no rales, rhonchi or wheezes  CV: regular rate and rhythm, normal S1 S2, no S3 or S4, no murmur, click or rub, no peripheral edema and peripheral pulses strong  ABDOMEN: soft, nontender, no hepatosplenomegaly, no masses and bowel sounds normal  MS: no gross musculoskeletal defects noted, 2+ pretibial edema without cords or tenderness            Assessment & Plan     Need for prophylactic vaccination and inoculation against influenza    - FLUZONE HIGH DOSE 65+  [70440]  - ADMIN INFLUENZA (For MEDICARE Patients ONLY) []    Chronic diastolic CHF -- Grade 1-2 Dysfunction Echo 2016  Follow-up labs and if it appears that he is having symptoms of congestive heart failure will consider restarting his Aldactone  - N terminal pro BNP outpatient  - **CBC with platelets FUTURE anytime  - **Basic metabolic panel FUTURE anytime    CRF (chronic renal failure), stage 4 (severe) (H)  Watch renal function closely if restarting his Aldactone to prevent hyperkalemia  - **CBC with platelets FUTURE anytime  - **Basic metabolic panel FUTURE  anytime    Benign essential hypertension  Well-controlled with current therapy  - **CBC with platelets FUTURE anytime  - **Basic metabolic panel FUTURE anytime        FUTURE APPOINTMENTS:       - Follow-up visit in 1 wk    No follow-ups on file.    Warren Lovelace MD  [facility]

## 2020-10-02 LAB
ANION GAP SERPL CALCULATED.3IONS-SCNC: 6 MMOL/L (ref 3–14)
BUN SERPL-MCNC: 29 MG/DL (ref 7–30)
CALCIUM SERPL-MCNC: 9.2 MG/DL (ref 8.5–10.1)
CHLORIDE SERPL-SCNC: 109 MMOL/L (ref 94–109)
CO2 SERPL-SCNC: 23 MMOL/L (ref 20–32)
CREAT SERPL-MCNC: 1.86 MG/DL (ref 0.66–1.25)
GFR SERPL CREATININE-BSD FRML MDRD: 32 ML/MIN/{1.73_M2}
GLUCOSE SERPL-MCNC: 92 MG/DL (ref 70–99)
POTASSIUM SERPL-SCNC: 4.2 MMOL/L (ref 3.4–5.3)
SODIUM SERPL-SCNC: 138 MMOL/L (ref 133–144)

## 2020-10-02 NOTE — RESULT ENCOUNTER NOTE
I called the patient and informed of results. The following changes were made to treatment:   Reviewed with the patient's wife that he should restart taking Aldactone, being very cautious with potassium containing foods and scheduled him for an office appointment Thursday, October 8 at 12:30 AM p.m.    Patient voices understanding and will contact me with any further questions.     Warren Lovelace MD

## 2020-10-08 ENCOUNTER — OFFICE VISIT (OUTPATIENT)
Dept: FAMILY MEDICINE | Facility: CLINIC | Age: 85
End: 2020-10-08
Payer: MEDICARE

## 2020-10-08 VITALS
WEIGHT: 146.9 LBS | HEART RATE: 77 BPM | DIASTOLIC BLOOD PRESSURE: 61 MMHG | HEIGHT: 68 IN | SYSTOLIC BLOOD PRESSURE: 128 MMHG | OXYGEN SATURATION: 97 % | BODY MASS INDEX: 22.26 KG/M2 | TEMPERATURE: 96.8 F

## 2020-10-08 DIAGNOSIS — I10 BENIGN ESSENTIAL HYPERTENSION: ICD-10-CM

## 2020-10-08 DIAGNOSIS — I50.32 CHRONIC DIASTOLIC HEART FAILURE (H): ICD-10-CM

## 2020-10-08 DIAGNOSIS — I65.29 STENOSIS OF CAROTID ARTERY, UNSPECIFIED LATERALITY: ICD-10-CM

## 2020-10-08 DIAGNOSIS — I71.40 ABDOMINAL AORTIC ANEURYSM (AAA) WITHOUT RUPTURE (H): ICD-10-CM

## 2020-10-08 DIAGNOSIS — I35.0 MILD AORTIC STENOSIS: ICD-10-CM

## 2020-10-08 DIAGNOSIS — N18.4 CKD (CHRONIC KIDNEY DISEASE) STAGE 4, GFR 15-29 ML/MIN (H): Primary | ICD-10-CM

## 2020-10-08 LAB
ERYTHROCYTE [DISTWIDTH] IN BLOOD BY AUTOMATED COUNT: 16.6 % (ref 10–15)
HCT VFR BLD AUTO: 29.9 % (ref 40–53)
HGB BLD-MCNC: 9.6 G/DL (ref 13.3–17.7)
MCH RBC QN AUTO: 28.7 PG (ref 26.5–33)
MCHC RBC AUTO-ENTMCNC: 32.1 G/DL (ref 31.5–36.5)
MCV RBC AUTO: 90 FL (ref 78–100)
NT-PROBNP SERPL-MCNC: 6427 PG/ML (ref 0–450)
PLATELET # BLD AUTO: 217 10E9/L (ref 150–450)
RBC # BLD AUTO: 3.34 10E12/L (ref 4.4–5.9)
WBC # BLD AUTO: 6.9 10E9/L (ref 4–11)

## 2020-10-08 PROCEDURE — 36415 COLL VENOUS BLD VENIPUNCTURE: CPT | Performed by: INTERNAL MEDICINE

## 2020-10-08 PROCEDURE — 99214 OFFICE O/P EST MOD 30 MIN: CPT | Performed by: INTERNAL MEDICINE

## 2020-10-08 PROCEDURE — 83880 ASSAY OF NATRIURETIC PEPTIDE: CPT | Performed by: INTERNAL MEDICINE

## 2020-10-08 PROCEDURE — 80048 BASIC METABOLIC PNL TOTAL CA: CPT | Performed by: INTERNAL MEDICINE

## 2020-10-08 PROCEDURE — 85027 COMPLETE CBC AUTOMATED: CPT | Performed by: INTERNAL MEDICINE

## 2020-10-08 ASSESSMENT — MIFFLIN-ST. JEOR: SCORE: 1315.83

## 2020-10-08 NOTE — PROGRESS NOTES
"Subjective     Trevor Soni is a 87 year old male who presents to clinic today for the following health issues:    HPI         Follow up  Low appetite.  Diet is complicated by meal preparation and his wife's illness.    Left shoulder pain    Orthopnea x2 no PND, angina or palpitations  Sleep is improved          Review of Systems   Constitutional, HEENT, cardiovascular, pulmonary, gi and gu systems are negative, except as otherwise noted.      Objective  /61 (BP Location: Left arm, Patient Position: Sitting, Cuff Size: Adult Regular)   Pulse 77   Temp 96.8  F (36  C) (Temporal)   Ht 1.727 m (5' 8\")   Wt 66.6 kg (146 lb 14.4 oz)   SpO2 97%   BMI 22.34 kg/m      There were no vitals taken for this visit.  There is no height or weight on file to calculate BMI.  Physical Exam   GENERAL: Appears fatigued, alert and no distress  NECK: no adenopathy, no asymmetry, masses, or scars and thyroid normal to palpation  RESP: lungs clear to auscultation - no rales, rhonchi or wheezes  CV: regular rate and rhythm, normal S1 S2, no S3 or S4, grade 2 out of 6 systolic ejection murmur, click or rub, no peripheral edema and peripheral pulses strong  ABDOMEN: soft, nontender, no hepatosplenomegaly, no masses and bowel sounds normal  MS: no gross musculoskeletal defects noted, no edema  Left shoulder flexion and extension are normal pain with abduction and internal and external rotation          Assessment & Plan     CKD (chronic kidney disease) stage 4, GFR 15-29 ml/min (H)  Following closely to avoid prerenal azotemia or nephrotoxicity from medications  - CBC with platelets  - Basic metabolic panel    Abdominal aortic aneurysm (AAA) without rupture (H)  Continue every 6 month surveillance    Mild aortic stenosis -- Echo 2016  No changes    Benign essential hypertension  Well-controlled with current therapy  - CBC with platelets  - Basic metabolic panel    Stenosis of carotid artery, unspecified laterality  Continue " ongoing surveillance    Chronic diastolic CHF -- Grade 1-2 Dysfunction Echo 2016  Appears to be euvolemic  - CBC with platelets  - Basic metabolic panel  - BNP-N terminal pro      Follow-up MRI of his left shoulder  FUTURE APPOINTMENTS:       - Follow-up visit in 1 mo    No follow-ups on file.  30 minutes spent during this encounter with greater than 50% of the time spent in counseling and coordinating care  Warren Lovelace MD  [facility]

## 2020-10-08 NOTE — LETTER
October 12, 2020      Trevor Soni  [address] [facility] [address]        Dear Lorenzo,     Enclosed are your lab reports from your recent office exam.   The BMP shows that your minerals and electrolytes all look normal your kidney function tests has decreased slightly from the added diuretic but we need to continue with the same routine.   The BNP shows that you are still dealing with an excessive amount of fluid in her system but because of the change in your kidney function we need to be cautious with the diuretics going forward.   The CBC shows that your blood cell counts are essentially the same as they were a week ago.     Because of the changes noted we need to continue to watch things very closely.  I recommend that you come back to see me in 2 weeks.  Have any questions regarding this please let me know.     Resulted Orders   CBC with platelets   Result Value Ref Range    WBC 6.9 4.0 - 11.0 10e9/L    RBC Count 3.34 (L) 4.4 - 5.9 10e12/L    Hemoglobin 9.6 (L) 13.3 - 17.7 g/dL    Hematocrit 29.9 (L) 40.0 - 53.0 %    MCV 90 78 - 100 fl    MCH 28.7 26.5 - 33.0 pg    MCHC 32.1 31.5 - 36.5 g/dL    RDW 16.6 (H) 10.0 - 15.0 %    Platelet Count 217 150 - 450 10e9/L   Basic metabolic panel   Result Value Ref Range    Sodium 139 133 - 144 mmol/L    Potassium 4.5 3.4 - 5.3 mmol/L    Chloride 111 (H) 94 - 109 mmol/L    Carbon Dioxide 24 20 - 32 mmol/L    Anion Gap 4 3 - 14 mmol/L    Glucose 119 (H) 70 - 99 mg/dL    Urea Nitrogen 33 (H) 7 - 30 mg/dL    Creatinine 2.18 (H) 0.66 - 1.25 mg/dL    GFR Estimate 26 (L) >60 mL/min/[1.73_m2]      Comment:      Non  GFR Calc  Starting 12/18/2018, serum creatinine based estimated GFR (eGFR) will be   calculated using the Chronic Kidney Disease Epidemiology Collaboration   (CKD-EPI) equation.      GFR Estimate If Black 30 (L) >60 mL/min/[1.73_m2]      Comment:       GFR Calc  Starting 12/18/2018, serum creatinine based  estimated GFR (eGFR) will be   calculated using the Chronic Kidney Disease Epidemiology Collaboration   (CKD-EPI) equation.      Calcium 9.0 8.5 - 10.1 mg/dL   BNP-N terminal pro   Result Value Ref Range    N-Terminal Pro Bnp 6,427 (H) 0 - 450 pg/mL      Comment:         Reference range shown and results flagged as abnormal are for the outpatient,   non acute settings. Establishing a baseline value for each individual patient   is useful for follow-up.  Suggested inpatient cut points for confirming diagnosis of CHF in an acute   setting are:   >450 pg/mL (age 18 to less than 50)   >900 pg/mL (age 50 to less than 75)   >1800 pg/mL (75 yrs and older)  An inpatient or emergency department NT-proPBNP <300 pg/mL effectively rules   out acute CHF, with 99% negative predictive value.          If you have any questions or concerns, please call the clinic at the number listed above.       Sincerely,        Warren Lovelace MD  jessy

## 2020-10-09 ENCOUNTER — TRANSFERRED RECORDS (OUTPATIENT)
Dept: HEALTH INFORMATION MANAGEMENT | Facility: CLINIC | Age: 85
End: 2020-10-09

## 2020-10-09 LAB
ANION GAP SERPL CALCULATED.3IONS-SCNC: 4 MMOL/L (ref 3–14)
BUN SERPL-MCNC: 33 MG/DL (ref 7–30)
CALCIUM SERPL-MCNC: 9 MG/DL (ref 8.5–10.1)
CHLORIDE SERPL-SCNC: 111 MMOL/L (ref 94–109)
CO2 SERPL-SCNC: 24 MMOL/L (ref 20–32)
CREAT SERPL-MCNC: 2.18 MG/DL (ref 0.66–1.25)
GFR SERPL CREATININE-BSD FRML MDRD: 26 ML/MIN/{1.73_M2}
GLUCOSE SERPL-MCNC: 119 MG/DL (ref 70–99)
POTASSIUM SERPL-SCNC: 4.5 MMOL/L (ref 3.4–5.3)
SODIUM SERPL-SCNC: 139 MMOL/L (ref 133–144)

## 2020-10-14 ENCOUNTER — TELEPHONE (OUTPATIENT)
Dept: FAMILY MEDICINE | Facility: CLINIC | Age: 85
End: 2020-10-14

## 2020-10-14 DIAGNOSIS — I50.32 CHRONIC DIASTOLIC HEART FAILURE (H): ICD-10-CM

## 2020-10-14 DIAGNOSIS — M15.0 PRIMARY OSTEOARTHRITIS INVOLVING MULTIPLE JOINTS: Primary | ICD-10-CM

## 2020-10-14 DIAGNOSIS — N18.4 CKD (CHRONIC KIDNEY DISEASE) STAGE 4, GFR 15-29 ML/MIN (H): ICD-10-CM

## 2020-10-14 NOTE — TELEPHONE ENCOUNTER
Patient's daughter reports [facility] needs more info from Dr. Lovelace regarding the walker before they can complete walker purchase through insurance. Please contact them as soon as possible to resolve the situation. [phone]. Gave patient's daughter Dr. Lovelace's fax number. Patient's daughter is helping to coordinate this.

## 2020-10-14 NOTE — TELEPHONE ENCOUNTER
Reason for Call: Request for an order or referral:    Order or referral being requested: Orders for a walker, as it would significantly help the patient ambulate/move around    Date needed: as soon as possible    Has the patient been seen by the PCP for this problem? YES    Additional comments: [facility] needs more info from Dr. Lovelace!!! Please call them at [phone], no particular agent name given.     Phone number Patient can be reached at:  Other phone number:  [phone]    Best Time: ANY    Can we leave a detailed message on this number?  YES    Call taken on 10/14/2020 at 9:03 AM by Elva Love

## 2020-10-14 NOTE — TELEPHONE ENCOUNTER
PCP - walker was signed with dx of chronic pain of both knees     Requesting a new order with a different diagnosis - unsure which are covered     Please advise  Mckayla ALEX RN

## 2020-10-14 NOTE — TELEPHONE ENCOUNTER
Patient's daughter called to update. [facility]  is named Padmaja. She said she will fax something to Dr. Lovelace's fax. She also says the problem is that Dr. Lovelace faxed over a form saying that patient needed the walker for pain and insurance will NEVER cover that. Patient requires a different diagnosis than pain and will be financially burdened without insurance coverage for walker.     Please have Dr. Lovelace fax over a form stating patient needs walker for a dx other than pain.     Padmaja does not have a direct number. Patient's daughter reports Padmaja was easy to get hold of. [phone] is the best number. Press number 4 for customer service.

## 2020-10-23 ENCOUNTER — MEDICAL CORRESPONDENCE (OUTPATIENT)
Dept: HEALTH INFORMATION MANAGEMENT | Facility: CLINIC | Age: 85
End: 2020-10-23

## 2020-11-05 ENCOUNTER — OFFICE VISIT (OUTPATIENT)
Dept: FAMILY MEDICINE | Facility: CLINIC | Age: 85
End: 2020-11-05
Payer: MEDICARE

## 2020-11-05 VITALS
BODY MASS INDEX: 21.98 KG/M2 | TEMPERATURE: 96.8 F | OXYGEN SATURATION: 97 % | SYSTOLIC BLOOD PRESSURE: 109 MMHG | HEART RATE: 59 BPM | WEIGHT: 145 LBS | HEIGHT: 68 IN | DIASTOLIC BLOOD PRESSURE: 43 MMHG

## 2020-11-05 DIAGNOSIS — K21.9 GASTROESOPHAGEAL REFLUX DISEASE WITHOUT ESOPHAGITIS: ICD-10-CM

## 2020-11-05 DIAGNOSIS — G89.29 CHRONIC PAIN OF BOTH KNEES: ICD-10-CM

## 2020-11-05 DIAGNOSIS — M25.562 CHRONIC PAIN OF BOTH KNEES: ICD-10-CM

## 2020-11-05 DIAGNOSIS — I50.32 CHRONIC DIASTOLIC HEART FAILURE (H): ICD-10-CM

## 2020-11-05 DIAGNOSIS — I35.0 MILD AORTIC STENOSIS: ICD-10-CM

## 2020-11-05 DIAGNOSIS — E78.5 HYPERLIPIDEMIA LDL GOAL <100: ICD-10-CM

## 2020-11-05 DIAGNOSIS — I10 BENIGN ESSENTIAL HYPERTENSION: ICD-10-CM

## 2020-11-05 DIAGNOSIS — I71.40 ABDOMINAL AORTIC ANEURYSM (AAA) WITHOUT RUPTURE (H): Primary | ICD-10-CM

## 2020-11-05 DIAGNOSIS — I65.29 STENOSIS OF CAROTID ARTERY, UNSPECIFIED LATERALITY: ICD-10-CM

## 2020-11-05 DIAGNOSIS — N18.4 CRF (CHRONIC RENAL FAILURE), STAGE 4 (SEVERE) (H): ICD-10-CM

## 2020-11-05 DIAGNOSIS — I10 ESSENTIAL HYPERTENSION, BENIGN: ICD-10-CM

## 2020-11-05 DIAGNOSIS — M17.0 PRIMARY OSTEOARTHRITIS OF BOTH KNEES: ICD-10-CM

## 2020-11-05 DIAGNOSIS — M25.561 CHRONIC PAIN OF BOTH KNEES: ICD-10-CM

## 2020-11-05 DIAGNOSIS — M19.012 PRIMARY OSTEOARTHRITIS OF LEFT SHOULDER: ICD-10-CM

## 2020-11-05 LAB
ERYTHROCYTE [DISTWIDTH] IN BLOOD BY AUTOMATED COUNT: 18.5 % (ref 10–15)
HCT VFR BLD AUTO: 29.8 % (ref 40–53)
HGB BLD-MCNC: 9.7 G/DL (ref 13.3–17.7)
MCH RBC QN AUTO: 29 PG (ref 26.5–33)
MCHC RBC AUTO-ENTMCNC: 32.6 G/DL (ref 31.5–36.5)
MCV RBC AUTO: 89 FL (ref 78–100)
NT-PROBNP SERPL-MCNC: 4938 PG/ML (ref 0–450)
PLATELET # BLD AUTO: 219 10E9/L (ref 150–450)
RBC # BLD AUTO: 3.35 10E12/L (ref 4.4–5.9)
WBC # BLD AUTO: 8.6 10E9/L (ref 4–11)

## 2020-11-05 PROCEDURE — 83880 ASSAY OF NATRIURETIC PEPTIDE: CPT | Performed by: INTERNAL MEDICINE

## 2020-11-05 PROCEDURE — 36415 COLL VENOUS BLD VENIPUNCTURE: CPT | Performed by: INTERNAL MEDICINE

## 2020-11-05 PROCEDURE — 85027 COMPLETE CBC AUTOMATED: CPT | Performed by: INTERNAL MEDICINE

## 2020-11-05 PROCEDURE — 99214 OFFICE O/P EST MOD 30 MIN: CPT | Performed by: INTERNAL MEDICINE

## 2020-11-05 PROCEDURE — 80048 BASIC METABOLIC PNL TOTAL CA: CPT | Performed by: INTERNAL MEDICINE

## 2020-11-05 ASSESSMENT — MIFFLIN-ST. JEOR: SCORE: 1307.22

## 2020-11-05 NOTE — PROGRESS NOTES
"Subjective     Trevor Soni is a 87 year old male who presents to clinic today for the following health issues:    HPI         Results reviewed MRI of the shoulder    Weight concerns patient's weight loss is tapering as he lost another pound but states that in his opinion the weight loss has been related to his teeth which are essentially completed.  His own food options have increased and he has been eating regular meals.    He notes that his peripheral edema is improved on Aldactone.    Arthritis is causing him general limitations of activities because of the stiffness in his knees and the shoulder pain left greater than the right limits his activity opportunities.          Review of Systems   Constitutional, HEENT, cardiovascular, pulmonary, gi and gu systems are negative, except as otherwise noted.      Objective    /43 (BP Location: Right arm, Patient Position: Sitting, Cuff Size: Adult Regular)   Pulse 59   Temp 96.8  F (36  C) (Temporal)   Ht 1.727 m (5' 8\")   Wt 65.8 kg (145 lb)   SpO2 97%   BMI 22.05 kg/m    Body mass index is 22.05 kg/m .  Physical Exam   GENERAL: healthy, alert and no distress  NECK: no adenopathy, no asymmetry, masses, or scars and thyroid normal to palpation  RESP: lungs clear to auscultation - no rales, rhonchi or wheezes  CV: regular rate and rhythm, 2/6 systolic ejection murmur, , 1-2+ pretibial peripheral edema and peripheral pulses strong  ABDOMEN: soft, nontender, no hepatosplenomegaly, no masses and bowel sounds normal  MS: Limited range of motion of both shoulders, no effusions of his knees            Assessment & Plan     Abdominal aortic aneurysm (AAA) without rupture (H)  Continue with serial follow-up, no symptoms    Chronic diastolic CHF -- Grade 1-2 Dysfunction Echo 2016  Managing well with current therapies, continue serial follow-up  - Basic metabolic panel  - BNP-N terminal pro  - CBC with platelets    CRF (chronic renal failure), stage 4 (severe) " (H)  Managing blood pressure and fluid status well.  Continue with same diuretic therapy watching closely for prerenal azotemia.  - Basic metabolic panel  - CBC with platelets    Stenosis of carotid artery, unspecified laterality  Ongoing serial follow-up, being monitored by vascular surgery as well    Mild aortic stenosis -- Echo 2016  Continue same serial follow-up    Chronic pain of both knees  Ongoing work in progress and will reevaluate current therapy in 1 to 2 months    Essential hypertension, benign  Blood pressure well managed with current therapy  - Basic metabolic panel  - CBC with platelets    Primary osteoarthritis of left shoulder  Reviewed MRI and unfortunately he has significant tendinosis and osteoarthritis.  It appears that surgery is not a very good option    Gastroesophageal reflux disease without esophagitis  Well managed with current therapy          FUTURE APPOINTMENTS:       - Follow-up visit in 2 mo    No follow-ups on file.    Warren Lovelace MD  [facility]

## 2020-11-05 NOTE — LETTER
November 16, 2020      Trevor DOWLING Nae  [address] [place] [address]        Les,     Enclosed your lab reports from your recent office exam.   The basic metabolic panel shows that your minerals and electrolytes were all normal and your kidney function tests is staying stable.  Your creatinine was 2.03 down from 2.18 from a month ago.     The BNP is a sensitive indicator of your bodies fluid volume and it shows that it has improved as compared to a month ago.  It is still slightly abnormal and we will continue to watch it serially.     The CBC shows your blood cell counts are in a similar range as they were a month ago.  We will reevaluate this at your next appointment.     I recommend coming back to see me in a month so we can follow-up on your kidney function tests your bodies fluid volume to monitor the improving congestive heart failure and recheck your blood cell tests.  If you have any questions regarding these reports please let me know.       Resulted Orders   Basic metabolic panel   Result Value Ref Range    Sodium 141 133 - 144 mmol/L    Potassium 5.1 3.4 - 5.3 mmol/L    Chloride 110 (H) 94 - 109 mmol/L    Carbon Dioxide 24 20 - 32 mmol/L    Anion Gap 7 3 - 14 mmol/L    Glucose 81 70 - 99 mg/dL    Urea Nitrogen 41 (H) 7 - 30 mg/dL    Creatinine 2.03 (H) 0.66 - 1.25 mg/dL    GFR Estimate 29 (L) >60 mL/min/[1.73_m2]      Comment:      Non  GFR Calc  Starting 12/18/2018, serum creatinine based estimated GFR (eGFR) will be   calculated using the Chronic Kidney Disease Epidemiology Collaboration   (CKD-EPI) equation.      GFR Estimate If Black 33 (L) >60 mL/min/[1.73_m2]      Comment:       GFR Calc  Starting 12/18/2018, serum creatinine based estimated GFR (eGFR) will be   calculated using the Chronic Kidney Disease Epidemiology Collaboration   (CKD-EPI) equation.      Calcium 9.4 8.5 - 10.1 mg/dL   BNP-N terminal pro   Result Value Ref Range    N-Terminal Pro  Bnp 4,938 (H) 0 - 450 pg/mL      Comment:         Reference range shown and results flagged as abnormal are for the outpatient,   non acute settings. Establishing a baseline value for each individual patient   is useful for follow-up.  Suggested inpatient cut points for confirming diagnosis of CHF in an acute   setting are:   >450 pg/mL (age 18 to less than 50)   >900 pg/mL (age 50 to less than 75)   >1800 pg/mL (75 yrs and older)  An inpatient or emergency department NT-proPBNP <300 pg/mL effectively rules   out acute CHF, with 99% negative predictive value.      CBC with platelets   Result Value Ref Range    WBC 8.6 4.0 - 11.0 10e9/L    RBC Count 3.35 (L) 4.4 - 5.9 10e12/L    Hemoglobin 9.7 (L) 13.3 - 17.7 g/dL    Hematocrit 29.8 (L) 40.0 - 53.0 %    MCV 89 78 - 100 fl    MCH 29.0 26.5 - 33.0 pg    MCHC 32.6 31.5 - 36.5 g/dL    RDW 18.5 (H) 10.0 - 15.0 %    Platelet Count 219 150 - 450 10e9/L       Thanks, GB

## 2020-11-05 NOTE — TELEPHONE ENCOUNTER
Note from pharmacy:  Pt had his meds at a different pharmacy and they are having trouble getting his Atenolol in. Can we get a new RX sent here for that? Thanks

## 2020-11-06 LAB
ANION GAP SERPL CALCULATED.3IONS-SCNC: 7 MMOL/L (ref 3–14)
BUN SERPL-MCNC: 41 MG/DL (ref 7–30)
CALCIUM SERPL-MCNC: 9.4 MG/DL (ref 8.5–10.1)
CHLORIDE SERPL-SCNC: 110 MMOL/L (ref 94–109)
CO2 SERPL-SCNC: 24 MMOL/L (ref 20–32)
CREAT SERPL-MCNC: 2.03 MG/DL (ref 0.66–1.25)
GFR SERPL CREATININE-BSD FRML MDRD: 29 ML/MIN/{1.73_M2}
GLUCOSE SERPL-MCNC: 81 MG/DL (ref 70–99)
POTASSIUM SERPL-SCNC: 5.1 MMOL/L (ref 3.4–5.3)
SODIUM SERPL-SCNC: 141 MMOL/L (ref 133–144)

## 2020-11-06 RX ORDER — ATENOLOL 50 MG/1
50 TABLET ORAL DAILY
Qty: 90 TABLET | Refills: 3 | Status: SHIPPED | OUTPATIENT
Start: 2020-11-06 | End: 2021-01-27

## 2020-11-06 NOTE — TELEPHONE ENCOUNTER
Prescription approved per [facility] Refill Protocol.  Maggie ANDERSON RN

## 2020-11-12 ENCOUNTER — TELEPHONE (OUTPATIENT)
Dept: FAMILY MEDICINE | Facility: CLINIC | Age: 85
End: 2020-11-12

## 2020-11-12 NOTE — TELEPHONE ENCOUNTER
"Reason for Call:  Other order for wheeled walker     Detailed comments: patient's daughter Bianka Soni calling.  [facility] is contacting them again about order for walker,stating \"pain\" is not a diagnosis that is accepted.  There is a scan  On 10/23 of another form stating \"osteoarthritis of both knees and  \"ASVD\" for diagnoses.   I dont think [facility] received that fax.  Please refax or call.     Phone Number Patient can be reached at: Other phone number:  [phone]  # for daughter Bianka hansen on file if question or problem    Best Time:     Can we leave a detailed message on this number? YES    Call taken on 11/12/2020 at 8:43 AM by Sariah Bell         "

## 2020-11-16 NOTE — TELEPHONE ENCOUNTER
Patient daughter (Bianka) called because she was contacted by [facility] that they have received  patient diagnosis and chart notes that was requested. Daughter would like the requested documents faxed to  Padmaja REILLY at [facility]  Fax# [phone]. She also stated that [facility] will be faxing the original request again sometime today. Daughter would like a callback. PH: [phone]

## 2020-11-23 NOTE — TELEPHONE ENCOUNTER
Patient daughter called again about getter the chart notes so that her father can get the walker. She reports that he fell this week-end and now has a huge lump on his head. She's is insisting that she receives a call today because it is vital that he gets a walker. [facility] is still waiting for the chart notes and needs to update  why patient needs a walker.

## 2020-11-24 NOTE — TELEPHONE ENCOUNTER
Dr Lovelace,   Spoke with daughter Bianka on PAYAM  Patient still unable to get walker  Pt tripped and fell the other day so walker is vital to get ASAP  Needs walker Rx changed and arthritis to both needs added to OV notes  Needs arthritis of knees added as dx codes and then needs OV notes faxed as well     ATTN: Padmaja REILLY - Fax [phone]    Maggie ANDERSON, RN    Daughter would like callback once faxed over

## 2020-11-30 ENCOUNTER — MEDICAL CORRESPONDENCE (OUTPATIENT)
Dept: HEALTH INFORMATION MANAGEMENT | Facility: CLINIC | Age: 85
End: 2020-11-30

## 2020-12-21 ENCOUNTER — OFFICE VISIT (OUTPATIENT)
Dept: FAMILY MEDICINE | Facility: CLINIC | Age: 85
End: 2020-12-21
Payer: MEDICARE

## 2020-12-21 VITALS
DIASTOLIC BLOOD PRESSURE: 63 MMHG | BODY MASS INDEX: 21.52 KG/M2 | OXYGEN SATURATION: 98 % | SYSTOLIC BLOOD PRESSURE: 136 MMHG | HEART RATE: 52 BPM | HEIGHT: 68 IN | WEIGHT: 142 LBS | TEMPERATURE: 96.3 F

## 2020-12-21 DIAGNOSIS — I10 ESSENTIAL HYPERTENSION, BENIGN: ICD-10-CM

## 2020-12-21 DIAGNOSIS — I35.0 MILD AORTIC STENOSIS: ICD-10-CM

## 2020-12-21 DIAGNOSIS — M17.0 PRIMARY OSTEOARTHRITIS OF BOTH KNEES: ICD-10-CM

## 2020-12-21 DIAGNOSIS — M1A.09X0 IDIOPATHIC CHRONIC GOUT OF MULTIPLE SITES WITHOUT TOPHUS: ICD-10-CM

## 2020-12-21 DIAGNOSIS — I50.32 CHRONIC DIASTOLIC HEART FAILURE (H): ICD-10-CM

## 2020-12-21 DIAGNOSIS — N18.4 CKD (CHRONIC KIDNEY DISEASE) STAGE 4, GFR 15-29 ML/MIN (H): ICD-10-CM

## 2020-12-21 DIAGNOSIS — I71.40 ABDOMINAL AORTIC ANEURYSM (AAA) WITHOUT RUPTURE (H): Primary | ICD-10-CM

## 2020-12-21 DIAGNOSIS — K21.9 GASTROESOPHAGEAL REFLUX DISEASE WITHOUT ESOPHAGITIS: ICD-10-CM

## 2020-12-21 DIAGNOSIS — D62 ANEMIA DUE TO BLOOD LOSS, ACUTE: ICD-10-CM

## 2020-12-21 LAB
ERYTHROCYTE [DISTWIDTH] IN BLOOD BY AUTOMATED COUNT: 17.9 % (ref 10–15)
HCT VFR BLD AUTO: 32.9 % (ref 40–53)
HGB BLD-MCNC: 10.7 G/DL (ref 13.3–17.7)
MCH RBC QN AUTO: 29.6 PG (ref 26.5–33)
MCHC RBC AUTO-ENTMCNC: 32.5 G/DL (ref 31.5–36.5)
MCV RBC AUTO: 91 FL (ref 78–100)
PLATELET # BLD AUTO: 212 10E9/L (ref 150–450)
RBC # BLD AUTO: 3.61 10E12/L (ref 4.4–5.9)
WBC # BLD AUTO: 7.7 10E9/L (ref 4–11)

## 2020-12-21 PROCEDURE — 83550 IRON BINDING TEST: CPT | Performed by: INTERNAL MEDICINE

## 2020-12-21 PROCEDURE — 85027 COMPLETE CBC AUTOMATED: CPT | Performed by: INTERNAL MEDICINE

## 2020-12-21 PROCEDURE — 83880 ASSAY OF NATRIURETIC PEPTIDE: CPT | Performed by: INTERNAL MEDICINE

## 2020-12-21 PROCEDURE — 80048 BASIC METABOLIC PNL TOTAL CA: CPT | Performed by: INTERNAL MEDICINE

## 2020-12-21 PROCEDURE — 36415 COLL VENOUS BLD VENIPUNCTURE: CPT | Performed by: INTERNAL MEDICINE

## 2020-12-21 PROCEDURE — 83540 ASSAY OF IRON: CPT | Performed by: INTERNAL MEDICINE

## 2020-12-21 PROCEDURE — 99214 OFFICE O/P EST MOD 30 MIN: CPT | Performed by: INTERNAL MEDICINE

## 2020-12-21 ASSESSMENT — MIFFLIN-ST. JEOR: SCORE: 1293.61

## 2020-12-21 NOTE — PROGRESS NOTES
"Subjective     Trevor Soni is a 87 year old male who presents to clinic today for the following health issues:    HPI         Left shoulder pain   Right knee pain  Fall tripping over his wife's oxygen cord in the basement hitting his head without loss of conscious or symptoms of a concussion.  Also landed on his knees and elbows and after initial stiffness they are currently okay    Sleep with poor rest    Review of Systems   Constitutional, HEENT, cardiovascular, pulmonary, gi and gu systems are negative, except as otherwise noted.      Objective    /63 (BP Location: Left arm, Patient Position: Sitting)   Pulse 52   Temp 96.3  F (35.7  C) (Tympanic)   Ht 1.727 m (5' 8\")   Wt 64.4 kg (142 lb)   SpO2 98%   BMI 21.59 kg/m    Body mass index is 21.59 kg/m .  Physical Exam   GENERAL: healthy, alert and no distress  NECK: no adenopathy, no asymmetry, masses, or scars and thyroid normal to palpation  RESP: lungs clear to auscultation - no rales, rhonchi or wheezes  CV: Irregularly irregular rhythm, normal S1 S2, no S3 or S4, grade 3/6 systolic ejection murmur rating aiding to the carotids, click or rub and peripheral pulses strong  ABDOMEN: soft, nontender, no hepatosplenomegaly, no masses and bowel sounds normal  MS: No effusions and no gross musculoskeletal defects noted, 1+ pretibial peripheral edema  Neck veins not interpretable          Assessment & Plan     Chronic diastolic CHF -- Grade 1-2 Dysfunction Echo 2016  Follow-up labs to evaluate the degree of diastolic dysfunction and reevaluate his diuretic therapy  - CBC with platelets  - Basic metabolic panel  - BNP-N terminal pro    Mild aortic stenosis -- Echo 2016  Continue ongoing serial surveillance  Idiopathic chronic gout of multiple sites without tophus    - acetaminophen-codeine (TYLENOL #3) 300-30 MG tablet; Take 1 tablet by mouth every 6 hours as needed for severe pain    CKD (chronic kidney disease) stage 4, GFR 15-29 ml/min (H)  Closely " monitoring his chronic renal failure to avoid prerenal azotemia with his diuretic therapy and avoiding nephrotoxic drugs  - CBC with platelets  - Basic metabolic panel    Abdominal aortic aneurysm (AAA) without rupture (H)  Continue with ongoing yearly surveillance  - acetaminophen-codeine (TYLENOL #3) 300-30 MG tablet; Take 1 tablet by mouth every 6 hours as needed for severe pain    Primary osteoarthritis of both knees  Is bilateral knee arthritis is stable and controlled with current therapy    Essential hypertension, benign  Well-controlled with current therapy  - CBC with platelets  - Basic metabolic panel    Gastroesophageal reflux disease without esophagitis  Symptoms adequately controlled  - CBC with platelets  - Basic metabolic panel  - Iron and iron binding capacity    Anemia due to blood loss, acute  Follow-up iron stores and continue to monitor for upper GI irritation from medications  - Iron and iron binding capacity        FUTURE APPOINTMENTS:       - Follow-up visit in 1 mo    No follow-ups on file.    Warren Lovelace MD  [facility]

## 2020-12-22 LAB
ANION GAP SERPL CALCULATED.3IONS-SCNC: 8 MMOL/L (ref 3–14)
BUN SERPL-MCNC: 51 MG/DL (ref 7–30)
CALCIUM SERPL-MCNC: 9.5 MG/DL (ref 8.5–10.1)
CHLORIDE SERPL-SCNC: 111 MMOL/L (ref 94–109)
CO2 SERPL-SCNC: 21 MMOL/L (ref 20–32)
CREAT SERPL-MCNC: 2.35 MG/DL (ref 0.66–1.25)
GFR SERPL CREATININE-BSD FRML MDRD: 24 ML/MIN/{1.73_M2}
GLUCOSE SERPL-MCNC: 103 MG/DL (ref 70–99)
IRON SATN MFR SERPL: 16 % (ref 15–46)
IRON SERPL-MCNC: 67 UG/DL (ref 35–180)
NT-PROBNP SERPL-MCNC: 3415 PG/ML (ref 0–450)
POTASSIUM SERPL-SCNC: 5.2 MMOL/L (ref 3.4–5.3)
SODIUM SERPL-SCNC: 140 MMOL/L (ref 133–144)
TIBC SERPL-MCNC: 429 UG/DL (ref 240–430)

## 2021-01-13 DIAGNOSIS — E78.5 HYPERLIPIDEMIA LDL GOAL <100: ICD-10-CM

## 2021-01-13 DIAGNOSIS — I10 BENIGN ESSENTIAL HYPERTENSION: ICD-10-CM

## 2021-01-13 RX ORDER — FENOFIBRATE 160 MG/1
TABLET ORAL
Qty: 90 TABLET | Refills: 3 | Status: CANCELLED | OUTPATIENT
Start: 2021-01-13

## 2021-01-13 NOTE — TELEPHONE ENCOUNTER
Pt calling stating out of the fenofibrate (TRIGLIDE/LOFIBRA) 160 MG tablet and would like a refill to get him to his appointment to see his new provider     Routing to refill pool     Damaris Damon RN, BSN  [facility]

## 2021-01-14 DIAGNOSIS — E78.5 HYPERLIPIDEMIA LDL GOAL <100: ICD-10-CM

## 2021-01-14 DIAGNOSIS — I10 BENIGN ESSENTIAL HYPERTENSION: ICD-10-CM

## 2021-01-14 RX ORDER — FENOFIBRATE 160 MG/1
TABLET ORAL
Qty: 90 TABLET | Refills: 0 | Status: SHIPPED | OUTPATIENT
Start: 2021-01-14 | End: 2021-01-27

## 2021-01-14 NOTE — TELEPHONE ENCOUNTER
Dad's doctor is retiring     Had seen him recently     But due to skilled nursing - Dr Redman is going to be new doctor     Medication was not renewed     Very last pill of fenofibrate today

## 2021-01-14 NOTE — TELEPHONE ENCOUNTER
"Requested Prescriptions   Pending Prescriptions Disp Refills     fenofibrate (TRIGLIDE/LOFIBRA) 160 MG tablet 90 tablet 3     Sig: TAKE 1 TABLET(160 MG) BY MOUTH DAILY       Fibrates Failed - 1/14/2021  3:11 PM        Failed - Lipid panel on file in past 12 months     Recent Labs   Lab Test 05/21/18  1042   CHOL 88   TRIG 59   HDL 33*   LDL 43   NHDL 55               Passed - No abnormal creatine kinase in past 12 months     No lab results found.             Passed - Recent (12 mo) or future (30 days) visit within the authorizing provider's specialty     Patient has had an office visit with the authorizing provider or a provider within the authorizing providers department within the previous 12 mos or has a future within next 30 days. See \"Patient Info\" tab in inbasket, or \"Choose Columns\" in Meds & Orders section of the refill encounter.              Passed - Medication is active on med list        Passed - Patient is age 18 or older             "

## 2021-01-27 ENCOUNTER — OFFICE VISIT (OUTPATIENT)
Dept: FAMILY MEDICINE | Facility: CLINIC | Age: 86
End: 2021-01-27
Payer: MEDICARE

## 2021-01-27 VITALS
HEART RATE: 59 BPM | SYSTOLIC BLOOD PRESSURE: 141 MMHG | TEMPERATURE: 97.1 F | HEIGHT: 67 IN | DIASTOLIC BLOOD PRESSURE: 56 MMHG | BODY MASS INDEX: 23.46 KG/M2 | OXYGEN SATURATION: 98 % | WEIGHT: 149.5 LBS

## 2021-01-27 DIAGNOSIS — I50.32 CHRONIC DIASTOLIC HEART FAILURE (H): Primary | ICD-10-CM

## 2021-01-27 DIAGNOSIS — I35.0 MILD AORTIC STENOSIS: ICD-10-CM

## 2021-01-27 DIAGNOSIS — N18.4 CKD (CHRONIC KIDNEY DISEASE) STAGE 4, GFR 15-29 ML/MIN (H): ICD-10-CM

## 2021-01-27 DIAGNOSIS — I15.9 SECONDARY HYPERTENSION: ICD-10-CM

## 2021-01-27 DIAGNOSIS — I71.40 ABDOMINAL AORTIC ANEURYSM (AAA) WITHOUT RUPTURE (H): ICD-10-CM

## 2021-01-27 DIAGNOSIS — K21.9 GASTROESOPHAGEAL REFLUX DISEASE WITHOUT ESOPHAGITIS: ICD-10-CM

## 2021-01-27 DIAGNOSIS — M1A.09X0 IDIOPATHIC CHRONIC GOUT OF MULTIPLE SITES WITHOUT TOPHUS: ICD-10-CM

## 2021-01-27 DIAGNOSIS — I25.10 CORONARY ARTERY DISEASE INVOLVING NATIVE CORONARY ARTERY OF NATIVE HEART WITHOUT ANGINA PECTORIS: ICD-10-CM

## 2021-01-27 DIAGNOSIS — E78.5 HYPERLIPIDEMIA LDL GOAL <100: ICD-10-CM

## 2021-01-27 DIAGNOSIS — I10 BENIGN ESSENTIAL HYPERTENSION: ICD-10-CM

## 2021-01-27 PROCEDURE — 99214 OFFICE O/P EST MOD 30 MIN: CPT | Performed by: INTERNAL MEDICINE

## 2021-01-27 PROCEDURE — 36415 COLL VENOUS BLD VENIPUNCTURE: CPT | Performed by: INTERNAL MEDICINE

## 2021-01-27 PROCEDURE — 80061 LIPID PANEL: CPT | Performed by: INTERNAL MEDICINE

## 2021-01-27 RX ORDER — AMLODIPINE BESYLATE 5 MG/1
TABLET ORAL
Qty: 270 TABLET | Refills: 3 | Status: SHIPPED | OUTPATIENT
Start: 2021-01-27 | End: 2021-11-22

## 2021-01-27 RX ORDER — ATORVASTATIN CALCIUM 20 MG/1
20 TABLET, FILM COATED ORAL DAILY
Qty: 90 TABLET | Refills: 3 | Status: SHIPPED | OUTPATIENT
Start: 2021-01-27 | End: 2021-11-22

## 2021-01-27 RX ORDER — ALLOPURINOL 100 MG/1
200 TABLET ORAL DAILY
Qty: 180 TABLET | Refills: 3 | Status: SHIPPED | OUTPATIENT
Start: 2021-01-27 | End: 2021-11-22

## 2021-01-27 RX ORDER — FENOFIBRATE 160 MG/1
TABLET ORAL
Qty: 90 TABLET | Refills: 3 | Status: SHIPPED | OUTPATIENT
Start: 2021-01-27 | End: 2021-12-27

## 2021-01-27 RX ORDER — FAMOTIDINE 20 MG/1
20 TABLET, FILM COATED ORAL AT BEDTIME
Qty: 90 TABLET | Refills: 3 | Status: SHIPPED | OUTPATIENT
Start: 2021-01-27 | End: 2021-11-22

## 2021-01-27 RX ORDER — ATENOLOL 50 MG/1
50 TABLET ORAL DAILY
Qty: 90 TABLET | Refills: 3 | Status: SHIPPED | OUTPATIENT
Start: 2021-01-27 | End: 2021-11-22

## 2021-01-27 RX ORDER — SPIRONOLACTONE 25 MG/1
25 TABLET ORAL DAILY
Qty: 90 TABLET | Refills: 3 | Status: SHIPPED | OUTPATIENT
Start: 2021-01-27 | End: 2021-11-22

## 2021-01-27 RX ORDER — FUROSEMIDE 20 MG
10 TABLET ORAL DAILY
Qty: 45 TABLET | Refills: 3 | Status: SHIPPED | OUTPATIENT
Start: 2021-01-27 | End: 2021-06-24 | Stop reason: DRUGHIGH

## 2021-01-27 RX ORDER — HYDRALAZINE HYDROCHLORIDE 50 MG/1
TABLET, FILM COATED ORAL
Qty: 360 TABLET | Refills: 3 | Status: SHIPPED | OUTPATIENT
Start: 2021-01-27 | End: 2021-11-22

## 2021-01-27 ASSESSMENT — MIFFLIN-ST. JEOR: SCORE: 1307.5

## 2021-01-27 NOTE — LETTER
January 29, 2021      Trevor Soni  [address] [place] [address]        Dear ,    The following letter pertains to your most recent diagnostic tests:     Good news!  Your cholesterol seems to be well controlled.    Resulted Orders   Lipid panel reflex to direct LDL Fasting   Result Value Ref Range    Cholesterol 107 <200 mg/dL    Triglycerides 76 <150 mg/dL      Comment:      Fasting specimen    HDL Cholesterol 34 (L) >39 mg/dL    LDL Cholesterol Calculated 58 <100 mg/dL      Comment:      Desirable:       <100 mg/dl    Non HDL Cholesterol 73 <130 mg/dL       If you have any questions or concerns, please call the clinic at the number listed above.       Sincerely,      Abdoulaye Redman MD      jessy

## 2021-01-27 NOTE — PROGRESS NOTES
Assessment & Plan     Chronic diastolic heart failure (H)  Volume status looks OK today     CKD (chronic kidney disease) stage 4, GFR 15-29 ml/min (H)      Abdominal aortic aneurysm (AAA) without rupture (H)  Schedule ultrasound next visit     Secondary hypertension  OK blood pressure control   - spironolactone (ALDACTONE) 25 MG tablet; Take 1 tablet (25 mg) by mouth daily    Mild aortic stenosis -- Echo 2016  No symptoms  Consider rechecking echo next visit as it has been 5 years     Hyperlipidemia LDL goal <100    - fenofibrate (TRIGLIDE/LOFIBRA) 160 MG tablet; TAKE 1 TABLET(160 MG) BY MOUTH DAILY  - hydrALAZINE (APRESOLINE) 50 MG tablet; TAKE 1 TABLET BY MOUTH 4  TIMES A DAY  - atorvastatin (LIPITOR) 20 MG tablet; Take 1 tablet (20 mg) by mouth daily  - atenolol (TENORMIN) 50 MG tablet; Take 1 tablet (50 mg) by mouth daily  - amLODIPine (NORVASC) 5 MG tablet; TAKE 1 TABLET BY MOUTH  EVERY MORNING AND 2 TABLETS EVERY EVENING  - allopurinol (ZYLOPRIM) 100 MG tablet; Take 2 tablets (200 mg) by mouth daily  - Lipid panel reflex to direct LDL Fasting    Idiopathic chronic gout of multiple sites without tophus  Stable     Coronary artery disease involving native coronary artery of native heart without angina pectoris  Stable     Gastroesophageal reflux disease without esophagitis    - famotidine (PEPCID) 20 MG tablet; Take 1 tablet (20 mg) by mouth At Bedtime    Benign essential hypertension    - fenofibrate (TRIGLIDE/LOFIBRA) 160 MG tablet; TAKE 1 TABLET(160 MG) BY MOUTH DAILY  - hydrALAZINE (APRESOLINE) 50 MG tablet; TAKE 1 TABLET BY MOUTH 4  TIMES A DAY  - furosemide (LASIX) 20 MG tablet; Take 0.5 tablets (10 mg) by mouth daily  - atorvastatin (LIPITOR) 20 MG tablet; Take 1 tablet (20 mg) by mouth daily  - atenolol (TENORMIN) 50 MG tablet; Take 1 tablet (50 mg) by mouth daily  - amLODIPine (NORVASC) 5 MG tablet; TAKE 1 TABLET BY MOUTH  EVERY MORNING AND 2 TABLETS EVERY EVENING  - allopurinol (ZYLOPRIM) 100 MG  "tablet; Take 2 tablets (200 mg) by mouth daily              22 minutes spent on the date of the encounter doing chart review, history and exam, documentation and further activities as noted above               Return in about 3 months (around 4/27/2021) for recheck.  Patient instructed to return to clinic or contact us sooner if symptoms worsen or new symptoms develop.     Abdoulaye Redman MD  [facility] CONCEPCION Murray is a 87 year old who presents to clinic today for the following health issues     HPI       New Patient/Transfer of Care - pt states his wife has Dr. Redman as PCP - did not changed PCP's until your approval.     Follow up for Lipids     Nice laura here for follow up multiple problems  Feels OK today, no new problems or concerns  Needs medication refills     No chest pain, syncope, orthopnea, PND, edema, ALFONSO     Was told to fast for lipid panel     Review of Systems   A 7 organ systems ROS is negative other than any pertinent positives or negatives previously stated.       Objective    BP (!) 141/56 (BP Location: Right arm, Patient Position: Sitting, Cuff Size: Adult Regular)   Pulse 59   Temp 97.1  F (36.2  C) (Temporal)   Ht 1.695 m (5' 6.73\")   Wt 67.8 kg (149 lb 8 oz)   SpO2 98%   BMI 23.60 kg/m    Body mass index is 23.6 kg/m .  Physical Exam   GENERAL: healthy, alert and no distress  RESP: lungs clear to auscultation - no rales, rhonchi or wheezes  CV: Heart with regular rate and rhythm. 2/6 systolic murmur noted   ABDOMEN: soft, nontender, no hepatosplenomegaly, no masses and bowel sounds normal  MS: no gross musculoskeletal defects noted, no edema  NEURO: Normal strength and tone, mentation intact and speech normal  PSYCH: mentation appears normal, affect normal/bright                "

## 2021-01-28 LAB
CHOLEST SERPL-MCNC: 107 MG/DL
HDLC SERPL-MCNC: 34 MG/DL
LDLC SERPL CALC-MCNC: 58 MG/DL
NONHDLC SERPL-MCNC: 73 MG/DL
TRIGL SERPL-MCNC: 76 MG/DL

## 2021-01-29 NOTE — RESULT ENCOUNTER NOTE
The following letter pertains to your most recent diagnostic tests:    Good news!  Your cholesterol seems to be well controlled.      Sincerely,    Dr. Redman

## 2021-04-28 ENCOUNTER — OFFICE VISIT (OUTPATIENT)
Dept: FAMILY MEDICINE | Facility: CLINIC | Age: 86
End: 2021-04-28
Payer: MEDICARE

## 2021-04-28 VITALS
WEIGHT: 154.2 LBS | BODY MASS INDEX: 24.2 KG/M2 | DIASTOLIC BLOOD PRESSURE: 54 MMHG | HEIGHT: 67 IN | HEART RATE: 49 BPM | SYSTOLIC BLOOD PRESSURE: 124 MMHG | OXYGEN SATURATION: 97 % | TEMPERATURE: 98.2 F

## 2021-04-28 DIAGNOSIS — I35.0 SEVERE AORTIC STENOSIS: Primary | ICD-10-CM

## 2021-04-28 DIAGNOSIS — I35.0 MILD AORTIC STENOSIS: ICD-10-CM

## 2021-04-28 DIAGNOSIS — I65.23 CAROTID STENOSIS, ASYMPTOMATIC, BILATERAL: ICD-10-CM

## 2021-04-28 DIAGNOSIS — I71.40 ABDOMINAL AORTIC ANEURYSM (AAA) WITHOUT RUPTURE (H): ICD-10-CM

## 2021-04-28 PROCEDURE — 99214 OFFICE O/P EST MOD 30 MIN: CPT | Performed by: INTERNAL MEDICINE

## 2021-04-28 RX ORDER — ANTIOX #8/OM3/DHA/EPA/LUT/ZEAX 250-2.5 MG
1 CAPSULE ORAL 2 TIMES DAILY
COMMUNITY

## 2021-04-28 ASSESSMENT — MIFFLIN-ST. JEOR: SCORE: 1328.79

## 2021-04-28 NOTE — PATIENT INSTRUCTIONS
"Please call [facility] at [phone] to schedule your abdominal aortic aneurysm screening ultrasound.    Call [phone] to schedule your echocardiogram (ultrasound of the heart).       You should get the new shingles vaccine series \"SHINGRIX\" (not Zostavax) at a pharmacy.    "

## 2021-05-06 ENCOUNTER — HOSPITAL ENCOUNTER (OUTPATIENT)
Dept: ULTRASOUND IMAGING | Facility: CLINIC | Age: 86
End: 2021-05-06
Attending: INTERNAL MEDICINE
Payer: MEDICARE

## 2021-05-06 DIAGNOSIS — I65.23 CAROTID STENOSIS, ASYMPTOMATIC, BILATERAL: ICD-10-CM

## 2021-05-06 DIAGNOSIS — I71.40 ABDOMINAL AORTIC ANEURYSM (AAA) WITHOUT RUPTURE (H): ICD-10-CM

## 2021-05-06 PROCEDURE — 76775 US EXAM ABDO BACK WALL LIM: CPT

## 2021-05-06 PROCEDURE — 93880 EXTRACRANIAL BILAT STUDY: CPT

## 2021-05-06 NOTE — LETTER
[address] [place]. [facility]  [address]  [place]  [address]  Tel: [phone]    May 7, 2021    Trevor JOSEY Nae  [address] [facility] [address]        Dear Mr. Soni,    The following letter pertains to your most recent diagnostic tests:     The plaques in the carotid arteries are stable.  They can be rechecked in 1 years time.     The abdominal aortic aneurysm is slightly larger than last check, but not big enough to consider surgical repair at this point.  I would recommend another ultrasound in 1 years time.       Sincerely,     Dr. Redman/VERONICA

## 2021-05-06 NOTE — RESULT ENCOUNTER NOTE
The following letter pertains to your most recent diagnostic tests:    The plaques in the carotid arteries are stable.  They can be rechecked in 1 years time.        Sincerely,    Dr. Redman

## 2021-05-06 NOTE — RESULT ENCOUNTER NOTE
The following letter pertains to your most recent diagnostic tests:    The abdominal aortic aneurysm is slightly larger than last check, but not big enough to consider surgical repair at this point.  I would recommend another ultrasound in 1 years time.      Sincerely,    Dr. Redman

## 2021-05-28 ENCOUNTER — HOSPITAL ENCOUNTER (OUTPATIENT)
Dept: CARDIOLOGY | Facility: CLINIC | Age: 86
Discharge: HOME | End: 2021-05-28
Attending: INTERNAL MEDICINE | Admitting: INTERNAL MEDICINE
Payer: MEDICARE

## 2021-05-28 DIAGNOSIS — I35.0 MILD AORTIC STENOSIS: ICD-10-CM

## 2021-05-28 PROCEDURE — 93306 TTE W/DOPPLER COMPLETE: CPT | Mod: 26 | Performed by: INTERNAL MEDICINE

## 2021-05-28 PROCEDURE — 93306 TTE W/DOPPLER COMPLETE: CPT

## 2021-05-28 NOTE — RESULT ENCOUNTER NOTE
The following letter pertains to your most recent diagnostic tests:    The aortic heart valve has gotten a little tighter since last check 5 years ago.  At this point, I think it is time to check with a heart specialist about this.  In the absence of symptoms such as chest pain, shortness of breath, feeling faint or passing out, I don't think an immediate heart valve repair is needed, but the valve is tight enough, that it will be good to have a cardiologist watching over the situation, so it does not get tight enough to where those symptoms might occur.  I recommend the doctors at the [facility] heart clinic across the street from our clinic.   Call [phone] to schedule a consultation with a cardiologist regarding the heart valve problem.   I sent a referral to their clinic on your behalf.        Sincerely,    Dr. Redman

## 2021-05-28 NOTE — LETTER
May 28, 2021      Trevor Soni  [address] [facility] [address]        Dear ,    The following letter pertains to your most recent diagnostic tests:     The aortic heart valve has gotten a little tighter since last check 5 years ago.  At this point, I think it is time to check with a heart specialist about this.  In the absence of symptoms such as chest pain, shortness of breath, feeling faint or passing out, I don't think an immediate heart valve repair is needed, but the valve is tight enough, that it will be good to have a cardiologist watching over the situation, so it does not get tight enough to where those symptoms might occur.  I recommend the doctors at the [facility] heart clinic across the street from our clinic.   Call [phone] to schedule a consultation with a cardiologist regarding the heart valve problem.   I sent a referral to their clinic on your behalf.         Sincerely,     Dr. Louis    Resulted Orders   Echocardiogram Complete    Narrative    [id number]  [id number]  [id number]  [id number]^HERIBERTO^OSVALDO^URBAN     [facility]  [facility]ch[facility]  [address] [facility] [address]  [place] [address]  Phone [phone]  Fax [phone]     Name: TREVOR SONI  MRN: [id number]  : 1933  Study Date: 2021 02:01 PM  Age: 87 yrs  Gender: Male  Patient Location: [facility]  Reason For Study: Mild aortic stenosis  Ordering Physician: OSVALDO LOUIS  Referring Physician: OSVALDO LOUIS  Performed By: Pietro Wells RDCS     BSA: 0.31 m2  Height: 6 in  Weight: 154 lb  HR: 56  BP: 117/81 mmHg  ______________________________________________________________________________  ______________________________________________________________________________  Interpretation Summary     1. Left ventricular systolic function is normal. The visual ejection fraction  is estimated at 60-65%.  2. No regional wall motion  abnormalities noted.  3. There is mild to moderate concentric left ventricular hypertrophy.  4. The right ventricle is normal in structure, function and size.  5. There is mild mitral stenosis. There is mild (1+) mitral regurgitation.  6. Severe aortic stenosis; mean gradient 22 mmHg, peak velocity 3.3 m/sec,  calculated valve area 0.9 cm2. Visually consistent with severe aortic  stenosis.  ______________________________________________________________________________  Left Ventricle  The left ventricle is normal in size. There is mild to moderate concentric  left ventricular hypertrophy. Left ventricular systolic function is normal.  The visual ejection fraction is estimated at 60-65%. Diastolic function not  assessed due to significant mitral annular calcification. No regional wall  motion abnormalities noted.     Right Ventricle  The right ventricle is normal in structure, function and size.     Atria  The left atrium is moderately dilated. The right atrium is moderately dilated.     Mitral Valve  There is moderate mitral annular calcification. There is mild (1+) mitral  regurgitation. There is mild mitral stenosis.     Tricuspid Valve  The tricuspid valve is normal in structure and function. There is trace  tricuspid regurgitation.     Aortic Valve  There is mild (1+) aortic regurgitation. Severe valvular aortic stenosis.  Severe aortic stenosis; mean gradient 22 mmHg, peak velocity 3.3 m/sec,  calculated valve area 0.9 cm2. Visually consistent with severe aortic  stenosis.     Pulmonic Valve  The pulmonic valve is normal in structure and function. There is mild (1+)  pulmonic valvular regurgitation.     Vessels  The ascending aorta is Mildly dilated.     Pericardium  There is no pericardial effusion.     ______________________________________________________________________________  MMode/2D Measurements & Calculations  IVSd: 1.5 cm  LVIDd: 4.3 cm  LVIDs: 2.7 cm  LVPWd: 1.2 cm  FS: 38.1 %  LV mass(C)d: 216.2  grams  LV mass(C)dI: 687.3 grams/m2  Ao root diam: 3.4 cm  LA dimension: 5.0 cm  asc Aorta Diam: 3.8 cm  LA/Ao: 1.5  LVOT diam: 1.9 cm  LVOT area: 3.0 cm2  LA Volume (BP): 87.0 ml     LA Volume Index (BP): 48.1 ml/m2  RWT: 0.55     Doppler Measurements & Calculations  MV E max dionicio: 137.6 cm/sec  MV A max dionicio: 90.1 cm/sec  MV E/A: 1.5  MV max P.3 mmHg  MV mean PG: 3.4 mmHg  MV V2 VTI: 51.5 cm  MVA(VTI): 1.8 cm2  MV P1/2t max dionicio: 170.3 cm/sec  MV P1/2t: 90.7 msec  MVA(P1/2t): 2.4 cm2  MV dec slope: 549.9 cm/sec2  MV dec time: 0.20 sec  Ao V2 max: 328.4 cm/sec  Ao max P.1 mmHg  Ao V2 mean: 217.7 cm/sec  Ao mean P.9 mmHg  Ao V2 VTI: 92.0 cm  NANDA(I,D): 0.99 cm2  NANDA(V,D): 0.93 cm2  LV V1 max P.2 mmHg  LV V1 max: 102.8 cm/sec  LV V1 VTI: 30.5 cm  SV(LVOT): 90.7 ml  SI(LVOT): 288.3 ml/m2     TR max dionicio: 276.3 cm/sec  TR max P.5 mmHg  AV Dionicio Ratio (DI): 0.31  NANDA Index (cm2/m2): 3.1  E/E' av.3  Lateral E/e': 30.0  Medial E/e': 40.7     ______________________________________________________________________________  Report approved by: Grecia Khan 2021 02:48 PM

## 2021-06-24 ENCOUNTER — OFFICE VISIT (OUTPATIENT)
Dept: CARDIOLOGY | Facility: CLINIC | Age: 86
End: 2021-06-24
Attending: INTERNAL MEDICINE
Payer: MEDICARE

## 2021-06-24 VITALS
HEART RATE: 53 BPM | WEIGHT: 157.5 LBS | HEIGHT: 66 IN | BODY MASS INDEX: 25.31 KG/M2 | SYSTOLIC BLOOD PRESSURE: 139 MMHG | DIASTOLIC BLOOD PRESSURE: 66 MMHG

## 2021-06-24 DIAGNOSIS — N18.4 CRF (CHRONIC RENAL FAILURE), STAGE 4 (SEVERE) (H): ICD-10-CM

## 2021-06-24 DIAGNOSIS — I65.23 BILATERAL CAROTID ARTERY STENOSIS: ICD-10-CM

## 2021-06-24 DIAGNOSIS — I25.810 CORONARY ARTERY DISEASE INVOLVING CORONARY BYPASS GRAFT OF NATIVE HEART WITHOUT ANGINA PECTORIS: ICD-10-CM

## 2021-06-24 DIAGNOSIS — I71.40 ABDOMINAL AORTIC ANEURYSM (AAA) WITHOUT RUPTURE (H): ICD-10-CM

## 2021-06-24 DIAGNOSIS — E78.5 HYPERLIPIDEMIA LDL GOAL <100: ICD-10-CM

## 2021-06-24 DIAGNOSIS — I50.32 CHRONIC DIASTOLIC HEART FAILURE (H): ICD-10-CM

## 2021-06-24 DIAGNOSIS — I35.0 SEVERE AORTIC STENOSIS: Primary | ICD-10-CM

## 2021-06-24 PROCEDURE — 99204 OFFICE O/P NEW MOD 45 MIN: CPT | Performed by: INTERNAL MEDICINE

## 2021-06-24 PROCEDURE — 93000 ELECTROCARDIOGRAM COMPLETE: CPT | Performed by: INTERNAL MEDICINE

## 2021-06-24 RX ORDER — FUROSEMIDE 20 MG
TABLET ORAL
COMMUNITY
End: 2021-12-28

## 2021-06-24 ASSESSMENT — MIFFLIN-ST. JEOR: SCORE: 1336.14

## 2021-06-24 NOTE — PROGRESS NOTES
HPI and Plan:   I had the pleasure of seeing Trevor Soni in cardiology clinic for severe aortic stenosis on request of Dr. Redman.    He is a pleasant 87-year-old male with a past medical history of coronary artery disease with bypass surgery in 2001 that was performed by Dr. Whitmore.  Patient and her his daughter believe that the surgery was done at [facility] but I was not able to locate those records.  Prior to the bypass surgery, he was also noted to have right internal carotid artery stenosis that required carotid endarterectomy.  He used to see Dr. Lovelace before he switched to Dr. Redman.     He also is noted to have aortic stenosis few years ago and an echo done [facility] revealed aortic stenosis with a valve area 1.1 cm  and a mean gradient of 17 mm at that time.  Dr. Redman referred him for a repeat echocardiogram and that revealed a mean gradient of 22 mm with a normal ejection fraction aortic valve area 0.9 cm .  There is suggest a severe aortic stenosis and therefore he was referred here.  On review of the echo myself, there appears to be mitral valve thickening and calcification with probably early mild mitral stenosis.  In addition, the LVOT diameter appears to be underestimated at 1.9 cm which may underestimate the aortic valve area.    Patient does not have any symptoms of chest pain or shortness of breath.  He has occasional lightheadedness that is positional.  He also has occasional leg edema which is also related to the time of the day.  Is noticeable at the end of the day and improves early morning suggesting it may be related in part to venous insufficiency and the fact that his veins were harvested on the left side due to bypass surgery.  He is also on amlodipine that is probably contributing.  I recommended using compression stockings.  I also discussed importance of lowering salt.  He denies any orthopnea or PND.  No palpitations or syncope.    He has  peripheral arterial disease.  He has small abdominal aortic aneurysm measuring 3.6 mm and on recent ultrasound which has been followed serially.  He also has bilateral carotid artery stenosis.  In 2018, he had greater than 70% internal carotid artery stenosis bilaterally but on a recent carotid Doppler in May 2021 it showed 50 to 69% stenosis on the right side and greater than 70% on the left side.    He has hyperlipidemia for which he is on medication.  He takes atorvastatin 20 mg daily and with that his LDL is 58 and HDL 34.     Does have sinus bradycardia which was confirmed on the EKG today.  He does have prolonged NM interval with nonspecific intermittent conduction delay of left bundle branch type.    Exam  Below    Impression    Severe aortic stenosis  Patient now has severe stenosis by echo although aortic valve area may be underestimated due to underestimation of the LVOT diameter.  By 2D, aortic valve appears severely calcified and restricted in motion although clinically, I can still clearly hear the second heart sound/A2 which suggest that his aortic stenosis is probably not critical.  At this time, he has no symptoms of chest pain shortness breath or any evidence of heart failure.  Therefore, I would suggest we continue to monitor him and repeat echocardiogram in 6 months.  If in the interim, he develops any symptoms, they will call me and in that case we will refer him to the TAVR clinic.  Discussed the plan with the patient and his daughter and they are agreeable.    2. Abdominal aortic aneurysm (AAA) without rupture (H)  Small.  Followed by the primary care physician.    3. Coronary artery disease involving coronary bypass graft of native heart without angina pectoris  Had triple-vessel bypass surgery in 2001.  I am in the process of locating the records.  Fortunately, he is free of any angina.  Recent echo revealed normal ejection fraction.  Continue risk factor modification.    4. Hyperlipidemia  LDL goal <100  Continue atorvastatin.    5. Bilateral carotid artery stenosis  Followed by primary care physician.  His carotid artery stenosis appears to be stable compared to 2018.  He is asymptomatic.  He may benefit from vascular surgery follow-up at some point and will defer to primary care physician.    6. CRF (chronic renal failure), stage 4 (severe) (H)  Sable.  This will influence the type of work-up we do if he proceeds with TAVR at some point.  Iodine-based contrast may have to be used cautiously.    7.  Bilateral basal rales, fine on exam, given this auscultatory finding, he may have early lung scarring or fibrosis.  I would defer to Dr. Redman if he wants to do a chest x-ray.  I will send him an epic message to discuss the same.    Thank you for allowing us to participate the care of this nice patient.  He will be seen us in follow-up in 6 months.    Sincerely,    Ivan Ravi MD      Review of the result(s) of each unique test - echo, carotid dopplers, abd ultrasound  Independent interpretation of a test performed by another physician/other qualified health care professional (not separately reported) - echo  Ordering of each unique test    {Provider  Link to [facility] Help Grid :1    The level of medical decision making during this visit was of high complexity.    Orders Placed This Encounter   Procedures     Follow-Up with Cardiologist     EKG 12-lead complete w/read - Clinics     EKG 12-lead complete w/read - Clinics     Echocardiogram Complete     Orders Placed This Encounter   Medications     furosemide (LASIX) 20 MG tablet     Sig: Take 10 mg every other day, alternating with 20 mg     Medications Discontinued During This Encounter   Medication Reason     furosemide (LASIX) 20 MG tablet Dose adjustment       Encounter Diagnoses   Name Primary?     Severe aortic stenosis Yes     Abdominal aortic aneurysm (AAA) without rupture (H)      Coronary artery disease involving coronary bypass graft of native  heart without angina pectoris      Hyperlipidemia LDL goal <100      Bilateral carotid artery stenosis      CRF (chronic renal failure), stage 4 (severe) (H)      Chronic diastolic CHF -- Grade 1-2 Dysfunction Echo 2016        CURRENT MEDICATIONS:  Current Outpatient Medications   Medication Sig Dispense Refill     acetaminophen-codeine (TYLENOL #3) 300-30 MG tablet Take 1 tablet by mouth every 6 hours as needed for severe pain 60 tablet 1     allopurinol (ZYLOPRIM) 100 MG tablet Take 2 tablets (200 mg) by mouth daily 180 tablet 3     amLODIPine (NORVASC) 5 MG tablet TAKE 1 TABLET BY MOUTH  EVERY MORNING AND 2 TABLETS EVERY EVENING 270 tablet 3     ASPIRIN PO Take 325 mg by mouth daily.       atenolol (TENORMIN) 50 MG tablet Take 1 tablet (50 mg) by mouth daily 90 tablet 3     atorvastatin (LIPITOR) 20 MG tablet Take 1 tablet (20 mg) by mouth daily 90 tablet 3     Cholecalciferol (VITAMIN D3 PO) Take 1 tablet by mouth daily       coenzyme Q-10 capsule Take 1 capsule by mouth daily       famotidine (PEPCID) 20 MG tablet Take 1 tablet (20 mg) by mouth At Bedtime 90 tablet 3     fenofibrate (TRIGLIDE/LOFIBRA) 160 MG tablet TAKE 1 TABLET(160 MG) BY MOUTH DAILY 90 tablet 3     furosemide (LASIX) 20 MG tablet Take 10 mg every other day, alternating with 20 mg       hydrALAZINE (APRESOLINE) 50 MG tablet TAKE 1 TABLET BY MOUTH 4  TIMES A  tablet 3     Multiple Vitamins-Minerals (PRESERVISION AREDS 2 PO)        spironolactone (ALDACTONE) 25 MG tablet Take 1 tablet (25 mg) by mouth daily 90 tablet 3       ALLERGIES     Allergies   Allergen Reactions     Avocado      Ciprofloxacin Itching     Penicillins Itching       PAST MEDICAL HISTORY:  Past Medical History:   Diagnosis Date     Arthritis     rhuematoid     Coronary artery disease     triple bypass 2001     CRF (chronic renal failure)      Gastro-oesophageal reflux disease      Gout      Hyperlipidemia      Hypertension      Nocturia        PAST SURGICAL  HISTORY:  Past Surgical History:   Procedure Laterality Date     CARDIAC SURGERY       CHOLECYSTECTOMY       COLONOSCOPY       LAPAROTOMY EXPLORATORY N/A 2020    Procedure: EXPLORATORY LAPAROTOMY, BOWEL RESECTION;  Surgeon: Bull Rachel MD;  Location:  OR     LAPAROTOMY, LYSIS ADHESIONS, COMBINED N/A 2020    Procedure: EXPLORATORY LAPAROTOMY WITH LYSIS OF ADHESIONS;  Surgeon: Jose Reed MD;  Location:  OR     ORTHOPEDIC SURGERY       PHACOEMULSIFICATION CLEAR CORNEA WITH TORIC INTRAOCULAR LENS IMPLANT  2012    Procedure:PHACOEMULSIFICATION CLEAR CORNEA WITH TORIC INTRAOCULAR LENS IMPLANT; LEFT PHACOEMULSIFICATION CLEAR CORNEA WITH DELUXE  TORIC INTRAOCULAR LENS IMPLANT ; Surgeon:BRANDO HIGHTOWER; Location: [facility]     PHACOEMULSIFICATION CLEAR CORNEA WITH TORIC INTRAOCULAR LENS IMPLANT  2012    Procedure:PHACOEMULSIFICATION CLEAR CORNEA WITH TORIC INTRAOCULAR LENS IMPLANT; RIGHT PHACOEMULSIFICATION CLEAR CORNEA WITH TORIC INTRAOCULAR LENS IMPLANT ; Surgeon:BRANDO HIGHTOWER; Location:[facility]     VASCULAR SURGERY         FAMILY HISTORY:  Family History   Problem Relation Age of Onset     Myocardial Infarction Mother      Rheumatic fever Father      Cerebrovascular Disease Brother        SOCIAL HISTORY:  Social History     Socioeconomic History     Marital status:      Spouse name: None     Number of children: None     Years of education: None     Highest education level: None   Occupational History     None   Social Needs     Financial resource strain: None     Food insecurity     Worry: None     Inability: None     Transportation needs     Medical: None     Non-medical: None   Tobacco Use     Smoking status: Former Smoker     Types: Cigars     Quit date: 1980     Years since quittin.5     Smokeless tobacco: Never Used   Substance and Sexual Activity     Alcohol use: Yes     Alcohol/week: 0.0 standard drinks     Comment: 1 drink week     Drug use: No     Sexual  "activity: Not Currently     Partners: Female   Lifestyle     Physical activity     Days per week: None     Minutes per session: None     Stress: None   Relationships     Social connections     Talks on phone: None     Gets together: None     Attends Sabianism service: None     Active member of club or organization: None     Attends meetings of clubs or organizations: None     Relationship status: None     Intimate partner violence     Fear of current or ex partner: None     Emotionally abused: None     Physically abused: None     Forced sexual activity: None   Other Topics Concern     Parent/sibling w/ CABG, MI or angioplasty before 65F 55M? Yes   Social History Narrative     None       Review of Systems:  Skin:  Negative     Eyes:  Positive for glasses;glaucoma  ENT:  Positive for hearing loss  Respiratory:  Negative    Cardiovascular:  Negative;palpitations;chest pain;dizziness;syncope or near-syncope;cyanosis Positive for;fatigue;lightheadedness;edema  Gastroenterology: Positive for heartburn  Genitourinary:  Positive for nocturia  Musculoskeletal:  Positive for arthritis  Neurologic:  Negative    Psychiatric:  Positive for sleep disturbances  Heme/Lymph/Imm:  Positive for allergies  Endocrine:  Negative      Physical Exam:  Vitals: /66 (BP Location: Left arm, Cuff Size: Adult Large)   Pulse 53   Ht 1.683 m (5' 6.25\")   Wt 71.4 kg (157 lb 8 oz)   BMI 25.23 kg/m      Constitutional:  in no acute distress        Skin:  warm and dry to the touch          Head:  normocephalic        Eyes:  sclera white        Lymph:      ENT:           Neck:  JVP normal bilateral carotid bruit      Respiratory:    basal rales;fine rales       Cardiac: regular rhythm;normal S1 and S2       systolic ejection murmur;grade 3        not assessed this visit                                        GI:  not assessed this visit        Extremities and Muscular Skeletal:        RLE edema;pitting;trace;1+ LLE edema;trace;pitting  "     Neurological:  no gross motor deficits        Psych:  Alert and Oriented x 3      Recent Lab Results:  LIPID RESULTS:  Lab Results   Component Value Date    CHOL 107 01/27/2021    HDL 34 (L) 01/27/2021    LDL 58 01/27/2021    TRIG 76 01/27/2021       LIVER ENZYME RESULTS:  Lab Results   Component Value Date    AST 24 07/02/2020    ALT 21 07/02/2020       CBC RESULTS:  Lab Results   Component Value Date    WBC 7.7 12/21/2020    RBC 3.61 (L) 12/21/2020    HGB 10.7 (L) 12/21/2020    HCT 32.9 (L) 12/21/2020    MCV 91 12/21/2020    MCH 29.6 12/21/2020    MCHC 32.5 12/21/2020    RDW 17.9 (H) 12/21/2020     12/21/2020       BMP RESULTS:  Lab Results   Component Value Date     12/21/2020    POTASSIUM 5.2 12/21/2020    CHLORIDE 111 (H) 12/21/2020    CO2 21 12/21/2020    ANIONGAP 8 12/21/2020     (H) 12/21/2020    BUN 51 (H) 12/21/2020    CR 2.35 (H) 12/21/2020    GFRESTIMATED 24 (L) 12/21/2020    GFRESTBLACK 28 (L) 12/21/2020    AGUSTÍN 9.5 12/21/2020        A1C RESULTS:  Lab Results   Component Value Date    A1C 5.9 05/26/2016       INR RESULTS:  Lab Results   Component Value Date    INR 1.06 05/06/2011    INR 0.98 05/02/2010         CC  Abdoulaye Redman MD  [id number]  [place] [id number]

## 2021-06-24 NOTE — LETTER
6/24/2021    Abdoulaye Redman MD  [address]  [facility] [address]    RE: Trevor JOSEY Nae       Dear Colleague,    I had the pleasure of seeing Trevor Soni in the [facility].    HPI and Plan:   I had the pleasure of seeing Trevor Soni in cardiology clinic for severe aortic stenosis on request of Dr. Redman.    He is a pleasant 87-year-old male with a past medical history of coronary artery disease with bypass surgery in 2001 that was performed by Dr. Whitmore.  Patient and her his daughter believe that the surgery was done at [facility] but I was not able to locate those records.  Prior to the bypass surgery, he was also noted to have right internal carotid artery stenosis that required carotid endarterectomy.  He used to see Dr. Lovelace before he switched to Dr. Redman.     He also is noted to have aortic stenosis few years ago and an [facility] revealed aortic stenosis with a valve area 1.1 cm  and a mean gradient of 17 mm at that time.  Dr. Redman referred him for a repeat echocardiogram and that revealed a mean gradient of 22 mm with a normal ejection fraction aortic valve area 0.9 cm .  There is suggest a severe aortic stenosis and therefore he was referred here.  On review of the echo myself, there appears to be mitral valve thickening and calcification with probably early mild mitral stenosis.  In addition, the LVOT diameter appears to be underestimated at 1.9 cm which may underestimate the aortic valve area.    Patient does not have any symptoms of chest pain or shortness of breath.  He has occasional lightheadedness that is positional.  He also has occasional leg edema which is also related to the time of the day.  Is noticeable at the end of the day and improves early morning suggesting it may be related in part to venous insufficiency and the fact that his veins were harvested on the left side  due to bypass surgery.  He is also on amlodipine that is probably contributing.  I recommended using compression stockings.  I also discussed importance of lowering salt.  He denies any orthopnea or PND.  No palpitations or syncope.    He has peripheral arterial disease.  He has small abdominal aortic aneurysm measuring 3.6 mm and on recent ultrasound which has been followed serially.  He also has bilateral carotid artery stenosis.  In 2018, he had greater than 70% internal carotid artery stenosis bilaterally but on a recent carotid Doppler in May 2021 it showed 50 to 69% stenosis on the right side and greater than 70% on the left side.    He has hyperlipidemia for which he is on medication.  He takes atorvastatin 20 mg daily and with that his LDL is 58 and HDL 34.     Does have sinus bradycardia which was confirmed on the EKG today.  He does have prolonged NM interval with nonspecific intermittent conduction delay of left bundle branch type.    Exam  Below    Impression    Severe aortic stenosis  Patient now has severe stenosis by echo although aortic valve area may be underestimated due to underestimation of the LVOT diameter.  By 2D, aortic valve appears severely calcified and restricted in motion although clinically, I can still clearly hear the second heart sound/A2 which suggest that his aortic stenosis is probably not critical.  At this time, he has no symptoms of chest pain shortness breath or any evidence of heart failure.  Therefore, I would suggest we continue to monitor him and repeat echocardiogram in 6 months.  If in the interim, he develops any symptoms, they will call me and in that case we will refer him to the TAVR clinic.  Discussed the plan with the patient and his daughter and they are agreeable.    2. Abdominal aortic aneurysm (AAA) without rupture (H)  Small.  Followed by the primary care physician.    3. Coronary artery disease involving coronary bypass graft of native heart without angina  pectoris  Had triple-vessel bypass surgery in 2001.  I am in the process of locating the records.  Fortunately, he is free of any angina.  Recent echo revealed normal ejection fraction.  Continue risk factor modification.    4. Hyperlipidemia LDL goal <100  Continue atorvastatin.    5. Bilateral carotid artery stenosis  Followed by primary care physician.  His carotid artery stenosis appears to be stable compared to 2018.  He is asymptomatic.  He may benefit from vascular surgery follow-up at some point and will defer to primary care physician.    6. CRF (chronic renal failure), stage 4 (severe) (H)  Sable.  This will influence the type of work-up we do if he proceeds with TAVR at some point.  Iodine-based contrast may have to be used cautiously.    7.  Bilateral basal rales, fine on exam, given this auscultatory finding, he may have early lung scarring or fibrosis.  I would defer to Dr. Redman if he wants to do a chest x-ray.  I will send him an epic message to discuss the same.    Thank you for allowing us to participate the care of this nice patient.  He will be seen us in follow-up in 6 months.    Sincerely,    Ivan Ravi MD      Review of the result(s) of each unique test - echo, carotid dopplers, abd ultrasound  Independent interpretation of a test performed by another physician/other qualified health care professional (not separately reported) - echo  Ordering of each unique test    {Provider  Link to [facility] Help Grid :1    The level of medical decision making during this visit was of high complexity.    Orders Placed This Encounter   Procedures     Follow-Up with Cardiologist     EKG 12-lead complete w/read - Clinics     EKG 12-lead complete w/read - Clinics     Echocardiogram Complete     Orders Placed This Encounter   Medications     furosemide (LASIX) 20 MG tablet     Sig: Take 10 mg every other day, alternating with 20 mg     Medications Discontinued During This Encounter   Medication Reason      furosemide (LASIX) 20 MG tablet Dose adjustment       Encounter Diagnoses   Name Primary?     Severe aortic stenosis Yes     Abdominal aortic aneurysm (AAA) without rupture (H)      Coronary artery disease involving coronary bypass graft of native heart without angina pectoris      Hyperlipidemia LDL goal <100      Bilateral carotid artery stenosis      CRF (chronic renal failure), stage 4 (severe) (H)      Chronic diastolic CHF -- Grade 1-2 Dysfunction Echo 2016        CURRENT MEDICATIONS:  Current Outpatient Medications   Medication Sig Dispense Refill     acetaminophen-codeine (TYLENOL #3) 300-30 MG tablet Take 1 tablet by mouth every 6 hours as needed for severe pain 60 tablet 1     allopurinol (ZYLOPRIM) 100 MG tablet Take 2 tablets (200 mg) by mouth daily 180 tablet 3     amLODIPine (NORVASC) 5 MG tablet TAKE 1 TABLET BY MOUTH  EVERY MORNING AND 2 TABLETS EVERY EVENING 270 tablet 3     ASPIRIN PO Take 325 mg by mouth daily.       atenolol (TENORMIN) 50 MG tablet Take 1 tablet (50 mg) by mouth daily 90 tablet 3     atorvastatin (LIPITOR) 20 MG tablet Take 1 tablet (20 mg) by mouth daily 90 tablet 3     Cholecalciferol (VITAMIN D3 PO) Take 1 tablet by mouth daily       coenzyme Q-10 capsule Take 1 capsule by mouth daily       famotidine (PEPCID) 20 MG tablet Take 1 tablet (20 mg) by mouth At Bedtime 90 tablet 3     fenofibrate (TRIGLIDE/LOFIBRA) 160 MG tablet TAKE 1 TABLET(160 MG) BY MOUTH DAILY 90 tablet 3     furosemide (LASIX) 20 MG tablet Take 10 mg every other day, alternating with 20 mg       hydrALAZINE (APRESOLINE) 50 MG tablet TAKE 1 TABLET BY MOUTH 4  TIMES A  tablet 3     Multiple Vitamins-Minerals (PRESERVISION AREDS 2 PO)        spironolactone (ALDACTONE) 25 MG tablet Take 1 tablet (25 mg) by mouth daily 90 tablet 3       ALLERGIES     Allergies   Allergen Reactions     Avocado      Ciprofloxacin Itching     Penicillins Itching       PAST MEDICAL HISTORY:  Past Medical History:   Diagnosis  Date     Arthritis     rhuematoid     Coronary artery disease     triple bypass 2001     CRF (chronic renal failure)      Gastro-oesophageal reflux disease      Gout      Hyperlipidemia      Hypertension      Nocturia        PAST SURGICAL HISTORY:  Past Surgical History:   Procedure Laterality Date     CARDIAC SURGERY       CHOLECYSTECTOMY       COLONOSCOPY       LAPAROTOMY EXPLORATORY N/A 6/30/2020    Procedure: EXPLORATORY LAPAROTOMY, BOWEL RESECTION;  Surgeon: Bull Rachel MD;  Location:  OR     LAPAROTOMY, LYSIS ADHESIONS, COMBINED N/A 6/21/2020    Procedure: EXPLORATORY LAPAROTOMY WITH LYSIS OF ADHESIONS;  Surgeon: Jose Reed MD;  Location:  OR     ORTHOPEDIC SURGERY       PHACOEMULSIFICATION CLEAR CORNEA WITH TORIC INTRAOCULAR LENS IMPLANT  1/30/2012    Procedure:PHACOEMULSIFICATION CLEAR CORNEA WITH TORIC INTRAOCULAR LENS IMPLANT; LEFT PHACOEMULSIFICATION CLEAR CORNEA WITH DELUXE  TORIC INTRAOCULAR LENS IMPLANT ; Surgeon:BRANDO HIGHTOWER; Location:[facility]     PHACOEMULSIFICATION CLEAR CORNEA WITH TORIC INTRAOCULAR LENS IMPLANT  2/13/2012    Procedure:PHACOEMULSIFICATION CLEAR CORNEA WITH TORIC INTRAOCULAR LENS IMPLANT; RIGHT PHACOEMULSIFICATION CLEAR CORNEA WITH TORIC INTRAOCULAR LENS IMPLANT ; Surgeon:BRANDO HIGHTOWER; Location: [facility]     VASCULAR SURGERY         FAMILY HISTORY:  Family History   Problem Relation Age of Onset     Myocardial Infarction Mother      Rheumatic fever Father      Cerebrovascular Disease Brother        SOCIAL HISTORY:  Social History     Socioeconomic History     Marital status:      Spouse name: None     Number of children: None     Years of education: None     Highest education level: None   Occupational History     None   Social Needs     Financial resource strain: None     Food insecurity     Worry: None     Inability: None     Transportation needs     Medical: None     Non-medical: None   Tobacco Use     Smoking status: Former Smoker     Types:  "Cigars     Quit date: 1980     Years since quittin.5     Smokeless tobacco: Never Used   Substance and Sexual Activity     Alcohol use: Yes     Alcohol/week: 0.0 standard drinks     Comment: 1 drink week     Drug use: No     Sexual activity: Not Currently     Partners: Female   Lifestyle     Physical activity     Days per week: None     Minutes per session: None     Stress: None   Relationships     Social connections     Talks on phone: None     Gets together: None     Attends Alevism service: None     Active member of club or organization: None     Attends meetings of clubs or organizations: None     Relationship status: None     Intimate partner violence     Fear of current or ex partner: None     Emotionally abused: None     Physically abused: None     Forced sexual activity: None   Other Topics Concern     Parent/sibling w/ CABG, MI or angioplasty before 65F 55M? Yes   Social History Narrative     None       Review of Systems:  Skin:  Negative     Eyes:  Positive for glasses;glaucoma  ENT:  Positive for hearing loss  Respiratory:  Negative    Cardiovascular:  Negative;palpitations;chest pain;dizziness;syncope or near-syncope;cyanosis Positive for;fatigue;lightheadedness;edema  Gastroenterology: Positive for heartburn  Genitourinary:  Positive for nocturia  Musculoskeletal:  Positive for arthritis  Neurologic:  Negative    Psychiatric:  Positive for sleep disturbances  Heme/Lymph/Imm:  Positive for allergies  Endocrine:  Negative      Physical Exam:  Vitals: /66 (BP Location: Left arm, Cuff Size: Adult Large)   Pulse 53   Ht 1.683 m (5' 6.25\")   Wt 71.4 kg (157 lb 8 oz)   BMI 25.23 kg/m      Constitutional:  in no acute distress        Skin:  warm and dry to the touch          Head:  normocephalic        Eyes:  sclera white        Lymph:      ENT:           Neck:  JVP normal bilateral carotid bruit      Respiratory:    basal rales;fine rales       Cardiac: regular rhythm;normal S1 and S2      "  systolic ejection murmur;grade 3        not assessed this visit                                        GI:  not assessed this visit        Extremities and Muscular Skeletal:        RLE edema;pitting;trace;1+ LLE edema;trace;pitting      Neurological:  no gross motor deficits        Psych:  Alert and Oriented x 3      Recent Lab Results:  LIPID RESULTS:  Lab Results   Component Value Date    CHOL 107 01/27/2021    HDL 34 (L) 01/27/2021    LDL 58 01/27/2021    TRIG 76 01/27/2021       LIVER ENZYME RESULTS:  Lab Results   Component Value Date    AST 24 07/02/2020    ALT 21 07/02/2020       CBC RESULTS:  Lab Results   Component Value Date    WBC 7.7 12/21/2020    RBC 3.61 (L) 12/21/2020    HGB 10.7 (L) 12/21/2020    HCT 32.9 (L) 12/21/2020    MCV 91 12/21/2020    MCH 29.6 12/21/2020    MCHC 32.5 12/21/2020    RDW 17.9 (H) 12/21/2020     12/21/2020       BMP RESULTS:  Lab Results   Component Value Date     12/21/2020    POTASSIUM 5.2 12/21/2020    CHLORIDE 111 (H) 12/21/2020    CO2 21 12/21/2020    ANIONGAP 8 12/21/2020     (H) 12/21/2020    BUN 51 (H) 12/21/2020    CR 2.35 (H) 12/21/2020    GFRESTIMATED 24 (L) 12/21/2020    GFRESTBLACK 28 (L) 12/21/2020    AGUSTÍN 9.5 12/21/2020        A1C RESULTS:  Lab Results   Component Value Date    A1C 5.9 05/26/2016       INR RESULTS:  Lab Results   Component Value Date    INR 1.06 05/06/2011    INR 0.98 05/02/2010     Thank you for allowing me to participate in the care of your patient.      Sincerely,     Ivan Ravi MD     [facility]    cc:   Abdoulaye Redman MD  [address]  [place] [address]

## 2021-10-25 ENCOUNTER — OFFICE VISIT (OUTPATIENT)
Dept: FAMILY MEDICINE | Facility: CLINIC | Age: 86
End: 2021-10-25
Payer: MEDICARE

## 2021-10-25 VITALS
OXYGEN SATURATION: 97 % | WEIGHT: 151.8 LBS | HEIGHT: 66 IN | BODY MASS INDEX: 24.4 KG/M2 | TEMPERATURE: 97 F | HEART RATE: 49 BPM | RESPIRATION RATE: 16 BRPM | SYSTOLIC BLOOD PRESSURE: 126 MMHG | DIASTOLIC BLOOD PRESSURE: 51 MMHG

## 2021-10-25 DIAGNOSIS — I65.29 STENOSIS OF CAROTID ARTERY, UNSPECIFIED LATERALITY: ICD-10-CM

## 2021-10-25 DIAGNOSIS — Z23 NEED FOR PROPHYLACTIC VACCINATION AND INOCULATION AGAINST INFLUENZA: ICD-10-CM

## 2021-10-25 DIAGNOSIS — E78.5 HYPERLIPIDEMIA LDL GOAL <100: ICD-10-CM

## 2021-10-25 DIAGNOSIS — I25.810 CORONARY ARTERY DISEASE INVOLVING CORONARY BYPASS GRAFT OF NATIVE HEART WITHOUT ANGINA PECTORIS: ICD-10-CM

## 2021-10-25 DIAGNOSIS — I15.9 SECONDARY HYPERTENSION: ICD-10-CM

## 2021-10-25 DIAGNOSIS — I71.40 ABDOMINAL AORTIC ANEURYSM (AAA) WITHOUT RUPTURE (H): ICD-10-CM

## 2021-10-25 DIAGNOSIS — Z00.00 ROUTINE GENERAL MEDICAL EXAMINATION AT A HEALTH CARE FACILITY: Primary | ICD-10-CM

## 2021-10-25 DIAGNOSIS — I35.0 MILD AORTIC STENOSIS: ICD-10-CM

## 2021-10-25 DIAGNOSIS — I50.32 CHRONIC DIASTOLIC HEART FAILURE (H): ICD-10-CM

## 2021-10-25 DIAGNOSIS — E87.5 HYPERKALEMIA: ICD-10-CM

## 2021-10-25 DIAGNOSIS — N18.4 CRF (CHRONIC RENAL FAILURE), STAGE 4 (SEVERE) (H): ICD-10-CM

## 2021-10-25 DIAGNOSIS — R73.9 HYPERGLYCEMIA: ICD-10-CM

## 2021-10-25 DIAGNOSIS — M1A.09X0 IDIOPATHIC CHRONIC GOUT OF MULTIPLE SITES WITHOUT TOPHUS: ICD-10-CM

## 2021-10-25 LAB
ERYTHROCYTE [DISTWIDTH] IN BLOOD BY AUTOMATED COUNT: 15.6 % (ref 10–15)
HCT VFR BLD AUTO: 33.3 % (ref 40–53)
HGB BLD-MCNC: 10.8 G/DL (ref 13.3–17.7)
MCH RBC QN AUTO: 32.8 PG (ref 26.5–33)
MCHC RBC AUTO-ENTMCNC: 32.4 G/DL (ref 31.5–36.5)
MCV RBC AUTO: 101 FL (ref 78–100)
PLATELET # BLD AUTO: 213 10E3/UL (ref 150–450)
RBC # BLD AUTO: 3.29 10E6/UL (ref 4.4–5.9)
WBC # BLD AUTO: 6.9 10E3/UL (ref 4–11)

## 2021-10-25 PROCEDURE — 90662 IIV NO PRSV INCREASED AG IM: CPT | Performed by: INTERNAL MEDICINE

## 2021-10-25 PROCEDURE — 80048 BASIC METABOLIC PNL TOTAL CA: CPT | Performed by: INTERNAL MEDICINE

## 2021-10-25 PROCEDURE — 80061 LIPID PANEL: CPT | Performed by: INTERNAL MEDICINE

## 2021-10-25 PROCEDURE — G0008 ADMIN INFLUENZA VIRUS VAC: HCPCS | Performed by: INTERNAL MEDICINE

## 2021-10-25 PROCEDURE — 36415 COLL VENOUS BLD VENIPUNCTURE: CPT | Performed by: INTERNAL MEDICINE

## 2021-10-25 PROCEDURE — G0439 PPPS, SUBSEQ VISIT: HCPCS | Performed by: INTERNAL MEDICINE

## 2021-10-25 PROCEDURE — 99214 OFFICE O/P EST MOD 30 MIN: CPT | Mod: 25 | Performed by: INTERNAL MEDICINE

## 2021-10-25 PROCEDURE — 85027 COMPLETE CBC AUTOMATED: CPT | Performed by: INTERNAL MEDICINE

## 2021-10-25 ASSESSMENT — ENCOUNTER SYMPTOMS
HEARTBURN: 1
JOINT SWELLING: 0
HEMATURIA: 0
SHORTNESS OF BREATH: 0
CONSTIPATION: 0
EYE PAIN: 0
ARTHRALGIAS: 1
NERVOUS/ANXIOUS: 0
MYALGIAS: 1
COUGH: 0
DYSURIA: 0
CHILLS: 1
FREQUENCY: 0
WEAKNESS: 0
HEMATOCHEZIA: 0
PALPITATIONS: 0
SORE THROAT: 0
NAUSEA: 0
HEADACHES: 0
ABDOMINAL PAIN: 0
FEVER: 0
DIZZINESS: 1
PARESTHESIAS: 0
DIARRHEA: 0

## 2021-10-25 ASSESSMENT — MIFFLIN-ST. JEOR: SCORE: 1305.28

## 2021-10-25 ASSESSMENT — ACTIVITIES OF DAILY LIVING (ADL): CURRENT_FUNCTION: NO ASSISTANCE NEEDED

## 2021-10-25 NOTE — LETTER
"[place] [address] [facility] [address]  [address]  [place]  [address]  Tel: [phone]    October 27, 2021    Trevor Soni  [address] [facility] [address]        Dear Mr. Soni,    The following letter pertains to your most recent diagnostic tests:     Cholesterol control looks ok except for low 'good cholesterol' HDL.  Increasing exercise can improve HDL (\"good cholesterol\") levels.  A good target to shoot for is 30 minutes of aerobic activity at least 4 days per week.     The metabolic panel shows stable kidney function.   He potassium is a little high.  The blood sugar is suprisingly high.       The CBC shows stable anemia (low hemoglobin) and is otherwise stable.         Bottom line:  We need to recheck your potassium and blood sugar.  I recommend scheduling a follow up lab appointment at your convenience for that purpose       Follow up:  lab appointment as soon as you can for repeat potassium and blood sugar.       Sincerely,     Dr. Redman/L           Enclosure: Lab Results  Results for orders placed or performed in visit on 10/25/21   Basic metabolic panel     Status: Abnormal   Result Value Ref Range    Sodium 139 133 - 144 mmol/L    Potassium 5.4 (H) 3.4 - 5.3 mmol/L    Chloride 113 (H) 94 - 109 mmol/L    Carbon Dioxide (CO2) 21 20 - 32 mmol/L    Anion Gap 5 3 - 14 mmol/L    Urea Nitrogen 47 (H) 7 - 30 mg/dL    Creatinine 2.74 (H) 0.66 - 1.25 mg/dL    Calcium 9.0 8.5 - 10.1 mg/dL    Glucose 150 (H) 70 - 99 mg/dL    GFR Estimate 20 (L) >60 mL/min/1.73m2   CBC with platelets     Status: Abnormal   Result Value Ref Range    WBC Count 6.9 4.0 - 11.0 10e3/uL    RBC Count 3.29 (L) 4.40 - 5.90 10e6/uL    Hemoglobin 10.8 (L) 13.3 - 17.7 g/dL    Hematocrit 33.3 (L) 40.0 - 53.0 %     (H) 78 - 100 fL    MCH 32.8 26.5 - 33.0 pg    MCHC 32.4 31.5 - 36.5 g/dL    RDW 15.6 (H) 10.0 - 15.0 %    Platelet Count 213 150 - 450 10e3/uL   Lipid panel reflex to direct LDL Fasting  "    Status: Abnormal   Result Value Ref Range    Cholesterol 96 <200 mg/dL    Triglycerides 111 <150 mg/dL    Direct Measure HDL 24 (L) >=40 mg/dL    LDL Cholesterol Calculated 50 <=100 mg/dL    Non HDL Cholesterol 72 <130 mg/dL    Patient Fasting > 8hrs? No     Narrative    Cholesterol  Desirable:  <200 mg/dL    Triglycerides  Normal:  Less than 150 mg/dL  Borderline High:  150-199 mg/dL  High:  200-499 mg/dL  Very High:  Greater than or equal to 500 mg/dL    Direct Measure HDL  Female:  Greater than or equal to 50 mg/dL   Male:  Greater than or equal to 40 mg/dL    LDL Cholesterol  Desirable:  <100mg/dL  Above Desirable:  100-129 mg/dL   Borderline High:  130-159 mg/dL   High:  160-189 mg/dL   Very High:  >= 190 mg/dL    Non HDL Cholesterol  Desirable:  130 mg/dL  Above Desirable:  130-159 mg/dL  Borderline High:  160-189 mg/dL  High:  190-219 mg/dL  Very High:  Greater than or equal to 220 mg/dL

## 2021-10-25 NOTE — PROGRESS NOTES
"SUBJECTIVE:   Trevor Soni is a 88 year old male who presents for Preventive Visit.      Patient has been advised of split billing requirements and indicates understanding: Yes   Are you in the first 12 months of your Medicare coverage?  No    Healthy Habits:     In general, how would you rate your overall health?  Good    Frequency of exercise:  None    Do you usually eat at least 4 servings of fruit and vegetables a day, include whole grains    & fiber and avoid regularly eating high fat or \"junk\" foods?  No    Taking medications regularly:  No    Barriers to taking medications:  None    Medication side effects:  None    Ability to successfully perform activities of daily living:  No assistance needed    Home Safety:  No safety concerns identified    Hearing Impairment:  Need to ask people to speak up or repeat themselves    In the past 6 months, have you been bothered by leaking of urine?  No    In general, how would you rate your overall mental or emotional health?  Good      PHQ-2 Total Score: 0    Additional concerns today:  No    Do you feel safe in your environment? Yes    Have you ever done Advance Care Planning? (For example, a Health Directive, POLST, or a discussion with a medical provider or your loved ones about your wishes): Yes, patient states has an Advance Care Planning document and will bring a copy to the clinic.       Fall risk  Fallen 2 or more times in the past year?: No  Any fall with injury in the past year?: No    Cognitive Screening   1) Repeat 3 items (Leader, Season, Table)    2) Clock draw: NORMAL  3) 3 item recall: Recalls 2 objects   Results: NORMAL clock, 1-2 items recalled: COGNITIVE IMPAIRMENT LESS LIKELY    Mini-CogTM Copyright CARMEN Whitten. Licensed by the author for use in [facility]; reprinted with permission (meaghan@.[email or web address]). All rights reserved.      Do you have sleep apnea, excessive snoring or daytime drowsiness?: no    Reviewed and updated as needed this visit " by clinical staff  Tobacco  Allergies  Meds   Med Hx  Surg Hx  Fam Hx          Reviewed and updated as needed this visit by Provider  Tobacco     Med Hx  Surg Hx  Fam Hx         Social History     Tobacco Use     Smoking status: Former Smoker     Types: Cigars     Quit date: 1980     Years since quittin.8     Smokeless tobacco: Never Used   Substance Use Topics     Alcohol use: Yes     Alcohol/week: 0.0 standard drinks     Comment: 1 drink week     If you drink alcohol do you typically have >3 drinks per day or >7 drinks per week? No    Alcohol Use 10/25/2021   Prescreen: >3 drinks/day or >7 drinks/week? No   Prescreen: >3 drinks/day or >7 drinks/week? -               Current providers sharing in care for this patient include:   Patient Care Team:  Abdoulaye Redman MD as PCP - General (Internal Medicine)  Bull Rachel MD as Assigned Surgical Provider  Ivan Ravi MD as Assigned Heart and Vascular Provider  Abdoulaye Redman MD as Assigned PCP    The following health maintenance items are reviewed in Epic and correct as of today:  Health Maintenance Due   Topic Date Due     URINE DRUG SCREEN  Never done     ANNUAL REVIEW OF  ORDERS  Never done     INFLUENZA VACCINE (1) 2021     Patient Active Problem List   Diagnosis     Stiffness of joint, not elsewhere classified,  shoulder region     Abdominal pain     Pneumonia     Hyperlipidemia LDL goal <100     Primary osteoarthritis involving multiple joints     Chronic rhinitis     Primary osteoarthritis of both knees     Idiopathic chronic gout of multiple sites without tophus     Abdominal aortic aneurysm (AAA) without rupture (H)     Stenosis of carotid artery, unspecified laterality     Post-traumatic osteoarthritis of left knee     Chronic bilateral low back pain     Mild aortic stenosis -- Echo 2016     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 Adhes 20     CRF (chronic renal failure), stage 4 (severe) (H)     Gout      CAD -- S/P CABG x 3 in      HTN (hypertension)     Chronic diastolic CHF -- Grade 1-2 Dysfunction Echo 2016     Status post exploratory laparotomy     Carotid stenosis, asymptomatic, bilateral     Past Surgical History:   Procedure Laterality Date     CARDIAC SURGERY       CHOLECYSTECTOMY       COLONOSCOPY       LAPAROTOMY EXPLORATORY N/A 2020    Procedure: EXPLORATORY LAPAROTOMY, BOWEL RESECTION;  Surgeon: Bull Rachel MD;  Location:  OR     LAPAROTOMY, LYSIS ADHESIONS, COMBINED N/A 2020    Procedure: EXPLORATORY LAPAROTOMY WITH LYSIS OF ADHESIONS;  Surgeon: Jose Reed MD;  Location:  OR     ORTHOPEDIC SURGERY       PHACOEMULSIFICATION CLEAR CORNEA WITH TORIC INTRAOCULAR LENS IMPLANT  2012    Procedure:PHACOEMULSIFICATION CLEAR CORNEA WITH TORIC INTRAOCULAR LENS IMPLANT; LEFT PHACOEMULSIFICATION CLEAR CORNEA WITH DELUXE  TORIC INTRAOCULAR LENS IMPLANT ; Surgeon:BRANDO HIGHTOWER; Location:[facility]     PHACOEMULSIFICATION CLEAR CORNEA WITH TORIC INTRAOCULAR LENS IMPLANT  2012    Procedure:PHACOEMULSIFICATION CLEAR CORNEA WITH TORIC INTRAOCULAR LENS IMPLANT; RIGHT PHACOEMULSIFICATION CLEAR CORNEA WITH TORIC INTRAOCULAR LENS IMPLANT ; Surgeon:BRANDO HIGHTOWER; Location: [facility]     VASCULAR SURGERY         Social History     Tobacco Use     Smoking status: Former Smoker     Types: Cigars     Quit date: 1980     Years since quittin.8     Smokeless tobacco: Never Used   Substance Use Topics     Alcohol use: Yes     Alcohol/week: 0.0 standard drinks     Comment: 1 drink week     Family History   Problem Relation Age of Onset     Myocardial Infarction Mother      Rheumatic fever Father      Cerebrovascular Disease Brother          Current Outpatient Medications   Medication Sig Dispense Refill     acetaminophen-codeine (TYLENOL #3) 300-30 MG tablet Take 1 tablet by mouth every 6 hours as needed for severe pain 60 tablet 1     allopurinol (ZYLOPRIM) 100 MG tablet Take 2  "tablets (200 mg) by mouth daily 180 tablet 3     amLODIPine (NORVASC) 5 MG tablet TAKE 1 TABLET BY MOUTH  EVERY MORNING AND 2 TABLETS EVERY EVENING 270 tablet 3     ASPIRIN PO Take 325 mg by mouth daily.       atenolol (TENORMIN) 50 MG tablet Take 1 tablet (50 mg) by mouth daily 90 tablet 3     atorvastatin (LIPITOR) 20 MG tablet Take 1 tablet (20 mg) by mouth daily 90 tablet 3     Cholecalciferol (VITAMIN D3 PO) Take 1 tablet by mouth daily       coenzyme Q-10 capsule Take 1 capsule by mouth daily       famotidine (PEPCID) 20 MG tablet Take 1 tablet (20 mg) by mouth At Bedtime 90 tablet 3     fenofibrate (TRIGLIDE/LOFIBRA) 160 MG tablet TAKE 1 TABLET(160 MG) BY MOUTH DAILY 90 tablet 3     furosemide (LASIX) 20 MG tablet Take 10 mg every other day, alternating with 20 mg       hydrALAZINE (APRESOLINE) 50 MG tablet TAKE 1 TABLET BY MOUTH 4  TIMES A  tablet 3     Multiple Vitamins-Minerals (PRESERVISION AREDS 2 PO)        spironolactone (ALDACTONE) 25 MG tablet Take 1 tablet (25 mg) by mouth daily 90 tablet 3     Allergies   Allergen Reactions     Avocado      Ciprofloxacin Itching     Penicillins Itching           A 10 organ systems ROS is negative other than any pertinent positives or negatives previously stated.     OBJECTIVE:   /51 (BP Location: Left arm, Cuff Size: Adult Large)   Pulse (!) 49   Temp 97  F (36.1  C) (Tympanic)   Resp 16   Ht 1.683 m (5' 6.25\")   Wt 68.9 kg (151 lb 12.8 oz)   SpO2 97%   BMI 24.32 kg/m   Estimated body mass index is 24.32 kg/m  as calculated from the following:    Height as of this encounter: 1.683 m (5' 6.25\").    Weight as of this encounter: 68.9 kg (151 lb 12.8 oz).  Physical Exam  GENERAL: healthy, alert and no distress  EYES: Eyes grossly normal to inspection, PERRL and conjunctivae and sclerae normal  HENT: ear canals and TM's normal  NECK: no adenopathy, no asymmetry, masses, right CEA scar, thyroid normal to palpation  RESP: dry crackles bilateral lung " "bases, no use of accessory muscles  CV: regular rate and rhythm unchanged systolic murmur  ABDOMEN: soft, nontender, no hepatosplenomegaly, no masses and bowel sounds normal  MS: no gross musculoskeletal defects noted, no edema  SKIN: no suspicious lesions or rashes  NEURO: Normal strength and tone, mentation intact and speech normal  PSYCH: mentation appears normal, affect normal/bright        ASSESSMENT / PLAN:   (Z00.00) Routine general medical examination at a health care facility  (primary encounter diagnosis)      (I71.4) Abdominal aortic aneurysm (AAA) without rupture (H)  Comment: repeat surveillance imaging in may 2022      (I50.32) Chronic diastolic CHF   Comment: home weights are stable; crackles in lungs are probably from early fibrosis, no symptoms, continue to monitor    (I35.0) Severe aortic stenosis --  Comment:   Plan: follow up echocardiogram planned for December     (I15.9) Secondary hypertension  Comment: OK control  Plan:     (I65.29) Stenosis of carotid artery, unspecified laterality  Comment: on aspirin for this ; no symptoms; recheck in May 2022      (M1A.09X0) Idiopathic chronic gout of multiple sites without tophus  Comment: stable       (E78.5) Hyperlipidemia LDL goal <100  Comment: on statin therapy; LDL at goal 1/2021    Stage IV CKD; recheck    CAD stable     Patient has been advised of split billing requirements and indicates understanding: Yes  COUNSELING:  Reviewed preventive health counseling, as reflected in patient instructions  Special attention given to:       Regular exercise       Healthy diet/nutrition       Immunizations    Recommended flu shot           Estimated body mass index is 24.32 kg/m  as calculated from the following:    Height as of this encounter: 1.683 m (5' 6.25\").    Weight as of this encounter: 68.9 kg (151 lb 12.8 oz).        He reports that he quit smoking about 41 years ago. His smoking use included cigars. He has never used smokeless " tobacco.      Appropriate preventive services were discussed with this patient, including applicable screening as appropriate for cardiovascular disease, diabetes, osteopenia/osteoporosis, and glaucoma.  As appropriate for age/gender, discussed screening for colorectal cancer, prostate cancer, breast cancer, and cervical cancer. Checklist reviewing preventive services available has been given to the patient.    Reviewed patients plan of care and provided an AVS. The Basic Care Plan (routine screening as documented in Health Maintenance) for Trevor meets the Care Plan requirement. This Care Plan has been established and reviewed with the Patient.    Counseling Resources:  ATP IV Guidelines  Pooled Cohorts Equation Calculator  Breast Cancer Risk Calculator  Breast Cancer: Medication to Reduce Risk  FRAX Risk Assessment  ICSI Preventive Guidelines  Dietary Guidelines for Americans, 2010  For[employer]'s MyPlate  ASA Prophylaxis  Lung CA Screening    Abdoulaye Redman MD  [facility]    Identified Health Risks:

## 2021-10-26 LAB
ANION GAP SERPL CALCULATED.3IONS-SCNC: 5 MMOL/L (ref 3–14)
BUN SERPL-MCNC: 47 MG/DL (ref 7–30)
CALCIUM SERPL-MCNC: 9 MG/DL (ref 8.5–10.1)
CHLORIDE BLD-SCNC: 113 MMOL/L (ref 94–109)
CHOLEST SERPL-MCNC: 96 MG/DL
CO2 SERPL-SCNC: 21 MMOL/L (ref 20–32)
CREAT SERPL-MCNC: 2.74 MG/DL (ref 0.66–1.25)
FASTING STATUS PATIENT QL REPORTED: NO
GFR SERPL CREATININE-BSD FRML MDRD: 20 ML/MIN/1.73M2
GLUCOSE BLD-MCNC: 150 MG/DL (ref 70–99)
HDLC SERPL-MCNC: 24 MG/DL
LDLC SERPL CALC-MCNC: 50 MG/DL
NONHDLC SERPL-MCNC: 72 MG/DL
POTASSIUM BLD-SCNC: 5.4 MMOL/L (ref 3.4–5.3)
SODIUM SERPL-SCNC: 139 MMOL/L (ref 133–144)
TRIGL SERPL-MCNC: 111 MG/DL

## 2021-10-26 NOTE — RESULT ENCOUNTER NOTE
"The following letter pertains to your most recent diagnostic tests:    Cholesterol control looks ok except for low 'good cholesterol' HDL.  Increasing exercise can improve HDL (\"good cholesterol\") levels.  A good target to shoot for is 30 minutes of aerobic activity at least 4 days per week.     The metabolic panel shows stable kidney function.   He potassium is a little high.  The blood sugar is suprisingly high.      The CBC shows stable anemia (low hemoglobin) and is otherwise stable.        Bottom line:  We need to recheck your potassium and blood sugar.  I recommend scheduling a follow up lab appointment at your convenience for that purpose       Follow up:  lab appointment as soon as you can for repeat potassium and blood sugar.       Sincerely,    Dr. Redman"

## 2021-10-28 DIAGNOSIS — R73.9 HYPERGLYCEMIA: ICD-10-CM

## 2021-10-28 DIAGNOSIS — E87.5 HYPERKALEMIA: ICD-10-CM

## 2021-10-28 LAB — HBA1C MFR BLD: 5.6 % (ref 0–5.6)

## 2021-10-28 PROCEDURE — 84132 ASSAY OF SERUM POTASSIUM: CPT

## 2021-10-28 PROCEDURE — 36415 COLL VENOUS BLD VENIPUNCTURE: CPT

## 2021-10-28 PROCEDURE — 82947 ASSAY GLUCOSE BLOOD QUANT: CPT

## 2021-10-28 PROCEDURE — 83036 HEMOGLOBIN GLYCOSYLATED A1C: CPT

## 2021-10-28 NOTE — LETTER
October 29, 2021      Trevor Soni  [address] [facility] [id number]        Dear ,    We are writing to inform you of your test results.    The following letter pertains to your most recent diagnostic tests:     Good news! The repeat glucose is much better.  The repeat potassium is normal.  The hemoglobin A1c is normal.  No diabetes!   All is well.         Resulted Orders   HEMOGLOBIN A1C   Result Value Ref Range    Hemoglobin A1C 5.6 0.0 - 5.6 %      Comment:      Normal <5.7%   Prediabetes 5.7-6.4%    Diabetes 6.5% or higher     Note: Adopted from ADA consensus guidelines.   Potassium   Result Value Ref Range    Potassium 4.7 3.4 - 5.3 mmol/L   Glucose   Result Value Ref Range    Glucose 114 (H) 70 - 99 mg/dL    Patient Fasting > 8hrs? No        If you have any questions or concerns, please call the clinic at the number listed above.       Sincerely,      Abdoulaye Redman MD

## 2021-10-29 ENCOUNTER — APPOINTMENT (OUTPATIENT)
Dept: LAB | Facility: CLINIC | Age: 86
End: 2021-10-29
Payer: MEDICARE

## 2021-10-29 LAB
FASTING STATUS PATIENT QL REPORTED: NO
GLUCOSE BLD-MCNC: 114 MG/DL (ref 70–99)
POTASSIUM BLD-SCNC: 4.7 MMOL/L (ref 3.4–5.3)

## 2021-10-29 NOTE — RESULT ENCOUNTER NOTE
The following letter pertains to your most recent diagnostic tests:    Good news! The repeat glucose is much better.  The repeat potassium is normal.  The hemoglobin A1c is normal.  No diabetes!   All is well.        Sincerely,    Dr. Redman

## 2021-11-19 DIAGNOSIS — I10 BENIGN ESSENTIAL HYPERTENSION: ICD-10-CM

## 2021-11-19 DIAGNOSIS — E78.5 HYPERLIPIDEMIA LDL GOAL <100: ICD-10-CM

## 2021-11-19 DIAGNOSIS — I15.9 SECONDARY HYPERTENSION: ICD-10-CM

## 2021-11-19 DIAGNOSIS — K21.9 GASTROESOPHAGEAL REFLUX DISEASE WITHOUT ESOPHAGITIS: ICD-10-CM

## 2021-11-22 RX ORDER — AMLODIPINE BESYLATE 5 MG/1
TABLET ORAL
Qty: 270 TABLET | Refills: 3 | Status: SHIPPED | OUTPATIENT
Start: 2021-11-22 | End: 2022-04-18

## 2021-11-22 RX ORDER — SPIRONOLACTONE 25 MG/1
TABLET ORAL
Qty: 90 TABLET | Refills: 3 | Status: SHIPPED | OUTPATIENT
Start: 2021-11-22 | End: 2022-07-27

## 2021-11-22 RX ORDER — ALLOPURINOL 100 MG/1
TABLET ORAL
Qty: 180 TABLET | Refills: 3 | Status: SHIPPED | OUTPATIENT
Start: 2021-11-22 | End: 2022-07-27

## 2021-11-22 RX ORDER — ATORVASTATIN CALCIUM 20 MG/1
TABLET, FILM COATED ORAL
Qty: 90 TABLET | Refills: 2 | Status: SHIPPED | OUTPATIENT
Start: 2021-11-22 | End: 2022-07-19

## 2021-11-22 RX ORDER — HYDRALAZINE HYDROCHLORIDE 50 MG/1
TABLET, FILM COATED ORAL
Qty: 360 TABLET | Refills: 2 | Status: SHIPPED | OUTPATIENT
Start: 2021-11-22 | End: 2022-07-19

## 2021-11-22 RX ORDER — FAMOTIDINE 20 MG/1
TABLET, FILM COATED ORAL
Qty: 90 TABLET | Refills: 2 | Status: ON HOLD | OUTPATIENT
Start: 2021-11-22 | End: 2022-07-15

## 2021-11-22 RX ORDER — ATENOLOL 50 MG/1
TABLET ORAL
Qty: 90 TABLET | Refills: 2 | Status: SHIPPED | OUTPATIENT
Start: 2021-11-22 | End: 2022-07-19

## 2021-11-22 NOTE — TELEPHONE ENCOUNTER
Last OV 10/25/21     Amlodipine, Spironolactone - Routing refill requests to provider for review/approval because:  Labs out of range:  Creatinine   Creatinine   Date Value Ref Range Status   10/25/2021 2.74 (H) 0.66 - 1.25 mg/dL Final   12/21/2020 2.35 (H) 0.66 - 1.25 mg/dL Final     Allopurinol - Routing refill request to provider for review/approval because:  Labs out of range:  creatinine  Labs not current:  ALT/URIC acid     Hydralazine, Atorvastatin, Pepcid, Atenolol- Prescriptions approved per [facility] Refill Protocol.

## 2021-12-03 ENCOUNTER — NURSE TRIAGE (OUTPATIENT)
Dept: FAMILY MEDICINE | Facility: CLINIC | Age: 86
End: 2021-12-03
Payer: MEDICARE

## 2021-12-03 ENCOUNTER — OFFICE VISIT (OUTPATIENT)
Dept: URGENT CARE | Facility: URGENT CARE | Age: 86
End: 2021-12-03
Payer: MEDICARE

## 2021-12-03 VITALS
DIASTOLIC BLOOD PRESSURE: 64 MMHG | HEART RATE: 56 BPM | SYSTOLIC BLOOD PRESSURE: 173 MMHG | OXYGEN SATURATION: 99 % | TEMPERATURE: 98.1 F | RESPIRATION RATE: 20 BRPM

## 2021-12-03 DIAGNOSIS — R05.9 COUGH: ICD-10-CM

## 2021-12-03 PROCEDURE — U0005 INFEC AGEN DETEC AMPLI PROBE: HCPCS

## 2021-12-03 PROCEDURE — U0003 INFECTIOUS AGENT DETECTION BY NUCLEIC ACID (DNA OR RNA); SEVERE ACUTE RESPIRATORY SYNDROME CORONAVIRUS 2 (SARS-COV-2) (CORONAVIRUS DISEASE [COVID-19]), AMPLIFIED PROBE TECHNIQUE, MAKING USE OF HIGH THROUGHPUT TECHNOLOGIES AS DESCRIBED BY CMS-2020-01-R: HCPCS

## 2021-12-03 PROCEDURE — 99213 OFFICE O/P EST LOW 20 MIN: CPT

## 2021-12-03 NOTE — TELEPHONE ENCOUNTER
I would agree with the 'be seen' part before the 'get on medication' part, can we help him get an appointment with me or a team provider as soon as possible?  Agree with RN advise for same day visit today if cough is sever or if there is fever or dyspnea, otherwise would recommend office visit as soon as appointment available

## 2021-12-03 NOTE — TELEPHONE ENCOUNTER
Nurse Triage SBAR    Is this a 2nd Level Triage? YES, LICENSED PRACTITIONER REVIEW IS REQUIRED    Situation  :Daughter Bianka calling with productive cough that started yesterday.  Pt has coughing fits and then is worn out after coughing.  Thinks pt needs to be seen or get medication.      Background  : see above    Assessment     (See information below for more triage details.)    Recommendation : Routing to provider for recommendation on Dr. Redman.    Protocol Recommended Disposition: Immediate office evaluation     Reason for Disposition    Continuous (nonstop) coughing interferes with work or school and no improvement using cough treatment per Care Advice    Additional Information    Negative: Bluish (or gray) lips or face    Negative: Severe difficulty breathing (e.g., struggling for each breath, speaks in single words)    Negative: Rapid onset of cough and has hives    Negative: Coughing started suddenly after medicine, an allergic food or bee sting    Negative: Difficulty breathing after exposure to flames, smoke, or fumes    Negative: Sounds like a life-threatening emergency to the triager    Negative: Previous asthma attacks and this feels like asthma attack    Negative: Chest pain present when not coughing    Negative: Difficulty breathing    Negative: Passed out (i.e., fainted, collapsed and was not responding)    Negative: Patient sounds very sick or weak to the triager    Negative: Coughed up > 1 tablespoon (15 ml) blood (Exception: blood-tinged sputum)    Negative: Fever > 103 F (39.4 C)    Negative: Fever > 101 F (38.3 C) and over 60 years of age    Negative: Fever > 100.0 F (37.8 C) and has diabetes mellitus or a weak immune system (e.g., HIV positive, cancer chemotherapy, organ transplant, splenectomy, chronic steroids)    Negative: Fever > 100.0 F (37.8 C) and bedridden (e.g., nursing home patient, stroke, chronic illness, recovering from surgery)    Negative: Increasing ankle swelling     "Negative: Wheezing is present    Negative: SEVERE coughing spells (e.g., whooping sound after coughing, vomiting after coughing)    Negative: Coughing up loco-colored (reddish-brown) or blood-tinged sputum    Negative: Fever present > 3 days (72 hours)    Negative: Fever returns after gone for over 24 hours and symptoms worse or not improved    Negative: Using nasal washes and pain medicine > 24 hours and sinus pain persists    Negative: Known COPD or other severe lung disease (i.e., bronchiectasis, cystic fibrosis, lung surgery) and worsening symptoms (i.e., increased sputum purulence or amount, increased breathing difficulty)    Answer Assessment - Initial Assessment Questions  1. ONSET: \"When did the cough begin?\"       Yesterday 12/2  2. SEVERITY: \"How bad is the cough today?\"       Coughing while talking to daughter on the phone and wears him out  3. RESPIRATORY DISTRESS: \"Describe your breathing.\"       After coughing takes a while catch breath.  4. FEVER: \"Do you have a fever?\" If so, ask: \"What is your temperature, how was it measured, and when did it start?\"      Cannot find thermometer  5. HEMOPTYSIS: \"Are you coughing up any blood?\" If so ask: \"How much?\" (flecks, streaks, tablespoons, etc.)      no  6. TREATMENT: \"What have you done so far to treat the cough?\" (e.g., meds, fluids, humidifier)      vicks  7. CARDIAC HISTORY: \"Do you have any history of heart disease?\" (e.g., heart attack, congestive heart failure)       Heart murmur and bypass surgery  8. LUNG HISTORY: \"Do you have any history of lung disease?\"  (e.g., pulmonary embolus, asthma, emphysema)      no  9. PE RISK FACTORS: \"Do you have a history of blood clots?\" (or: recent major surgery, recent prolonged travel, bedridden)      no  10. OTHER SYMPTOMS: \"Do you have any other symptoms? (e.g., runny nose, wheezing, chest pain)        Runny nose  11. PREGNANCY: \"Is there any chance you are pregnant?\" \"When was your last menstrual period?\"        " "NA  12. TRAVEL: \"Have you traveled out of the country in the last month?\" (e.g., travel history, exposures)        no    Protocols used: COUGH-A-OH    SEE TODAY OR TOMORROW IN OFFICE: You need to be examined. Let me give you an appointment.      FOR A RUNNY NOSE WITH PROFUSE DISCHARGE: Blow the Nose.  * Nasal mucus and discharge help to wash viruses and bacteria out of the nose and sinuses.  * Blowing the nose is all that is needed.  * If the skin around your nostrils gets irritated, apply a tiny amount of petroleum ointment to the nasal openings once or twice a day.      COUGH MEDICINES:  * OTC COUGH SYRUPS: The most common cough suppressant in OTC cough medications is dextromethorphan. Often the letters 'DM' appear in the name.  * OTC COUGH DROPS: Cough drops can help a lot, especially for mild coughs. They reduce coughing by soothing your irritated throat and removing that tickle sensation in the back of the throat. Cough drops also have the advantage of portability - you can carry them with you.  * HOME REMEDY - HARD CANDY: Hard candy works just as well as medicine-flavored OTC cough drops. People who have diabetes should use sugar-free candy.  * HOME REMEDY - HONEY: This old home remedy has been shown to help decrease coughing at night. The adult dosage is 2 teaspoons (10 ml) at bedtime. Honey should not be given to infants under one year of age.      CALL BACK IF:  * Difficulty breathing occurs  * Fever lasts more than 3 days   * Nasal discharge lasts more than 10 days   * Cough lasts more than 3 weeks   * You become worse        Patient/Caregiver understands and will follow care advice? Yes, plans to follow advice        Offered [facility] walk in clinic but wanted message to go to provider first.    Darby CARD RN  EP Triage    "

## 2021-12-04 NOTE — PROGRESS NOTES
HPI  Trevor Soni 88 year old presents to the urgent care with chief complaint of cough.  Patient states he developed an upper respiratory infection with cough approximately 4 days ago.  He states the cough is mild, dry, and intermittent.  He does state that he does have some discomfort with cough but none in between episodes of coughing.  He denies any fevers, body aches, nausea vomiting, diarrhea, ear or sinus pain.  He has not taken any over-the-counter medications for his symptoms.    ROS   ROS: 10 point ROS neg other than the symptoms noted above in the HPI.  Past Medical History:   Diagnosis Date     Arthritis     rhuematoid     Coronary artery disease     triple bypass 2001     CRF (chronic renal failure)      Gastro-oesophageal reflux disease      Gout      Hyperlipidemia      Hypertension      Nocturia      Patient Active Problem List   Diagnosis     Stiffness of joint, not elsewhere classified,  shoulder region     Abdominal pain     Pneumonia     Hyperlipidemia LDL goal <100     Primary osteoarthritis involving multiple joints     Chronic rhinitis     Primary osteoarthritis of both knees     Idiopathic chronic gout of multiple sites without tophus     Abdominal aortic aneurysm (AAA) without rupture (H)     Stenosis of carotid artery, unspecified laterality     Post-traumatic osteoarthritis of left knee     Chronic bilateral low back pain     Mild aortic stenosis -- Echo 2016     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/21/20     CRF (chronic renal failure), stage 4 (severe) (H)     Gout     CAD -- S/P CABG x 3 in 2001     HTN (hypertension)     Chronic diastolic CHF -- Grade 1-2 Dysfunction Echo 2016     Status post exploratory laparotomy     Carotid stenosis, asymptomatic, bilateral      Past Surgical History:   Procedure Laterality Date     CARDIAC SURGERY       CHOLECYSTECTOMY       COLONOSCOPY       LAPAROTOMY EXPLORATORY N/A 6/30/2020    Procedure: EXPLORATORY LAPAROTOMY, BOWEL  RESECTION;  Surgeon: Bull Rachel MD;  Location:  OR     LAPAROTOMY, LYSIS ADHESIONS, COMBINED N/A 6/21/2020    Procedure: EXPLORATORY LAPAROTOMY WITH LYSIS OF ADHESIONS;  Surgeon: Jose Reed MD;  Location:  OR     ORTHOPEDIC SURGERY       PHACOEMULSIFICATION CLEAR CORNEA WITH TORIC INTRAOCULAR LENS IMPLANT  1/30/2012    Procedure:PHACOEMULSIFICATION CLEAR CORNEA WITH TORIC INTRAOCULAR LENS IMPLANT; LEFT PHACOEMULSIFICATION CLEAR CORNEA WITH DELUXE  TORIC INTRAOCULAR LENS IMPLANT ; Surgeon:BRANDO HIGHTOWER; Location:[facility]     PHACOEMULSIFICATION CLEAR CORNEA WITH TORIC INTRAOCULAR LENS IMPLANT  2/13/2012    Procedure:PHACOEMULSIFICATION CLEAR CORNEA WITH TORIC INTRAOCULAR LENS IMPLANT; RIGHT PHACOEMULSIFICATION CLEAR CORNEA WITH TORIC INTRAOCULAR LENS IMPLANT ; Surgeon:BRANDO HIGHTOWER; Location:[facility]     VASCULAR SURGERY       Allergies   Allergen Reactions     Avocado      Ciprofloxacin Itching     Penicillins Itching     Current Outpatient Medications   Medication     acetaminophen-codeine (TYLENOL #3) 300-30 MG tablet     allopurinol (ZYLOPRIM) 100 MG tablet     amLODIPine (NORVASC) 5 MG tablet     ASPIRIN PO     atenolol (TENORMIN) 50 MG tablet     atorvastatin (LIPITOR) 20 MG tablet     Cholecalciferol (VITAMIN D3 PO)     coenzyme Q-10 capsule     famotidine (PEPCID) 20 MG tablet     fenofibrate (TRIGLIDE/LOFIBRA) 160 MG tablet     furosemide (LASIX) 20 MG tablet     hydrALAZINE (APRESOLINE) 50 MG tablet     Multiple Vitamins-Minerals (PRESERVISION AREDS 2 PO)     spironolactone (ALDACTONE) 25 MG tablet     No current facility-administered medications for this visit.         Physical Exam  Vitals and nursing note reviewed.   Constitutional:       General: He is not in acute distress.     Appearance: He is not toxic-appearing.      Comments: BP (!) 173/64   Pulse 56   Temp 98.1  F (36.7  C) (Tympanic)   Resp 20   SpO2 99%      HENT:      Head: Normocephalic.      Right Ear: Tympanic  membrane normal.      Left Ear: Tympanic membrane normal.      Nose: No rhinorrhea.      Mouth/Throat:      Mouth: Mucous membranes are moist.   Eyes:      Conjunctiva/sclera: Conjunctivae normal.   Cardiovascular:      Rate and Rhythm: Normal rate.      Heart sounds: Normal heart sounds.   Pulmonary:      Effort: Pulmonary effort is normal.      Breath sounds: Normal breath sounds.   Abdominal:      Tenderness: There is no abdominal tenderness.   Musculoskeletal:         General: Normal range of motion.   Lymphadenopathy:      Cervical: No cervical adenopathy.   Skin:     Capillary Refill: Capillary refill takes less than 2 seconds.   Neurological:      Mental Status: He is alert and oriented to person, place, and time.       Assessment:  1. Chest pain    2. Cough        Plan:  Orders Placed This Encounter     Symptomatic COVID-19 Virus (Coronavirus) by PCR Nose   Tylenol 1-2 tabs po q4h prn discomfort  Robitussin DM as directed for cough  Isolate until C-19 labs are known  Instructions regarding self-care of patient/child reviewed.   Written instructions provided in after visit summary and reviewed.  Patient instructed to see primary care provider for new or persistent symptoms.   Red flag symptoms reviewed and patient has been instructed to seek emergent care   Continue other medications as previously prescribed.    Maria Victoria Osorio, DNP, APRN, CNP

## 2021-12-04 NOTE — PATIENT INSTRUCTIONS
"  Patient Education   After Your COVID-19 (Coronavirus) Test  You have been tested for COVID-19 (coronavirus).   If you'll have surgery in the next few days, we'll let you know ahead of time if you have the virus. Please call your surgeon's office with any questions.  For all other patients: Results are usually available in [employer] within 2 to 3 days.   If you do not have a [employer] account, you'll get a letter in the mail in about 7 to 10 days.   [employer] is often the fastest way to get test results. Please sign up if you do not already have a [employer] account. See the handout Getting COVID-19 Test Results in [employer] for help.  What if my test result is positive?  If your test is positive and you have not viewed your result in [employer], you'll get a phone call with your result. (A positive test means that you have the virus.)     Follow the tips under \"How do I self-isolate?\" below for 10 days (20 days if you have a weak immune system).    You don't need to be retested for COVID-19 before going back to school or work. As long as you're fever-free and feeling better, you can go back to school, work and other activities after waiting the 10 or 20 days.  What if I have questions after I get my results?  If you have questions about your results, please visit our testing website at [email or web address].   After 7 to 10 days, if you have not gotten your results:     Call [phone] ([phone]) and ask to speak with our COVID-19 results team.    If you're being treated at an infusion center: Call your infusion center directly.  What are the symptoms of COVID-19?  Cough, fever and trouble breathing are the most common signs of COVID-19.  Other symptoms can include new headaches, new muscle or body aches, new and unexplained fatigue (feeling very tired), chills, sore throat, congestion (stuffy or runny nose), diarrhea (loose poop), loss of taste or smell, belly pain, and nausea or vomiting " "(feeling sick to your stomach or throwing up).  You may already have symptoms of COVID-19, or they may show up later.  What should I do if I have symptoms?  If you're having surgery: Call your surgeon's office.  For all other patients: Stay home and away from others (self-isolate) until ...    You've had no fever--and no medicine that reduces fever--for 1 full day (24 hours), AND    Other symptoms have gotten better. For example, your cough or breathing has improved, AND    At least 10 days have passed since your symptoms first started.  How do I self-isolate?    Stay in your own room, even for meals. Use your own bathroom if you can.    Stay away from others in your home. No hugging, kissing or shaking hands. No visitors.    Don't go to work, school or anywhere else.    Clean \"high touch\" surfaces often (doorknobs, counters, handles). Use household cleaning spray or wipes. You'll find a full list of  on the EPA website: www.epa.gov/pesticide-registration/list-n-disinfectants-use-against-sars-cov-2.    Cover your mouth and nose with a mask or other face covering to avoid spreading germs.    Wash your hands and face often. Use soap and water.    Caregivers in these groups are at risk for severe illness due to COVID-19:  ? People 65 years and older  ? People who live in a nursing home or long-term care facility  ? People with chronic disease (lung, heart, cancer, diabetes, kidney, liver, immunologic)  ? People who have a weakened immune system, including those who:    Are in cancer treatment    Take medicine that weakens the immune system, such as corticosteroids    Had a bone marrow or organ transplant    Have an immune deficiency    Have poorly controlled HIV or AIDS    Are obese (body mass index of 40 or higher)    Smoke regularly    Caregivers should wear gloves while washing dishes, handling laundry and cleaning bedrooms and bathrooms.    Use caution when washing and drying laundry: Don't shake dirty " laundry and use the warmest water setting that you can.    For more tips on managing your health at home, go to www.cdc.gov/coronavirus/2019-ncov/downloads/10Things.pdf.  How can I take care of myself at home?  1. Get lots of rest. Drink extra fluids (unless a doctor has told you not to).  2. Take Tylenol (acetaminophen) for fever or pain. If you have liver or kidney problems, ask your family doctor if it's OK to take Tylenol.   Adults can take either:  ? 650 mg (two 325 mg pills) every 4 to 6 hours, or   ? 1,000 mg (two 500 mg pills) every 8 hours as needed.  ? Note: Don't take more than 3,000 mg in one day. Acetaminophen is found in many medicines (both prescribed and over-the-counter medicines). Read all labels to be sure you don't take too much.   For children, check the Tylenol bottle for the right dose. The dose is based on the child's age or weight.  3. If you have other health problems (like cancer, heart failure, an organ transplant or severe kidney disease): Call your specialty clinic if you don't feel better in the next 2 days.  4. Know when to call 911. Emergency warning signs include:  ? Trouble breathing or shortness of breath  ? Chest pain or pressure that doesn't go away  ? Feeling confused like you haven't felt before, or not being able to wake up  ? Bluish-colored lips or face  5. If your doctor prescribed a blood thinner medicine: Follow their instructions.  Where can I get more information?    [facility] - About COVID-19:   [email or web address]    CDC - If You're Sick: cdc.gov/coronavirus/2019-ncov/about/steps-when-sick.html    CDC - Ending Home Isolation: www.cdc.gov/coronavirus/2019-ncov/hcp/disposition-in-home-patients.html    CDC - Caring for Someone: www.cdc.gov/coronavirus/2019-ncov/if-you-are-sick/care-for-someone.html    [facility] - Interim Guidance for Hospital Discharge to Home: www.health.[facility].[place]/diseases/coronavirus/hcp/hospdischarge.pdf    [facility]  clinical trials (COVID-19 research studies): [email or web address]-clinical-trials    Below are the COVID-19 hotlines at the [place] Department of Health ([facility]). Interpreters are available.  ? For health questions: Call 797-793-1285 or 1-248.781.9895 (7 a.m. to 7 p.m.)  ? For questions about schools and childcare: Call 240-794-1946 or 1-511.730.7612 (7 a.m. to 7 p.m.)    For informational purposes only. Not to replace the advice of your health care provider. Clinically reviewed by Infection Prevention and the [facility] COVID-19 Clinical Team. Copyright   2020 [facility]. All rights reserved. [employer] 067645 - Rev 11/11/20.

## 2021-12-05 LAB — SARS-COV-2 RNA RESP QL NAA+PROBE: NEGATIVE

## 2021-12-06 ENCOUNTER — VIRTUAL VISIT (OUTPATIENT)
Dept: FAMILY MEDICINE | Facility: CLINIC | Age: 86
End: 2021-12-06
Payer: MEDICARE

## 2021-12-06 VITALS — SYSTOLIC BLOOD PRESSURE: 135 MMHG | DIASTOLIC BLOOD PRESSURE: 56 MMHG

## 2021-12-06 DIAGNOSIS — H10.33 ACUTE CONJUNCTIVITIS OF BOTH EYES, UNSPECIFIED ACUTE CONJUNCTIVITIS TYPE: ICD-10-CM

## 2021-12-06 DIAGNOSIS — J06.9 UPPER RESPIRATORY TRACT INFECTION, UNSPECIFIED TYPE: Primary | ICD-10-CM

## 2021-12-06 PROCEDURE — 99442 PR PHYSICIAN TELEPHONE EVALUATION 11-20 MIN: CPT | Mod: 95 | Performed by: INTERNAL MEDICINE

## 2021-12-06 RX ORDER — CHLORPHENIRAMINE MALEATE AND DEXTROMETHORPHAN HYDROBROMIDE 4; 30 MG/1; MG/1
1 TABLET, FILM COATED ORAL EVERY 6 HOURS PRN
Qty: 42 TABLET | Refills: 2 | Status: SHIPPED | OUTPATIENT
Start: 2021-12-06 | End: 2022-09-22

## 2021-12-06 RX ORDER — TOBRAMYCIN 3 MG/ML
1-2 SOLUTION/ DROPS OPHTHALMIC
Qty: 5 ML | Refills: 0 | Status: SHIPPED | OUTPATIENT
Start: 2021-12-06 | End: 2022-07-11

## 2021-12-06 NOTE — PROGRESS NOTES
Trevor is a 88 year old who is being evaluated via a billable telephone visit.      What phone number would you like to be contacted at? [phone]  How would you like to obtain your AVS? Mail a copy    Assessment & Plan     Upper respiratory tract infection, unspecified type  Sounds like viral URI and overall symptoms improving  Can use below for symptoms  Apply Vaseline to nostrils with Q-tip BID to avoid nose bleeds   - Chlorpheniramine-DM (CORICIDIN HBP COUGH/COLD) 4-30 MG TABS; Take 1 tablet by mouth every 6 hours as needed (nasal congestion, cough, runny nose)    Acute conjunctivitis of both eyes, unspecified acute conjunctivitis type  Sounds like conjunctivitis by history  Exact diagnosis difficult due to telephone visit  Can use below to lubricate/hydrate eyes and avoid secondary bacterial infection    - tobramycin (TOBREX) 0.3 % ophthalmic solution; Place 1-2 drops into both eyes every 2 hours      23 minutes spent on the date of the encounter doing chart review, history and exam, documentation and further activities per the note           Return in about 1 week (around 12/13/2021) for recheck if symptoms persist.  Patient instructed to return to clinic or contact us sooner if symptoms worsen or new symptoms develop.     Abdoulaye Redman MD  [facility] [place]    Subjective   Trevor is a 88 year old who presents for the following health issues     HPI     ED/UC Followup:    Facility:  [facility] [place]  Date of visit: 12/3/2021  Reason for visit:       Cough    Current Status: pt report epistaxis (started last night) - stopped now but has not been able to sleep, also states cough has got worse, sore throat and runny nose.        Seen in UC with cough, runny nose and congestion  COVID test negative  Symptoms improving  Now with 'crusty', mildly painful bilateral eyes without vision loss  Also nose bleed last night   Using over the counter Robitussin for cough     Review of  Systems         Objective    Vitals - Patient Reported  Systolic (Patient Reported): 132  Diastolic (Patient Reported): 56      Vitals:  No vitals were obtained today due to virtual visit.    Physical Exam   healthy, alert and no distress  PSYCH: Alert and oriented times 3; coherent speech, normal   rate and volume, able to articulate logical thoughts, able   to abstract reason, no tangential thoughts, no hallucinations   or delusions  His affect is normal  RESP: No cough, no audible wheezing, able to talk in full sentences  Remainder of exam unable to be completed due to telephone visits                Phone call duration: 13:10 minutes

## 2021-12-24 DIAGNOSIS — I10 BENIGN ESSENTIAL HYPERTENSION: ICD-10-CM

## 2021-12-24 DIAGNOSIS — E78.5 HYPERLIPIDEMIA LDL GOAL <100: ICD-10-CM

## 2021-12-27 RX ORDER — FENOFIBRATE 160 MG/1
TABLET ORAL
Qty: 90 TABLET | Refills: 3 | Status: SHIPPED | OUTPATIENT
Start: 2021-12-27 | End: 2022-07-27

## 2021-12-28 DIAGNOSIS — I10 BENIGN ESSENTIAL HYPERTENSION: Primary | ICD-10-CM

## 2021-12-28 NOTE — TELEPHONE ENCOUNTER
Furosemide 20 mg tablet    Summary: Take 10 mg every other day, alternating with 20 mg     Dose, Route, Frequency: Oral    Ord/Sold: 6/24/2021    LOV 10/25/21

## 2021-12-30 RX ORDER — FUROSEMIDE 20 MG
20 TABLET ORAL DAILY
Qty: 90 TABLET | Refills: 3 | Status: SHIPPED | OUTPATIENT
Start: 2021-12-30 | End: 2022-05-09

## 2021-12-30 NOTE — TELEPHONE ENCOUNTER
Creatinine   Date Value Ref Range Status   10/25/2021 2.74 (H) 0.66 - 1.25 mg/dL Final   12/21/2020 2.35 (H) 0.66 - 1.25 mg/dL Final     Need frequency dose and refill quantities.    10/25/21 LOV Dr. Redman.    Please refill as appropriate.  Thank you,    Ritika Peguero RN  [facility]

## 2022-01-04 ENCOUNTER — TELEPHONE (OUTPATIENT)
Dept: FAMILY MEDICINE | Facility: CLINIC | Age: 87
End: 2022-01-04
Payer: MEDICARE

## 2022-01-04 NOTE — TELEPHONE ENCOUNTER
Patient called following up on refill request. Writer notified that script sent below    furosemide (LASIX) 20 MG tablet 90 tablet 3 12/30/2021  No       [facility] - [place], [place] - [address], [address]     Advised patient to follow up with pharmacy to see if medication sent out. Patient expressed verbal understnding.   Ernestine Boateng RN  [facility]

## 2022-02-15 ENCOUNTER — HOSPITAL ENCOUNTER (OUTPATIENT)
Dept: CARDIOLOGY | Facility: CLINIC | Age: 87
Discharge: HOME | End: 2022-02-15
Attending: INTERNAL MEDICINE | Admitting: INTERNAL MEDICINE
Payer: MEDICARE

## 2022-02-15 DIAGNOSIS — I35.0 SEVERE AORTIC STENOSIS: ICD-10-CM

## 2022-02-15 LAB — LVEF ECHO: NORMAL

## 2022-02-15 PROCEDURE — 93306 TTE W/DOPPLER COMPLETE: CPT

## 2022-02-15 PROCEDURE — 93306 TTE W/DOPPLER COMPLETE: CPT | Mod: 26 | Performed by: INTERNAL MEDICINE

## 2022-02-21 ENCOUNTER — OFFICE VISIT (OUTPATIENT)
Dept: CARDIOLOGY | Facility: CLINIC | Age: 87
End: 2022-02-21
Payer: MEDICARE

## 2022-02-21 VITALS
WEIGHT: 152.7 LBS | DIASTOLIC BLOOD PRESSURE: 56 MMHG | SYSTOLIC BLOOD PRESSURE: 128 MMHG | HEART RATE: 50 BPM | HEIGHT: 66 IN | BODY MASS INDEX: 24.54 KG/M2 | OXYGEN SATURATION: 98 %

## 2022-02-21 DIAGNOSIS — I71.40 ABDOMINAL AORTIC ANEURYSM (AAA) WITHOUT RUPTURE (H): ICD-10-CM

## 2022-02-21 DIAGNOSIS — I25.810 CORONARY ARTERY DISEASE INVOLVING CORONARY BYPASS GRAFT OF NATIVE HEART WITHOUT ANGINA PECTORIS: ICD-10-CM

## 2022-02-21 DIAGNOSIS — I35.0 SEVERE AORTIC STENOSIS: Primary | ICD-10-CM

## 2022-02-21 DIAGNOSIS — I10 PRIMARY HYPERTENSION: ICD-10-CM

## 2022-02-21 DIAGNOSIS — N18.4 CRF (CHRONIC RENAL FAILURE), STAGE 4 (SEVERE) (H): ICD-10-CM

## 2022-02-21 PROCEDURE — 99214 OFFICE O/P EST MOD 30 MIN: CPT | Performed by: INTERNAL MEDICINE

## 2022-02-21 NOTE — LETTER
2/21/2022    Abdoulaye Redman MD  [address]  [place] [address]    RE: Trevor Soni       Dear Colleague,     I had the pleasure of seeing Trevor Soni in the [facility].  HPI and Plan:   He is a pleasant 87-year-old male with a past medical history of coronary artery disease with bypass surgery in 2001.  At that time he had a LIMA to the diagonal branch which was more dominant vessel compared to the LAD which was small.  He also had a vein graft to the circumflex and PDA.  This was 3 weeks after his right carotid endarterectomy.     I saw him June 2021 for aortic stenosis.  At that time aortic stenosis appeared moderate to severe.  Mean gradient was 22 mm.  Subsequently, he was asked to follow-up with me.  He had a repeat echocardiogram earlier this year and it revealed progression of aortic stenosis with a mean gradient of now 41 mm.  The LVOT diameter was measured higher on the study and more accurately.  The valve area is somewhat in the severe range at 0.9 cm .  However the dimensionless index is moderate to severe at 0.28.  Ejection fraction was normal.  In addition he has some mild to moderate mitral stenosis with a mean gradient of 4 to 6 mm.     Patient returns for a follow-up with his daughter.  He walks with a cane.  He goes grocery shopping.  He is slowing down at his age but there is no new discernible cardiac symptoms.  He denies any chest pain or shortness of breath orthopnea or PND.  No dizziness or syncope.      He also had carotid Dopplers and abdominal aortic ultrasound last year which showed greater than 70% stenosis of left internal carotid artery and 50 to 69% on right which is similar to 2017 carotid Dopplers.  Her abdominal aortic aneurysm was smaller 3.6 cm.      He does have chronic renal insufficiency last creatinine was 2.74.  He is concerned about dye exposure and wants to delay any intervention as much as he can.       Exam  Below     Impression     Severe aortic stenosis  Aortic stenosis now appears inching toward severe range.  The mean gradient is 41 with a dimension mass index is 0.28 making it moderate to severe.  He again remains more or less asymptomatic.  Ejection fraction is normal.  We talked about referral to the structural heart clinic an hour later.  Explained to him that in work-up for his valve replacement he will need dye exposure for CT as well as angiogram and that might affect his renal function.  For now he wants to hold off on any procedures as he is asymptomatic.  If he has any interim symptoms, he will call us.  Otherwise we will do a close follow-up of his aortic stenosis with a repeat echocardiogram in 6 months and see me in follow-up.     2. Abdominal aortic aneurysm (AAA) without rupture (H)  Small.  Followed by the primary care physician.     3. Coronary artery disease involving coronary bypass graft of native heart without angina pectoris  No angina.     4. Hyperlipidemia LDL goal <100  Continue atorvastatin.     5. Bilateral carotid artery stenosis  Stable findings on recent carotid Doppler.     6. CRF (chronic renal failure), stage 4 (severe) (H)  Deferred to primary care physician but may need nephrology consult.     7.  Bilateral basal rales, fine on exam, given this auscultatory finding, he may have early lung scarring or fibrosis.  Defer further work-up to primary care physician.      Thank you for allowing us to participate the care of this nice patient.  He will be seen us in follow-up in 6 months.     Sincerely,     Ivan Ravi MD    Today's clinic visit entailed:  32 minutes spent on the date of the encounter doing chart review, review of test results, patient visit and documentation   Provider  Link to [facility] Help Grid     The level of medical decision making during this visit was of moderate complexity.      No orders of the defined types were placed in this encounter.      No orders of  the defined types were placed in this encounter.      There are no discontinued medications.      Encounter Diagnosis   Name Primary?     Severe aortic stenosis        CURRENT MEDICATIONS:  Current Outpatient Medications   Medication Sig Dispense Refill     acetaminophen-codeine (TYLENOL #3) 300-30 MG tablet Take 1 tablet by mouth every 6 hours as needed for severe pain 60 tablet 1     allopurinol (ZYLOPRIM) 100 MG tablet TAKE 2 TABLETS BY MOUTH  DAILY 180 tablet 3     amLODIPine (NORVASC) 5 MG tablet TAKE 1 TABLET BY MOUTH IN  THE MORNING AND 2 TABLETS  BY MOUTH IN THE EVENING 270 tablet 3     ASPIRIN PO Take 325 mg by mouth daily.       atenolol (TENORMIN) 50 MG tablet TAKE 1 TABLET BY MOUTH  DAILY 90 tablet 2     atorvastatin (LIPITOR) 20 MG tablet TAKE 1 TABLET BY MOUTH  DAILY 90 tablet 2     Chlorpheniramine-DM (CORICIDIN HBP COUGH/COLD) 4-30 MG TABS Take 1 tablet by mouth every 6 hours as needed (nasal congestion, cough, runny nose) 42 tablet 2     Cholecalciferol (VITAMIN D3 PO) Take 1 tablet by mouth daily       coenzyme Q-10 capsule Take 1 capsule by mouth daily       famotidine (PEPCID) 20 MG tablet TAKE 1 TABLET BY MOUTH AT  BEDTIME 90 tablet 2     fenofibrate (TRIGLIDE/LOFIBRA) 160 MG tablet TAKE 1 TABLET BY MOUTH  DAILY 90 tablet 3     furosemide (LASIX) 20 MG tablet Take 1 tablet (20 mg) by mouth daily Take 10 mg every other day, alternating with 20 mg 90 tablet 3     hydrALAZINE (APRESOLINE) 50 MG tablet TAKE 1 TABLET BY MOUTH 4  TIMES DAILY 360 tablet 2     Multiple Vitamins-Minerals (PRESERVISION AREDS 2 PO)        spironolactone (ALDACTONE) 25 MG tablet TAKE 1 TABLET BY MOUTH  DAILY 90 tablet 3     tobramycin (TOBREX) 0.3 % ophthalmic solution Place 1-2 drops into both eyes every 2 hours 5 mL 0       ALLERGIES     Allergies   Allergen Reactions     Avocado      Ciprofloxacin Itching     Penicillins Itching       PAST MEDICAL HISTORY:  Past Medical History:   Diagnosis Date     Arthritis      rhuematoid     Coronary artery disease     triple bypass      CRF (chronic renal failure)      Gastro-oesophageal reflux disease      Gout      Hyperlipidemia      Hypertension      Nocturia        PAST SURGICAL HISTORY:  Past Surgical History:   Procedure Laterality Date     CARDIAC SURGERY       CHOLECYSTECTOMY       COLONOSCOPY       LAPAROTOMY EXPLORATORY N/A 2020    Procedure: EXPLORATORY LAPAROTOMY, BOWEL RESECTION;  Surgeon: Bull Rachel MD;  Location:  OR     LAPAROTOMY, LYSIS ADHESIONS, COMBINED N/A 2020    Procedure: EXPLORATORY LAPAROTOMY WITH LYSIS OF ADHESIONS;  Surgeon: Jose Reed MD;  Location:  OR     ORTHOPEDIC SURGERY       PHACOEMULSIFICATION CLEAR CORNEA WITH TORIC INTRAOCULAR LENS IMPLANT  2012    Procedure:PHACOEMULSIFICATION CLEAR CORNEA WITH TORIC INTRAOCULAR LENS IMPLANT; LEFT PHACOEMULSIFICATION CLEAR CORNEA WITH DELUXE  TORIC INTRAOCULAR LENS IMPLANT ; Surgeon:BRANDO HGIHTOWER; Location: [facility]     PHACOEMULSIFICATION CLEAR CORNEA WITH TORIC INTRAOCULAR LENS IMPLANT  2012    Procedure:PHACOEMULSIFICATION CLEAR CORNEA WITH TORIC INTRAOCULAR LENS IMPLANT; RIGHT PHACOEMULSIFICATION CLEAR CORNEA WITH TORIC INTRAOCULAR LENS IMPLANT ; Surgeon:BRANDO HIGHTOWER; Location: [facility]     VASCULAR SURGERY         FAMILY HISTORY:  Family History   Problem Relation Age of Onset     Myocardial Infarction Mother      Rheumatic fever Father      Cerebrovascular Disease Brother        SOCIAL HISTORY:  Social History     Socioeconomic History     Marital status:      Spouse name: Not on file     Number of children: Not on file     Years of education: Not on file     Highest education level: Not on file   Occupational History     Not on file   Tobacco Use     Smoking status: Former Smoker     Types: Cigars     Quit date: 1980     Years since quittin.1     Smokeless tobacco: Never Used   Substance and Sexual Activity     Alcohol use: Yes      Alcohol/week: 0.0 standard drinks     Comment: 1 drink week     Drug use: No     Sexual activity: Not Currently     Partners: Female   Other Topics Concern     Parent/sibling w/ CABG, MI or angioplasty before 65F 55M? Yes   Social History Narrative     Not on file     Social Determinants of Health     Financial Resource Strain: Not on file   Food Insecurity: Not on file   Transportation Needs: Not on file   Physical Activity: Not on file   Stress: Not on file   Social Connections: Not on file   Intimate Partner Violence: Not on file   Housing Stability: Not on file       Review of Systems:  Skin:          Eyes:         ENT:         Respiratory:          Cardiovascular:         Gastroenterology:        Genitourinary:         Musculoskeletal:         Neurologic:         Psychiatric:         Heme/Lymph/Imm:         Endocrine:           Physical Exam:  Vitals: There were no vitals taken for this visit.    Constitutional:  in no acute distress        Skin:  warm and dry to the touch          Head:  normocephalic        Eyes:  sclera white        Lymph:      ENT:           Neck:  JVP normal bilateral carotid bruit      Respiratory:    basal rales;fine rales       Cardiac: regular rhythm;normal S1 and S2       systolic ejection murmur;grade 3        not assessed this visit                                        GI:  not assessed this visit        Extremities and Muscular Skeletal:        RLE edema;pitting;trace LLE edema;trace;pitting      Neurological:  no gross motor deficits        Psych:  Alert and Oriented x 3        Ivan Ravi MD   [facility]

## 2022-02-21 NOTE — PROGRESS NOTES
HPI and Plan:   He is a pleasant 87-year-old male with a past medical history of coronary artery disease with bypass surgery in 2001.  At that time he had a LIMA to the diagonal branch which was more dominant vessel compared to the LAD which was small.  He also had a vein graft to the circumflex and PDA.  This was 3 weeks after his right carotid endarterectomy.     I saw him June 2021 for aortic stenosis.  At that time aortic stenosis appeared moderate to severe.  Mean gradient was 22 mm.  Subsequently, he was asked to follow-up with me.  He had a repeat echocardiogram earlier this year and it revealed progression of aortic stenosis with a mean gradient of now 41 mm.  The LVOT diameter was measured higher on the study and more accurately.  The valve area is somewhat in the severe range at 0.9 cm .  However the dimensionless index is moderate to severe at 0.28.  Ejection fraction was normal.  In addition he has some mild to moderate mitral stenosis with a mean gradient of 4 to 6 mm.     Patient returns for a follow-up with his daughter.  He walks with a cane.  He goes grocery shopping.  He is slowing down at his age but there is no new discernible cardiac symptoms.  He denies any chest pain or shortness of breath orthopnea or PND.  No dizziness or syncope.      He also had carotid Dopplers and abdominal aortic ultrasound last year which showed greater than 70% stenosis of left internal carotid artery and 50 to 69% on right which is similar to 2017 carotid Dopplers.  Her abdominal aortic aneurysm was smaller 3.6 cm.      He does have chronic renal insufficiency last creatinine was 2.74.  He is concerned about dye exposure and wants to delay any intervention as much as he can.      Exam  Below     Impression     Severe aortic stenosis  Aortic stenosis now appears inching toward severe range.  The mean gradient is 41 with a dimension mass index is 0.28 making it moderate to severe.  He again remains more or less  asymptomatic.  Ejection fraction is normal.  We talked about referral to the structural heart clinic an hour later.  Explained to him that in work-up for his valve replacement he will need dye exposure for CT as well as angiogram and that might affect his renal function.  For now he wants to hold off on any procedures as he is asymptomatic.  If he has any interim symptoms, he will call us.  Otherwise we will do a close follow-up of his aortic stenosis with a repeat echocardiogram in 6 months and see me in follow-up.     2. Abdominal aortic aneurysm (AAA) without rupture (H)  Small.  Followed by the primary care physician.     3. Coronary artery disease involving coronary bypass graft of native heart without angina pectoris  No angina.     4. Hyperlipidemia LDL goal <100  Continue atorvastatin.     5. Bilateral carotid artery stenosis  Stable findings on recent carotid Doppler.     6. CRF (chronic renal failure), stage 4 (severe) (H)  Deferred to primary care physician but may need nephrology consult.     7.  Bilateral basal rales, fine on exam, given this auscultatory finding, he may have early lung scarring or fibrosis.  Defer further work-up to primary care physician.      Thank you for allowing us to participate the care of this nice patient.  He will be seen us in follow-up in 6 months.     Sincerely,     Ivan Ravi MD    Today's clinic visit entailed:  32 minutes spent on the date of the encounter doing chart review, review of test results, patient visit and documentation   Provider  Link to [facility] Help Grid     The level of medical decision making during this visit was of moderate complexity.      No orders of the defined types were placed in this encounter.      No orders of the defined types were placed in this encounter.      There are no discontinued medications.      Encounter Diagnosis   Name Primary?     Severe aortic stenosis        CURRENT MEDICATIONS:  Current Outpatient Medications   Medication Sig  Dispense Refill     acetaminophen-codeine (TYLENOL #3) 300-30 MG tablet Take 1 tablet by mouth every 6 hours as needed for severe pain 60 tablet 1     allopurinol (ZYLOPRIM) 100 MG tablet TAKE 2 TABLETS BY MOUTH  DAILY 180 tablet 3     amLODIPine (NORVASC) 5 MG tablet TAKE 1 TABLET BY MOUTH IN  THE MORNING AND 2 TABLETS  BY MOUTH IN THE EVENING 270 tablet 3     ASPIRIN PO Take 325 mg by mouth daily.       atenolol (TENORMIN) 50 MG tablet TAKE 1 TABLET BY MOUTH  DAILY 90 tablet 2     atorvastatin (LIPITOR) 20 MG tablet TAKE 1 TABLET BY MOUTH  DAILY 90 tablet 2     Chlorpheniramine-DM (CORICIDIN HBP COUGH/COLD) 4-30 MG TABS Take 1 tablet by mouth every 6 hours as needed (nasal congestion, cough, runny nose) 42 tablet 2     Cholecalciferol (VITAMIN D3 PO) Take 1 tablet by mouth daily       coenzyme Q-10 capsule Take 1 capsule by mouth daily       famotidine (PEPCID) 20 MG tablet TAKE 1 TABLET BY MOUTH AT  BEDTIME 90 tablet 2     fenofibrate (TRIGLIDE/LOFIBRA) 160 MG tablet TAKE 1 TABLET BY MOUTH  DAILY 90 tablet 3     furosemide (LASIX) 20 MG tablet Take 1 tablet (20 mg) by mouth daily Take 10 mg every other day, alternating with 20 mg 90 tablet 3     hydrALAZINE (APRESOLINE) 50 MG tablet TAKE 1 TABLET BY MOUTH 4  TIMES DAILY 360 tablet 2     Multiple Vitamins-Minerals (PRESERVISION AREDS 2 PO)        spironolactone (ALDACTONE) 25 MG tablet TAKE 1 TABLET BY MOUTH  DAILY 90 tablet 3     tobramycin (TOBREX) 0.3 % ophthalmic solution Place 1-2 drops into both eyes every 2 hours 5 mL 0       ALLERGIES     Allergies   Allergen Reactions     Avocado      Ciprofloxacin Itching     Penicillins Itching       PAST MEDICAL HISTORY:  Past Medical History:   Diagnosis Date     Arthritis     rhuematoid     Coronary artery disease     triple bypass 2001     CRF (chronic renal failure)      Gastro-oesophageal reflux disease      Gout      Hyperlipidemia      Hypertension      Nocturia        PAST SURGICAL HISTORY:  Past Surgical  History:   Procedure Laterality Date     CARDIAC SURGERY       CHOLECYSTECTOMY       COLONOSCOPY       LAPAROTOMY EXPLORATORY N/A 2020    Procedure: EXPLORATORY LAPAROTOMY, BOWEL RESECTION;  Surgeon: Bull Rachel MD;  Location:  OR     LAPAROTOMY, LYSIS ADHESIONS, COMBINED N/A 2020    Procedure: EXPLORATORY LAPAROTOMY WITH LYSIS OF ADHESIONS;  Surgeon: Jose Reed MD;  Location:  OR     ORTHOPEDIC SURGERY       PHACOEMULSIFICATION CLEAR CORNEA WITH TORIC INTRAOCULAR LENS IMPLANT  2012    Procedure:PHACOEMULSIFICATION CLEAR CORNEA WITH TORIC INTRAOCULAR LENS IMPLANT; LEFT PHACOEMULSIFICATION CLEAR CORNEA WITH DELUXE  TORIC INTRAOCULAR LENS IMPLANT ; Surgeon:BRANDO HIGHTOWER; Location: [facility]     PHACOEMULSIFICATION CLEAR CORNEA WITH TORIC INTRAOCULAR LENS IMPLANT  2012    Procedure:PHACOEMULSIFICATION CLEAR CORNEA WITH TORIC INTRAOCULAR LENS IMPLANT; RIGHT PHACOEMULSIFICATION CLEAR CORNEA WITH TORIC INTRAOCULAR LENS IMPLANT ; Surgeon:BRANDO HIGHTOWER; Location[facility]     VASCULAR SURGERY         FAMILY HISTORY:  Family History   Problem Relation Age of Onset     Myocardial Infarction Mother      Rheumatic fever Father      Cerebrovascular Disease Brother        SOCIAL HISTORY:  Social History     Socioeconomic History     Marital status:      Spouse name: Not on file     Number of children: Not on file     Years of education: Not on file     Highest education level: Not on file   Occupational History     Not on file   Tobacco Use     Smoking status: Former Smoker     Types: Cigars     Quit date: 1980     Years since quittin.1     Smokeless tobacco: Never Used   Substance and Sexual Activity     Alcohol use: Yes     Alcohol/week: 0.0 standard drinks     Comment: 1 drink week     Drug use: No     Sexual activity: Not Currently     Partners: Female   Other Topics Concern     Parent/sibling w/ CABG, MI or angioplasty before 65F 55M? Yes   Social History Narrative      Not on file     Social Determinants of Health     Financial Resource Strain: Not on file   Food Insecurity: Not on file   Transportation Needs: Not on file   Physical Activity: Not on file   Stress: Not on file   Social Connections: Not on file   Intimate Partner Violence: Not on file   Housing Stability: Not on file       Review of Systems:  Skin:          Eyes:         ENT:         Respiratory:          Cardiovascular:         Gastroenterology:        Genitourinary:         Musculoskeletal:         Neurologic:         Psychiatric:         Heme/Lymph/Imm:         Endocrine:           Physical Exam:  Vitals: There were no vitals taken for this visit.    Constitutional:  in no acute distress        Skin:  warm and dry to the touch          Head:  normocephalic        Eyes:  sclera white        Lymph:      ENT:           Neck:  JVP normal bilateral carotid bruit      Respiratory:    basal rales;fine rales       Cardiac: regular rhythm;normal S1 and S2       systolic ejection murmur;grade 3        not assessed this visit                                        GI:  not assessed this visit        Extremities and Muscular Skeletal:        RLE edema;pitting;trace LLE edema;trace;pitting      Neurological:  no gross motor deficits        Psych:  Alert and Oriented x 3        CC  Ivna Ravi MD  [address]  [id number]  [place] [id number]

## 2022-04-18 ENCOUNTER — OFFICE VISIT (OUTPATIENT)
Dept: FAMILY MEDICINE | Facility: CLINIC | Age: 87
End: 2022-04-18

## 2022-04-18 ENCOUNTER — ANCILLARY PROCEDURE (OUTPATIENT)
Dept: GENERAL RADIOLOGY | Facility: CLINIC | Age: 87
End: 2022-04-18
Attending: INTERNAL MEDICINE
Payer: MEDICARE

## 2022-04-18 VITALS
DIASTOLIC BLOOD PRESSURE: 52 MMHG | RESPIRATION RATE: 16 BRPM | WEIGHT: 154 LBS | SYSTOLIC BLOOD PRESSURE: 133 MMHG | HEART RATE: 54 BPM | OXYGEN SATURATION: 98 % | TEMPERATURE: 96.9 F | HEIGHT: 66 IN | BODY MASS INDEX: 24.75 KG/M2

## 2022-04-18 DIAGNOSIS — M54.50 RIGHT-SIDED LOW BACK PAIN WITHOUT SCIATICA, UNSPECIFIED CHRONICITY: ICD-10-CM

## 2022-04-18 DIAGNOSIS — R60.0 BILATERAL LOWER EXTREMITY EDEMA: Primary | ICD-10-CM

## 2022-04-18 DIAGNOSIS — I10 BENIGN ESSENTIAL HYPERTENSION: ICD-10-CM

## 2022-04-18 DIAGNOSIS — M1A.09X0 IDIOPATHIC CHRONIC GOUT OF MULTIPLE SITES WITHOUT TOPHUS: ICD-10-CM

## 2022-04-18 DIAGNOSIS — I10 PRIMARY HYPERTENSION: ICD-10-CM

## 2022-04-18 DIAGNOSIS — I50.32 CHRONIC DIASTOLIC HEART FAILURE (H): ICD-10-CM

## 2022-04-18 DIAGNOSIS — I35.0 SEVERE AORTIC STENOSIS: ICD-10-CM

## 2022-04-18 DIAGNOSIS — I71.40 ABDOMINAL AORTIC ANEURYSM (AAA) WITHOUT RUPTURE (H): ICD-10-CM

## 2022-04-18 DIAGNOSIS — E78.5 HYPERLIPIDEMIA LDL GOAL <100: ICD-10-CM

## 2022-04-18 PROCEDURE — 72100 X-RAY EXAM L-S SPINE 2/3 VWS: CPT | Performed by: RADIOLOGY

## 2022-04-18 PROCEDURE — 99214 OFFICE O/P EST MOD 30 MIN: CPT | Performed by: INTERNAL MEDICINE

## 2022-04-18 RX ORDER — AMLODIPINE BESYLATE 5 MG/1
5 TABLET ORAL DAILY
Qty: 90 TABLET | Refills: 3 | Status: SHIPPED | OUTPATIENT
Start: 2022-04-18 | End: 2022-07-21

## 2022-04-18 ASSESSMENT — PAIN SCALES - GENERAL: PAINLEVEL: NO PAIN (0)

## 2022-04-18 NOTE — LETTER
April 18, 2022      Trevor Soni  [address] [facility] [id number]        Dear ,    We are writing to inform you of your test results.      The following letter pertains to your most recent diagnostic tests:       Unfortunately, the back x-ray shows several compression fractures in the spine.  This could be the cause of your back pain.  I do not think any specific intervention is necessary to heal these fractures.  These kinds of fractures typically heal on their own with some time.  However, you could use Tylenol 3 if Tylenol alone does not address the pain from these fractures adequately in addition to the lidocaine patch.  Be careful not to exceed 3000 mg of acetaminophen in a 24-hour period.  I sent a new prescription for Tylenol 3 tablets to your pharmacy.  Typically, these fractures just take time to feel better.  We can follow-up on how you are feeling when we meet back up again at the beginning of next month.  We can also discuss medications that you can take to prevent more fractures in your spine at that time.       Resulted Orders   XR Lumbar Spine 2/3 Views    Narrative    XR LUMBAR SPINE TWO-THREE VIEWS  4/18/2022 9:33 AM     HISTORY: Low back pain. Right-sided low back pain.    COMPARISON: None.       Impression    IMPRESSION: Bones are osteopenic which limits evaluation for  fractures. There are age-indeterminate compression deformities of  multiple vertebral bodies particularly L1, L2, and L3. Acute fractures  are not excluded by plain film. Grade 1 retrolisthesis of L1 on L2 and  L2 on L3. Grade 1 anterolisthesis of L4 on L5. Severe multilevel  degenerative changes throughout the lumbar spine.    DIANA ANDREW MD         SYSTEM ID:  [id number]       If you have any questions or concerns, please call the clinic at the number listed above.       Sincerely,      Abdoulaye Redman MD

## 2022-04-18 NOTE — RESULT ENCOUNTER NOTE
The following letter pertains to your most recent diagnostic tests:    Unfortunately, the back x-ray shows several compression fractures in the spine.  This could be the cause of your back pain.  I do not think any specific intervention is necessary to heal these fractures.  These kinds of fractures typically heal on their own with some time.  However, you could use Tylenol 3 if Tylenol alone does not address the pain from these fractures adequately in addition to the lidocaine patch.  Be careful not to exceed 3000 mg of acetaminophen in a 24-hour period.  I sent a new prescription for Tylenol 3 tablets to your pharmacy.  Typically, these fractures just take time to feel better.  We can follow-up on how you are feeling when we meet back up again at the beginning of next month.  We can also discuss medications that you can take to prevent more fractures in your spine at that time.      Sincerely,    Dr. Redman

## 2022-04-18 NOTE — PROGRESS NOTES
Assessment & Plan     Bilateral lower extremity edema  I don't see any obvious signs of decompensated heart failure  I wonder if this related to his high dose of amlodipine?  I think we should try reducing the dose from 15 mg daily to 5 mg daily and arrange follow up in 2 weeks or so to recheck blood pressure and swelling     Chronic diastolic heart failure (H)  Again I don't think this is heart failure, but close follow up after changing amlodipine dose will necessary     Severe aortic stenosis  He has chosen not to pursue a procedure to help this yet  I don't know for sure if this is the cause his swelling  Let's try the above intervention first  I reducing the CCB dose does not improve swelling, consider going back to cardiology to see if we need expedite the TAVR proceedings     Primary hypertension  Will need close follow up of blood pressure after making amlodipine change as above     Right-sided low back pain without sciatica, unspecified chronicity  Check x-ray to exclude malignancy/fracture  If x-ray negative, I recommended PT, he declined, could try over the counter topical lidocaine too   Will use heat and APAP if that is the case   - XR Lumbar Spine 2/3 Views; Future      25 minutes spent on the date of the encounter doing chart review, history and exam, documentation and further activities per the note           Return in about 2 weeks (around 5/2/2022) for recheck.  Patient instructed to return to clinic or contact us sooner if symptoms worsen or new symptoms develop.     Abdoulaye Redman MD  [facility] CONCE[facility] Murray is a 88 year old who presents for the following health issues     HPI       Swelling in legs  History is challenging   Symptom onset several weeks ago  Does not improve overnight  No associated new dyspnea, orthopnea, PND  He his weight seems stable  History of severe AORTIC STENOSIS, but he has been reluctant to pursue intervention  Takes unusual dose of  "amlodipine 15 mg daily     Also notes several weeks of right low back pain without injury or trauma  No radiation to legs no leg weakness        Review of Systems         Objective    /52 (BP Location: Left arm, Patient Position: Sitting, Cuff Size: Adult Regular)   Pulse 54   Temp 96.9  F (36.1  C) (Temporal)   Resp 16   Ht 1.683 m (5' 6.25\")   Wt 69.9 kg (154 lb)   SpO2 98%   BMI 24.67 kg/m    Body mass index is 24.67 kg/m .  Physical Exam   GEN:  Frail, elderly man  CV:  Heart with regular rate and rhythm. Systolic murmur noted.  No obvious elevated JVP.  PULM:  The lungs are clear to auscultation bilaterally.   Non labored breathing  EXT:  1++ edema extending to midshin bilaterally   NEURO:  Alert and oriented to person, place and time. Hard of hearing.  Hard to tell if there are memory problems due to this.   Walks with a cane.  SPINE:  No midline tenderness, lower extremity strength symmetric and reflexes 2+ bilaterally patell and achilles tendons, straight leg raise does not elicit radicular symptoms     X-ray spine         "

## 2022-05-03 ENCOUNTER — OFFICE VISIT (OUTPATIENT)
Dept: FAMILY MEDICINE | Facility: CLINIC | Age: 87
End: 2022-05-03
Payer: MEDICARE

## 2022-05-03 VITALS
DIASTOLIC BLOOD PRESSURE: 53 MMHG | HEIGHT: 67 IN | WEIGHT: 147.2 LBS | BODY MASS INDEX: 23.1 KG/M2 | SYSTOLIC BLOOD PRESSURE: 131 MMHG | RESPIRATION RATE: 16 BRPM | TEMPERATURE: 97.2 F | HEART RATE: 56 BPM | OXYGEN SATURATION: 97 %

## 2022-05-03 DIAGNOSIS — I10 PRIMARY HYPERTENSION: ICD-10-CM

## 2022-05-03 DIAGNOSIS — R60.0 BILATERAL LOWER EXTREMITY EDEMA: Primary | ICD-10-CM

## 2022-05-03 DIAGNOSIS — Z23 HIGH PRIORITY FOR 2019-NCOV VACCINE: ICD-10-CM

## 2022-05-03 DIAGNOSIS — M54.50 RIGHT-SIDED LOW BACK PAIN WITHOUT SCIATICA, UNSPECIFIED CHRONICITY: ICD-10-CM

## 2022-05-03 DIAGNOSIS — M80.00XD OSTEOPOROSIS WITH CURRENT PATHOLOGICAL FRACTURE WITH ROUTINE HEALING, UNSPECIFIED OSTEOPOROSIS TYPE, SUBSEQUENT ENCOUNTER: ICD-10-CM

## 2022-05-03 PROCEDURE — 99213 OFFICE O/P EST LOW 20 MIN: CPT | Performed by: INTERNAL MEDICINE

## 2022-05-03 PROCEDURE — 0054A COVID-19,PF,PFIZER (12+ YRS): CPT | Performed by: INTERNAL MEDICINE

## 2022-05-03 PROCEDURE — 91305 COVID-19,PF,PFIZER (12+ YRS): CPT | Performed by: INTERNAL MEDICINE

## 2022-05-03 ASSESSMENT — PAIN SCALES - GENERAL: PAINLEVEL: MILD PAIN (2)

## 2022-05-03 NOTE — PROGRESS NOTES
"  Assessment & Plan     Bilateral lower extremity edema  This was most likely a CCB side effects   Improved on lower dose of amlodipine      Primary hypertension  Blood pressure remains adequately control on lower dose of amlodipine  Continue 5 mg daily dose of amlodipine     Right-sided low back pain without sciatica, unspecified chronicity  This seems to have just gotten better     Osteoporosis with current pathological fracture with routine healing, unspecified osteoporosis type, subsequent encounter  We discussed his multiple compression fractures  We discussed the potential risk for future fractures  We discussed medications to mitigate that risk  He is not a candidate for a bisphosphonate due to low GFR  He does not want to start other drugs  He would prefer to continue vitamin D supplementation and vows to add more dairy to his diet         15 minutes spent on the date of the encounter doing chart review, history and exam, documentation and further activities per the note           Return in about 6 months (around 11/3/2022) for Preventive Visit.  Patient instructed to return to clinic or contact us sooner if symptoms worsen or new symptoms develop.     Abdoulaye Redman MD  [facility] CONCE[facility] Murray is a 88 year old who presents for the following health issues     HPI   2 wk f/up  He feels better  Back feels better  He never needed the T3  His swelling has resolved  He denies orthopnea, PND, ALFONSO      Review of Systems         Objective    /53 (BP Location: Right arm, Cuff Size: Adult Regular)   Pulse 56   Temp 97.2  F (36.2  C) (Oral)   Resp 16   Ht 1.689 m (5' 6.5\")   Wt 66.8 kg (147 lb 3.2 oz)   SpO2 97%   BMI 23.40 kg/m    Body mass index is 23.4 kg/m .  Physical Exam   GEN:  Well appearing elderly man, comfortable  EXT:  Resolution of previously described lower extremity edema                 "

## 2022-05-04 ENCOUNTER — TELEPHONE (OUTPATIENT)
Dept: FAMILY MEDICINE | Facility: CLINIC | Age: 87
End: 2022-05-04
Payer: MEDICARE

## 2022-05-04 NOTE — TELEPHONE ENCOUNTER
amLODIPine (NORVASC) 5 MG tablet 90 tablet 3 4/18/2022  No   Sig - Route: Take 1 tablet (5 mg) by mouth daily Profile Rx: patient will contact pharmacy when needed - Oral   Sent to pharmacy as: amLODIPine Besylate 5 MG Oral Tablet (NORVASC)   Class: E-Prescribe   Order: [id number]   E-Prescribing Status: Receipt confirmed by pharmacy (4/18/2022  9:16 AM CDT)     Pharmacy    [facility] - [address], [address]     Associated Diagnoses    Hyperlipidemia LDL goal <100 [E78.5]       Benign essential hypertension [I10]         Pharmacy faxing - clarification needed.  Patient previously on 5mg in AM and 10mg in PM.  New Rx is 5mg daily.    Please clarify.    OV 4- dose reduced to 5mg daily  OV 5-3-2022 patient doing well on reduced dose, stay at 5mg daily, follow up 6 months    Appointments in Next Year    Nov 14, 2022  9:30 AM  (Arrive by 9:10 AM)  Annual Wellness Visit with Abdoulaye Redman MD  [facility] ([facility] [place] ) [phone]        Triage:  Please contact [facility] to confirm:  5mg daily is correct.    Phone [phone]    Order #[id number]    RT Markel (R)

## 2022-05-06 NOTE — TELEPHONE ENCOUNTER
Per chart review, Amlodipine dose was decreased 04/18/2022 from from 15 mg daily to 5 mg daily.     At 05/03/2022 BP follow up visit, pt was advised to continue - 5 mg daily dose of amlodipine.    Triage tried calling pt to verify he is taking Amlodipine 5 mg tablet daily.    Once pt confirms dose, please contact [facility] to confirm:  5mg daily is correct.    See providers notes from 05/03/2022 OV below.      Primary hypertension  Blood pressure remains adequately control on lower dose of amlodipine  Continue 5 mg daily dose of amlodipine.

## 2022-05-07 NOTE — TELEPHONE ENCOUNTER
5/9/22 Trevor's daughter Bianka called and said she will call on Monday 5/9/22 to verify the medication amount her Dad is taking.

## 2022-05-09 DIAGNOSIS — I10 BENIGN ESSENTIAL HYPERTENSION: ICD-10-CM

## 2022-05-09 RX ORDER — FUROSEMIDE 20 MG
TABLET ORAL
Qty: 90 TABLET | Refills: 0 | Status: SHIPPED | OUTPATIENT
Start: 2022-05-09 | End: 2022-07-27

## 2022-05-09 NOTE — TELEPHONE ENCOUNTER
Pt called regarding the Lasix     Bottle is empty and cannot find any tabs at home     [facility] did not automatically refill his med and he is going to call to see what the issue is     furosemide (LASIX) 20 MG tablet 90 tablet 3 12/30/2021  No   Sig - Route: Take 1 tablet (20 mg) by mouth daily Take 10 mg every other day, alternating with 20 mg - Oral   Sent to pharmacy as: Furosemide 20 MG Oral Tablet (LASIX)   Class: E-Prescribe   Order: [id number]   E-Prescribing Status: Receipt confirmed by pharmacy (12/30/2021  6:04 PM CST)     Asking if you'll refill 90 tabs to local pharmacy in the meantime?     Mckayla ALEX Triage RN  [facility]

## 2022-07-11 ENCOUNTER — APPOINTMENT (OUTPATIENT)
Dept: GENERAL RADIOLOGY | Facility: CLINIC | Age: 87
DRG: 194 | End: 2022-07-11
Attending: EMERGENCY MEDICINE
Payer: MEDICARE

## 2022-07-11 ENCOUNTER — NURSE TRIAGE (OUTPATIENT)
Dept: FAMILY MEDICINE | Facility: CLINIC | Age: 87
End: 2022-07-11

## 2022-07-11 ENCOUNTER — HOSPITAL ENCOUNTER (INPATIENT)
Facility: CLINIC | Age: 87
LOS: 4 days | Discharge: NURSING, REHAB OR LONG-TERM CARE | DRG: 194 | End: 2022-07-15
Attending: EMERGENCY MEDICINE | Admitting: STUDENT IN AN ORGANIZED HEALTH CARE EDUCATION/TRAINING PROGRAM
Payer: MEDICARE

## 2022-07-11 ENCOUNTER — LAB (OUTPATIENT)
Dept: URGENT CARE | Facility: URGENT CARE | Age: 87
End: 2022-07-11
Attending: INTERNAL MEDICINE

## 2022-07-11 DIAGNOSIS — I48.92 ATRIAL FLUTTER, UNSPECIFIED TYPE (H): ICD-10-CM

## 2022-07-11 DIAGNOSIS — R05.9 COUGH: ICD-10-CM

## 2022-07-11 DIAGNOSIS — R52 PAIN: ICD-10-CM

## 2022-07-11 DIAGNOSIS — K59.00 CONSTIPATION, UNSPECIFIED CONSTIPATION TYPE: ICD-10-CM

## 2022-07-11 DIAGNOSIS — R09.02 HYPOXIA: ICD-10-CM

## 2022-07-11 DIAGNOSIS — I35.0 SEVERE AORTIC STENOSIS: Primary | ICD-10-CM

## 2022-07-11 DIAGNOSIS — K21.9 GASTROESOPHAGEAL REFLUX DISEASE WITHOUT ESOPHAGITIS: ICD-10-CM

## 2022-07-11 DIAGNOSIS — R05.9 COUGH: Primary | ICD-10-CM

## 2022-07-11 DIAGNOSIS — J18.9 PNEUMONIA OF RIGHT UPPER LOBE DUE TO INFECTIOUS ORGANISM: ICD-10-CM

## 2022-07-11 LAB
ANION GAP SERPL CALCULATED.3IONS-SCNC: 9 MMOL/L (ref 3–14)
ATRIAL RATE - MUSE: 217 BPM
ATRIAL RATE - MUSE: 57 BPM
BASOPHILS # BLD AUTO: 0 10E3/UL (ref 0–0.2)
BASOPHILS NFR BLD AUTO: 0 %
BUN SERPL-MCNC: 67 MG/DL (ref 7–30)
CALCIUM SERPL-MCNC: 9 MG/DL (ref 8.5–10.1)
CHLORIDE BLD-SCNC: 105 MMOL/L (ref 94–109)
CO2 SERPL-SCNC: 21 MMOL/L (ref 20–32)
CREAT SERPL-MCNC: 2.64 MG/DL (ref 0.66–1.25)
DIASTOLIC BLOOD PRESSURE - MUSE: NORMAL MMHG
DIASTOLIC BLOOD PRESSURE - MUSE: NORMAL MMHG
EOSINOPHIL # BLD AUTO: 0 10E3/UL (ref 0–0.7)
EOSINOPHIL NFR BLD AUTO: 0 %
ERYTHROCYTE [DISTWIDTH] IN BLOOD BY AUTOMATED COUNT: 15.9 % (ref 10–15)
FLUAV RNA SPEC QL NAA+PROBE: NEGATIVE
FLUBV RNA RESP QL NAA+PROBE: NEGATIVE
GFR SERPL CREATININE-BSD FRML MDRD: 23 ML/MIN/1.73M2
GLUCOSE BLD-MCNC: 136 MG/DL (ref 70–99)
HCO3 BLDV-SCNC: 22 MMOL/L (ref 21–28)
HCT VFR BLD AUTO: 32.4 % (ref 40–53)
HGB BLD-MCNC: 10.6 G/DL (ref 13.3–17.7)
HOLD SPECIMEN: NORMAL
IMM GRANULOCYTES # BLD: 0.1 10E3/UL
IMM GRANULOCYTES NFR BLD: 1 %
INTERPRETATION ECG - MUSE: NORMAL
INTERPRETATION ECG - MUSE: NORMAL
LACTATE BLD-SCNC: 1.9 MMOL/L
LYMPHOCYTES # BLD AUTO: 0.3 10E3/UL (ref 0.8–5.3)
LYMPHOCYTES NFR BLD AUTO: 2 %
MAGNESIUM SERPL-MCNC: 2.6 MG/DL (ref 1.6–2.3)
MCH RBC QN AUTO: 31.4 PG (ref 26.5–33)
MCHC RBC AUTO-ENTMCNC: 32.7 G/DL (ref 31.5–36.5)
MCV RBC AUTO: 96 FL (ref 78–100)
MONOCYTES # BLD AUTO: 0.9 10E3/UL (ref 0–1.3)
MONOCYTES NFR BLD AUTO: 6 %
NEUTROPHILS # BLD AUTO: 12.8 10E3/UL (ref 1.6–8.3)
NEUTROPHILS NFR BLD AUTO: 91 %
NRBC # BLD AUTO: 0 10E3/UL
NRBC BLD AUTO-RTO: 0 /100
NT-PROBNP SERPL-MCNC: ABNORMAL PG/ML (ref 0–1800)
P AXIS - MUSE: 263 DEGREES
P AXIS - MUSE: NORMAL DEGREES
PCO2 BLDV: 30 MM HG (ref 40–50)
PH BLDV: 7.47 [PH] (ref 7.32–7.43)
PLATELET # BLD AUTO: 215 10E3/UL (ref 150–450)
PO2 BLDV: 24 MM HG (ref 25–47)
POTASSIUM BLD-SCNC: 4.5 MMOL/L (ref 3.4–5.3)
PR INTERVAL - MUSE: NORMAL MS
PR INTERVAL - MUSE: NORMAL MS
QRS DURATION - MUSE: 114 MS
QRS DURATION - MUSE: 118 MS
QT - MUSE: 472 MS
QT - MUSE: 542 MS
QTC - MUSE: 512 MS
QTC - MUSE: 597 MS
R AXIS - MUSE: 204 DEGREES
R AXIS - MUSE: 236 DEGREES
RBC # BLD AUTO: 3.38 10E6/UL (ref 4.4–5.9)
RSV RNA SPEC NAA+PROBE: NEGATIVE
SAO2 % BLDV: 48 % (ref 94–100)
SARS-COV-2 RNA RESP QL NAA+PROBE: NEGATIVE
SODIUM SERPL-SCNC: 135 MMOL/L (ref 133–144)
SYSTOLIC BLOOD PRESSURE - MUSE: NORMAL MMHG
SYSTOLIC BLOOD PRESSURE - MUSE: NORMAL MMHG
T AXIS - MUSE: -7 DEGREES
T AXIS - MUSE: 17 DEGREES
TROPONIN I SERPL HS-MCNC: 154 NG/L
TROPONIN I SERPL HS-MCNC: 163 NG/L
VENTRICULAR RATE- MUSE: 71 BPM
VENTRICULAR RATE- MUSE: 73 BPM
WBC # BLD AUTO: 14 10E3/UL (ref 4–11)

## 2022-07-11 PROCEDURE — 80048 BASIC METABOLIC PNL TOTAL CA: CPT | Performed by: EMERGENCY MEDICINE

## 2022-07-11 PROCEDURE — 99221 1ST HOSP IP/OBS SF/LOW 40: CPT | Mod: AI | Performed by: STUDENT IN AN ORGANIZED HEALTH CARE EDUCATION/TRAINING PROGRAM

## 2022-07-11 PROCEDURE — 87040 BLOOD CULTURE FOR BACTERIA: CPT | Performed by: EMERGENCY MEDICINE

## 2022-07-11 PROCEDURE — 96366 THER/PROPH/DIAG IV INF ADDON: CPT

## 2022-07-11 PROCEDURE — 85025 COMPLETE CBC W/AUTO DIFF WBC: CPT | Performed by: EMERGENCY MEDICINE

## 2022-07-11 PROCEDURE — 96365 THER/PROPH/DIAG IV INF INIT: CPT

## 2022-07-11 PROCEDURE — U0003 INFECTIOUS AGENT DETECTION BY NUCLEIC ACID (DNA OR RNA); SEVERE ACUTE RESPIRATORY SYNDROME CORONAVIRUS 2 (SARS-COV-2) (CORONAVIRUS DISEASE [COVID-19]), AMPLIFIED PROBE TECHNIQUE, MAKING USE OF HIGH THROUGHPUT TECHNOLOGIES AS DESCRIBED BY CMS-2020-01-R: HCPCS

## 2022-07-11 PROCEDURE — 36415 COLL VENOUS BLD VENIPUNCTURE: CPT | Performed by: EMERGENCY MEDICINE

## 2022-07-11 PROCEDURE — 84484 ASSAY OF TROPONIN QUANT: CPT | Performed by: EMERGENCY MEDICINE

## 2022-07-11 PROCEDURE — 120N000001 HC R&B MED SURG/OB

## 2022-07-11 PROCEDURE — U0005 INFEC AGEN DETEC AMPLI PROBE: HCPCS

## 2022-07-11 PROCEDURE — 71046 X-RAY EXAM CHEST 2 VIEWS: CPT

## 2022-07-11 PROCEDURE — 99291 CRITICAL CARE FIRST HOUR: CPT | Mod: 25,CS

## 2022-07-11 PROCEDURE — 93005 ELECTROCARDIOGRAM TRACING: CPT

## 2022-07-11 PROCEDURE — 87637 SARSCOV2&INF A&B&RSV AMP PRB: CPT | Performed by: EMERGENCY MEDICINE

## 2022-07-11 PROCEDURE — 83605 ASSAY OF LACTIC ACID: CPT

## 2022-07-11 PROCEDURE — C9803 HOPD COVID-19 SPEC COLLECT: HCPCS

## 2022-07-11 PROCEDURE — 250N000011 HC RX IP 250 OP 636: Performed by: EMERGENCY MEDICINE

## 2022-07-11 PROCEDURE — 83880 ASSAY OF NATRIURETIC PEPTIDE: CPT | Performed by: EMERGENCY MEDICINE

## 2022-07-11 PROCEDURE — 96367 TX/PROPH/DG ADDL SEQ IV INF: CPT

## 2022-07-11 PROCEDURE — 83735 ASSAY OF MAGNESIUM: CPT | Performed by: EMERGENCY MEDICINE

## 2022-07-11 RX ORDER — DOXYCYCLINE 100 MG/10ML
100 INJECTION, POWDER, LYOPHILIZED, FOR SOLUTION INTRAVENOUS EVERY 12 HOURS
Status: DISCONTINUED | OUTPATIENT
Start: 2022-07-11 | End: 2022-07-12

## 2022-07-11 RX ORDER — ASPIRIN 325 MG
325 TABLET ORAL DAILY
Status: ON HOLD | COMMUNITY
End: 2022-07-15

## 2022-07-11 RX ORDER — CEFTRIAXONE 2 G/1
2 INJECTION, POWDER, FOR SOLUTION INTRAMUSCULAR; INTRAVENOUS ONCE
Status: COMPLETED | OUTPATIENT
Start: 2022-07-11 | End: 2022-07-11

## 2022-07-11 RX ORDER — AZITHROMYCIN 500 MG/1
500 INJECTION, POWDER, LYOPHILIZED, FOR SOLUTION INTRAVENOUS ONCE
Status: DISCONTINUED | OUTPATIENT
Start: 2022-07-11 | End: 2022-07-11

## 2022-07-11 RX ADMIN — CEFTRIAXONE SODIUM 2 G: 2 INJECTION, POWDER, FOR SOLUTION INTRAMUSCULAR; INTRAVENOUS at 16:54

## 2022-07-11 RX ADMIN — DOXYCYCLINE 100 MG: 100 INJECTION, POWDER, LYOPHILIZED, FOR SOLUTION INTRAVENOUS at 18:12

## 2022-07-11 ASSESSMENT — ENCOUNTER SYMPTOMS
DIARRHEA: 0
VOICE CHANGE: 1
HEADACHES: 0
ABDOMINAL PAIN: 0
WEAKNESS: 1
FEVER: 0
VOMITING: 0
SHORTNESS OF BREATH: 1
SORE THROAT: 0

## 2022-07-11 ASSESSMENT — ACTIVITIES OF DAILY LIVING (ADL)
ADLS_ACUITY_SCORE: 35

## 2022-07-11 NOTE — H&P
[facility]    History and Physical - Hospitalist Service       Date of Admission:  7/11/2022    Assessment & Plan      Trevor Soni is a 88 year old male admitted on 7/11/2022.     Pneumonia  *1 weeks of fatigue, poor appetite, non-productive cough  *CXR with RUL infiltrate  *wbc 14.0  - inpatient  - ceftriaxone  - doxycycline 2/2 prolonged QT    Rate controlled a flutter  * initial EKG with apparent rate controlled aflutter  - continue atenolol  - monitor     Severe Aortic Stenosis  *still appears to be minimally symptomatic. When feeling well, activity is more limited by knees than fatigue/SOB  - repeat TTE     Prolonged QT  - monitor    HTN  Diastolic CHF  *does not appear clinically exacerbated despite elevated BNP  - continue PTA hydralazine, amlodipine, furosemide, spironolactone, atenolol    Gout  - allopurinol    HLD  - continue PTA fenofibrate, atorvastatin  - PTA     Osteoarthritis  - tylenol, oxycodone PRN         Diet:   regular  DVT Prophylaxis: Pneumatic Compression Devices  Farooq Catheter: Not present  Central Lines: None  Cardiac Monitoring: None  Code Status:   FULL CODE, this was discussed with patient and daughter on admission    Clinically Significant Risk Factors Present on Admission                 # Hypertension: home medication list includes antihypertensive(s)          Disposition Plan         The patient's care was discussed with the Bedside Nurse, Patient, Patient's Family and ED Team.    Tramaine Duncan MD  Hospitalist Service  [facility]  Securely message with the Vocera Web Console (learn more here)  Text page via [employer] Paging/Directory         ______________________________________________________________________    Chief Complaint   fatigue    History is obtained from the patient, electronic health record and emergency department physician    History of Present Illness   Trevor Soni is a 88 year old male who has history  of severe aortic stenosis who presents to the hospital on 7/11/2022 with approximately 1 week of fatigue and cough.  He states that approximately Tuesday, 7/5/2022 he noted that he has had lower energy than usual and felt fatigued by almost any activity.  He also noted that he has a new dry cough.    Review of Systems    The 10 point Review of Systems is negative other than noted in the HPI or here.     Past Medical History    I have reviewed this patient's medical history and updated it with pertinent information if needed.   Past Medical History:   Diagnosis Date     Arthritis     rhuematoid     Coronary artery disease     triple bypass 2001     CRF (chronic renal failure)      Gastro-oesophageal reflux disease      Gout      Hyperlipidemia      Hypertension      Nocturia        Past Surgical History   I have reviewed this patient's surgical history and updated it with pertinent information if needed.  Past Surgical History:   Procedure Laterality Date     CARDIAC SURGERY       CHOLECYSTECTOMY       COLONOSCOPY       LAPAROTOMY EXPLORATORY N/A 6/30/2020    Procedure: EXPLORATORY LAPAROTOMY, BOWEL RESECTION;  Surgeon: Bull Rachel MD;  Location:  OR     LAPAROTOMY, LYSIS ADHESIONS, COMBINED N/A 6/21/2020    Procedure: EXPLORATORY LAPAROTOMY WITH LYSIS OF ADHESIONS;  Surgeon: Jose Reed MD;  Location:  OR     ORTHOPEDIC SURGERY       PHACOEMULSIFICATION CLEAR CORNEA WITH TORIC INTRAOCULAR LENS IMPLANT  1/30/2012    Procedure:PHACOEMULSIFICATION CLEAR CORNEA WITH TORIC INTRAOCULAR LENS IMPLANT; LEFT PHACOEMULSIFICATION CLEAR CORNEA WITH DELUXE  TORIC INTRAOCULAR LENS IMPLANT ; Surgeon:BRANDO HIGHTOWER; Location[facility]     PHACOEMULSIFICATION CLEAR CORNEA WITH TORIC INTRAOCULAR LENS IMPLANT  2/13/2012    Procedure:PHACOEMULSIFICATION CLEAR CORNEA WITH TORIC INTRAOCULAR LENS IMPLANT; RIGHT PHACOEMULSIFICATION CLEAR CORNEA WITH TORIC INTRAOCULAR LENS IMPLANT ; Surgeon:BRANDO HIGHTOWER; Location:  [facility]     VASCULAR SURGERY         Social History   I have reviewed this patient's social history and updated it with pertinent information if needed.  Social History     Tobacco Use     Smoking status: Former Smoker     Types: Cigars     Quit date: 1980     Years since quittin.5     Smokeless tobacco: Never Used   Substance Use Topics     Alcohol use: Yes     Alcohol/week: 0.0 standard drinks     Comment: 1 drink week     Drug use: No       Family History   I have reviewed this patient's family history and updated it with pertinent information if needed.  Family History   Problem Relation Age of Onset     Myocardial Infarction Mother      Rheumatic fever Father      Cerebrovascular Disease Brother        Prior to Admission Medications   Prior to Admission Medications   Prescriptions Last Dose Informant Patient Reported? Taking?   Chlorpheniramine-DM (CORICIDIN HBP COUGH/COLD) 4-30 MG TABS Not Taking at Unknown time Daughter No No   Sig: Take 1 tablet by mouth every 6 hours as needed (nasal congestion, cough, runny nose)   Patient not taking: No sig reported   Cholecalciferol (VITAMIN D3 PO) 7/10/2022 at Unknown time Daughter Yes Yes   Sig: Take 1 tablet by mouth daily OTC dose unknown   Multiple Vitamin (MULTIVITAMIN ADULT PO) 7/10/2022 at Unknown time Daughter Yes Yes   Sig: Take 1 tablet by mouth daily   Multiple Vitamins-Minerals (PRESERVISION AREDS 2 PO) 7/10/2022 at Unknown time Daughter Yes Yes   Sig: Take 1 tablet by mouth 2 times daily   acetaminophen-codeine (TYLENOL #3) 300-30 MG tablet Unknown at prn Daughter No Yes   Sig: Take 1 tablet by mouth every 6 hours as needed for severe pain   allopurinol (ZYLOPRIM) 100 MG tablet 7/10/2022 at Unknown time Daughter No Yes   Sig: TAKE 2 TABLETS BY MOUTH  DAILY   amLODIPine (NORVASC) 5 MG tablet 7/10/2022 at Unknown time Daughter No Yes   Sig: Take 1 tablet (5 mg) by mouth daily Profile Rx: patient will contact pharmacy when needed   aspirin (ASA) 325 MG  tablet 7/10/2022 at Unknown time Daughter Yes Yes   Sig: Take 325 mg by mouth daily   atenolol (TENORMIN) 50 MG tablet  Daughter No No   Sig: TAKE 1 TABLET BY MOUTH  DAILY   atorvastatin (LIPITOR) 20 MG tablet 7/10/2022 at Unknown time Daughter No Yes   Sig: TAKE 1 TABLET BY MOUTH  DAILY   coenzyme Q-10 capsule 7/10/2022 at Unknown time Daughter Yes Yes   Sig: Take 1 capsule by mouth daily OTC dose unknown   famotidine (PEPCID) 20 MG tablet 7/10/2022 at Unknown time Daughter No Yes   Sig: TAKE 1 TABLET BY MOUTH AT  BEDTIME   fenofibrate (TRIGLIDE/LOFIBRA) 160 MG tablet 7/10/2022 at Unknown time Daughter No Yes   Sig: TAKE 1 TABLET BY MOUTH  DAILY   furosemide (LASIX) 20 MG tablet 7/10/2022 at Unknown time Daughter No Yes   Sig: Take 1 tablet (20 mg) by mouth every 48 hours AND 0.5 tablets (10 mg) every 48 hours. (alternates 1 pill with 1/2 pill every other day)   hydrALAZINE (APRESOLINE) 50 MG tablet 7/10/2022 at Unknown time Daughter No Yes   Sig: TAKE 1 TABLET BY MOUTH 4  TIMES DAILY   spironolactone (ALDACTONE) 25 MG tablet 7/10/2022 at Unknown time Daughter No Yes   Sig: TAKE 1 TABLET BY MOUTH  DAILY      Facility-Administered Medications: None     Allergies   Allergies   Allergen Reactions     Avocado      Ciprofloxacin Itching     Penicillins Itching       Physical Exam   Vital Signs: Temp: 97.6  F (36.4  C)   BP: (!) 159/56 Pulse: 62   Resp: (!) 37 SpO2: 94 % O2 Device: None (Room air)    Weight: 0 lbs 0 oz    Constitutional: Awake, alert, cooperative, no apparent cardiopulmonary distress.  Eyes: Conjunctiva and pupils examined and normal.  HEENT: Moist mucous membranes, normal dentition.  Respiratory: Right upper posterior field with diffuse crackles. Otherwise clear  Cardiovascular: Regular rate and rhythm, normal S1 and S2. Faint high pitch sys murmur 2/6  GI: Soft, non-distended, non-tender, normal bowel sounds.  Lymph/Hematologic: No anterior cervical or supraclavicular adenopathy.  Skin: No rashes, no  cyanosis, no edema noted on exposed skin.  Musculoskeletal: No joint swelling, erythema or tenderness. No gross bony abnormalities  Neurologic: Cranial nerves 2-12 grossly intact, normal strength and sensation.  Psychiatric: Alert, oriented to person, place and time, no obvious anxiety or depression.      Data   Data reviewed today: I reviewed all medications, new labs and imaging results over the last 24 hours. I personally reviewed the EKG tracing showing atrial rate appears to be flutter at about 175, unclear if variable/prolonged MN or AV dissociation.  and the chest x-ray image(s) showing RUL infiltrate.    Recent Labs   Lab 07/11/22  1456   WBC 14.0*   HGB 10.6*   MCV 96         POTASSIUM 4.5   CHLORIDE 105   CO2 21   BUN 67*   CR 2.64*   ANIONGAP 9   AGUSTÍN 9.0   *     Recent Results (from the past 24 hour(s))   XR Chest 2 Views    Narrative    XR CHEST 2 VW   7/11/2022 4:23 PM     HISTORY: cough, sob    COMPARISON: Chest radiograph 10/13/2016      Impression    IMPRESSION: Stable cardiomediastinal 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 Newly visualized right upper lobe airspace  disease, suspicious for pneumonia. Recommend follow-up chest x-ray  after treatment to ensure resolution. No pleural effusion or  pneumothorax. No acute bony abnormality.    KEIKO OMER MD         SYSTEM ID:  [id number]

## 2022-07-11 NOTE — PHARMACY-ADMISSION MEDICATION HISTORY
Pharmacy Medication History  Admission medication history interview status for the 7/11/2022  admission is complete. See EPIC admission navigator for prior to admission medications     Location of Interview: Outside patient room but on unit  Medication history sources: Patient's family/friend (daughter), Surescripts and Epic notes Dr. Redman office visit 5-3-22    Significant changes made to the medication list:  Flagged for removal: Coricidin HBP cough/cold    In the past week, patient estimated taking medication this percent of the time: greater than 90%    Additional medication history information:   none    Medication reconciliation completed by provider prior to medication history? No    Time spent in this activity: 20 min    Prior to Admission medications    Medication Sig Last Dose Taking? Auth Provider Long Term End Date   acetaminophen-codeine (TYLENOL #3) 300-30 MG tablet Take 1 tablet by mouth every 6 hours as needed for severe pain Unknown at prn Yes Abdoulaye Redman MD     allopurinol (ZYLOPRIM) 100 MG tablet TAKE 2 TABLETS BY MOUTH  DAILY 7/10/2022 at Unknown time Yes Abdoulaye Redman MD     amLODIPine (NORVASC) 5 MG tablet Take 1 tablet (5 mg) by mouth daily Profile Rx: patient will contact pharmacy when needed 7/10/2022 at Unknown time Yes Abdoulaye Redman MD Yes    aspirin (ASA) 325 MG tablet Take 325 mg by mouth daily 7/10/2022 at Unknown time Yes Unknown, Entered By History     atorvastatin (LIPITOR) 20 MG tablet TAKE 1 TABLET BY MOUTH  DAILY 7/10/2022 at Unknown time Yes Abdoulaye Redman MD Yes    Cholecalciferol (VITAMIN D3 PO) Take 1 tablet by mouth daily OTC dose unknown 7/10/2022 at Unknown time Yes Unknown, Entered By History     coenzyme Q-10 capsule Take 1 capsule by mouth daily OTC dose unknown 7/10/2022 at Unknown time Yes Reported, Patient     famotidine (PEPCID) 20 MG tablet TAKE 1 TABLET BY MOUTH AT  BEDTIME 7/10/2022 at Unknown time Yes Abdoulaye Redman MD     fenofibrate  (TRIGLIDE/LOFIBRA) 160 MG tablet TAKE 1 TABLET BY MOUTH  DAILY 7/10/2022 at Unknown time Yes Abdoulaye Redman MD Yes    furosemide (LASIX) 20 MG tablet Take 1 tablet (20 mg) by mouth every 48 hours AND 0.5 tablets (10 mg) every 48 hours. (alternates 1 pill with 1/2 pill every other day) 7/10/2022 at Unknown time Yes Abdoulaye Redman MD Yes    hydrALAZINE (APRESOLINE) 50 MG tablet TAKE 1 TABLET BY MOUTH 4  TIMES DAILY 7/10/2022 at Unknown time Yes Abdoulaye Redman MD Yes    Multiple Vitamin (MULTIVITAMIN ADULT PO) Take 1 tablet by mouth daily 7/10/2022 at Unknown time Yes Reported, Patient     Multiple Vitamins-Minerals (PRESERVISION AREDS 2 PO) Take 1 tablet by mouth 2 times daily 7/10/2022 at Unknown time Yes Reported, Patient     spironolactone (ALDACTONE) 25 MG tablet TAKE 1 TABLET BY MOUTH  DAILY 7/10/2022 at Unknown time Yes Abdoulaye Redmna MD Yes    atenolol (TENORMIN) 50 MG tablet TAKE 1 TABLET BY MOUTH  DAILY   Abdoulaye Redman MD Yes    Chlorpheniramine-DM (CORICIDIN HBP COUGH/COLD) 4-30 MG TABS Take 1 tablet by mouth every 6 hours as needed (nasal congestion, cough, runny nose)  Patient not taking: No sig reported Not Taking at Unknown time  Abdoulaye Redman MD         The information provided in this note is only as accurate as the sources available at the time of update(s)

## 2022-07-11 NOTE — TELEPHONE ENCOUNTER
"Pts daughter called the clinic stating the pt is worse than she thought he was (daughter called earlier describing symptoms- see note). Pts daughter stated they were at [facility] to get Covid test and pt has no energy, no \"spark in his eyes\", wheezing/ heavier breathing, weak/no energy. Advised daughter to bring pt to ED and that they can do the covid 19 test in the ED. While on the phone daughter stated they were at the window to do pts COVID 19 test. Daughter said she will bring pt to the ED right now.      "

## 2022-07-11 NOTE — ED PROVIDER NOTES
History   Chief Complaint:  Generalized Weakness and Shortness of Breath       The history is provided by the patient and a relative.      Trevor Soni is a 88 year old male with history of hypertension, hyperlipidemia, AAA, CAD, chronic renal failure, and carotid stenosis who presents with generalized weakness and shortness of breath and swelling in legs. Daughter recalls seeing a family member on 07/04/22 which had come down with COVID-19 a few weeks prior. A few days after that visit, the patient developed a cough and sounded raspy according to daughter. Patient denies any chest pain, shortness of breath, vomiting, abdominal pain, fever, diarrhea, sore throat, or headache.    Review of Systems   Constitutional: Negative for fever.   HENT: Positive for voice change. Negative for sore throat.    Respiratory: Positive for shortness of breath.    Cardiovascular: Negative for chest pain.   Gastrointestinal: Negative for abdominal pain, diarrhea and vomiting.   Neurological: Positive for weakness. Negative for headaches.   All other systems reviewed and are negative.      Allergies:  Avocado  Ciprofloxacin  Penicillins    Medications:  Lasix  Zyloprim  Norvasc  Asprin  Tenormin  Lipitor  Chlorpheniramine-DM  Fenofibrate  Apresoline  Tobrex  Aldactone  Prednisone    Past Medical History:     Hyperlipidemia  Abdominal aortic aneurysm  Stenosis of carotid artery  Ischemic bowel  Chronic renal failure  CAD  Hypertension  Osteoporosis    Past Surgical History:    Cardiac surgery  Cholecystectomy  Colonoscopy  Exploratory laparotomy  Orthopedic surgery     Family History:    Mother: Myocardial infarction  Father: Rheumatic fever  Brother: Cerebrovascular Disease    Social History:  Presents to the ED with daughter. Lives alone.    Physical Exam     Patient Vitals for the past 24 hrs:   BP Temp Pulse Resp SpO2   07/11/22 1659 (!) 140/88 -- 64 (!) 33 96 %   07/11/22 1545 (!) 159/56 -- 62 (!) 37 94 %   07/11/22 1355 (!)  129/37 97.6  F (36.4  C) 66 18 93 %       Physical Exam  Eyes:  The pupils are equal and round    Conjunctivae and sclerae are normal  ENT:    The nose is normal    Pinnae are normal  CV:  Regular rate and rhythm     No edema  Resp:  Rales noted on right lung    Left lung is clear   GI:  Abdomen is soft, there is no rigidity    No distension    No rebound tenderness   MS:  Normal muscular tone    No asymmetric leg swelling  Skin:  No rash or acute skin lesions noted  Neuro:   Awake, alert.      Speech is normal and fluent.    Face is symmetric.     Moves all extremities    Emergency Department Course   ECG  ECG taken at 1353, ECG read at 1578. Atrial flutter with variable AV block with premature ventricular or aberrantly conducted complexes. right superior axis deviation. pulmonary disease pattern. nonspecific ST abnormality. Abnormal QRS-T angle, consider primary T wave abnormality. prolonged QT.   Rate 73 bpm. KS interval * ms. QRS duration 114 ms. QT/QTc 542/597 ms. P-R-T axes 263 204 -7.     Imaging:  XR Chest 2 Views   Final Result   IMPRESSION: Stable cardiomediastinal silhouette with prior median   sternotomy and CABG. Newly visualized right upper lobe airspace   disease, suspicious for pneumonia. Recommend follow-up chest x-ray   after treatment to ensure resolution. No pleural effusion or   pneumothorax. No acute bony abnormality.      KEIKO OMER MD            SYSTEM ID:  [id number]         Report per radiology    Laboratory:  Labs Ordered and Resulted from Time of ED Arrival to Time of ED Departure   BASIC METABOLIC PANEL - Abnormal       Result Value    Sodium 135      Potassium 4.5      Chloride 105      Carbon Dioxide (CO2) 21      Anion Gap 9      Urea Nitrogen 67 (*)     Creatinine 2.64 (*)     Calcium 9.0      Glucose 136 (*)     GFR Estimate 23 (*)    TROPONIN I - Abnormal    Troponin I High Sensitivity 163 (*)    CBC WITH PLATELETS AND DIFFERENTIAL - Abnormal    WBC Count 14.0 (*)     RBC Count  3.38 (*)     Hemoglobin 10.6 (*)     Hematocrit 32.4 (*)     MCV 96      MCH 31.4      MCHC 32.7      RDW 15.9 (*)     Platelet Count 215      % Neutrophils 91      % Lymphocytes 2      % Monocytes 6      % Eosinophils 0      % Basophils 0      % Immature Granulocytes 1      NRBCs per 100 WBC 0      Absolute Neutrophils 12.8 (*)     Absolute Lymphocytes 0.3 (*)     Absolute Monocytes 0.9      Absolute Eosinophils 0.0      Absolute Basophils 0.0      Absolute Immature Granulocytes 0.1      Absolute NRBCs 0.0     NT PROBNP INPATIENT - Abnormal    N terminal Pro BNP Inpatient 42,074 (*)    TROPONIN I - Abnormal    Troponin I High Sensitivity 154 (*)    ISTAT GASES LACTATE VENOUS POCT - Abnormal    Lactic Acid POCT 1.9      Bicarbonate Venous POCT 22      O2 Sat, Venous POCT 48 (*)     pCO2V Venous POCT 30 (*)     pH Venous POCT 7.47 (*)     pO2 Venous POCT 24 (*)    MAGNESIUM - Abnormal    Magnesium 2.6 (*)    INFLUENZA A/B & SARS-COV2 PCR MULTIPLEX - Normal    Influenza A PCR Negative      Influenza B PCR Negative      RSV PCR Negative      SARS CoV2 PCR Negative     ROUTINE UA WITH MICROSCOPIC REFLEX TO CULTURE   BLOOD CULTURE   BLOOD CULTURE        Emergency Department Course:         Reviewed:  I reviewed nursing notes, vitals, and past medical history.    Assessments:  1518 I obtained history and examined the patient as noted above.   1800 I rechecked the patient and explained findings and prepared for admission.     Consults:  1730 I spoke with Dr. Duncan, hospitalist, regarding the patient who agreed to admit the patient.      Interventions:  1654 Rocephin 2g IV  1812 Vibramycin 100mg IV    Disposition:  The patient was admitted to the hospital under the care of Dr. Duncan.    Impression & Plan     Medical Decision Making:  Trevor is an 88 year old male presents to the ED with shortness of breath, cough, and weakness. Symptoms started about a week ago. He has had a COVID exposure although the family member  had thought to have been recovered. Here he presents to the ED and has a low oxygen level of 88% and is tachypneic. He has a history of aortic stenosis, heart failure, and prior significant ischemic bowl problem. He also has a AAA. AAA has been stable according to daughter. He has been ambulatory, but daughter noticed that he was weaker. He lives alone. Chest x-ray shows an infiltrate in right upper lobe, and he has an elevated white blood cell count worrisome for pneumonia. He is given rocephin and doxycyline. His EKG shows a portion that appears to show an atrial flutter-like rhythm and switching to a more sinus-type like rhythm. Troponin is mildly elevated but he denies any chest pain. Will have repeat troponin obtained.   His lactic acid level was normal. Plan for admission due to the mild hypoxia, pneumonia, and abnormal EKG. Discussed admission with hospitalist, who has agreed to accept him for admission. Cardiology consulted and discussed ECG with admitting hospitalist.    Critical Care Time: was 0 minutes for this patient excluding procedures    Diagnosis:    ICD-10-CM    1. Pneumonia of right upper lobe due to infectious organism  J18.9        Scribe Disclosure:  Rachel BENITES, am serving as a scribe at 3:18 PM on 7/11/2022 to document services personally performed by Morales Fonseca MD based on my observations and the provider's statements to me.            Morales Fonseca MD  07/11/22 2041

## 2022-07-11 NOTE — TELEPHONE ENCOUNTER
"Nurse Triage SBAR    Is this a 2nd Level Triage? NO    Situation: Daughter called the clinic (pt isnt with daughter currently). Information given by daughter. Daughter concerned and doesn't want this to turn into something worse. Daughter's sister in law dropped of OTC meds to pt yesterday and stated pt \"doesn't look very good\".    Background: Symptoms started before the weekend- didn't feel good Saturday. Symptoms kept getting worse and worse.     Assessment: Per daughter- pt has a productive cough, feeling worn out, and a runny nose/ongestion. Pt took a covid 19 test on Sunday and it was negative. Pt is taking OTC Coricidin (cough/cold meds for people with high BP). Daughter unsure if OTC meds are working. Daughter doesn't think pt is in any pain because pt would mention it to her and he didn't. Pt is drinking water but not really eating. Coughing comes and goes.     Protocol Recommended Disposition:   See Today Or Tomorrow In Office- routing to PCP for recommendations as there are no currently openings for PCP today or tomorrow. Advised daughter if pts symptoms get worse/new symptoms develop to go to UC or ED. Daughter agrees to plan.      Please call daughter back with PCPs recommendations.     Can we leave a detailed message on this number? YES  Phone number patient can be reached at: [phone].    Elizabeth Evans RN  [facility] Triage    Routed to provider    Does the patient meet one of the following criteria for ADS visit consideration? 16+ years old, with an [facility] PCP     TIP  Providers, please consider if this condition is appropriate for management at one of our Acute and Diagnostic Services sites.     If patient is a good candidate, please use dotphrase <dot>triageresponse and select Refer to ADS to document.        1. ONSET: \"When did the cough begin?\"       Started on Friday   2. SEVERITY: \"How bad is the cough today?\"       Pt stated to daughter when he got up today he immediately " "started coughing again.   3. RESPIRATORY DISTRESS: \"Describe your breathing.\"       Pt didn't mention being SOB to daughter and daughter didn't hear any wheezing when speaking with son.   4. FEVER: \"Do you have a fever?\" If so, ask: \"What is your temperature, how was it measured, and when did it start?\"      Unknown if he has checked. Per daughter- pt hasnt complained of anything like a fever.   5. HEMOPTYSIS: \"Are you coughing up any blood?\" If so ask: \"How much?\" (flecks, streaks, tablespoons, etc.)      Pts daughter stated pt would have told her if he was coughing up blood and he didn't say anything about that.   6. TREATMENT: \"What have you done so far to treat the cough?\" (e.g., meds, fluids, humidifier)      Coricedin (for the cough/colds for people with high blood pressure)  7. CARDIAC HISTORY: \"Do you have any history of heart disease?\" (e.g., heart attack, congestive heart failure)       Pt has HTN. Pt has had a triple bipass, open heart surgery. Pt is followed by cardiologist.   8. LUNG HISTORY: \"Do you have any history of lung disease?\"  (e.g., pulmonary embolus, asthma, emphysema)      No  9. PE RISK FACTORS: \"Do you have a history of blood clots?\" (or: recent major surgery, recent prolonged travel, bedridden)      No  10. OTHER SYMPTOMS: \"Do you have any other symptoms? (e.g., runny nose, wheezing, chest pain)        Productive cough- yesterday pt stated hes coughing up a bunch phlegm and has to spit it out, runny nose.   12. TRAVEL: \"Have you traveled out of the country in the last month?\" (e.g., travel history, exposures)        None    Reason for Disposition    Patient wants to be seen    Additional Information    Negative: Severe difficulty breathing (e.g., struggling for each breath, speaks in single words)    Negative: Bluish (or gray) lips or face    Negative: Rapid onset of cough and has hives    Negative: Coughing started suddenly after medicine, an allergic food or bee sting    Negative: " Difficulty breathing after exposure to flames, smoke, or fumes    Negative: Sounds like a life-threatening emergency to the triager    Negative: Previous asthma attacks and this feels like asthma attack    Negative: Chest pain present when not coughing    Negative: Difficulty breathing    Negative: Passed out (i.e., fainted, collapsed and was not responding)    Negative: Patient sounds very sick or weak to the triager    Negative: Coughed up > 1 tablespoon (15 ml) blood (Exception: blood-tinged sputum)    Negative: Fever > 103 F (39.4 C)    Negative: Fever > 101 F (38.3 C) and over 60 years of age    Negative: Fever > 100.0 F (37.8 C) and has diabetes mellitus or a weak immune system (e.g., HIV positive, cancer chemotherapy, organ transplant, splenectomy, chronic steroids)    Negative: Fever > 100.0 F (37.8 C) and bedridden (e.g., nursing home patient, stroke, chronic illness, recovering from surgery)    Negative: Increasing ankle swelling    Negative: Wheezing is present    Negative: SEVERE coughing spells (e.g., whooping sound after coughing, vomiting after coughing)    Negative: Coughing up loco-colored (reddish-brown) or blood-tinged sputum    Negative: Fever present > 3 days (72 hours)    Negative: Fever returns after gone for over 24 hours and symptoms worse or not improved    Negative: Using nasal washes and pain medicine > 24 hours and sinus pain persists    Negative: Known COPD or other severe lung disease (i.e., bronchiectasis, cystic fibrosis, lung surgery) and worsening symptoms (i.e., increased sputum purulence or amount, increased breathing difficulty)    Negative: Continuous (nonstop) coughing interferes with work or school and no improvement using cough treatment per Care Advice    Protocols used: COUGH-A-OH

## 2022-07-11 NOTE — ED NOTES
[facility]  ED Nurse Handoff Report    ED Chief complaint: Generalized Weakness and Shortness of Breath      ED Diagnosis:   Final diagnoses:   Pneumonia of right upper lobe due to infectious organism       Code Status: Full Code    Allergies:   Allergies   Allergen Reactions     Avocado      Ciprofloxacin Itching     Penicillins Itching       Patient Story: pt presents with 1 week of increased fatigue cough and sob. Pt also has hx of heart disease.  Focused Assessment:  Pt presents alert and oriented denies any pain. Pt is on 2 L oxygen but when momentarily taken off stay around 92%. BP stable lac 1.8. pt denies any CP or having any medical concenrfs but daughter report pt has had increasing sob and weakness     Treatments and/or interventions provided: blood work imaging   Patient's response to treatments and/or interventions: therapeutic     To be done/followed up on inpatient unit:  weakness and sob     Does this patient have any cognitive concerns?: Forgetful    Activity level - Baseline/Home:  Stand with Assist  Activity Level - Current:   Stand with assist x2    Patient's Preferred language: English   Needed?: No    Isolation: None  Infection: Not Applicable  Patient tested for COVID 19 prior to admission: YES  Bariatric?: No    Vital Signs:   Vitals:    07/11/22 1355   BP: (!) 129/37   Pulse: 66   Resp: 18   Temp: 97.6  F (36.4  C)   SpO2: 93%       Cardiac Rhythm:     Was the PSS-3 completed:   Yes  What interventions are required if any?               Family Comments: increased weakness   OBS brochure/video discussed/provided to patient/family: Yes              Name of person given brochure if not patient:               Relationship to patient: daughter    For the majority of the shift this patient's behavior was Green.   Behavioral interventions performed were none.    ED NURSE PHONE NUMBER: RN 2

## 2022-07-11 NOTE — ED TRIAGE NOTES
1 week of fatigue, cough and SOB- all have worsened. Hx of heart disease.      Triage Assessment     Row Name 07/11/22 9367       Triage Assessment (Adult)    Airway WDL WDL       Respiratory WDL    Respiratory WDL X  productive cough, SOB       Skin Circulation/Temperature WDL    Skin Circulation/Temperature WDL WDL       Cardiac WDL    Cardiac WDL WDL       Peripheral/Neurovascular WDL    Peripheral Neurovascular WDL WDL       Cognitive/Neuro/Behavioral WDL    Cognitive/Neuro/Behavioral WDL WDL

## 2022-07-11 NOTE — TELEPHONE ENCOUNTER
Recommend office visit appointment team or PCP as soon as possible   ER or UC if symptoms worsen between then and now  Recommend COVID PCR test if not done (ordered)

## 2022-07-12 ENCOUNTER — APPOINTMENT (OUTPATIENT)
Dept: PHYSICAL THERAPY | Facility: CLINIC | Age: 87
DRG: 194 | End: 2022-07-12
Attending: STUDENT IN AN ORGANIZED HEALTH CARE EDUCATION/TRAINING PROGRAM
Payer: MEDICARE

## 2022-07-12 ENCOUNTER — APPOINTMENT (OUTPATIENT)
Dept: CARDIOLOGY | Facility: CLINIC | Age: 87
DRG: 194 | End: 2022-07-12
Attending: STUDENT IN AN ORGANIZED HEALTH CARE EDUCATION/TRAINING PROGRAM
Payer: MEDICARE

## 2022-07-12 LAB
ANION GAP SERPL CALCULATED.3IONS-SCNC: 9 MMOL/L (ref 3–14)
BUN SERPL-MCNC: 75 MG/DL (ref 7–30)
CALCIUM SERPL-MCNC: 9 MG/DL (ref 8.5–10.1)
CHLORIDE BLD-SCNC: 110 MMOL/L (ref 94–109)
CO2 SERPL-SCNC: 22 MMOL/L (ref 20–32)
CREAT SERPL-MCNC: 2.72 MG/DL (ref 0.66–1.25)
ERYTHROCYTE [DISTWIDTH] IN BLOOD BY AUTOMATED COUNT: 16 % (ref 10–15)
GFR SERPL CREATININE-BSD FRML MDRD: 22 ML/MIN/1.73M2
GLUCOSE BLD-MCNC: 121 MG/DL (ref 70–99)
HCT VFR BLD AUTO: 28.4 % (ref 40–53)
HGB BLD-MCNC: 9.4 G/DL (ref 13.3–17.7)
LVEF ECHO: NORMAL
MCH RBC QN AUTO: 30.6 PG (ref 26.5–33)
MCHC RBC AUTO-ENTMCNC: 33.1 G/DL (ref 31.5–36.5)
MCV RBC AUTO: 93 FL (ref 78–100)
PLATELET # BLD AUTO: 185 10E3/UL (ref 150–450)
POTASSIUM BLD-SCNC: 4 MMOL/L (ref 3.4–5.3)
RBC # BLD AUTO: 3.07 10E6/UL (ref 4.4–5.9)
SARS-COV-2 RNA RESP QL NAA+PROBE: NEGATIVE
SODIUM SERPL-SCNC: 141 MMOL/L (ref 133–144)
WBC # BLD AUTO: 12 10E3/UL (ref 4–11)

## 2022-07-12 PROCEDURE — 99222 1ST HOSP IP/OBS MODERATE 55: CPT | Mod: 25 | Performed by: INTERNAL MEDICINE

## 2022-07-12 PROCEDURE — 250N000013 HC RX MED GY IP 250 OP 250 PS 637: Performed by: STUDENT IN AN ORGANIZED HEALTH CARE EDUCATION/TRAINING PROGRAM

## 2022-07-12 PROCEDURE — 82310 ASSAY OF CALCIUM: CPT | Performed by: STUDENT IN AN ORGANIZED HEALTH CARE EDUCATION/TRAINING PROGRAM

## 2022-07-12 PROCEDURE — 97161 PT EVAL LOW COMPLEX 20 MIN: CPT | Mod: GP

## 2022-07-12 PROCEDURE — 93306 TTE W/DOPPLER COMPLETE: CPT | Mod: 26 | Performed by: INTERNAL MEDICINE

## 2022-07-12 PROCEDURE — 999N000208 ECHOCARDIOGRAM COMPLETE

## 2022-07-12 PROCEDURE — 250N000011 HC RX IP 250 OP 636: Performed by: STUDENT IN AN ORGANIZED HEALTH CARE EDUCATION/TRAINING PROGRAM

## 2022-07-12 PROCEDURE — 99232 SBSQ HOSP IP/OBS MODERATE 35: CPT | Performed by: HOSPITALIST

## 2022-07-12 PROCEDURE — 120N000001 HC R&B MED SURG/OB

## 2022-07-12 PROCEDURE — 97530 THERAPEUTIC ACTIVITIES: CPT | Mod: GP

## 2022-07-12 PROCEDURE — 250N000009 HC RX 250: Performed by: HOSPITALIST

## 2022-07-12 PROCEDURE — 255N000002 HC RX 255 OP 636: Performed by: STUDENT IN AN ORGANIZED HEALTH CARE EDUCATION/TRAINING PROGRAM

## 2022-07-12 PROCEDURE — 36415 COLL VENOUS BLD VENIPUNCTURE: CPT | Performed by: STUDENT IN AN ORGANIZED HEALTH CARE EDUCATION/TRAINING PROGRAM

## 2022-07-12 PROCEDURE — 85027 COMPLETE CBC AUTOMATED: CPT | Performed by: STUDENT IN AN ORGANIZED HEALTH CARE EDUCATION/TRAINING PROGRAM

## 2022-07-12 PROCEDURE — 250N000013 HC RX MED GY IP 250 OP 250 PS 637: Performed by: INTERNAL MEDICINE

## 2022-07-12 RX ORDER — FUROSEMIDE 20 MG
20 TABLET ORAL
Status: DISCONTINUED | OUTPATIENT
Start: 2022-07-13 | End: 2022-07-15 | Stop reason: HOSPADM

## 2022-07-12 RX ORDER — ACETAMINOPHEN 325 MG/1
650 TABLET ORAL EVERY 6 HOURS PRN
Status: DISCONTINUED | OUTPATIENT
Start: 2022-07-12 | End: 2022-07-15 | Stop reason: HOSPADM

## 2022-07-12 RX ORDER — AMLODIPINE BESYLATE 5 MG/1
5 TABLET ORAL DAILY
Status: DISCONTINUED | OUTPATIENT
Start: 2022-07-12 | End: 2022-07-15 | Stop reason: HOSPADM

## 2022-07-12 RX ORDER — FUROSEMIDE 20 MG
20 TABLET ORAL
Status: DISCONTINUED | OUTPATIENT
Start: 2022-07-12 | End: 2022-07-12

## 2022-07-12 RX ORDER — AMOXICILLIN 250 MG
2 CAPSULE ORAL 2 TIMES DAILY PRN
Status: DISCONTINUED | OUTPATIENT
Start: 2022-07-12 | End: 2022-07-15 | Stop reason: HOSPADM

## 2022-07-12 RX ORDER — ONDANSETRON 2 MG/ML
4 INJECTION INTRAMUSCULAR; INTRAVENOUS EVERY 6 HOURS PRN
Status: DISCONTINUED | OUTPATIENT
Start: 2022-07-12 | End: 2022-07-12

## 2022-07-12 RX ORDER — ATORVASTATIN CALCIUM 20 MG/1
20 TABLET, FILM COATED ORAL DAILY
Status: DISCONTINUED | OUTPATIENT
Start: 2022-07-12 | End: 2022-07-15 | Stop reason: HOSPADM

## 2022-07-12 RX ORDER — NALOXONE HYDROCHLORIDE 0.4 MG/ML
0.2 INJECTION, SOLUTION INTRAMUSCULAR; INTRAVENOUS; SUBCUTANEOUS
Status: DISCONTINUED | OUTPATIENT
Start: 2022-07-12 | End: 2022-07-15 | Stop reason: HOSPADM

## 2022-07-12 RX ORDER — FUROSEMIDE 20 MG/1
10 TABLET ORAL
Status: DISCONTINUED | OUTPATIENT
Start: 2022-07-12 | End: 2022-07-15 | Stop reason: HOSPADM

## 2022-07-12 RX ORDER — FAMOTIDINE 20 MG/1
20 TABLET, FILM COATED ORAL
Status: DISCONTINUED | OUTPATIENT
Start: 2022-07-12 | End: 2022-07-15 | Stop reason: HOSPADM

## 2022-07-12 RX ORDER — AMOXICILLIN 250 MG
1 CAPSULE ORAL 2 TIMES DAILY PRN
Status: DISCONTINUED | OUTPATIENT
Start: 2022-07-12 | End: 2022-07-15 | Stop reason: HOSPADM

## 2022-07-12 RX ORDER — SPIRONOLACTONE 25 MG/1
25 TABLET ORAL DAILY
Status: DISCONTINUED | OUTPATIENT
Start: 2022-07-12 | End: 2022-07-15 | Stop reason: HOSPADM

## 2022-07-12 RX ORDER — FENOFIBRATE 160 MG/1
160 TABLET ORAL DAILY
Status: DISCONTINUED | OUTPATIENT
Start: 2022-07-12 | End: 2022-07-15 | Stop reason: HOSPADM

## 2022-07-12 RX ORDER — HYDRALAZINE HYDROCHLORIDE 25 MG/1
50 TABLET, FILM COATED ORAL 4 TIMES DAILY
Status: DISCONTINUED | OUTPATIENT
Start: 2022-07-12 | End: 2022-07-15 | Stop reason: HOSPADM

## 2022-07-12 RX ORDER — PROCHLORPERAZINE 25 MG
12.5 SUPPOSITORY, RECTAL RECTAL EVERY 12 HOURS PRN
Status: DISCONTINUED | OUTPATIENT
Start: 2022-07-12 | End: 2022-07-15 | Stop reason: HOSPADM

## 2022-07-12 RX ORDER — CEFTRIAXONE 2 G/1
2 INJECTION, POWDER, FOR SOLUTION INTRAMUSCULAR; INTRAVENOUS EVERY 24 HOURS
Status: DISCONTINUED | OUTPATIENT
Start: 2022-07-12 | End: 2022-07-15

## 2022-07-12 RX ORDER — ONDANSETRON 4 MG/1
4 TABLET, ORALLY DISINTEGRATING ORAL EVERY 6 HOURS PRN
Status: DISCONTINUED | OUTPATIENT
Start: 2022-07-12 | End: 2022-07-12

## 2022-07-12 RX ORDER — ALBUTEROL SULFATE 0.83 MG/ML
2.5 SOLUTION RESPIRATORY (INHALATION)
Status: DISCONTINUED | OUTPATIENT
Start: 2022-07-12 | End: 2022-07-15 | Stop reason: HOSPADM

## 2022-07-12 RX ORDER — NALOXONE HYDROCHLORIDE 0.4 MG/ML
0.4 INJECTION, SOLUTION INTRAMUSCULAR; INTRAVENOUS; SUBCUTANEOUS
Status: DISCONTINUED | OUTPATIENT
Start: 2022-07-12 | End: 2022-07-15 | Stop reason: HOSPADM

## 2022-07-12 RX ORDER — ALLOPURINOL 100 MG/1
200 TABLET ORAL DAILY
Status: DISCONTINUED | OUTPATIENT
Start: 2022-07-12 | End: 2022-07-15 | Stop reason: HOSPADM

## 2022-07-12 RX ORDER — ATENOLOL 50 MG/1
50 TABLET ORAL DAILY
Status: DISCONTINUED | OUTPATIENT
Start: 2022-07-12 | End: 2022-07-15 | Stop reason: HOSPADM

## 2022-07-12 RX ORDER — ASPIRIN 325 MG
325 TABLET ORAL DAILY
Status: DISCONTINUED | OUTPATIENT
Start: 2022-07-12 | End: 2022-07-12

## 2022-07-12 RX ORDER — DOXYCYCLINE 100 MG/1
100 CAPSULE ORAL EVERY 12 HOURS SCHEDULED
Status: DISCONTINUED | OUTPATIENT
Start: 2022-07-12 | End: 2022-07-15 | Stop reason: HOSPADM

## 2022-07-12 RX ORDER — POLYETHYLENE GLYCOL 3350 17 G/17G
17 POWDER, FOR SOLUTION ORAL DAILY PRN
Status: DISCONTINUED | OUTPATIENT
Start: 2022-07-12 | End: 2022-07-15 | Stop reason: HOSPADM

## 2022-07-12 RX ORDER — ACETAMINOPHEN 650 MG/1
650 SUPPOSITORY RECTAL EVERY 6 HOURS PRN
Status: DISCONTINUED | OUTPATIENT
Start: 2022-07-12 | End: 2022-07-15 | Stop reason: HOSPADM

## 2022-07-12 RX ORDER — FAMOTIDINE 20 MG/1
20 TABLET, FILM COATED ORAL AT BEDTIME
Status: DISCONTINUED | OUTPATIENT
Start: 2022-07-12 | End: 2022-07-12

## 2022-07-12 RX ORDER — PROCHLORPERAZINE MALEATE 5 MG
5 TABLET ORAL EVERY 6 HOURS PRN
Status: DISCONTINUED | OUTPATIENT
Start: 2022-07-12 | End: 2022-07-15 | Stop reason: HOSPADM

## 2022-07-12 RX ORDER — LIDOCAINE 40 MG/G
CREAM TOPICAL
Status: DISCONTINUED | OUTPATIENT
Start: 2022-07-12 | End: 2022-07-15 | Stop reason: HOSPADM

## 2022-07-12 RX ADMIN — SPIRONOLACTONE 25 MG: 25 TABLET ORAL at 08:21

## 2022-07-12 RX ADMIN — ATORVASTATIN CALCIUM 20 MG: 20 TABLET, FILM COATED ORAL at 08:21

## 2022-07-12 RX ADMIN — HYDRALAZINE HYDROCHLORIDE 50 MG: 25 TABLET ORAL at 20:20

## 2022-07-12 RX ADMIN — FAMOTIDINE 20 MG: 20 TABLET ORAL at 21:36

## 2022-07-12 RX ADMIN — AMLODIPINE BESYLATE 5 MG: 5 TABLET ORAL at 08:21

## 2022-07-12 RX ADMIN — ASPIRIN 325 MG ORAL TABLET 325 MG: 325 PILL ORAL at 08:21

## 2022-07-12 RX ADMIN — HUMAN ALBUMIN MICROSPHERES AND PERFLUTREN 9 ML: 10; .22 INJECTION, SOLUTION INTRAVENOUS at 13:20

## 2022-07-12 RX ADMIN — CEFTRIAXONE SODIUM 2 G: 2 INJECTION, POWDER, FOR SOLUTION INTRAMUSCULAR; INTRAVENOUS at 16:20

## 2022-07-12 RX ADMIN — APIXABAN 2.5 MG: 2.5 TABLET, FILM COATED ORAL at 20:19

## 2022-07-12 RX ADMIN — FENOFIBRATE 160 MG: 160 TABLET ORAL at 08:21

## 2022-07-12 RX ADMIN — HYDRALAZINE HYDROCHLORIDE 50 MG: 25 TABLET ORAL at 08:21

## 2022-07-12 RX ADMIN — DOXYCYCLINE HYCLATE 100 MG: 100 CAPSULE ORAL at 20:19

## 2022-07-12 RX ADMIN — ATENOLOL 50 MG: 50 TABLET ORAL at 08:22

## 2022-07-12 RX ADMIN — DOXYCYCLINE HYCLATE 100 MG: 100 CAPSULE ORAL at 10:31

## 2022-07-12 RX ADMIN — HYDRALAZINE HYDROCHLORIDE 50 MG: 25 TABLET ORAL at 16:29

## 2022-07-12 RX ADMIN — FUROSEMIDE 10 MG: 20 TABLET ORAL at 10:31

## 2022-07-12 RX ADMIN — ALBUTEROL SULFATE 2.5 MG: 2.5 SOLUTION RESPIRATORY (INHALATION) at 17:05

## 2022-07-12 RX ADMIN — ACETAMINOPHEN 650 MG: 325 TABLET ORAL at 21:39

## 2022-07-12 RX ADMIN — HYDRALAZINE HYDROCHLORIDE 50 MG: 25 TABLET ORAL at 13:22

## 2022-07-12 RX ADMIN — ALLOPURINOL 200 MG: 100 TABLET ORAL at 08:22

## 2022-07-12 RX ADMIN — ACETAMINOPHEN 650 MG: 325 TABLET ORAL at 01:33

## 2022-07-12 ASSESSMENT — ACTIVITIES OF DAILY LIVING (ADL)
ADLS_ACUITY_SCORE: 35
ADLS_ACUITY_SCORE: 24
ADLS_ACUITY_SCORE: 28
ADLS_ACUITY_SCORE: 28
ADLS_ACUITY_SCORE: 35
ADLS_ACUITY_SCORE: 27
ADLS_ACUITY_SCORE: 35
ADLS_ACUITY_SCORE: 35
ADLS_ACUITY_SCORE: 27
ADLS_ACUITY_SCORE: 35

## 2022-07-12 NOTE — PROGRESS NOTES
[facility]    Medicine Progress Note - Hospitalist Service    Date of Admission:  7/11/2022    Assessment & Plan        Trevor Soni is a 88 year old male admitted on 7/11/2022 with fatigue, poor appetite, and a non-productive cough felt to be due to pneumonia.     Community Acquired Pneumonia  One week of fatigue, poor appetite, non-productive cough. CXR with RUL infiltrate. WBC 14.0.  - Admitted to inpatient.  - Continue Ceftriaxone and doxycycline, day #2 of antibiotics.  - Currently on 4 lpm of supplemental oxygen, wean as tolerated.  - PT consulted.    Rate controlled atrial flutter  * Initial EKG with apparent rate controlled aflutter.  - Continue PTA atenolol.    Severe Aortic Stenosis  * Still appears to be minimally symptomatic. When feeling well, activity is more limited by knees than fatigue/SOB.  - Repeat TTE obtained today, results pending.    Prolonged QTc  - Avoid medications that can prolong QTc.    Hypertension  Diastolic CHF  * Does not appear clinically exacerbated despite elevated BNP.  - Blood pressure better controlled today.  - Continue PTA hydralazine, amlodipine, furosemide, spironolactone, atenolol.    Gout  - Continue PTA allopurinol.    Hyperlipidemia  - Continue PTA fenofibrate, atorvastatin.     Osteoarthritis  - Symptomatic management.       Diet: Combination Diet Regular Diet Adult    DVT Prophylaxis: Pneumatic Compression Devices  Farooq Catheter: Not present  Central Lines: None  Cardiac Monitoring: ACTIVE order. Indication: Tachyarrhythmias, acute (48 hours)  Code Status: Full Code      Disposition Plan      Expected Discharge Date: 07/15/2022                The patient's care was discussed with the Bedside Nurse and Patient.    Lisandro Redmond MD  Hospitalist Service  [facility]  Securely message with the Vocera Web Console (learn more here)  Text page via [employer] Paging/Directory         Clinically Significant Risk Factors  Present on Admission                 # Hypertension: home medication list includes antihypertensive(s)          ______________________________________________________________________    Interval History   Trevor Soni was seen this morning. He feels a little better today. Feels like he has a little more energy and strength. Continues to have a cough. Denies fevers, chest pain, shortness of breath at rest, nausea, abdominal pain.    Data reviewed today: I reviewed all medications, new labs and imaging results over the last 24 hours. I personally reviewed no images or EKG's today.    Physical Exam   Vital Signs: Temp: 97.5  F (36.4  C) Temp src: Axillary BP: 123/53 Pulse: 59   Resp: 20 SpO2: 90 % O2 Device: Nasal cannula Oxygen Delivery: 4 LPM  Weight: 0 lbs 0 oz  Constitutional: awake, alert, cooperative, no apparent distress, laying in the hospital bed  Respiratory: clear to auscultation anteriorly, no crackles or wheezing  Cardiovascular: regular rate and rhythm, normal S1 and S2, no murmur noted  GI: normal bowel sounds, soft, non-distended, non-tender  Skin: warm, dry  Musculoskeletal: no lower extremity pitting edema present  Neurologic: awake, alert, answers questions appropriately, moves all extremities    Data   Recent Labs   Lab 07/12/22  0615 07/11/22  1456   WBC 12.0* 14.0*   HGB 9.4* 10.6*   MCV 93 96    215    135   POTASSIUM 4.0 4.5   CHLORIDE 110* 105   CO2 22 21   BUN 75* 67*   CR 2.72* 2.64*   ANIONGAP 9 9   AGUSTÍN 9.0 9.0   * 136*     Medications       allopurinol  200 mg Oral Daily     amLODIPine  5 mg Oral Daily     aspirin  325 mg Oral Daily     atenolol  50 mg Oral Daily     atorvastatin  20 mg Oral Daily     cefTRIAXone  2 g Intravenous Q24H     doxycycline hyclate  100 mg Oral Q12H ADOLFO (08/20)     famotidine  20 mg Oral Q48H     fenofibrate  160 mg Oral Daily     furosemide  10 mg Oral Q48H     [START ON 7/13/2022] furosemide  20 mg Oral Q48H     hydrALAZINE  50 mg Oral 4x  Daily     sodium chloride (PF)  3 mL Intracatheter Q8H     spironolactone  25 mg Oral Daily

## 2022-07-12 NOTE — CONSULTS
[facility]    Cardiology Consultation     Date of Admission:  7/11/2022    1. Community-acquired pneumonia  2. Severe aortic stenosis, minimally symptomatic at baseline  3. Paroxysmal atrial flutter, newly discovered, rate controlled  4. CKD 4  5. Hypertension, dyslipidemia  6. AAA  7. Anemia of chronic kidney disease      It was a pleasure to speak with Trevor at the bedside today.  Thankfully, he appears to be improving with treatment of his community-acquired pneumonia.  His severe aortic stenosis has been medically managed at this point by Dr. Ravi, and the patient is very happy with this plan and how he has been feeling other than during the past week as a result of his pneumonia.  He has a another echocardiogram which has been ordered, which is fine, but regardless of the valve is extremely unlikely to be into the critical range at this point, and otherwise I would not feel any more strongly about pushing for valve replacement at this time.    In regards to his atrial flutter, this is a new discovery for him, but certainly could have been present on and off in the past.  His [id number] is 4-5, and from what I can gather at present his fall risk does not appear prohibitive.  Accordingly, I would recommend reduced dose Eliquis 2.5 mg twice daily for now, though if his fall/bleeding risk is felt to be exceedingly high by providers which know him better over the long-term, this could certainly be stopped in the future.      -Start Eliquis 2.5 mg twice daily as above  -Would discontinue full dose aspirin when starting Eliquis  -Continue home atenolol 50 mg daily  -Continue home Lasix dosing  -Continue home spironolactone  -Continue home atorvastatin  -Follow-up with cardiology AD after discharge        Thank you for the opportunity to participate in the care of this delightful patient.  Cardiology will sign off at this time.  Please feel free to call back anytime with future questions  or concerns.          Missael Esparza MD  Interventional Cardiology  July 12, 2022      Code Status    Full Code    Reason for Consult   Severe aortic stenosis, atrial flutter    Primary Care Physician   Abdoulaye Redman    Chief Complaint   Shortness of breath    History is obtained from the patient    History of Present Illness   Trevor Soni is an extremely pleasant 88-year-old man who follows with Dr. Ravi in our clinic, admitted with community-acquired pneumonia.  He has a past medical history significant for severe aortic stenosis which at present is only minimally symptomatic and thus far has been medically managed, as well as rate controlled atrial flutter, CKD, hypertension, and dyslipidemia.    He tells me that prior to about a week ago he was feeling quite well with no change in his baseline symptoms.  He then began to notice 1 week of fatigue, poor appetite, and a nonproductive cough.  No obvious fevers though he does have chronic chills.  He came in for evaluation, and was found to have a right upper lobe infiltrate on his chest x-ray without significant pulmonary edema.  White blood cell count was elevated around 14.0.    EKG on admission showed atrial flutter with variable AV block with good rate control and a pulmonary disease pattern.  Repeat echocardiogram has been ordered but not yet performed.  Last echo from 2/15/2022 showed mean gradient 41 mmHg, valve area 0.9 cm , DI 0.28 with a normal stroke-volume index.    Labs showed a flat and mildly elevated high-sensitivity troponin, elevated white blood cell count as above, anemia and normal platelets.  Of note, his NT proBNP was dramatically elevated at 42,000.  In 2020 it was 3400.      Past Medical History   I have reviewed this patient's medical history and updated it with pertinent information if needed.   Past Medical History:   Diagnosis Date     Arthritis     rhuematoid     Coronary artery disease     triple bypass 2001     CRF (chronic  renal failure)      Gastro-oesophageal reflux disease      Gout      Hyperlipidemia      Hypertension      Nocturia        Past Surgical History   I have reviewed this patient's surgical history and updated it with pertinent information if needed.  Past Surgical History:   Procedure Laterality Date     CARDIAC SURGERY       CHOLECYSTECTOMY       COLONOSCOPY       LAPAROTOMY EXPLORATORY N/A 6/30/2020    Procedure: EXPLORATORY LAPAROTOMY, BOWEL RESECTION;  Surgeon: Bull Rachel MD;  Location:  OR     LAPAROTOMY, LYSIS ADHESIONS, COMBINED N/A 6/21/2020    Procedure: EXPLORATORY LAPAROTOMY WITH LYSIS OF ADHESIONS;  Surgeon: Jose Reed MD;  Location:  OR     ORTHOPEDIC SURGERY       PHACOEMULSIFICATION CLEAR CORNEA WITH TORIC INTRAOCULAR LENS IMPLANT  1/30/2012    Procedure:PHACOEMULSIFICATION CLEAR CORNEA WITH TORIC INTRAOCULAR LENS IMPLANT; LEFT PHACOEMULSIFICATION CLEAR CORNEA WITH DELUXE  TORIC INTRAOCULAR LENS IMPLANT ; Surgeon:BRANDO HIGHTOWER; Location:[facility]     PHACOEMULSIFICATION CLEAR CORNEA WITH TORIC INTRAOCULAR LENS IMPLANT  2/13/2012    Procedure:PHACOEMULSIFICATION CLEAR CORNEA WITH TORIC INTRAOCULAR LENS IMPLANT; RIGHT PHACOEMULSIFICATION CLEAR CORNEA WITH TORIC INTRAOCULAR LENS IMPLANT ; Surgeon:BRANDO HIGHTOWER; Location:[facility]     VASCULAR SURGERY         Prior to Admission Medications   Prior to Admission Medications   Prescriptions Last Dose Informant Patient Reported? Taking?   Chlorpheniramine-DM (CORICIDIN HBP COUGH/COLD) 4-30 MG TABS Not Taking at Unknown time Daughter No No   Sig: Take 1 tablet by mouth every 6 hours as needed (nasal congestion, cough, runny nose)   Patient not taking: No sig reported   Cholecalciferol (VITAMIN D3 PO) 7/10/2022 at Unknown time Daughter Yes Yes   Sig: Take 1 tablet by mouth daily OTC dose unknown   Multiple Vitamin (MULTIVITAMIN ADULT PO) 7/10/2022 at Unknown time Daughter Yes Yes   Sig: Take 1 tablet by mouth daily   Multiple  Vitamins-Minerals (PRESERVISION AREDS 2 PO) 7/10/2022 at Unknown time Daughter Yes Yes   Sig: Take 1 tablet by mouth 2 times daily   acetaminophen-codeine (TYLENOL #3) 300-30 MG tablet Unknown at prn Daughter No Yes   Sig: Take 1 tablet by mouth every 6 hours as needed for severe pain   allopurinol (ZYLOPRIM) 100 MG tablet 7/10/2022 at Unknown time Daughter No Yes   Sig: TAKE 2 TABLETS BY MOUTH  DAILY   amLODIPine (NORVASC) 5 MG tablet 7/10/2022 at Unknown time Daughter No Yes   Sig: Take 1 tablet (5 mg) by mouth daily Profile Rx: patient will contact pharmacy when needed   aspirin (ASA) 325 MG tablet 7/10/2022 at Unknown time Daughter Yes Yes   Sig: Take 325 mg by mouth daily   atenolol (TENORMIN) 50 MG tablet  Daughter No No   Sig: TAKE 1 TABLET BY MOUTH  DAILY   atorvastatin (LIPITOR) 20 MG tablet 7/10/2022 at Unknown time Daughter No Yes   Sig: TAKE 1 TABLET BY MOUTH  DAILY   coenzyme Q-10 capsule 7/10/2022 at Unknown time Daughter Yes Yes   Sig: Take 1 capsule by mouth daily OTC dose unknown   famotidine (PEPCID) 20 MG tablet 7/10/2022 at Unknown time Daughter No Yes   Sig: TAKE 1 TABLET BY MOUTH AT  BEDTIME   fenofibrate (TRIGLIDE/LOFIBRA) 160 MG tablet 7/10/2022 at Unknown time Daughter No Yes   Sig: TAKE 1 TABLET BY MOUTH  DAILY   furosemide (LASIX) 20 MG tablet 7/10/2022 at Unknown time Daughter No Yes   Sig: Take 1 tablet (20 mg) by mouth every 48 hours AND 0.5 tablets (10 mg) every 48 hours. (alternates 1 pill with 1/2 pill every other day)   hydrALAZINE (APRESOLINE) 50 MG tablet 7/10/2022 at Unknown time Daughter No Yes   Sig: TAKE 1 TABLET BY MOUTH 4  TIMES DAILY   spironolactone (ALDACTONE) 25 MG tablet 7/10/2022 at Unknown time Daughter No Yes   Sig: TAKE 1 TABLET BY MOUTH  DAILY      Facility-Administered Medications: None     Allergies   Allergies   Allergen Reactions     Avocado      Ciprofloxacin Itching     Penicillins Itching       Social History   I have reviewed this patient's social history  and updated it with pertinent information if needed. Trevor Soni  reports that he quit smoking about 42 years ago. His smoking use included cigars. He has never used smokeless tobacco. He reports current alcohol use. He reports that he does not use drugs.    Family History   I have reviewed this patient's family history and updated it with pertinent information if needed.   Family History   Problem Relation Age of Onset     Myocardial Infarction Mother      Rheumatic fever Father      Cerebrovascular Disease Brother        Review of Systems   A 10 point Review of Systems was completed and was negative other than noted in the HPI       Physical Exam   Temp: 97.5  F (36.4  C) Temp src: Axillary BP: 123/53 Pulse: 59   Resp: 20 SpO2: (P) 94 % O2 Device: (P) Nasal cannula Oxygen Delivery: (P) 2.5 LPM  Vital Signs with Ranges  Temp:  [97.5  F (36.4  C)-100.6  F (38.1  C)] 97.5  F (36.4  C)  Pulse:  [58-80] 59  Resp:  [15-41] 20  BP: (112-159)/(43-88) 123/53  SpO2:  [89 %-97 %] (P) 94 %  0 lbs 0 oz    Constitutional:  Awake, alert, no acute distress  Eyes: EOMI, sclera non-icteric  ENT: Trachea midline  Respiratory: Normal respiratory effort, crackles noted in the right upper lobe  Cardiovascular: Primarily regular, borderline bradycardic, 3/6 systolic murmur at the right upper sternal border without obvious radiation to the carotids.  JVP difficult to assess but does not appear elevated.  There is trace bilateral LE edema.    GI:  Nondistended, nontender.  Skin: Dry, no significant rash on exposed skin  Musculoskeletal:  Strength 5/5 in upper and lower extremities  Psychiatric: Appropriate affect        Missael Esparza MD  Interventional Cardiology  July 12, 2022        HF, AMI, or PCI Cardiac Risk    Clinically Significant Risk Factors Present on Admission                     Cardiovascular: Cardiac Arrhythmia: Atrial flutter: Unspecified  Non-Rheumatic Valve Disease: Aortic valve  stenosis                Nephrology: CKD POA List: Stage 4 (GFR 15-29)        Pulmonology: Pneumonia    Systemic: Chronic Fatigue and Other Debilities: Age-related physical debility

## 2022-07-12 NOTE — PLAN OF CARE
AXO 4. VSS currently on 6L NC, Was successful early on titrating O2 down to 2L NC then stats dipped to mid - high 80's. Provider notified and gave PRN nebulizer orders. Assist of 1 w/gait belt walker. Tolerating regular diet. Lung Sounds tight w/ wheezes on the left, + flatus, BM +, adequate urine output. Skin WDL. Denies pain. Denies nausea. Tele A-fib w/ bundle branch block. Continue plan of care.

## 2022-07-12 NOTE — PROGRESS NOTES
07/12/22 1509   Quick Adds   Type of Visit Initial PT Evaluation   Living Environment   People in Home alone   Current Living Arrangements house   Home Accessibility stairs to enter home   Number of Stairs, Main Entrance 3   Stair Railings, Main Entrance railings safe and in good condition   Transportation Anticipated family or friend will provide   Living Environment Comments Pt lives alone in a house, his children check on him regularily and can stay with him if needed   Self-Care   Usual Activity Tolerance good   Current Activity Tolerance poor   Equipment Currently Used at Home cane, straight   Fall history within last six months yes   Number of times patient has fallen within last six months 2   Activity/Exercise/Self-Care Comment Pt IND at baseline, uses a SPC for all mobility   General Information   Onset of Illness/Injury or Date of Surgery 07/11/22   Referring Physician Tramaine Duncan MD   Patient/Family Therapy Goals Statement (PT) Return home   Pertinent History of Current Problem (include personal factors and/or comorbidities that impact the POC) Per chart: Trevor Soni is a 88 year old male admitted on 7/11/2022 with fatigue, poor appetite, and a non-productive cough felt to be due to pneumonia.   Existing Precautions/Restrictions fall   General Observations Pt sitting in chair upon therapist arrival   Cognition   Affect/Mental Status (Cognition) WFL   Orientation Status (Cognition) oriented x 4   Follows Commands (Cognition) WFL   Pain Assessment   Patient Currently in Pain No   Integumentary/Edema   Integumentary/Edema no deficits were identifed   Posture    Posture Forward head position;Protracted shoulders   Range of Motion (ROM)   Range of Motion ROM is WFL   Strength (Manual Muscle Testing)   Strength (Manual Muscle Testing) strength is WFL   Bed Mobility   Comment, (Bed Mobility) Sit to supine SBA   Transfers   Comment, (Transfers) Sit to stand Min A   Gait/Stairs (Locomotion)    Comment, (Gait/Stairs) Pt amb 5' w/ FWW and CGA   Sensory Examination   Sensory Perception patient reports no sensory changes   Clinical Impression   Criteria for Skilled Therapeutic Intervention Yes, treatment indicated   PT Diagnosis (PT) Impaired functional mobility and gait   Influenced by the following impairments Decreased strength, shortness of breath, decreased activity tolerance   Functional limitations due to impairments Limited functional mobility requiring AD and assist   Clinical Presentation (PT Evaluation Complexity) Stable/Uncomplicated   Clinical Presentation Rationale Based on PMH, current status, and social support   Clinical Decision Making (Complexity) low complexity   Planned Therapy Interventions (PT) bed mobility training;gait training;stair training;strengthening;transfer training   Risk & Benefits of therapy have been explained evaluation/treatment results reviewed;care plan/treatment goals reviewed;risks/benefits reviewed;current/potential barriers reviewed;participants voiced agreement with care plan;participants included;patient;daughter   PT Discharge Planning   PT Discharge Recommendation (DC Rec) Transitional Care Facility;home with assist   PT Rationale for DC Rec Pt is below baseline, currently requires A x1 for mobility. Pt lives alone and has to navigate stairs. Recommend TCU to increase strength and functional mobility. Pending improvement and ability to naviagte stairs pt could return home w/ assist.   PT Brief overview of current status Bed mob SBA, transfers Min A, amb CGA w/ FWW   Total Evaluation Time   Total Evaluation Time (Minutes) 10   Physical Therapy Goals   PT Frequency 5x/week   PT Predicted Duration/Target Date for Goal Attainment 07/19/22   PT Goals Bed Mobility;Transfers;Gait;Stairs   PT: Bed Mobility Modified independent;Supine to/from sit   PT: Transfers Supervision/stand-by assist;Sit to/from stand;Assistive device   PT: Gait Supervision/stand-by  assist;Assistive device;50 feet   PT: Stairs Supervision/stand-by assist;3 stairs

## 2022-07-13 ENCOUNTER — APPOINTMENT (OUTPATIENT)
Dept: PHYSICAL THERAPY | Facility: CLINIC | Age: 87
DRG: 194 | End: 2022-07-13
Payer: MEDICARE

## 2022-07-13 LAB
ANION GAP SERPL CALCULATED.3IONS-SCNC: 6 MMOL/L (ref 3–14)
BUN SERPL-MCNC: 87 MG/DL (ref 7–30)
CALCIUM SERPL-MCNC: 8.7 MG/DL (ref 8.5–10.1)
CHLORIDE BLD-SCNC: 107 MMOL/L (ref 94–109)
CO2 SERPL-SCNC: 23 MMOL/L (ref 20–32)
CREAT SERPL-MCNC: 2.83 MG/DL (ref 0.66–1.25)
ERYTHROCYTE [DISTWIDTH] IN BLOOD BY AUTOMATED COUNT: 16.4 % (ref 10–15)
GFR SERPL CREATININE-BSD FRML MDRD: 21 ML/MIN/1.73M2
GLUCOSE BLD-MCNC: 103 MG/DL (ref 70–99)
HCT VFR BLD AUTO: 28.9 % (ref 40–53)
HGB BLD-MCNC: 9.7 G/DL (ref 13.3–17.7)
MCH RBC QN AUTO: 30.6 PG (ref 26.5–33)
MCHC RBC AUTO-ENTMCNC: 33.6 G/DL (ref 31.5–36.5)
MCV RBC AUTO: 91 FL (ref 78–100)
PLATELET # BLD AUTO: 203 10E3/UL (ref 150–450)
POTASSIUM BLD-SCNC: 4.2 MMOL/L (ref 3.4–5.3)
RBC # BLD AUTO: 3.17 10E6/UL (ref 4.4–5.9)
SODIUM SERPL-SCNC: 136 MMOL/L (ref 133–144)
WBC # BLD AUTO: 15 10E3/UL (ref 4–11)

## 2022-07-13 PROCEDURE — 99232 SBSQ HOSP IP/OBS MODERATE 35: CPT | Performed by: HOSPITALIST

## 2022-07-13 PROCEDURE — 250N000013 HC RX MED GY IP 250 OP 250 PS 637: Performed by: STUDENT IN AN ORGANIZED HEALTH CARE EDUCATION/TRAINING PROGRAM

## 2022-07-13 PROCEDURE — 97116 GAIT TRAINING THERAPY: CPT | Mod: GP

## 2022-07-13 PROCEDURE — 85027 COMPLETE CBC AUTOMATED: CPT | Performed by: HOSPITALIST

## 2022-07-13 PROCEDURE — 97530 THERAPEUTIC ACTIVITIES: CPT | Mod: GP

## 2022-07-13 PROCEDURE — 97110 THERAPEUTIC EXERCISES: CPT | Mod: GP

## 2022-07-13 PROCEDURE — 80048 BASIC METABOLIC PNL TOTAL CA: CPT | Performed by: HOSPITALIST

## 2022-07-13 PROCEDURE — 250N000011 HC RX IP 250 OP 636: Performed by: STUDENT IN AN ORGANIZED HEALTH CARE EDUCATION/TRAINING PROGRAM

## 2022-07-13 PROCEDURE — 120N000001 HC R&B MED SURG/OB

## 2022-07-13 PROCEDURE — 250N000013 HC RX MED GY IP 250 OP 250 PS 637: Performed by: INTERNAL MEDICINE

## 2022-07-13 PROCEDURE — 36415 COLL VENOUS BLD VENIPUNCTURE: CPT | Performed by: HOSPITALIST

## 2022-07-13 RX ADMIN — ATENOLOL 50 MG: 50 TABLET ORAL at 09:03

## 2022-07-13 RX ADMIN — DOXYCYCLINE HYCLATE 100 MG: 100 CAPSULE ORAL at 09:11

## 2022-07-13 RX ADMIN — ATORVASTATIN CALCIUM 20 MG: 20 TABLET, FILM COATED ORAL at 09:02

## 2022-07-13 RX ADMIN — HYDRALAZINE HYDROCHLORIDE 50 MG: 25 TABLET ORAL at 09:03

## 2022-07-13 RX ADMIN — APIXABAN 2.5 MG: 2.5 TABLET, FILM COATED ORAL at 09:03

## 2022-07-13 RX ADMIN — ALLOPURINOL 200 MG: 100 TABLET ORAL at 09:03

## 2022-07-13 RX ADMIN — HYDRALAZINE HYDROCHLORIDE 50 MG: 25 TABLET ORAL at 16:17

## 2022-07-13 RX ADMIN — HYDRALAZINE HYDROCHLORIDE 50 MG: 25 TABLET ORAL at 11:48

## 2022-07-13 RX ADMIN — DOXYCYCLINE HYCLATE 100 MG: 100 CAPSULE ORAL at 20:12

## 2022-07-13 RX ADMIN — AMLODIPINE BESYLATE 5 MG: 5 TABLET ORAL at 09:02

## 2022-07-13 RX ADMIN — APIXABAN 2.5 MG: 2.5 TABLET, FILM COATED ORAL at 20:11

## 2022-07-13 RX ADMIN — CEFTRIAXONE SODIUM 2 G: 2 INJECTION, POWDER, FOR SOLUTION INTRAMUSCULAR; INTRAVENOUS at 16:11

## 2022-07-13 RX ADMIN — FENOFIBRATE 160 MG: 160 TABLET ORAL at 09:03

## 2022-07-13 RX ADMIN — HYDRALAZINE HYDROCHLORIDE 50 MG: 25 TABLET ORAL at 20:11

## 2022-07-13 RX ADMIN — FUROSEMIDE 20 MG: 20 TABLET ORAL at 09:04

## 2022-07-13 RX ADMIN — SPIRONOLACTONE 25 MG: 25 TABLET ORAL at 09:02

## 2022-07-13 ASSESSMENT — ACTIVITIES OF DAILY LIVING (ADL)
DEPENDENT_IADLS:: TRANSPORTATION
ADLS_ACUITY_SCORE: 27

## 2022-07-13 NOTE — PROGRESS NOTES
Length of stay: 22 Day 2  CODE: Full  Primary Problem: Pneumonia  Summary: Patient wean done to 2L when at rest PT had to place him to 3L after they worked with him. Returned to 2L. Has been tired  Today with activity. Has been using IS. Up for meals.     Feeding/Fluids: Regular diet THin liquids takes pills whole     Analgesia/ Anticoagulation: Denies pain PO eliquis     Skin: WNL    Telemetry & Rhythm: On Tele A-FIb with BBB     Emotional Needs/ Neuro status: AxOx4 Calm and cooperative with cares    Resp Needs/WeaninL NC wean as tolerated. Lungs diminished with fine crackles    HOB/ Activity: Assist of 1 with walker and gait belt      Urologic & Bowel: Voiding without difficulty BM this shift     Glycemic Control: N/A    Invasive Lines: PIV x 1     Discharge: TBD- When medically stable possible TCU

## 2022-07-13 NOTE — PLAN OF CARE
Pt. Alert and oriented x4. Vital signs stable on 3L NC (continuing to titrate O2 down as tolerated). Assist of 1 SBA gait belt and walker. Tolerating regular diet. Lung sounds clear, diminished to left side. Bowel sounds +, passing flatus. BM (-) this shift,  urine output. Skin bruising to BUE. Pain managed with Tylenol - pain in diaphragm area - from coughing. Denies nausea. Tele AFIB CVR, BBB, Prolonged QT .    Decreased SOB, utilizing IS and aerobica well - non-productive and congested cough - states using the aerbica helps the best.

## 2022-07-13 NOTE — CONSULTS
Care Management Initial Consult    General Information  Assessment completed with: Patient, Children, Patient/Daughter Bianka  Type of CM/SW Visit: Initial Assessment    Primary Care Provider verified and updated as needed:     Readmission within the last 30 days:        Reason for Consult: discharge planning  Advance Care Planning:            Communication Assessment  Patient's communication style: spoken language (English or Bilingual)    Hearing Difficulty or Deaf: no   Wear Glasses or Blind: yes    Cognitive  Cognitive/Neuro/Behavioral: WDL, all  Level of Consciousness: alert  Arousal Level: opens eyes spontaneously  Orientation: oriented x 4  Mood/Behavior: calm, cooperative  Best Language: 0 - No aphasia  Speech: clear, spontaneous    Living Environment:   People in home: alone     Current living Arrangements: house      Able to return to prior arrangements: no       Family/Social Support:  Care provided by: self  Provides care for: no one  Marital Status:   Children          Description of Support System: Supportive, Involved    Support Assessment: Adequate family and caregiver support, Adequate social supports    Current Resources:   Patient receiving home care services: No     Community Resources: None  Equipment currently used at home: cane, straight, walker, rolling, wheelchair, manual  Supplies currently used at home: None    Employment/Financial:  Employment Status: retired        Financial Concerns: No concerns identified   Referral to Financial Worker: No       Lifestyle & Psychosocial Needs:  Social Determinants of Health     Tobacco Use: Medium Risk     Smoking Tobacco Use: Former Smoker     Smokeless Tobacco Use: Never Used   Alcohol Use: Not on file   Financial Resource Strain: Not on file   Food Insecurity: Not on file   Transportation Needs: Not on file   Physical Activity: Not on file   Stress: Not on file   Social Connections: Not on file   Intimate Partner Violence: Not on file    Depression: Not at risk     PHQ-2 Score: 0   Housing Stability: Not on file       Functional Status:  Prior to admission patient needed assistance:   Dependent ADLs:: Independent  Dependent IADLs:: Transportation       Mental Health Status:  Mental Health Status: No Current Concerns       Chemical Dependency Status:  Chemical Dependency Status: No Current Concerns             Values/Beliefs:  Spiritual, Cultural Beliefs, [employer]ism Practices, Values that affect care: no               Additional Information:  Per Care Management/Social Work Consult for discharge planning patient admitted on 07/11 due to Pneumonia. SW reviewed chart and spoke with patient/daughter Bianka. Per patient and daughters report patient lives alone in a house with 3-4 stairs to enter. There is another flight of stairs within the home to get to the basement but there is no need for patient to navigate these stairs. BARRETT informed patient has a cane, walker and wheelchair at home. SW informed pateint is independent with all ADL's/IADL's at baseline besides transportation which his children assist with. SW discussed recommendation for TCU and patient/daughter are in agreement. BARRETT informed they would like referrals to 1) [facility] 2) [facility]. BARRETT sent referrals via [facility]. BARRETT discussed private vs shared room and was informed patient would like private and is aware of fees. BARRETT discussed transport and was informed children would like to transport if possible or have transport via [facility]. BARRETT will continue to follow.    MARI Peters    [facility]

## 2022-07-14 ENCOUNTER — APPOINTMENT (OUTPATIENT)
Dept: PHYSICAL THERAPY | Facility: CLINIC | Age: 87
DRG: 194 | End: 2022-07-14
Payer: MEDICARE

## 2022-07-14 LAB
ANION GAP SERPL CALCULATED.3IONS-SCNC: 8 MMOL/L (ref 3–14)
BUN SERPL-MCNC: 94 MG/DL (ref 7–30)
CALCIUM SERPL-MCNC: 8.7 MG/DL (ref 8.5–10.1)
CHLORIDE BLD-SCNC: 108 MMOL/L (ref 94–109)
CO2 SERPL-SCNC: 21 MMOL/L (ref 20–32)
CREAT SERPL-MCNC: 2.71 MG/DL (ref 0.66–1.25)
ERYTHROCYTE [DISTWIDTH] IN BLOOD BY AUTOMATED COUNT: 16.5 % (ref 10–15)
GFR SERPL CREATININE-BSD FRML MDRD: 22 ML/MIN/1.73M2
GLUCOSE BLD-MCNC: 122 MG/DL (ref 70–99)
HCT VFR BLD AUTO: 30.1 % (ref 40–53)
HGB BLD-MCNC: 9.9 G/DL (ref 13.3–17.7)
MCH RBC QN AUTO: 30.4 PG (ref 26.5–33)
MCHC RBC AUTO-ENTMCNC: 32.9 G/DL (ref 31.5–36.5)
MCV RBC AUTO: 92 FL (ref 78–100)
PLATELET # BLD AUTO: 241 10E3/UL (ref 150–450)
POTASSIUM BLD-SCNC: 3.8 MMOL/L (ref 3.4–5.3)
RBC # BLD AUTO: 3.26 10E6/UL (ref 4.4–5.9)
SODIUM SERPL-SCNC: 137 MMOL/L (ref 133–144)
WBC # BLD AUTO: 14.8 10E3/UL (ref 4–11)

## 2022-07-14 PROCEDURE — 36415 COLL VENOUS BLD VENIPUNCTURE: CPT | Performed by: HOSPITALIST

## 2022-07-14 PROCEDURE — 250N000013 HC RX MED GY IP 250 OP 250 PS 637: Performed by: INTERNAL MEDICINE

## 2022-07-14 PROCEDURE — 120N000001 HC R&B MED SURG/OB

## 2022-07-14 PROCEDURE — 97116 GAIT TRAINING THERAPY: CPT | Mod: GP

## 2022-07-14 PROCEDURE — 250N000013 HC RX MED GY IP 250 OP 250 PS 637: Performed by: HOSPITALIST

## 2022-07-14 PROCEDURE — 97110 THERAPEUTIC EXERCISES: CPT | Mod: GP

## 2022-07-14 PROCEDURE — 99232 SBSQ HOSP IP/OBS MODERATE 35: CPT | Performed by: HOSPITALIST

## 2022-07-14 PROCEDURE — 85027 COMPLETE CBC AUTOMATED: CPT | Performed by: HOSPITALIST

## 2022-07-14 PROCEDURE — 250N000013 HC RX MED GY IP 250 OP 250 PS 637: Performed by: STUDENT IN AN ORGANIZED HEALTH CARE EDUCATION/TRAINING PROGRAM

## 2022-07-14 PROCEDURE — 97530 THERAPEUTIC ACTIVITIES: CPT | Mod: GP

## 2022-07-14 PROCEDURE — 250N000011 HC RX IP 250 OP 636: Performed by: STUDENT IN AN ORGANIZED HEALTH CARE EDUCATION/TRAINING PROGRAM

## 2022-07-14 PROCEDURE — 80048 BASIC METABOLIC PNL TOTAL CA: CPT | Performed by: HOSPITALIST

## 2022-07-14 RX ADMIN — APIXABAN 2.5 MG: 2.5 TABLET, FILM COATED ORAL at 08:21

## 2022-07-14 RX ADMIN — FENOFIBRATE 160 MG: 160 TABLET ORAL at 08:20

## 2022-07-14 RX ADMIN — DOXYCYCLINE HYCLATE 100 MG: 100 CAPSULE ORAL at 08:28

## 2022-07-14 RX ADMIN — FUROSEMIDE 10 MG: 20 TABLET ORAL at 08:20

## 2022-07-14 RX ADMIN — ATENOLOL 50 MG: 50 TABLET ORAL at 08:20

## 2022-07-14 RX ADMIN — AMLODIPINE BESYLATE 5 MG: 5 TABLET ORAL at 08:21

## 2022-07-14 RX ADMIN — CEFTRIAXONE SODIUM 2 G: 2 INJECTION, POWDER, FOR SOLUTION INTRAMUSCULAR; INTRAVENOUS at 15:55

## 2022-07-14 RX ADMIN — ATORVASTATIN CALCIUM 20 MG: 20 TABLET, FILM COATED ORAL at 08:21

## 2022-07-14 RX ADMIN — HYDRALAZINE HYDROCHLORIDE 50 MG: 25 TABLET ORAL at 20:41

## 2022-07-14 RX ADMIN — HYDRALAZINE HYDROCHLORIDE 50 MG: 25 TABLET ORAL at 08:21

## 2022-07-14 RX ADMIN — FAMOTIDINE 20 MG: 20 TABLET ORAL at 20:41

## 2022-07-14 RX ADMIN — ALLOPURINOL 200 MG: 100 TABLET ORAL at 08:21

## 2022-07-14 RX ADMIN — DOXYCYCLINE HYCLATE 100 MG: 100 CAPSULE ORAL at 20:41

## 2022-07-14 RX ADMIN — APIXABAN 2.5 MG: 2.5 TABLET, FILM COATED ORAL at 20:41

## 2022-07-14 RX ADMIN — SPIRONOLACTONE 25 MG: 25 TABLET ORAL at 08:21

## 2022-07-14 RX ADMIN — HYDRALAZINE HYDROCHLORIDE 50 MG: 25 TABLET ORAL at 11:57

## 2022-07-14 ASSESSMENT — ACTIVITIES OF DAILY LIVING (ADL)
ADLS_ACUITY_SCORE: 27
ADLS_ACUITY_SCORE: 30
ADLS_ACUITY_SCORE: 27
ADLS_ACUITY_SCORE: 27

## 2022-07-14 NOTE — CARE PLAN
Alert and oriented x4. VSS. Denies pain. Up with one and walker. Voiding well. Tele discontinued. Tolerating diet. Plan to continue to monitor tonight likely discharge tomorrow.

## 2022-07-14 NOTE — PROGRESS NOTES
[facility]    Medicine Progress Note - Hospitalist Service    Date of Admission:  7/11/2022    Assessment & Plan        Trevor Soni is a 88 year old male admitted on 7/11/2022 with fatigue, poor appetite, and a non-productive cough felt to be due to pneumonia.     Community Acquired Pneumonia  One week of fatigue, poor appetite, non-productive cough. CXR with RUL infiltrate. WBC 14.0.  - Admitted to inpatient.  - Continue Ceftriaxone and doxycycline, day #4 of antibiotics.  - Currently on 2 lpm of supplemental oxygen, wean as tolerated.  - PT consulted.  - Care transitions consulted, expect he will need TCU at time of discharge, will likely be ready for TCU in the next 24-48 hours.    Rate controlled atrial flutter, new diagnosis  * Initial EKG with apparent rate controlled aflutter.  - Continue PTA atenolol. Rate well controlled.  - Started on Eliquis for [id number] score of 4-5.  - Follow-up with Cardiology AD after discharge.    Severe Aortic Stenosis  * Still appears to be minimally symptomatic. When feeling well, activity is more limited by knees than fatigue/SOB.  - Repeat TTE 7/12/22 showed mild interval progression of aortic valve stenosis, see report in Epic.  - Follow up with Cardiology AD after discharge.    Prolonged QTc  EKG on 7/11/22 showed QTc of 597 and then down to 512 on repeat EKG later in the day.  - Avoid medications that can prolong QTc.    Hypertension  Diastolic CHF  * Does not appear clinically exacerbated despite elevated BNP.  - Blood pressure well controlled.  - Continue PTA hydralazine, amlodipine, furosemide, spironolactone, atenolol.    Gout  - Continue PTA allopurinol.    Hyperlipidemia  - Continue PTA fenofibrate, atorvastatin.     Osteoarthritis  - Symptomatic management.       Diet: Combination Diet Regular Diet Adult    DVT Prophylaxis: Pneumatic Compression Devices  Farooq Catheter: Not present  Central Lines: None  Cardiac Monitoring: ACTIVE  order. Indication: Tachyarrhythmias, acute (48 hours)  Code Status: Full Code      Disposition Plan      Expected Discharge Date: 07/15/2022,  3:00 PM  Discharge Delays: Placement - TCU  Destination: other (comment) (TCU)  Discharge Comments: 7/13: Needs TCU, placement pending        The patient's care was discussed with the Bedside Nurse, Care Coordinator/, Patient and Patient's Family.    Lisandro Redmond MD  Hospitalist Service  [facility]  Securely message with the Vocera Web Console (learn more here)  Text page via [facility] Paging/Directory         Clinically Significant Risk Factors Present on Admission                     ______________________________________________________________________    Interval History   Trevor Soni was seen this morning. He feels about the same today. Appetite isn't very good. Feels fatigued. Non-productive cough. Denies fevers, chest pain, shortness of breath, nausea, abdominal pain. Updated his daughter by phone this morning.    Data reviewed today: I reviewed all medications, new labs and imaging results over the last 24 hours. I personally reviewed no images or EKG's today.    Physical Exam   Vital Signs: Temp: 98.4  F (36.9  C) Temp src: Oral BP: 110/58 Pulse: 77   Resp: 22 SpO2: 90 % O2 Device: Nasal cannula Oxygen Delivery: 2 LPM  Weight: 0 lbs 0 oz  Constitutional: awake, alert, cooperative, no apparent distress, laying in the hospital bed  Respiratory: clear to auscultation bilaterally, no crackles or wheezing  Cardiovascular: regular rate and rhythm, normal S1 and S2, no murmur noted  GI: normal bowel sounds, soft, non-distended, non-tender  Skin: warm, dry  Musculoskeletal: no lower extremity pitting edema present  Neurologic: awake, alert, answers questions appropriately, moves all extremities    Data   Recent Labs   Lab 07/14/22  0527 07/13/22  0528 07/12/22  0615   WBC 14.8* 15.0* 12.0*   HGB 9.9* 9.7* 9.4*   MCV 92 91 93     203 185    136 141   POTASSIUM 3.8 4.2 4.0   CHLORIDE 108 107 110*   CO2 21 23 22   BUN 94* 87* 75*   CR 2.71* 2.83* 2.72*   ANIONGAP 8 6 9   AGUSTÍN 8.7 8.7 9.0   * 103* 121*     Medications       allopurinol  200 mg Oral Daily     amLODIPine  5 mg Oral Daily     apixaban ANTICOAGULANT  2.5 mg Oral BID     atenolol  50 mg Oral Daily     atorvastatin  20 mg Oral Daily     cefTRIAXone  2 g Intravenous Q24H     doxycycline hyclate  100 mg Oral Q12H ADOLFO (08/20)     famotidine  20 mg Oral Q48H     fenofibrate  160 mg Oral Daily     furosemide  10 mg Oral Q48H     furosemide  20 mg Oral Q48H     hydrALAZINE  50 mg Oral 4x Daily     sodium chloride (PF)  3 mL Intracatheter Q8H     spironolactone  25 mg Oral Daily

## 2022-07-14 NOTE — PLAN OF CARE
Goal Outcome Evaluation:    Plan of Care Reviewed With: patient     Overall Patient Progress: improving    Outcome Evaluation: Decreased O2 needs compared to night befor, decreased SOB    Pt. Alert and oriented x4. Vital signs stable on 2L NC at rest, 3L NC with activity. Assist of 1 Walker and gait belt. Tolerating regular diet. Lung sounds fine crackles audible. Bowel sounds +, passing flatus. BM 7/13, adequate urine output. Skin Scattered bruising to BUE. Pain: denies. Denies nausea. Tele Afib, CVR BBB    Continues to use IS and aerobica.

## 2022-07-14 NOTE — PROGRESS NOTES
Care Management Follow Up    Length of Stay (days): 3    Expected Discharge Date: 07/15/2022     Concerns to be Addressed:       Patient plan of care discussed at interdisciplinary rounds: Yes    Anticipated Discharge Disposition: Transitional Care     Anticipated Discharge Services: None  Anticipated Discharge DME: None    Patient/family educated on Medicare website which has current facility and service quality ratings: yes  Education Provided on the Discharge Plan:    Patient/Family in Agreement with the Plan: yes    Referrals Placed by CM/SW:    Private pay costs discussed: Not applicable    Additional Information:  BARRETT received voicemail from [facility] asking whether pt was still in need of a TCU bed.  SW indicated that pt is still in need of TCU bed and likely ready for discharge on 7/15/22.  SW called and spoke with [facility], and they first requested to know whether pt will be on IV abx at discharge.  SW spoke to nurse and pt is anticipated to be switched to oral medication at discharge.  SW updated [facility], and they will complete their review soon.   [facility] clinically accepts pt.  They have a shared room available on 7/15/22 and a private room available on 7/16/22.  Pt could have shared room initially and then switch to private when available.   SW met with pt and family.  Updated them that [facility] has a shared room available on 7/15 and that we have not yet heard whether [facility] can accept pt. Pt states he strongly prefers a private room.         CON ZaragozaSW

## 2022-07-15 ENCOUNTER — APPOINTMENT (OUTPATIENT)
Dept: PHYSICAL THERAPY | Facility: CLINIC | Age: 87
DRG: 194 | End: 2022-07-15
Payer: MEDICARE

## 2022-07-15 ENCOUNTER — LAB REQUISITION (OUTPATIENT)
Dept: LAB | Facility: CLINIC | Age: 87
End: 2022-07-15

## 2022-07-15 VITALS
TEMPERATURE: 97.8 F | RESPIRATION RATE: 20 BRPM | HEART RATE: 72 BPM | OXYGEN SATURATION: 90 % | SYSTOLIC BLOOD PRESSURE: 124 MMHG | DIASTOLIC BLOOD PRESSURE: 67 MMHG

## 2022-07-15 DIAGNOSIS — Z00.01 ENCOUNTER FOR GENERAL ADULT MEDICAL EXAMINATION WITH ABNORMAL FINDINGS: ICD-10-CM

## 2022-07-15 LAB
ANION GAP SERPL CALCULATED.3IONS-SCNC: 8 MMOL/L (ref 3–14)
BUN SERPL-MCNC: 107 MG/DL (ref 7–30)
CALCIUM SERPL-MCNC: 8.7 MG/DL (ref 8.5–10.1)
CHLORIDE BLD-SCNC: 107 MMOL/L (ref 94–109)
CO2 SERPL-SCNC: 21 MMOL/L (ref 20–32)
CREAT SERPL-MCNC: 2.52 MG/DL (ref 0.66–1.25)
ERYTHROCYTE [DISTWIDTH] IN BLOOD BY AUTOMATED COUNT: 16.2 % (ref 10–15)
GFR SERPL CREATININE-BSD FRML MDRD: 24 ML/MIN/1.73M2
GLUCOSE BLD-MCNC: 109 MG/DL (ref 70–99)
HCT VFR BLD AUTO: 27.2 % (ref 40–53)
HGB BLD-MCNC: 9.1 G/DL (ref 13.3–17.7)
MCH RBC QN AUTO: 30.1 PG (ref 26.5–33)
MCHC RBC AUTO-ENTMCNC: 33.5 G/DL (ref 31.5–36.5)
MCV RBC AUTO: 90 FL (ref 78–100)
PLATELET # BLD AUTO: 229 10E3/UL (ref 150–450)
POTASSIUM BLD-SCNC: 3.7 MMOL/L (ref 3.4–5.3)
RBC # BLD AUTO: 3.02 10E6/UL (ref 4.4–5.9)
SODIUM SERPL-SCNC: 136 MMOL/L (ref 133–144)
WBC # BLD AUTO: 13.1 10E3/UL (ref 4–11)

## 2022-07-15 PROCEDURE — 82310 ASSAY OF CALCIUM: CPT | Performed by: HOSPITALIST

## 2022-07-15 PROCEDURE — U0005 INFEC AGEN DETEC AMPLI PROBE: HCPCS | Performed by: NURSE PRACTITIONER

## 2022-07-15 PROCEDURE — 97116 GAIT TRAINING THERAPY: CPT | Mod: GP

## 2022-07-15 PROCEDURE — 250N000013 HC RX MED GY IP 250 OP 250 PS 637: Performed by: STUDENT IN AN ORGANIZED HEALTH CARE EDUCATION/TRAINING PROGRAM

## 2022-07-15 PROCEDURE — 99239 HOSP IP/OBS DSCHRG MGMT >30: CPT | Performed by: HOSPITALIST

## 2022-07-15 PROCEDURE — 36415 COLL VENOUS BLD VENIPUNCTURE: CPT | Performed by: HOSPITALIST

## 2022-07-15 PROCEDURE — 250N000013 HC RX MED GY IP 250 OP 250 PS 637: Performed by: HOSPITALIST

## 2022-07-15 PROCEDURE — 250N000013 HC RX MED GY IP 250 OP 250 PS 637: Performed by: INTERNAL MEDICINE

## 2022-07-15 PROCEDURE — 85027 COMPLETE CBC AUTOMATED: CPT | Performed by: HOSPITALIST

## 2022-07-15 PROCEDURE — 97110 THERAPEUTIC EXERCISES: CPT | Mod: GP

## 2022-07-15 RX ORDER — DOXYCYCLINE 100 MG/1
100 CAPSULE ORAL EVERY 12 HOURS
DISCHARGE
Start: 2022-07-15 | End: 2022-07-18

## 2022-07-15 RX ORDER — FAMOTIDINE 20 MG/1
20 TABLET, FILM COATED ORAL
DISCHARGE
Start: 2022-07-15 | End: 2022-08-08

## 2022-07-15 RX ORDER — CEFDINIR 300 MG/1
300 CAPSULE ORAL DAILY
DISCHARGE
Start: 2022-07-15 | End: 2022-07-18

## 2022-07-15 RX ORDER — ACETAMINOPHEN 325 MG/1
650 TABLET ORAL EVERY 6 HOURS PRN
DISCHARGE
Start: 2022-07-15

## 2022-07-15 RX ORDER — POLYETHYLENE GLYCOL 3350 17 G/17G
17 POWDER, FOR SOLUTION ORAL DAILY
Qty: 510 G | DISCHARGE
Start: 2022-07-15 | End: 2022-08-08

## 2022-07-15 RX ORDER — CEFDINIR 300 MG/1
300 CAPSULE ORAL DAILY
Status: DISCONTINUED | OUTPATIENT
Start: 2022-07-15 | End: 2022-07-15 | Stop reason: HOSPADM

## 2022-07-15 RX ADMIN — DOXYCYCLINE HYCLATE 100 MG: 100 CAPSULE ORAL at 10:36

## 2022-07-15 RX ADMIN — SPIRONOLACTONE 25 MG: 25 TABLET ORAL at 10:29

## 2022-07-15 RX ADMIN — ATENOLOL 50 MG: 50 TABLET ORAL at 10:28

## 2022-07-15 RX ADMIN — HYDRALAZINE HYDROCHLORIDE 50 MG: 25 TABLET ORAL at 10:28

## 2022-07-15 RX ADMIN — ATORVASTATIN CALCIUM 20 MG: 20 TABLET, FILM COATED ORAL at 10:28

## 2022-07-15 RX ADMIN — CEFDINIR 300 MG: 300 CAPSULE ORAL at 13:07

## 2022-07-15 RX ADMIN — AMLODIPINE BESYLATE 5 MG: 5 TABLET ORAL at 10:29

## 2022-07-15 RX ADMIN — FUROSEMIDE 20 MG: 20 TABLET ORAL at 10:28

## 2022-07-15 RX ADMIN — FENOFIBRATE 160 MG: 160 TABLET ORAL at 10:28

## 2022-07-15 RX ADMIN — ALLOPURINOL 200 MG: 100 TABLET ORAL at 10:28

## 2022-07-15 RX ADMIN — APIXABAN 2.5 MG: 2.5 TABLET, FILM COATED ORAL at 10:28

## 2022-07-15 RX ADMIN — HYDRALAZINE HYDROCHLORIDE 50 MG: 25 TABLET ORAL at 13:07

## 2022-07-15 ASSESSMENT — ACTIVITIES OF DAILY LIVING (ADL)
ADLS_ACUITY_SCORE: 32
ADLS_ACUITY_SCORE: 29
ADLS_ACUITY_SCORE: 32
ADLS_ACUITY_SCORE: 32

## 2022-07-15 NOTE — PROGRESS NOTES
Care Management Discharge Note    Discharge Date: 07/15/2022     Discharge Disposition: Transitional Care  Discharge Services: None  Discharge DME: oxygen    Discharge Transportation: family or friend will provide, agency    Private pay costs discussed: private room/amenity fees and insurance costs out of pocket expenses    PAS Confirmation Code:    Patient/family educated on Medicare website which has current facility and service quality ratings: yes    Education Provided on the Discharge Plan:    Persons Notified of Discharge Plans: Hospitalist, Charge, Huc  Patient/Family in Agreement with the Plan: yes    Handoff Referral Completed: Yes    Additional Information:  [facility] [place] [facility].     D: Per [facility] regulation, SW completed and submitted PAS-RR to [place] [employer] Direct Connect via the Senior LinkAge Line.  PAS-RR confirmation # is :[id number]    I: SW spoke with pt and they are aware a PAS-RR has been submitted.  SW reviewed with pt that they may be contacted for a follow up appointment within 10 days of hospital discharge if their SNF stay is < 30 days.  Contact information for [facility] LinkAge Line was also provided.    A: pt verbalized understanding.    P: Further questions may be directed to [facility] LinkAge Line at #[phone], option #4 for PAS-RR staff.    ANNI Owens

## 2022-07-15 NOTE — PROGRESS NOTES
Care Management Follow Up    Length of Stay (days): 4    Expected Discharge Date: 07/15/2022     Concerns to be Addressed:       Patient plan of care discussed at interdisciplinary rounds: Yes    Anticipated Discharge Disposition: Transitional Care     Anticipated Discharge Services: None  Anticipated Discharge DME: None    Patient/family educated on Medicare website which has current facility and service quality ratings: yes  Education Provided on the Discharge Plan:    Patient/Family in Agreement with the Plan: yes    Referrals Placed by CM/SW:    Private pay costs discussed: private room/amenity fees    Additional Information:  Patient has been accepted at [facility] today for a private room at $46 a day. SW spoke to patient about cost and patient agreed to pay. SW will continue to follow.    Addendum:  [place] transport aide will  patient at 1500 and take via [employer].    CHELSI Owens, LGSW

## 2022-07-15 NOTE — DISCHARGE SUMMARY
[facility]  Hospitalist Discharge Summary      Date of Admission:  7/11/2022  Date of Discharge:  7/15/2022  Discharging Provider: Lisandro Redmond MD  Discharge Service: Hospitalist Service    Discharge Diagnoses   Community Acquired Pneumonia  Rate controlled atrial flutter, new diagnosis  Severe Aortic Stenosis  Prolonged QTc  Hypertension  Diastolic CHF  Gout  Hyperlipidemia  Osteoarthritis    Follow-ups Needed After Discharge   Follow-up Appointments     Follow Up and recommended labs and tests      Follow up with senior living physician.  The following labs/tests are   recommended: CBC and BMP.             Unresulted Labs Ordered in the Past 30 Days of this Admission     Date and Time Order Name Status Description    7/11/2022  3:27 PM Blood Culture Peripheral Blood Preliminary     7/11/2022  3:27 PM Blood Culture Peripheral Blood Preliminary       These results will be followed up by Dr. Redmond    Discharge Disposition   Discharged to short-term care facility  Condition at discharge: Stable    Hospital Course   Trevor Soni is a 88 year old male admitted on 7/11/2022 with fatigue, poor appetite, and a non-productive cough felt to be due to pneumonia.     Community Acquired Pneumonia  One week of fatigue, poor appetite, non-productive cough. CXR with RUL infiltrate. WBC 14.0.  - Admitted to inpatient.  - Treated with Ceftriaxone and doxycycline in the hospital. Will switch to oral omnicef and doxycycline upon discharge to complete 7 day course of antibiotics.  - Currently on 2 lpm of supplemental oxygen, wean as tolerated.  - PT consulted, recommending TCU.  - Care transitions consulted.  - He feels better overall, but still not a lot of energy and feels generally weak.  - Updated his daughter in the hospital room today.  - Discharge to TCU today for ongoing therapies and oxygen weaning.  - Follow-up with TCU provider.    Rate controlled atrial flutter, new diagnosis  * Initial  EKG with apparent rate controlled aflutter.  - Continue PTA atenolol. Rate well controlled.  - Started on Eliquis for [id number] score of 4-5.  - Follow-up with Cardiology AD after discharge.    Severe Aortic Stenosis  * Still appears to be minimally symptomatic. When feeling well, activity is more limited by knees than fatigue/SOB.  - Repeat TTE 7/12/22 showed mild interval progression of aortic valve stenosis, see report in Epic.  - Follow up with Cardiology AD after discharge.    Prolonged QTc  EKG on 7/11/22 showed QTc of 597 and then down to 512 on repeat EKG later in the day.  - Avoid medications that can prolong QTc.    Hypertension  Diastolic CHF  * Does not appear clinically exacerbated despite elevated BNP.  - Blood pressure well controlled.  - Continue PTA hydralazine, amlodipine, furosemide, spironolactone, atenolol.    Gout  - Continue PTA allopurinol.    Hyperlipidemia  - Continue PTA fenofibrate, atorvastatin.     Osteoarthritis  - Symptomatic management.    Consultations This Hospital Stay   CARDIOLOGY IP CONSULT  PHYSICAL THERAPY ADULT IP CONSULT  CARE MANAGEMENT / SOCIAL WORK IP CONSULT  PHYSICAL THERAPY ADULT IP CONSULT  OCCUPATIONAL THERAPY ADULT IP CONSULT    Code Status   Full Code    Time Spent on this Encounter   I, Lisandro Redmond MD, personally saw the patient today and spent greater than 30 minutes discharging this patient.       Lisandro Redmond MD  [place] [address] [place] [address]  Phone: [phone]  ______________________________________________________________________    Physical Exam   Vital Signs: Temp: 97.8  F (36.6  C) Temp src: Oral BP: 129/67 Pulse: 72   Resp: 20 SpO2: 90 % O2 Device: Nasal cannula Oxygen Delivery: 2 LPM  Weight: 0 lbs 0 oz  Constitutional: awake, alert, cooperative, no apparent distress, laying in the hospital bed  Respiratory: few faint crackles at the bases, no increased work of  breathing  Cardiovascular: regular rate and rhythm, normal S1 and S2, no murmur noted  GI: normal bowel sounds, soft, non-distended, non-tender  Skin: warm, dry  Musculoskeletal: no lower extremity pitting edema present  Neurologic: awake, alert, answers questions appropriately, moves all extremities       Primary Care Physician   Abdoulaye Redman    Discharge Orders      Follow-Up with Cardiology AD      General info for SNF    Length of Stay Estimate: Short Term Care: Estimated # of Days <30  Condition at Discharge: Improving  Level of care:skilled   Rehabilitation Potential: Good  Admission H&P remains valid and up-to-date: Yes  Recent Chemotherapy: N/A  Use Nursing Home Standing Orders: Yes     Mantoux instructions    Give two-step Mantoux (PPD) Per Facility Policy Yes     Follow Up and recommended labs and tests    Follow up with shelter physician.  The following labs/tests are recommended: CBC and BMP.     Reason for your hospital stay    pneumonia     Activity - Up with nursing assistance     Full Code     Physical Therapy Adult Consult    Evaluate and treat as clinically indicated.    Reason:  deconditioning     Occupational Therapy Adult Consult    Evaluate and treat as clinically indicated.    Reason:  deconditioning     Fall precautions     Oxygen Adult/Peds    Oxygen Documentation:   I certify that this patient, Trevor Soni has been under my care (or a nurse practitioner or physican's assistant working with me). This is the face-to-face encounter for oxygen medical necessity.      Trevor Soni is now in a chronic stable state and continues to require supplemental oxygen. Patient has continued oxygen desaturation due to Chronic Heart Failure I50.    Alternative treatment(s) tried or considered and deemed clinically infective for treatment of Chronic Heart Failure I50 include pulmonary toileting and pulmonary rehab.  If portability is ordered, is the patient mobile within the home?  yes    **Patients who qualify for home O2 coverage under the CMS guidelines require ABG tests or O2 sat readings obtained closest to, but no earlier than 2 days prior to the discharge, as evidence of the need for home oxygen therapy. Testing must be performed while patient is in the chronic stable state. See notes for O2 sats.**     Diet    Follow this diet upon discharge: Regular Diet Adult     Significant Results and Procedures   Most Recent 3 CBC's:Recent Labs   Lab Test 07/15/22  0540 07/14/22  0527 07/13/22  0528   WBC 13.1* 14.8* 15.0*   HGB 9.1* 9.9* 9.7*   MCV 90 92 91    241 203     Most Recent 3 BMP's:Recent Labs   Lab Test 07/15/22  0540 07/14/22  0527 07/13/22  0528    137 136   POTASSIUM 3.7 3.8 4.2   CHLORIDE 107 108 107   CO2 21 21 23   * 94* 87*   CR 2.52* 2.71* 2.83*   ANIONGAP 8 8 6   AGUSTÍN 8.7 8.7 8.7   * 122* 103*     Results for orders placed or performed during the hospital encounter of 22   XR Chest 2 Views    Narrative    XR CHEST 2 VW   2022 4:23 PM     HISTORY: cough, sob    COMPARISON: Chest radiograph 10/13/2016      Impression    IMPRESSION: Stable cardiomediastinal 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 Newly visualized right upper lobe airspace  disease, suspicious for pneumonia. Recommend follow-up chest x-ray  after treatment to ensure resolution. No pleural effusion or  pneumothorax. No acute bony abnormality.    KEIKO OMER MD         SYSTEM ID:  [id number]   Echocardiogram Complete     Value    LVEF  60-65%    Narrative    [id number]  [id number]  [id number]  [id number]^[email or web address]^FLORY^OSVALDO     [facility]  Echocardiography Laboratory  [address] [place] [address]     Name: VIRGINIA LAMBERT  MRN: [id number]  : 1933  Study Date: 2022 10:34 AM  Age: 88 yrs  Gender: Male  Patient Location: [facility]  Reason For Study: Aortic Valve Disorder  Ordering Physician: FLORY DALTON  Referring Physician:  Abdoulaye Redman  Performed By: Ne Olivarez     BSA: 1.8 m2  Height: 66 in  Weight: 147 lb  HR: 59  BP: 93/65 mmHg  ______________________________________________________________________________  Procedure  Complete Echo Adult.  ______________________________________________________________________________  Interpretation Summary     1. There is moderate concentric left ventricular hypertrophy. Left ventricular  systolic function is normal. The visual ejection fraction is 60-65%. No  regional wall motion abnormalities noted.  2. Severe aortic stenosis with mean AV gradient of 39 mmHg and NANDA of 0.8 cm2  (LVOT diameter 2.0 cm), dimensionless index 0.25, normal stroke-volume index.  3. Mild-moderate, degenerative mitral valvular stenosis. Mean diastolic  gradient 4-6 mmHg (heart rate 60 bpm).  Compared to the previous study dated2/15/2022, there has been mild interval  progression of aortic valve stenosis.  ______________________________________________________________________________  Left Ventricle  The left ventricle is normal in size. There is moderate concentric left  ventricular hypertrophy. Left ventricular systolic function is normal. The  visual ejection fraction is 60-65%. Diastolic Doppler findings (E/E' ratio  and/or other parameters) suggest left ventricular filling pressures are  increased. No regional wall motion abnormalities noted.     Right Ventricle  The right ventricle is normal size. The right ventricular systolic function is  borderline reduced.     Atria  The left atrium is moderately dilated. The right atrium is mildly dilated.     Mitral Valve  Calcified mitral apparatus. There is severe mitral annular calcification.  There is trace mitral regurgitation. There is mild to moderate mitral  stenosis.     Tricuspid Valve  Normal tricuspid valve. There is mild (1+) tricuspid regurgitation. Right  ventricular systolic pressure could not be approximated due to inadequate  tricuspid regurgitation.      Aortic Valve  The aortic valve is trileaflet. There is mild (1+) aortic regurgitation.  Severe valvular aortic stenosis. The mean AoV pressure gradient is 38.6 mmHg.  The calculated aortic valve are is 0.78 cm^2. The calculated aortic valve are  is 0.45 .     Pulmonic Valve  Normal pulmonic valve. This degree of valvular regurgitation is within normal  limits.     Vessels  The aortic root is normal size. The ascending aorta is Mildly dilated. 3.9 cm.  The inferior vena cava is normal.     Pericardium  There is no pericardial effusion.     Rhythm  Sinus rhythm was noted.  ______________________________________________________________________________  MMode/2D Measurements & Calculations  IVSd: 1.5 cm     LVIDd: 3.8 cm  LVIDs: 2.5 cm  LVPWd: 1.5 cm  FS: 35.2 %  LV mass(C)d: 223.3 grams  LV mass(C)dI: 127.2 grams/m2  Ao root diam: 3.6 cm  LA dimension: 4.5 cm  asc Aorta Diam: 3.3 cm  LA/Ao: 1.2  LVOT diam: 2.0 cm  LVOT area: 3.2 cm2  LA Volume (BP): 83.3 ml  LA Volume Index (BP): 47.6 ml/m2  RWT: 0.81     Doppler Measurements & Calculations  MV E max dionicio: 150.0 cm/sec  MV A max dionicio: 67.0 cm/sec  MV E/A: 2.2  MV max PG: 10.1 mmHg  MV mean P.5 mmHg  MV V2 VTI: 43.4 cm  MVA(VTI): 1.7 cm2  MV dec slope: 688.0 cm/sec2  MV dec time: 0.22 sec  Ao V2 max: 339.3 cm/sec  Ao max P.0 mmHg  Ao V2 mean: 257.8 cm/sec  Ao mean P.6 mmHg  Ao V2 VTI: 95.3 cm  NANDA(I,D): 0.78 cm2  NANDA(V,D): 0.84 cm2  AI P1/2t: 492.0 msec  LV V1 max PG: 3.3 mmHg  LV V1 max: 90.7 cm/sec  LV V1 VTI: 23.7 cm  SV(LVOT): 74.6 ml  SI(LVOT): 42.5 ml/m2  PA acc time: 0.10 sec  AV Dionicio Ratio (DI): 0.27  NANDA Index (cm2/m2): 0.45  E/E' av.1  Lateral E/e': 24.9  Medial E/e': 51.3     ______________________________________________________________________________  Report approved by: Yvette Alvarado 2022 02:42 PM           Discharge Medications   Current Discharge Medication List      START taking these medications    Details   acetaminophen  (TYLENOL) 325 MG tablet Take 2 tablets (650 mg) by mouth every 6 hours as needed for mild pain or other (and adjunct with moderate or severe pain or per patient request)    Associated Diagnoses: Pain      apixaban ANTICOAGULANT (ELIQUIS) 2.5 MG tablet Take 1 tablet (2.5 mg) by mouth 2 times daily    Associated Diagnoses: Atrial flutter, unspecified type (H)      cefdinir (OMNICEF) 300 MG capsule Take 1 capsule (300 mg) by mouth daily for 3 days (7/16-7/18)    Associated Diagnoses: Pneumonia of right upper lobe due to infectious organism      doxycycline hyclate (VIBRAMYCIN) 100 MG capsule Take 1 capsule (100 mg) by mouth every 12 hours for 7 doses (evening of 7/15 - evening of 7/18)    Associated Diagnoses: Pneumonia of right upper lobe due to infectious organism      polyethylene glycol (MIRALAX) 17 GM/Dose powder Take 17 g by mouth daily  Qty: 510 g    Associated Diagnoses: Constipation, unspecified constipation type         CONTINUE these medications which have CHANGED    Details   famotidine (PEPCID) 20 MG tablet Take 1 tablet (20 mg) by mouth every 48 hours At bedtime, next dose due on evening of 7/16/22.    Associated Diagnoses: Gastroesophageal reflux disease without esophagitis         CONTINUE these medications which have NOT CHANGED    Details   allopurinol (ZYLOPRIM) 100 MG tablet TAKE 2 TABLETS BY MOUTH  DAILY  Qty: 180 tablet, Refills: 3    Comments: Requesting 1 year supply  Associated Diagnoses: Hyperlipidemia LDL goal <100; Benign essential hypertension      amLODIPine (NORVASC) 5 MG tablet Take 1 tablet (5 mg) by mouth daily Profile Rx: patient will contact pharmacy when needed  Qty: 90 tablet, Refills: 3    Associated Diagnoses: Hyperlipidemia LDL goal <100; Benign essential hypertension      atorvastatin (LIPITOR) 20 MG tablet TAKE 1 TABLET BY MOUTH  DAILY  Qty: 90 tablet, Refills: 2    Comments: Requesting 1 year supply  Associated Diagnoses: Hyperlipidemia LDL goal <100; Benign essential  hypertension      Cholecalciferol (VITAMIN D3 PO) Take 1 tablet by mouth daily OTC dose unknown      coenzyme Q-10 capsule Take 1 capsule by mouth daily OTC dose unknown      fenofibrate (TRIGLIDE/LOFIBRA) 160 MG tablet TAKE 1 TABLET BY MOUTH  DAILY  Qty: 90 tablet, Refills: 3    Comments: Requesting 1 year supply  Associated Diagnoses: Hyperlipidemia LDL goal <100; Benign essential hypertension      furosemide (LASIX) 20 MG tablet Take 1 tablet (20 mg) by mouth every 48 hours AND 0.5 tablets (10 mg) every 48 hours. (alternates 1 pill with 1/2 pill every other day)  Qty: 90 tablet, Refills: 0    Associated Diagnoses: Benign essential hypertension      hydrALAZINE (APRESOLINE) 50 MG tablet TAKE 1 TABLET BY MOUTH 4  TIMES DAILY  Qty: 360 tablet, Refills: 2    Comments: Requesting 1 year supply  Associated Diagnoses: Hyperlipidemia LDL goal <100; Benign essential hypertension      !! Multiple Vitamin (MULTIVITAMIN ADULT PO) Take 1 tablet by mouth daily      !! Multiple Vitamins-Minerals (PRESERVISION AREDS 2 PO) Take 1 tablet by mouth 2 times daily      spironolactone (ALDACTONE) 25 MG tablet TAKE 1 TABLET BY MOUTH  DAILY  Qty: 90 tablet, Refills: 3    Comments: Requesting 1 year supply  Associated Diagnoses: Secondary hypertension      atenolol (TENORMIN) 50 MG tablet TAKE 1 TABLET BY MOUTH  DAILY  Qty: 90 tablet, Refills: 2    Comments: Requesting 1 year supply  Associated Diagnoses: Hyperlipidemia LDL goal <100; Benign essential hypertension      Chlorpheniramine-DM (CORICIDIN HBP COUGH/COLD) 4-30 MG TABS Take 1 tablet by mouth every 6 hours as needed (nasal congestion, cough, runny nose)  Qty: 42 tablet, Refills: 2    Associated Diagnoses: Upper respiratory tract infection, unspecified type       !! - Potential duplicate medications found. Please discuss with provider.      STOP taking these medications       acetaminophen-codeine (TYLENOL #3) 300-30 MG tablet Comments:   Reason for Stopping:         aspirin (ASA)  325 MG tablet Comments:   Reason for Stopping:             Allergies   Allergies   Allergen Reactions     Avocado      Ciprofloxacin Itching     Penicillins Itching

## 2022-07-15 NOTE — PLAN OF CARE
[id number]  Pt. Alert and oriented x4. Vital signs stable on RA. Assist of 1 gait belt and walker. Tolerating regular diet. Lung sounds fine crackles. Bowel sounds +, passing flatus. BM (-), adequate urine output. Skin scattered bruising BUE. Pain: Denies. Denies nausea.

## 2022-07-15 NOTE — PLAN OF CARE
Vital Signs stable, pt on 2L oxygen via nasal cannula. Alert and Oriented. Lung sounds diminished with crackles on the left and clear on the right. Bowel sounds active and audile. Passing flatus. Regular diet. Denies nausea. Adequate urinary output. CMS intact. Up with assist of 1. Pt has denied pain. Plan to discharge to [facility] tomorrow.

## 2022-07-15 NOTE — PLAN OF CARE
Pt is A&Ox4 but confused at times, regular diet w/ poor appetite, Ax1 w/ GB and walker. Bumped to 4L of O2 throughout the day, satting in low-mid 90's. Accepted to [facility] this shift w/ private room. Plan was to for [facility] to pick Pt up at 1500, but did not come until 1630 and without oxygen tank. Pt was sent w/ one of our oxygen tanks w/ promise to bring back. IV's were removed. Discharge instructions and medications gone over w/ Pt and daughter, both verbalized understanding.

## 2022-07-15 NOTE — PLAN OF CARE
Goal Outcome Evaluation:  DATE: 7/15/2022 [id number]  SUMMARY: 7/11 admit for pneumonia  ORIENTATION: A&OX4, cooperative, calm  VS: VSS on 2-3L NC with sats in mid-90s during shift  ACTIVITY: A1 with GB/W  DIET:  Regular  SKIN:  No concerns  BOWEL/BLADDER: Continent with AUO   DRAINS/IV: IV SL.  PAIN MANAGEMENT: Denies this shift  DISCHARGE PLAN: Pending to TCU 7/15/2022  Slept well during NOC shift.

## 2022-07-16 ENCOUNTER — PATIENT OUTREACH (OUTPATIENT)
Dept: CARE COORDINATION | Facility: CLINIC | Age: 87
End: 2022-07-16

## 2022-07-16 DIAGNOSIS — Z71.89 OTHER SPECIFIED COUNSELING: ICD-10-CM

## 2022-07-16 LAB
BACTERIA BLD CULT: NO GROWTH
BACTERIA BLD CULT: NO GROWTH
SARS-COV-2 RNA RESP QL NAA+PROBE: NEGATIVE

## 2022-07-16 NOTE — PROGRESS NOTES
[facility]    Background: Care Coordination referral placed from [facility] discharge report for reason of patient meeting criteria for a TCM outreach call by [facility] team.    Assessment: Upon chart review, [facility] Team member will cancel/close the referral for TCM outreach due to reason below:    Upon chart review, CCRC Team member noted patient discharged to TCU.     Plan: Care Coordination referral for TCM outreach canceled.      FELIX Feldman  [phone]  [facility]    *Connected Care Resource Team does NOT follow patient ongoing. Referrals are identified based on internal discharge reports and the outreach is to ensure patient has an understanding of their discharge instructions.

## 2022-07-17 ENCOUNTER — LAB REQUISITION (OUTPATIENT)
Dept: LAB | Facility: CLINIC | Age: 87
End: 2022-07-17

## 2022-07-17 DIAGNOSIS — Z00.01 ENCOUNTER FOR GENERAL ADULT MEDICAL EXAMINATION WITH ABNORMAL FINDINGS: ICD-10-CM

## 2022-07-17 DIAGNOSIS — E78.5 HYPERLIPIDEMIA LDL GOAL <100: ICD-10-CM

## 2022-07-17 DIAGNOSIS — I10 BENIGN ESSENTIAL HYPERTENSION: ICD-10-CM

## 2022-07-18 ENCOUNTER — TRANSITIONAL CARE UNIT VISIT (OUTPATIENT)
Dept: GERIATRICS | Facility: CLINIC | Age: 87
End: 2022-07-18
Payer: MEDICARE

## 2022-07-18 ENCOUNTER — LAB REQUISITION (OUTPATIENT)
Dept: LAB | Facility: CLINIC | Age: 87
End: 2022-07-18

## 2022-07-18 VITALS
OXYGEN SATURATION: 97 % | RESPIRATION RATE: 18 BRPM | DIASTOLIC BLOOD PRESSURE: 63 MMHG | SYSTOLIC BLOOD PRESSURE: 130 MMHG | WEIGHT: 138.7 LBS | HEART RATE: 69 BPM | BODY MASS INDEX: 22.05 KG/M2 | TEMPERATURE: 98.5 F

## 2022-07-18 VITALS
BODY MASS INDEX: 21.77 KG/M2 | DIASTOLIC BLOOD PRESSURE: 50 MMHG | OXYGEN SATURATION: 93 % | WEIGHT: 138.7 LBS | RESPIRATION RATE: 16 BRPM | TEMPERATURE: 97.9 F | HEIGHT: 67 IN | SYSTOLIC BLOOD PRESSURE: 105 MMHG | HEART RATE: 68 BPM

## 2022-07-18 DIAGNOSIS — D64.9 CHRONIC ANEMIA: ICD-10-CM

## 2022-07-18 DIAGNOSIS — J18.9 COMMUNITY ACQUIRED PNEUMONIA OF RIGHT UPPER LOBE OF LUNG: Primary | ICD-10-CM

## 2022-07-18 DIAGNOSIS — I50.32 CHRONIC HEART FAILURE WITH PRESERVED EJECTION FRACTION (H): ICD-10-CM

## 2022-07-18 DIAGNOSIS — Z00.01 ENCOUNTER FOR GENERAL ADULT MEDICAL EXAMINATION WITH ABNORMAL FINDINGS: ICD-10-CM

## 2022-07-18 DIAGNOSIS — M10.9 GOUT, UNSPECIFIED CAUSE, UNSPECIFIED CHRONICITY, UNSPECIFIED SITE: ICD-10-CM

## 2022-07-18 DIAGNOSIS — E78.5 DYSLIPIDEMIA: ICD-10-CM

## 2022-07-18 DIAGNOSIS — R53.81 PHYSICAL DECONDITIONING: ICD-10-CM

## 2022-07-18 DIAGNOSIS — I35.0 SEVERE AORTIC STENOSIS: ICD-10-CM

## 2022-07-18 DIAGNOSIS — R94.31 PROLONGED QT INTERVAL: ICD-10-CM

## 2022-07-18 DIAGNOSIS — K21.9 GASTROESOPHAGEAL REFLUX DISEASE, UNSPECIFIED WHETHER ESOPHAGITIS PRESENT: ICD-10-CM

## 2022-07-18 DIAGNOSIS — I25.10 CORONARY ARTERY DISEASE INVOLVING NATIVE CORONARY ARTERY OF NATIVE HEART WITHOUT ANGINA PECTORIS: ICD-10-CM

## 2022-07-18 DIAGNOSIS — I05.0 MODERATE MITRAL STENOSIS: ICD-10-CM

## 2022-07-18 DIAGNOSIS — J96.01 ACUTE RESPIRATORY FAILURE WITH HYPOXIA (H): ICD-10-CM

## 2022-07-18 DIAGNOSIS — I10 ESSENTIAL HYPERTENSION: ICD-10-CM

## 2022-07-18 DIAGNOSIS — I71.43 ANEURYSM OF INFRARENAL ABDOMINAL AORTA (H): ICD-10-CM

## 2022-07-18 DIAGNOSIS — N18.4 CKD (CHRONIC KIDNEY DISEASE) STAGE 4, GFR 15-29 ML/MIN (H): ICD-10-CM

## 2022-07-18 DIAGNOSIS — I48.92 ATRIAL FLUTTER, UNSPECIFIED TYPE (H): ICD-10-CM

## 2022-07-18 PROCEDURE — 86481 TB AG RESPONSE T-CELL SUSP: CPT | Performed by: NURSE PRACTITIONER

## 2022-07-18 PROCEDURE — 36415 COLL VENOUS BLD VENIPUNCTURE: CPT | Performed by: NURSE PRACTITIONER

## 2022-07-18 PROCEDURE — P9604 ONE-WAY ALLOW PRORATED TRIP: HCPCS | Performed by: NURSE PRACTITIONER

## 2022-07-18 PROCEDURE — 99305 1ST NF CARE MODERATE MDM 35: CPT | Performed by: INTERNAL MEDICINE

## 2022-07-18 PROCEDURE — U0003 INFECTIOUS AGENT DETECTION BY NUCLEIC ACID (DNA OR RNA); SEVERE ACUTE RESPIRATORY SYNDROME CORONAVIRUS 2 (SARS-COV-2) (CORONAVIRUS DISEASE [COVID-19]), AMPLIFIED PROBE TECHNIQUE, MAKING USE OF HIGH THROUGHPUT TECHNOLOGIES AS DESCRIBED BY CMS-2020-01-R: HCPCS | Performed by: NURSE PRACTITIONER

## 2022-07-18 NOTE — PROGRESS NOTES
[facility]  INITIAL VISIT NOTE  July 18, 2022    PRIMARY CARE PROVIDER AND CLINIC:  Abdoulaye Redman [facility] [address] / [place] [address]    Chief Complaint   Patient presents with     Hospital F/U       HPI:    HPI:    Trevor Soni is a 88 year old  (9/3/1933) male who was [facility] July [facility], 2022 for an initial visit.     Medical history is notable for CAD, s/p CABG, chronic HFpEF, severe aortic stenosis, mild-moderate mitral stenosis, hypertension, dyslipidemia, infrarenal abdominal aortic aneurysm, CKD stage IV, chronic anemia, GERD, ischemic bowel, gout, low back pain, and osteoarthritis.    Summary of hospital course:  Patient was hospitalized at [facility] from July 11 through July 15, 2022 for pneumonia.  He originally presented to the emergency department with generalized weakness, shortness of breath, and leg edema.  Work-up revealed elevated BNP of 42,074, mildly elevated troponin I of 163, white count of 14,000, hemoglobin of 10.6, and creatinine of 2.64.  Chest x-ray revealed right upper lobe disease, suspicious for pneumonia.  Screening for COVID-19, RSV, and influenza was negative.  He was treated with ceftriaxone and doxycycline with eventual transition to doxycycline and cefdinir at discharge.  Notably, echocardiogram revealed atrial flutter with variable AV block with premature or aberrantly conducted complexes.  Echocardiogram was remarkable for normal LV systolic function with EF of 60-65%, severe aortic stenosis, and mild-moderate degenerative mitral valve stenosis.  Diastolic Doppler findings were suggestive of increased LV filling pressures.  He was started on apixaban for [id number] score of 4-5. TCU was recommended per therapies.    Patient is admitted to this facility for medical management, nursing care, and rehab.     Of note, history was obtained from patient, facility RN, and extensive review of the chart.    Today's  visit:  Patient was seen in his room, while sitting in a wheelchair.  He appears weak and frail but in no acute distress.  He is currently on oxygen at 2 L/min.  He denies cough, sputum, dyspnea, chest pain, palpitation, nausea, vomiting, abdominal pain, or urinary symptoms.  He reports that he had a bowel movement earlier today.      CODE STATUS:   CPR/Full code     PAST MEDICAL HISTORY:   Community-acquired pneumonia (RUL) in July 2022  Atrial flutter, new diagnosis in July 2022  CAD, s/p CABG in 2001  Chronic HFpEF  Severe aortic stenosis with NANDA of 0.8 cm , per echo on July 12, 2022  Moderate-severe degenerative mitral stenosis  Infrarenal AAA, measuring 3.6 cm x 3.6 cm x 5.8 cm, per ultrasound in May 2021  Hypertension  Dyslipidemia  CKD stage IV, baseline creatinine 2.3-2.8  Chronic anemia, baseline hemoglobin 9-11  Nephrolithiasis  GERD  Gout  Low back pain  Ischemic bowel with perforated ileum and peritonitis, s/p exploratory laparotomy and bowel resection in June 2020    Past Medical History:   Diagnosis Date     Arthritis     rhuematoid     Coronary artery disease     triple bypass 2001     CRF (chronic renal failure)      Gastro-oesophageal reflux disease      Gout      Hyperlipidemia      Hypertension      Nocturia        PAST SURGICAL HISTORY:   Past Surgical History:   Procedure Laterality Date     CARDIAC SURGERY       CHOLECYSTECTOMY       COLONOSCOPY       LAPAROTOMY EXPLORATORY N/A 6/30/2020    Procedure: EXPLORATORY LAPAROTOMY, BOWEL RESECTION;  Surgeon: Bull Rachel MD;  Location:  OR     LAPAROTOMY, LYSIS ADHESIONS, COMBINED N/A 6/21/2020    Procedure: EXPLORATORY LAPAROTOMY WITH LYSIS OF ADHESIONS;  Surgeon: Jose Reed MD;  Location:  OR     ORTHOPEDIC SURGERY       PHACOEMULSIFICATION CLEAR CORNEA WITH TORIC INTRAOCULAR LENS IMPLANT  1/30/2012    Procedure:PHACOEMULSIFICATION CLEAR CORNEA WITH TORIC INTRAOCULAR LENS IMPLANT; LEFT PHACOEMULSIFICATION CLEAR CORNEA WITH  DELUXE  TORIC INTRAOCULAR LENS IMPLANT ; Surgeon:BRANDO HIGHTOWER; Location:[facility]     PHACOEMULSIFICATION CLEAR CORNEA WITH TORIC INTRAOCULAR LENS IMPLANT  2012    Procedure:PHACOEMULSIFICATION CLEAR CORNEA WITH TORIC INTRAOCULAR LENS IMPLANT; RIGHT PHACOEMULSIFICATION CLEAR CORNEA WITH TORIC INTRAOCULAR LENS IMPLANT ; Surgeon:BRANDO HIGHTOWER; Location:[facility]     VASCULAR SURGERY         FAMILY HISTORY:   Family History   Problem Relation Age of Onset     Myocardial Infarction Mother      Rheumatic fever Father      Cerebrovascular Disease Brother        SOCIAL HISTORY:  Social History     Tobacco Use     Smoking status: Former Smoker     Types: Cigars     Quit date: 1980     Years since quittin.5     Smokeless tobacco: Never Used   Substance Use Topics     Alcohol use: Yes     Alcohol/week: 0.0 standard drinks     Comment: 1 drink week       MEDICATIONS:  Current Outpatient Medications   Medication Sig Dispense Refill     acetaminophen (TYLENOL) 325 MG tablet Take 2 tablets (650 mg) by mouth every 6 hours as needed for mild pain or other (and adjunct with moderate or severe pain or per patient request)       allopurinol (ZYLOPRIM) 100 MG tablet TAKE 2 TABLETS BY MOUTH  DAILY 180 tablet 3     amLODIPine (NORVASC) 5 MG tablet Take 1 tablet (5 mg) by mouth daily Profile Rx: patient will contact pharmacy when needed 90 tablet 3     apixaban ANTICOAGULANT (ELIQUIS) 2.5 MG tablet Take 1 tablet (2.5 mg) by mouth 2 times daily       atenolol (TENORMIN) 50 MG tablet TAKE 1 TABLET BY MOUTH  DAILY 90 tablet 2     atorvastatin (LIPITOR) 20 MG tablet TAKE 1 TABLET BY MOUTH  DAILY 90 tablet 2     cefdinir (OMNICEF) 300 MG capsule Take 1 capsule (300 mg) by mouth daily for 3 days (-)       Chlorpheniramine-DM (CORICIDIN HBP COUGH/COLD) 4-30 MG TABS Take 1 tablet by mouth every 6 hours as needed (nasal congestion, cough, runny nose) (Patient not taking: No sig reported) 42 tablet 2     Cholecalciferol  "(VITAMIN D3 PO) Take 1 tablet by mouth daily OTC dose unknown       coenzyme Q-10 capsule Take 1 capsule by mouth daily OTC dose unknown       doxycycline hyclate (VIBRAMYCIN) 100 MG capsule Take 1 capsule (100 mg) by mouth every 12 hours for 7 doses (evening of 7/15 - evening of 7/18)       famotidine (PEPCID) 20 MG tablet Take 1 tablet (20 mg) by mouth every 48 hours At bedtime, next dose due on evening of 7/16/22.       fenofibrate (TRIGLIDE/LOFIBRA) 160 MG tablet TAKE 1 TABLET BY MOUTH  DAILY 90 tablet 3     furosemide (LASIX) 20 MG tablet Take 1 tablet (20 mg) by mouth every 48 hours AND 0.5 tablets (10 mg) every 48 hours. (alternates 1 pill with 1/2 pill every other day) 90 tablet 0     hydrALAZINE (APRESOLINE) 50 MG tablet TAKE 1 TABLET BY MOUTH 4  TIMES DAILY 360 tablet 2     Multiple Vitamin (MULTIVITAMIN ADULT PO) Take 1 tablet by mouth daily       Multiple Vitamins-Minerals (PRESERVISION AREDS 2 PO) Take 1 tablet by mouth 2 times daily       polyethylene glycol (MIRALAX) 17 GM/Dose powder Take 17 g by mouth daily 510 g      spironolactone (ALDACTONE) 25 MG tablet TAKE 1 TABLET BY MOUTH  DAILY 90 tablet 3       Post Discharge Medication Reconciliation Status: discharge medications reconciled and changed, per note/orders.      ALLERGIES:  Allergies   Allergen Reactions     Avocado      Ciprofloxacin Itching     Penicillins Itching       ROS:  10 point ROS were negative other than the symptoms noted above in the HPI.    PHYSICAL EXAM:  Vital signs were reviewed in the chart.  Vital Signs: /50   Pulse 68   Temp 97.9  F (36.6  C)   Resp 16   Ht 1.689 m (5' 6.5\")   Wt 62.9 kg (138 lb 11.2 oz)   SpO2 93%   BMI 22.05 kg/m    General: Weak and frail appearing but in no acute distress  HEENT: No conjunctival pallor, no scleral icterus or injection, moist oral mucosa  Cardiovascular: Normal S1, S2, RRR, grade 2/6 systolic murmur  Respiratory: Right basilar crackles, no rhonchi or wheezing  GI: Abdomen " soft, non-tender, non-distended, +BS  Extremities: No LE edema  Neuro: CX II-XII grossly intact; ROM in all four extremities grossly intact  Psych: Alert and oriented almost x3; normal affect  Skin: No acute rash    LABORATORY/IMAGING DATA:  All relevant labs and imaging data in [facility] and/or Care Everywhere were personally reviewed today.      Most Recent 3 CBC's:Recent Labs   Lab Test 07/15/22  0540 07/14/22  0527 07/13/22  0528   WBC 13.1* 14.8* 15.0*   HGB 9.1* 9.9* 9.7*   MCV 90 92 91    241 203     Most Recent 3 BMP's:Recent Labs   Lab Test 07/15/22  0540 07/14/22  0527 07/13/22  0528    137 136   POTASSIUM 3.7 3.8 4.2   CHLORIDE 107 108 107   CO2 21 21 23   * 94* 87*   CR 2.52* 2.71* 2.83*   ANIONGAP 8 8 6   AGUSTÍN 8.7 8.7 8.7   * 122* 103*         ASSESSMENT/PLAN:  Community-acquired pneumonia (RUL),  Acute hypoxic respiratory failure.  Treated with IV ceftriaxone and doxycycline in the hospital and discharged on oral doxycycline and cefdinir.  He is currently on supplemental oxygen at 2 L/min.  Plan:  Continue supplemental oxygen, wean as able  Continue doxycycline 100 mg p.o. twice daily and cefdinir 300 mg p.o. daily through end of today, July 18, 2022  Monitor respiratory status and fever curve  Recheck CBC on July 22  Follow-up chest x-ray in 2 weeks to ensure resolution of infiltrate    Atrial flutter, newly diagnosed,  Prolonged QTc interval.  There was atrial flutter with prolonged QTc of 597 on initial EKG from July 11 that  improved to 512 on repeat EKG.  Rate is controlled.  Started on apixaban for [id number] score of 4-5.  Plan:  Continue atenolol 50 mg p.o. daily  Continue apixaban 2.5 mg p.o. twice daily  Avoid QT prolonging medications  Monitor heart rate  Follow-up with cardiology as recommended    CAD, s/p CABG in 2001,  Essential hypertension,  Dyslipidemia.  Appears stable.  PTA ASA was discontinued after initiation of anticoagulation with apixaban for atrial  flutter.  Plan:  Continue atorvastatin 20 mg p.o. daily and fenofibrate 160 mg p.o. daily  Continue amlodipine 5 mg p.o. daily, atenolol 50 mg p.o. daily, hydralazine 50 mg p.o. 4 times daily, furosemide 10 mg and 20 mg p.o. every 48 hours (on alternating days), and spironolactone 25 mg p.o. daily  Monitor blood pressure and cardiac status  Follow-up with cardiology as recommended    Chronic HFpEF.  Patient currently weighs 138.7 and appears euvolemic.  Plan:  Continue atenolol 50 mg p.o. daily, hydralazine 50 mg p.o. 4 times daily, furosemide 10 mg and 20 mg p.o. every 48 hours (on alternating days), and spironolactone 25 mg p.o. daily  Monitor weight and volume status    Severe aortic stenosis with NANDA of 0.8 cm  per echo on July 12, 2022,  Moderate-severe degenerative mitral stenosis.  Patient is being followed by cardiology.  Plan:   Follow-up as outpatient    Infrarenal abdominal aortic aneurysm.  Measuring 3.6 cm x 3.6 cm x 5.8 cm, per ultrasound in May 2021.  Plan:  Continue to monitor as outpatient    CKD stage IV.  Baseline creatinine 2.3-2.8.  Last creatinine was 2.52 on July 15.  Plan:  Avoid NSAIDs and nephrotoxic  Monitor renal function periodically  Recheck BMP on July 22    Chronic anemia.  Baseline hemoglobin 9-11.  Last hemoglobin 9.1 on July 15  Plan:  Monitor hemoglobin creatinine  Recheck CBC on July 22    Gout.  Plan:  Continue PTA allopurinol 200 mg p.o. daily    GERD.  Plan:  Continue PTA famotidine 20 mg p.o. every 48 hours    Physical deconditioning.  Plan:  Continue PT/OT evaluation and therapy          Orders written by provider at facility:  Wean O2 as able  CBC on July 22, DX: Pneumonia, anemia  BMP on July 22, DX: CKD      Recommendation by provider at facility:  Repeat chest x-ray in 2 weeks to ensure resolution of pneumonia        Disclaimer: This note may contain text created using speech-recognition software and may contain unintended word substitutions.      Electronically signed  by:  Yung Lei MD

## 2022-07-18 NOTE — PLAN OF CARE
Physical Therapy Discharge Summary    Reason for therapy discharge:    Discharged to transitional care facility.    Progress towards therapy goal(s). See goals on Care Plan in [facility] electronic health record for goal details.  Goals partially met.  Barriers to achieving goals:   discharge from facility.    Therapy recommendation(s):    Continued therapy is recommended.  Rationale/Recommendations:  TCU to improve IND with functional mobility as pt below baseline at this time.

## 2022-07-18 NOTE — LETTER
7/18/2022        RE: Trevor Soni  [address]  [facility] [address]        [place] [facility]  INITIAL VISIT NOTE  July 18, 2022    PRIMARY CARE PROVIDER AND CLINIC:  Abdoulaye Redman [address] [facility] [address] / [place] [address]    Chief Complaint   Patient presents with     Hospital F/U       HPI:    HPI:    Trevor Soni is a 88 year old  (9/3/1933) male who [facility] [place] [facility] for an initial visit.     Medical history is notable for CAD, s/p CABG, chronic HFpEF, severe aortic stenosis, mild-moderate mitral stenosis, hypertension, dyslipidemia, infrarenal abdominal aortic aneurysm, CKD stage IV, chronic anemia, GERD, ischemic bowel, gout, low back pain, and osteoarthritis.    Summary of hospital course:  Patient was hospitalized at [facility] from July 11 through July 15, 2022 for pneumonia.  He originally presented to the emergency department with generalized weakness, shortness of breath, and leg edema.  Work-up revealed elevated BNP of 42,074, mildly elevated troponin I of 163, white count of 14,000, hemoglobin of 10.6, and creatinine of 2.64.  Chest x-ray revealed right upper lobe disease, suspicious for pneumonia.  Screening for COVID-19, RSV, and influenza was negative.  He was treated with ceftriaxone and doxycycline with eventual transition to doxycycline and cefdinir at discharge.  Notably, echocardiogram revealed atrial flutter with variable AV block with premature or aberrantly conducted complexes.  Echocardiogram was remarkable for normal LV systolic function with EF of 60-65%, severe aortic stenosis, and mild-moderate degenerative mitral valve stenosis.  Diastolic Doppler findings were suggestive of increased LV filling pressures.  He was started on apixaban for [id number] score of 4-5. TCU was recommended per therapies.    Patient is admitted to this facility for medical management, nursing care, and rehab.     Of note,  history was obtained from patient, facility RN, and extensive review of the chart.    Today's visit:  Patient was seen in his room, while sitting in a wheelchair.  He appears weak and frail but in no acute distress.  He is currently on oxygen at 2 L/min.  He denies cough, sputum, dyspnea, chest pain, palpitation, nausea, vomiting, abdominal pain, or urinary symptoms.  He reports that he had a bowel movement earlier today.      CODE STATUS:   CPR/Full code     PAST MEDICAL HISTORY:   Community-acquired pneumonia (RUL) in July 2022  Atrial flutter, new diagnosis in July 2022  CAD, s/p CABG in 2001  Chronic HFpEF  Severe aortic stenosis with NANDA of 0.8 cm , per echo on July 12, 2022  Moderate-severe degenerative mitral stenosis  Infrarenal AAA, measuring 3.6 cm x 3.6 cm x 5.8 cm, per ultrasound in May 2021  Hypertension  Dyslipidemia  CKD stage IV, baseline creatinine 2.3-2.8  Chronic anemia, baseline hemoglobin 9-11  Nephrolithiasis  GERD  Gout  Low back pain  Ischemic bowel with perforated ileum and peritonitis, s/p exploratory laparotomy and bowel resection in June 2020    Past Medical History:   Diagnosis Date     Arthritis     rhuematoid     Coronary artery disease     triple bypass 2001     CRF (chronic renal failure)      Gastro-oesophageal reflux disease      Gout      Hyperlipidemia      Hypertension      Nocturia        PAST SURGICAL HISTORY:   Past Surgical History:   Procedure Laterality Date     CARDIAC SURGERY       CHOLECYSTECTOMY       COLONOSCOPY       LAPAROTOMY EXPLORATORY N/A 6/30/2020    Procedure: EXPLORATORY LAPAROTOMY, BOWEL RESECTION;  Surgeon: Bull Rachel MD;  Location:  OR     LAPAROTOMY, LYSIS ADHESIONS, COMBINED N/A 6/21/2020    Procedure: EXPLORATORY LAPAROTOMY WITH LYSIS OF ADHESIONS;  Surgeon: Jose Reed MD;  Location:  OR     ORTHOPEDIC SURGERY       PHACOEMULSIFICATION CLEAR CORNEA WITH TORIC INTRAOCULAR LENS IMPLANT  1/30/2012    Procedure:PHACOEMULSIFICATION  CLEAR CORNEA WITH TORIC INTRAOCULAR LENS IMPLANT; LEFT PHACOEMULSIFICATION CLEAR CORNEA WITH DELUXE  TORIC INTRAOCULAR LENS IMPLANT ; Surgeon:BRANDO HIGHTOWER; Location:[facility]     PHACOEMULSIFICATION CLEAR CORNEA WITH TORIC INTRAOCULAR LENS IMPLANT  2012    Procedure:PHACOEMULSIFICATION CLEAR CORNEA WITH TORIC INTRAOCULAR LENS IMPLANT; RIGHT PHACOEMULSIFICATION CLEAR CORNEA WITH TORIC INTRAOCULAR LENS IMPLANT ; Surgeon:BRANDO HIGHTOWER; Location:[facility]     VASCULAR SURGERY         FAMILY HISTORY:   Family History   Problem Relation Age of Onset     Myocardial Infarction Mother      Rheumatic fever Father      Cerebrovascular Disease Brother        SOCIAL HISTORY:  Social History     Tobacco Use     Smoking status: Former Smoker     Types: Cigars     Quit date: 1980     Years since quittin.5     Smokeless tobacco: Never Used   Substance Use Topics     Alcohol use: Yes     Alcohol/week: 0.0 standard drinks     Comment: 1 drink week       MEDICATIONS:  Current Outpatient Medications   Medication Sig Dispense Refill     acetaminophen (TYLENOL) 325 MG tablet Take 2 tablets (650 mg) by mouth every 6 hours as needed for mild pain or other (and adjunct with moderate or severe pain or per patient request)       allopurinol (ZYLOPRIM) 100 MG tablet TAKE 2 TABLETS BY MOUTH  DAILY 180 tablet 3     amLODIPine (NORVASC) 5 MG tablet Take 1 tablet (5 mg) by mouth daily Profile Rx: patient will contact pharmacy when needed 90 tablet 3     apixaban ANTICOAGULANT (ELIQUIS) 2.5 MG tablet Take 1 tablet (2.5 mg) by mouth 2 times daily       atenolol (TENORMIN) 50 MG tablet TAKE 1 TABLET BY MOUTH  DAILY 90 tablet 2     atorvastatin (LIPITOR) 20 MG tablet TAKE 1 TABLET BY MOUTH  DAILY 90 tablet 2     cefdinir (OMNICEF) 300 MG capsule Take 1 capsule (300 mg) by mouth daily for 3 days (-)       Chlorpheniramine-DM (CORICIDIN HBP COUGH/COLD) 4-30 MG TABS Take 1 tablet by mouth every 6 hours as needed (nasal congestion,  "cough, runny nose) (Patient not taking: No sig reported) 42 tablet 2     Cholecalciferol (VITAMIN D3 PO) Take 1 tablet by mouth daily OTC dose unknown       coenzyme Q-10 capsule Take 1 capsule by mouth daily OTC dose unknown       doxycycline hyclate (VIBRAMYCIN) 100 MG capsule Take 1 capsule (100 mg) by mouth every 12 hours for 7 doses (evening of 7/15 - evening of 7/18)       famotidine (PEPCID) 20 MG tablet Take 1 tablet (20 mg) by mouth every 48 hours At bedtime, next dose due on evening of 7/16/22.       fenofibrate (TRIGLIDE/LOFIBRA) 160 MG tablet TAKE 1 TABLET BY MOUTH  DAILY 90 tablet 3     furosemide (LASIX) 20 MG tablet Take 1 tablet (20 mg) by mouth every 48 hours AND 0.5 tablets (10 mg) every 48 hours. (alternates 1 pill with 1/2 pill every other day) 90 tablet 0     hydrALAZINE (APRESOLINE) 50 MG tablet TAKE 1 TABLET BY MOUTH 4  TIMES DAILY 360 tablet 2     Multiple Vitamin (MULTIVITAMIN ADULT PO) Take 1 tablet by mouth daily       Multiple Vitamins-Minerals (PRESERVISION AREDS 2 PO) Take 1 tablet by mouth 2 times daily       polyethylene glycol (MIRALAX) 17 GM/Dose powder Take 17 g by mouth daily 510 g      spironolactone (ALDACTONE) 25 MG tablet TAKE 1 TABLET BY MOUTH  DAILY 90 tablet 3       Post Discharge Medication Reconciliation Status: discharge medications reconciled and changed, per note/orders.      ALLERGIES:  Allergies   Allergen Reactions     Avocado      Ciprofloxacin Itching     Penicillins Itching       ROS:  10 point ROS were negative other than the symptoms noted above in the HPI.    PHYSICAL EXAM:  Vital signs were reviewed in the chart.  Vital Signs: /50   Pulse 68   Temp 97.9  F (36.6  C)   Resp 16   Ht 1.689 m (5' 6.5\")   Wt 62.9 kg (138 lb 11.2 oz)   SpO2 93%   BMI 22.05 kg/m    General: Weak and frail appearing but in no acute distress  HEENT: No conjunctival pallor, no scleral icterus or injection, moist oral mucosa  Cardiovascular: Normal S1, S2, RRR, grade 2/6 " systolic murmur  Respiratory: Right basilar crackles, no rhonchi or wheezing  GI: Abdomen soft, non-tender, non-distended, +BS  Extremities: No LE edema  Neuro: CX II-XII grossly intact; ROM in all four extremities grossly intact  Psych: Alert and oriented almost x3; normal affect  Skin: No acute rash    LABORATORY/IMAGING DATA:  All relevant labs and imaging data in [facility] and/or Care Everywhere were personally reviewed today.      Most Recent 3 CBC's:Recent Labs   Lab Test 07/15/22  0540 07/14/22  0527 07/13/22  0528   WBC 13.1* 14.8* 15.0*   HGB 9.1* 9.9* 9.7*   MCV 90 92 91    241 203     Most Recent 3 BMP's:Recent Labs   Lab Test 07/15/22  0540 07/14/22  0527 07/13/22  0528    137 136   POTASSIUM 3.7 3.8 4.2   CHLORIDE 107 108 107   CO2 21 21 23   * 94* 87*   CR 2.52* 2.71* 2.83*   ANIONGAP 8 8 6   AGUSTÍN 8.7 8.7 8.7   * 122* 103*         ASSESSMENT/PLAN:  Community-acquired pneumonia (RUL),  Acute hypoxic respiratory failure.  Treated with IV ceftriaxone and doxycycline in the hospital and discharged on oral doxycycline and cefdinir.  He is currently on supplemental oxygen at 2 L/min.  Plan:  Continue supplemental oxygen, wean as able  Continue doxycycline 100 mg p.o. twice daily and cefdinir 300 mg p.o. daily through end of today, July 18, 2022  Monitor respiratory status and fever curve  Recheck CBC on July 22  Follow-up chest x-ray in 2 weeks to ensure resolution of infiltrate    Atrial flutter, newly diagnosed,  Prolonged QTc interval.  There was atrial flutter with prolonged QTc of 597 on initial EKG from July 11 that  improved to 512 on repeat EKG.  Rate is controlled.  Started on apixaban for [id number] score of 4-5.  Plan:  Continue atenolol 50 mg p.o. daily  Continue apixaban 2.5 mg p.o. twice daily  Avoid QT prolonging medications  Monitor heart rate  Follow-up with cardiology as recommended    CAD, s/p CABG in 2001,  Essential hypertension,  Dyslipidemia.  Appears  stable.  PTA ASA was discontinued after initiation of anticoagulation with apixaban for atrial flutter.  Plan:  Continue atorvastatin 20 mg p.o. daily and fenofibrate 160 mg p.o. daily  Continue amlodipine 5 mg p.o. daily, atenolol 50 mg p.o. daily, hydralazine 50 mg p.o. 4 times daily, furosemide 10 mg and 20 mg p.o. every 48 hours (on alternating days), and spironolactone 25 mg p.o. daily  Monitor blood pressure and cardiac status  Follow-up with cardiology as recommended    Chronic HFpEF.  Patient currently weighs 138.7 and appears euvolemic.  Plan:  Continue atenolol 50 mg p.o. daily, hydralazine 50 mg p.o. 4 times daily, furosemide 10 mg and 20 mg p.o. every 48 hours (on alternating days), and spironolactone 25 mg p.o. daily  Monitor weight and volume status    Severe aortic stenosis with NANDA of 0.8 cm  per echo on July 12, 2022,  Moderate-severe degenerative mitral stenosis.  Patient is being followed by cardiology.  Plan:   Follow-up as outpatient    Infrarenal abdominal aortic aneurysm.  Measuring 3.6 cm x 3.6 cm x 5.8 cm, per ultrasound in May 2021.  Plan:  Continue to monitor as outpatient    CKD stage IV.  Baseline creatinine 2.3-2.8.  Last creatinine was 2.52 on July 15.  Plan:  Avoid NSAIDs and nephrotoxic  Monitor renal function periodically  Recheck BMP on July 22    Chronic anemia.  Baseline hemoglobin 9-11.  Last hemoglobin 9.1 on July 15  Plan:  Monitor hemoglobin creatinine  Recheck CBC on July 22    Gout.  Plan:  Continue PTA allopurinol 200 mg p.o. daily    GERD.  Plan:  Continue PTA famotidine 20 mg p.o. every 48 hours    Physical deconditioning.  Plan:  Continue PT/OT evaluation and therapy          Orders written by provider at facility:  Wean O2 as able  CBC on July 22, DX: Pneumonia, anemia  BMP on July 22, DX: CKD      Recommendation by provider at facility:  Repeat chest x-ray in 2 weeks to ensure resolution of pneumonia        Disclaimer: This note may contain text created using  speech-recognition software and may contain unintended word substitutions.      Electronically signed by:  Yung Lei MD                        Sincerely,        Yung Lei MD

## 2022-07-19 ENCOUNTER — TRANSITIONAL CARE UNIT VISIT (OUTPATIENT)
Dept: GERIATRICS | Facility: CLINIC | Age: 87
End: 2022-07-19
Payer: MEDICARE

## 2022-07-19 DIAGNOSIS — I25.810 CORONARY ARTERY DISEASE INVOLVING CORONARY BYPASS GRAFT OF NATIVE HEART WITHOUT ANGINA PECTORIS: ICD-10-CM

## 2022-07-19 DIAGNOSIS — Z79.01 ANTICOAGULATED: ICD-10-CM

## 2022-07-19 DIAGNOSIS — I35.0 SEVERE AORTIC STENOSIS: ICD-10-CM

## 2022-07-19 DIAGNOSIS — N18.4 CRF (CHRONIC RENAL FAILURE), STAGE 4 (SEVERE) (H): ICD-10-CM

## 2022-07-19 DIAGNOSIS — J18.9 PNEUMONIA DUE TO INFECTIOUS ORGANISM, UNSPECIFIED LATERALITY, UNSPECIFIED PART OF LUNG: Primary | ICD-10-CM

## 2022-07-19 DIAGNOSIS — I48.92 ATRIAL FLUTTER, UNSPECIFIED TYPE (H): ICD-10-CM

## 2022-07-19 DIAGNOSIS — I10 PRIMARY HYPERTENSION: ICD-10-CM

## 2022-07-19 DIAGNOSIS — K59.01 SLOW TRANSIT CONSTIPATION: ICD-10-CM

## 2022-07-19 LAB
GAMMA INTERFERON BACKGROUND BLD IA-ACNC: 0 IU/ML
M TB IFN-G BLD-IMP: ABNORMAL
M TB IFN-G CD4+ BCKGRND COR BLD-ACNC: 0 IU/ML
MITOGEN IGNF BCKGRD COR BLD-ACNC: 0 IU/ML
MITOGEN IGNF BCKGRD COR BLD-ACNC: 0 IU/ML
QUANTIFERON MITOGEN: 0 IU/ML
QUANTIFERON NIL TUBE: 0 IU/ML
QUANTIFERON TB1 TUBE: 0 IU/ML
QUANTIFERON TB2 TUBE: 0
SARS-COV-2 RNA RESP QL NAA+PROBE: NEGATIVE

## 2022-07-19 PROCEDURE — 99310 SBSQ NF CARE HIGH MDM 45: CPT | Performed by: NURSE PRACTITIONER

## 2022-07-19 RX ORDER — ATENOLOL 50 MG/1
TABLET ORAL
Qty: 90 TABLET | Refills: 3 | Status: SHIPPED | OUTPATIENT
Start: 2022-07-19 | End: 2022-08-08

## 2022-07-19 RX ORDER — HYDRALAZINE HYDROCHLORIDE 50 MG/1
TABLET, FILM COATED ORAL
Qty: 360 TABLET | Refills: 3 | Status: SHIPPED | OUTPATIENT
Start: 2022-07-19 | End: 2022-07-27

## 2022-07-19 RX ORDER — GUAIFENESIN 600 MG/1
600 TABLET, EXTENDED RELEASE ORAL 2 TIMES DAILY
COMMUNITY
Start: 2022-07-19 | End: 2022-07-26

## 2022-07-19 RX ORDER — ATORVASTATIN CALCIUM 20 MG/1
TABLET, FILM COATED ORAL
Qty: 90 TABLET | Refills: 3 | Status: SHIPPED | OUTPATIENT
Start: 2022-07-19 | End: 2022-07-27

## 2022-07-19 NOTE — PROGRESS NOTES
[facility]    Chief Complaint   Patient presents with     RECHECK     HPI:  Trevor Soni is a 88 year old  (9/3/1933), who is being seen today for an episodic care visit at: [facility] ([facility]) [[id number]].     This is a 88-year-old male with a past medical history of hypertension, diastolic heart failure, gout, hyperlipidemia, osteoarthritis who presented with 1 week of fatigue, poor appetite and a nonproductive cough.  He was found to have commune acquired pneumonia, treated with Rocephin and doxycycline while in hospital, switched to cefdinir to complete a 7-day course on 7/18.  He was also found to have new a flutter, continued on his PTA atenolol, started on Eliquis per [id number] score of 4-5.  Also found to have severe aortic stenosis, minimally symptomatic, TTE on 7/12 showed mild interval progression, will follow up with cardiology outpatient.  Also has prolonged QTC.  No other changes noted, discharged to [facility] for rehab.    Allergies, and PMH/PSH reviewed in [facility] today.  REVIEW OF SYSTEMS:  10 point ROS of systems including Constitutional, Eyes, Respiratory, Cardiovascular, Gastroenterology, Genitourinary, Integumentary, Musculoskeletal, Psychiatric were all negative except for pertinent positives noted in my HPI.    Objective:   /63   Pulse 69   Temp 98.5  F (36.9  C)   Resp 18   Wt 62.9 kg (138 lb 11.2 oz)   SpO2 97%   BMI 22.05 kg/m    GENERAL APPEARANCE:  Alert, oriented, thin, cooperative, denies pain  ENT:  Mouth and posterior oropharynx normal, moist mucous membranes, normal hearing acuity  NECK:  No adenopathy,masses or thyromegaly  RESP:  no respiratory distress, diminished breath sounds bilaterally, 2LNC  CV:  no edema, irregular rhythm per rate controlled A-flutter/fib, 3/6 ELANA 2ICS RSB and 2/6 ELANA 4ICS LSB  ABDOMEN:  normal bowel sounds, soft, nontender, no hepatosplenomegaly or other masses  M/S:   Able to move all extremities  SKIN:  Inspection of skin  and subcutaneous tissue baseline  NEURO:   No focal deficits  PSYCH:  oriented X 3, memory impaired       Most Recent 3 CBC's:  Recent Labs   Lab Test 07/15/22  0540 07/14/22  0527 07/13/22  0528   WBC 13.1* 14.8* 15.0*   HGB 9.1* 9.9* 9.7*   MCV 90 92 91    241 203     Most Recent 3 BMP's:  Recent Labs   Lab Test 07/15/22  0540 07/14/22  0527 07/13/22  0528    137 136   POTASSIUM 3.7 3.8 4.2   CHLORIDE 107 108 107   CO2 21 21 23   * 94* 87*   CR 2.52* 2.71* 2.83*   ANIONGAP 8 8 6   AGUSTÍN 8.7 8.7 8.7   * 122* 103*       Assessment/Plan:    (J18.9) Pneumonia due to infectious organism, unspecified laterality, unspecified part of lung    Given a course of Rocephin, transition to Omnicef and doxycycline, complete on 7/18, today start Mucinex 600 mg twice daily x1 week.  Wean oxygen off, encourage incentive spirometry    (I35.0) Severe aortic stenosis  Mostly asymptomatic, gradient 39.  Continue diuretics.  Follow-up with cardiology    (I25.810) Coronary artery disease involving coronary bypass graft of native heart without angina pectoris  (I10) Primary hypertension  (I48.92) Atrial flutter, unspecified type (H)  Prolonged QTC  EF 60 to 65%, continue atenolol 50 mg daily, spironolactone, Lasix, amlodipine 5 mg daily and hydralazine 50 mg 4 times daily (hold for SBP less than 150).  Continue statin.  Monitor blood pressure 3 times daily    (Z79.01) Anticoagulated  New a flutter  [id number] score of 4-5, started on apixaban 2.5 mg twice daily    Diastolic heart failure  Continue spironolactone 25 mg daily and furosemide 20/10 mg every other day, start daily weights    Pain control  Continue Tylenol 650 mg every 6 hours as needed, denies pain    (N18.4) CRF (chronic renal failure), stage 4 (severe)   Last creatinine 2.52 with GFR 24, recheck BMP on 7/22    Normocytic normochromic anemia  Last hemoglobin 9.1, recheck CBC on 7/22    Leukocytosis  Last WBC 13.1, recheck CBC on 7/22    (K59.01) Slow  transit constipation  Continue MiraLAX daily    GERD  Continue famotidine 20 mg every other day at bedtime    Gout  Continue allopurinol 200 mg daily    Orders:  Increase blood pressure monitoring, wean oxygen, encourage incentive spirometry, start Mucinex, daily weights, blood pressure holding parameters, lab recheck    Total time spent with patient visit at the [facility] nursing [facility] was 35 min including patient visit and review of past records. Greater than 50% of total time spent with counseling and coordinating care due to increased blood pressure monitoring,  wean oxygen off and encourage incentive spirometry with starting Mucinex, daily weights for congestive heart failure, BMP/CBC recheck per CKD stage IV and anemia and therapy which lasted 18 minutes.    Electronically signed by: Paul Felipe NP

## 2022-07-19 NOTE — TELEPHONE ENCOUNTER
"Routing refill request to provider for review/approval because:  Pt is currently at a TCU post-hospital stay     Requested Prescriptions   Pending Prescriptions Disp Refills     atenolol (TENORMIN) 50 MG tablet [Pharmacy Med Name: Atenolol 50 MG Oral Tablet] 90 tablet 3     Sig: TAKE 1 TABLET BY MOUTH  DAILY       Beta-Blockers Protocol Passed - 7/17/2022 11:16 AM        Passed - Blood pressure under 140/90 in past 12 months     BP Readings from Last 3 Encounters:   07/18/22 130/63   07/18/22 105/50   07/15/22 124/67                 Passed - Patient is age 6 or older        Passed - Recent (12 mo) or future (30 days) visit within the authorizing provider's specialty     Patient has had an office visit with the authorizing provider or a provider within the authorizing providers department within the previous 12 mos or has a future within next 30 days. See \"Patient Info\" tab in inbasket, or \"Choose Columns\" in Meds & Orders section of the refill encounter.              Passed - Medication is active on med list           atorvastatin (LIPITOR) 20 MG tablet [Pharmacy Med Name: Atorvastatin Calcium 20 MG Oral Tablet] 90 tablet 3     Sig: TAKE 1 TABLET BY MOUTH  DAILY       Statins Protocol Passed - 7/17/2022 11:16 AM        Passed - LDL on file in past 12 months     Recent Labs   Lab Test 10/25/21  1417   LDL 50             Passed - No abnormal creatine kinase in past 12 months     No lab results found.             Passed - Recent (12 mo) or future (30 days) visit within the authorizing provider's specialty     Patient has had an office visit with the authorizing provider or a provider within the authorizing providers department within the previous 12 mos or has a future within next 30 days. See \"Patient Info\" tab in inbasket, or \"Choose Columns\" in Meds & Orders section of the refill encounter.              Passed - Medication is active on med list        Passed - Patient is age 18 or older           hydrALAZINE " "(APRESOLINE) 50 MG tablet [Pharmacy Med Name: hydrALAZINE HCl 50 MG Oral Tablet] 360 tablet 3     Sig: TAKE 1 TABLET BY MOUTH 4  TIMES DAILY       Vasodilators Failed - 7/17/2022 11:16 AM        Failed - Most recent encounter is not a hospital encounter. Patient has recent (12 mos) or future (1 mos) visit with authorizing provider's specialty     Patient's most recent encounter is NOT a hospital encounter and has had an office visit in the last 12 months or has a visit in the next 30 days with authorizing provider or within the authorizing provider's specialty.      See \"Patient Info\" tab in inbasket, or \"Choose Columns\" in Meds & Orders section of the refill encounter.      If most recent encounter is a hospital encounter AND the patient does NOT have an appointment scheduled with the authorizing provider or authorizing provider's specialty within the next 30 days, forward refill to authorizing provider for medication review.          Passed - Most recent BP less than 140/90 on record     BP Readings from Last 3 Encounters:   07/18/22 130/63   07/18/22 105/50   07/15/22 124/67                 Passed - Medication is active on med list        Passed - Patient is of age 18 years or older               Mckayla ALEX Triage [facility]     "

## 2022-07-20 VITALS
TEMPERATURE: 96.2 F | DIASTOLIC BLOOD PRESSURE: 44 MMHG | HEART RATE: 61 BPM | RESPIRATION RATE: 16 BRPM | WEIGHT: 131.4 LBS | BODY MASS INDEX: 20.89 KG/M2 | OXYGEN SATURATION: 100 % | SYSTOLIC BLOOD PRESSURE: 81 MMHG

## 2022-07-21 ENCOUNTER — LAB REQUISITION (OUTPATIENT)
Dept: LAB | Facility: CLINIC | Age: 87
End: 2022-07-21

## 2022-07-21 ENCOUNTER — OFFICE VISIT (OUTPATIENT)
Dept: CARDIOLOGY | Facility: CLINIC | Age: 87
End: 2022-07-21
Attending: INTERNAL MEDICINE
Payer: MEDICARE

## 2022-07-21 ENCOUNTER — TRANSITIONAL CARE UNIT VISIT (OUTPATIENT)
Dept: GERIATRICS | Facility: CLINIC | Age: 87
End: 2022-07-21
Payer: MEDICARE

## 2022-07-21 VITALS
SYSTOLIC BLOOD PRESSURE: 92 MMHG | BODY MASS INDEX: 20.99 KG/M2 | WEIGHT: 132 LBS | OXYGEN SATURATION: 100 % | DIASTOLIC BLOOD PRESSURE: 52 MMHG | HEART RATE: 71 BPM

## 2022-07-21 DIAGNOSIS — F51.02 ADJUSTMENT INSOMNIA: ICD-10-CM

## 2022-07-21 DIAGNOSIS — I25.810 CORONARY ARTERY DISEASE INVOLVING CORONARY BYPASS GRAFT OF NATIVE HEART WITHOUT ANGINA PECTORIS: ICD-10-CM

## 2022-07-21 DIAGNOSIS — N18.4 CRF (CHRONIC RENAL FAILURE), STAGE 4 (SEVERE) (H): ICD-10-CM

## 2022-07-21 DIAGNOSIS — Z00.01 ENCOUNTER FOR GENERAL ADULT MEDICAL EXAMINATION WITH ABNORMAL FINDINGS: ICD-10-CM

## 2022-07-21 DIAGNOSIS — I10 BENIGN ESSENTIAL HYPERTENSION: ICD-10-CM

## 2022-07-21 DIAGNOSIS — N18.4 CKD (CHRONIC KIDNEY DISEASE) STAGE 4, GFR 15-29 ML/MIN (H): ICD-10-CM

## 2022-07-21 DIAGNOSIS — I10 PRIMARY HYPERTENSION: ICD-10-CM

## 2022-07-21 DIAGNOSIS — I48.92 ATRIAL FLUTTER, UNSPECIFIED TYPE (H): ICD-10-CM

## 2022-07-21 DIAGNOSIS — K59.01 SLOW TRANSIT CONSTIPATION: ICD-10-CM

## 2022-07-21 DIAGNOSIS — J18.9 PNEUMONIA DUE TO INFECTIOUS ORGANISM, UNSPECIFIED LATERALITY, UNSPECIFIED PART OF LUNG: Primary | ICD-10-CM

## 2022-07-21 DIAGNOSIS — I35.0 SEVERE AORTIC STENOSIS: Primary | ICD-10-CM

## 2022-07-21 DIAGNOSIS — E78.5 HYPERLIPIDEMIA LDL GOAL <100: ICD-10-CM

## 2022-07-21 DIAGNOSIS — Z79.01 ANTICOAGULATED: ICD-10-CM

## 2022-07-21 PROCEDURE — 99215 OFFICE O/P EST HI 40 MIN: CPT | Performed by: PHYSICIAN ASSISTANT

## 2022-07-21 PROCEDURE — 99309 SBSQ NF CARE MODERATE MDM 30: CPT | Performed by: NURSE PRACTITIONER

## 2022-07-21 PROCEDURE — U0003 INFECTIOUS AGENT DETECTION BY NUCLEIC ACID (DNA OR RNA); SEVERE ACUTE RESPIRATORY SYNDROME CORONAVIRUS 2 (SARS-COV-2) (CORONAVIRUS DISEASE [COVID-19]), AMPLIFIED PROBE TECHNIQUE, MAKING USE OF HIGH THROUGHPUT TECHNOLOGIES AS DESCRIBED BY CMS-2020-01-R: HCPCS | Performed by: NURSE PRACTITIONER

## 2022-07-21 RX ORDER — LANOLIN ALCOHOL/MO/W.PET/CERES
3 CREAM (GRAM) TOPICAL
COMMUNITY
End: 2023-01-05

## 2022-07-21 RX ORDER — DOXYCYCLINE 100 MG/1
100 CAPSULE ORAL 2 TIMES DAILY
COMMUNITY
End: 2022-07-21

## 2022-07-21 NOTE — PROGRESS NOTES
[facility] GERIATRICS    Chief Complaint   Patient presents with     RECHECK     HPI:  Trevor Soni is a 88 year old  (9/3/1933), who is being seen today for an episodic care visit at: [facility] ([facility]) [[id number]].     This is a 88-year-old male with a past medical history of hypertension, diastolic heart failure, gout, hyperlipidemia, osteoarthritis who presented with 1 week of fatigue, poor appetite and a nonproductive cough.  He was found to have commune acquired pneumonia, treated with Rocephin and doxycycline while in hospital, switched to cefdinir to complete a 7-day course on 7/18.  He was also found to have new a flutter, continued on his PTA atenolol, started on Eliquis per [id number] score of 4-5.  Also found to have severe aortic stenosis, minimally symptomatic, TTE on 7/12 showed mild interval progression, will follow up with cardiology outpatient.  Also has prolonged QTC.  No other changes noted, discharged to [facility] for rehab.    Today's concern is:  Hypotension, insomnia, therapy progress    Allergies, and PMH/PSH reviewed in [facility] today.  REVIEW OF SYSTEMS:  4 point ROS including Respiratory, CV, GI and , other than that noted in the HPI,  is negative    Objective:   BP (!) 81/44   Pulse 61   Temp (!) 96.2  F (35.7  C)   Resp 16   Wt 59.6 kg (131 lb 6.4 oz)   SpO2 100%   BMI 20.89 kg/m    GENERAL APPEARANCE:  Alert, oriented, thin, cooperative, denies pain  RESP:  no respiratory distress, diminished breath sounds bilaterally, 2LNC, ALFONSO  CV:  no edema, irregular rhythm per rate controlled A-flutter/fib, 3/6 ELANA 2ICS RSB and 2/6 ELANA 4ICS LSB  PSYCH:  oriented X 3, memory impaired .       Most Recent 3 CBC's:  Recent Labs   Lab Test 07/15/22  0540 07/14/22 0527 07/13/22 0528   WBC 13.1* 14.8* 15.0*   HGB 9.1* 9.9* 9.7*   MCV 90 92 91    241 203     Most Recent 3 BMP's:  Recent Labs   Lab Test 07/15/22  0540 07/14/22 0527 07/13/22 0528    137 136   POTASSIUM 3.7  3.8 4.2   CHLORIDE 107 108 107   CO2 21 21 23   * 94* 87*   CR 2.52* 2.71* 2.83*   ANIONGAP 8 8 6   AGUSTÍN 8.7 8.7 8.7   * 122* 103*       Assessment/Plan:    (J18.9) Pneumonia due to infectious organism, unspecified laterality, unspecified part of lung    Given a course of Rocephin, transition to Omnicef and doxycycline, completed on 7/18. Continue mucinex 600 mg twice daily x1 week (until 6/26).  Wean oxygen off, encourage incentive spirometry. Currently 2L NC     (I35.0) Severe aortic stenosis  Mostly asymptomatic, gradient 39.  Continue diuretics.  Follow-up with cardiology (7/21)     (I25.810) Coronary artery disease involving coronary bypass graft of native heart without angina pectoris  (I10) Primary hypertension  (I48.92) Atrial flutter, unspecified type (H)  Prolonged QTC  EF 60 to 65%, today decrease atenolol to 25mg daily per hypotension, continue spironolactone, lasix and hydralazine 50 mg 4 times daily (hold for SBP less than 150). Continue statin.  Monitor blood pressure 3 times daily. Today per cardiology amlodipine discontinued. SBP 90-110s, HR 60s     (Z79.01) Anticoagulated  New a flutter  [id number] score of 4-5, started on apixaban 2.5 mg twice daily, stable     Diastolic heart failure  Continue spironolactone 25 mg daily and furosemide 20/10 mg every other day, daily weights. Last 129 (-8lbs).  Today start hold parameters ( SBP <130)     Pain control  Continue Tylenol 650 mg every 6 hours as needed, denies pain     (N18.4) CRF (chronic renal failure), stage 4 (severe)   Last creatinine 2.52 with GFR 24, recheck BMP on 7/22     Normocytic normochromic anemia  Last hemoglobin 9.1, recheck CBC on 7/22     Leukocytosis  Last WBC 13.1, recheck CBC on 7/22     (K59.01) Slow transit constipation  Continue MiraLAX daily, effective     GERD  Continue famotidine 20 mg every other day at bedtime, adequate     Gout  Continue allopurinol 200 mg daily, no change    Insomnia  Today start melatonin  6mg at HS    Therapy  Working on strengthening    Orders:  Decrease atenolol, melatonin, hold parameters on diuretics    Electronically signed by: Paul Felipe NP

## 2022-07-21 NOTE — LETTER
7/21/2022    Abdoulaye Redman MD  [address] [facility] [address]  [facility] [address]    RE: Trevor DOWLING Nae       Dear Colleague,     I had the pleasure of seeing Trevor Soni in the [facility].  Primary Cardiologist: Dr. Ravi    Reason for Visit: Hospital Follow up     History of Present Illness:   Trevor is a pleasant 88-year-old male with past medical history notable for known severe aortic valve stenosis (this has been managed with surveillance echoes as patient has been minimally symptomatic and he would like to avoid dialysis at this time), new discovery of paroxysmal atrial flutter (on rate control as well as low-dose Eliquis for thromboembolic prophylaxis given elevated [id number] score), CKD stage IV, hypertension, hyperlipidemia, and anemia of chronic disease.    Trevor was admitted recently with pneumonia.  Cardiology was consulted due to new onset of atrial flutter.  Dr. Esparza saw him in consultation and initiated him on rate control strategy as well as thromboembolic prophylaxis in the form of low-dose Eliquis.  He had a repeat echocardiogram which showed slight progression of his known severe aortic valve stenosis.  His valve area was 0.8 cm  with mean gradient of 39 mmHg and his dimensionless index was 0.25.  This was in the setting of borderline hyperdynamic LVEF.    Trevor presents to clinic with his children today.  His daughter who is the primary caregiver tells me that Trevor continues to live by himself and is usually quite active.  He is currently at TCU and has had significant fatigue and decreased stamina.  He is in a wheelchair today.  He does look thin and frail and a bit pale.  He is on chronic oxygen therapy.  He also reports a few episodes of presyncope but unclear if this was while he was having low blood pressure or not.  He denies any bleeding issues with Eliquis.  He denies any palpitations.  He is not sure if he wants to have any sort of intervention on his valve  at this time but he is interested in discussing this more.    Assessment and Plan:  Trevor is a pleasant 88-year-old male with past medical history notable for known severe aortic valve stenosis (this has been managed with surveillance echoes as patient has been minimally symptomatic and he would like to avoid dialysis at this time), new discovery of paroxysmal atrial flutter (on rate control as well as low-dose Eliquis for thromboembolic prophylaxis given elevated [id number] score), CKD stage IV, hypertension, hyperlipidemia, and anemia of chronic disease.    In regards to his atrial flutter here peers to be doing well with no symptoms whatsoever.  We will continue with rate control strategy as well as low-dose Eliquis.  He has no bleeding issues. His CrCl is 15-17. We will need to discuss the long term use of this medication, weighing risk and benefits.     I am not sure if his presyncopal episodes are related to his severe aortic stenosis or secondary to hypotension as he is mildly hypotensive today in clinic.  I requested a copy of his blood pressure log from his TCU as he tells me that he check his blood pressure every day but he does not ever know what the numbers are.    In regards to his known severe aortic valve stenosis we talked about prognosis in detail today.  Risk of mortality is 50% within 2 to 3 years once aortic valve is in the severe range.  I arrange one of our TAVR coordinators to come in to give a brief information on TAVR.  I informed patient and family that he will need to gain some stamina before considering any type of intervention.  We talked about the risk of needing dialysis with contrast exposure from both CT imaging and the procedure itself.  I have also reviewed his case with Dr. Ravi who agreed to have patient discuss his options with structural cardiologist.  We will not order CT TAVR at this time.    Addendum: His blood pressure log shows that he has labile blood pressure pattern  with his peak being in the 130s and lows being in the 90s for systolic.  I will have him stop amlodipine at this time.  They will continue to check his blood pressure and his family will request a copy of his blood pressure logs and they are aware of what his numbers are.  Our TAVR valve coordinator, Ne, will arrange a consult visit in structural clinic in the next 1 to 2 months.    70 minutes spent on the date of the encounter with chart review, patient visit, care coordination, and documentation.      This note was completed in part using Dragon voice recognition software. Although reviewed after completion, some word and grammatical errors may occur.    Orders this Visit:  No orders of the defined types were placed in this encounter.    No orders of the defined types were placed in this encounter.    There are no discontinued medications.      Encounter Diagnosis   Name Primary?     Severe aortic stenosis        CURRENT MEDICATIONS:  Current Outpatient Medications   Medication Sig Dispense Refill     acetaminophen (TYLENOL) 325 MG tablet Take 2 tablets (650 mg) by mouth every 6 hours as needed for mild pain or other (and adjunct with moderate or severe pain or per patient request)       allopurinol (ZYLOPRIM) 100 MG tablet TAKE 2 TABLETS BY MOUTH  DAILY 180 tablet 3     amLODIPine (NORVASC) 5 MG tablet Take 1 tablet (5 mg) by mouth daily Profile Rx: patient will contact pharmacy when needed 90 tablet 3     apixaban ANTICOAGULANT (ELIQUIS) 2.5 MG tablet Take 1 tablet (2.5 mg) by mouth 2 times daily       atenolol (TENORMIN) 50 MG tablet TAKE 1 TABLET BY MOUTH  DAILY 90 tablet 3     atorvastatin (LIPITOR) 20 MG tablet TAKE 1 TABLET BY MOUTH  DAILY 90 tablet 3     Chlorpheniramine-DM (CORICIDIN HBP COUGH/COLD) 4-30 MG TABS Take 1 tablet by mouth every 6 hours as needed (nasal congestion, cough, runny nose) (Patient not taking: No sig reported) 42 tablet 2     Cholecalciferol (VITAMIN D3 PO) Take 1 tablet by  mouth daily OTC dose unknown       coenzyme Q-10 capsule Take 1 capsule by mouth daily OTC dose unknown (Patient not taking: Reported on 7/19/2022)       famotidine (PEPCID) 20 MG tablet Take 1 tablet (20 mg) by mouth every 48 hours At bedtime, next dose due on evening of 7/16/22.       fenofibrate (TRIGLIDE/LOFIBRA) 160 MG tablet TAKE 1 TABLET BY MOUTH  DAILY 90 tablet 3     furosemide (LASIX) 20 MG tablet Take 1 tablet (20 mg) by mouth every 48 hours AND 0.5 tablets (10 mg) every 48 hours. (alternates 1 pill with 1/2 pill every other day) 90 tablet 0     guaiFENesin (MUCINEX) 600 MG 12 hr tablet Take 600 mg by mouth 2 times daily       hydrALAZINE (APRESOLINE) 50 MG tablet Hold for SBP <150 360 tablet 3     Multiple Vitamin (MULTIVITAMIN ADULT PO) Take 1 tablet by mouth daily (Patient not taking: Reported on 7/19/2022)       Multiple Vitamins-Minerals (PRESERVISION AREDS 2 PO) Take 1 tablet by mouth 2 times daily (Patient not taking: Reported on 7/19/2022)       polyethylene glycol (MIRALAX) 17 GM/Dose powder Take 17 g by mouth daily 510 g      spironolactone (ALDACTONE) 25 MG tablet TAKE 1 TABLET BY MOUTH  DAILY 90 tablet 3       ALLERGIES     Allergies   Allergen Reactions     Avocado      Ciprofloxacin Itching     Penicillins Itching       PAST MEDICAL HISTORY:  Past Medical History:   Diagnosis Date     Arthritis     rhuematoid     Coronary artery disease     triple bypass 2001     CRF (chronic renal failure)      Gastro-oesophageal reflux disease      Gout      Hyperlipidemia      Hypertension      Nocturia        PAST SURGICAL HISTORY:  Past Surgical History:   Procedure Laterality Date     CARDIAC SURGERY       CHOLECYSTECTOMY       COLONOSCOPY       LAPAROTOMY EXPLORATORY N/A 6/30/2020    Procedure: EXPLORATORY LAPAROTOMY, BOWEL RESECTION;  Surgeon: Bull Rachel MD;  Location: [facility] OR     LAPAROTOMY, LYSIS ADHESIONS, COMBINED N/A 6/21/2020    Procedure: EXPLORATORY LAPAROTOMY WITH LYSIS OF ADHESIONS;   Surgeon: Jose Reed MD;  Location:  OR     ORTHOPEDIC SURGERY       PHACOEMULSIFICATION CLEAR CORNEA WITH TORIC INTRAOCULAR LENS IMPLANT  2012    Procedure:PHACOEMULSIFICATION CLEAR CORNEA WITH TORIC INTRAOCULAR LENS IMPLANT; LEFT PHACOEMULSIFICATION CLEAR CORNEA WITH DELUXE  TORIC INTRAOCULAR LENS IMPLANT ; Surgeon:BRANDO HIGHTOWER; Location: [facility]     PHACOEMULSIFICATION CLEAR CORNEA WITH TORIC INTRAOCULAR LENS IMPLANT  2012    Procedure:PHACOEMULSIFICATION CLEAR CORNEA WITH TORIC INTRAOCULAR LENS IMPLANT; RIGHT PHACOEMULSIFICATION CLEAR CORNEA WITH TORIC INTRAOCULAR LENS IMPLANT ; Surgeon:BRANDO HIGHTOWER; Location: [facility]     VASCULAR SURGERY         FAMILY HISTORY:  Family History   Problem Relation Age of Onset     Myocardial Infarction Mother      Rheumatic fever Father      Cerebrovascular Disease Brother        SOCIAL HISTORY:  Social History     Socioeconomic History     Marital status:    Tobacco Use     Smoking status: Former Smoker     Types: Cigars     Quit date: 1980     Years since quittin.5     Smokeless tobacco: Never Used   Substance and Sexual Activity     Alcohol use: Yes     Alcohol/week: 0.0 standard drinks     Comment: 1 drink week     Drug use: No     Sexual activity: Not Currently     Partners: Female   Other Topics Concern     Parent/sibling w/ CABG, MI or angioplasty before 65F 55M? Yes       Review of Systems:  Skin:        Eyes:       ENT:       Respiratory:       Cardiovascular:       Gastroenterology:      Genitourinary:       Musculoskeletal:       Neurologic:       Psychiatric:       Heme/Lymph/Imm:       Endocrine:         Physical Exam:  Vitals: There were no vitals taken for this visit.     GEN:  NAD. Thin and frail appearing. Oxygen on nasal cannula. In wheelchair.  NECK: No JVD  C/V:  Regular rate and rhythm; 3/6 harsh late peaking systolic murmur auscultated best at RUSB.  RESP: Clear to auscultation bilaterally without wheezing, rales, or  rhonchi.  GI: Abdomen soft, nontender, nondistended.   EXTREM: No pitting LE edema.   NEURO: Alert and oriented, cooperative. No obvious focal deficits.   PSYCH: Normal affect.  SKIN: Warm and dry.       Recent Lab Results:  LIPID RESULTS:  Lab Results   Component Value Date    CHOL 96 10/25/2021    CHOL 107 01/27/2021    HDL 24 (L) 10/25/2021    HDL 34 (L) 01/27/2021    LDL 50 10/25/2021    LDL 58 01/27/2021    TRIG 111 10/25/2021    TRIG 76 01/27/2021       LIVER ENZYME RESULTS:  Lab Results   Component Value Date    AST 24 07/02/2020    ALT 21 07/02/2020       CBC RESULTS:  Lab Results   Component Value Date    WBC 13.1 (H) 07/15/2022    WBC 7.7 12/21/2020    RBC 3.02 (L) 07/15/2022    RBC 3.61 (L) 12/21/2020    HGB 9.1 (L) 07/15/2022    HGB 10.7 (L) 12/21/2020    HCT 27.2 (L) 07/15/2022    HCT 32.9 (L) 12/21/2020    MCV 90 07/15/2022    MCV 91 12/21/2020    MCH 30.1 07/15/2022    MCH 29.6 12/21/2020    MCHC 33.5 07/15/2022    MCHC 32.5 12/21/2020    RDW 16.2 (H) 07/15/2022    RDW 17.9 (H) 12/21/2020     07/15/2022     12/21/2020       BMP RESULTS:  Lab Results   Component Value Date     07/15/2022     12/21/2020    POTASSIUM 3.7 07/15/2022    POTASSIUM 5.2 12/21/2020    CHLORIDE 107 07/15/2022    CHLORIDE 111 (H) 12/21/2020    CO2 21 07/15/2022    CO2 21 12/21/2020    ANIONGAP 8 07/15/2022    ANIONGAP 8 12/21/2020     (H) 07/15/2022     (H) 12/21/2020     (H) 07/15/2022    BUN 51 (H) 12/21/2020    CR 2.52 (H) 07/15/2022    CR 2.35 (H) 12/21/2020    GFRESTIMATED 24 (L) 07/15/2022    GFRESTIMATED 24 (L) 12/21/2020    GFRESTBLACK 28 (L) 12/21/2020    AGUSTÍN 8.7 07/15/2022    AGUSTÍN 9.5 12/21/2020        A1C RESULTS:  Lab Results   Component Value Date    A1C 5.6 10/28/2021    A1C 5.9 05/26/2016       INR RESULTS:  Lab Results   Component Value Date    INR 1.06 05/06/2011    INR 0.98 05/02/2010           Phill Mcneil PA-C  July 21, 2022         Thank you for allowing  me to participate in the care of your patient.      Sincerely,     Phill Mcneil PA-C     [facility]  cc:   Missael Esparza MD  [id number] [id number]

## 2022-07-21 NOTE — PROGRESS NOTES
Primary Cardiologist: Dr. Ravi    Reason for Visit: Hospital Follow up     History of Present Illness:   Trevor is a pleasant 88-year-old male with past medical history notable for known severe aortic valve stenosis (this has been managed with surveillance echoes as patient has been minimally symptomatic and he would like to avoid dialysis at this time), new discovery of paroxysmal atrial flutter (on rate control as well as low-dose Eliquis for thromboembolic prophylaxis given elevated [id number] score), CKD stage IV, hypertension, hyperlipidemia, and anemia of chronic disease.    Trevor was admitted recently with pneumonia.  Cardiology was consulted due to new onset of atrial flutter.  Dr. Esparza saw him in consultation and initiated him on rate control strategy as well as thromboembolic prophylaxis in the form of low-dose Eliquis.  He had a repeat echocardiogram which showed slight progression of his known severe aortic valve stenosis.  His valve area was 0.8 cm  with mean gradient of 39 mmHg and his dimensionless index was 0.25.  This was in the setting of borderline hyperdynamic LVEF.    Trevor presents to clinic with his children today.  His daughter who is the primary caregiver tells me that Trevor continues to live by himself and is usually quite active.  He is currently at TCU and has had significant fatigue and decreased stamina.  He is in a wheelchair today.  He does look thin and frail and a bit pale.  He is on chronic oxygen therapy.  He also reports a few episodes of presyncope but unclear if this was while he was having low blood pressure or not.  He denies any bleeding issues with Eliquis.  He denies any palpitations.  He is not sure if he wants to have any sort of intervention on his valve at this time but he is interested in discussing this more.    Assessment and Plan:  Trevor is a pleasant 88-year-old male with past medical history notable for known severe aortic valve stenosis (this has been  managed with surveillance echoes as patient has been minimally symptomatic and he would like to avoid dialysis at this time), new discovery of paroxysmal atrial flutter (on rate control as well as low-dose Eliquis for thromboembolic prophylaxis given elevated [id number]-NIZg score), CKD stage IV, hypertension, hyperlipidemia, and anemia of chronic disease.    In regards to his atrial flutter here peers to be doing well with no symptoms whatsoever.  We will continue with rate control strategy as well as low-dose Eliquis.  He has no bleeding issues. His CrCl is 15-17. We will need to discuss the long term use of this medication, weighing risk and benefits.     I am not sure if his presyncopal episodes are related to his severe aortic stenosis or secondary to hypotension as he is mildly hypotensive today in clinic.  I requested a copy of his blood pressure log from his TCU as he tells me that he check his blood pressure every day but he does not ever know what the numbers are.    In regards to his known severe aortic valve stenosis we talked about prognosis in detail today.  Risk of mortality is 50% within 2 to 3 years once aortic valve is in the severe range.  I arrange one of our TAVR coordinators to come in to give a brief information on TAVR.  I informed patient and family that he will need to gain some stamina before considering any type of intervention.  We talked about the risk of needing dialysis with contrast exposure from both CT imaging and the procedure itself.  I have also reviewed his case with Dr. Ravi who agreed to have patient discuss his options with structural cardiologist.  We will not order CT TAVR at this time.    Addendum: His blood pressure log shows that he has labile blood pressure pattern with his peak being in the 130s and lows being in the 90s for systolic.  I will have him stop amlodipine at this time.  They will continue to check his blood pressure and his family will request a copy of his  blood pressure logs and they are aware of what his numbers are.  Our TAVR valve coordinator, Ne, will arrange a consult visit in structural clinic in the next 1 to 2 months.    70 minutes spent on the date of the encounter with chart review, patient visit, care coordination, and documentation.      This note was completed in part using Dragon voice recognition software. Although reviewed after completion, some word and grammatical errors may occur.    Orders this Visit:  No orders of the defined types were placed in this encounter.    No orders of the defined types were placed in this encounter.    There are no discontinued medications.      Encounter Diagnosis   Name Primary?     Severe aortic stenosis        CURRENT MEDICATIONS:  Current Outpatient Medications   Medication Sig Dispense Refill     acetaminophen (TYLENOL) 325 MG tablet Take 2 tablets (650 mg) by mouth every 6 hours as needed for mild pain or other (and adjunct with moderate or severe pain or per patient request)       allopurinol (ZYLOPRIM) 100 MG tablet TAKE 2 TABLETS BY MOUTH  DAILY 180 tablet 3     amLODIPine (NORVASC) 5 MG tablet Take 1 tablet (5 mg) by mouth daily Profile Rx: patient will contact pharmacy when needed 90 tablet 3     apixaban ANTICOAGULANT (ELIQUIS) 2.5 MG tablet Take 1 tablet (2.5 mg) by mouth 2 times daily       atenolol (TENORMIN) 50 MG tablet TAKE 1 TABLET BY MOUTH  DAILY 90 tablet 3     atorvastatin (LIPITOR) 20 MG tablet TAKE 1 TABLET BY MOUTH  DAILY 90 tablet 3     Chlorpheniramine-DM (CORICIDIN HBP COUGH/COLD) 4-30 MG TABS Take 1 tablet by mouth every 6 hours as needed (nasal congestion, cough, runny nose) (Patient not taking: No sig reported) 42 tablet 2     Cholecalciferol (VITAMIN D3 PO) Take 1 tablet by mouth daily OTC dose unknown       coenzyme Q-10 capsule Take 1 capsule by mouth daily OTC dose unknown (Patient not taking: Reported on 7/19/2022)       famotidine (PEPCID) 20 MG tablet Take 1 tablet (20 mg) by  mouth every 48 hours At bedtime, next dose due on evening of 7/16/22.       fenofibrate (TRIGLIDE/LOFIBRA) 160 MG tablet TAKE 1 TABLET BY MOUTH  DAILY 90 tablet 3     furosemide (LASIX) 20 MG tablet Take 1 tablet (20 mg) by mouth every 48 hours AND 0.5 tablets (10 mg) every 48 hours. (alternates 1 pill with 1/2 pill every other day) 90 tablet 0     guaiFENesin (MUCINEX) 600 MG 12 hr tablet Take 600 mg by mouth 2 times daily       hydrALAZINE (APRESOLINE) 50 MG tablet Hold for SBP <150 360 tablet 3     Multiple Vitamin (MULTIVITAMIN ADULT PO) Take 1 tablet by mouth daily (Patient not taking: Reported on 7/19/2022)       Multiple Vitamins-Minerals (PRESERVISION AREDS 2 PO) Take 1 tablet by mouth 2 times daily (Patient not taking: Reported on 7/19/2022)       polyethylene glycol (MIRALAX) 17 GM/Dose powder Take 17 g by mouth daily 510 g      spironolactone (ALDACTONE) 25 MG tablet TAKE 1 TABLET BY MOUTH  DAILY 90 tablet 3       ALLERGIES     Allergies   Allergen Reactions     Avocado      Ciprofloxacin Itching     Penicillins Itching       PAST MEDICAL HISTORY:  Past Medical History:   Diagnosis Date     Arthritis     rhuematoid     Coronary artery disease     triple bypass 2001     CRF (chronic renal failure)      Gastro-oesophageal reflux disease      Gout      Hyperlipidemia      Hypertension      Nocturia        PAST SURGICAL HISTORY:  Past Surgical History:   Procedure Laterality Date     CARDIAC SURGERY       CHOLECYSTECTOMY       COLONOSCOPY       LAPAROTOMY EXPLORATORY N/A 6/30/2020    Procedure: EXPLORATORY LAPAROTOMY, BOWEL RESECTION;  Surgeon: Bull Rachel MD;  Location:  OR     LAPAROTOMY, LYSIS ADHESIONS, COMBINED N/A 6/21/2020    Procedure: EXPLORATORY LAPAROTOMY WITH LYSIS OF ADHESIONS;  Surgeon: Jose Reed MD;  Location:  OR     ORTHOPEDIC SURGERY       PHACOEMULSIFICATION CLEAR CORNEA WITH TORIC INTRAOCULAR LENS IMPLANT  1/30/2012    Procedure:PHACOEMULSIFICATION CLEAR CORNEA  WITH TORIC INTRAOCULAR LENS IMPLANT; LEFT PHACOEMULSIFICATION CLEAR CORNEA WITH DELUXE  TORIC INTRAOCULAR LENS IMPLANT ; Surgeon:BRANDO HIGHTOWER; Location: [facility]     PHACOEMULSIFICATION CLEAR CORNEA WITH TORIC INTRAOCULAR LENS IMPLANT  2012    Procedure:PHACOEMULSIFICATION CLEAR CORNEA WITH TORIC INTRAOCULAR LENS IMPLANT; RIGHT PHACOEMULSIFICATION CLEAR CORNEA WITH TORIC INTRAOCULAR LENS IMPLANT ; Surgeon:BRANDO HIGHTOWER; Location:[facility]     VASCULAR SURGERY         FAMILY HISTORY:  Family History   Problem Relation Age of Onset     Myocardial Infarction Mother      Rheumatic fever Father      Cerebrovascular Disease Brother        SOCIAL HISTORY:  Social History     Socioeconomic History     Marital status:    Tobacco Use     Smoking status: Former Smoker     Types: Cigars     Quit date: 1980     Years since quittin.5     Smokeless tobacco: Never Used   Substance and Sexual Activity     Alcohol use: Yes     Alcohol/week: 0.0 standard drinks     Comment: 1 drink week     Drug use: No     Sexual activity: Not Currently     Partners: Female   Other Topics Concern     Parent/sibling w/ CABG, MI or angioplasty before 65F 55M? Yes       Review of Systems:  Skin:        Eyes:       ENT:       Respiratory:       Cardiovascular:       Gastroenterology:      Genitourinary:       Musculoskeletal:       Neurologic:       Psychiatric:       Heme/Lymph/Imm:       Endocrine:         Physical Exam:  Vitals: There were no vitals taken for this visit.     GEN:  NAD. Thin and frail appearing. Oxygen on nasal cannula. In wheelchair.  NECK: No JVD  C/V:  Regular rate and rhythm; 3/6 harsh late peaking systolic murmur auscultated best at RUSB.  RESP: Clear to auscultation bilaterally without wheezing, rales, or rhonchi.  GI: Abdomen soft, nontender, nondistended.   EXTREM: No pitting LE edema.   NEURO: Alert and oriented, cooperative. No obvious focal deficits.   PSYCH: Normal affect.  SKIN: Warm and dry.        Recent Lab Results:  LIPID RESULTS:  Lab Results   Component Value Date    CHOL 96 10/25/2021    CHOL 107 01/27/2021    HDL 24 (L) 10/25/2021    HDL 34 (L) 01/27/2021    LDL 50 10/25/2021    LDL 58 01/27/2021    TRIG 111 10/25/2021    TRIG 76 01/27/2021       LIVER ENZYME RESULTS:  Lab Results   Component Value Date    AST 24 07/02/2020    ALT 21 07/02/2020       CBC RESULTS:  Lab Results   Component Value Date    WBC 13.1 (H) 07/15/2022    WBC 7.7 12/21/2020    RBC 3.02 (L) 07/15/2022    RBC 3.61 (L) 12/21/2020    HGB 9.1 (L) 07/15/2022    HGB 10.7 (L) 12/21/2020    HCT 27.2 (L) 07/15/2022    HCT 32.9 (L) 12/21/2020    MCV 90 07/15/2022    MCV 91 12/21/2020    MCH 30.1 07/15/2022    MCH 29.6 12/21/2020    MCHC 33.5 07/15/2022    MCHC 32.5 12/21/2020    RDW 16.2 (H) 07/15/2022    RDW 17.9 (H) 12/21/2020     07/15/2022     12/21/2020       BMP RESULTS:  Lab Results   Component Value Date     07/15/2022     12/21/2020    POTASSIUM 3.7 07/15/2022    POTASSIUM 5.2 12/21/2020    CHLORIDE 107 07/15/2022    CHLORIDE 111 (H) 12/21/2020    CO2 21 07/15/2022    CO2 21 12/21/2020    ANIONGAP 8 07/15/2022    ANIONGAP 8 12/21/2020     (H) 07/15/2022     (H) 12/21/2020     (H) 07/15/2022    BUN 51 (H) 12/21/2020    CR 2.52 (H) 07/15/2022    CR 2.35 (H) 12/21/2020    GFRESTIMATED 24 (L) 07/15/2022    GFRESTIMATED 24 (L) 12/21/2020    GFRESTBLACK 28 (L) 12/21/2020    AGUSTÍN 8.7 07/15/2022    AGUSTÍN 9.5 12/21/2020        A1C RESULTS:  Lab Results   Component Value Date    A1C 5.6 10/28/2021    A1C 5.9 05/26/2016       INR RESULTS:  Lab Results   Component Value Date    INR 1.06 05/06/2011    INR 0.98 05/02/2010           Phill Mcneil PA-C  July 21, 2022

## 2022-07-21 NOTE — PATIENT INSTRUCTIONS
Today's Plan:   1) Stop amlodipine to allow blood pressure to come up.   2) I will review with Dr. Ravi about the timing of the procedure.     If you have questions or concerns please call my nurse team at [phone].     Scheduling phone number: [phone].  Reminder: Please bring in all current medications, over the counter supplements and vitamin bottles to your next appointment.    It was a pleasure seeing you today!

## 2022-07-22 LAB — SARS-COV-2 RNA RESP QL NAA+PROBE: NEGATIVE

## 2022-07-24 ENCOUNTER — LAB REQUISITION (OUTPATIENT)
Dept: LAB | Facility: CLINIC | Age: 87
End: 2022-07-24

## 2022-07-24 VITALS
OXYGEN SATURATION: 96 % | WEIGHT: 130 LBS | SYSTOLIC BLOOD PRESSURE: 152 MMHG | HEART RATE: 71 BPM | BODY MASS INDEX: 20.67 KG/M2 | TEMPERATURE: 96.4 F | RESPIRATION RATE: 18 BRPM | DIASTOLIC BLOOD PRESSURE: 65 MMHG

## 2022-07-24 DIAGNOSIS — I10 ESSENTIAL (PRIMARY) HYPERTENSION: ICD-10-CM

## 2022-07-24 DIAGNOSIS — J18.9 PNEUMONIA, UNSPECIFIED ORGANISM: ICD-10-CM

## 2022-07-25 ENCOUNTER — LAB REQUISITION (OUTPATIENT)
Dept: LAB | Facility: CLINIC | Age: 87
End: 2022-07-25

## 2022-07-25 ENCOUNTER — TRANSITIONAL CARE UNIT VISIT (OUTPATIENT)
Dept: GERIATRICS | Facility: CLINIC | Age: 87
End: 2022-07-25
Payer: MEDICARE

## 2022-07-25 DIAGNOSIS — F51.02 ADJUSTMENT INSOMNIA: ICD-10-CM

## 2022-07-25 DIAGNOSIS — K59.01 SLOW TRANSIT CONSTIPATION: ICD-10-CM

## 2022-07-25 DIAGNOSIS — I48.92 ATRIAL FLUTTER, UNSPECIFIED TYPE (H): ICD-10-CM

## 2022-07-25 DIAGNOSIS — N18.4 CRF (CHRONIC RENAL FAILURE), STAGE 4 (SEVERE) (H): ICD-10-CM

## 2022-07-25 DIAGNOSIS — I50.32 CHRONIC DIASTOLIC HEART FAILURE (H): ICD-10-CM

## 2022-07-25 DIAGNOSIS — D64.9 ANEMIA, UNSPECIFIED: ICD-10-CM

## 2022-07-25 DIAGNOSIS — Z79.01 ANTICOAGULATED: ICD-10-CM

## 2022-07-25 DIAGNOSIS — J18.9 PNEUMONIA DUE TO INFECTIOUS ORGANISM, UNSPECIFIED LATERALITY, UNSPECIFIED PART OF LUNG: Primary | ICD-10-CM

## 2022-07-25 DIAGNOSIS — I10 PRIMARY HYPERTENSION: ICD-10-CM

## 2022-07-25 LAB
ANION GAP SERPL CALCULATED.3IONS-SCNC: 6 MMOL/L (ref 3–14)
BUN SERPL-MCNC: 73 MG/DL (ref 7–30)
CALCIUM SERPL-MCNC: 9.4 MG/DL (ref 8.5–10.1)
CHLORIDE BLD-SCNC: 116 MMOL/L (ref 94–109)
CO2 SERPL-SCNC: 23 MMOL/L (ref 20–32)
CREAT SERPL-MCNC: 2.51 MG/DL (ref 0.66–1.25)
ERYTHROCYTE [DISTWIDTH] IN BLOOD BY AUTOMATED COUNT: 19.3 % (ref 10–15)
GFR SERPL CREATININE-BSD FRML MDRD: 24 ML/MIN/1.73M2
GLUCOSE BLD-MCNC: 142 MG/DL (ref 70–99)
HCT VFR BLD AUTO: 23.1 % (ref 40–53)
HGB BLD-MCNC: 6.9 G/DL (ref 13.3–17.7)
MCH RBC QN AUTO: 30.3 PG (ref 26.5–33)
MCHC RBC AUTO-ENTMCNC: 29.9 G/DL (ref 31.5–36.5)
MCV RBC AUTO: 101 FL (ref 78–100)
PLATELET # BLD AUTO: 327 10E3/UL (ref 150–450)
POTASSIUM BLD-SCNC: 4.7 MMOL/L (ref 3.4–5.3)
RBC # BLD AUTO: 2.28 10E6/UL (ref 4.4–5.9)
SODIUM SERPL-SCNC: 145 MMOL/L (ref 133–144)
WBC # BLD AUTO: 12.2 10E3/UL (ref 4–11)

## 2022-07-25 PROCEDURE — 36415 COLL VENOUS BLD VENIPUNCTURE: CPT | Performed by: NURSE PRACTITIONER

## 2022-07-25 PROCEDURE — 80048 BASIC METABOLIC PNL TOTAL CA: CPT | Performed by: NURSE PRACTITIONER

## 2022-07-25 PROCEDURE — 99309 SBSQ NF CARE MODERATE MDM 30: CPT | Performed by: NURSE PRACTITIONER

## 2022-07-25 PROCEDURE — 85027 COMPLETE CBC AUTOMATED: CPT | Performed by: NURSE PRACTITIONER

## 2022-07-25 NOTE — PROGRESS NOTES
[facility] GERIATRICS    Chief Complaint   Patient presents with     RECHECK     HPI:  Trevor Soni is a 88 year old  (9/3/1933), who is being seen today for an episodic care visit at: [facility] ([facility]) [[id number]].     This is a 88-year-old male with a past medical history of hypertension, diastolic heart failure, gout, hyperlipidemia, osteoarthritis who presented with 1 week of fatigue, poor appetite and a nonproductive cough.  He was found to have commune acquired pneumonia, treated with Rocephin and doxycycline while in hospital, switched to cefdinir to complete a 7-day course on 7/18.  He was also found to have new a flutter, continued on his PTA atenolol, started on Eliquis per [id number] score of 4-5.  Also found to have severe aortic stenosis, minimally symptomatic, TTE on 7/12 showed mild interval progression, will follow up with cardiology outpatient.  Also has prolonged QTC.  No other changes noted, discharged to [facility] for rehab.    Today's concern is:   Hypotension, hypoxia    Allergies, and PMH/PSH reviewed in [facility] today.  REVIEW OF SYSTEMS:  4 point ROS including Respiratory, CV, GI and , other than that noted in the HPI,  is negative    Objective:   BP (!) 152/65   Pulse 71   Temp (!) 96.4  F (35.8  C)   Resp 18   Wt 59 kg (130 lb)   SpO2 96%   BMI 20.67 kg/m    GENERAL APPEARANCE:  Alert, oriented, thin, cooperative, denies pain  RESP:  no respiratory distress, diminished breath sounds bilaterally, RA  CV:  no edema, irregular rhythm per rate controlled A-flutter/fib, 3/6 ELANA 2ICS RSB and 2/6 ELANA 4ICS LSB  PSYCH:  oriented X 3, memory impaired. SLUMS 7/30      Most Recent 3 CBC's:  Recent Labs   Lab Test 07/15/22  0540 07/14/22  0527 07/13/22  0528   WBC 13.1* 14.8* 15.0*   HGB 9.1* 9.9* 9.7*   MCV 90 92 91    241 203     Most Recent 3 BMP's:  Recent Labs   Lab Test 07/15/22  0540 07/14/22  0527 07/13/22  0528    137 136   POTASSIUM 3.7 3.8 4.2   CHLORIDE 107  108 107   CO2 21 21 23   * 94* 87*   CR 2.52* 2.71* 2.83*   ANIONGAP 8 8 6   AGUSTÍN 8.7 8.7 8.7   * 122* 103*       Assessment/Plan:    (J18.9) Pneumonia due to infectious organism, unspecified laterality, unspecified part of lung    Given a course of Rocephin, transition to Omnicef and doxycycline, completed course on 7/18. Continue mucinex 600 mg twice daily x1 week (until 6/26).  Weaned oxygen off, encourage incentive spirometry. RA     (I35.0) Severe aortic stenosis  Mostly asymptomatic, gradient 39.  Continue diuretics.  Amlodipine discontinued     (I25.810) Coronary artery disease involving coronary bypass graft of native heart without angina pectoris  (I10) Primary hypertension  (I48.92) Atrial flutter, unspecified type (H)  Prolonged QTC  EF 60 to 65%, previously decreased atenolol to 25mg daily per hypotension, continue spironolactone, lasix and hydralazine 50 mg 4 times daily (hold for SBP less than 150). Continue statin.  Monitor blood pressure 3 times daily. Recently cardiology discontinued amlodipine discontinued. SBP 90-150s, HR <70s. F/u with Dr Stewart on 8/25     (Z79.01) Anticoagulated  New a flutter  Hx of AAA  [id number] score of 4-5, in hospital started on apixaban 2.5 mg twice daily, stable     Diastolic heart failure  EF 60-65%  Continue spironolactone 25 mg daily and furosemide 20/10 mg every other day, daily weights. Last 130 (-7lbs). Continue hold parameters ( SBP <130)     Pain control  Continue Tylenol 650 mg every 6 hours as needed, denies pain    Nonfitting dentures  Patient's daughter contacted dentistry, speech consult ordered     (N18.4) CRF (chronic renal failure), stage 4 (severe)   Last creatinine 2.52 with GFR 24, recheck BMP on 7/25     Normocytic normochromic anemia  Last hemoglobin 9.1, recheck CBC on 7/25     Leukocytosis  Last WBC 13.1, recheck CBC on 7/25     (K59.01) Slow transit constipation  Continue MiraLAX daily, effective     GERD  Continue famotidine 20 mg  every other day at bedtime, adequate     Gout  Continue allopurinol 200 mg daily, no change     Insomnia  Using melatonin 6mg at HS, effective     Therapy  Working on strengthening, household distances    Orders:  Speech consult    Electronically signed by: Paul Felipe NP

## 2022-07-26 ENCOUNTER — PATIENT OUTREACH (OUTPATIENT)
Dept: CARE COORDINATION | Facility: CLINIC | Age: 87
End: 2022-07-26

## 2022-07-26 LAB
ERYTHROCYTE [DISTWIDTH] IN BLOOD BY AUTOMATED COUNT: 19.4 % (ref 10–15)
HCT VFR BLD AUTO: 24.8 % (ref 40–53)
HGB BLD-MCNC: 7.5 G/DL (ref 13.3–17.7)
MCH RBC QN AUTO: 31.1 PG (ref 26.5–33)
MCHC RBC AUTO-ENTMCNC: 30.2 G/DL (ref 31.5–36.5)
MCV RBC AUTO: 103 FL (ref 78–100)
PLATELET # BLD AUTO: 364 10E3/UL (ref 150–450)
RBC # BLD AUTO: 2.41 10E6/UL (ref 4.4–5.9)
WBC # BLD AUTO: 11.7 10E3/UL (ref 4–11)

## 2022-07-26 PROCEDURE — 36415 COLL VENOUS BLD VENIPUNCTURE: CPT | Performed by: NURSE PRACTITIONER

## 2022-07-26 PROCEDURE — 85027 COMPLETE CBC AUTOMATED: CPT | Performed by: NURSE PRACTITIONER

## 2022-07-26 PROCEDURE — P9604 ONE-WAY ALLOW PRORATED TRIP: HCPCS | Performed by: NURSE PRACTITIONER

## 2022-07-26 ASSESSMENT — ACTIVITIES OF DAILY LIVING (ADL): DEPENDENT_IADLS:: TRANSPORTATION

## 2022-07-26 NOTE — PROGRESS NOTES
Clinic Care Coordination Contact  Care Team Conversations    RNCC received an email from Melissa Behl, RN CPN stating the patient will be discharging from [facility] on Friday, July 29, 2022.    RNCC will follow up with patient post TCU discharge on 8/1/2022.     Damaris Torres RN, BSN, PHN  Primary Care / Care Coordinator   [facility]  E-[email or web address]   [phone]

## 2022-07-27 ENCOUNTER — MYC MEDICAL ADVICE (OUTPATIENT)
Dept: FAMILY MEDICINE | Facility: CLINIC | Age: 87
End: 2022-07-27

## 2022-07-27 ENCOUNTER — DISCHARGE SUMMARY NURSING HOME (OUTPATIENT)
Dept: GERIATRICS | Facility: CLINIC | Age: 87
End: 2022-07-27

## 2022-07-27 VITALS
BODY MASS INDEX: 20.6 KG/M2 | TEMPERATURE: 96.2 F | WEIGHT: 129.6 LBS | RESPIRATION RATE: 18 BRPM | SYSTOLIC BLOOD PRESSURE: 135 MMHG | DIASTOLIC BLOOD PRESSURE: 74 MMHG | OXYGEN SATURATION: 97 % | HEART RATE: 66 BPM

## 2022-07-27 DIAGNOSIS — I35.0 SEVERE AORTIC STENOSIS: ICD-10-CM

## 2022-07-27 DIAGNOSIS — N18.4 CRF (CHRONIC RENAL FAILURE), STAGE 4 (SEVERE) (H): ICD-10-CM

## 2022-07-27 DIAGNOSIS — E78.5 HYPERLIPIDEMIA LDL GOAL <100: ICD-10-CM

## 2022-07-27 DIAGNOSIS — J18.9 PNEUMONIA DUE TO INFECTIOUS ORGANISM, UNSPECIFIED LATERALITY, UNSPECIFIED PART OF LUNG: Primary | ICD-10-CM

## 2022-07-27 DIAGNOSIS — Z79.01 ANTICOAGULATED: ICD-10-CM

## 2022-07-27 DIAGNOSIS — K59.01 SLOW TRANSIT CONSTIPATION: ICD-10-CM

## 2022-07-27 DIAGNOSIS — F51.02 ADJUSTMENT INSOMNIA: ICD-10-CM

## 2022-07-27 DIAGNOSIS — I48.92 ATRIAL FLUTTER, UNSPECIFIED TYPE (H): ICD-10-CM

## 2022-07-27 DIAGNOSIS — I10 PRIMARY HYPERTENSION: ICD-10-CM

## 2022-07-27 DIAGNOSIS — I10 BENIGN ESSENTIAL HYPERTENSION: ICD-10-CM

## 2022-07-27 DIAGNOSIS — I15.9 SECONDARY HYPERTENSION: ICD-10-CM

## 2022-07-27 DIAGNOSIS — N18.4 CKD (CHRONIC KIDNEY DISEASE) STAGE 4, GFR 15-29 ML/MIN (H): ICD-10-CM

## 2022-07-27 PROCEDURE — 99316 NF DSCHRG MGMT 30 MIN+: CPT | Performed by: NURSE PRACTITIONER

## 2022-07-27 RX ORDER — FERROUS SULFATE 325(65) MG
325 TABLET, DELAYED RELEASE (ENTERIC COATED) ORAL DAILY
Qty: 30 TABLET | Refills: 0 | Status: SHIPPED | OUTPATIENT
Start: 2022-07-27 | End: 2022-08-08

## 2022-07-27 RX ORDER — ATORVASTATIN CALCIUM 20 MG/1
20 TABLET, FILM COATED ORAL DAILY
Qty: 30 TABLET | Refills: 0 | Status: SHIPPED | OUTPATIENT
Start: 2022-07-27 | End: 2023-06-19

## 2022-07-27 RX ORDER — FERROUS SULFATE 325(65) MG
325 TABLET, DELAYED RELEASE (ENTERIC COATED) ORAL DAILY
COMMUNITY
End: 2022-07-27

## 2022-07-27 RX ORDER — HYDRALAZINE HYDROCHLORIDE 50 MG/1
50 TABLET, FILM COATED ORAL 4 TIMES DAILY
Qty: 90 TABLET | Refills: 0 | Status: SHIPPED | OUTPATIENT
Start: 2022-07-27 | End: 2022-08-08

## 2022-07-27 RX ORDER — SPIRONOLACTONE 25 MG/1
25 TABLET ORAL DAILY
Qty: 30 TABLET | Refills: 0 | Status: SHIPPED | OUTPATIENT
Start: 2022-07-27 | End: 2022-10-10

## 2022-07-27 RX ORDER — ALLOPURINOL 100 MG/1
200 TABLET ORAL DAILY
Qty: 60 TABLET | Refills: 0 | Status: SHIPPED | OUTPATIENT
Start: 2022-07-27 | End: 2022-10-10

## 2022-07-27 RX ORDER — FENOFIBRATE 160 MG/1
TABLET ORAL
Qty: 30 TABLET | Refills: 3 | Status: SHIPPED | OUTPATIENT
Start: 2022-07-27 | End: 2022-12-12

## 2022-07-27 RX ORDER — FUROSEMIDE 20 MG
TABLET ORAL
Qty: 30 TABLET | Refills: 0 | Status: SHIPPED | OUTPATIENT
Start: 2022-07-27 | End: 2023-01-30

## 2022-07-27 NOTE — PROGRESS NOTES
[facility] GERIATRICS DISCHARGE SUMMARY  PATIENT'S NAME: Trevor Soni  YOB: 1933  MEDICAL RECORD NUMBER:  [id number]  Place of Service where encounter took place:   (TCU) [[id number]]    PRIMARY CARE PROVIDER AND CLINIC RESPONSIBLE AFTER TRANSFER:   Abdoulaye Redman MD, [address] / [place] [address]    G Provider     Transferring providers: Paul Felipe NP, Dr. Quique MD  Recent Hospitalization/ED:  [facility] Hospital stay 7/11/22 to 7/15/22.  Date of SNF Admission: 7/15/22  Date of SNF (anticipated) Discharge: July 30, 2022  Discharged to: with family   Cognitive Scores: SLUMS: 7/30  Physical Function: household distances  DME: No DME needed    CODE STATUS/ADVANCE DIRECTIVES DISCUSSION:  Full Code   ALLERGIES: Avocado, Ciprofloxacin, and Penicillins    HPI  This is a 88-year-old male with a past medical history of hypertension, diastolic heart failure, gout, hyperlipidemia, osteoarthritis who presented with 1 week of fatigue, poor appetite and a nonproductive cough.  He was found to have commune acquired pneumonia, treated with Rocephin and doxycycline while in hospital, switched to cefdinir to complete a 7-day course on 7/18.  He was also found to have new a flutter, continued on his PTA atenolol, started on Eliquis per [id number] score of 4-5.  Also found to have severe aortic stenosis, minimally symptomatic, TTE on 7/12 showed mild interval progression, will follow up with cardiology outpatient.  Also has prolonged QTC.  No other changes noted, discharged to [facility] for rehab.    Summary of nursing facility stay:     (J18.9) Pneumonia due to infectious organism, unspecified laterality, unspecified part of lung    Given a course of Rocephin, transition to Omnicef and doxycycline, completed course on 7/18. Continue mucinex 600 mg twice daily x1 week (completed 6/26).  Weaned oxygen off, encourage incentive spirometry.  RA     (I35.0) Severe aortic stenosis  Mostly asymptomatic, gradient 39.  Continue diuretics.  Amlodipine discontinued per cardiology     (I25.810) Coronary artery disease involving coronary bypass graft of native heart without angina pectoris  (I10) Primary hypertension  (I48.92) Atrial flutter, unspecified type (H)  Prolonged QTC  EF 60 to 65%, previously decreased atenolol to 25mg daily per hypotension, continue spironolactone, lasix and hydralazine 50 mg 4 times daily (hold for SBP less than 150). Continue statin. Recently cardiology discontinued amlodipine discontinued. SBP 90-150s, HR <70s. F/u with Dr Stewart on 8/25     (Z79.01) Anticoagulated  New a flutter  Hx of AAA  [id number] score of 4-5, in hospital started on apixaban 2.5 mg twice daily, stable     Diastolic heart failure  EF 60-65%  Continue spironolactone 25 mg daily and furosemide 20/10 mg every other day, daily weights. Last 129 (-8lbs). Continue hold parameters ( SBP <130)     Pain control  Continue Tylenol 650 mg every 6 hours as needed, denies pain     Nonfitting dentures  Patient's daughter contacted dentistry (visit on 7/27), speech consult ordered     (N18.4) CRF (chronic renal failure), stage 4 (severe)   Last creatinine 2.51 on 7/25     Normocytic normochromic anemia  Last hemoglobin 7.5 on 7/25, started on FeSO4 325mg daily. Recheck on 8/3 with      Leukocytosis  Last WBC 11.7 (improved) on 7/25     (K59.01) Slow transit constipation  Continue MiraLAX daily, effective     GERD  Continue famotidine 20 mg every other day at bedtime, adequate     Gout  Continue allopurinol 200 mg daily, no change     Insomnia  Using melatonin 6mg at HS, effective    Discharge Medications:    Current Outpatient Medications   Medication Sig Dispense Refill     ferrous sulfate (FE TABS) 325 (65 Fe) MG EC tablet Take 325 mg by mouth daily       acetaminophen (TYLENOL) 325 MG tablet Take 2 tablets (650 mg) by mouth every 6 hours as needed for mild pain or other  (and adjunct with moderate or severe pain or per patient request)       allopurinol (ZYLOPRIM) 100 MG tablet TAKE 2 TABLETS BY MOUTH  DAILY 180 tablet 3     apixaban ANTICOAGULANT (ELIQUIS) 2.5 MG tablet Take 1 tablet (2.5 mg) by mouth 2 times daily       atenolol (TENORMIN) 50 MG tablet TAKE 1 TABLET BY MOUTH  DAILY (Patient taking differently: 25 mg) 90 tablet 3     atorvastatin (LIPITOR) 20 MG tablet TAKE 1 TABLET BY MOUTH  DAILY 90 tablet 3     Chlorpheniramine-DM (CORICIDIN HBP COUGH/COLD) 4-30 MG TABS Take 1 tablet by mouth every 6 hours as needed (nasal congestion, cough, runny nose) (Patient not taking: No sig reported) 42 tablet 2     Cholecalciferol (VITAMIN D3 PO) Take 1 tablet by mouth daily OTC dose unknown       coenzyme Q-10 capsule Take 1 capsule by mouth daily OTC dose unknown (Patient not taking: Reported on 7/21/2022)       famotidine (PEPCID) 20 MG tablet Take 1 tablet (20 mg) by mouth every 48 hours At bedtime, next dose due on evening of 7/16/22.       fenofibrate (TRIGLIDE/LOFIBRA) 160 MG tablet TAKE 1 TABLET BY MOUTH  DAILY 90 tablet 3     furosemide (LASIX) 20 MG tablet Take 1 tablet (20 mg) by mouth every 48 hours AND 0.5 tablets (10 mg) every 48 hours. (alternates 1 pill with 1/2 pill every other day) 90 tablet 0     hydrALAZINE (APRESOLINE) 50 MG tablet Hold for SBP <150 (Patient taking differently: 50 mg 4 times daily Hold for SBP <150) 360 tablet 3     melatonin 3 MG tablet Take 3 mg by mouth nightly as needed for sleep       Multiple Vitamin (MULTIVITAMIN ADULT PO) Take 1 tablet by mouth daily       Multiple Vitamins-Minerals (PRESERVISION AREDS 2 PO) Take 1 tablet by mouth 2 times daily       polyethylene glycol (MIRALAX) 17 GM/Dose powder Take 17 g by mouth daily 510 g      spironolactone (ALDACTONE) 25 MG tablet TAKE 1 TABLET BY MOUTH  DAILY 90 tablet 3     Controlled medications:   not applicable/none     Past Medical History:   Past Medical History:   Diagnosis Date     Arthritis      rhuematoid     Coronary artery disease     triple bypass 2001     CRF (chronic renal failure)      Gastro-oesophageal reflux disease      Gout      Hyperlipidemia      Hypertension      Nocturia      Physical Exam:   Vitals: /74   Pulse 66   Temp (!) 96.2  F (35.7  C)   Resp 18   Wt 58.8 kg (129 lb 9.6 oz)   SpO2 97%   BMI 20.60 kg/m    BMI: Body mass index is 20.6 kg/m .  GENERAL APPEARANCE:  Alert, oriented, thin, cooperative, denies pain  RESP:  no respiratory distress, diminished breath sounds bilaterally, RA  CV:  no edema, irregular rhythm per rate controlled A-flutter/fib, 3/6 ELANA 2ICS RSB and 2/6 ELANA 4ICS LSB  PSYCH:  oriented X 3, memory impaired. SLUMS 7/30    SNF labs:   Most Recent 3 CBC's:Recent Labs   Lab Test 07/26/22  0811 07/25/22  1200 07/15/22  0540   WBC 11.7* 12.2* 13.1*   HGB 7.5* 6.9* 9.1*   * 101* 90    327 229     Most Recent 3 BMP's:Recent Labs   Lab Test 07/25/22  1200 07/15/22  0540 07/14/22  0527   * 136 137   POTASSIUM 4.7 3.7 3.8   CHLORIDE 116* 107 108   CO2 23 21 21   BUN 73* 107* 94*   CR 2.51* 2.52* 2.71*   ANIONGAP 6 8 8   AGUSTÍN 9.4 8.7 8.7   * 109* 122*       DISCHARGE PLAN:    Follow up labs: CBC due 8/3 to be drawn by home care with results to PCP    Medical Follow Up:      Follow up with primary care provider in 1-2 weeks    Current [place] scheduled appointments:       Discharge Services: Home Care:  Occupational Therapy, Physical Therapy, Registered Nurse, Home Health Aide and     Discharge Instructions Verbalized to Patient at Discharge:     None    TOTAL DISCHARGE TIME:   Greater than 30 minutes  Electronically signed by:  Paul Felipe NP     Home care Face to Face documentation done in [facility] attached to Home care orders for [facility].

## 2022-07-28 ENCOUNTER — LAB REQUISITION (OUTPATIENT)
Dept: LAB | Facility: CLINIC | Age: 87
End: 2022-07-28

## 2022-07-28 DIAGNOSIS — Z00.01 ENCOUNTER FOR GENERAL ADULT MEDICAL EXAMINATION WITH ABNORMAL FINDINGS: ICD-10-CM

## 2022-07-28 PROCEDURE — U0003 INFECTIOUS AGENT DETECTION BY NUCLEIC ACID (DNA OR RNA); SEVERE ACUTE RESPIRATORY SYNDROME CORONAVIRUS 2 (SARS-COV-2) (CORONAVIRUS DISEASE [COVID-19]), AMPLIFIED PROBE TECHNIQUE, MAKING USE OF HIGH THROUGHPUT TECHNOLOGIES AS DESCRIBED BY CMS-2020-01-R: HCPCS | Performed by: NURSE PRACTITIONER

## 2022-07-29 ENCOUNTER — TELEPHONE (OUTPATIENT)
Dept: FAMILY MEDICINE | Facility: CLINIC | Age: 87
End: 2022-07-29

## 2022-07-29 LAB — SARS-COV-2 RNA RESP QL NAA+PROBE: NEGATIVE

## 2022-07-29 NOTE — TELEPHONE ENCOUNTER
Dr. Sydnie Knowles called concerned about pt's dry mouth. Pt was recently in transitional care/home for [facility] for a few weeks now. Was recently in hospital 7/11-7/15. She states  Pt was going to be moving in with his daughter. It appears home care referral was also placed. She states his dry mouth has gotten significantly worse and concerned about his current medications potential side effects could be causing this? Pt has upper and lower denture appliances and dry mouth has been preventing him from wearing them. This has been affecting him eating. She has discussed staying hydrated and salt water rinses but wanted to update PCP and hopefully try to collaborate on improving this issue? Pt not scheduled until his physical with PCP in November. Do you want to see him sooner regarding this or any recommendations on medications?    Dr. Sydnie Knowles: [phone]    Tamar JORGE RN  [facility]

## 2022-07-29 NOTE — TELEPHONE ENCOUNTER
We can schedule an appointment sooner if needed to discuss interventions for dry mouth.  Ok to use same day slot or reserverd slot etc.  Even a virtual visit would be OK.  BTW shouldn't he have a hosptial follow up visit anyway?

## 2022-08-01 ENCOUNTER — TELEPHONE (OUTPATIENT)
Dept: FAMILY MEDICINE | Facility: CLINIC | Age: 87
End: 2022-08-01

## 2022-08-01 ENCOUNTER — MYC MEDICAL ADVICE (OUTPATIENT)
Dept: FAMILY MEDICINE | Facility: CLINIC | Age: 87
End: 2022-08-01

## 2022-08-01 ENCOUNTER — PATIENT OUTREACH (OUTPATIENT)
Dept: NURSING | Facility: CLINIC | Age: 87
End: 2022-08-01

## 2022-08-01 ASSESSMENT — ACTIVITIES OF DAILY LIVING (ADL): DEPENDENT_IADLS:: TRANSPORTATION

## 2022-08-01 NOTE — PROGRESS NOTES
"Clinic Care Coordination Contact    Clinic Care Coordination Contact  OUTREACH with Post Discharge Assessment    Referral Information:  Referral Source: Care Team ([facility])    Primary Diagnosis: Pneumonia    Chief Complaint   Patient presents with     Clinic Care Coordination - Initial     Clinic Care Coordination - Post Hospital      [place] Utilization:   [facility]  Clinic Utilization:  [facility]   Difficulty keeping appointments:: No  Compliance Concerns: No  No-Show Concerns: No  No PCP office visit in Past Year: No  Utilization    Hospital Admissions  1             ED Visits  1             No Show Count (past year)  0                Current as of: 8/1/2022  6:30 AM            Clinical Concerns:  Current Medical Concerns:  Recent discharge from [facility]    Current Behavioral Concerns: None    Education Provided to patient:   RNCC called and spoke with patient; introduced self, discussed role of Care Coordination and explained reason for call     Pain  Pain (GOAL):: No  Health Maintenance Reviewed: Up to date  Clinical Pathway: None    Admission:    Admission Date: 07/15/22 ([facility])   Reason for Admission: Community acquired pneumonia, A. flutter, severe aortic stenosis  Discharge:   Discharge Date: 07/30/22  Discharge Diagnosis: Community acquired pneumonia, A. flutter, severe aortic stenosis    Enrollment  Primary Care Care Coordination Status: Potential  Clinical Pathway Name: Pneumonia    Discharge Assessment  How are you doing now that you are home?: \"He's doing great!\"  How are your symptoms? (Red Flag symptoms escalate to triage hotline per guidelines): Improved  Do you feel your condition is stable enough to be safe at home until your provider visit?: Yes  Does the patient have their discharge instructions? : Yes  Does the patient have questions regarding their discharge instructions? : No  Were you started on any " new medications or were there changes to any of your previous medications? : No  Does the patient have all of their medications?: Yes  Do you have questions regarding any of your medications? : No  Do you have all of your needed medical supplies or equipment (DME)?  (i.e. oxygen tank, CPAP, cane, etc.): Yes  Discharge follow-up appointment scheduled within 14 calendar days? : No (Bianka, daughter, will reach out to schedule)  Is patient agreeable to assistance with scheduling? : Yes    Post-op (Clinicians Only)  Did the patient have surgery or a procedure: No  Fever: No  Chills: No  Eating & Drinking: eating and drinking without complaints/concerns  PO Intake: regular diet  Additional Symptoms:  (Denies)  Bowel Function: normal  Date of last BM: 07/31/22  Urinary Status: voiding without complaint/concerns    Medication Management:  Medication review status: Medications reviewed and no changes reported per patient.        Current Outpatient Medications   Medication     acetaminophen (TYLENOL) 325 MG tablet     allopurinol (ZYLOPRIM) 100 MG tablet     apixaban ANTICOAGULANT (ELIQUIS) 2.5 MG tablet     atenolol (TENORMIN) 50 MG tablet     atorvastatin (LIPITOR) 20 MG tablet     Chlorpheniramine-DM (CORICIDIN HBP COUGH/COLD) 4-30 MG TABS     Cholecalciferol (VITAMIN D3 PO)     coenzyme Q-10 capsule     famotidine (PEPCID) 20 MG tablet     fenofibrate (TRIGLIDE/LOFIBRA) 160 MG tablet     ferrous sulfate (FE TABS) 325 (65 Fe) MG EC tablet     furosemide (LASIX) 20 MG tablet     hydrALAZINE (APRESOLINE) 50 MG tablet     melatonin 3 MG tablet     Multiple Vitamin (MULTIVITAMIN ADULT PO)     Multiple Vitamins-Minerals (PRESERVISION AREDS 2 PO)     polyethylene glycol (MIRALAX) 17 GM/Dose powder     spironolactone (ALDACTONE) 25 MG tablet     No current facility-administered medications for this visit.     Functional Status:  Dependent ADLs:: Ambulation-walker  Dependent IADLs:: Transportation  Bed or wheelchair confined::  No  Mobility Status: Independent w/Device  Fallen 2 or more times in the past year?: Yes  Any fall with injury in the past year?: Yes    Living Situation:  Current living arrangement:: I live in a private home with family (Bianka, daughter, moved in with patient)  Type of residence:: Private home - stairs (hermilo daughter moved in with patient)    Lifestyle & Psychosocial Needs:    Social Determinants of Health     Tobacco Use: Medium Risk     Smoking Tobacco Use: Former Smoker     Smokeless Tobacco Use: Never Used   Alcohol Use: Not on file   Financial Resource Strain: Not on file   Food Insecurity: Not on file   Transportation Needs: Not on file   Physical Activity: Not on file   Stress: Not on file   Social Connections: Not on file   Intimate Partner Violence: Not on file   Depression: Not at risk     PHQ-2 Score: 0   Housing Stability: Not on file     Diet:: Regular  Inadequate nutrition (GOAL):: No  Tube Feeding: No  Inadequate activity/exercise (GOAL):: No  Significant changes in sleep pattern (GOAL): No  Transportation means:: Family, Regular car     Hinduism or spiritual beliefs that impact treatment:: No  Mental health DX:: No  Mental health management concern (GOAL):: No  Chemical Dependency Status: No Current Concerns  Chemical Dependency Management:  (NA)  Informal Support system:: Children      Resources and Interventions:  Current Resources:   Skilled Home Care Services: Skilled Nursing, Home Health Aid, Physicial Therapy, Occupational Therapy ()  Community Resources: Home Care  Supplies Currently Used at Home:  (Blood pressure cuff and scale; BP's range /45-76 and HR range 70-76)  Equipment Currently Used at Home: cane, straight, walker, rolling, wheelchair, manual  Employment Status: retired ()    Advance Care Plan/Directive  Advanced Care Plans/Directives on file:: No  Type Advanced Care Plans/Directives:  (Full Code)  Advanced Care Plan/Directive Status: Considering  Options    Referrals Placed: None     Goals:     Patient/Caregiver understanding: Yes    Future Appointments              In 3 weeks Hamzah Stewart MD [facility]    In 3 months [facility]         Plan:   -Patient will contact the care team with questions, concerns, support needs   -Patient will use the clinic as a resource and understands (s)he can contact the [facility] with 24/7 after hours services available  -Care Coordinator will remain available as needed  -No further care coordination outreaches at this time     Damaris Torres RN, BSN, PHN  Primary Care / Care Coordinator   [facility]  [email or web address]   [phone]

## 2022-08-01 NOTE — TELEPHONE ENCOUNTER
Patient declined home health aide showering with family assist.     eval delay for 1 month per family requets    FYI: patient is not taking hydralazine.  No swelling.  BP reading are 90-95 / 60's    Verbal approval given for the homecare request below. Homecare/Hospice agency to fax orders for provider signature.    PT 1 times a week for 4 weeks, then once every other week for 4 weeks.    OT eval    Skilled nursing eval    Ofe Vitale RN  [facility]

## 2022-08-01 NOTE — LETTER
[facility] CARE COORDINATION  [address]  [place] [address]    August 1, 2022    Trevor Soni  [address] [place] [address]      Dear Royal,    I am a clinic care coordinator who works with Abdoulaye Redman MD with the [facility]. I wanted to thank you for spending the time to talk with me.  Below is a description of clinic care coordination and how I can further assist you.       The clinic care coordination team is made up of a registered nurse, , financial resource worker and community health worker who understand the health care system. The goal of clinic care coordination is to help you manage your health and improve access to the health care system. Our team works alongside your provider to assist you in determining your health and social needs. We can help you obtain health care and community resources, providing you with necessary information and education. We can work with you through any barriers and develop a care plan that helps coordinate and strengthen the communication between you and your care team.    Please feel free to contact me with any questions or concerns regarding care coordination and what we can offer.      We are focused on providing you with the highest-quality healthcare experience possible.    Sincerely,      Damaris Torres RN, BSN, PHN  Primary Care / Care Coordinator   [facility]  [email or web address] [email or web address]   [phone]

## 2022-08-02 ENCOUNTER — TELEPHONE (OUTPATIENT)
Dept: FAMILY MEDICINE | Facility: CLINIC | Age: 87
End: 2022-08-02

## 2022-08-02 NOTE — TELEPHONE ENCOUNTER
[facility] called to request  delay in start of care verbal order.  Patient discharged on 7/30.  Home care was first able to yesterday and will see patient tomorrow.    Ok to leave detailed message on call back to home care.  [phone]  [phone]    Ofe Vitale RN

## 2022-08-02 NOTE — TELEPHONE ENCOUNTER
I looked at my schedule and there are several approval only slots, I also think that it would be very appropriate to use a same day slot for a hospital follow up visit or else those spots inevitably get filled by people who are not my primary care patients

## 2022-08-02 NOTE — TELEPHONE ENCOUNTER
TCs - are you able to assist pt/family with scheduling appointment for TCU/hospital follow up?     Thank you   Mckayla ALEX, Triage RN  [facility]

## 2022-08-03 ENCOUNTER — MEDICAL CORRESPONDENCE (OUTPATIENT)
Dept: HEALTH INFORMATION MANAGEMENT | Facility: CLINIC | Age: 87
End: 2022-08-03

## 2022-08-04 ENCOUNTER — LAB REQUISITION (OUTPATIENT)
Dept: LAB | Facility: CLINIC | Age: 87
End: 2022-08-04
Payer: MEDICARE

## 2022-08-04 ENCOUNTER — TELEPHONE (OUTPATIENT)
Dept: FAMILY MEDICINE | Facility: CLINIC | Age: 87
End: 2022-08-04

## 2022-08-04 DIAGNOSIS — I10 ESSENTIAL (PRIMARY) HYPERTENSION: ICD-10-CM

## 2022-08-04 PROCEDURE — 80048 BASIC METABOLIC PNL TOTAL CA: CPT | Mod: ORL | Performed by: INTERNAL MEDICINE

## 2022-08-04 PROCEDURE — 85027 COMPLETE CBC AUTOMATED: CPT | Mod: ORL | Performed by: INTERNAL MEDICINE

## 2022-08-04 NOTE — TELEPHONE ENCOUNTER
Dylan from [facility] called requesting verbal approval of SN orders 1 x a wk for 2 wks and every other week for 6 week with 4 PRN visits.     Verbal approval given.

## 2022-08-04 NOTE — TELEPHONE ENCOUNTER
Jyoti from [facility] called requesting OT orders 1 x a wk for 2 wks and every other week for 3 more visits. Order needed for ADS and strengthening.     Verbal approval given.

## 2022-08-05 LAB
ANION GAP SERPL CALCULATED.3IONS-SCNC: 6 MMOL/L (ref 3–14)
BUN SERPL-MCNC: 34 MG/DL (ref 7–30)
CALCIUM SERPL-MCNC: 8.6 MG/DL (ref 8.5–10.1)
CHLORIDE BLD-SCNC: 110 MMOL/L (ref 94–109)
CO2 SERPL-SCNC: 24 MMOL/L (ref 20–32)
CREAT SERPL-MCNC: 2.14 MG/DL (ref 0.66–1.25)
ERYTHROCYTE [DISTWIDTH] IN BLOOD BY AUTOMATED COUNT: 17.7 % (ref 10–15)
GFR SERPL CREATININE-BSD FRML MDRD: 29 ML/MIN/1.73M2
GLUCOSE BLD-MCNC: 131 MG/DL (ref 70–99)
HCT VFR BLD AUTO: 27.8 % (ref 40–53)
HGB BLD-MCNC: 8.1 G/DL (ref 13.3–17.7)
MCH RBC QN AUTO: 29.7 PG (ref 26.5–33)
MCHC RBC AUTO-ENTMCNC: 29.1 G/DL (ref 31.5–36.5)
MCV RBC AUTO: 102 FL (ref 78–100)
PLATELET # BLD AUTO: 249 10E3/UL (ref 150–450)
POTASSIUM BLD-SCNC: 4.5 MMOL/L (ref 3.4–5.3)
RBC # BLD AUTO: 2.73 10E6/UL (ref 4.4–5.9)
SODIUM SERPL-SCNC: 140 MMOL/L (ref 133–144)
WBC # BLD AUTO: 5 10E3/UL (ref 4–11)

## 2022-08-05 NOTE — RESULT ENCOUNTER NOTE
The following letter pertains to your most recent diagnostic tests:    These results look improved from the previous check.  Kidney function is improving. Anemia is improving .            Sincerely,    Dr. Redman

## 2022-08-08 ENCOUNTER — OFFICE VISIT (OUTPATIENT)
Dept: FAMILY MEDICINE | Facility: CLINIC | Age: 87
End: 2022-08-08
Payer: MEDICARE

## 2022-08-08 VITALS
HEIGHT: 67 IN | SYSTOLIC BLOOD PRESSURE: 100 MMHG | OXYGEN SATURATION: 97 % | WEIGHT: 129.3 LBS | HEART RATE: 77 BPM | BODY MASS INDEX: 20.29 KG/M2 | DIASTOLIC BLOOD PRESSURE: 64 MMHG | TEMPERATURE: 96.8 F | RESPIRATION RATE: 16 BRPM

## 2022-08-08 DIAGNOSIS — M80.00XD OSTEOPOROSIS WITH CURRENT PATHOLOGICAL FRACTURE WITH ROUTINE HEALING, UNSPECIFIED OSTEOPOROSIS TYPE, SUBSEQUENT ENCOUNTER: ICD-10-CM

## 2022-08-08 DIAGNOSIS — D64.9 ANEMIA, UNSPECIFIED TYPE: ICD-10-CM

## 2022-08-08 DIAGNOSIS — I10 BENIGN ESSENTIAL HYPERTENSION: ICD-10-CM

## 2022-08-08 DIAGNOSIS — I48.92 ATRIAL FLUTTER, UNSPECIFIED TYPE (H): ICD-10-CM

## 2022-08-08 DIAGNOSIS — J18.9 PNEUMONIA DUE TO INFECTIOUS ORGANISM, UNSPECIFIED LATERALITY, UNSPECIFIED PART OF LUNG: Primary | ICD-10-CM

## 2022-08-08 PROCEDURE — 99495 TRANSJ CARE MGMT MOD F2F 14D: CPT | Performed by: INTERNAL MEDICINE

## 2022-08-08 RX ORDER — AMLODIPINE BESYLATE 5 MG/1
5 TABLET ORAL DAILY
Qty: 90 TABLET | Refills: 3 | Status: SHIPPED | OUTPATIENT
Start: 2022-08-08 | End: 2024-09-19

## 2022-08-08 ASSESSMENT — PAIN SCALES - GENERAL: PAINLEVEL: MILD PAIN (3)

## 2022-08-08 NOTE — PROGRESS NOTES
"  Assessment & Plan     Pneumonia due to infectious organism, unspecified laterality, unspecified part of lung  Recheck chest x-ray  But, it may be a little early to expect resolution   Overall, he seems to be slowly improving   - XR Chest 2 Views; Future    Benign essential hypertension  Well controlled on current regimen  - amLODIPine (NORVASC) 5 MG tablet; Take 1 tablet (5 mg) by mouth daily    Atrial flutter, unspecified type (H)  Add back beta blocker if additional blood pressure control is needed as he recovers from pneumonia   On DOAC for prophylaxis     Anemia, unspecified type  Acutely exacerbated chronic anemia   Trending upward on discharge  Recheck when he return in November with iron studies or sooner if needed     Osteoporosis with current pathological fracture with routine healing, unspecified osteoporosis type, subsequent encounter    - Cholecalciferol (VITAMIN D3) 25 MCG (1000 UT) CAPS; Take 1 capsule by mouth daily OTC dose unknown      38 minutes spent on the date of the encounter doing chart review, history and exam, documentation and further activities per the note           Return in about 14 weeks (around 11/14/2022) for recheck.  Patient instructed to return to clinic or contact us sooner if symptoms worsen or new symptoms develop.     Abdoulaye Redman MD  [facility] [place] is a 88 year old accompanied by his daughter, presenting for the following health issues:  No chief complaint on file.      HPI     Post Discharge Outreach 8/1/2022   Admission Date 7/15/2022   Reason for Admission Community acquired pneumonia, A. flutter, severe aortic stenosis   Discharge Date 7/30/2022   Discharge Diagnosis Community acquired pneumonia, A. flutter, severe aortic stenosis   How are you doing now that you are home? \"He's doing great!\"   How are your symptoms? (Red Flag symptoms escalate to triage hotline per guidelines) Improved   Do you feel your condition is stable " enough to be safe at home until your provider visit? Yes   Does the patient have their discharge instructions?  Yes   Does the patient have questions regarding their discharge instructions?  No   Were you started on any new medications or were there changes to any of your previous medications?  No   Does the patient have all of their medications? Yes   Do you have questions regarding any of your medications?  No   Do you have all of your needed medical supplies or equipment (DME)?  (i.e. oxygen tank, CPAP, cane, etc.) Yes   Discharge follow-up appointment scheduled within 14 calendar days?  No     Hospital Follow-up Visit:    Hospital/Nursing Home/IP Rehab Facility: [facility]   Date of Admission: 7-  Date of Discharge: 7-   Reason(s) for Admission: Pneumonia due to infectious organism, unspecified laterality, unspecified part of lung    Pneumonia due to infectious organism, unspecified laterality, unspecified part of lung       Was your hospitalization related to COVID-19? No   Problems taking medications regularly:  None  Medication changes since discharge: None  Problems adhering to non-medication therapy:  None    Summary of hospitalization:  [facility] discharge summary reviewed  Diagnostic Tests/Treatments reviewed.  Follow up needed: none  Other Healthcare Providers Involved in Patient s Care:         Homecare  Update since discharge: improved. Post Medication Reconciliation Status:        Plan of care communicated with patient and family     Hospitalized for pneumonia and atrial fibrillation/flutter  Completed antibiotics  Feels better, but still weak  Doing home PT  Started on NOAC  Daughter has actually been giving him amlodipine 5 daily, but no atenolol and hydralazine due to quite low blood pressure readings at home  He was started on oral iron, but I don't see iron sat result  He has longstanding anemia and CKD   He has no complaints     Review of  "Systems         Objective    /64 (BP Location: Right arm, Cuff Size: Adult Regular)   Pulse 77   Temp 96.8  F (36  C) (Tympanic)   Resp 16   Ht 1.689 m (5' 6.5\")   Wt 58.7 kg (129 lb 4.8 oz)   SpO2 97%   BMI 20.56 kg/m    Body mass index is 20.56 kg/m .  Physical Exam   GEN:  Frail, chronically ill appearing, speaks in full sentances  CV:  Heart with regular rate and rhythm. Unchanged murmur.  PULM:  The lungs are clear to auscultation bilaterally breathing not labored  ABDOMINAL:  The abdomen is soft, without distention and non-tender with normal bowel sounds.   EXT:  No edema  NEURO:  Generally weak, uses walker, no focal weakness      Chest x-ray pending                 .  ..  "

## 2022-08-10 ENCOUNTER — MEDICAL CORRESPONDENCE (OUTPATIENT)
Dept: HEALTH INFORMATION MANAGEMENT | Facility: CLINIC | Age: 87
End: 2022-08-10

## 2022-08-11 DIAGNOSIS — Z53.9 DIAGNOSIS NOT YET DEFINED: Primary | ICD-10-CM

## 2022-08-11 PROCEDURE — 99207 PR MD CERTIFICATION HHA PATIENT: CPT | Performed by: INTERNAL MEDICINE

## 2022-08-15 ENCOUNTER — MEDICAL CORRESPONDENCE (OUTPATIENT)
Dept: HEALTH INFORMATION MANAGEMENT | Facility: CLINIC | Age: 87
End: 2022-08-15

## 2022-08-17 DIAGNOSIS — Z53.9 DIAGNOSIS NOT YET DEFINED: Primary | ICD-10-CM

## 2022-08-17 PROCEDURE — G0180 MD CERTIFICATION HHA PATIENT: HCPCS | Performed by: INTERNAL MEDICINE

## 2022-08-18 ENCOUNTER — TELEPHONE (OUTPATIENT)
Dept: FAMILY MEDICINE | Facility: CLINIC | Age: 87
End: 2022-08-18

## 2022-08-18 NOTE — TELEPHONE ENCOUNTER
Trevor had a fall on 8/16 with no injury while reaching down to the ground    Patient fell into a soft dog bed  Home care will continue to monitor    Routing as KENNY Tai RN

## 2022-08-23 ENCOUNTER — MYC REFILL (OUTPATIENT)
Dept: FAMILY MEDICINE | Facility: CLINIC | Age: 87
End: 2022-08-23

## 2022-08-23 DIAGNOSIS — I48.92 ATRIAL FLUTTER, UNSPECIFIED TYPE (H): ICD-10-CM

## 2022-08-26 DIAGNOSIS — I48.92 ATRIAL FLUTTER, UNSPECIFIED TYPE (H): ICD-10-CM

## 2022-08-26 RX ORDER — APIXABAN 2.5 MG/1
TABLET, FILM COATED ORAL
Qty: 60 TABLET | Refills: 0 | OUTPATIENT
Start: 2022-08-26

## 2022-08-26 NOTE — TELEPHONE ENCOUNTER
Patient will be out of medication today. Initial refill was sent to the incorrect doctor ([facility] by mistake. Tyra Hopper RN

## 2022-08-26 NOTE — TELEPHONE ENCOUNTER
Routing refill request to provider for review/approval because:  Labs out of range:  Creatinine   Patient is 18-79 years of age    Serum creatinine less than or equal to 1.4 on file in past 12 mos    Weight is greater than 60 kg for the past year     Creatinine   Date Value Ref Range Status   08/04/2022 2.14 (H) 0.66 - 1.25 mg/dL Final   12/21/2020 2.35 (H) 0.66 - 1.25 mg/dL Final     Lashae Flores RN

## 2022-09-06 ENCOUNTER — MYC MEDICAL ADVICE (OUTPATIENT)
Dept: FAMILY MEDICINE | Facility: CLINIC | Age: 87
End: 2022-09-06

## 2022-09-06 ENCOUNTER — MEDICAL CORRESPONDENCE (OUTPATIENT)
Dept: FAMILY MEDICINE | Facility: CLINIC | Age: 87
End: 2022-09-06

## 2022-09-06 DIAGNOSIS — I48.92 ATRIAL FLUTTER, UNSPECIFIED TYPE (H): ICD-10-CM

## 2022-09-08 ENCOUNTER — MYC MEDICAL ADVICE (OUTPATIENT)
Dept: FAMILY MEDICINE | Facility: CLINIC | Age: 87
End: 2022-09-08

## 2022-09-09 ENCOUNTER — TELEPHONE (OUTPATIENT)
Dept: FAMILY MEDICINE | Facility: CLINIC | Age: 87
End: 2022-09-09

## 2022-09-09 ENCOUNTER — VIRTUAL VISIT (OUTPATIENT)
Dept: FAMILY MEDICINE | Facility: CLINIC | Age: 87
End: 2022-09-09
Payer: MEDICARE

## 2022-09-09 DIAGNOSIS — U07.1 INFECTION DUE TO 2019 NOVEL CORONAVIRUS: Primary | ICD-10-CM

## 2022-09-09 PROCEDURE — 99442 PR PHYSICIAN TELEPHONE EVALUATION 11-20 MIN: CPT | Mod: CS | Performed by: PHYSICIAN ASSISTANT

## 2022-09-09 NOTE — PROGRESS NOTES
"Trevor is a 89 year old who is being evaluated via a billable telephone visit.      What phone number would you like to be contacted at? [phone]  How would you like to obtain your AVS? MyChart    Assessment & Plan     Infection due to 2019 novel coronavirus  Patient is a 89 YOM who presents to clinic due to symptoms of COVID-19 starting 2 day(s) ago with subsequent positive test results.  Patient is able to speak in full sentences-no signs of respiratory distress. Per CDC criteria, patient qualifies for prescribed treatment of COVID19 as patient meets high risk criteria. Discussed treatment options for COVID-19 as well as risks and benefits.  Patient elects to proceed with Molnupiravir due to medication interactions with Paxlovid.  Discussed home medications and possible interactions.  Also discussed OTC options for managing symptoms of COVID-19.  Return and urgent/emergent follow-up precautions provided.    - molnupiravir (LAGEVRIO) 200 MG capsule; Take 4 capsules (800 mg) by mouth every 12 hours for 5 days       COVID-19 positive patient.  Encounter for consideration of medication intervention. Patient does qualify for a prescription. Full discussion with patient including medication options, risks and benefits. Potential drug interactions reviewed with patient.     Treatment Planned Paxlovid, Rx sent to [place] [facility]  [facility]   [phone]  [address] [place] [address]    Hours:  Mon-Fri: 8:30a - 6:00p  Sat-Sun: 8:30a - 12:30p    Curbside Delivery: Patient to call [phone] to set up and  at door 2 of [facility]  Temporary change to home medications:  None     Estimated body mass index is 20.56 kg/m  as calculated from the following:    Height as of 8/8/22: 1.689 m (5' 6.5\").    Weight as of 8/8/22: 58.7 kg (129 lb 4.8 oz).  GFR Estimate   Date Value Ref Range Status   08/04/2022 29 (L) >60 mL/min/1.73m2 Final     Comment:     Effective December 21, 2021 eGFRcr in adults " is calculated using the 2021 CKD-EPI creatinine equation which includes age and gender (Alli rowe al., NEJM, DOI: 10.1056/XSCGze8547896)   12/21/2020 24 (L) >60 mL/min/[1.73_m2] Final     Comment:     Non  GFR Calc  Starting 12/18/2018, serum creatinine based estimated GFR (eGFR) will be   calculated using the Chronic Kidney Disease Epidemiology Collaboration   (CKD-EPI) equation.       Lab Results   Component Value Date    LLCOR30EZN Negative 07/28/2022       Return in about 1 week (around 9/16/2022), or if symptoms worsen or fail to improve.    Gregoria Kendrick PA-C  [facility] [place] is a 89 year old, presenting with daughter for the following health issues:  Covid Concern      HPI       COVID-19 Symptom Review  How many days ago did these symptoms start? 2 days    Are any of the following symptoms significant for you?    New or worsening difficulty breathing? No    Worsening cough? Yes, it's a dry cough.     Fever or chills? No    Headache: No    Sore throat: YES    Chest pain: No    Diarrhea: YES    Body aches? No    02: 97-98%    What treatments has patient tried? none   Does patient live in a nursing home, group home, or shelter? No  Does patient have a way to get food/medications during quarantined? Yes, I have a friend or family member who can help me.        Objective           Vitals:  No vitals were obtained today due to virtual visit.    Physical Exam   healthy, alert and no distress  PSYCH: Alert and oriented times 3; coherent speech, normal   rate and volume, able to articulate logical thoughts, able   to abstract reason, no tangential thoughts, no hallucinations   or delusions  His affect is normal  RESP: No cough, no audible wheezing, able to talk in full sentences  Remainder of exam unable to be completed due to telephone visits                Phone call duration: 14 minutes

## 2022-09-09 NOTE — TELEPHONE ENCOUNTER
No appointments available with PCP today.    Sent patient Tarana Wireless[employer] message with covid-19 patient instructions including when to call 911 and how to schedule a virtual treatment appointment.    Larry Cruz RN  [facility]

## 2022-09-09 NOTE — PATIENT INSTRUCTIONS
Neftali Murray,     Based on your health history you do qualify for treatment of COVID-19.  For treatment you have been prescribed Molnupiravir.  Molnupiravir. is a combined antiviral medication that reduces risk of hospitalization or severe COVID by 30%.  It is important that you  this medication and start it right away today.  The medication was sent to the [facility] pharmacy.  Please see below for pharmacy information.    While taking Molnupiravir, you can continue medications as prescribed.     You may continue to use Tylenol for fevers, headache, body ache.  You can also use over-the-counter decongestants and cough suppressants to help manage symptoms.    If you develop any severe symptoms of medication reaction or COVID-19 including chest pain, coughing up blood, shortness of breath, swelling, rashes, or any other severe symptoms, please call 911/go to the emergency department.    Please reach out with any questions or concerns.  Take Care,  Gregoria Kendrick PA-C    [facility]   [phone]  [address] [place] [address]    Hours:  Mon-Fri: 8:30a - 6:00p  Sat-Sun: 8:30a - 12:30p    Curbside Delivery: Patient to call [phone] to set up and  at door 2 of [facility]  Instructions for Patients      What are the symptoms of COVID-19?  Symptoms can include fever, cough, shortness of breath, chills, headache, muscle pain sore throat, fatigue, runny or stuffy nose, and loss of taste and smell. Other less common symptoms include nausea, vomiting, or diarrhea (watery stools).    Know when to call 911. Emergency warning signs include:  Trouble breathing or shortness of breath  Pain or pressure in the chest that doesn't go away  Feeling confused like you haven't felt before, or not being able to wake up  Bluish-colored lips or face    How can I take care of myself?  Get lots of rest. Drink extra fluids (unless a doctor has told you not to).  Take Tylenol (acetaminophen) for fever or pain.  If you have liver or kidney problems, ask your family doctor if it's okay to take Tylenol   Adults:   650 mg (two 325 mg pills or tablets) every 4 to 6 hours, or...   1,000 mg (two 500 mg pills or tablets) every 8 hours as needed.  Note: Don't take more than 3,000 mg in one day. Acetaminophen is found in many medicines (both prescribed and over the counter). Read all labels to be sure you don't take too much.  For children, check the Tylenol bottle for the right dose. The dose is based on the child's age or weight.  Take over the counter medicines for your symptoms as needed. Talk to your pharmacist.  If you have other health problems (like cancer, heart failure, an organ transplant, or severe kidney disease): Call your specialty clinic if you don't feel better in the next 2 days.    These guidelines are for isolating and quarantining before returning to work, school or .   For employers, schools and day cares: This is an official notice for this person s medical guidelines for returning in-person.   For health care sites: The CDC gives different isolation and quarantine guidelines for healthcare sites, please check with these sites before arriving.     How do I self-isolate?  You isolate when you have symptoms of COVID or a test shows you have COVID, even if you don t have symptoms.   If you DO have symptoms:  Stay home and away from others  For at least 5 days after your symptoms started, AND   You are fever free for 24 hours (with no medicine that reduces fever), AND  Your other symptoms are better.  Wear a mask for 10 full days any time you are around others.  If you DON T have symptoms:  Stay at home and away from others for at least 5 days after your positive test.  Wear a mask for 10 full days any time you are around others.    How and when do I quarantine?  You quarantine when you may have been exposed to the virus and DON T have symptoms.   Stay home and away from others.   You must quarantine for 5  days after your last contact with a person who has COVID.  Note: If you are fully vaccinated, you don t need to quarantine. You should still follow the steps below.   Wear a mask for 10 full days any time you re around others.  Get tested at least 5 days after you were exposed, even if you don t have symptoms.   If you start to have symptoms, isolate right away and get tested.    Where can I get more information?  [facility] COVID-19 Resource Hub: [email or web address]   CDC Quarantine & Isolation: https://www.cdc.gov/coronavirus/2019-ncov/your-health/quarantine-isolation.html   [facility] - What to Do If You're Sick: https://www.cdc.gov/coronavirus/2019-ncov/if-you-are-sick/index.html  [facility] clinical trials (COVID-19 research studies): clinicalaffairs.[facility]/umn-clinical-trials  [place] Department of Health COVID-19 Public Hotline: 1-916.820.5261

## 2022-09-09 NOTE — TELEPHONE ENCOUNTER
RN Hemant from [facility] called reporting pt is positive for COVID-19. Called to let provider know if result and asked that pt be followed with providers advice.     Triage called pt and talked to pt and daughter Bianka. Per daughter, pt has a mild cough and sore throat. Given Hx of pneumonia and pt's age, they are concerned of COVID-19 result. Pt wants to know what PCP recommends. Pt was advised to schedule a VV to discuss Tx. He verbalized agreement with plan. Pt was transferred to scheduling.

## 2022-09-13 ENCOUNTER — TELEPHONE (OUTPATIENT)
Dept: FAMILY MEDICINE | Facility: CLINIC | Age: 87
End: 2022-09-13

## 2022-09-13 NOTE — TELEPHONE ENCOUNTER
[place] GUERRA requesting delay of start. Patient and his daughter have COVID 19 symptoms.  Original social work order has . Needs new order.  Requesting 1 social work visit within the next 21 days for community resources and care giver support.    Can we leave a detailed message on this number? YES  Phone number patient can be reached at: Other phone number:  [phone]    Tyra Hopper RN  [facility] Triage

## 2022-09-19 ENCOUNTER — TELEPHONE (OUTPATIENT)
Dept: FAMILY MEDICINE | Facility: CLINIC | Age: 87
End: 2022-09-19

## 2022-09-19 NOTE — TELEPHONE ENCOUNTER
CHAYITO Montes is requesting providers approval to lif COVID-19 Precautions. Pt tested positive for COVID-19 on 09/07. Tested negative 09/16 and has no lingering symptoms.     Ok to leave a detailed message with providers response to  [phone].     FYI- verbal approval given.

## 2022-09-20 ENCOUNTER — MEDICAL CORRESPONDENCE (OUTPATIENT)
Dept: HEALTH INFORMATION MANAGEMENT | Facility: CLINIC | Age: 87
End: 2022-09-20

## 2022-09-20 ENCOUNTER — MYC MEDICAL ADVICE (OUTPATIENT)
Dept: FAMILY MEDICINE | Facility: CLINIC | Age: 87
End: 2022-09-20

## 2022-09-20 NOTE — TELEPHONE ENCOUNTER
Verbal given on vm for requested homecare orders.  Homecare to send orders over for PCP signature.  Carly James, RN  [facility] RN Triage Team

## 2022-09-20 NOTE — TELEPHONE ENCOUNTER
Called Jyoti,  Verbal approval for orders below given.      Ernestine Boateng RN  [facility]

## 2022-09-22 ENCOUNTER — OFFICE VISIT (OUTPATIENT)
Dept: CARDIOLOGY | Facility: CLINIC | Age: 87
End: 2022-09-22
Attending: INTERNAL MEDICINE
Payer: MEDICARE

## 2022-09-22 ENCOUNTER — OFFICE VISIT (OUTPATIENT)
Dept: CARDIOLOGY | Facility: CLINIC | Age: 87
End: 2022-09-22
Payer: MEDICARE

## 2022-09-22 VITALS
DIASTOLIC BLOOD PRESSURE: 70 MMHG | SYSTOLIC BLOOD PRESSURE: 150 MMHG | BODY MASS INDEX: 20.4 KG/M2 | OXYGEN SATURATION: 100 % | HEIGHT: 67 IN | WEIGHT: 130 LBS | HEART RATE: 97 BPM

## 2022-09-22 DIAGNOSIS — N18.4 CHRONIC KIDNEY DISEASE, STAGE IV (SEVERE) (H): ICD-10-CM

## 2022-09-22 DIAGNOSIS — I35.0 SEVERE AORTIC STENOSIS: Primary | ICD-10-CM

## 2022-09-22 PROCEDURE — 99215 OFFICE O/P EST HI 40 MIN: CPT | Performed by: INTERNAL MEDICINE

## 2022-09-22 PROCEDURE — 93000 ELECTROCARDIOGRAM COMPLETE: CPT | Performed by: INTERNAL MEDICINE

## 2022-09-22 PROCEDURE — 99203 OFFICE O/P NEW LOW 30 MIN: CPT | Performed by: SURGERY

## 2022-09-22 NOTE — PROGRESS NOTES
TAVR Coordinator visit:  Provided additional education regarding TAVR procedure, after being present for discussion with physician. Explained the work-up process and next steps for patient. Patient provided our direct contact number and instructed to call with any questions.     Completed frailty testing and KCCQ.   5 meter walk: 6 seconds with walker  Frailty score: 3/5 (albumin, eyeball, ADL)    KCCQ Results:   1a. 5  1b. 2  1c. 1  2. 5  3. 1  4. 1  5. 5  6. 4  7. 3  8a. 4  8b. 3  8c. 5    Preliminary STS Risk Score: 7.624%  NYHA Class: III    Before proceeding with TAVR workup patient will establish care with nephrology. After that visit we will follow up to discuss whether he should proceed with TAVR workup.     Jen Hinton RN  Structural Heart Coordinator  [facility]

## 2022-09-22 NOTE — PATIENT INSTRUCTIONS
Your blood pressure was elevated at your appointment today.  Elevated blood pressure can increase your risk of a heart attack, stroke and heart failure.  For this reason, we feel it is important to monitor your blood pressure closely.  If you have access to a home blood pressure monitor or are able to check your blood pressure at a local pharmacy in the next week we would like you to do so and call our clinic with those readings. Please call [phone] ([place]) and leave a message with your name, date of birth, blood pressure reading, date and location it was completed. If your blood pressure remains elevated your care team will be notified.  We appreciate being a part of your healthcare team and look forward to hearing from you soon.

## 2022-09-22 NOTE — PROGRESS NOTES
CARDIOLOGY VALVE CLINIC CONSULT    REASON FOR CONSULT: aortic stenosis     PRIMARY CARE PHYSICIAN:  Abdoulaye Redman    HISTORY OF PRESENT ILLNESS:      Trevor Soni is a very nice 89 year old gentleman referred to valve clinic for evaluation of severe aortic stenosis.  He has a pertinent past medical history of coronary artery disease status post coronary artery bypass in 2001, right carotid endarterectomy also in 2001, CKD stage IV.  He has been followed for aortic stenosis in cardiology clinic by Dr. Ravi and was most recently seen by Phill Mcneil PA-C on 7/21/2022.  He has been reluctant to pursue evaluation for aortic stenosis including TAVR CT and coronary angiogram due to wish to avoid contrast nephropathy and precipitation of need for hemodialysis given his significant underlying renal dysfunction.    He was hospitalized from July 11 to July 15, 2022 and at that time diagnosed with atrial flutter and community-acquired pneumonia.  He was initiated on anticoagulation.  At his follow-up visit on July 21 in cardiology clinic he was appearing pale and frail and is was on chronic oxygen.  He was in a TCU but now has returned home but his daughter has been staying with him.  He is gaining strength and feels less short of breath and fatigued and markedly improved since July.  He has overall had some decline though in the past 6 months.  He is unsure whether his symptoms are related to his valve and overall states he feels pretty good.  He does not have chest pain palpitations or edema.    He recently had COVID and fortunately was minimally symptomatic and has recovered.    We discussed severe aortic stenosis and the natural progression and history.  We discussed options for treatment including transcatheter surgical aortic valves and medical therapy.  He understands medical therapy would not be curative and he is not a candidate for surgery and he is not interested in surgery.  He is nervous about having  any testing which would require contrast due to his underlying CKD.  He does not have a nephrologist but is interested in establishing care with a nephrologist.    PAST MEDICAL HISTORY:  Past Medical History:   Diagnosis Date     Arthritis     rhuematoid     Coronary artery disease     triple bypass 2001     CRF (chronic renal failure)      Gastro-oesophageal reflux disease      Gout      Hyperlipidemia      Hypertension      Nocturia        MEDICATIONS:  Current Outpatient Medications   Medication     acetaminophen (TYLENOL) 325 MG tablet     allopurinol (ZYLOPRIM) 100 MG tablet     amLODIPine (NORVASC) 5 MG tablet     apixaban ANTICOAGULANT (ELIQUIS) 2.5 MG tablet     atorvastatin (LIPITOR) 20 MG tablet     Cholecalciferol (VITAMIN D3) 25 MCG (1000 UT) CAPS     fenofibrate (TRIGLIDE/LOFIBRA) 160 MG tablet     furosemide (LASIX) 20 MG tablet     melatonin 3 MG tablet     Multiple Vitamin (MULTIVITAMIN ADULT PO)     Multiple Vitamins-Minerals (PRESERVISION AREDS 2 PO)     spironolactone (ALDACTONE) 25 MG tablet     No current facility-administered medications for this visit.       ALLERGIES:  Allergies   Allergen Reactions     Avocado      Ciprofloxacin Itching     Penicillins Itching       SOCIAL HISTORY:  I have reviewed this patient's social history and updated it with pertinent information if needed. Trevor JOSEY Soni  reports that he quit smoking about 42 years ago. His smoking use included cigars. He has never used smokeless tobacco. He reports current alcohol use. He reports that he does not use drugs.    FAMILY HISTORY:  I have reviewed this patient's family history and updated it with pertinent information if needed.   Family History   Problem Relation Age of Onset     Myocardial Infarction Mother      Rheumatic fever Father      Cerebrovascular Disease Brother        REVIEW OF SYSTEMS:  Skin:  not assessed     Eyes:  not assessed    ENT:  not assessed    Respiratory:  Positive for dyspnea on  exertion  Cardiovascular:    Positive for;fatigue  Gastroenterology: not assessed    Genitourinary:  not assessed    Musculoskeletal:  not assessed    Neurologic:  not assessed    Psychiatric:  not assessed    Heme/Lymph/Imm:  Positive for allergies  Endocrine:  Negative      PHYSICAL EXAM:      BP: (!) 150/70 Pulse: 97     SpO2: 100 %      Vital Signs with Ranges  Pulse:  [97] 97  BP: (150)/(70) 150/70  SpO2:  [100 %] 100 %  130 lbs 0 oz    Constitutional: awake, alert, no distress appears frail but alert uses a walker  Eyes: sclera nonicteric  ENT: trachea midline  Respiratory: Clear to auscultation bilaterally  Cardiovascular: Regular with harsh III/VI systolic murmur loudest at the right upper sternal border  GI: nondistended, nontender, bowel sounds present  Skin: dry, no rash no edema  Musculoskeletal: grossly normal muscle bulk and tone  Neuropsychiatric: Normal affect      Review of Systems:  Skin:  not assessed     Eyes:  not assessed    ENT:  not assessed    Respiratory:  Positive for dyspnea on exertion  Cardiovascular:    Positive for;fatigue  Gastroenterology: not assessed    Genitourinary:  not assessed    Musculoskeletal:  not assessed    Neurologic:  not assessed    Psychiatric:  not assessed    Heme/Lymph/Imm:  Positive for allergies  Endocrine:  Negative         DATA:   LAST CHOLESTEROL:  Lab Results   Component Value Date    CHOL 96 10/25/2021    CHOL 107 01/27/2021     Lab Results   Component Value Date    HDL 24 10/25/2021    HDL 34 01/27/2021     Lab Results   Component Value Date    LDL 50 10/25/2021    LDL 58 01/27/2021     Lab Results   Component Value Date    TRIG 111 10/25/2021    TRIG 76 01/27/2021     No results found for: CHOLHDLRATIO    LAST BMP:  Lab Results   Component Value Date     08/04/2022     12/21/2020      Lab Results   Component Value Date    POTASSIUM 4.5 08/04/2022    POTASSIUM 5.2 12/21/2020     Lab Results   Component Value Date    CHLORIDE 110 08/04/2022     CHLORIDE 111 12/21/2020     Lab Results   Component Value Date    AGUSTÍN 8.6 08/04/2022    AGUSTÍN 9.5 12/21/2020     Lab Results   Component Value Date    CO2 24 08/04/2022    CO2 21 12/21/2020     Lab Results   Component Value Date    BUN 34 08/04/2022    BUN 51 12/21/2020     Lab Results   Component Value Date    CR 2.14 08/04/2022    CR 2.35 12/21/2020     Lab Results   Component Value Date     08/04/2022     12/21/2020       LAST CBC:  Lab Results   Component Value Date    WBC 5.0 08/04/2022    WBC 7.7 12/21/2020     Lab Results   Component Value Date    RBC 2.73 08/04/2022    RBC 3.61 12/21/2020     Lab Results   Component Value Date    HGB 8.1 08/04/2022    HGB 10.7 12/21/2020     Lab Results   Component Value Date    HCT 27.8 08/04/2022    HCT 32.9 12/21/2020     Lab Results   Component Value Date     08/04/2022    MCV 91 12/21/2020     Lab Results   Component Value Date    MCH 29.7 08/04/2022    MCH 29.6 12/21/2020     Lab Results   Component Value Date    MCHC 29.1 08/04/2022    MCHC 32.5 12/21/2020     Lab Results   Component Value Date    RDW 17.7 08/04/2022    RDW 17.9 12/21/2020     Lab Results   Component Value Date     08/04/2022     12/21/2020         EKG atrial fibrillation with bifascicular block    Echo7/12/22 Interpretation Summary 1. There is moderate concentric left ventricular hypertrophy. Left ventricularsystolic function is normal. The visual ejection fraction is 60-65%. Noregional wall motion abnormalities noted.2. Severe aortic stenosis with mean AV gradient of 39 mmHg and NANDA of 0.8 cm2(LVOT diameter 2.0 cm), dimensionless index 0.25, normal stroke-volume index.3. Mild-moderate, degenerative mitral valvular stenosis. Mean diastolicgradient 4-6 mmHg (heart rate 60 bpm).Compared to the previous study dated2/15/2022, there has been mild intervalprogression of aortic valve stenosis.          ASSESSMENT:  1. Severe aortic stenosis with mean AV gradient of 39 mmHg  and NANDA of 0.8 cm2(LVOT diameter 2.0 cm), dimensionless index 0.25, normal stroke-volume index.  He has chronic fatigue and exertional dyspnea although the symptoms have improved since his hospitalization for pneumonia in July.  2. Atrial flutter and fibrillation  3. CAD s/p CABG in 2001.  At that time he had a LIMA to the diagonal branch which was more dominant vessel compared to the LAD which was small.  He also had a vein graft to the circumflex and PDA.    4. S/p right carotid endarterectomy 2021  5. CKD stage IV  6. Bifascicular block    NYHA class III  STS risk estimate 7.63%    RECOMMENDATIONS:  1. He remains uncertain as to whether he would like to proceed with assessment for aortic valve replacement.  He is apprehensive about having testing with contrast done.  2. He is interested in establishing care with a nephrologist and was given a referral to nephrology for his CKD and to discuss his risk of contrast nephropathy.  3. He was seen by Dr. Ellsworth today and we agree that he is not a candidate for surgical aortic valve replacement given his age and frailty comorbidities and previous open heart surgery.  4. We will follow-up with him after his nephrology consult and if he is interested in pursuing valve replacement we will schedule him for a low contrast protocol TAVR CT and coronary and graft angiography.    Greater than 40 minutes for this encounter more than 50% in direct patient face-to-face time with counseling.  Sujata Ramires MD [facility]  Text Page

## 2022-09-22 NOTE — LETTER
9/22/2022    Abdoulaye Redman MD  [address]  [facility] [address]    RE: Trevor Soni       Dear Colleague,     I had the pleasure of seeing Trevor Soni in the [facility].  CARDIOLOGY VALVE CLINIC CONSULT    REASON FOR CONSULT: aortic stenosis     PRIMARY CARE PHYSICIAN:  Abdoulaye Redman    HISTORY OF PRESENT ILLNESS:      Trevor Soni is a very nice 89 year old gentleman referred to valve clinic for evaluation of severe aortic stenosis.  He has a pertinent past medical history of coronary artery disease status post coronary artery bypass in 2001, right carotid endarterectomy also in 2001, CKD stage IV.  He has been followed for aortic stenosis in cardiology clinic by Dr. Ravi and was most recently seen by Phill Mcneil PA-C on 7/21/2022.  He has been reluctant to pursue evaluation for aortic stenosis including TAVR CT and coronary angiogram due to wish to avoid contrast nephropathy and precipitation of need for hemodialysis given his significant underlying renal dysfunction.    He was hospitalized from July 11 to July 15, 2022 and at that time diagnosed with atrial flutter and community-acquired pneumonia.  He was initiated on anticoagulation.  At his follow-up visit on July 21 in cardiology clinic he was appearing pale and frail and is was on chronic oxygen.  He was in a TCU but now has returned home but his daughter has been staying with him.  He is gaining strength and feels less short of breath and fatigued and markedly improved since July.  He has overall had some decline though in the past 6 months.  He is unsure whether his symptoms are related to his valve and overall states he feels pretty good.  He does not have chest pain palpitations or edema.    He recently had COVID and fortunately was minimally symptomatic and has recovered.    We discussed severe aortic stenosis and the natural progression and history.  We discussed options for treatment including  transcatheter surgical aortic valves and medical therapy.  He understands medical therapy would not be curative and he is not a candidate for surgery and he is not interested in surgery.  He is nervous about having any testing which would require contrast due to his underlying CKD.  He does not have a nephrologist but is interested in establishing care with a nephrologist.    PAST MEDICAL HISTORY:  Past Medical History:   Diagnosis Date     Arthritis     rhuematoid     Coronary artery disease     triple bypass 2001     CRF (chronic renal failure)      Gastro-oesophageal reflux disease      Gout      Hyperlipidemia      Hypertension      Nocturia        MEDICATIONS:  Current Outpatient Medications   Medication     acetaminophen (TYLENOL) 325 MG tablet     allopurinol (ZYLOPRIM) 100 MG tablet     amLODIPine (NORVASC) 5 MG tablet     apixaban ANTICOAGULANT (ELIQUIS) 2.5 MG tablet     atorvastatin (LIPITOR) 20 MG tablet     Cholecalciferol (VITAMIN D3) 25 MCG (1000 UT) CAPS     fenofibrate (TRIGLIDE/LOFIBRA) 160 MG tablet     furosemide (LASIX) 20 MG tablet     melatonin 3 MG tablet     Multiple Vitamin (MULTIVITAMIN ADULT PO)     Multiple Vitamins-Minerals (PRESERVISION AREDS 2 PO)     spironolactone (ALDACTONE) 25 MG tablet     No current facility-administered medications for this visit.       ALLERGIES:  Allergies   Allergen Reactions     Avocado      Ciprofloxacin Itching     Penicillins Itching       SOCIAL HISTORY:  I have reviewed this patient's social history and updated it with pertinent information if needed. Trevor Soni  reports that he quit smoking about 42 years ago. His smoking use included cigars. He has never used smokeless tobacco. He reports current alcohol use. He reports that he does not use drugs.    FAMILY HISTORY:  I have reviewed this patient's family history and updated it with pertinent information if needed.   Family History   Problem Relation Age of Onset     Myocardial Infarction Mother       Rheumatic fever Father      Cerebrovascular Disease Brother        REVIEW OF SYSTEMS:  Skin:  not assessed     Eyes:  not assessed    ENT:  not assessed    Respiratory:  Positive for dyspnea on exertion  Cardiovascular:    Positive for;fatigue  Gastroenterology: not assessed    Genitourinary:  not assessed    Musculoskeletal:  not assessed    Neurologic:  not assessed    Psychiatric:  not assessed    Heme/Lymph/Imm:  Positive for allergies  Endocrine:  Negative      PHYSICAL EXAM:      BP: (!) 150/70 Pulse: 97     SpO2: 100 %      Vital Signs with Ranges  Pulse:  [97] 97  BP: (150)/(70) 150/70  SpO2:  [100 %] 100 %  130 lbs 0 oz    Constitutional: awake, alert, no distress appears frail but alert uses a walker  Eyes: sclera nonicteric  ENT: trachea midline  Respiratory: Clear to auscultation bilaterally  Cardiovascular: Regular with harsh III/VI systolic murmur loudest at the right upper sternal border  GI: nondistended, nontender, bowel sounds present  Skin: dry, no rash no edema  Musculoskeletal: grossly normal muscle bulk and tone  Neuropsychiatric: Normal affect      Review of Systems:  Skin:  not assessed     Eyes:  not assessed    ENT:  not assessed    Respiratory:  Positive for dyspnea on exertion  Cardiovascular:    Positive for;fatigue  Gastroenterology: not assessed    Genitourinary:  not assessed    Musculoskeletal:  not assessed    Neurologic:  not assessed    Psychiatric:  not assessed    Heme/Lymph/Imm:  Positive for allergies  Endocrine:  Negative         DATA:   LAST CHOLESTEROL:  Lab Results   Component Value Date    CHOL 96 10/25/2021    CHOL 107 01/27/2021     Lab Results   Component Value Date    HDL 24 10/25/2021    HDL 34 01/27/2021     Lab Results   Component Value Date    LDL 50 10/25/2021    LDL 58 01/27/2021     Lab Results   Component Value Date    TRIG 111 10/25/2021    TRIG 76 01/27/2021     No results found for: CHOLHDLRATIO    LAST BMP:  Lab Results   Component Value Date    NA  140 08/04/2022     12/21/2020      Lab Results   Component Value Date    POTASSIUM 4.5 08/04/2022    POTASSIUM 5.2 12/21/2020     Lab Results   Component Value Date    CHLORIDE 110 08/04/2022    CHLORIDE 111 12/21/2020     Lab Results   Component Value Date    AGUSTÍN 8.6 08/04/2022    AGUSTÍN 9.5 12/21/2020     Lab Results   Component Value Date    CO2 24 08/04/2022    CO2 21 12/21/2020     Lab Results   Component Value Date    BUN 34 08/04/2022    BUN 51 12/21/2020     Lab Results   Component Value Date    CR 2.14 08/04/2022    CR 2.35 12/21/2020     Lab Results   Component Value Date     08/04/2022     12/21/2020       LAST CBC:  Lab Results   Component Value Date    WBC 5.0 08/04/2022    WBC 7.7 12/21/2020     Lab Results   Component Value Date    RBC 2.73 08/04/2022    RBC 3.61 12/21/2020     Lab Results   Component Value Date    HGB 8.1 08/04/2022    HGB 10.7 12/21/2020     Lab Results   Component Value Date    HCT 27.8 08/04/2022    HCT 32.9 12/21/2020     Lab Results   Component Value Date     08/04/2022    MCV 91 12/21/2020     Lab Results   Component Value Date    MCH 29.7 08/04/2022    MCH 29.6 12/21/2020     Lab Results   Component Value Date    MCHC 29.1 08/04/2022    MCHC 32.5 12/21/2020     Lab Results   Component Value Date    RDW 17.7 08/04/2022    RDW 17.9 12/21/2020     Lab Results   Component Value Date     08/04/2022     12/21/2020         EKG atrial fibrillation with bifascicular block    Echo7/12/22 Interpretation Summary 1. There is moderate concentric left ventricular hypertrophy. Left ventricularsystolic function is normal. The visual ejection fraction is 60-65%. Noregional wall motion abnormalities noted.2. Severe aortic stenosis with mean AV gradient of 39 mmHg and NANDA of 0.8 cm2(LVOT diameter 2.0 cm), dimensionless index 0.25, normal stroke-volume index.3. Mild-moderate, degenerative mitral valvular stenosis. Mean diastolicgradient 4-6 mmHg (heart rate 60  bpm).Compared to the previous study dated2/15/2022, there has been mild intervalprogression of aortic valve stenosis.          ASSESSMENT:  1. Severe aortic stenosis with mean AV gradient of 39 mmHg and NANDA of 0.8 cm2(LVOT diameter 2.0 cm), dimensionless index 0.25, normal stroke-volume index.  He has chronic fatigue and exertional dyspnea although the symptoms have improved since his hospitalization for pneumonia in July.  2. Atrial flutter and fibrillation  3. CAD s/p CABG in 2001.  At that time he had a LIMA to the diagonal branch which was more dominant vessel compared to the LAD which was small.  He also had a vein graft to the circumflex and PDA.    4. S/p right carotid endarterectomy 2021  5. CKD stage IV  6. Bifascicular block    NYHA class III  STS risk estimate 7.63%    RECOMMENDATIONS:  1. He remains uncertain as to whether he would like to proceed with assessment for aortic valve replacement.  He is apprehensive about having testing with contrast done.  2. He is interested in establishing care with a nephrologist and was given a referral to nephrology for his CKD and to discuss his risk of contrast nephropathy.  3. He was seen by Dr. Ellsworth today and we agree that he is not a candidate for surgical aortic valve replacement given his age and frailty comorbidities and previous open heart surgery.  4. We will follow-up with him after his nephrology consult and if he is interested in pursuing valve replacement we will schedule him for a low contrast protocol TAVR CT and coronary and graft angiography.    Greater than 40 minutes for this encounter more than 50% in direct patient face-to-face time with counseling.  Sujata Ramires MD [facility]  Text Page         TAVR Coordinator visit:  Provided additional education regarding TAVR procedure, after being present for discussion with physician. Explained the work-up process and next steps for patient. Patient provided our direct contact number and instructed  to call with any questions.     Completed frailty testing and KCCQ.   5 meter walk: 6 seconds with walker  Frailty score: 3/5 (albumin, eyeball, ADL)    KCCQ Results:   1a. 5  1b. 2  1c. 1  2. 5  3. 1  4. 1  5. 5  6. 4  7. 3  8a. 4  8b. 3  8c. 5    Preliminary STS Risk Score: 7.624%  NYHA Class: III    Before proceeding with TAVR workup patient will establish care with nephrology. After that visit we will follow up to discuss whether he should proceed with TAVR workup.     Jen Hinton RN  Structural Heart Coordinator  [facility]    Thank you for allowing me to participate in the care of your patient.      Sincerely,     Sujata Ramires MD     [facility]  cc:   Ivan Ravi MD  [address]  [place] [address]

## 2022-09-23 ENCOUNTER — E-CONSULT (OUTPATIENT)
Dept: MULTI SPECIALTY CLINIC | Facility: CLINIC | Age: 87
End: 2022-09-23
Payer: MEDICARE

## 2022-09-23 PROCEDURE — 99207 PR NO CHARGE LOS: CPT | Performed by: STUDENT IN AN ORGANIZED HEALTH CARE EDUCATION/TRAINING PROGRAM

## 2022-09-23 NOTE — PROGRESS NOTES
9/23/2022     E-Consult has been denied due to: Doesn't meet criteria for E-Consult - Asked a logistical question (scheduling, referral request).    Interprofessional consultation requested by:  Sujata Ramires MD      Clinical Question/Purpose: MY CLINICAL QUESTION IS:     Patient assessment and information reviewed: n/a    Recommendations: n/a       The recommendations provided in this E-Consult are based on a review of clinical data pertinent to the clinical question presented, without a review of the patient's complete medical record or, the benefit of a comprehensive in-person or virtual patient evaluation. This consultation should not replace the clinical judgement and evaluation of the provider ordering this E-Consult. Any new clinical issues, or changes in patient status since the filing of this E-Consult will need to be taken into account when assessing these recommendations. Please contact me if you have further questions.    My total time spent reviewing clinical information and formulating assessment was 5 minutes.        Margarito Rodarte MD

## 2022-09-26 ENCOUNTER — TELEPHONE (OUTPATIENT)
Dept: FAMILY MEDICINE | Facility: CLINIC | Age: 87
End: 2022-09-26

## 2022-09-26 NOTE — TELEPHONE ENCOUNTER
Di, calling from [facility] ([phone], secure line, okay to leave detailed message).     Di requesting verbal orders for:     Skilled nursing once a week x2 weeks and then every other week x6 weeks and 3 prn visits.       Kristen Wilkerson, RN BSN MSN  [facility]

## 2022-09-26 NOTE — TELEPHONE ENCOUNTER
Griselda calling from [facility] ([phone], secure line).   Griselda requesting verbal orders for:     physical therapy once a week x1 week then every other week x6 weeks to work on gait training and progressing the patient on short distances and balance instruction.       Last OV with Dr. Redman was on 8/8/2022.       Kristen Wilkerson, RN BSN MSN  [facility]

## 2022-09-26 NOTE — PROGRESS NOTES
Service Date: 09/22/2022    I was requested to see Mr. Soni for evaluation of options for severe aortic stenosis.  The patient is a pleasant 88-year-old gentleman with a past history of coronary artery disease, status post CABG x 3 in 2001, hypertension, hyperlipidemia, chronic renal insufficiency, anemia, who had worsening shortness of breath, fatigue and tiredness the last several months.  He was diagnosed to have severe aortic stenosis.  He also recently had COVID and was treated for pneumonia.  He also had history of presyncopal episodes.  His effort tolerance is approximately half a block and he is able to do that with a walker.    PAST MEDICAL HISTORY:  Coronary artery disease, status post coronary artery bypass grafting x 3 in 2001, hypertension, hyperlipidemia, atrial fibrillation, carotid stenosis, status post carotid endarterectomy, arthritis, chronic renal insufficiency, anemia, osteoporosis.    MEDICATIONS:    1.  Amlodipine.  2.  Aldactone.  3.  Lipitor.  4.  Lasix.  5.  Eliquis.  6.  Allopurinol.  7.  Fenofibrate.  8.  Vitamins.  9.  Eliquis.    ALLERGIES:  PENICILLIN AND CIPRO.    SURGICAL HISTORY:   1.  Coronary artery bypass grafting.  2.  Carotid endarterectomy.    REVIEW OF SYSTEMS:  A 10-point review of systems was normal other than mentioned in history and physical.    PHYSICAL EXAMINATION:    VITAL SIGNS:  Afebrile, blood pressure 130/74, heart rate 68.   GENERAL:  He is awake, alert, oriented x 3, appears comfortable at rest.  CARDIOVASCULAR:  S1, S2 normal.  No S3.  A 3/6 systolic murmur at left sternal border.   RESPIRATORY:  Bibasilar breath sounds.  No rhonchi and crepitations.  ABDOMEN:  Bowel sounds present, nondistended, nontender.  LOWER EXTREMITIES: Warm and perfused.  No pedal edema.  NEUROLOGIC:  No focal motor deficits.  EYES:  Pupils equal, react to light.  DERMATOLOGIC:  Within normal limits.  ENT:  Within normal limits.    LABORATORY STUDIES:  Electrolytes within normal  limits.  Creatinine 2.14. Platelets 249, hematocrit 27.9.  Echocardiogram showed ejection fraction normal, 60% to 65%. Aortic valve area of 0.78.  Mean gradient 38.6.  Peak velocity 3.3 cm per second.    ASSESSMENT AND PLAN:  An 88-year-old gentleman with severe symptomatic aortic stenosis, status post coronary artery bypass grafting x 3.  The patient is at high risk for adverse outcomes after surgical aortic valve replacement with an estimated STS 30-day mortality of 7.8%  because of elderly age, redo status, chronic renal insufficiency, and should be considered for TAVR.  The patient at this point in time is hesitant because of his worry with dialysis.  We have encouraged him to see a renal consultant for evaluation and discussed risks and benefits of TAVR.  He wishes to think about it at this time and will follow up with us.      If any questions arise regarding his care, please contact me.    Simon Ellsworth MD        D: 2022   T: 2022   MT: [id number]    Name:     VIRGINIA LAMBERTClemencia  MRN:      [id number]        Account:      [id number]   :      1933           Service Date: 2022       Document: [id number]

## 2022-09-27 NOTE — TELEPHONE ENCOUNTER
Call to Nkechi with [facility] at [phone]. Nkechi informed of Dr. Schumacher's below response. Nkechi verbalized understanding.     Call to Griselda with [facility] at [phone]. Griselda informed of Dr. Schumacher's below response. Griselda verbalized understanding.      Kristen Wilkerson RN BSN MSN  [facility]

## 2022-09-28 ENCOUNTER — MEDICAL CORRESPONDENCE (OUTPATIENT)
Dept: HEALTH INFORMATION MANAGEMENT | Facility: CLINIC | Age: 87
End: 2022-09-28

## 2022-09-29 ENCOUNTER — MEDICAL CORRESPONDENCE (OUTPATIENT)
Dept: HEALTH INFORMATION MANAGEMENT | Facility: CLINIC | Age: 87
End: 2022-09-29

## 2022-09-30 ENCOUNTER — MEDICAL CORRESPONDENCE (OUTPATIENT)
Dept: HEALTH INFORMATION MANAGEMENT | Facility: CLINIC | Age: 87
End: 2022-09-30

## 2022-10-03 DIAGNOSIS — Z53.9 DIAGNOSIS NOT YET DEFINED: Primary | ICD-10-CM

## 2022-10-03 PROCEDURE — G0179 MD RECERTIFICATION HHA PT: HCPCS | Performed by: INTERNAL MEDICINE

## 2022-10-05 DIAGNOSIS — I15.9 SECONDARY HYPERTENSION: ICD-10-CM

## 2022-10-05 DIAGNOSIS — E78.5 HYPERLIPIDEMIA LDL GOAL <100: ICD-10-CM

## 2022-10-05 DIAGNOSIS — I10 BENIGN ESSENTIAL HYPERTENSION: ICD-10-CM

## 2022-10-06 NOTE — TELEPHONE ENCOUNTER
DIAGNOSIS: Chronic kidney disease, stage IV    DATE RECEIVED: 10.11.2022   NOTES STATUS DETAILS   OFFICE NOTE from referring provider Internal 09.22.2022 Sujata Ramires MD   OFFICE NOTE from other specialist  Internal  07.21.2022 Phill Mcneil PA-C     01.27.2021 Abdoulaye Redman MD   *Only VASCULITIS or LUPUS gather office notes for the following     *PULMONARY       *ENT     *DERMATOLOGY     *RHEUMATOLOGY     DISCHARGE SUMMARY from hospital     DISCHARGE REPORT from the ER     MEDICATION LIST Internal    IMAGING  (NEED IMAGES AND REPORTS)     KIDNEY CT SCAN     KIDNEY ULTRASOUND     MR ABDOMEN     NUCLEAR MEDICINE RENAL     LABS     CBC Internal 08.04.2022   CMP Internal 08.04.2022   BMP Internal 08.04.2022   UA Internal 09.16.2019   URINE PROTEIN Internal 09.16.2019   RENAL PANEL       BIOPSY     KIDNEY BIOPSY

## 2022-10-10 DIAGNOSIS — N18.4 CRF (CHRONIC RENAL FAILURE), STAGE 4 (SEVERE) (H): Primary | ICD-10-CM

## 2022-10-10 RX ORDER — SPIRONOLACTONE 25 MG/1
TABLET ORAL
Qty: 90 TABLET | Refills: 3 | Status: SHIPPED | OUTPATIENT
Start: 2022-10-10 | End: 2023-08-25

## 2022-10-10 RX ORDER — ALLOPURINOL 100 MG/1
TABLET ORAL
Qty: 180 TABLET | Refills: 3 | Status: SHIPPED | OUTPATIENT
Start: 2022-10-10 | End: 2023-08-25

## 2022-10-10 NOTE — TELEPHONE ENCOUNTER
Routing refill request to provider for review/approval because:  Creatinine   Date Value Ref Range Status   08/04/2022 2.14 (H) 0.66 - 1.25 mg/dL Final   07/25/2022 2.51 (H) 0.66 - 1.25 mg/dL Final   07/15/2022 2.52 (H) 0.66 - 1.25 mg/dL Final   07/14/2022 2.71 (H) 0.66 - 1.25 mg/dL Final   07/13/2022 2.83 (H) 0.66 - 1.25 mg/dL Final   07/12/2022 2.72 (H) 0.66 - 1.25 mg/dL Final   12/21/2020 2.35 (H) 0.66 - 1.25 mg/dL Final   11/05/2020 2.03 (H) 0.66 - 1.25 mg/dL Final   10/08/2020 2.18 (H) 0.66 - 1.25 mg/dL Final   10/01/2020 1.86 (H) 0.66 - 1.25 mg/dL Final   07/29/2020 1.76 (H) 0.66 - 1.25 mg/dL Final   07/21/2020 2.02 (H) 0.66 - 1.25 mg/dL Final       Carly James RN

## 2022-10-11 ENCOUNTER — LAB (OUTPATIENT)
Dept: LAB | Facility: CLINIC | Age: 87
End: 2022-10-11
Payer: MEDICARE

## 2022-10-11 ENCOUNTER — PRE VISIT (OUTPATIENT)
Dept: NEPHROLOGY | Facility: CLINIC | Age: 87
End: 2022-10-11

## 2022-10-11 ENCOUNTER — OFFICE VISIT (OUTPATIENT)
Dept: NEPHROLOGY | Facility: CLINIC | Age: 87
End: 2022-10-11
Attending: INTERNAL MEDICINE
Payer: MEDICARE

## 2022-10-11 VITALS
DIASTOLIC BLOOD PRESSURE: 71 MMHG | HEART RATE: 83 BPM | BODY MASS INDEX: 21.42 KG/M2 | OXYGEN SATURATION: 97 % | SYSTOLIC BLOOD PRESSURE: 127 MMHG | WEIGHT: 134.7 LBS

## 2022-10-11 DIAGNOSIS — I71.40 ABDOMINAL AORTIC ANEURYSM (AAA) WITHOUT RUPTURE, UNSPECIFIED PART (H): ICD-10-CM

## 2022-10-11 DIAGNOSIS — I10 BENIGN ESSENTIAL HYPERTENSION: ICD-10-CM

## 2022-10-11 DIAGNOSIS — N18.4 CHRONIC KIDNEY DISEASE, STAGE IV (SEVERE) (H): ICD-10-CM

## 2022-10-11 DIAGNOSIS — I50.32 CHRONIC DIASTOLIC HEART FAILURE (H): ICD-10-CM

## 2022-10-11 DIAGNOSIS — N17.8 OTHER ACUTE KIDNEY FAILURE (H): ICD-10-CM

## 2022-10-11 DIAGNOSIS — I35.0 SEVERE AORTIC STENOSIS: ICD-10-CM

## 2022-10-11 DIAGNOSIS — D50.8 IRON DEFICIENCY ANEMIA SECONDARY TO INADEQUATE DIETARY IRON INTAKE: ICD-10-CM

## 2022-10-11 DIAGNOSIS — N18.4 CRF (CHRONIC RENAL FAILURE), STAGE 4 (SEVERE) (H): ICD-10-CM

## 2022-10-11 DIAGNOSIS — I25.10 CORONARY ARTERY DISEASE INVOLVING NATIVE HEART WITHOUT ANGINA PECTORIS, UNSPECIFIED VESSEL OR LESION TYPE: ICD-10-CM

## 2022-10-11 DIAGNOSIS — M1A.9XX0 CHRONIC GOUT WITHOUT TOPHUS, UNSPECIFIED CAUSE, UNSPECIFIED SITE: Primary | ICD-10-CM

## 2022-10-11 DIAGNOSIS — M1A.9XX0 CHRONIC GOUT WITHOUT TOPHUS, UNSPECIFIED CAUSE, UNSPECIFIED SITE: ICD-10-CM

## 2022-10-11 LAB
ALBUMIN MFR UR ELPH: 63.7 MG/DL
ALBUMIN SERPL BCG-MCNC: 3.4 G/DL (ref 3.5–5.2)
ALBUMIN UR-MCNC: 30 MG/DL
ANION GAP SERPL CALCULATED.3IONS-SCNC: 13 MMOL/L (ref 7–15)
APPEARANCE UR: CLEAR
BILIRUB UR QL STRIP: NEGATIVE
BUN SERPL-MCNC: 35.2 MG/DL (ref 8–23)
CALCIUM SERPL-MCNC: 9.4 MG/DL (ref 8.8–10.2)
CHLORIDE SERPL-SCNC: 106 MMOL/L (ref 98–107)
COLOR UR AUTO: YELLOW
CREAT SERPL-MCNC: 1.72 MG/DL (ref 0.67–1.17)
CREAT UR-MCNC: 127 MG/DL
DEPRECATED HCO3 PLAS-SCNC: 20 MMOL/L (ref 22–29)
ERYTHROCYTE [DISTWIDTH] IN BLOOD BY AUTOMATED COUNT: 17.8 % (ref 10–15)
FERRITIN SERPL-MCNC: 103 NG/ML (ref 31–409)
GFR SERPL CREATININE-BSD FRML MDRD: 38 ML/MIN/1.73M2
GLUCOSE SERPL-MCNC: 108 MG/DL (ref 70–99)
GLUCOSE UR STRIP-MCNC: NEGATIVE MG/DL
HCT VFR BLD AUTO: 32.5 % (ref 40–53)
HGB BLD-MCNC: 10.3 G/DL (ref 13.3–17.7)
HGB UR QL STRIP: NEGATIVE
HYALINE CASTS: 4 /LPF
IRON BINDING CAPACITY (ROCHE): 365 UG/DL (ref 240–430)
IRON SATN MFR SERPL: 12 % (ref 15–46)
IRON SERPL-MCNC: 45 UG/DL (ref 61–157)
KETONES UR STRIP-MCNC: NEGATIVE MG/DL
LEUKOCYTE ESTERASE UR QL STRIP: NEGATIVE
MCH RBC QN AUTO: 28.6 PG (ref 26.5–33)
MCHC RBC AUTO-ENTMCNC: 31.7 G/DL (ref 31.5–36.5)
MCV RBC AUTO: 90 FL (ref 78–100)
MUCOUS THREADS #/AREA URNS LPF: PRESENT /LPF
NITRATE UR QL: NEGATIVE
PH UR STRIP: 6 [PH] (ref 5–7)
PHOSPHATE SERPL-MCNC: 3.2 MG/DL (ref 2.5–4.5)
PLATELET # BLD AUTO: 254 10E3/UL (ref 150–450)
POTASSIUM SERPL-SCNC: 4.5 MMOL/L (ref 3.4–5.3)
PROT/CREAT 24H UR: 0.5 MG/MG CR (ref 0–0.2)
PTH-INTACT SERPL-MCNC: 51 PG/ML (ref 15–65)
RBC # BLD AUTO: 3.6 10E6/UL (ref 4.4–5.9)
RBC URINE: <1 /HPF
SODIUM SERPL-SCNC: 139 MMOL/L (ref 136–145)
SP GR UR STRIP: 1.02 (ref 1–1.03)
URATE SERPL-MCNC: 4.8 MG/DL (ref 3.4–7)
UROBILINOGEN UR STRIP-MCNC: NORMAL MG/DL
WBC # BLD AUTO: 9.1 10E3/UL (ref 4–11)
WBC URINE: 1 /HPF

## 2022-10-11 PROCEDURE — 82306 VITAMIN D 25 HYDROXY: CPT | Performed by: INTERNAL MEDICINE

## 2022-10-11 PROCEDURE — 84156 ASSAY OF PROTEIN URINE: CPT | Performed by: PATHOLOGY

## 2022-10-11 PROCEDURE — 82728 ASSAY OF FERRITIN: CPT | Performed by: INTERNAL MEDICINE

## 2022-10-11 PROCEDURE — 83540 ASSAY OF IRON: CPT | Performed by: PATHOLOGY

## 2022-10-11 PROCEDURE — G0463 HOSPITAL OUTPT CLINIC VISIT: HCPCS

## 2022-10-11 PROCEDURE — 81001 URINALYSIS AUTO W/SCOPE: CPT | Performed by: PATHOLOGY

## 2022-10-11 PROCEDURE — 85027 COMPLETE CBC AUTOMATED: CPT | Performed by: PATHOLOGY

## 2022-10-11 PROCEDURE — 84550 ASSAY OF BLOOD/URIC ACID: CPT | Performed by: INTERNAL MEDICINE

## 2022-10-11 PROCEDURE — 99205 OFFICE O/P NEW HI 60 MIN: CPT | Performed by: INTERNAL MEDICINE

## 2022-10-11 PROCEDURE — 83550 IRON BINDING TEST: CPT | Performed by: PATHOLOGY

## 2022-10-11 PROCEDURE — 36415 COLL VENOUS BLD VENIPUNCTURE: CPT | Performed by: PATHOLOGY

## 2022-10-11 PROCEDURE — 83970 ASSAY OF PARATHORMONE: CPT | Performed by: PATHOLOGY

## 2022-10-11 PROCEDURE — 80069 RENAL FUNCTION PANEL: CPT | Performed by: PATHOLOGY

## 2022-10-11 RX ORDER — FERROUS GLUCONATE 324(38)MG
324 TABLET ORAL
Qty: 90 TABLET | Refills: 3 | Status: SHIPPED | OUTPATIENT
Start: 2022-10-11 | End: 2023-01-09

## 2022-10-11 ASSESSMENT — PAIN SCALES - GENERAL: PAINLEVEL: NO PAIN (0)

## 2022-10-11 NOTE — NURSING NOTE
Chief Complaint   Patient presents with     Consult     New CKD     Blood pressure 127/71, pulse 83, weight 61.1 kg (134 lb 11.2 oz), SpO2 97 %.    Brenda Madrigal

## 2022-10-11 NOTE — LETTER
10/11/2022       RE: Trevor Soni  [address] [facility] [address]     Dear Colleague,    Thank you for referring your patient, Trevor Soni, to the [facility] [place] at [facility]. Please see a copy of my visit note below.      Nephrology Clinic Note  October 11, 2022    I was asked to see this patient by Dr. Redman    CC: CKD    HPI: Trevor Soni is a 89 year old male with PMH significant for HTN, CAD, HFpEF, AAA with out rupture, atrial flutter, aortic stenosis, gout who presents for evaluation and management of CKD.     Pt endorsed feeling good today. No new symptoms today. Up on questioning, denied having recurrent fevers/chills, nausea/vomiting, chest pain, SOB, abdominal pain, diarrhea. No LE edema noted in the recent past.  Pt endorsed weight loss while in the hospital but now stabilized with stable appetite.  Home BP were in 130-150 systolic  H/o Second hand smoking (from wife) but no person history   Denied other illicit drug use.    - History of urinary symptoms: no, may wake up 2-3 times per night, feels completely empty the bladder  - History of Hematuria: no  - Swelling: no  - Hx of UTIs: no  - Hx of stones: one time 20 years ago and none since then  - Rashes/Joint pain: just on left upper arm after scratching,   - Family hx of kidney disease: no  - NSAID use: no       Allergies   Allergen Reactions     Avocado      Ciprofloxacin Itching     Penicillins Itching       acetaminophen (TYLENOL) 325 MG tablet, Take 2 tablets (650 mg) by mouth every 6 hours as needed for mild pain or other (and adjunct with moderate or severe pain or per patient request)  allopurinol (ZYLOPRIM) 100 MG tablet, TAKE 2 TABLETS BY MOUTH  DAILY  amLODIPine (NORVASC) 5 MG tablet, Take 1 tablet (5 mg) by mouth daily  apixaban ANTICOAGULANT (ELIQUIS) 2.5 MG tablet, Take 1 tablet (2.5 mg) by mouth 2 times daily  atorvastatin (LIPITOR)  20 MG tablet, Take 1 tablet (20 mg) by mouth daily  Cholecalciferol (VITAMIN D3) 25 MCG (1000 UT) CAPS, Take 1 capsule by mouth daily OTC dose unknown  fenofibrate (TRIGLIDE/LOFIBRA) 160 MG tablet, TAKE 1 TABLET BY MOUTH  DAILY  furosemide (LASIX) 20 MG tablet, Take 1 tablet (20 mg) by mouth every 48 hours AND 0.5 tablets (10 mg) every 48 hours. (alternates 1 pill with 1/2 pill every other day)  melatonin 3 MG tablet, Take 3 mg by mouth nightly as needed for sleep  Multiple Vitamin (MULTIVITAMIN ADULT PO), Take 1 tablet by mouth daily  Multiple Vitamins-Minerals (PRESERVISION AREDS 2 PO), Take 1 tablet by mouth 2 times daily  spironolactone (ALDACTONE) 25 MG tablet, TAKE 1 TABLET BY MOUTH  DAILY    No current facility-administered medications on file prior to visit.    Past Medical History:   Diagnosis Date     Arthritis     rhuematoid     Coronary artery disease     triple bypass 2001     CRF (chronic renal failure)      Gastro-oesophageal reflux disease      Gout      Hyperlipidemia      Hypertension      Nocturia        Past Surgical History:   Procedure Laterality Date     CARDIAC SURGERY       CHOLECYSTECTOMY       COLONOSCOPY       LAPAROTOMY EXPLORATORY N/A 6/30/2020    Procedure: EXPLORATORY LAPAROTOMY, BOWEL RESECTION;  Surgeon: Bull Rachel MD;  Location:  OR     LAPAROTOMY, LYSIS ADHESIONS, COMBINED N/A 6/21/2020    Procedure: EXPLORATORY LAPAROTOMY WITH LYSIS OF ADHESIONS;  Surgeon: Jose Reed MD;  Location:  OR     ORTHOPEDIC SURGERY       PHACOEMULSIFICATION CLEAR CORNEA WITH TORIC INTRAOCULAR LENS IMPLANT  1/30/2012    Procedure:PHACOEMULSIFICATION CLEAR CORNEA WITH TORIC INTRAOCULAR LENS IMPLANT; LEFT PHACOEMULSIFICATION CLEAR CORNEA WITH DELUXE  TORIC INTRAOCULAR LENS IMPLANT ; Surgeon:BRANDO HIGHTOWER; Location: [facility]     PHACOEMULSIFICATION CLEAR CORNEA WITH TORIC INTRAOCULAR LENS IMPLANT  2/13/2012    Procedure:PHACOEMULSIFICATION CLEAR CORNEA WITH TORIC INTRAOCULAR LENS  IMPLANT; RIGHT PHACOEMULSIFICATION CLEAR CORNEA WITH TORIC INTRAOCULAR LENS IMPLANT ; Surgeon:CAMPBELL, BRANDO EVANGELISTA; Location:[facility]     VASCULAR SURGERY         Social History     Tobacco Use     Smoking status: Former     Types: Cigars     Quit date: 1980     Years since quittin.8     Smokeless tobacco: Never   Vaping Use     Vaping Use: Never used   Substance Use Topics     Alcohol use: Yes     Alcohol/week: 0.0 standard drinks     Comment: 1 drink week     Drug use: No       Family History   Problem Relation Age of Onset     Myocardial Infarction Mother      Rheumatic fever Father      Cerebrovascular Disease Brother        ROS: A 12 system review of systems was negative other than noted here or above.     Exam:  /71   Pulse 83   Wt 61.1 kg (134 lb 11.2 oz)   SpO2 97%   BMI 21.42 kg/m      GENERAL APPEARANCE: alert and no distress  EYES: no scleral icterus  HENT: no gross abnormality noted  NECK: supple, no adenopathy  RESP: lungs clear to auscultation   CV: regular rhythm, normal rate, no rub  Extremities: no clubbing, cyanosis, or edema  SKIN: no rash  NEURO: mentation intact and speech normal  PSYCH: affect normal/bright    Results:    Lab on 10/11/2022   Component Date Value Ref Range Status     Sodium 10/11/2022 139  136 - 145 mmol/L Final     Potassium 10/11/2022 4.5  3.4 - 5.3 mmol/L Final     Chloride 10/11/2022 106  98 - 107 mmol/L Final     Carbon Dioxide (CO2) 10/11/2022 20 (L)  22 - 29 mmol/L Final     Anion Gap 10/11/2022 13  7 - 15 mmol/L Final     Glucose 10/11/2022 108 (H)  70 - 99 mg/dL Final     Urea Nitrogen 10/11/2022 35.2 (H)  8.0 - 23.0 mg/dL Final     Creatinine 10/11/2022 1.72 (H)  0.67 - 1.17 mg/dL Final     GFR Estimate 10/11/2022 38 (L)  >60 mL/min/1.73m2 Final    Effective 2021 eGFRcr in adults is calculated using the  CKD-EPI creatinine equation which includes age and gender (Alli rowe al., NEJ, DOI: 10.1056/LJRKvg7502874)     Calcium 10/11/2022 9.4  8.8  - 10.2 mg/dL Final     Albumin 10/11/2022 3.4 (L)  3.5 - 5.2 g/dL Final     Phosphorus 10/11/2022 3.2  2.5 - 4.5 mg/dL Final     WBC Count 10/11/2022 9.1  4.0 - 11.0 10e3/uL Final     RBC Count 10/11/2022 3.60 (L)  4.40 - 5.90 10e6/uL Final     Hemoglobin 10/11/2022 10.3 (L)  13.3 - 17.7 g/dL Final     Hematocrit 10/11/2022 32.5 (L)  40.0 - 53.0 % Final     MCV 10/11/2022 90  78 - 100 fL Final     MCH 10/11/2022 28.6  26.5 - 33.0 pg Final     MCHC 10/11/2022 31.7  31.5 - 36.5 g/dL Final     RDW 10/11/2022 17.8 (H)  10.0 - 15.0 % Final     Platelet Count 10/11/2022 254  150 - 450 10e3/uL Final     Iron 10/11/2022 45 (L)  61 - 157 ug/dL Final     Iron Sat Index 10/11/2022 12 (L)  15 - 46 % Final     Iron Binding Capacity 10/11/2022 365  240 - 430 ug/dL Final       Assessment/Plan:     CKD stage IIIb/IV  Pt with no clear baseline creatinine with recurrent SHANICE episodes. His creatinine varies any where between 1.7-2.7 with eGFR in 20's and 30's which put him in CKD stage III b vs stage IV.  Recurrent SHANICE episodes in setting of HFpEF and continued diuretic use.  UA with out sediment and UPCR was WNL. CKD likely 2/2 renovascular disease (known PAD, AAA) and progression is 2/2 recurrent SHANICE in setting of HFpEF.  - maintain BP >120 systolic   - good hydration   - low salt diet   - continue to avoid NSAID's.  - no renal imaging in recent past, will get renal imaging insetting of his urinary symptoms.    Hypertension :  BP in clinic was WNL. No hypotensive symptoms today, continue current therapy. Recommended to Check BP at home.    Electrolytes :  Stable    BMD :  Ismael, phos, Vit D and PTH were WNL    Metabolic acidosis :  Bicarb is WNL    Anemia :  Hb is low but improved from July. Iron studies c/w iron def anemia, start on iron supplementation. Will continue to monitor    Other problems  CAD  HFpEF, continue on spironolactone and lasix as prescribed.  Gout, no recent episodes, continue on allopurinol    Total time spent on  the day of clinic visit was 60 min including face to face time of 40 min with pt, chart and lab review, image review, prescribing medications and documentation as above.      Afsaneh Thomas  Dept of Nephrology and Hypertension  [facility]

## 2022-10-11 NOTE — PROGRESS NOTES
Nephrology Clinic Note  October 11, 2022    I was asked to see this patient by Dr. Redman    CC: CKD    HPI: Trevor Soni is a 89 year old male with PMH significant for HTN, CAD, HFpEF, AAA with out rupture, atrial flutter, aortic stenosis, gout who presents for evaluation and management of CKD.     Pt endorsed feeling good today. No new symptoms today. Up on questioning, denied having recurrent fevers/chills, nausea/vomiting, chest pain, SOB, abdominal pain, diarrhea. No LE edema noted in the recent past.  Pt endorsed weight loss while in the hospital but now stabilized with stable appetite.  Home BP were in 130-150 systolic  H/o Second hand smoking (from wife) but no person history   Denied other illicit drug use.    - History of urinary symptoms: no, may wake up 2-3 times per night, feels completely empty the bladder  - History of Hematuria: no  - Swelling: no  - Hx of UTIs: no  - Hx of stones: one time 20 years ago and none since then  - Rashes/Joint pain: just on left upper arm after scratching,   - Family hx of kidney disease: no  - NSAID use: no       Allergies   Allergen Reactions     Avocado      Ciprofloxacin Itching     Penicillins Itching       acetaminophen (TYLENOL) 325 MG tablet, Take 2 tablets (650 mg) by mouth every 6 hours as needed for mild pain or other (and adjunct with moderate or severe pain or per patient request)  allopurinol (ZYLOPRIM) 100 MG tablet, TAKE 2 TABLETS BY MOUTH  DAILY  amLODIPine (NORVASC) 5 MG tablet, Take 1 tablet (5 mg) by mouth daily  apixaban ANTICOAGULANT (ELIQUIS) 2.5 MG tablet, Take 1 tablet (2.5 mg) by mouth 2 times daily  atorvastatin (LIPITOR) 20 MG tablet, Take 1 tablet (20 mg) by mouth daily  Cholecalciferol (VITAMIN D3) 25 MCG (1000 UT) CAPS, Take 1 capsule by mouth daily OTC dose unknown  fenofibrate (TRIGLIDE/LOFIBRA) 160 MG tablet, TAKE 1 TABLET BY MOUTH  DAILY  furosemide (LASIX) 20 MG tablet, Take 1 tablet (20 mg) by mouth every 48 hours AND 0.5 tablets  (10 mg) every 48 hours. (alternates 1 pill with 1/2 pill every other day)  melatonin 3 MG tablet, Take 3 mg by mouth nightly as needed for sleep  Multiple Vitamin (MULTIVITAMIN ADULT PO), Take 1 tablet by mouth daily  Multiple Vitamins-Minerals (PRESERVISION AREDS 2 PO), Take 1 tablet by mouth 2 times daily  spironolactone (ALDACTONE) 25 MG tablet, TAKE 1 TABLET BY MOUTH  DAILY    No current facility-administered medications on file prior to visit.    Past Medical History:   Diagnosis Date     Arthritis     rhuematoid     Coronary artery disease     triple bypass      CRF (chronic renal failure)      Gastro-oesophageal reflux disease      Gout      Hyperlipidemia      Hypertension      Nocturia        Past Surgical History:   Procedure Laterality Date     CARDIAC SURGERY       CHOLECYSTECTOMY       COLONOSCOPY       LAPAROTOMY EXPLORATORY N/A 2020    Procedure: EXPLORATORY LAPAROTOMY, BOWEL RESECTION;  Surgeon: Bull Rachel MD;  Location:  OR     LAPAROTOMY, LYSIS ADHESIONS, COMBINED N/A 2020    Procedure: EXPLORATORY LAPAROTOMY WITH LYSIS OF ADHESIONS;  Surgeon: Jose Reed MD;  Location:  OR     ORTHOPEDIC SURGERY       PHACOEMULSIFICATION CLEAR CORNEA WITH TORIC INTRAOCULAR LENS IMPLANT  2012    Procedure:PHACOEMULSIFICATION CLEAR CORNEA WITH TORIC INTRAOCULAR LENS IMPLANT; LEFT PHACOEMULSIFICATION CLEAR CORNEA WITH DELUXE  TORIC INTRAOCULAR LENS IMPLANT ; Surgeon:BRANDO HIGHTOWER; Location:[facility]     PHACOEMULSIFICATION CLEAR CORNEA WITH TORIC INTRAOCULAR LENS IMPLANT  2012    Procedure:PHACOEMULSIFICATION CLEAR CORNEA WITH TORIC INTRAOCULAR LENS IMPLANT; RIGHT PHACOEMULSIFICATION CLEAR CORNEA WITH TORIC INTRAOCULAR LENS IMPLANT ; Surgeon:BRANDO HIGHTOWER; Location:[facility]     VASCULAR SURGERY         Social History     Tobacco Use     Smoking status: Former     Types: Cigars     Quit date: 1980     Years since quittin.8     Smokeless tobacco: Never   Vaping  Use     Vaping Use: Never used   Substance Use Topics     Alcohol use: Yes     Alcohol/week: 0.0 standard drinks     Comment: 1 drink week     Drug use: No       Family History   Problem Relation Age of Onset     Myocardial Infarction Mother      Rheumatic fever Father      Cerebrovascular Disease Brother        ROS: A 12 system review of systems was negative other than noted here or above.     Exam:  /71   Pulse 83   Wt 61.1 kg (134 lb 11.2 oz)   SpO2 97%   BMI 21.42 kg/m      GENERAL APPEARANCE: alert and no distress  EYES: no scleral icterus  HENT: no gross abnormality noted  NECK: supple, no adenopathy  RESP: lungs clear to auscultation   CV: regular rhythm, normal rate, no rub  Extremities: no clubbing, cyanosis, or edema  SKIN: no rash  NEURO: mentation intact and speech normal  PSYCH: affect normal/bright    Results:    Lab on 10/11/2022   Component Date Value Ref Range Status     Sodium 10/11/2022 139  136 - 145 mmol/L Final     Potassium 10/11/2022 4.5  3.4 - 5.3 mmol/L Final     Chloride 10/11/2022 106  98 - 107 mmol/L Final     Carbon Dioxide (CO2) 10/11/2022 20 (L)  22 - 29 mmol/L Final     Anion Gap 10/11/2022 13  7 - 15 mmol/L Final     Glucose 10/11/2022 108 (H)  70 - 99 mg/dL Final     Urea Nitrogen 10/11/2022 35.2 (H)  8.0 - 23.0 mg/dL Final     Creatinine 10/11/2022 1.72 (H)  0.67 - 1.17 mg/dL Final     GFR Estimate 10/11/2022 38 (L)  >60 mL/min/1.73m2 Final    Effective December 21, 2021 eGFRcr in adults is calculated using the 2021 CKD-EPI creatinine equation which includes age and gender (Alli rowe al., NEJ, DOI: 10.1056/TJIPts8094736)     Calcium 10/11/2022 9.4  8.8 - 10.2 mg/dL Final     Albumin 10/11/2022 3.4 (L)  3.5 - 5.2 g/dL Final     Phosphorus 10/11/2022 3.2  2.5 - 4.5 mg/dL Final     WBC Count 10/11/2022 9.1  4.0 - 11.0 10e3/uL Final     RBC Count 10/11/2022 3.60 (L)  4.40 - 5.90 10e6/uL Final     Hemoglobin 10/11/2022 10.3 (L)  13.3 - 17.7 g/dL Final     Hematocrit  10/11/2022 32.5 (L)  40.0 - 53.0 % Final     MCV 10/11/2022 90  78 - 100 fL Final     MCH 10/11/2022 28.6  26.5 - 33.0 pg Final     MCHC 10/11/2022 31.7  31.5 - 36.5 g/dL Final     RDW 10/11/2022 17.8 (H)  10.0 - 15.0 % Final     Platelet Count 10/11/2022 254  150 - 450 10e3/uL Final     Iron 10/11/2022 45 (L)  61 - 157 ug/dL Final     Iron Sat Index 10/11/2022 12 (L)  15 - 46 % Final     Iron Binding Capacity 10/11/2022 365  240 - 430 ug/dL Final       Assessment/Plan:     CKD stage IIIb/IV  Pt with no clear baseline creatinine with recurrent SHANICE episodes. His creatinine varies any where between 1.7-2.7 with eGFR in 20's and 30's which put him in CKD stage III b vs stage IV.  Recurrent SHANICE episodes in setting of HFpEF and continued diuretic use.  UA with out sediment and UPCR was WNL. CKD likely 2/2 renovascular disease (known PAD, AAA) and progression is 2/2 recurrent SHANICE in setting of HFpEF.  - maintain BP >120 systolic   - good hydration   - low salt diet   - continue to avoid NSAID's.  - no renal imaging in recent past, will get renal imaging insetting of his urinary symptoms.    Hypertension :  BP in clinic was WNL. No hypotensive symptoms today, continue current therapy. Recommended to Check BP at home.    Electrolytes :  Stable    BMD :  Ismael, phos, Vit D and PTH were WNL    Metabolic acidosis :  Bicarb is WNL    Anemia :  Hb is low but improved from July. Iron studies c/w iron def anemia, start on iron supplementation. Will continue to monitor    Other problems  CAD  HFpEF, continue on spironolactone and lasix as prescribed.  Gout, no recent episodes, continue on allopurinol    Total time spent on the day of clinic visit was 60 min including face to face time of 40 min with pt, chart and lab review, image review, prescribing medications and documentation as above.      Afsaneh Thomas  Dept of Nephrology and Hypertension  [facility]

## 2022-10-12 LAB — DEPRECATED CALCIDIOL+CALCIFEROL SERPL-MC: 32 UG/L (ref 20–75)

## 2022-10-13 ENCOUNTER — VIRTUAL VISIT (OUTPATIENT)
Dept: FAMILY MEDICINE | Facility: CLINIC | Age: 87
End: 2022-10-13
Payer: MEDICARE

## 2022-10-13 ENCOUNTER — ANCILLARY PROCEDURE (OUTPATIENT)
Dept: GENERAL RADIOLOGY | Facility: CLINIC | Age: 87
End: 2022-10-13
Attending: INTERNAL MEDICINE
Payer: MEDICARE

## 2022-10-13 DIAGNOSIS — J18.9 PNEUMONIA DUE TO INFECTIOUS ORGANISM, UNSPECIFIED LATERALITY, UNSPECIFIED PART OF LUNG: ICD-10-CM

## 2022-10-13 DIAGNOSIS — I35.0 SEVERE AORTIC STENOSIS: ICD-10-CM

## 2022-10-13 DIAGNOSIS — J18.9 PNEUMONIA DUE TO INFECTIOUS ORGANISM, UNSPECIFIED LATERALITY, UNSPECIFIED PART OF LUNG: Primary | ICD-10-CM

## 2022-10-13 DIAGNOSIS — I48.92 ATRIAL FLUTTER, UNSPECIFIED TYPE (H): ICD-10-CM

## 2022-10-13 PROCEDURE — 99213 OFFICE O/P EST LOW 20 MIN: CPT | Mod: 95 | Performed by: INTERNAL MEDICINE

## 2022-10-13 PROCEDURE — 71046 X-RAY EXAM CHEST 2 VIEWS: CPT

## 2022-10-13 NOTE — PROGRESS NOTES
Trevor is a 89 year old who is being evaluated via a billable video visit.      How would you like to obtain your AVS? MyChart  If the video visit is dropped, the invitation should be resent by: MY CHART  Will anyone else be joining your video visit? No        Assessment & Plan     Pneumonia due to infectious organism, unspecified laterality, unspecified part of lung      Severe aortic stenosis      Atrial flutter, unspecified type (H)      FMLA paper work filled out  Continue follow up with cardiology re TAVR  Continue good work with PT to recover from severe debilitating pneumonia         24 minutes spent on the date of the encounter doing chart review, history and exam, documentation and further activities per the note           Return in about 3 months (around 1/13/2023) for recheck.  Patient instructed to return to clinic or contact us sooner if symptoms worsen or new symptoms develop.     Abdoulaye Redman MD  [facility]    Neal Murray is a 89 year old accompanied by his daughter, presenting for the following health issues:  Forms      HPI       Recovering from severe pneumonia  In process of being evaluated for TAVR  Feeling better  Daughter was with him at home 24-7 since he got out of [facility] 7/27  She is hopeful she can return to work over the next few weeks/ months once PT upgrades his care requirements  He is making progress according to daughter    Review of Systems         Objective           Vitals:  No vitals were obtained today due to virtual visit.    Physical Exam   Could not establish video connection   Trevor sounds healthy  Chest x-ray shows resolved pneumonia                 Video-Visit Details    Video Start Time: 5:20 PM    Type of service:  Video Visit    Video End Time:5:40 PM    Originating Location (pt. Location): Home    Distant Location (provider location):  On-site    Platform used for Video Visit: Jessika

## 2022-10-13 NOTE — RESULT ENCOUNTER NOTE
The following letter pertains to your most recent diagnostic tests:    Good news! The pneumonia in the right lung has resolved.        Sincerely,    Dr. Redman

## 2022-10-14 ENCOUNTER — TELEPHONE (OUTPATIENT)
Dept: FAMILY MEDICINE | Facility: CLINIC | Age: 87
End: 2022-10-14

## 2022-10-14 NOTE — TELEPHONE ENCOUNTER
LVM-Completed FMLA forms are ready to  at the .  Copy sent to [employer] and placed in [employer] accordion file

## 2022-10-17 ENCOUNTER — HOSPITAL ENCOUNTER (OUTPATIENT)
Dept: ULTRASOUND IMAGING | Facility: CLINIC | Age: 87
Discharge: HOME | End: 2022-10-17
Attending: INTERNAL MEDICINE | Admitting: INTERNAL MEDICINE
Payer: MEDICARE

## 2022-10-17 DIAGNOSIS — N17.8 OTHER ACUTE KIDNEY FAILURE (H): ICD-10-CM

## 2022-10-17 DIAGNOSIS — N18.4 CHRONIC KIDNEY DISEASE, STAGE IV (SEVERE) (H): ICD-10-CM

## 2022-10-17 PROCEDURE — 93975 VASCULAR STUDY: CPT

## 2022-10-20 ENCOUNTER — TELEPHONE (OUTPATIENT)
Dept: CARDIOLOGY | Facility: CLINIC | Age: 87
End: 2022-10-20

## 2022-10-20 NOTE — TELEPHONE ENCOUNTER
Left voicemail for patient's daughter to follow up. Patient saw nephrology 10/11/22 but note is not yet available. Asked that she call back to discuss that visit and how they wish to proceed regarding TAVR.   Direct phone number provided for return call.

## 2022-10-22 ENCOUNTER — HEALTH MAINTENANCE LETTER (OUTPATIENT)
Age: 87
End: 2022-10-22

## 2022-10-25 ENCOUNTER — MEDICAL CORRESPONDENCE (OUTPATIENT)
Dept: HEALTH INFORMATION MANAGEMENT | Facility: CLINIC | Age: 87
End: 2022-10-25

## 2022-10-27 ENCOUNTER — NURSE TRIAGE (OUTPATIENT)
Dept: FAMILY MEDICINE | Facility: CLINIC | Age: 87
End: 2022-10-27

## 2022-10-27 NOTE — TELEPHONE ENCOUNTER
"Per chart review,   Patient was seen on 7/11/22 for pneumonia in the hospital.   Patient has had multiple follow up with PCP regarding ongoing pnemonia symptoms.    At last OV on 10/13/22 PCP advised: Continue good work with PT to recover from severe debilitating pneumonia, Chest x-ray shows resolved pneumonia. Return in about 3 months (around 1/13/2023) for recheck but patient has future OV visit with PCP on:   11/14/2022 9:30 AM (Arrive by 9:10 AM) Abdoulaye Redman MD [facility]       Per chart review Radha ENCISO [facility] care called previously.   Writer spoke with Radha ENCISO who reports   CC: cough,   Onset  a week ago started coughing a lot  Other symptoms:  runny nose and\" tightness in chest a little bit\"  Per Radha \"when he coughs he has some tightness\"  Radha states that patient reported that symptoms were improving  Radha saw patient today, no fever (97.5) and O2 stats 99%. Patient denied shortness of breath.     Writer called patient to gather further information  Patient Contact     Attempt #: 1     Was call answered? Yes, Writer spoke to patient and Bianka (daughter C2C).  CC: cough  Onset: week, symptoms improving  Suptum: none but per Bianka \"Cough sounds like it should be coughing up stuff\", \"when he coughs it sounds crackly\"  Difficulty breathing: denies  Fever: denies  Heart hx: high blood pressure, AAA, CAD, CHF, Atrial FLutter  Lung: ongoing pnemonia, no other hx  Denies wheezing or chest pain. Patient denied tightness with cough.   Interventions: tea/honey    Triaged per Epic Triage Protocol, gave care advice which patient plans to follow.Writer also reviewed provider message below.   See Care advice tab for more information.  Patent to call back if further questions or concerns. Writer encouraged patient to be seen in UC/ ER if symptoms are worsening. Patient expressed verbal understanding.      Ernestine Boateng RN  [facility]      Reason " for Disposition    Cough with no complications    Additional Information    Negative: Bluish (or gray) lips or face    Negative: SEVERE difficulty breathing (e.g., struggling for each breath, speaks in single words)    Negative: Rapid onset of cough and has hives    Negative: Coughing started suddenly after medicine, an allergic food or bee sting    Negative: Difficulty breathing after exposure to flames, smoke, or fumes    Negative: Sounds like a life-threatening emergency to the triager    Negative: Previous asthma attacks and this feels like asthma attack    Negative: Dry cough (non-productive; no sputum or minimal clear sputum) and within 14 days of COVID-19 Exposure    Negative: MODERATE difficulty breathing (e.g., speaks in phrases, SOB even at rest, pulse 100-120) and still present when not coughing    Negative: Chest pain present when not coughing    Negative: Passed out (i.e., fainted, collapsed and was not responding)    Negative: Patient sounds very sick or weak to the triager    Negative: MILD difficulty breathing (e.g., minimal/no SOB at rest, SOB with walking, pulse <100) and still present when not coughing    Negative: Coughed up > 1 tablespoon (15 ml) blood (Exception: Blood-tinged sputum.)    Negative: Fever > 103 F (39.4 C)    Negative: Fever > 100.0 F (37.8 C) and has diabetes mellitus or a weak immune system (e.g., HIV positive, cancer chemotherapy, organ transplant, splenectomy, chronic steroids)    Negative: Fever > 100.0 F (37.8 C) and bedridden (e.g., nursing home patient, stroke, chronic illness, recovering from surgery)    Negative: Fever > 101 F (38.3 C) and over 60 years of age    Negative: Increasing ankle swelling    Negative: Wheezing is present    Negative: SEVERE coughing spells (e.g., whooping sound after coughing, vomiting after coughing)    Negative: Fever returns after gone for over 24 hours and symptoms worse or not improved    Negative: Fever present > 3 days (72 hours)     Negative: Coughing up loco-colored (reddish-brown) or blood-tinged sputum    Negative: Using nasal washes and pain medicine > 24 hours and sinus pain persists    Negative: Known COPD or other severe lung disease (i.e., bronchiectasis, cystic fibrosis, lung surgery) and worsening symptoms (i.e., increased sputum purulence or amount, increased breathing difficulty)    Negative: Continuous (nonstop) coughing interferes with work or school and no improvement using cough treatment per Care Advice    Negative: Patient wants to be seen    Negative: Cough has been present for > 3 weeks    Negative: Allergy symptoms are also present (e.g., itchy eyes, clear nasal discharge, postnasal drip)    Negative: Nasal discharge present > 10 days    Negative: Exposure to TB (Tuberculosis)    Negative: Taking an ACE Inhibitor medication (e.g., benazepril/LOTENSIN, captopril/CAPOTEN, enalapril/VASOTEC, lisinopril/ZESTRIL)    Protocols used: COUGH-A-OH

## 2022-10-27 NOTE — TELEPHONE ENCOUNTER
I think we have an appointment on 11/14, but he could be sooner by me or team provider if there are concerns  I would not be surprised if there is still a lingering cough from his pneumonia even at this point

## 2022-10-27 NOTE — TELEPHONE ENCOUNTER
Patient has had no productive cough since last week no fever clear mucus and seems improved over the last week but still bothersome and had tightness with cough     She is wanting Dr. Redman to know and if Dr. Redman would like patient to be seen

## 2022-11-14 ENCOUNTER — MEDICAL CORRESPONDENCE (OUTPATIENT)
Dept: HEALTH INFORMATION MANAGEMENT | Facility: CLINIC | Age: 87
End: 2022-11-14

## 2022-11-14 ENCOUNTER — OFFICE VISIT (OUTPATIENT)
Dept: FAMILY MEDICINE | Facility: CLINIC | Age: 87
End: 2022-11-14
Payer: MEDICARE

## 2022-11-14 VITALS
DIASTOLIC BLOOD PRESSURE: 57 MMHG | BODY MASS INDEX: 21.83 KG/M2 | RESPIRATION RATE: 16 BRPM | OXYGEN SATURATION: 98 % | TEMPERATURE: 97.2 F | WEIGHT: 139.1 LBS | SYSTOLIC BLOOD PRESSURE: 105 MMHG | HEART RATE: 92 BPM | HEIGHT: 67 IN

## 2022-11-14 DIAGNOSIS — Z00.00 ENCOUNTER FOR MEDICARE ANNUAL WELLNESS EXAM: Primary | ICD-10-CM

## 2022-11-14 DIAGNOSIS — I35.0 SEVERE AORTIC STENOSIS: ICD-10-CM

## 2022-11-14 DIAGNOSIS — I10 PRIMARY HYPERTENSION: ICD-10-CM

## 2022-11-14 DIAGNOSIS — I65.23 CAROTID STENOSIS, ASYMPTOMATIC, BILATERAL: ICD-10-CM

## 2022-11-14 DIAGNOSIS — Z23 HIGH PRIORITY FOR 2019-NCOV VACCINE: ICD-10-CM

## 2022-11-14 DIAGNOSIS — I48.92 ATRIAL FLUTTER, UNSPECIFIED TYPE (H): ICD-10-CM

## 2022-11-14 DIAGNOSIS — E78.5 HYPERLIPIDEMIA LDL GOAL <100: ICD-10-CM

## 2022-11-14 DIAGNOSIS — M1A.09X0 IDIOPATHIC CHRONIC GOUT OF MULTIPLE SITES WITHOUT TOPHUS: ICD-10-CM

## 2022-11-14 DIAGNOSIS — Z23 NEED FOR PROPHYLACTIC VACCINATION AND INOCULATION AGAINST INFLUENZA: ICD-10-CM

## 2022-11-14 DIAGNOSIS — N18.4 CRF (CHRONIC RENAL FAILURE), STAGE 4 (SEVERE) (H): ICD-10-CM

## 2022-11-14 DIAGNOSIS — I25.10 CORONARY ARTERY DISEASE INVOLVING NATIVE HEART WITHOUT ANGINA PECTORIS, UNSPECIFIED VESSEL OR LESION TYPE: ICD-10-CM

## 2022-11-14 DIAGNOSIS — I71.40 ABDOMINAL AORTIC ANEURYSM (AAA) WITHOUT RUPTURE, UNSPECIFIED PART (H): ICD-10-CM

## 2022-11-14 PROCEDURE — 90662 IIV NO PRSV INCREASED AG IM: CPT | Performed by: INTERNAL MEDICINE

## 2022-11-14 PROCEDURE — 91312 COVID-19,PF,PFIZER BOOSTER BIVALENT: CPT | Performed by: INTERNAL MEDICINE

## 2022-11-14 PROCEDURE — 99214 OFFICE O/P EST MOD 30 MIN: CPT | Mod: 25 | Performed by: INTERNAL MEDICINE

## 2022-11-14 PROCEDURE — 0124A COVID-19,PF,PFIZER BOOSTER BIVALENT: CPT | Performed by: INTERNAL MEDICINE

## 2022-11-14 PROCEDURE — G0008 ADMIN INFLUENZA VIRUS VAC: HCPCS | Performed by: INTERNAL MEDICINE

## 2022-11-14 PROCEDURE — G0439 PPPS, SUBSEQ VISIT: HCPCS | Performed by: INTERNAL MEDICINE

## 2022-11-14 ASSESSMENT — ENCOUNTER SYMPTOMS
SORE THROAT: 0
SHORTNESS OF BREATH: 0
HEMATOCHEZIA: 0
JOINT SWELLING: 1
EYE PAIN: 0
ARTHRALGIAS: 1
WEAKNESS: 0
FREQUENCY: 0
HEARTBURN: 0
MYALGIAS: 1
DYSURIA: 0
COUGH: 0
CHILLS: 0
PARESTHESIAS: 0
HEMATURIA: 0
FEVER: 0
DIARRHEA: 0
NERVOUS/ANXIOUS: 0
HEADACHES: 0
DIZZINESS: 0
PALPITATIONS: 0
CONSTIPATION: 0
ABDOMINAL PAIN: 0
NAUSEA: 0

## 2022-11-14 ASSESSMENT — PAIN SCALES - GENERAL: PAINLEVEL: NO PAIN (0)

## 2022-11-14 ASSESSMENT — ACTIVITIES OF DAILY LIVING (ADL)
CURRENT_FUNCTION: SHOPPING REQUIRES ASSISTANCE
CURRENT_FUNCTION: TRANSPORTATION REQUIRES ASSISTANCE
CURRENT_FUNCTION: LAUNDRY REQUIRES ASSISTANCE

## 2022-11-14 NOTE — PROGRESS NOTES
"SUBJECTIVE:   Trevor is a 89 year old who presents for Preventive Visit.        Are you in the first 12 months of your Medicare coverage?  No    Healthy Habits:     In general, how would you rate your overall health?  Good    Frequency of exercise:  2-3 days/week    Duration of exercise:  15-30 minutes    Do you usually eat at least 4 servings of fruit and vegetables a day, include whole grains    & fiber and avoid regularly eating high fat or \"junk\" foods?  Yes    Taking medications regularly:  Yes    Medication side effects:  None    Ability to successfully perform activities of daily living:  Transportation requires assistance, shopping requires assistance and laundry requires assistance    Home Safety:  No safety concerns identified    Hearing Impairment:  Difficulty following a conversation in a noisy restaurant or crowded room, difficult to understand a speaker at a public meeting or Faith service, need to ask people to speak up or repeat themselves, find that men's voices are easier to understand than woman's, difficulty understanding soft or whispered speech and difficulty understanding speech on the telephone    In the past 6 months, have you been bothered by leaking of urine?  No    In general, how would you rate your overall mental or emotional health?  Excellent      PHQ-2 Total Score: 0    Additional concerns today:  No    Do you feel safe in your environment? Yes    Have you ever done Advance Care Planning? (For example, a Health Directive, POLST, or a discussion with a medical provider or your loved ones about your wishes): Yes, patient states has an Advance Care Planning document and will bring a copy to the clinic.       Fall risk  Fallen 2 or more times in the past year?: Yes  Any fall with injury in the past year?: No    Cognitive Screening   1) Repeat 3 items (Leader, Season, Table)    2) Clock draw: NORMAL  3) 3 item recall: Recalls 3 objects  Results: 3 items recalled: COGNITIVE IMPAIRMENT " LESS LIKELY    Mini-CogTM Copyright CARMEN Whitten. Licensed by the author for use in [facility]; reprinted with permission (meaghan@.[facility]). All rights reserved.      Do you have sleep apnea, excessive snoring or daytime drowsiness?: no    Reviewed and updated as needed this visit by clinical staff   Tobacco  Allergies               Reviewed and updated as needed this visit by Provider                 Social History     Tobacco Use     Smoking status: Former     Types: Cigars     Quit date: 1980     Years since quittin.8     Smokeless tobacco: Never   Substance Use Topics     Alcohol use: Yes     Alcohol/week: 0.0 standard drinks     Comment: 1 drink week         Alcohol Use 2022   Prescreen: >3 drinks/day or >7 drinks/week? No   Prescreen: >3 drinks/day or >7 drinks/week? -         Current providers sharing in care for this patient include:   Patient Care Team:  Abdoulaye Redman MD as PCP - General (Internal Medicine)  Ivan Ravi MD as Assigned Heart and Vascular Provider  Abdoulaye Redman MD as Assigned PCP    The following health maintenance items are reviewed in Epic and correct as of today:  Health Maintenance   Topic Date Due     COVID-19 Vaccine (4 - Booster for Vasyl series) 2022     INFLUENZA VACCINE (1) 2022     LIPID  10/25/2022     ANNUAL REVIEW OF HM ORDERS  10/25/2022     MEDICARE ANNUAL WELLNESS VISIT  10/25/2022     FALL RISK ASSESSMENT  2023     DTAP/TDAP/TD IMMUNIZATION (3 - Td or Tdap) 2027     ADVANCE CARE PLANNING  2027     PHQ-2 (once per calendar year)  Completed     Pneumococcal Vaccine: 65+ Years  Completed     URINALYSIS  Completed     ALK PHOS  Completed     ZOSTER IMMUNIZATION  Completed     IPV IMMUNIZATION  Aged Out     MENINGITIS IMMUNIZATION  Aged Out     BMP  Discontinued     MICROALBUMIN  Discontinued     PARATHYROID  Discontinued     PHOSPHORUS  Discontinued     URINE DRUG SCREEN  Discontinued     HEMOGLOBIN   Discontinued     Patient Active Problem List   Diagnosis     Stiffness of joint, not elsewhere classified,  shoulder region     Abdominal pain     Pneumonia     Hyperlipidemia LDL goal <100     Primary osteoarthritis involving multiple joints     Chronic rhinitis     Primary osteoarthritis of both knees     Idiopathic chronic gout of multiple sites without tophus     Abdominal aortic aneurysm (AAA) without rupture     Stenosis of carotid artery, unspecified laterality     Post-traumatic osteoarthritis of left knee     Chronic bilateral low back pain     Severe aortic stenosis     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/21/20     CRF (chronic renal failure), stage 4 (severe) (H)     Gout     CAD -- S/P CABG x 3 in 2001     HTN (hypertension)     Chronic diastolic CHF -- Grade 1-2 Dysfunction Echo 2016     Status post exploratory laparotomy     Carotid stenosis, asymptomatic, bilateral     Osteoporosis with current pathological fracture with routine healing, unspecified osteoporosis type, subsequent encounter     Atrial flutter (H)     Anticoagulated     Slow transit constipation     Adjustment insomnia     Past Surgical History:   Procedure Laterality Date     CARDIAC SURGERY       CHOLECYSTECTOMY       COLONOSCOPY       LAPAROTOMY EXPLORATORY N/A 6/30/2020    Procedure: EXPLORATORY LAPAROTOMY, BOWEL RESECTION;  Surgeon: Bull Rachel MD;  Location:  OR     LAPAROTOMY, LYSIS ADHESIONS, COMBINED N/A 6/21/2020    Procedure: EXPLORATORY LAPAROTOMY WITH LYSIS OF ADHESIONS;  Surgeon: Jose Reed MD;  Location:  OR     ORTHOPEDIC SURGERY       PHACOEMULSIFICATION CLEAR CORNEA WITH TORIC INTRAOCULAR LENS IMPLANT  1/30/2012    Procedure:PHACOEMULSIFICATION CLEAR CORNEA WITH TORIC INTRAOCULAR LENS IMPLANT; LEFT PHACOEMULSIFICATION CLEAR CORNEA WITH DELUXE  TORIC INTRAOCULAR LENS IMPLANT ; Surgeon:BRANDO HIGHTOWER; Location:[facility]     PHACOEMULSIFICATION CLEAR CORNEA WITH TORIC INTRAOCULAR  LENS IMPLANT  2012    Procedure:PHACOEMULSIFICATION CLEAR CORNEA WITH TORIC INTRAOCULAR LENS IMPLANT; RIGHT PHACOEMULSIFICATION CLEAR CORNEA WITH TORIC INTRAOCULAR LENS IMPLANT ; Surgeon:BRANDO HIGHTOWER; Location:[facility]     VASCULAR SURGERY         Social History     Tobacco Use     Smoking status: Former     Types: Cigars     Quit date: 1980     Years since quittin.8     Smokeless tobacco: Never   Substance Use Topics     Alcohol use: Yes     Alcohol/week: 0.0 standard drinks     Comment: 1 drink week     Family History   Problem Relation Age of Onset     Myocardial Infarction Mother      Rheumatic fever Father      Cerebrovascular Disease Brother          Current Outpatient Medications   Medication Sig Dispense Refill     acetaminophen (TYLENOL) 325 MG tablet Take 2 tablets (650 mg) by mouth every 6 hours as needed for mild pain or other (and adjunct with moderate or severe pain or per patient request)       allopurinol (ZYLOPRIM) 100 MG tablet TAKE 2 TABLETS BY MOUTH  DAILY 180 tablet 3     amLODIPine (NORVASC) 5 MG tablet Take 1 tablet (5 mg) by mouth daily 90 tablet 3     apixaban ANTICOAGULANT (ELIQUIS) 2.5 MG tablet Take 1 tablet (2.5 mg) by mouth 2 times daily 180 tablet 3     atorvastatin (LIPITOR) 20 MG tablet Take 1 tablet (20 mg) by mouth daily 30 tablet 0     Cholecalciferol (VITAMIN D3) 25 MCG (1000 UT) CAPS Take 1 capsule by mouth daily OTC dose unknown       fenofibrate (TRIGLIDE/LOFIBRA) 160 MG tablet TAKE 1 TABLET BY MOUTH  DAILY 30 tablet 3     ferrous gluconate (FERGON) 324 (38 Fe) MG tablet Take 1 tablet (324 mg) by mouth daily (with breakfast) for 90 days 90 tablet 3     furosemide (LASIX) 20 MG tablet Take 1 tablet (20 mg) by mouth every 48 hours AND 0.5 tablets (10 mg) every 48 hours. (alternates 1 pill with 1/2 pill every other day) 30 tablet 0     melatonin 3 MG tablet Take 3 mg by mouth nightly as needed for sleep       Multiple Vitamin (MULTIVITAMIN ADULT PO) Take 1 tablet  "by mouth daily       Multiple Vitamins-Minerals (PRESERVISION AREDS 2 PO) Take 1 tablet by mouth 2 times daily       spironolactone (ALDACTONE) 25 MG tablet TAKE 1 TABLET BY MOUTH  DAILY 90 tablet 3     Allergies   Allergen Reactions     Avocado      Ciprofloxacin Itching     Penicillins Itching             Review of Systems   Constitutional: Negative for chills and fever.   HENT: Negative for congestion, ear pain, hearing loss and sore throat.    Eyes: Positive for visual disturbance. Negative for pain.   Respiratory: Negative for cough and shortness of breath.    Cardiovascular: Negative for chest pain, palpitations and peripheral edema.   Gastrointestinal: Negative for abdominal pain, constipation, diarrhea, heartburn, hematochezia and nausea.   Genitourinary: Negative for dysuria, frequency, genital sores, hematuria, impotence, penile discharge and urgency.   Musculoskeletal: Positive for arthralgias, joint swelling and myalgias.   Skin: Negative for rash.   Neurological: Negative for dizziness, weakness, headaches and paresthesias.   Psychiatric/Behavioral: Negative for mood changes. The patient is not nervous/anxious.      Above symptoms are chronic     OBJECTIVE:   /57 (BP Location: Right arm, Cuff Size: Adult Regular)   Pulse 92   Temp 97.2  F (36.2  C) (Tympanic)   Resp 16   Ht 1.689 m (5' 6.5\")   Wt 63.1 kg (139 lb 1.6 oz)   SpO2 98%   BMI 22.11 kg/m   Estimated body mass index is 22.11 kg/m  as calculated from the following:    Height as of this encounter: 1.689 m (5' 6.5\").    Weight as of this encounter: 63.1 kg (139 lb 1.6 oz).  Physical Exam  GENERAL: healthy, alert and no distress  EYES: Eyes grossly normal to inspection, PERRL and conjunctivae and sclerae normal  RESP: lungs clear to auscultation - no rales, rhonchi or wheezes  CV: The heart has an irregularly irregular rhythm with a normal rate. Systolic murmur is louder and radiates to carotids.    ABDOMEN: soft, nontender, no " "hepatosplenomegaly, no masses and bowel sounds normal  MS: no gross musculoskeletal defects noted, no edema  SKIN: no suspicious lesions or rashes  NEURO: Normal strength and tone, mentation intact and speech normal  PSYCH: mentation appears normal, affect normal/bright        ASSESSMENT / PLAN:       ICD-10-CM    1. Encounter for Medicare annual wellness exam  Z00.00       2. Severe aortic stenosis  I35.0       3. Coronary artery disease involving native heart without angina pectoris, unspecified vessel or lesion type  I25.10       4. Atrial flutter, unspecified type (H)  I48.92       5. CRF (chronic renal failure), stage 4 (severe) (H)  N18.4       6. Carotid stenosis, asymptomatic, bilateral  I65.23 US Carotid Bilateral      7. Abdominal aortic aneurysm (AAA) without rupture, unspecified part  I71.40 US Abdominal Aorta Imaging      8. Hyperlipidemia LDL goal <100  E78.5       9. Primary hypertension  I10       10. Idiopathic chronic gout of multiple sites without tophus  M1A.09X0       Spent most of our time today discussing why I think he should reconnect with TAVR clinc and find a time for valve replacement  CAD symptoms stable  On NOAC for stroke prophylaxis, rate OK  Saw nephrology re CKD, things are stable; metabolic panel was OK one month ago; oral iron was recommended   Reminded to schedule surveillance imaging of carotids and abdominal aorta  On statin therapy   Blood pressure well controlled  No current gout symptoms     Patient has been advised of split billing requirements and indicates understanding: Yes      COUNSELING:  Reviewed preventive health counseling, as reflected in patient instructions  Special attention given to:       Regular exercise       Healthy diet/nutrition       Immunizations    Flu shot, COVID booster today           Estimated body mass index is 22.11 kg/m  as calculated from the following:    Height as of this encounter: 1.689 m (5' 6.5\").    Weight as of this encounter: 63.1 kg " (139 lb 1.6 oz).        He reports that he quit smoking about 42 years ago. His smoking use included cigars. He has never used smokeless tobacco.      Appropriate preventive services were discussed with this patient, including applicable screening as appropriate for cardiovascular disease, diabetes, osteopenia/osteoporosis, and glaucoma.  As appropriate for age/gender, discussed screening for colorectal cancer, prostate cancer, breast cancer, and cervical cancer. Checklist reviewing preventive services available has been given to the patient.    Reviewed patients plan of care and provided an AVS. The Basic Care Plan (routine screening as documented in Health Maintenance) for Trevor meets the Care Plan requirement. This Care Plan has been established and reviewed with the Patient and daughter.    Counseling Resources:  ATP IV Guidelines  Pooled Cohorts Equation Calculator  Breast Cancer Risk Calculator  Breast Cancer: Medication to Reduce Risk  FRAX Risk Assessment  ICSI Preventive Guidelines  Dietary Guidelines for Americans, 2010  USDA's MyPlate  ASA Prophylaxis  Lung CA Screening    Abdoulaye Redman MD  [facility]    Identified Health Risks:

## 2022-11-14 NOTE — PATIENT INSTRUCTIONS
Please call [facility] at [phone] to schedule your carotid artery ultrasound and aortic aneurysm ultrasound.    I strongly recommend that you call the TAVR clinic and discuss getting your severely sticky heart valve fixed.           Patient Education   Personalized Prevention Plan  You are due for the preventive services outlined below.  Your care team is available to assist you in scheduling these services.  If you have already completed any of these items, please share that information with your care team to update in your medical record.  Health Maintenance Due   Topic Date Due    COVID-19 Vaccine (4 - Booster for Vasyl series) 06/28/2022    Flu Vaccine (1) 09/01/2022    Cholesterol Lab  10/25/2022    ANNUAL REVIEW OF HM ORDERS  10/25/2022    Annual Wellness Visit  10/25/2022

## 2022-11-17 ENCOUNTER — TELEPHONE (OUTPATIENT)
Dept: CARDIOLOGY | Facility: CLINIC | Age: 87
End: 2022-11-17

## 2022-11-17 DIAGNOSIS — I35.0 SEVERE AORTIC STENOSIS: Primary | ICD-10-CM

## 2022-11-17 NOTE — TELEPHONE ENCOUNTER
Received call from patient's daughter Bianka. She states that after discussion with his PCP Dr. Redman and their family, patient has decided that he would like to proceed with TAVR workup. Reviewed with Bianka that the first step is to get a TAVR CT done. Per protocol, he will need 2 hours of IV fluids prior.   Will have scheduling team reach out to arrange.

## 2022-11-21 NOTE — TELEPHONE ENCOUNTER
Spoke with patient's daughter this morning to verify TAVR CT scheduled 12/1/22. Per protocol, this is currently ordered with 2 hours of hydration prior. Last GFR result is from 10/11/22. Patient has an ultrasound scheduled across the street on 11/23/22. Sent message to scheduling to arrange a lab draw to be done that day so we have an updated GFR available.

## 2022-11-23 ENCOUNTER — ANCILLARY PROCEDURE (OUTPATIENT)
Dept: ULTRASOUND IMAGING | Facility: CLINIC | Age: 87
End: 2022-11-23
Attending: INTERNAL MEDICINE
Payer: MEDICARE

## 2022-11-23 ENCOUNTER — LAB (OUTPATIENT)
Dept: LAB | Facility: CLINIC | Age: 87
End: 2022-11-23
Payer: MEDICARE

## 2022-11-23 DIAGNOSIS — I71.40 ABDOMINAL AORTIC ANEURYSM (AAA) WITHOUT RUPTURE, UNSPECIFIED PART (H): ICD-10-CM

## 2022-11-23 DIAGNOSIS — I65.23 CAROTID STENOSIS, ASYMPTOMATIC, BILATERAL: ICD-10-CM

## 2022-11-23 DIAGNOSIS — I35.0 SEVERE AORTIC STENOSIS: ICD-10-CM

## 2022-11-23 LAB
ALBUMIN SERPL-MCNC: 3.2 G/DL (ref 3.4–5)
ALP SERPL-CCNC: 108 U/L (ref 40–150)
ALT SERPL W P-5'-P-CCNC: 20 U/L (ref 0–70)
ANION GAP SERPL CALCULATED.3IONS-SCNC: 7 MMOL/L (ref 3–14)
AST SERPL W P-5'-P-CCNC: 29 U/L (ref 0–45)
BILIRUB SERPL-MCNC: 0.6 MG/DL (ref 0.2–1.3)
BUN SERPL-MCNC: 37 MG/DL (ref 7–30)
CALCIUM SERPL-MCNC: 9.3 MG/DL (ref 8.5–10.1)
CHLORIDE BLD-SCNC: 111 MMOL/L (ref 94–109)
CO2 SERPL-SCNC: 25 MMOL/L (ref 20–32)
CREAT SERPL-MCNC: 1.82 MG/DL (ref 0.66–1.25)
GFR SERPL CREATININE-BSD FRML MDRD: 35 ML/MIN/1.73M2
GLUCOSE BLD-MCNC: 132 MG/DL (ref 70–99)
POTASSIUM BLD-SCNC: 4.2 MMOL/L (ref 3.4–5.3)
PROT SERPL-MCNC: 6.6 G/DL (ref 6.8–8.8)
SODIUM SERPL-SCNC: 143 MMOL/L (ref 133–144)

## 2022-11-23 PROCEDURE — 80053 COMPREHEN METABOLIC PANEL: CPT | Performed by: INTERNAL MEDICINE

## 2022-11-23 PROCEDURE — 76775 US EXAM ABDO BACK WALL LIM: CPT

## 2022-11-23 PROCEDURE — 36415 COLL VENOUS BLD VENIPUNCTURE: CPT | Performed by: INTERNAL MEDICINE

## 2022-11-23 PROCEDURE — 93880 EXTRACRANIAL BILAT STUDY: CPT

## 2022-11-23 NOTE — RESULT ENCOUNTER NOTE
The following letter pertains to your most recent diagnostic tests:    Good news! The carotid arteries do not look any worse than when last checked in May of 2021.      Sincerely,    Dr. Redman

## 2022-11-23 NOTE — RESULT ENCOUNTER NOTE
The following letter pertains to your most recent diagnostic tests:    Good news! This looks stable for you.      Sincerely,    Dr. Redman

## 2022-11-25 ENCOUNTER — TELEPHONE (OUTPATIENT)
Dept: FAMILY MEDICINE | Facility: CLINIC | Age: 87
End: 2022-11-25

## 2022-11-25 NOTE — TELEPHONE ENCOUNTER
Call to Yuan with [facility]. Yuan informed of 's below response. Yuan verbalized understanding.     Kristen Wilkerson RN BSN MSN  [facility]

## 2022-11-25 NOTE — TELEPHONE ENCOUNTER
Yuan calling from [facility] ([phone], okay to leave detailed message).    Yuan requesting verbal orders for skilled nursing once a week x3 weeks and then discharge patient from skilled nursing.     Kristen Wilkerson, RN BSN MSN  [facility]

## 2022-11-29 ENCOUNTER — MEDICAL CORRESPONDENCE (OUTPATIENT)
Dept: HEALTH INFORMATION MANAGEMENT | Facility: CLINIC | Age: 87
End: 2022-11-29

## 2022-12-11 DIAGNOSIS — E78.5 HYPERLIPIDEMIA LDL GOAL <100: ICD-10-CM

## 2022-12-11 DIAGNOSIS — I10 BENIGN ESSENTIAL HYPERTENSION: ICD-10-CM

## 2022-12-12 DIAGNOSIS — Z53.9 DIAGNOSIS NOT YET DEFINED: Primary | ICD-10-CM

## 2022-12-12 PROCEDURE — G0179 MD RECERTIFICATION HHA PT: HCPCS | Performed by: INTERNAL MEDICINE

## 2022-12-12 RX ORDER — FENOFIBRATE 160 MG/1
TABLET ORAL
Qty: 90 TABLET | Refills: 3 | Status: SHIPPED | OUTPATIENT
Start: 2022-12-12 | End: 2023-10-20

## 2022-12-13 ENCOUNTER — HOSPITAL ENCOUNTER (OUTPATIENT)
Dept: CARDIOLOGY | Facility: CLINIC | Age: 87
Discharge: HOME | End: 2022-12-13
Attending: INTERNAL MEDICINE | Admitting: INTERNAL MEDICINE
Payer: MEDICARE

## 2022-12-13 DIAGNOSIS — I35.0 SEVERE AORTIC STENOSIS: ICD-10-CM

## 2022-12-13 LAB
CREAT BLD-MCNC: 2.1 MG/DL (ref 0.7–1.3)
GFR SERPL CREATININE-BSD FRML MDRD: 30 ML/MIN/1.73M2

## 2022-12-13 PROCEDURE — 258N000003 HC RX IP 258 OP 636: Performed by: INTERNAL MEDICINE

## 2022-12-13 PROCEDURE — 71275 CT ANGIOGRAPHY CHEST: CPT | Mod: 26 | Performed by: INTERNAL MEDICINE

## 2022-12-13 PROCEDURE — 82565 ASSAY OF CREATININE: CPT

## 2022-12-13 PROCEDURE — 74174 CTA ABD&PLVS W/CONTRAST: CPT | Mod: 26 | Performed by: INTERNAL MEDICINE

## 2022-12-13 PROCEDURE — G1010 CDSM STANSON: HCPCS | Performed by: INTERNAL MEDICINE

## 2022-12-13 PROCEDURE — G1010 CDSM STANSON: HCPCS

## 2022-12-13 PROCEDURE — 250N000011 HC RX IP 250 OP 636: Performed by: INTERNAL MEDICINE

## 2022-12-13 RX ORDER — METHYLPREDNISOLONE SODIUM SUCCINATE 125 MG/2ML
125 INJECTION, POWDER, LYOPHILIZED, FOR SOLUTION INTRAMUSCULAR; INTRAVENOUS
Status: DISCONTINUED | OUTPATIENT
Start: 2022-12-13 | End: 2022-12-14 | Stop reason: HOSPADM

## 2022-12-13 RX ORDER — ACYCLOVIR 200 MG/1
0-1 CAPSULE ORAL
Status: DISCONTINUED | OUTPATIENT
Start: 2022-12-13 | End: 2022-12-14 | Stop reason: HOSPADM

## 2022-12-13 RX ORDER — IOPAMIDOL 755 MG/ML
500 INJECTION, SOLUTION INTRAVASCULAR ONCE
Status: COMPLETED | OUTPATIENT
Start: 2022-12-13 | End: 2022-12-13

## 2022-12-13 RX ORDER — DIPHENHYDRAMINE HYDROCHLORIDE 50 MG/ML
25-50 INJECTION INTRAMUSCULAR; INTRAVENOUS
Status: DISCONTINUED | OUTPATIENT
Start: 2022-12-13 | End: 2022-12-14 | Stop reason: HOSPADM

## 2022-12-13 RX ORDER — ONDANSETRON 2 MG/ML
4 INJECTION INTRAMUSCULAR; INTRAVENOUS
Status: DISCONTINUED | OUTPATIENT
Start: 2022-12-13 | End: 2022-12-14 | Stop reason: HOSPADM

## 2022-12-13 RX ORDER — DIPHENHYDRAMINE HCL 25 MG
25 CAPSULE ORAL
Status: DISCONTINUED | OUTPATIENT
Start: 2022-12-13 | End: 2022-12-14 | Stop reason: HOSPADM

## 2022-12-13 RX ADMIN — IOPAMIDOL 55 ML: 755 INJECTION, SOLUTION INTRAVENOUS at 16:37

## 2022-12-13 RX ADMIN — SODIUM CHLORIDE 300 ML: 9 INJECTION, SOLUTION INTRAVENOUS at 12:00

## 2022-12-15 ENCOUNTER — TELEPHONE (OUTPATIENT)
Dept: CARDIOLOGY | Facility: CLINIC | Age: 87
End: 2022-12-15

## 2022-12-15 DIAGNOSIS — I35.0 SEVERE AORTIC STENOSIS: ICD-10-CM

## 2022-12-15 DIAGNOSIS — R93.1 ABNORMAL FINDINGS DIAGNOSTIC IMAGING OF HEART AND CORONARY CIRCULATION: Primary | ICD-10-CM

## 2022-12-15 NOTE — TELEPHONE ENCOUNTER
Dr. Ramires reviewed patient's TAVR CT. She would like for patient to proceed with coronary angiogram. She would like him to have an iliofemoral angiogram as well if contrast allows. Patient does have CKD- labs below.       Placed order for anigogram. Left voicemail for patient's daughter Bianka requesting call back to discuss next step for patient.

## 2023-01-05 ENCOUNTER — OFFICE VISIT (OUTPATIENT)
Dept: CARDIOLOGY | Facility: CLINIC | Age: 88
End: 2023-01-05
Payer: MEDICARE

## 2023-01-05 VITALS
SYSTOLIC BLOOD PRESSURE: 127 MMHG | HEIGHT: 67 IN | HEART RATE: 79 BPM | WEIGHT: 146.7 LBS | BODY MASS INDEX: 23.02 KG/M2 | DIASTOLIC BLOOD PRESSURE: 72 MMHG

## 2023-01-05 DIAGNOSIS — I35.0 SEVERE AORTIC STENOSIS: Primary | ICD-10-CM

## 2023-01-05 PROCEDURE — 99214 OFFICE O/P EST MOD 30 MIN: CPT | Performed by: NURSE PRACTITIONER

## 2023-01-05 NOTE — PROGRESS NOTES
Cardiology Clinic Progress Note  Trevor Soni MRN# [id number]   YOB: 1933 Age: 89 year old     Primary cardiologist: Dr. Ravi     Reason for visit: H&P for coronary angiogram    History of presenting illness:    Trevor Soni is a pleasant 89 year old patient with past medical history significant for coronary disease s/p CABG x 3 (2001), paroxysmal atrial fibrillation, R CEA (2001), stage IV CKD, and severe aortic stenosis who was recently referred to our structural clinic for evaluation of transcatheter aortic valve replacement.  He is here today to discuss upcoming coronary angiogram that is being done as part of that work-up.    His aortic stenosis has been followed closely by Dr. Ravi over the years.  His most recent echocardiogram showed progression of his aortic stenosis, now meeting severe criteria characterized by valve area of 0.8 cm2, mean gradient of 39 mmHg, dimensionless index of 0.25, peak V of 3.4 m/s.  His LV function is preserved with an EF of 60 to 65%.    He was initially reluctant to pursue evaluation for his TAVR due to the wish to avoid contrast nephropathy and the possibility of the need for hemodialysis given his significant underlying renal dysfunction.  However, he has since been seen by nephrology who felt it was reasonable to pursue TAVR despite his underlying kidney disease.    Today the patient reports ongoing fatigue and dyspnea with exertion over the last several months.  He denies any chest pain, syncope or near syncope, or palpitations.  He has no PND orthopnea.  His blood pressure is well controlled today.  His weight is stable.    I reviewed his most recent labs.  His BMP shows creatinine of 1.82, BUN 37, and normal electrolytes.  CBC demonstrates a hemoglobin of 10.3, and normal platelet count.         Assessment and Plan:     ASSESSMENT:    1. Severe aortic stenosis.  Characterized with mean gradient 39, NANDA of 0.8, dimensionless index of 0.25, normal  stroke-volume index.  He has chronic fatigue and exertional dyspnea.  Currently undergoing evaluation for TAVR.  2. Paroxysmal atrial fibrillation.  On apixaban 2.5 twice daily for stroke prophylaxis.  3. CAD s/p CABG (LIMA-diagonal branch, SVG-PDA, SVG-LCx) in 2001. On atorvastatin 20 mg daily.  4. CKD stage IV. Most recent BMP with cr 1.8, baseline appears to be 1.8-2.2.   5. S/p R CEA in 2001. On statin therapy.      PLAN:     1. Plan for coronary angiogram on 1/13/2023 with Dr. Ramires.  Risk and benefits were discussed, patient is agreeable and wishes to proceed.  2. Hold apixaban 48 hours prior to procedure, last dose will be evening of 1/10/2022.   3. Take 325 mg aspirin the night prior and morning of procedure.   4. Hold lasix and spironolactone morning of procedure.   5. Follow-up with me on 1/20/2023.          Chanel Mares DNP, APRN, CNP  Page: [phone] (8a-5p M-F)    Orders this Visit:  No orders of the defined types were placed in this encounter.    No orders of the defined types were placed in this encounter.    Medications Discontinued During This Encounter   Medication Reason     melatonin 3 MG tablet Medication Reconciliation Clean Up       Today's clinic visit entailed:  Review of the result(s) of each unique test - BMP, CBC, echo, CT  Ordering of each unique test  Prescription drug management  35 minutes spent on the date of the encounter doing chart review and review of test results   Provider  Link to [facility] Help Grid     The level of medical decision making during this visit was of moderate complexity.           Review of Systems:     Review of Systems:  Skin:        Eyes:       ENT:       Respiratory:  Negative    Cardiovascular:    Positive for;edema  Gastroenterology:      Genitourinary:       Musculoskeletal:       Neurologic:       Psychiatric:       Heme/Lymph/Imm:  Positive for allergies  Endocrine:  Negative              Physical Exam:   Vitals: /72   Pulse 79   Ht 1.689 m  "(5' 6.5\")   Wt 66.5 kg (146 lb 11.2 oz)   BMI 23.32 kg/m    Constitutional:  cooperative        Skin:  warm and dry to the touch        Head:  normocephalic        Eyes:  pupils equal and round        ENT:  no pallor or cyanosis        Neck:  JVP normal bilateral carotid bruit      Chest:  normal breath sounds, clear to auscultation, normal A-P diameter, normal symmetry, normal respiratory excursion, no use of accessory muscles        Cardiac: normal S1 and S2       early systolic murmur;grade 2          Abdomen:  abdomen soft        Vascular: pulses full and equal                                      Extremities and Back:           Neurological:  affect appropriate;no gross motor deficits             Medications:     Current Outpatient Medications   Medication Sig Dispense Refill     acetaminophen (TYLENOL) 325 MG tablet Take 2 tablets (650 mg) by mouth every 6 hours as needed for mild pain or other (and adjunct with moderate or severe pain or per patient request)       allopurinol (ZYLOPRIM) 100 MG tablet TAKE 2 TABLETS BY MOUTH  DAILY 180 tablet 3     amLODIPine (NORVASC) 5 MG tablet Take 1 tablet (5 mg) by mouth daily 90 tablet 3     apixaban ANTICOAGULANT (ELIQUIS) 2.5 MG tablet Take 1 tablet (2.5 mg) by mouth 2 times daily 180 tablet 3     atorvastatin (LIPITOR) 20 MG tablet Take 1 tablet (20 mg) by mouth daily 30 tablet 0     Cholecalciferol (VITAMIN D3) 25 MCG (1000 UT) CAPS Take 1 capsule by mouth daily OTC dose unknown       fenofibrate (TRIGLIDE/LOFIBRA) 160 MG tablet TAKE 1 TABLET BY MOUTH  DAILY 90 tablet 3     ferrous gluconate (FERGON) 324 (38 Fe) MG tablet Take 1 tablet (324 mg) by mouth daily (with breakfast) for 90 days 90 tablet 3     furosemide (LASIX) 20 MG tablet Take 1 tablet (20 mg) by mouth every 48 hours AND 0.5 tablets (10 mg) every 48 hours. (alternates 1 pill with 1/2 pill every other day) 30 tablet 0     Multiple Vitamin (MULTIVITAMIN ADULT PO) Take 1 tablet by mouth daily       " Multiple Vitamins-Minerals (PRESERVISION AREDS 2 PO) Take 1 tablet by mouth 2 times daily       spironolactone (ALDACTONE) 25 MG tablet TAKE 1 TABLET BY MOUTH  DAILY 90 tablet 3       Family History   Problem Relation Age of Onset     Myocardial Infarction Mother      Rheumatic fever Father      Cerebrovascular Disease Brother        Social History     Socioeconomic History     Marital status:      Spouse name: Not on file     Number of children: Not on file     Years of education: Not on file     Highest education level: Not on file   Occupational History     Not on file   Tobacco Use     Smoking status: Former     Types: Cigars     Quit date: 1980     Years since quittin.0     Smokeless tobacco: Never   Vaping Use     Vaping Use: Never used   Substance and Sexual Activity     Alcohol use: Yes     Comment: occ.     Drug use: No     Sexual activity: Not Currently     Partners: Female   Other Topics Concern     Parent/sibling w/ CABG, MI or angioplasty before 65F 55M? Yes   Social History Narrative     Not on file     Social Determinants of Health     Financial Resource Strain: Not on file   Food Insecurity: Not on file   Transportation Needs: Not on file   Physical Activity: Not on file   Stress: Not on file   Social Connections: Not on file   Intimate Partner Violence: Not on file   Housing Stability: Not on file            Past Medical History:     Past Medical History:   Diagnosis Date     Arthritis     rhuematoid     Coronary artery disease     triple bypass      CRF (chronic renal failure)      Gastro-oesophageal reflux disease      Gout      Hyperlipidemia      Hypertension      Nocturia               Past Surgical History:     Past Surgical History:   Procedure Laterality Date     CARDIAC SURGERY       CHOLECYSTECTOMY       COLONOSCOPY       LAPAROTOMY EXPLORATORY N/A 2020    Procedure: EXPLORATORY LAPAROTOMY, BOWEL RESECTION;  Surgeon: Bull Rachel MD;  Location:  OR      LAPAROTOMY, LYSIS ADHESIONS, COMBINED N/A 6/21/2020    Procedure: EXPLORATORY LAPAROTOMY WITH LYSIS OF ADHESIONS;  Surgeon: Jose Reed MD;  Location:  OR     ORTHOPEDIC SURGERY       PHACOEMULSIFICATION CLEAR CORNEA WITH TORIC INTRAOCULAR LENS IMPLANT  1/30/2012    Procedure:PHACOEMULSIFICATION CLEAR CORNEA WITH TORIC INTRAOCULAR LENS IMPLANT; LEFT PHACOEMULSIFICATION CLEAR CORNEA WITH DELUXE  TORIC INTRAOCULAR LENS IMPLANT ; Surgeon:BRANDO HIGHTOWER; Location:[facility]     PHACOEMULSIFICATION CLEAR CORNEA WITH TORIC INTRAOCULAR LENS IMPLANT  2/13/2012    Procedure:PHACOEMULSIFICATION CLEAR CORNEA WITH TORIC INTRAOCULAR LENS IMPLANT; RIGHT PHACOEMULSIFICATION CLEAR CORNEA WITH TORIC INTRAOCULAR LENS IMPLANT ; Surgeon:BRANDO HIGHTOWER; Location:[facility]     VASCULAR SURGERY                Allergies:   Avocado, Ciprofloxacin, and Penicillins       Data:   All laboratory data reviewed:    Recent Labs   Lab Test 10/11/22  1358 10/25/21  1417 01/27/21  1154 12/21/20  1442 11/05/20  1300 10/08/20  1428 07/08/20  1714 05/21/18  1042   LDL  --  50 58  --   --   --   --  43   HDL  --  24* 34*  --   --   --   --  33*   NHDL  --  72 73  --   --   --   --  55   CHOL  --  96 107  --   --   --   --  88   TRIG  --  111 76  --   --   --   --  59   TSH  --   --   --   --   --   --   --  2.07   NTBNP  --   --   --  3,415* 4,938* 6,427*   < >  --    IRON 45*  --   --  67  --   --   --   --      --   --  429  --   --   --   --    IRONSAT 12*  --   --  16  --   --   --   --    RICKEY 103  --   --   --   --   --   --   --     < > = values in this interval not displayed.       Lab Results   Component Value Date    WBC 9.1 10/11/2022    WBC 7.7 12/21/2020    RBC 3.60 (L) 10/11/2022    RBC 3.61 (L) 12/21/2020    HGB 10.3 (L) 10/11/2022    HGB 10.7 (L) 12/21/2020    HCT 32.5 (L) 10/11/2022    HCT 32.9 (L) 12/21/2020    MCV 90 10/11/2022    MCV 91 12/21/2020    MCH 28.6 10/11/2022    MCH 29.6 12/21/2020    MCHC 31.7 10/11/2022     MCHC 32.5 12/21/2020    RDW 17.8 (H) 10/11/2022    RDW 17.9 (H) 12/21/2020     10/11/2022     12/21/2020       Lab Results   Component Value Date     11/23/2022     12/21/2020    POTASSIUM 4.2 11/23/2022    POTASSIUM 5.2 12/21/2020    CHLORIDE 111 (H) 11/23/2022    CHLORIDE 111 (H) 12/21/2020    CO2 25 11/23/2022    CO2 21 12/21/2020    ANIONGAP 7 11/23/2022    ANIONGAP 8 12/21/2020     (H) 11/23/2022     (H) 12/21/2020    BUN 37 (H) 11/23/2022    BUN 51 (H) 12/21/2020    CR 2.1 (H) 12/13/2022    CR 1.82 (H) 11/23/2022    CR 2.35 (H) 12/21/2020    GFRESTIMATED 30 (L) 12/13/2022    GFRESTIMATED 24 (L) 12/21/2020    GFRESTBLACK 28 (L) 12/21/2020    AGUSTÍN 9.3 11/23/2022    AGUSTÍN 9.5 12/21/2020      Lab Results   Component Value Date    AST 29 11/23/2022    AST 24 07/02/2020    ALT 20 11/23/2022    ALT 21 07/02/2020       Lab Results   Component Value Date    A1C 5.6 10/28/2021    A1C 5.9 05/26/2016       Lab Results   Component Value Date    INR 1.06 05/06/2011    INR 0.98 05/02/2010

## 2023-01-05 NOTE — LETTER
1/5/2023    Abdoulaye Redman MD  [address]  [facility] [address]    RE: Trevor Soni       Dear Colleague,     I had the pleasure of seeing Trevor Soni in the [facility].  Cardiology Clinic Progress Note  Trevor Soni MRN# [id number]   YOB: 1933 Age: 89 year old     Primary cardiologist: Dr. Ravi     Reason for visit: H&P for coronary angiogram    History of presenting illness:    Trevor Soni is a pleasant 89 year old patient with past medical history significant for coronary disease s/p CABG x 3 (2001), paroxysmal atrial fibrillation, R CEA (2001), stage IV CKD, and severe aortic stenosis who was recently referred to our structural clinic for evaluation of transcatheter aortic valve replacement.  He is here today to discuss upcoming coronary angiogram that is being done as part of that work-up.    His aortic stenosis has been followed closely by Dr. Ravi over the years.  His most recent echocardiogram showed progression of his aortic stenosis, now meeting severe criteria characterized by valve area of 0.8 cm2, mean gradient of 39 mmHg, dimensionless index of 0.25, peak V of 3.4 m/s.  His LV function is preserved with an EF of 60 to 65%.    He was initially reluctant to pursue evaluation for his TAVR due to the wish to avoid contrast nephropathy and the possibility of the need for hemodialysis given his significant underlying renal dysfunction.  However, he has since been seen by nephrology who felt it was reasonable to pursue TAVR despite his underlying kidney disease.    Today the patient reports ongoing fatigue and dyspnea with exertion over the last several months.  He denies any chest pain, syncope or near syncope, or palpitations.  He has no PND orthopnea.  His blood pressure is well controlled today.  His weight is stable.    I reviewed his most recent labs.  His BMP shows creatinine of 1.82, BUN 37, and normal electrolytes.  CBC demonstrates a  hemoglobin of 10.3, and normal platelet count.         Assessment and Plan:     ASSESSMENT:    1. Severe aortic stenosis.  Characterized with mean gradient 39, NANDA of 0.8, dimensionless index of 0.25, normal stroke-volume index.  He has chronic fatigue and exertional dyspnea.  Currently undergoing evaluation for TAVR.  2. Paroxysmal atrial fibrillation.  On apixaban 2.5 twice daily for stroke prophylaxis.  3. CAD s/p CABG (LIMA-diagonal branch, SVG-PDA, SVG-LCx) in 2001. On atorvastatin 20 mg daily.  4. CKD stage IV. Most recent BMP with cr 1.8, baseline appears to be 1.8-2.2.   5. S/p R CEA in 2001. On statin therapy.      PLAN:     1. Plan for coronary angiogram on 1/13/2023 with Dr. Ramires.  Risk and benefits were discussed, patient is agreeable and wishes to proceed.  2. Hold apixaban 48 hours prior to procedure, last dose will be evening of 1/10/2022.   3. Take 325 mg aspirin the night prior and morning of procedure.   4. Hold lasix and spironolactone morning of procedure.   5. Follow-up with me on 1/20/2023.          Chanel Mares, BEVERLEY, APRN, CNP  Page: [phone] (8a-5p M-F)    Orders this Visit:  No orders of the defined types were placed in this encounter.    No orders of the defined types were placed in this encounter.    Medications Discontinued During This Encounter   Medication Reason     melatonin 3 MG tablet Medication Reconciliation Clean Up       Today's clinic visit entailed:  Review of the result(s) of each unique test - BMP, CBC, echo, CT  Ordering of each unique test  Prescription drug management  35 minutes spent on the date of the encounter doing chart review and review of test results   Provider  Link to [facility] Help Grid     The level of medical decision making during this visit was of moderate complexity.           Review of Systems:     Review of Systems:  Skin:        Eyes:       ENT:       Respiratory:  Negative    Cardiovascular:    Positive for;edema  Gastroenterology:     "  Genitourinary:       Musculoskeletal:       Neurologic:       Psychiatric:       Heme/Lymph/Imm:  Positive for allergies  Endocrine:  Negative              Physical Exam:   Vitals: /72   Pulse 79   Ht 1.689 m (5' 6.5\")   Wt 66.5 kg (146 lb 11.2 oz)   BMI 23.32 kg/m    Constitutional:  cooperative        Skin:  warm and dry to the touch        Head:  normocephalic        Eyes:  pupils equal and round        ENT:  no pallor or cyanosis        Neck:  JVP normal bilateral carotid bruit      Chest:  normal breath sounds, clear to auscultation, normal A-P diameter, normal symmetry, normal respiratory excursion, no use of accessory muscles        Cardiac: normal S1 and S2       early systolic murmur;grade 2          Abdomen:  abdomen soft        Vascular: pulses full and equal                                      Extremities and Back:           Neurological:  affect appropriate;no gross motor deficits             Medications:     Current Outpatient Medications   Medication Sig Dispense Refill     acetaminophen (TYLENOL) 325 MG tablet Take 2 tablets (650 mg) by mouth every 6 hours as needed for mild pain or other (and adjunct with moderate or severe pain or per patient request)       allopurinol (ZYLOPRIM) 100 MG tablet TAKE 2 TABLETS BY MOUTH  DAILY 180 tablet 3     amLODIPine (NORVASC) 5 MG tablet Take 1 tablet (5 mg) by mouth daily 90 tablet 3     apixaban ANTICOAGULANT (ELIQUIS) 2.5 MG tablet Take 1 tablet (2.5 mg) by mouth 2 times daily 180 tablet 3     atorvastatin (LIPITOR) 20 MG tablet Take 1 tablet (20 mg) by mouth daily 30 tablet 0     Cholecalciferol (VITAMIN D3) 25 MCG (1000 UT) CAPS Take 1 capsule by mouth daily OTC dose unknown       fenofibrate (TRIGLIDE/LOFIBRA) 160 MG tablet TAKE 1 TABLET BY MOUTH  DAILY 90 tablet 3     ferrous gluconate (FERGON) 324 (38 Fe) MG tablet Take 1 tablet (324 mg) by mouth daily (with breakfast) for 90 days 90 tablet 3     furosemide (LASIX) 20 MG tablet Take 1 tablet " (20 mg) by mouth every 48 hours AND 0.5 tablets (10 mg) every 48 hours. (alternates 1 pill with 1/2 pill every other day) 30 tablet 0     Multiple Vitamin (MULTIVITAMIN ADULT PO) Take 1 tablet by mouth daily       Multiple Vitamins-Minerals (PRESERVISION AREDS 2 PO) Take 1 tablet by mouth 2 times daily       spironolactone (ALDACTONE) 25 MG tablet TAKE 1 TABLET BY MOUTH  DAILY 90 tablet 3       Family History   Problem Relation Age of Onset     Myocardial Infarction Mother      Rheumatic fever Father      Cerebrovascular Disease Brother        Social History     Socioeconomic History     Marital status:      Spouse name: Not on file     Number of children: Not on file     Years of education: Not on file     Highest education level: Not on file   Occupational History     Not on file   Tobacco Use     Smoking status: Former     Types: Cigars     Quit date: 1980     Years since quittin.0     Smokeless tobacco: Never   Vaping Use     Vaping Use: Never used   Substance and Sexual Activity     Alcohol use: Yes     Comment: occ.     Drug use: No     Sexual activity: Not Currently     Partners: Female   Other Topics Concern     Parent/sibling w/ CABG, MI or angioplasty before 65F 55M? Yes   Social History Narrative     Not on file     Social Determinants of Health     Financial Resource Strain: Not on file   Food Insecurity: Not on file   Transportation Needs: Not on file   Physical Activity: Not on file   Stress: Not on file   Social Connections: Not on file   Intimate Partner Violence: Not on file   Housing Stability: Not on file            Past Medical History:     Past Medical History:   Diagnosis Date     Arthritis     rhuematoid     Coronary artery disease     triple bypass      CRF (chronic renal failure)      Gastro-oesophageal reflux disease      Gout      Hyperlipidemia      Hypertension      Nocturia               Past Surgical History:     Past Surgical History:   Procedure Laterality Date      CARDIAC SURGERY       CHOLECYSTECTOMY       COLONOSCOPY       LAPAROTOMY EXPLORATORY N/A 6/30/2020    Procedure: EXPLORATORY LAPAROTOMY, BOWEL RESECTION;  Surgeon: Bull Rachel MD;  Location:  OR     LAPAROTOMY, LYSIS ADHESIONS, COMBINED N/A 6/21/2020    Procedure: EXPLORATORY LAPAROTOMY WITH LYSIS OF ADHESIONS;  Surgeon: Jose Reed MD;  Location:  OR     ORTHOPEDIC SURGERY       PHACOEMULSIFICATION CLEAR CORNEA WITH TORIC INTRAOCULAR LENS IMPLANT  1/30/2012    Procedure:PHACOEMULSIFICATION CLEAR CORNEA WITH TORIC INTRAOCULAR LENS IMPLANT; LEFT PHACOEMULSIFICATION CLEAR CORNEA WITH DELUXE  TORIC INTRAOCULAR LENS IMPLANT ; Surgeon:BRANDO HIGHTOWER; Location:[facility]     PHACOEMULSIFICATION CLEAR CORNEA WITH TORIC INTRAOCULAR LENS IMPLANT  2/13/2012    Procedure:PHACOEMULSIFICATION CLEAR CORNEA WITH TORIC INTRAOCULAR LENS IMPLANT; RIGHT PHACOEMULSIFICATION CLEAR CORNEA WITH TORIC INTRAOCULAR LENS IMPLANT ; Surgeon:BRANDO HIGHTOWER; Location:[facility]     VASCULAR SURGERY                Allergies:   Avocado, Ciprofloxacin, and Penicillins       Data:   All laboratory data reviewed:    Recent Labs   Lab Test 10/11/22  1358 10/25/21  1417 01/27/21  1154 12/21/20  1442 11/05/20  1300 10/08/20  1428 07/08/20  1714 05/21/18  1042   LDL  --  50 58  --   --   --   --  43   HDL  --  24* 34*  --   --   --   --  33*   NHDL  --  72 73  --   --   --   --  55   CHOL  --  96 107  --   --   --   --  88   TRIG  --  111 76  --   --   --   --  59   TSH  --   --   --   --   --   --   --  2.07   NTBNP  --   --   --  3,415* 4,938* 6,427*   < >  --    IRON 45*  --   --  67  --   --   --   --      --   --  429  --   --   --   --    IRONSAT 12*  --   --  16  --   --   --   --    RICKEY 103  --   --   --   --   --   --   --     < > = values in this interval not displayed.       Lab Results   Component Value Date    WBC 9.1 10/11/2022    WBC 7.7 12/21/2020    RBC 3.60 (L) 10/11/2022    RBC 3.61 (L) 12/21/2020    HGB 10.3  (L) 10/11/2022    HGB 10.7 (L) 12/21/2020    HCT 32.5 (L) 10/11/2022    HCT 32.9 (L) 12/21/2020    MCV 90 10/11/2022    MCV 91 12/21/2020    MCH 28.6 10/11/2022    MCH 29.6 12/21/2020    MCHC 31.7 10/11/2022    MCHC 32.5 12/21/2020    RDW 17.8 (H) 10/11/2022    RDW 17.9 (H) 12/21/2020     10/11/2022     12/21/2020       Lab Results   Component Value Date     11/23/2022     12/21/2020    POTASSIUM 4.2 11/23/2022    POTASSIUM 5.2 12/21/2020    CHLORIDE 111 (H) 11/23/2022    CHLORIDE 111 (H) 12/21/2020    CO2 25 11/23/2022    CO2 21 12/21/2020    ANIONGAP 7 11/23/2022    ANIONGAP 8 12/21/2020     (H) 11/23/2022     (H) 12/21/2020    BUN 37 (H) 11/23/2022    BUN 51 (H) 12/21/2020    CR 2.1 (H) 12/13/2022    CR 1.82 (H) 11/23/2022    CR 2.35 (H) 12/21/2020    GFRESTIMATED 30 (L) 12/13/2022    GFRESTIMATED 24 (L) 12/21/2020    GFRESTBLACK 28 (L) 12/21/2020    AGUSTÍN 9.3 11/23/2022    AGUSTÍN 9.5 12/21/2020      Lab Results   Component Value Date    AST 29 11/23/2022    AST 24 07/02/2020    ALT 20 11/23/2022    ALT 21 07/02/2020       Lab Results   Component Value Date    A1C 5.6 10/28/2021    A1C 5.9 05/26/2016       Lab Results   Component Value Date    INR 1.06 05/06/2011    INR 0.98 05/02/2010       Thank you for allowing me to participate in the care of your patient.      Sincerely,     Chanel Mares, [facility]  cc:   No referring provider defined for this encounter.

## 2023-01-05 NOTE — PATIENT INSTRUCTIONS
Coronary angiogram prep instructions.     You are scheduled for a Coronary Angio w/possible PCI at [facility] - [address], [place] [address] - Main Entrance of the Hospital, on 1/13/23.  Check in time is at 6:30am and procedure to follow.    No solid foods 8 hours prior to arrival. You may have clear liquids up to 2 hours prior to arrival. Please take your medications with small sips of water.     Please hold your eliquis for 2 days prior to the angiogram. Take your last dose the evening of 1/10/23. You will resume taking this after procedure.    Please hold your aldactone and lasix (water pills) the morning of procedure.   Patient is not currently taking ASA and has been advised to take one 325 mg tablet the day prior and morning of the procedure.      You will need someone to drive you home from the hospital and to stay with you for 24 hours after the procedure.   As always, please call with any questions. [phone]    Jen Hinton RN   [facility]

## 2023-01-09 ENCOUNTER — TELEPHONE (OUTPATIENT)
Dept: CARDIOLOGY | Facility: CLINIC | Age: 88
End: 2023-01-09

## 2023-01-09 DIAGNOSIS — R93.1 ABNORMAL FINDINGS DIAGNOSTIC IMAGING OF HEART AND CORONARY CIRCULATION: Primary | ICD-10-CM

## 2023-01-09 RX ORDER — SODIUM CHLORIDE 9 MG/ML
INJECTION, SOLUTION INTRAVENOUS CONTINUOUS
Status: CANCELLED | OUTPATIENT
Start: 2023-01-09

## 2023-01-09 RX ORDER — ASPIRIN 81 MG/1
243 TABLET, CHEWABLE ORAL ONCE
Status: CANCELLED | OUTPATIENT
Start: 2023-01-09

## 2023-01-09 RX ORDER — ASPIRIN 325 MG
325 TABLET ORAL ONCE
Status: CANCELLED | OUTPATIENT
Start: 2023-01-09 | End: 2023-01-09

## 2023-01-09 RX ORDER — LIDOCAINE 40 MG/G
CREAM TOPICAL
Status: CANCELLED | OUTPATIENT
Start: 2023-01-09

## 2023-01-09 RX ORDER — POTASSIUM CHLORIDE 1500 MG/1
20 TABLET, EXTENDED RELEASE ORAL
Status: CANCELLED | OUTPATIENT
Start: 2023-01-09

## 2023-01-09 NOTE — TELEPHONE ENCOUNTER
Called patient's daughter Bianka. She confirmed that they received the pre-angiogram instructions in his AVS following his office visit with Chanel Mares NP last week. We reviewed these over the phone again and all questions were answered.     Orders entered.

## 2023-01-13 ENCOUNTER — HOSPITAL ENCOUNTER (OUTPATIENT)
Facility: CLINIC | Age: 88
Discharge: HOME | End: 2023-01-13
Admitting: INTERNAL MEDICINE
Payer: MEDICARE

## 2023-01-13 VITALS
WEIGHT: 143 LBS | HEIGHT: 66 IN | BODY MASS INDEX: 22.98 KG/M2 | TEMPERATURE: 97.7 F | SYSTOLIC BLOOD PRESSURE: 135 MMHG | RESPIRATION RATE: 16 BRPM | HEART RATE: 70 BPM | OXYGEN SATURATION: 98 % | DIASTOLIC BLOOD PRESSURE: 70 MMHG

## 2023-01-13 DIAGNOSIS — N18.4 CRF (CHRONIC RENAL FAILURE), STAGE 4 (SEVERE) (H): Primary | ICD-10-CM

## 2023-01-13 DIAGNOSIS — I35.0 SEVERE AORTIC STENOSIS: ICD-10-CM

## 2023-01-13 DIAGNOSIS — R93.1 ABNORMAL FINDINGS DIAGNOSTIC IMAGING OF HEART AND CORONARY CIRCULATION: ICD-10-CM

## 2023-01-13 PROBLEM — Z98.890 STATUS POST CORONARY ANGIOGRAM: Status: ACTIVE | Noted: 2023-01-13

## 2023-01-13 LAB
ANION GAP SERPL CALCULATED.3IONS-SCNC: 8 MMOL/L (ref 3–14)
APTT PPP: 33 SECONDS (ref 22–38)
BUN SERPL-MCNC: 47 MG/DL (ref 7–30)
CALCIUM SERPL-MCNC: 9.7 MG/DL (ref 8.5–10.1)
CHLORIDE BLD-SCNC: 110 MMOL/L (ref 94–109)
CO2 SERPL-SCNC: 24 MMOL/L (ref 20–32)
CREAT SERPL-MCNC: 1.68 MG/DL (ref 0.66–1.25)
ERYTHROCYTE [DISTWIDTH] IN BLOOD BY AUTOMATED COUNT: 15 % (ref 10–15)
GFR SERPL CREATININE-BSD FRML MDRD: 39 ML/MIN/1.73M2
GLUCOSE BLD-MCNC: 114 MG/DL (ref 70–99)
HCT VFR BLD AUTO: 35.6 % (ref 40–53)
HGB BLD-MCNC: 11.7 G/DL (ref 13.3–17.7)
INR PPP: 1.11 (ref 0.85–1.15)
MCH RBC QN AUTO: 31.5 PG (ref 26.5–33)
MCHC RBC AUTO-ENTMCNC: 32.9 G/DL (ref 31.5–36.5)
MCV RBC AUTO: 96 FL (ref 78–100)
PLATELET # BLD AUTO: 200 10E3/UL (ref 150–450)
POTASSIUM BLD-SCNC: 4 MMOL/L (ref 3.4–5.3)
RBC # BLD AUTO: 3.72 10E6/UL (ref 4.4–5.9)
SODIUM SERPL-SCNC: 142 MMOL/L (ref 133–144)
WBC # BLD AUTO: 7.8 10E3/UL (ref 4–11)

## 2023-01-13 PROCEDURE — 93455 CORONARY ART/GRFT ANGIO S&I: CPT | Mod: 26 | Performed by: INTERNAL MEDICINE

## 2023-01-13 PROCEDURE — 250N000013 HC RX MED GY IP 250 OP 250 PS 637: Performed by: INTERNAL MEDICINE

## 2023-01-13 PROCEDURE — 250N000009 HC RX 250: Performed by: INTERNAL MEDICINE

## 2023-01-13 PROCEDURE — 272N000001 HC OR GENERAL SUPPLY STERILE: Performed by: INTERNAL MEDICINE

## 2023-01-13 PROCEDURE — 85730 THROMBOPLASTIN TIME PARTIAL: CPT | Performed by: INTERNAL MEDICINE

## 2023-01-13 PROCEDURE — 258N000003 HC RX IP 258 OP 636: Performed by: INTERNAL MEDICINE

## 2023-01-13 PROCEDURE — 36415 COLL VENOUS BLD VENIPUNCTURE: CPT | Performed by: INTERNAL MEDICINE

## 2023-01-13 PROCEDURE — 93005 ELECTROCARDIOGRAM TRACING: CPT

## 2023-01-13 PROCEDURE — 82310 ASSAY OF CALCIUM: CPT | Performed by: INTERNAL MEDICINE

## 2023-01-13 PROCEDURE — 93452 LEFT HRT CATH W/VENTRCLGRPHY: CPT | Performed by: INTERNAL MEDICINE

## 2023-01-13 PROCEDURE — 99152 MOD SED SAME PHYS/QHP 5/>YRS: CPT | Performed by: INTERNAL MEDICINE

## 2023-01-13 PROCEDURE — 85610 PROTHROMBIN TIME: CPT | Performed by: INTERNAL MEDICINE

## 2023-01-13 PROCEDURE — 99153 MOD SED SAME PHYS/QHP EA: CPT | Performed by: INTERNAL MEDICINE

## 2023-01-13 PROCEDURE — 250N000011 HC RX IP 250 OP 636: Performed by: INTERNAL MEDICINE

## 2023-01-13 PROCEDURE — 93010 ELECTROCARDIOGRAM REPORT: CPT | Performed by: INTERNAL MEDICINE

## 2023-01-13 PROCEDURE — C1769 GUIDE WIRE: HCPCS | Performed by: INTERNAL MEDICINE

## 2023-01-13 PROCEDURE — 85027 COMPLETE CBC AUTOMATED: CPT | Performed by: INTERNAL MEDICINE

## 2023-01-13 PROCEDURE — C1894 INTRO/SHEATH, NON-LASER: HCPCS | Performed by: INTERNAL MEDICINE

## 2023-01-13 PROCEDURE — 93454 CORONARY ARTERY ANGIO S&I: CPT | Performed by: INTERNAL MEDICINE

## 2023-01-13 RX ORDER — ATROPINE SULFATE 0.1 MG/ML
0.5 INJECTION INTRAVENOUS
Status: DISCONTINUED | OUTPATIENT
Start: 2023-01-13 | End: 2023-01-13 | Stop reason: HOSPADM

## 2023-01-13 RX ORDER — NALOXONE HYDROCHLORIDE 0.4 MG/ML
0.4 INJECTION, SOLUTION INTRAMUSCULAR; INTRAVENOUS; SUBCUTANEOUS
Status: DISCONTINUED | OUTPATIENT
Start: 2023-01-13 | End: 2023-01-13 | Stop reason: HOSPADM

## 2023-01-13 RX ORDER — FLUMAZENIL 0.1 MG/ML
0.2 INJECTION, SOLUTION INTRAVENOUS
Status: DISCONTINUED | OUTPATIENT
Start: 2023-01-13 | End: 2023-01-13 | Stop reason: HOSPADM

## 2023-01-13 RX ORDER — ASPIRIN 325 MG
325 TABLET ORAL ONCE
Status: COMPLETED | OUTPATIENT
Start: 2023-01-13 | End: 2023-01-13

## 2023-01-13 RX ORDER — POTASSIUM CHLORIDE 1500 MG/1
20 TABLET, EXTENDED RELEASE ORAL
Status: DISCONTINUED | OUTPATIENT
Start: 2023-01-13 | End: 2023-01-13 | Stop reason: HOSPADM

## 2023-01-13 RX ORDER — FENTANYL CITRATE 50 UG/ML
INJECTION, SOLUTION INTRAMUSCULAR; INTRAVENOUS
Status: DISCONTINUED | OUTPATIENT
Start: 2023-01-13 | End: 2023-01-13 | Stop reason: HOSPADM

## 2023-01-13 RX ORDER — NALOXONE HYDROCHLORIDE 0.4 MG/ML
0.2 INJECTION, SOLUTION INTRAMUSCULAR; INTRAVENOUS; SUBCUTANEOUS
Status: DISCONTINUED | OUTPATIENT
Start: 2023-01-13 | End: 2023-01-13 | Stop reason: HOSPADM

## 2023-01-13 RX ORDER — LIDOCAINE 40 MG/G
CREAM TOPICAL
Status: DISCONTINUED | OUTPATIENT
Start: 2023-01-13 | End: 2023-01-13 | Stop reason: HOSPADM

## 2023-01-13 RX ORDER — ACETAMINOPHEN 325 MG/1
650 TABLET ORAL EVERY 4 HOURS PRN
Status: DISCONTINUED | OUTPATIENT
Start: 2023-01-13 | End: 2023-01-13 | Stop reason: HOSPADM

## 2023-01-13 RX ORDER — ASPIRIN 81 MG/1
243 TABLET, CHEWABLE ORAL ONCE
Status: COMPLETED | OUTPATIENT
Start: 2023-01-13 | End: 2023-01-13

## 2023-01-13 RX ORDER — SODIUM CHLORIDE 9 MG/ML
INJECTION, SOLUTION INTRAVENOUS CONTINUOUS
Status: DISCONTINUED | OUTPATIENT
Start: 2023-01-13 | End: 2023-01-13 | Stop reason: HOSPADM

## 2023-01-13 RX ORDER — FENTANYL CITRATE 50 UG/ML
25 INJECTION, SOLUTION INTRAMUSCULAR; INTRAVENOUS
Status: DISCONTINUED | OUTPATIENT
Start: 2023-01-13 | End: 2023-01-13 | Stop reason: HOSPADM

## 2023-01-13 RX ADMIN — ASPIRIN 325 MG ORAL TABLET 325 MG: 325 PILL ORAL at 07:10

## 2023-01-13 RX ADMIN — SODIUM CHLORIDE: 9 INJECTION, SOLUTION INTRAVENOUS at 07:04

## 2023-01-13 ASSESSMENT — ACTIVITIES OF DAILY LIVING (ADL)
ADLS_ACUITY_SCORE: 35

## 2023-01-13 NOTE — PROGRESS NOTES
[facility] Discharge Nursing Note    Patient Information  Name: Trevor Soni  Age: 89 year old    Discharge Education:  Discharge instructions reviewed: Yes  Additional education/resources provided: no  Patient/patient representative verbalizes understanding: Yes  Patient discharging on new medications: No  Medication education completed: Yes    Discharge Plans:   Discharge location: home  Discharge ride contacted: Yes  Approximate discharge time: 1330    Discharge Criteria:  Discharge criteria met and vital signs stable: Yes    Patient Belongs:  Patient belongings returned to patient: Yes    Gerson Samuel RN

## 2023-01-13 NOTE — PROGRESS NOTES
Care Suites Post Procedure Note    Patient Information  Name: Trevor Soni  Age: 89 year old    Post Procedure  Time patient returned to Care Suites: 0930  Concerns/abnormal assessment: none  If abnormal assessment, provider notified: N/A  Plan/Other: discharge at 1330    Gerson Samuel RN

## 2023-01-13 NOTE — PROGRESS NOTES
Care Suites Admission Nursing Note    Patient Information  Name: Trevor Soni  Age: 89 year old  Reason for admission: heart cath  Care Suites arrival time: 0630    Visitor Information  Name: frank  Informed of visitor restrictions: Yes  1 visitor allowed per patient   Visitor must screen negative for COVID symptoms   Visitor must wear a mask  Waiting rooms closed to visitors    Patient Admission/Assessment   Pre-procedure assessment complete: Yes  If abnormal assessment/labs, provider notified: N/A  NPO: Yes  Medications held per instructions/orders: Yes  Consent: deferred  If applicable, pregnancy test status: deferred  Patient oriented to room: Yes  Education/questions answered: Yes  Plan/other: heat cath    Discharge Planning  Discharge name/phone number: frank [phone]   Overnight post sedation caregiver: frank  Discharge location: home    Gerson Samuel RN

## 2023-01-13 NOTE — Clinical Note
Potential access sites were evaluated for patency using ultrasound.   The right femoral artery was selected. Access was obtained under with Sonosite guidance using a micropuncture 21 gauge needle with direct visualization of needle entry.

## 2023-01-13 NOTE — PRE-PROCEDURE
GENERAL PRE-PROCEDURE:   Procedure:  Cor and graft angio possible PCI  Date/Time:  1/13/2023 8:36 AM    Written consent obtained?: Yes    Risks and benefits: Risks, benefits and alternatives were discussed    Consent given by:  Patient  Patient states understanding of procedure being performed: Yes    Patient's understanding of procedure matches consent: Yes    Procedure consent matches procedure scheduled: Yes    Expected level of sedation:  Moderate  Appropriately NPO:  Yes  ASA Class:  4  Mallampati  :  Grade 2- soft palate, base of uvula, tonsillar pillars, and portion of posterior pharyngeal wall visible  Lungs:  Lungs clear with good breath sounds bilaterally  Heart:  RRR and systolic murmur  History & Physical reviewed:  History and physical reviewed and no updates needed  Statement of review:  I have reviewed the lab findings, diagnostic data, medications, and the plan for sedation

## 2023-01-13 NOTE — DISCHARGE INSTRUCTIONS

## 2023-01-16 ENCOUNTER — LAB (OUTPATIENT)
Dept: LAB | Facility: CLINIC | Age: 88
End: 2023-01-16
Payer: MEDICARE

## 2023-01-16 DIAGNOSIS — N18.4 CRF (CHRONIC RENAL FAILURE), STAGE 4 (SEVERE) (H): ICD-10-CM

## 2023-01-16 LAB
ANION GAP SERPL CALCULATED.3IONS-SCNC: 9 MMOL/L (ref 3–14)
BUN SERPL-MCNC: 36 MG/DL (ref 7–30)
CALCIUM SERPL-MCNC: 9.2 MG/DL (ref 8.5–10.1)
CHLORIDE BLD-SCNC: 108 MMOL/L (ref 94–109)
CO2 SERPL-SCNC: 24 MMOL/L (ref 20–32)
CREAT SERPL-MCNC: 1.65 MG/DL (ref 0.66–1.25)
GFR SERPL CREATININE-BSD FRML MDRD: 39 ML/MIN/1.73M2
GLUCOSE BLD-MCNC: 121 MG/DL (ref 70–99)
POTASSIUM BLD-SCNC: 3.7 MMOL/L (ref 3.4–5.3)
SODIUM SERPL-SCNC: 141 MMOL/L (ref 133–144)

## 2023-01-16 PROCEDURE — 80048 BASIC METABOLIC PNL TOTAL CA: CPT | Performed by: INTERNAL MEDICINE

## 2023-01-16 PROCEDURE — 36415 COLL VENOUS BLD VENIPUNCTURE: CPT | Performed by: INTERNAL MEDICINE

## 2023-01-17 LAB
ATRIAL RATE - MUSE: 63 BPM
DIASTOLIC BLOOD PRESSURE - MUSE: NORMAL MMHG
INTERPRETATION ECG - MUSE: NORMAL
P AXIS - MUSE: NORMAL DEGREES
PR INTERVAL - MUSE: NORMAL MS
QRS DURATION - MUSE: 130 MS
QT - MUSE: 470 MS
QTC - MUSE: 521 MS
R AXIS - MUSE: -66 DEGREES
SYSTOLIC BLOOD PRESSURE - MUSE: NORMAL MMHG
T AXIS - MUSE: 49 DEGREES
VENTRICULAR RATE- MUSE: 74 BPM

## 2023-01-20 ENCOUNTER — OFFICE VISIT (OUTPATIENT)
Dept: CARDIOLOGY | Facility: CLINIC | Age: 88
End: 2023-01-20
Payer: MEDICARE

## 2023-01-20 ENCOUNTER — TELEPHONE (OUTPATIENT)
Dept: CARDIOLOGY | Facility: CLINIC | Age: 88
End: 2023-01-20

## 2023-01-20 VITALS
HEIGHT: 66 IN | OXYGEN SATURATION: 99 % | WEIGHT: 143 LBS | HEART RATE: 80 BPM | BODY MASS INDEX: 22.98 KG/M2 | SYSTOLIC BLOOD PRESSURE: 93 MMHG | DIASTOLIC BLOOD PRESSURE: 56 MMHG

## 2023-01-20 DIAGNOSIS — I48.0 PAROXYSMAL ATRIAL FIBRILLATION (H): ICD-10-CM

## 2023-01-20 DIAGNOSIS — I35.0 SEVERE AORTIC STENOSIS BY PRIOR ECHOCARDIOGRAM: Primary | ICD-10-CM

## 2023-01-20 DIAGNOSIS — I25.10 CORONARY ARTERY DISEASE INVOLVING NATIVE CORONARY ARTERY OF NATIVE HEART WITHOUT ANGINA PECTORIS: ICD-10-CM

## 2023-01-20 DIAGNOSIS — N18.32 STAGE 3B CHRONIC KIDNEY DISEASE (H): ICD-10-CM

## 2023-01-20 PROCEDURE — 99214 OFFICE O/P EST MOD 30 MIN: CPT | Performed by: NURSE PRACTITIONER

## 2023-01-20 NOTE — PROGRESS NOTES
Cardiology Clinic Progress Note  Trevor Soni MRN# [id number]   YOB: 1933 Age: 89 year old     Primary cardiologist: Dr. Ravi     Reason for visit: s/p coronary angiogram     History of presenting illness:    Trevor Soni is a pleasant 89 year old patient with past medical history significant for coronary disease s/p CABG x 3 (2001), paroxysmal atrial fibrillation, R CEA (2001), stage IV CKD, and severe aortic stenosis who recently underwent coronary angiogram as part of TAVR evaluation that showed severe 3-vessel disease and patent grafts.     In summary, his aortic stenosis has been followed closely by Dr. Ravi over the years.  His most recent echocardiogram showed progression of his aortic stenosis, now meeting severe criteria characterized by valve area of 0.8 cm2, mean gradient of 39 mmHg, dimensionless index of 0.25, peak V of 3.4 m/s.  His LV function is preserved with an EF of 60 to 65%.    He was initially reluctant to pursue evaluation for his TAVR due to the wish to avoid contrast nephropathy and the possibility of the need for hemodialysis given his significant underlying renal dysfunction.  However, he has since been seen by nephrology who felt it was reasonable to pursue TAVR despite his underlying kidney disease.    He is here today for follow-up . He reports ongoing fatigue and dyspnea with minimal exertion.  He denies any chest pain, syncope or near syncope, or palpitations.  He has no PND orthopnea.  His R femoral artery has healed well.      His blood pressure is well controlled today.  His weight is stable.     I reviewed his most recent labs.  His BMP shows creatinine of 1.65, GFR 39, normal electrolytes.  His CBC shows hemoglobin of 11.7, platelets of 200.         Assessment and Plan:     ASSESSMENT:    1. Severe aortic stenosis.  MG 39 mmHg, NANDA of 0.8 cm2, DI of 0.25, normal stroke-volume index.  He has chronic fatigue and exertional dyspnea.  Currently undergoing  "evaluation for TAVR, awaiting dental clearance.   2. Paroxysmal atrial fibrillation.  On apixaban 2.5 twice daily for stroke prophylaxis.  3. CAD s/p CABG (LIMA-diagonal branch, SVG-PDA, SVG-LCx) in 2001. Pre-TAVR coronary angiogram showed 3-vessel CAD and patent grafts. No angina. On atorvastatin 20 mg daily.  4. CKD stage IV. Most recent BMP with cr 1.65, baseline appears to be 1.8-2.2.   5. S/p R CEA in 2001. On statin therapy.      PLAN:     1. We will present patient at our upcoming interdisciplinary valve conference with final decision and timing of TAVR.   2. Of note, he is awaiting dental clearance.          Chanel Mares DNP, APRN, CNP  Page: [phone] (8a-5p M-F)    Orders this Visit:  No orders of the defined types were placed in this encounter.    No orders of the defined types were placed in this encounter.    There are no discontinued medications.    Today's clinic visit entailed:  Review of the result(s) of each unique test - labs, coronary angiogram, cath  Ordering of each unique test  Prescription drug management  35 minutes spent on the date of the encounter doing chart review and review of test results   Provider  Link to [facility] Help Grid     The level of medical decision making during this visit was of moderate complexity.           Review of Systems:     Review of Systems:  Skin:        Eyes:       ENT:       Respiratory:  Positive for dyspnea on exertion  Cardiovascular:    Positive for;edema;fatigue  Gastroenterology:      Genitourinary:       Musculoskeletal:       Neurologic:       Psychiatric:       Heme/Lymph/Imm:  Positive for allergies  Endocrine:  Negative              Physical Exam:   Vitals: BP 93/56   Pulse 80   Ht 1.676 m (5' 6\")   Wt 64.9 kg (143 lb)   SpO2 99%   BMI 23.08 kg/m    Constitutional:  well developed        Skin:  warm and dry to the touch        Head:  normocephalic        Eyes:  pupils equal and round        ENT:  no pallor or cyanosis        Neck:  JVP normal "        Chest:  normal breath sounds, clear to auscultation, normal A-P diameter, normal symmetry, normal respiratory excursion, no use of accessory muscles        Cardiac: normal S1 and S2       early systolic murmur;grade 2          Abdomen:  abdomen soft        Vascular: pulses full and equal                                      Extremities and Back:  no edema        Neurological:  no gross motor deficits;affect appropriate             Medications:     Current Outpatient Medications   Medication Sig Dispense Refill     acetaminophen (TYLENOL) 325 MG tablet Take 2 tablets (650 mg) by mouth every 6 hours as needed for mild pain or other (and adjunct with moderate or severe pain or per patient request)       allopurinol (ZYLOPRIM) 100 MG tablet TAKE 2 TABLETS BY MOUTH  DAILY 180 tablet 3     amLODIPine (NORVASC) 5 MG tablet Take 1 tablet (5 mg) by mouth daily 90 tablet 3     apixaban ANTICOAGULANT (ELIQUIS) 2.5 MG tablet Take 1 tablet (2.5 mg) by mouth 2 times daily 180 tablet 3     atorvastatin (LIPITOR) 20 MG tablet Take 1 tablet (20 mg) by mouth daily 30 tablet 0     Cholecalciferol (VITAMIN D3) 25 MCG (1000 UT) CAPS Take 1 capsule by mouth daily OTC dose unknown       fenofibrate (TRIGLIDE/LOFIBRA) 160 MG tablet TAKE 1 TABLET BY MOUTH  DAILY 90 tablet 3     furosemide (LASIX) 20 MG tablet Take 1 tablet (20 mg) by mouth every 48 hours AND 0.5 tablets (10 mg) every 48 hours. (alternates 1 pill with 1/2 pill every other day) 30 tablet 0     Multiple Vitamin (MULTIVITAMIN ADULT PO) Take 1 tablet by mouth daily       Multiple Vitamins-Minerals (PRESERVISION AREDS 2 PO) Take 1 tablet by mouth 2 times daily       spironolactone (ALDACTONE) 25 MG tablet TAKE 1 TABLET BY MOUTH  DAILY 90 tablet 3       Family History   Problem Relation Age of Onset     Myocardial Infarction Mother      Rheumatic fever Father      Cerebrovascular Disease Brother        Social History     Socioeconomic History     Marital status:       Spouse name: Not on file     Number of children: Not on file     Years of education: Not on file     Highest education level: Not on file   Occupational History     Not on file   Tobacco Use     Smoking status: Former     Types: Cigars     Quit date: 1980     Years since quittin.0     Smokeless tobacco: Never   Vaping Use     Vaping Use: Never used   Substance and Sexual Activity     Alcohol use: Yes     Comment: occ.     Drug use: No     Sexual activity: Not Currently     Partners: Female   Other Topics Concern     Parent/sibling w/ CABG, MI or angioplasty before 65F 55M? Yes   Social History Narrative     Not on file     Social Determinants of Health     Financial Resource Strain: Not on file   Food Insecurity: Not on file   Transportation Needs: Not on file   Physical Activity: Not on file   Stress: Not on file   Social Connections: Not on file   Intimate Partner Violence: Not on file   Housing Stability: Not on file            Past Medical History:     Past Medical History:   Diagnosis Date     Arthritis     rhuematoid     Coronary artery disease     triple bypass      CRF (chronic renal failure)      Gastro-oesophageal reflux disease      Gout      Hyperlipidemia      Hypertension      Nocturia               Past Surgical History:     Past Surgical History:   Procedure Laterality Date     CARDIAC SURGERY       CHOLECYSTECTOMY       COLONOSCOPY       CV CORONARY ANGIOGRAM N/A 2023    Procedure: Coronary Angiogram;  Surgeon: Sujata Ramires MD;  Location: [facility] CARDIAC CATH LAB     CV PCI N/A 2023    Procedure: Percutaneous Coronary Intervention;  Surgeon: Sujata Ramires MD;  Location: [facility] CARDIAC CATH LAB     LAPAROTOMY EXPLORATORY N/A 2020    Procedure: EXPLORATORY LAPAROTOMY, BOWEL RESECTION;  Surgeon: Bull Rachel MD;  Location:  OR     LAPAROTOMY, LYSIS ADHESIONS, COMBINED N/A 2020    Procedure: EXPLORATORY LAPAROTOMY WITH LYSIS OF ADHESIONS;  Surgeon:  Jose Reed MD;  Location:  OR     ORTHOPEDIC SURGERY       PHACOEMULSIFICATION CLEAR CORNEA WITH TORIC INTRAOCULAR LENS IMPLANT  1/30/2012    Procedure:PHACOEMULSIFICATION CLEAR CORNEA WITH TORIC INTRAOCULAR LENS IMPLANT; LEFT PHACOEMULSIFICATION CLEAR CORNEA WITH DELUXE  TORIC INTRAOCULAR LENS IMPLANT ; Surgeon:BRNADO HIGHTOWER; Location:[facility]     PHACOEMULSIFICATION CLEAR CORNEA WITH TORIC INTRAOCULAR LENS IMPLANT  2/13/2012    Procedure:PHACOEMULSIFICATION CLEAR CORNEA WITH TORIC INTRAOCULAR LENS IMPLANT; RIGHT PHACOEMULSIFICATION CLEAR CORNEA WITH TORIC INTRAOCULAR LENS IMPLANT ; Surgeon:BRANDO HIGHTOWER; Location:[facility]     VASCULAR SURGERY                Allergies:   Avocado, Ciprofloxacin, and Penicillins       Data:   All laboratory data reviewed:    Recent Labs   Lab Test 10/11/22  1358 10/25/21  1417 01/27/21  1154 12/21/20  1442 11/05/20  1300 10/08/20  1428 07/08/20  1714 05/21/18  1042   LDL  --  50 58  --   --   --   --  43   HDL  --  24* 34*  --   --   --   --  33*   NHDL  --  72 73  --   --   --   --  55   CHOL  --  96 107  --   --   --   --  88   TRIG  --  111 76  --   --   --   --  59   TSH  --   --   --   --   --   --   --  2.07   NTBNP  --   --   --  3,415* 4,938* 6,427*   < >  --    IRON 45*  --   --  67  --   --   --   --      --   --  429  --   --   --   --    IRONSAT 12*  --   --  16  --   --   --   --    RICKEY 103  --   --   --   --   --   --   --     < > = values in this interval not displayed.       Lab Results   Component Value Date    WBC 7.8 01/13/2023    WBC 7.7 12/21/2020    RBC 3.72 (L) 01/13/2023    RBC 3.61 (L) 12/21/2020    HGB 11.7 (L) 01/13/2023    HGB 10.7 (L) 12/21/2020    HCT 35.6 (L) 01/13/2023    HCT 32.9 (L) 12/21/2020    MCV 96 01/13/2023    MCV 91 12/21/2020    MCH 31.5 01/13/2023    MCH 29.6 12/21/2020    MCHC 32.9 01/13/2023    MCHC 32.5 12/21/2020    RDW 15.0 01/13/2023    RDW 17.9 (H) 12/21/2020     01/13/2023     12/21/2020       Lab  Results   Component Value Date     01/16/2023     12/21/2020    POTASSIUM 3.7 01/16/2023    POTASSIUM 5.2 12/21/2020    CHLORIDE 108 01/16/2023    CHLORIDE 111 (H) 12/21/2020    CO2 24 01/16/2023    CO2 21 12/21/2020    ANIONGAP 9 01/16/2023    ANIONGAP 8 12/21/2020     (H) 01/16/2023     (H) 12/21/2020    BUN 36 (H) 01/16/2023    BUN 51 (H) 12/21/2020    CR 1.65 (H) 01/16/2023    CR 2.35 (H) 12/21/2020    GFRESTIMATED 39 (L) 01/16/2023    GFRESTIMATED 30 (L) 12/13/2022    GFRESTIMATED 24 (L) 12/21/2020    GFRESTBLACK 28 (L) 12/21/2020    AGUSTÍN 9.2 01/16/2023    AGUSTÍN 9.5 12/21/2020      Lab Results   Component Value Date    AST 29 11/23/2022    AST 24 07/02/2020    ALT 20 11/23/2022    ALT 21 07/02/2020       Lab Results   Component Value Date    A1C 5.6 10/28/2021    A1C 5.9 05/26/2016       Lab Results   Component Value Date    INR 1.11 01/13/2023    INR 1.06 05/06/2011    INR 0.98 05/02/2010

## 2023-01-20 NOTE — TELEPHONE ENCOUNTER
Patient's daughter, Nora, called and left a message with structural heart RN, Nidhi at 1250, just prior to patient's scheduled appointment today with Chanel Mares NP. Nora stated she has the stomach flu and was hoping that patient's appointment today could be rescheduled.     Unfortunately this message was received after patient had already checked-in and starting his office visit with Chanel Mares NP. Additionally Nora stated that she has not gotten patient in yet to see a dentist for his dental clearance appointment.     Did call Nora back and left a voicemail apologizing for missing her message prior to patient's office visit and informed her that I would update Nidhi regarding her response about the dentist.

## 2023-01-20 NOTE — LETTER
1/20/2023    Abdoulaye Redman MD  [address]  [facility] [address]    RE: Trevor Soni       Dear Colleague,     I had the pleasure of seeing Trevor Soni in the [facility].  Cardiology Clinic Progress Note  Trevor Soni MRN# [id number]   YOB: 1933 Age: 89 year old     Primary cardiologist: Dr. Ravi     Reason for visit: s/p coronary angiogram     History of presenting illness:    Trevor Soni is a pleasant 89 year old patient with past medical history significant for coronary disease s/p CABG x 3 (2001), paroxysmal atrial fibrillation, R CEA (2001), stage IV CKD, and severe aortic stenosis who recently underwent coronary angiogram as part of TAVR evaluation that showed severe 3-vessel disease and patent grafts.     In summary, his aortic stenosis has been followed closely by Dr. Ravi over the years.  His most recent echocardiogram showed progression of his aortic stenosis, now meeting severe criteria characterized by valve area of 0.8 cm2, mean gradient of 39 mmHg, dimensionless index of 0.25, peak V of 3.4 m/s.  His LV function is preserved with an EF of 60 to 65%.    He was initially reluctant to pursue evaluation for his TAVR due to the wish to avoid contrast nephropathy and the possibility of the need for hemodialysis given his significant underlying renal dysfunction.  However, he has since been seen by nephrology who felt it was reasonable to pursue TAVR despite his underlying kidney disease.    He is here today for follow-up . He reports ongoing fatigue and dyspnea with minimal exertion.  He denies any chest pain, syncope or near syncope, or palpitations.  He has no PND orthopnea.  His R femoral artery has healed well.      His blood pressure is well controlled today.  His weight is stable.     I reviewed his most recent labs.  His BMP shows creatinine of 1.65, GFR 39, normal electrolytes.  His CBC shows hemoglobin of 11.7, platelets of 200.          "Assessment and Plan:     ASSESSMENT:    1. Severe aortic stenosis.  MG 39 mmHg, NANDA of 0.8 cm2, DI of 0.25, normal stroke-volume index.  He has chronic fatigue and exertional dyspnea.  Currently undergoing evaluation for TAVR, awaiting dental clearance.   2. Paroxysmal atrial fibrillation.  On apixaban 2.5 twice daily for stroke prophylaxis.  3. CAD s/p CABG (LIMA-diagonal branch, SVG-PDA, SVG-LCx) in 2001. Pre-TAVR coronary angiogram showed 3-vessel CAD and patent grafts. No angina. On atorvastatin 20 mg daily.  4. CKD stage IV. Most recent BMP with cr 1.65, baseline appears to be 1.8-2.2.   5. S/p R CEA in 2001. On statin therapy.      PLAN:     1. We will present patient at our upcoming interdisciplinary valve conference with final decision and timing of TAVR.   2. Of note, he is awaiting dental clearance.          Chanel Mares DNP, APRN, CNP  Page: [phone] (8a-5p M-F)    Orders this Visit:  No orders of the defined types were placed in this encounter.    No orders of the defined types were placed in this encounter.    There are no discontinued medications.    Today's clinic visit entailed:  Review of the result(s) of each unique test - labs, coronary angiogram, cath  Ordering of each unique test  Prescription drug management  35 minutes spent on the date of the encounter doing chart review and review of test results   Provider  Link to [facility] Help Grid     The level of medical decision making during this visit was of moderate complexity.           Review of Systems:     Review of Systems:  Skin:        Eyes:       ENT:       Respiratory:  Positive for dyspnea on exertion  Cardiovascular:    Positive for;edema;fatigue  Gastroenterology:      Genitourinary:       Musculoskeletal:       Neurologic:       Psychiatric:       Heme/Lymph/Imm:  Positive for allergies  Endocrine:  Negative              Physical Exam:   Vitals: BP 93/56   Pulse 80   Ht 1.676 m (5' 6\")   Wt 64.9 kg (143 lb)   SpO2 99%   BMI " 23.08 kg/m    Constitutional:  well developed        Skin:  warm and dry to the touch        Head:  normocephalic        Eyes:  pupils equal and round        ENT:  no pallor or cyanosis        Neck:  JVP normal        Chest:  normal breath sounds, clear to auscultation, normal A-P diameter, normal symmetry, normal respiratory excursion, no use of accessory muscles        Cardiac: normal S1 and S2       early systolic murmur;grade 2          Abdomen:  abdomen soft        Vascular: pulses full and equal                                      Extremities and Back:  no edema        Neurological:  no gross motor deficits;affect appropriate             Medications:     Current Outpatient Medications   Medication Sig Dispense Refill     acetaminophen (TYLENOL) 325 MG tablet Take 2 tablets (650 mg) by mouth every 6 hours as needed for mild pain or other (and adjunct with moderate or severe pain or per patient request)       allopurinol (ZYLOPRIM) 100 MG tablet TAKE 2 TABLETS BY MOUTH  DAILY 180 tablet 3     amLODIPine (NORVASC) 5 MG tablet Take 1 tablet (5 mg) by mouth daily 90 tablet 3     apixaban ANTICOAGULANT (ELIQUIS) 2.5 MG tablet Take 1 tablet (2.5 mg) by mouth 2 times daily 180 tablet 3     atorvastatin (LIPITOR) 20 MG tablet Take 1 tablet (20 mg) by mouth daily 30 tablet 0     Cholecalciferol (VITAMIN D3) 25 MCG (1000 UT) CAPS Take 1 capsule by mouth daily OTC dose unknown       fenofibrate (TRIGLIDE/LOFIBRA) 160 MG tablet TAKE 1 TABLET BY MOUTH  DAILY 90 tablet 3     furosemide (LASIX) 20 MG tablet Take 1 tablet (20 mg) by mouth every 48 hours AND 0.5 tablets (10 mg) every 48 hours. (alternates 1 pill with 1/2 pill every other day) 30 tablet 0     Multiple Vitamin (MULTIVITAMIN ADULT PO) Take 1 tablet by mouth daily       Multiple Vitamins-Minerals (PRESERVISION AREDS 2 PO) Take 1 tablet by mouth 2 times daily       spironolactone (ALDACTONE) 25 MG tablet TAKE 1 TABLET BY MOUTH  DAILY 90 tablet 3       Family  History   Problem Relation Age of Onset     Myocardial Infarction Mother      Rheumatic fever Father      Cerebrovascular Disease Brother        Social History     Socioeconomic History     Marital status:      Spouse name: Not on file     Number of children: Not on file     Years of education: Not on file     Highest education level: Not on file   Occupational History     Not on file   Tobacco Use     Smoking status: Former     Types: Cigars     Quit date: 1980     Years since quittin.0     Smokeless tobacco: Never   Vaping Use     Vaping Use: Never used   Substance and Sexual Activity     Alcohol use: Yes     Comment: occ.     Drug use: No     Sexual activity: Not Currently     Partners: Female   Other Topics Concern     Parent/sibling w/ CABG, MI or angioplasty before 65F 55M? Yes   Social History Narrative     Not on file     Social Determinants of Health     Financial Resource Strain: Not on file   Food Insecurity: Not on file   Transportation Needs: Not on file   Physical Activity: Not on file   Stress: Not on file   Social Connections: Not on file   Intimate Partner Violence: Not on file   Housing Stability: Not on file            Past Medical History:     Past Medical History:   Diagnosis Date     Arthritis     rhuematoid     Coronary artery disease     triple bypass      CRF (chronic renal failure)      Gastro-oesophageal reflux disease      Gout      Hyperlipidemia      Hypertension      Nocturia               Past Surgical History:     Past Surgical History:   Procedure Laterality Date     CARDIAC SURGERY       CHOLECYSTECTOMY       COLONOSCOPY       CV CORONARY ANGIOGRAM N/A 2023    Procedure: Coronary Angiogram;  Surgeon: Sujata Ramires MD;  Location: [facility] CARDIAC CATH LAB     CV PCI N/A 2023    Procedure: Percutaneous Coronary Intervention;  Surgeon: Sujata Ramires MD;  Location: [facility] CARDIAC CATH LAB     LAPAROTOMY EXPLORATORY N/A 2020    Procedure:  EXPLORATORY LAPAROTOMY, BOWEL RESECTION;  Surgeon: Bull Rachel MD;  Location:  OR     LAPAROTOMY, LYSIS ADHESIONS, COMBINED N/A 6/21/2020    Procedure: EXPLORATORY LAPAROTOMY WITH LYSIS OF ADHESIONS;  Surgeon: Jose Reed MD;  Location:  OR     ORTHOPEDIC SURGERY       PHACOEMULSIFICATION CLEAR CORNEA WITH TORIC INTRAOCULAR LENS IMPLANT  1/30/2012    Procedure:PHACOEMULSIFICATION CLEAR CORNEA WITH TORIC INTRAOCULAR LENS IMPLANT; LEFT PHACOEMULSIFICATION CLEAR CORNEA WITH DELUXE  TORIC INTRAOCULAR LENS IMPLANT ; Surgeon:BRANDO HIGHTOWER; Location:[facility]     PHACOEMULSIFICATION CLEAR CORNEA WITH TORIC INTRAOCULAR LENS IMPLANT  2/13/2012    Procedure:PHACOEMULSIFICATION CLEAR CORNEA WITH TORIC INTRAOCULAR LENS IMPLANT; RIGHT PHACOEMULSIFICATION CLEAR CORNEA WITH TORIC INTRAOCULAR LENS IMPLANT ; Surgeon:BRANDO HIGHTOWER; Location:[facility]     VASCULAR SURGERY                Allergies:   Avocado, Ciprofloxacin, and Penicillins       Data:   All laboratory data reviewed:    Recent Labs   Lab Test 10/11/22  1358 10/25/21  1417 01/27/21  1154 12/21/20  1442 11/05/20  1300 10/08/20  1428 07/08/20  1714 05/21/18  1042   LDL  --  50 58  --   --   --   --  43   HDL  --  24* 34*  --   --   --   --  33*   NHDL  --  72 73  --   --   --   --  55   CHOL  --  96 107  --   --   --   --  88   TRIG  --  111 76  --   --   --   --  59   TSH  --   --   --   --   --   --   --  2.07   NTBNP  --   --   --  3,415* 4,938* 6,427*   < >  --    IRON 45*  --   --  67  --   --   --   --      --   --  429  --   --   --   --    IRONSAT 12*  --   --  16  --   --   --   --    RICKEY 103  --   --   --   --   --   --   --     < > = values in this interval not displayed.       Lab Results   Component Value Date    WBC 7.8 01/13/2023    WBC 7.7 12/21/2020    RBC 3.72 (L) 01/13/2023    RBC 3.61 (L) 12/21/2020    HGB 11.7 (L) 01/13/2023    HGB 10.7 (L) 12/21/2020    HCT 35.6 (L) 01/13/2023    HCT 32.9 (L) 12/21/2020    MCV 96 01/13/2023     MCV 91 12/21/2020    MCH 31.5 01/13/2023    MCH 29.6 12/21/2020    MCHC 32.9 01/13/2023    MCHC 32.5 12/21/2020    RDW 15.0 01/13/2023    RDW 17.9 (H) 12/21/2020     01/13/2023     12/21/2020       Lab Results   Component Value Date     01/16/2023     12/21/2020    POTASSIUM 3.7 01/16/2023    POTASSIUM 5.2 12/21/2020    CHLORIDE 108 01/16/2023    CHLORIDE 111 (H) 12/21/2020    CO2 24 01/16/2023    CO2 21 12/21/2020    ANIONGAP 9 01/16/2023    ANIONGAP 8 12/21/2020     (H) 01/16/2023     (H) 12/21/2020    BUN 36 (H) 01/16/2023    BUN 51 (H) 12/21/2020    CR 1.65 (H) 01/16/2023    CR 2.35 (H) 12/21/2020    GFRESTIMATED 39 (L) 01/16/2023    GFRESTIMATED 30 (L) 12/13/2022    GFRESTIMATED 24 (L) 12/21/2020    GFRESTBLACK 28 (L) 12/21/2020    AGUSTÍN 9.2 01/16/2023    AGUSTÍN 9.5 12/21/2020      Lab Results   Component Value Date    AST 29 11/23/2022    AST 24 07/02/2020    ALT 20 11/23/2022    ALT 21 07/02/2020       Lab Results   Component Value Date    A1C 5.6 10/28/2021    A1C 5.9 05/26/2016       Lab Results   Component Value Date    INR 1.11 01/13/2023    INR 1.06 05/06/2011    INR 0.98 05/02/2010       Thank you for allowing me to participate in the care of your patient.      Sincerely,     Chanel Mares, CNP     [facility]

## 2023-01-20 NOTE — PATIENT INSTRUCTIONS
eN RN ([phone]), Vanita RN ([phone]), and Jen STRATTON ([phone])    Scheduling phone number: [phone]    Reminder: Please bring in all current medications, over the counter supplements and vitamin bottles to your next appointment.-    It was a pleasure seeing you today!     Chanel Mares, MEENA  1/20/2023    -

## 2023-01-28 DIAGNOSIS — I10 BENIGN ESSENTIAL HYPERTENSION: ICD-10-CM

## 2023-01-30 RX ORDER — FUROSEMIDE 20 MG
TABLET ORAL
Qty: 68 TABLET | Refills: 3 | Status: SHIPPED | OUTPATIENT
Start: 2023-01-30 | End: 2023-03-17 | Stop reason: SINTOL

## 2023-02-16 ENCOUNTER — TELEPHONE (OUTPATIENT)
Dept: CARDIOLOGY | Facility: CLINIC | Age: 88
End: 2023-02-16
Payer: MEDICARE

## 2023-02-16 DIAGNOSIS — I35.0 SEVERE AORTIC STENOSIS BY PRIOR ECHOCARDIOGRAM: Primary | ICD-10-CM

## 2023-02-16 NOTE — TELEPHONE ENCOUNTER
Patient was presented this morning at the multidisciplinary valve conference. Plan is to proceed with echocardiogram to confirm severity of AS. If severe, will proceed with TAVR.     Valve: Nunes  Approach: R TF, consider manta  Sedation: Moderate Sedation    Left voicemail for patient's daughter Bianka requesting call back to discuss.     Addendum: Received return call form patient's daughter Bianka. Reviewed plan to proceed with echo. She states understanding. Kelsi will contact her to schedule.     Jen Hinton RN  Structural Heart Coordinator  [facility]

## 2023-03-01 ENCOUNTER — HOSPITAL ENCOUNTER (OUTPATIENT)
Dept: CARDIOLOGY | Facility: CLINIC | Age: 88
Discharge: HOME | End: 2023-03-01
Attending: INTERNAL MEDICINE | Admitting: INTERNAL MEDICINE
Payer: MEDICARE

## 2023-03-01 DIAGNOSIS — I35.0 SEVERE AORTIC STENOSIS BY PRIOR ECHOCARDIOGRAM: ICD-10-CM

## 2023-03-01 LAB — LVEF ECHO: NORMAL

## 2023-03-01 PROCEDURE — 93306 TTE W/DOPPLER COMPLETE: CPT | Mod: 26 | Performed by: INTERNAL MEDICINE

## 2023-03-01 PROCEDURE — 93306 TTE W/DOPPLER COMPLETE: CPT

## 2023-03-02 ENCOUNTER — TELEPHONE (OUTPATIENT)
Dept: CARDIOLOGY | Facility: CLINIC | Age: 88
End: 2023-03-02
Payer: MEDICARE

## 2023-03-02 DIAGNOSIS — I35.0 SEVERE AORTIC STENOSIS BY PRIOR ECHOCARDIOGRAM: Primary | ICD-10-CM

## 2023-03-02 DIAGNOSIS — R06.00 DYSPNEA, UNSPECIFIED: ICD-10-CM

## 2023-03-02 NOTE — TELEPHONE ENCOUNTER
Patient had an echocardiogram yesterday confirming severe aortic stenosis. Will proceed with H&P, labs and TAVR procedure. He does need to have a bailout discussion with CV surgery prior to this as well. Will send message to scheduling to arrange.

## 2023-03-13 DIAGNOSIS — N18.4 CHRONIC KIDNEY DISEASE, STAGE IV (SEVERE) (H): Primary | ICD-10-CM

## 2023-03-16 LAB
ABO/RH(D): NORMAL
ANTIBODY SCREEN: NEGATIVE
SPECIMEN EXPIRATION DATE: NORMAL

## 2023-03-17 ENCOUNTER — OFFICE VISIT (OUTPATIENT)
Dept: NEPHROLOGY | Facility: CLINIC | Age: 88
End: 2023-03-17
Attending: INTERNAL MEDICINE
Payer: MEDICARE

## 2023-03-17 ENCOUNTER — LAB (OUTPATIENT)
Dept: LAB | Facility: CLINIC | Age: 88
End: 2023-03-17
Payer: MEDICARE

## 2023-03-17 VITALS
DIASTOLIC BLOOD PRESSURE: 65 MMHG | BODY MASS INDEX: 23.06 KG/M2 | HEIGHT: 66 IN | WEIGHT: 143.5 LBS | SYSTOLIC BLOOD PRESSURE: 123 MMHG | HEART RATE: 92 BPM

## 2023-03-17 DIAGNOSIS — R06.00 DYSPNEA, UNSPECIFIED: ICD-10-CM

## 2023-03-17 DIAGNOSIS — I35.0 SEVERE AORTIC STENOSIS BY PRIOR ECHOCARDIOGRAM: ICD-10-CM

## 2023-03-17 DIAGNOSIS — N18.4 CHRONIC KIDNEY DISEASE (CKD), STAGE IV (SEVERE) (H): ICD-10-CM

## 2023-03-17 DIAGNOSIS — N18.4 CHRONIC KIDNEY DISEASE, STAGE IV (SEVERE) (H): ICD-10-CM

## 2023-03-17 DIAGNOSIS — N17.9 ACUTE KIDNEY INJURY (H): Primary | ICD-10-CM

## 2023-03-17 DIAGNOSIS — D64.9 ANEMIA, UNSPECIFIED TYPE: ICD-10-CM

## 2023-03-17 LAB
ALBUMIN MFR UR ELPH: 30.2 MG/DL (ref 1–14)
ALBUMIN SERPL BCG-MCNC: 4.3 G/DL (ref 3.5–5.2)
ALBUMIN UR-MCNC: 20 MG/DL
ALP SERPL-CCNC: 91 U/L (ref 40–129)
ALT SERPL W P-5'-P-CCNC: 14 U/L (ref 10–50)
ANION GAP SERPL CALCULATED.3IONS-SCNC: 12 MMOL/L (ref 7–15)
APPEARANCE UR: CLEAR
AST SERPL W P-5'-P-CCNC: 30 U/L (ref 10–50)
BILIRUB SERPL-MCNC: 0.6 MG/DL
BILIRUB UR QL STRIP: NEGATIVE
BUN SERPL-MCNC: 55.1 MG/DL (ref 8–23)
CALCIUM SERPL-MCNC: 10.1 MG/DL (ref 8.8–10.2)
CHLORIDE SERPL-SCNC: 106 MMOL/L (ref 98–107)
COLOR UR AUTO: ABNORMAL
CREAT SERPL-MCNC: 2.29 MG/DL (ref 0.67–1.17)
CREAT UR-MCNC: 55.8 MG/DL
DEPRECATED HCO3 PLAS-SCNC: 22 MMOL/L (ref 22–29)
ERYTHROCYTE [DISTWIDTH] IN BLOOD BY AUTOMATED COUNT: 14.8 % (ref 10–15)
GFR SERPL CREATININE-BSD FRML MDRD: 27 ML/MIN/1.73M2
GLUCOSE SERPL-MCNC: 101 MG/DL (ref 70–99)
GLUCOSE UR STRIP-MCNC: NEGATIVE MG/DL
HCT VFR BLD AUTO: 37 % (ref 40–53)
HGB BLD-MCNC: 12.1 G/DL (ref 13.3–17.7)
HGB UR QL STRIP: NEGATIVE
HYALINE CASTS: 1 /LPF
INR PPP: 1.24 (ref 0.85–1.15)
KETONES UR STRIP-MCNC: NEGATIVE MG/DL
LEUKOCYTE ESTERASE UR QL STRIP: NEGATIVE
MCH RBC QN AUTO: 30.9 PG (ref 26.5–33)
MCHC RBC AUTO-ENTMCNC: 32.7 G/DL (ref 31.5–36.5)
MCV RBC AUTO: 94 FL (ref 78–100)
NITRATE UR QL: NEGATIVE
NT-PROBNP SERPL-MCNC: 3614 PG/ML (ref 0–1800)
PH UR STRIP: 5.5 [PH] (ref 5–7)
PHOSPHATE SERPL-MCNC: 3.7 MG/DL (ref 2.5–4.5)
PLATELET # BLD AUTO: 229 10E3/UL (ref 150–450)
POTASSIUM SERPL-SCNC: 4.7 MMOL/L (ref 3.4–5.3)
PROT SERPL-MCNC: 7.4 G/DL (ref 6.4–8.3)
PROT/CREAT 24H UR: 0.54 MG/MG CR (ref 0–0.2)
RBC # BLD AUTO: 3.92 10E6/UL (ref 4.4–5.9)
RBC URINE: 1 /HPF
SODIUM SERPL-SCNC: 140 MMOL/L (ref 136–145)
SP GR UR STRIP: 1.01 (ref 1–1.03)
SQUAMOUS EPITHELIAL: <1 /HPF
UROBILINOGEN UR STRIP-MCNC: NORMAL MG/DL
WBC # BLD AUTO: 7.7 10E3/UL (ref 4–11)
WBC URINE: <1 /HPF

## 2023-03-17 PROCEDURE — 85610 PROTHROMBIN TIME: CPT | Performed by: PATHOLOGY

## 2023-03-17 PROCEDURE — 84156 ASSAY OF PROTEIN URINE: CPT | Performed by: PATHOLOGY

## 2023-03-17 PROCEDURE — 99215 OFFICE O/P EST HI 40 MIN: CPT | Performed by: INTERNAL MEDICINE

## 2023-03-17 PROCEDURE — 80053 COMPREHEN METABOLIC PANEL: CPT | Performed by: PATHOLOGY

## 2023-03-17 PROCEDURE — 83880 ASSAY OF NATRIURETIC PEPTIDE: CPT | Performed by: PATHOLOGY

## 2023-03-17 PROCEDURE — 85027 COMPLETE CBC AUTOMATED: CPT | Performed by: PATHOLOGY

## 2023-03-17 PROCEDURE — G0463 HOSPITAL OUTPT CLINIC VISIT: HCPCS | Performed by: INTERNAL MEDICINE

## 2023-03-17 PROCEDURE — 81001 URINALYSIS AUTO W/SCOPE: CPT | Performed by: PATHOLOGY

## 2023-03-17 PROCEDURE — 86901 BLOOD TYPING SEROLOGIC RH(D): CPT | Performed by: INTERNAL MEDICINE

## 2023-03-17 PROCEDURE — 36415 COLL VENOUS BLD VENIPUNCTURE: CPT | Performed by: PATHOLOGY

## 2023-03-17 PROCEDURE — 84100 ASSAY OF PHOSPHORUS: CPT | Performed by: PATHOLOGY

## 2023-03-17 RX ORDER — FERROUS GLUCONATE 324(38)MG
324 TABLET ORAL DAILY
COMMUNITY
Start: 2023-01-12 | End: 2023-10-25

## 2023-03-17 ASSESSMENT — PAIN SCALES - GENERAL: PAINLEVEL: NO PAIN (0)

## 2023-03-17 NOTE — LETTER
3/17/2023       RE: Trevor Soni  [address] [facility] [address]     Dear Colleague,    Thank you for referring your patient, Trevor Soni, to the [facility] [place] at [facility]. Please see a copy of my visit note below.      Nephrology Clinic Note  March 17, 2023    I was asked to see this patient by Dr. Redman     CC: CKD     HPI: Trevor Soni is a 89 year old male with PMH significant for HTN, CAD, HFpEF, AAA with out rupture, atrial flutter, aortic stenosis, gout who presents for evaluation and management of CKD.     Patient presented to nephrology clinic with her daughter who participated in obtaining history and medical decision making.    Patient endorsed that he is feeling stable since her last visit.  No new symptoms today.  Is complaining that his sleep pattern has been disrupted and wake up multiple times during the night because he is not sleeping.  Later in the afternoon during the day he doses off.  Denied any other systemic symptoms including recurrent fevers, chills, nausea, vomiting, diarrhea, chest pain or breathing difficulties.  No new infections or medications since her last visit.  Continue to check blood pressures at home and never had any low blood pressures in the recent past.  Also denied any dizziness/lightheadedness and/or blurry vision in the recent past.  He takes 20 mg of alternating with 10 mg every day.  Denied any OTC medications.  Patient plan to have Aortic valve repair on April 11, 2023.    - History of urinary symptoms: no, may wake up 2-3 times per night, feels completely empty the bladder  - History of Hematuria: no  - Swelling: no  - Hx of UTIs: no  - Hx of stones: one time 20 years ago and none since then  - Rashes/Joint pain: just on left upper arm after scratching,   - Family hx of kidney disease: no  - NSAID use: no       Allergies   Allergen Reactions    Avocado      Ciprofloxacin Itching    Penicillins Itching       acetaminophen (TYLENOL) 325 MG tablet, Take 2 tablets (650 mg) by mouth every 6 hours as needed for mild pain or other (and adjunct with moderate or severe pain or per patient request)  allopurinol (ZYLOPRIM) 100 MG tablet, TAKE 2 TABLETS BY MOUTH  DAILY  amLODIPine (NORVASC) 5 MG tablet, Take 1 tablet (5 mg) by mouth daily  apixaban ANTICOAGULANT (ELIQUIS) 2.5 MG tablet, Take 1 tablet (2.5 mg) by mouth 2 times daily  atorvastatin (LIPITOR) 20 MG tablet, Take 1 tablet (20 mg) by mouth daily  Cholecalciferol (VITAMIN D3) 25 MCG (1000 UT) CAPS, Take 1 capsule by mouth daily OTC dose unknown  fenofibrate (TRIGLIDE/LOFIBRA) 160 MG tablet, TAKE 1 TABLET BY MOUTH  DAILY  furosemide (LASIX) 20 MG tablet, TAKE ONE-HALF TABLET BY  MOUTH EVERY OTHER DAY  ALTERNATING WITH 1 TABLET  Multiple Vitamin (MULTIVITAMIN ADULT PO), Take 1 tablet by mouth daily  Multiple Vitamins-Minerals (PRESERVISION AREDS 2 PO), Take 1 tablet by mouth 2 times daily  spironolactone (ALDACTONE) 25 MG tablet, TAKE 1 TABLET BY MOUTH  DAILY    No current facility-administered medications on file prior to visit.      Past Medical History:   Diagnosis Date    Arthritis     rhuematoid    Coronary artery disease     triple bypass 2001    CRF (chronic renal failure)     Gastro-oesophageal reflux disease     Gout     Hyperlipidemia     Hypertension     Nocturia        Past Surgical History:   Procedure Laterality Date    CARDIAC SURGERY      CHOLECYSTECTOMY      COLONOSCOPY      CV CORONARY ANGIOGRAM N/A 1/13/2023    Procedure: Coronary Angiogram;  Surgeon: Sujata Ramires MD;  Location: [facility] CARDIAC CATH LAB    CV PCI N/A 1/13/2023    Procedure: Percutaneous Coronary Intervention;  Surgeon: Sujata Ramires MD;  Location: [facility] CARDIAC CATH LAB    LAPAROTOMY EXPLORATORY N/A 6/30/2020    Procedure: EXPLORATORY LAPAROTOMY, BOWEL RESECTION;  Surgeon: Bull Rachel MD;  Location:  OR     LAPAROTOMY, LYSIS ADHESIONS, COMBINED N/A 2020    Procedure: EXPLORATORY LAPAROTOMY WITH LYSIS OF ADHESIONS;  Surgeon: Jose Reed MD;  Location:  OR    ORTHOPEDIC SURGERY      PHACOEMULSIFICATION CLEAR CORNEA WITH TORIC INTRAOCULAR LENS IMPLANT  2012    Procedure:PHACOEMULSIFICATION CLEAR CORNEA WITH TORIC INTRAOCULAR LENS IMPLANT; LEFT PHACOEMULSIFICATION CLEAR CORNEA WITH DELUXE  TORIC INTRAOCULAR LENS IMPLANT ; Surgeon:BRANDO HIGHTOWER; Location: [facility]    PHACOEMULSIFICATION CLEAR CORNEA WITH TORIC INTRAOCULAR LENS IMPLANT  2012    Procedure:PHACOEMULSIFICATION CLEAR CORNEA WITH TORIC INTRAOCULAR LENS IMPLANT; RIGHT PHACOEMULSIFICATION CLEAR CORNEA WITH TORIC INTRAOCULAR LENS IMPLANT ; Surgeon:BRANDO HIGHTOWER; Location:[facility]    VASCULAR SURGERY         Social History     Tobacco Use    Smoking status: Former     Types: Cigars     Quit date: 1980     Years since quittin.2    Smokeless tobacco: Never   Vaping Use    Vaping Use: Never used   Substance Use Topics    Alcohol use: Yes     Comment: occ.    Drug use: No       Family History   Problem Relation Age of Onset    Myocardial Infarction Mother     Rheumatic fever Father     Cerebrovascular Disease Brother        ROS: A 12 system review of systems was negative other than noted here or above.     Exam:  There were no vitals taken for this visit.    GENERAL APPEARANCE: alert and no distress  EYES:  no scleral icterus  HENT: No gross abnormalities noted  NECK: supple, no adenopathy  RESP: Able to speak in full sentences, no audible wheezing, no accessory muscle usage  CV: S1, S2 present  Extremities: 1+ LE edema  SKIN: no rash  NEURO: mentation intact and speech normal  PSYCH: affect normal/bright    Results:    No visits with results within 1 Day(s) from this visit.   Latest known visit with results is:   Hospital Outpatient Visit on 2023   Component Date Value Ref Range Status    LVEF  2023 55-60%   Final        Assessment/Plan:     CKD stage IIIb/IV  Acute kidney injury versus CKD progression  Pt with no clear baseline creatinine with recurrent SHANICE episodes. His creatinine varies any where between 1.7-2.7 with eGFR in 20's and 30's which put him in CKD stage III b vs stage IV.  Recurrent SHANICE episodes in setting of HFpEF and continued diuretic use.  UA with out sediment and UPCR was WNL. CKD likely 2/2 renovascular disease (known PAD, AAA) and progression is 2/2 recurrent SHANICE in setting of HFpEF. Renal US showed possible InStent stenosis but flow is stable at this point, but no hydronephrosis or nephrolithiasis noted.  - maintain BP >120 systolic   - good hydration   - low salt diet   - continue to avoid NSAID's.     Hypertension :  BP in clinic was WNL. No hypotensive symptoms today, continue current therapy. Recommended to Check BP at home.     Electrolytes :  Stable     BMD :  Ismael, phos, Vit D and PTH were WNL     Metabolic acidosis :  Bicarb is WNL     Anemia :  Hb is low but improved from July. Iron studies c/w iron def anemia, start on iron supplementation. Will continue to monitor     Other problems  CAD  HFpEF, continue on spironolactone and lasix as prescribed.  Gout, no recent episodes, continue on allopurinol     Total time spent on the day of clinic visit was 40 min including face to face time of 25 min with pt and his daughter, chart and lab review, image review and documentation as above.    Afsaneh Thomas  Dept of Nephrology and Hypertension  [facility]        Again, thank you for allowing me to participate in the care of your patient.      Sincerely,    Afsaneh Thomas MD

## 2023-03-17 NOTE — PROGRESS NOTES
Nephrology Clinic Note  March 17, 2023    I was asked to see this patient by Dr. Redman     CC: CKD     HPI: Trevor Soni is a 89 year old male with PMH significant for HTN, CAD, HFpEF, AAA with out rupture, atrial flutter, aortic stenosis, gout who presents for evaluation and management of CKD.     Patient presented to nephrology clinic with her daughter who participated in obtaining history and medical decision making.    Patient endorsed that he is feeling stable since her last visit.  No new symptoms today.  Is complaining that his sleep pattern has been disrupted and wake up multiple times during the night because he is not sleeping.  Later in the afternoon during the day he doses off.  Denied any other systemic symptoms including recurrent fevers, chills, nausea, vomiting, diarrhea, chest pain or breathing difficulties.  No new infections or medications since her last visit.  Continue to check blood pressures at home and never had any low blood pressures in the recent past.  Also denied any dizziness/lightheadedness and/or blurry vision in the recent past.  He takes 20 mg of alternating with 10 mg every day.  Denied any OTC medications.  Patient plan to have Aortic valve repair on April 11, 2023.    - History of urinary symptoms: no, may wake up 2-3 times per night, feels completely empty the bladder  - History of Hematuria: no  - Swelling: no  - Hx of UTIs: no  - Hx of stones: one time 20 years ago and none since then  - Rashes/Joint pain: just on left upper arm after scratching,   - Family hx of kidney disease: no  - NSAID use: no       Allergies   Allergen Reactions     Avocado      Ciprofloxacin Itching     Penicillins Itching       acetaminophen (TYLENOL) 325 MG tablet, Take 2 tablets (650 mg) by mouth every 6 hours as needed for mild pain or other (and adjunct with moderate or severe pain or per patient request)  allopurinol (ZYLOPRIM) 100 MG tablet, TAKE 2 TABLETS BY MOUTH  DAILY  amLODIPine  (NORVASC) 5 MG tablet, Take 1 tablet (5 mg) by mouth daily  apixaban ANTICOAGULANT (ELIQUIS) 2.5 MG tablet, Take 1 tablet (2.5 mg) by mouth 2 times daily  atorvastatin (LIPITOR) 20 MG tablet, Take 1 tablet (20 mg) by mouth daily  Cholecalciferol (VITAMIN D3) 25 MCG (1000 UT) CAPS, Take 1 capsule by mouth daily OTC dose unknown  fenofibrate (TRIGLIDE/LOFIBRA) 160 MG tablet, TAKE 1 TABLET BY MOUTH  DAILY  furosemide (LASIX) 20 MG tablet, TAKE ONE-HALF TABLET BY  MOUTH EVERY OTHER DAY  ALTERNATING WITH 1 TABLET  Multiple Vitamin (MULTIVITAMIN ADULT PO), Take 1 tablet by mouth daily  Multiple Vitamins-Minerals (PRESERVISION AREDS 2 PO), Take 1 tablet by mouth 2 times daily  spironolactone (ALDACTONE) 25 MG tablet, TAKE 1 TABLET BY MOUTH  DAILY    No current facility-administered medications on file prior to visit.      Past Medical History:   Diagnosis Date     Arthritis     rhuematoid     Coronary artery disease     triple bypass 2001     CRF (chronic renal failure)      Gastro-oesophageal reflux disease      Gout      Hyperlipidemia      Hypertension      Nocturia        Past Surgical History:   Procedure Laterality Date     CARDIAC SURGERY       CHOLECYSTECTOMY       COLONOSCOPY       CV CORONARY ANGIOGRAM N/A 1/13/2023    Procedure: Coronary Angiogram;  Surgeon: Sujata Ramires MD;  Location:  HEART CARDIAC CATH LAB     CV PCI N/A 1/13/2023    Procedure: Percutaneous Coronary Intervention;  Surgeon: Sujata Ramires MD;  Location: [facility] CARDIAC CATH LAB     LAPAROTOMY EXPLORATORY N/A 6/30/2020    Procedure: EXPLORATORY LAPAROTOMY, BOWEL RESECTION;  Surgeon: Bull Rachel MD;  Location:  OR     LAPAROTOMY, LYSIS ADHESIONS, COMBINED N/A 6/21/2020    Procedure: EXPLORATORY LAPAROTOMY WITH LYSIS OF ADHESIONS;  Surgeon: Jose Reed MD;  Location:  OR     ORTHOPEDIC SURGERY       PHACOEMULSIFICATION CLEAR CORNEA WITH TORIC INTRAOCULAR LENS IMPLANT  1/30/2012    Procedure:PHACOEMULSIFICATION  CLEAR CORNEA WITH TORIC INTRAOCULAR LENS IMPLANT; LEFT PHACOEMULSIFICATION CLEAR CORNEA WITH DELUXE  TORIC INTRAOCULAR LENS IMPLANT ; Surgeon:BRANDO HIGHTOWER; Location:[facility]     PHACOEMULSIFICATION CLEAR CORNEA WITH TORIC INTRAOCULAR LENS IMPLANT  2012    Procedure:PHACOEMULSIFICATION CLEAR CORNEA WITH TORIC INTRAOCULAR LENS IMPLANT; RIGHT PHACOEMULSIFICATION CLEAR CORNEA WITH TORIC INTRAOCULAR LENS IMPLANT ; Surgeon:BRANDO HIGHOTWER; Location:[facility]     VASCULAR SURGERY         Social History     Tobacco Use     Smoking status: Former     Types: Cigars     Quit date: 1980     Years since quittin.2     Smokeless tobacco: Never   Vaping Use     Vaping Use: Never used   Substance Use Topics     Alcohol use: Yes     Comment: occ.     Drug use: No       Family History   Problem Relation Age of Onset     Myocardial Infarction Mother      Rheumatic fever Father      Cerebrovascular Disease Brother        ROS: A 12 system review of systems was negative other than noted here or above.     Exam:  There were no vitals taken for this visit.    GENERAL APPEARANCE: alert and no distress  EYES:  no scleral icterus  HENT: No gross abnormalities noted  NECK: supple, no adenopathy  RESP: Able to speak in full sentences, no audible wheezing, no accessory muscle usage  CV: S1, S2 present  Extremities: 1+ LE edema  SKIN: no rash  NEURO: mentation intact and speech normal  PSYCH: affect normal/bright    Results:    No visits with results within 1 Day(s) from this visit.   Latest known visit with results is:   Hospital Outpatient Visit on 2023   Component Date Value Ref Range Status     LVEF  2023 55-60%   Final       Assessment/Plan:     CKD stage IIIb/IV  Acute kidney injury versus CKD progression  Pt with no clear baseline creatinine with recurrent SHANICE episodes. His creatinine varies any where between 1.7-2.7 with eGFR in 20's and 30's which put him in CKD stage III b vs stage IV.  Recurrent SHANICE episodes in  setting of HFpEF and continued diuretic use.  UA with out sediment and UPCR was WNL. CKD likely 2/2 renovascular disease (known PAD, AAA) and progression is 2/2 recurrent SHANICE in setting of HFpEF. Renal US showed possible InStent stenosis but flow is stable at this point, but no hydronephrosis or nephrolithiasis noted.  - maintain BP >120 systolic   - good hydration   - low salt diet   - continue to avoid NSAID's.     Hypertension :  BP in clinic was WNL. No hypotensive symptoms today, continue current therapy. Recommended to Check BP at home.     Electrolytes :  Stable     BMD :  Ismael, phos, Vit D and PTH were WNL     Metabolic acidosis :  Bicarb is WNL     Anemia :  Hb is low but improved from July. Iron studies c/w iron def anemia, start on iron supplementation. Will continue to monitor     Other problems  CAD  HFpEF, continue on spironolactone and lasix as prescribed.  Gout, no recent episodes, continue on allopurinol     Total time spent on the day of clinic visit was 40 min including face to face time of 25 min with pt and his daughter, chart and lab review, image review and documentation as above.    Afsaneh Thomas  Dept of Nephrology and Hypertension  [facility]

## 2023-03-17 NOTE — NURSING NOTE
"Chief Complaint   Patient presents with     RECHECK     Follow up with CKD     /65   Pulse 92   Ht 1.676 m (5' 6\")   Wt 65.1 kg (143 lb 8 oz)   BMI 23.16 kg/m    Gissell Velasquez CMA on 3/17/2023 at 1:15 PM    "

## 2023-04-03 DIAGNOSIS — R06.00 DYSPNEA, UNSPECIFIED: ICD-10-CM

## 2023-04-03 DIAGNOSIS — I35.0 SEVERE AORTIC STENOSIS: Primary | ICD-10-CM

## 2023-04-04 LAB
ABO/RH(D): NORMAL
ANTIBODY SCREEN: NEGATIVE
SPECIMEN EXPIRATION DATE: NORMAL

## 2023-04-05 ENCOUNTER — LAB (OUTPATIENT)
Dept: LAB | Facility: CLINIC | Age: 88
End: 2023-04-05
Payer: MEDICARE

## 2023-04-05 ENCOUNTER — OFFICE VISIT (OUTPATIENT)
Dept: CARDIOLOGY | Facility: CLINIC | Age: 88
End: 2023-04-05
Payer: MEDICARE

## 2023-04-05 VITALS
HEIGHT: 67 IN | BODY MASS INDEX: 23.2 KG/M2 | SYSTOLIC BLOOD PRESSURE: 122 MMHG | OXYGEN SATURATION: 99 % | WEIGHT: 147.8 LBS | DIASTOLIC BLOOD PRESSURE: 64 MMHG | HEART RATE: 88 BPM

## 2023-04-05 DIAGNOSIS — I35.0 SEVERE AORTIC STENOSIS BY PRIOR ECHOCARDIOGRAM: Primary | ICD-10-CM

## 2023-04-05 DIAGNOSIS — I35.0 SEVERE AORTIC STENOSIS: ICD-10-CM

## 2023-04-05 DIAGNOSIS — R06.00 DYSPNEA, UNSPECIFIED: ICD-10-CM

## 2023-04-05 LAB
ALBUMIN SERPL BCG-MCNC: 4.1 G/DL (ref 3.5–5.2)
ALP SERPL-CCNC: 102 U/L (ref 40–129)
ALT SERPL W P-5'-P-CCNC: 15 U/L (ref 10–50)
ANION GAP SERPL CALCULATED.3IONS-SCNC: 11 MMOL/L (ref 7–15)
AST SERPL W P-5'-P-CCNC: 27 U/L (ref 10–50)
BILIRUB SERPL-MCNC: 0.5 MG/DL
BLOOD BANK CHART COMMENT: NORMAL
BUN SERPL-MCNC: 30 MG/DL (ref 8–23)
CALCIUM SERPL-MCNC: 9.8 MG/DL (ref 8.8–10.2)
CHLORIDE SERPL-SCNC: 109 MMOL/L (ref 98–107)
CREAT SERPL-MCNC: 1.67 MG/DL (ref 0.67–1.17)
DEPRECATED HCO3 PLAS-SCNC: 22 MMOL/L (ref 22–29)
ERYTHROCYTE [DISTWIDTH] IN BLOOD BY AUTOMATED COUNT: 15.3 % (ref 10–15)
GFR SERPL CREATININE-BSD FRML MDRD: 39 ML/MIN/1.73M2
GLUCOSE SERPL-MCNC: 111 MG/DL (ref 70–99)
HCT VFR BLD AUTO: 37.2 % (ref 40–53)
HGB BLD-MCNC: 12 G/DL (ref 13.3–17.7)
INR PPP: 1.26 (ref 0.85–1.15)
MCH RBC QN AUTO: 31.2 PG (ref 26.5–33)
MCHC RBC AUTO-ENTMCNC: 32.3 G/DL (ref 31.5–36.5)
MCV RBC AUTO: 97 FL (ref 78–100)
NT-PROBNP SERPL-MCNC: 4957 PG/ML (ref 0–1800)
PLATELET # BLD AUTO: 201 10E3/UL (ref 150–450)
POTASSIUM SERPL-SCNC: 5 MMOL/L (ref 3.4–5.3)
PROT SERPL-MCNC: 7 G/DL (ref 6.4–8.3)
RBC # BLD AUTO: 3.85 10E6/UL (ref 4.4–5.9)
SODIUM SERPL-SCNC: 142 MMOL/L (ref 136–145)
SPECIMEN EXPIRATION DATE: NORMAL
WBC # BLD AUTO: 7 10E3/UL (ref 4–11)

## 2023-04-05 PROCEDURE — 86901 BLOOD TYPING SEROLOGIC RH(D): CPT

## 2023-04-05 PROCEDURE — 85610 PROTHROMBIN TIME: CPT

## 2023-04-05 PROCEDURE — 80053 COMPREHEN METABOLIC PANEL: CPT

## 2023-04-05 PROCEDURE — 93000 ELECTROCARDIOGRAM COMPLETE: CPT | Performed by: NURSE PRACTITIONER

## 2023-04-05 PROCEDURE — 85027 COMPLETE CBC AUTOMATED: CPT

## 2023-04-05 PROCEDURE — 36415 COLL VENOUS BLD VENIPUNCTURE: CPT

## 2023-04-05 PROCEDURE — 83880 ASSAY OF NATRIURETIC PEPTIDE: CPT

## 2023-04-05 PROCEDURE — 99215 OFFICE O/P EST HI 40 MIN: CPT | Performed by: NURSE PRACTITIONER

## 2023-04-05 NOTE — PROGRESS NOTES
STRUCTURAL HEART CLINIC  H&P    Referring Provider: Dr. Ramires     History of Present Illness  Trevor Soni is a very pleasant 89-year-old gentleman with past medical history significant for coronary disease s/p CABG x3 (2001), paroxysmal atrial fibrillation, R CEA (2001), stage IV CKD, infrarenal abdominal aortic aneurysm measuring 3.7 x 3.5 cm, chronic diastolic heart failure, and severe aortic stenosis who presents for pre-operative H&P in preparation for transcatheter aortic valve replacement on 4/11/2023 at [facility] with Dr. Ramires.     Patient with a history of severe aortic stenosis, characterized with an NANDA 0.8 cm2, mean PG 43 mmHg, peak V 3.4 m/s, dimensionless index of 0.25, and EF 60 to 65%. He reports symptoms of dyspnea with minimal exertion and worsening fatigue. Patient was evaluated in structural heart clinic where he was deemed an appropriate TAVR candidate.  TAVR guided CT showed favorable anatomy for transfemoral approach.  He does have significant iliofemoral calcification, though sizing is adequate.  He was counseled for the above procedure.      Today the patient reports ongoing dyspnea and fatigue.  He otherwise denies any chest discomfort, syncope or near syncope, or palpitations.  He has no PND or orthopnea.  He has no infectious symptoms. His blood pressure is well controlled today.  His weight is stable.    Assessment and Plan     Trevor Soni presents for pre-operative H&P in preparation for a transcatheter aortic valve replacement on 4/11/2023 at [facility].       1. Severe aortic valve stenosis: Patient reports progressive exertional dyspnea and worsening fatigue. His echo shows severe aortic stenosis. He has been evaluated by the our structural heart team and has been deemed an appropriate candidate for transcatheter aortic valve replacement. We discussed the procedure in detail, including pre and post-procedure care, restrictions and  follow-up. He understands risks including 5-10% risk of heart block leading to permanent pacemaker placement, 3% risk of bleeding, infection, vascular complication, 1-3% risk of stroke and very low risk (<1%) of serious complications such as cardiac perforation, aortic root rupture, dissection or death.     All questions answered  Type and screen orders complete  Supplies for scrubbing provided  No known contrast dye allergy  No trouble with anesthesia in the past  Labs today WNL, no s/s of infection    2. Chronic diastolic heart failure, NYHA class II, Stage B: Secondary to valvular heart disease. No acute volume overload on exam, though patient does endorse significant exertional dyspnea. Not currently on diuretic therapy.    3. CAD s/p CABG x 3 in 2001 (LIMA-diagonal branch, SVG-PDA, SVG-LCx): Pre-TAVR coronary angiogram showed 3-vessel CAD and patent grafts. No angina. On atorvastatin 20 mg daily.     4. Paroxysmal atrial fibrillation: He takes apixaban for stroke prophylaxis. Hold AC 48 hours pre procedure.    5. Stage IV CKD: baseline cr appears to be 1.8-2.0.     6. S/p R CEA: On statin therapy.     7.  Infrarenal abdominal aortic aneurysm measuring 3.7 cm x 3.5 cm per CT.     Medication Recommendations:  Antiplatelet: Take  mg perioperatively   NOAC: Hold 48 hours pre-procedure, last dose 4/8/23.  Supplements: Hold morning of procedure    Patient is optimized and is acceptable candidate for the proposed procedure.  No further diagnostic evaluation is needed. Pre-procedure instructions provided in written format.     Chanel Mares, BEVERLEY, APRN, CNP  Structural Heart Nurse Practitioner  [facility]   Pager: [phone]    Current Medications:  Current Outpatient Medications   Medication Sig Dispense Refill     acetaminophen (TYLENOL) 325 MG tablet Take 2 tablets (650 mg) by mouth every 6 hours as needed for mild pain or other (and adjunct with moderate or severe pain or per patient request)        allopurinol (ZYLOPRIM) 100 MG tablet TAKE 2 TABLETS BY MOUTH  DAILY 180 tablet 3     amLODIPine (NORVASC) 5 MG tablet Take 1 tablet (5 mg) by mouth daily 90 tablet 3     apixaban ANTICOAGULANT (ELIQUIS) 2.5 MG tablet Take 1 tablet (2.5 mg) by mouth 2 times daily 180 tablet 3     atorvastatin (LIPITOR) 20 MG tablet Take 1 tablet (20 mg) by mouth daily 30 tablet 0     Cholecalciferol (VITAMIN D3) 25 MCG (1000 UT) CAPS Take 1 capsule by mouth daily OTC dose unknown       fenofibrate (TRIGLIDE/LOFIBRA) 160 MG tablet TAKE 1 TABLET BY MOUTH  DAILY 90 tablet 3     ferrous gluconate (FERGON) 324 (38 Fe) MG tablet        Multiple Vitamin (MULTIVITAMIN ADULT PO) Take 1 tablet by mouth daily       Multiple Vitamins-Minerals (PRESERVISION AREDS 2 PO) Take 1 tablet by mouth 2 times daily       spironolactone (ALDACTONE) 25 MG tablet TAKE 1 TABLET BY MOUTH  DAILY 90 tablet 3       Allergies:     Allergies   Allergen Reactions     Avocado      Ciprofloxacin Itching     Penicillins Itching       Past Medical History:  Past Medical History:   Diagnosis Date     Arthritis     rhuematoid     Coronary artery disease     triple bypass 2001     CRF (chronic renal failure)      Gastro-oesophageal reflux disease      Gout      Hyperlipidemia      Hypertension      Nocturia        Past Surgical History:  Past Surgical History:   Procedure Laterality Date     CARDIAC SURGERY       CHOLECYSTECTOMY       COLONOSCOPY       CV CORONARY ANGIOGRAM N/A 1/13/2023    Procedure: Coronary Angiogram;  Surgeon: Sujata Ramires MD;  Location:  HEART CARDIAC CATH LAB     CV PCI N/A 1/13/2023    Procedure: Percutaneous Coronary Intervention;  Surgeon: Sujata Ramires MD;  Location:  HEART CARDIAC CATH LAB     LAPAROTOMY EXPLORATORY N/A 6/30/2020    Procedure: EXPLORATORY LAPAROTOMY, BOWEL RESECTION;  Surgeon: Bull Rachel MD;  Location:  OR     LAPAROTOMY, LYSIS ADHESIONS, COMBINED N/A 6/21/2020    Procedure: EXPLORATORY LAPAROTOMY WITH  LYSIS OF ADHESIONS;  Surgeon: Jose Reed MD;  Location:  OR     ORTHOPEDIC SURGERY       PHACOEMULSIFICATION CLEAR CORNEA WITH TORIC INTRAOCULAR LENS IMPLANT  2012    Procedure:PHACOEMULSIFICATION CLEAR CORNEA WITH TORIC INTRAOCULAR LENS IMPLANT; LEFT PHACOEMULSIFICATION CLEAR CORNEA WITH DELUXE  TORIC INTRAOCULAR LENS IMPLANT ; Surgeon:BRANDO HIGHTOWER; Location: [facility]     PHACOEMULSIFICATION CLEAR CORNEA WITH TORIC INTRAOCULAR LENS IMPLANT  2012    Procedure:PHACOEMULSIFICATION CLEAR CORNEA WITH TORIC INTRAOCULAR LENS IMPLANT; RIGHT PHACOEMULSIFICATION CLEAR CORNEA WITH TORIC INTRAOCULAR LENS IMPLANT ; Surgeon:BRANDO HIGHTOWER; Location: [facility]     VASCULAR SURGERY         Family History:  Family History   Problem Relation Age of Onset     Myocardial Infarction Mother      Rheumatic fever Father      Cerebrovascular Disease Brother        Social History:  Social History     Socioeconomic History     Marital status:      Spouse name: None     Number of children: None     Years of education: None     Highest education level: None   Tobacco Use     Smoking status: Former     Types: Cigars     Quit date: 1980     Years since quittin.2     Smokeless tobacco: Never   Vaping Use     Vaping status: Never Used   Substance and Sexual Activity     Alcohol use: Yes     Comment: occ.     Drug use: No     Sexual activity: Not Currently     Partners: Female   Other Topics Concern     Parent/sibling w/ CABG, MI or angioplasty before 65F 55M? Yes       Review of Systems:  Constitutional: No fever, chills, or sweats. No weight gain/loss   ENT: No visual disturbance, ear ache, epistaxis, sore throat  Allergies/Immunologic: Negative.   Respiratory: No cough, hemoptysia  Cardiovascular: As per HPI  GI: No nausea, vomiting, hematemesis, melena, or hematochezia  : No urinary frequency, dysuria, or hematuria  Integument: Negative  Psychiatric: Negative  Neuro: Negative  Endocrinology: Negative  "  Musculoskeletal: Negative      Physical Exam:  Vitals: /64   Pulse 88   Ht 1.689 m (5' 6.5\")   Wt 67 kg (147 lb 12.8 oz)   SpO2 99%   BMI 23.50 kg/m     General: NAD  HEENT:  Dentition intact.    Neck: No jugular venous distension.   Heart: RRR with grade II ELANA at RUSB  Lungs: CTA.    Abdomen: Soft, nontender, nondistended.   Extremities: No clubbing, cyanosis, or edema.  The pulses are +4/4 at the radial, brachial, femoral, popliteal, DP, and PT sites bilaterally.  No bruits are noted.  Neurologic: Alert and oriented to person/place/time, normal speech, gait and affect  Skin: No petechiae, purpura or rash.      Diagnostic Studies:  ECG: Afib with RBBB  Personally reviewed and interpreted by me.    Coronary Angiogram: 1/13/23  Conclusion         Prox Cx to Mid Cx lesion is 100% stenosed.    Mid RCA lesion is 100% stenosed.    Prox RCA lesion is 80% stenosed.    RPAV lesion is 50% stenosed.    Prox LAD lesion is 80% stenosed.    Mid LAD lesion is 70% stenosed.     1.  S/p CABG in 2001 for severe multivessel CAD.  All grafts are patent.  2.  LM has no significant stenosis.  LAD with severe proximal and mid stenosis. CT of MidLCX.  of midRCA.  3.  LIMA to diagonal is patent.  4.  SVG to OM is patent.  5.  SVG to RPDA is patent.       Echocardiogram: 3/1/23  Interpretation Summary     The left ventricle is normal in size.  There is moderate concentric left ventricular hypertrophy.  Left ventricular systolic function is normal.  The visual ejection fraction is 55-60%.  Severe valvular aortic stenosis. The mean AoV pressure gradient is 43mmHg. The  peak AoV pressure gradient is 71mmHg. The calculated aortic valve area is  0.8cm2. DI 0.28.  There is mild (1+) aortic regurgitation.  The rhythm was atrial fibrillation.     Compared to the previous study, the AS is slightly more severe. Measurements  are variable due to afib.      Laboratory Studies:  Personally reviewed and interpreted by me.    LIPID " RESULTS:  Lab Results   Component Value Date    CHOL 96 10/25/2021    CHOL 107 01/27/2021    HDL 24 (L) 10/25/2021    HDL 34 (L) 01/27/2021    LDL 50 10/25/2021    LDL 58 01/27/2021    TRIG 111 10/25/2021    TRIG 76 01/27/2021       LIVER ENZYME RESULTS:  Lab Results   Component Value Date    AST 30 03/17/2023    AST 24 07/02/2020    ALT 14 03/17/2023    ALT 21 07/02/2020       CBC RESULTS:  Lab Results   Component Value Date    WBC 7.7 03/17/2023    WBC 7.7 12/21/2020    RBC 3.92 (L) 03/17/2023    RBC 3.61 (L) 12/21/2020    HGB 12.1 (L) 03/17/2023    HGB 10.7 (L) 12/21/2020    HCT 37.0 (L) 03/17/2023    HCT 32.9 (L) 12/21/2020    MCV 94 03/17/2023    MCV 91 12/21/2020    MCH 30.9 03/17/2023    MCH 29.6 12/21/2020    MCHC 32.7 03/17/2023    MCHC 32.5 12/21/2020    RDW 14.8 03/17/2023    RDW 17.9 (H) 12/21/2020     03/17/2023     12/21/2020       BMP RESULTS:  Lab Results   Component Value Date     03/17/2023     12/21/2020    POTASSIUM 4.7 03/17/2023    POTASSIUM 3.7 01/16/2023    POTASSIUM 5.2 12/21/2020    CHLORIDE 106 03/17/2023    CHLORIDE 108 01/16/2023    CHLORIDE 111 (H) 12/21/2020    CO2 22 03/17/2023    CO2 24 01/16/2023    CO2 21 12/21/2020    ANIONGAP 12 03/17/2023    ANIONGAP 9 01/16/2023    ANIONGAP 8 12/21/2020     (H) 03/17/2023     (H) 01/16/2023     (H) 12/21/2020    BUN 55.1 (H) 03/17/2023    BUN 36 (H) 01/16/2023    BUN 51 (H) 12/21/2020    CR 2.29 (H) 03/17/2023    CR 2.35 (H) 12/21/2020    GFRESTIMATED 27 (L) 03/17/2023    GFRESTIMATED 30 (L) 12/13/2022    GFRESTIMATED 24 (L) 12/21/2020    GFRESTBLACK 28 (L) 12/21/2020    AGUSTÍN 10.1 03/17/2023    AGUSTÍN 9.5 12/21/2020        A1C RESULTS:  Lab Results   Component Value Date    A1C 5.6 10/28/2021    A1C 5.9 05/26/2016       INR RESULTS:  Lab Results   Component Value Date    INR 1.24 (H) 03/17/2023    INR 1.11 01/13/2023    INR 1.06 05/06/2011    INR 0.98 05/02/2010

## 2023-04-05 NOTE — LETTER
4/5/2023    Abdoulaye Redman MD  [address]  [place] [address]    RE: Trevor Soni       Dear Colleague,     I had the pleasure of seeing Trevor Soni in the [facility].      STRUCTURAL HEART CLINIC  H&P    Referring Provider: Dr. Ramires     History of Present Illness  Trevor Soni is a very pleasant 89-year-old gentleman with past medical history significant for coronary disease s/p CABG x3 (2001), paroxysmal atrial fibrillation, R CEA (2001), stage IV CKD, infrarenal abdominal aortic aneurysm measuring 3.7 x 3.5 cm, chronic diastolic heart failure, and severe aortic stenosis who presents for pre-operative H&P in preparation for transcatheter aortic valve replacement on 4/11/2023 at [facility] with Dr. Ramires.     Patient with a history of severe aortic stenosis, characterized with an NANDA 0.8 cm2, mean PG 43 mmHg, peak V 3.4 m/s, dimensionless index of 0.25, and EF 60 to 65%. He reports symptoms of dyspnea with minimal exertion and worsening fatigue. Patient was evaluated in structural heart clinic where he was deemed an appropriate TAVR candidate.  TAVR guided CT showed favorable anatomy for transfemoral approach.  He does have significant iliofemoral calcification, though sizing is adequate.  He was counseled for the above procedure.      Today the patient reports ongoing dyspnea and fatigue.  He otherwise denies any chest discomfort, syncope or near syncope, or palpitations.  He has no PND or orthopnea.  He has no infectious symptoms. His blood pressure is well controlled today.  His weight is stable.    Assessment and Plan     Trevor Soni presents for pre-operative H&P in preparation for a transcatheter aortic valve replacement on 4/11/2023 at [facility].       1. Severe aortic valve stenosis: Patient reports progressive exertional dyspnea and worsening fatigue. His echo shows severe aortic stenosis. He has been evaluated by the our  structural heart team and has been deemed an appropriate candidate for transcatheter aortic valve replacement. We discussed the procedure in detail, including pre and post-procedure care, restrictions and follow-up. He understands risks including 5-10% risk of heart block leading to permanent pacemaker placement, 3% risk of bleeding, infection, vascular complication, 1-3% risk of stroke and very low risk (<1%) of serious complications such as cardiac perforation, aortic root rupture, dissection or death.     All questions answered  Type and screen orders complete  Supplies for scrubbing provided  No known contrast dye allergy  No trouble with anesthesia in the past  Labs today WNL, no s/s of infection    2. Chronic diastolic heart failure, NYHA class II, Stage B: Secondary to valvular heart disease. No acute volume overload on exam, though patient does endorse significant exertional dyspnea. Not currently on diuretic therapy.    3. CAD s/p CABG x 3 in 2001 (LIMA-diagonal branch, SVG-PDA, SVG-LCx): Pre-TAVR coronary angiogram showed 3-vessel CAD and patent grafts. No angina. On atorvastatin 20 mg daily.     4. Paroxysmal atrial fibrillation: He takes apixaban for stroke prophylaxis. Hold AC 48 hours pre procedure.    5. Stage IV CKD: baseline cr appears to be 1.8-2.0.     6. S/p R CEA: On statin therapy.     7.  Infrarenal abdominal aortic aneurysm measuring 3.7 cm x 3.5 cm per CT.     Medication Recommendations:  Antiplatelet: Take  mg perioperatively   NOAC: Hold 48 hours pre-procedure, last dose 4/8/23.  Supplements: Hold morning of procedure    Patient is optimized and is acceptable candidate for the proposed procedure.  No further diagnostic evaluation is needed. Pre-procedure instructions provided in written format.     Chanel Mares, BEVERLEY, APRN, CNP  Structural Heart Nurse Practitioner   [facility]   Pager: [phone]    Current Medications:  Current Outpatient Medications   Medication Sig  Dispense Refill    acetaminophen (TYLENOL) 325 MG tablet Take 2 tablets (650 mg) by mouth every 6 hours as needed for mild pain or other (and adjunct with moderate or severe pain or per patient request)      allopurinol (ZYLOPRIM) 100 MG tablet TAKE 2 TABLETS BY MOUTH  DAILY 180 tablet 3    amLODIPine (NORVASC) 5 MG tablet Take 1 tablet (5 mg) by mouth daily 90 tablet 3    apixaban ANTICOAGULANT (ELIQUIS) 2.5 MG tablet Take 1 tablet (2.5 mg) by mouth 2 times daily 180 tablet 3    atorvastatin (LIPITOR) 20 MG tablet Take 1 tablet (20 mg) by mouth daily 30 tablet 0    Cholecalciferol (VITAMIN D3) 25 MCG (1000 UT) CAPS Take 1 capsule by mouth daily OTC dose unknown      fenofibrate (TRIGLIDE/LOFIBRA) 160 MG tablet TAKE 1 TABLET BY MOUTH  DAILY 90 tablet 3    ferrous gluconate (FERGON) 324 (38 Fe) MG tablet       Multiple Vitamin (MULTIVITAMIN ADULT PO) Take 1 tablet by mouth daily      Multiple Vitamins-Minerals (PRESERVISION AREDS 2 PO) Take 1 tablet by mouth 2 times daily      spironolactone (ALDACTONE) 25 MG tablet TAKE 1 TABLET BY MOUTH  DAILY 90 tablet 3       Allergies:     Allergies   Allergen Reactions    Avocado     Ciprofloxacin Itching    Penicillins Itching       Past Medical History:  Past Medical History:   Diagnosis Date    Arthritis     rhuematoid    Coronary artery disease     triple bypass 2001    CRF (chronic renal failure)     Gastro-oesophageal reflux disease     Gout     Hyperlipidemia     Hypertension     Nocturia        Past Surgical History:  Past Surgical History:   Procedure Laterality Date    CARDIAC SURGERY      CHOLECYSTECTOMY      COLONOSCOPY      CV CORONARY ANGIOGRAM N/A 1/13/2023    Procedure: Coronary Angiogram;  Surgeon: Sujata Ramires MD;  Location: [facility] CARDIAC CATH LAB    CV PCI N/A 1/13/2023    Procedure: Percutaneous Coronary Intervention;  Surgeon: Sujata Ramires MD;  Location: [facility] CARDIAC CATH LAB    LAPAROTOMY EXPLORATORY N/A 6/30/2020    Procedure: EXPLORATORY  LAPAROTOMY, BOWEL RESECTION;  Surgeon: Bull Rachel MD;  Location:  OR    LAPAROTOMY, LYSIS ADHESIONS, COMBINED N/A 2020    Procedure: EXPLORATORY LAPAROTOMY WITH LYSIS OF ADHESIONS;  Surgeon: Jose Reed MD;  Location:  OR    ORTHOPEDIC SURGERY      PHACOEMULSIFICATION CLEAR CORNEA WITH TORIC INTRAOCULAR LENS IMPLANT  2012    Procedure:PHACOEMULSIFICATION CLEAR CORNEA WITH TORIC INTRAOCULAR LENS IMPLANT; LEFT PHACOEMULSIFICATION CLEAR CORNEA WITH DELUXE  TORIC INTRAOCULAR LENS IMPLANT ; Surgeon:BRANDO HIGHTOWER; Location: [facility]    PHACOEMULSIFICATION CLEAR CORNEA WITH TORIC INTRAOCULAR LENS IMPLANT  2012    Procedure:PHACOEMULSIFICATION CLEAR CORNEA WITH TORIC INTRAOCULAR LENS IMPLANT; RIGHT PHACOEMULSIFICATION CLEAR CORNEA WITH TORIC INTRAOCULAR LENS IMPLANT ; Surgeon:BRANDO HIGHTOWER; Location:[facility]    VASCULAR SURGERY         Family History:  Family History   Problem Relation Age of Onset    Myocardial Infarction Mother     Rheumatic fever Father     Cerebrovascular Disease Brother        Social History:  Social History     Socioeconomic History    Marital status:      Spouse name: None    Number of children: None    Years of education: None    Highest education level: None   Tobacco Use    Smoking status: Former     Types: Cigars     Quit date: 1980     Years since quittin.2    Smokeless tobacco: Never   Vaping Use    Vaping status: Never Used   Substance and Sexual Activity    Alcohol use: Yes     Comment: occ.    Drug use: No    Sexual activity: Not Currently     Partners: Female   Other Topics Concern    Parent/sibling w/ CABG, MI or angioplasty before 65F 55M? Yes       Review of Systems:  Constitutional: No fever, chills, or sweats. No weight gain/loss   ENT: No visual disturbance, ear ache, epistaxis, sore throat  Allergies/Immunologic: Negative.   Respiratory: No cough, hemoptysia  Cardiovascular: As per HPI  GI: No nausea, vomiting, hematemesis,  "melena, or hematochezia  : No urinary frequency, dysuria, or hematuria  Integument: Negative  Psychiatric: Negative  Neuro: Negative  Endocrinology: Negative   Musculoskeletal: Negative      Physical Exam:  Vitals: /64   Pulse 88   Ht 1.689 m (5' 6.5\")   Wt 67 kg (147 lb 12.8 oz)   SpO2 99%   BMI 23.50 kg/m     General: NAD  HEENT:  Dentition intact.    Neck: No jugular venous distension.   Heart: RRR with grade II ELANA at RUSB  Lungs: CTA.    Abdomen: Soft, nontender, nondistended.   Extremities: No clubbing, cyanosis, or edema.  The pulses are +4/4 at the radial, brachial, femoral, popliteal, DP, and PT sites bilaterally.  No bruits are noted.  Neurologic: Alert and oriented to person/place/time, normal speech, gait and affect  Skin: No petechiae, purpura or rash.      Diagnostic Studies:  ECG: Afib with RBBB  Personally reviewed and interpreted by me.    Coronary Angiogram: 1/13/23  Conclusion       Prox Cx to Mid Cx lesion is 100% stenosed.  Mid RCA lesion is 100% stenosed.  Prox RCA lesion is 80% stenosed.  RPAV lesion is 50% stenosed.  Prox LAD lesion is 80% stenosed.  Mid LAD lesion is 70% stenosed.     1.  S/p CABG in 2001 for severe multivessel CAD.  All grafts are patent.  2.  LM has no significant stenosis.  LAD with severe proximal and mid stenosis. CT of MidLCX.  of midRCA.  3.  LIMA to diagonal is patent.  4.  SVG to OM is patent.  5.  SVG to RPDA is patent.       Echocardiogram: 3/1/23  Interpretation Summary     The left ventricle is normal in size.  There is moderate concentric left ventricular hypertrophy.  Left ventricular systolic function is normal.  The visual ejection fraction is 55-60%.  Severe valvular aortic stenosis. The mean AoV pressure gradient is 43mmHg. The  peak AoV pressure gradient is 71mmHg. The calculated aortic valve area is  0.8cm2. DI 0.28.  There is mild (1+) aortic regurgitation.  The rhythm was atrial fibrillation.     Compared to the previous study, the AS " is slightly more severe. Measurements  are variable due to afib.      Laboratory Studies:  Personally reviewed and interpreted by me.    LIPID RESULTS:  Lab Results   Component Value Date    CHOL 96 10/25/2021    CHOL 107 01/27/2021    HDL 24 (L) 10/25/2021    HDL 34 (L) 01/27/2021    LDL 50 10/25/2021    LDL 58 01/27/2021    TRIG 111 10/25/2021    TRIG 76 01/27/2021       LIVER ENZYME RESULTS:  Lab Results   Component Value Date    AST 30 03/17/2023    AST 24 07/02/2020    ALT 14 03/17/2023    ALT 21 07/02/2020       CBC RESULTS:  Lab Results   Component Value Date    WBC 7.7 03/17/2023    WBC 7.7 12/21/2020    RBC 3.92 (L) 03/17/2023    RBC 3.61 (L) 12/21/2020    HGB 12.1 (L) 03/17/2023    HGB 10.7 (L) 12/21/2020    HCT 37.0 (L) 03/17/2023    HCT 32.9 (L) 12/21/2020    MCV 94 03/17/2023    MCV 91 12/21/2020    MCH 30.9 03/17/2023    MCH 29.6 12/21/2020    MCHC 32.7 03/17/2023    MCHC 32.5 12/21/2020    RDW 14.8 03/17/2023    RDW 17.9 (H) 12/21/2020     03/17/2023     12/21/2020       BMP RESULTS:  Lab Results   Component Value Date     03/17/2023     12/21/2020    POTASSIUM 4.7 03/17/2023    POTASSIUM 3.7 01/16/2023    POTASSIUM 5.2 12/21/2020    CHLORIDE 106 03/17/2023    CHLORIDE 108 01/16/2023    CHLORIDE 111 (H) 12/21/2020    CO2 22 03/17/2023    CO2 24 01/16/2023    CO2 21 12/21/2020    ANIONGAP 12 03/17/2023    ANIONGAP 9 01/16/2023    ANIONGAP 8 12/21/2020     (H) 03/17/2023     (H) 01/16/2023     (H) 12/21/2020    BUN 55.1 (H) 03/17/2023    BUN 36 (H) 01/16/2023    BUN 51 (H) 12/21/2020    CR 2.29 (H) 03/17/2023    CR 2.35 (H) 12/21/2020    GFRESTIMATED 27 (L) 03/17/2023    GFRESTIMATED 30 (L) 12/13/2022    GFRESTIMATED 24 (L) 12/21/2020    GFRESTBLACK 28 (L) 12/21/2020    AGUSTÍN 10.1 03/17/2023    AGUSTÍN 9.5 12/21/2020        A1C RESULTS:  Lab Results   Component Value Date    A1C 5.6 10/28/2021    A1C 5.9 05/26/2016       INR RESULTS:  Lab Results   Component  Value Date    INR 1.24 (H) 03/17/2023    INR 1.11 01/13/2023    INR 1.06 05/06/2011    INR 0.98 05/02/2010             Thank you for allowing me to participate in the care of your patient.      Sincerely,     Chanel Mares, [facility]  cc:   No referring provider defined for this encounter.

## 2023-04-06 DIAGNOSIS — I35.0 SEVERE AORTIC STENOSIS: Primary | ICD-10-CM

## 2023-04-06 RX ORDER — CLINDAMYCIN PHOSPHATE 900 MG/50ML
900 INJECTION, SOLUTION INTRAVENOUS
Status: CANCELLED | OUTPATIENT
Start: 2023-04-06

## 2023-04-06 RX ORDER — ASPIRIN 325 MG
325 TABLET ORAL ONCE
Status: CANCELLED | OUTPATIENT
Start: 2023-04-06 | End: 2023-04-06

## 2023-04-06 RX ORDER — SODIUM CHLORIDE 9 MG/ML
INJECTION, SOLUTION INTRAVENOUS CONTINUOUS
Status: CANCELLED | OUTPATIENT
Start: 2023-04-06

## 2023-04-06 RX ORDER — LIDOCAINE 40 MG/G
CREAM TOPICAL
Status: CANCELLED | OUTPATIENT
Start: 2023-04-06

## 2023-04-10 RX ORDER — MULTIPLE VITAMINS W/ MINERALS TAB 9MG-400MCG
1 TAB ORAL DAILY
COMMUNITY

## 2023-04-10 NOTE — PROGRESS NOTES
PTA medications updated by Medication Scribe prior to surgery via phone call with patient (last doses completed by Nurse)     Medication history sources: Patient's family/friend (DAUGHTER), Surescripts and H&P  In the past week, patient estimated taking medication this percent of the time: Greater than 90%  Adherence assessment: N/A Not Observed    Significant changes made to the medication list:  None      Additional medication history information:   None    Medication reconciliation completed by provider prior to medication history? No    Time spent in this activity: 30 MINUTES    The information provided in this note is only as accurate as the sources available at the time of update(s)      Prior to Admission medications    Medication Sig Last Dose Taking? Auth Provider Long Term End Date   acetaminophen (TYLENOL) 325 MG tablet Take 2 tablets (650 mg) by mouth every 6 hours as needed for mild pain or other (and adjunct with moderate or severe pain or per patient request)  at PRN Yes Lisandro Redmond MD     allopurinol (ZYLOPRIM) 100 MG tablet TAKE 2 TABLETS BY MOUTH  DAILY  at AM Yes Abdoulaye Redman MD     amLODIPine (NORVASC) 5 MG tablet Take 1 tablet (5 mg) by mouth daily  at AM Yes Abdoulaye Redman MD Yes    apixaban ANTICOAGULANT (ELIQUIS) 2.5 MG tablet Take 1 tablet (2.5 mg) by mouth 2 times daily  at PM Yes Abdoulaye Redman MD     atorvastatin (LIPITOR) 20 MG tablet Take 1 tablet (20 mg) by mouth daily  at PM Yes Paul Felipe, HERMINIA Yes    Cholecalciferol (VITAMIN D3) 25 MCG (1000 UT) CAPS Take 1 capsule by mouth daily OTC dose unknown  at AM Yes Abdoulaye Redman MD     fenofibrate (TRIGLIDE/LOFIBRA) 160 MG tablet TAKE 1 TABLET BY MOUTH  DAILY  at AM Yes Abdoulaye Redman MD Yes    ferrous gluconate (FERGON) 324 (38 Fe) MG tablet Take 324 mg by mouth daily  at AM Yes Reported, Patient     Multiple Vitamins-Minerals (PRESERVISION AREDS 2) CAPS Take 1 capsule by mouth 2 times daily  at PM Yes  Reported, Patient     multivitamin w/minerals (MULTI-VITAMIN) tablet Take 1 tablet by mouth daily  at AM Yes Reported, Patient     spironolactone (ALDACTONE) 25 MG tablet TAKE 1 TABLET BY MOUTH  DAILY  at AM Yes Abdoulaye Redman MD Yes

## 2023-04-11 ENCOUNTER — HOSPITAL ENCOUNTER (OUTPATIENT)
Dept: CARDIOLOGY | Facility: CLINIC | Age: 88
Discharge: HOME | DRG: 267 | End: 2023-04-11
Attending: INTERNAL MEDICINE
Payer: MEDICARE

## 2023-04-11 ENCOUNTER — HOSPITAL ENCOUNTER (INPATIENT)
Facility: CLINIC | Age: 88
LOS: 1 days | Discharge: HOME | DRG: 267 | End: 2023-04-12
Attending: INTERNAL MEDICINE | Admitting: INTERNAL MEDICINE
Payer: MEDICARE

## 2023-04-11 DIAGNOSIS — Z95.2 S/P TAVR (TRANSCATHETER AORTIC VALVE REPLACEMENT): Primary | ICD-10-CM

## 2023-04-11 DIAGNOSIS — I35.0 SEVERE AORTIC STENOSIS BY PRIOR ECHOCARDIOGRAM: ICD-10-CM

## 2023-04-11 DIAGNOSIS — I35.0 SEVERE AORTIC STENOSIS: ICD-10-CM

## 2023-04-11 DIAGNOSIS — I35.0 SEVERE AORTIC STENOSIS: Primary | ICD-10-CM

## 2023-04-11 LAB
ACT BLD: 156 SECONDS (ref 74–150)
ACT BLD: 309 SECONDS (ref 74–150)
BLD PROD TYP BPU: NORMAL
BLD PROD TYP BPU: NORMAL
BLOOD COMPONENT TYPE: NORMAL
BLOOD COMPONENT TYPE: NORMAL
CODING SYSTEM: NORMAL
CODING SYSTEM: NORMAL
CROSSMATCH: NORMAL
CROSSMATCH: NORMAL
ISSUE DATE AND TIME: NORMAL
ISSUE DATE AND TIME: NORMAL
LVEF ECHO: NORMAL
MAGNESIUM SERPL-MCNC: 2 MG/DL (ref 1.7–2.3)
POTASSIUM SERPL-SCNC: 4.3 MMOL/L (ref 3.4–5.3)
UNIT ABO/RH: NORMAL
UNIT ABO/RH: NORMAL
UNIT NUMBER: NORMAL
UNIT NUMBER: NORMAL
UNIT STATUS: NORMAL
UNIT STATUS: NORMAL
UNIT TYPE ISBT: 5100
UNIT TYPE ISBT: 5100

## 2023-04-11 PROCEDURE — 33361 REPLACE AORTIC VALVE PERQ: CPT | Performed by: INTERNAL MEDICINE

## 2023-04-11 PROCEDURE — 93308 TTE F-UP OR LMTD: CPT | Mod: 26 | Performed by: INTERNAL MEDICINE

## 2023-04-11 PROCEDURE — 99152 MOD SED SAME PHYS/QHP 5/>YRS: CPT | Performed by: INTERNAL MEDICINE

## 2023-04-11 PROCEDURE — 02RF38Z REPLACEMENT OF AORTIC VALVE WITH ZOOPLASTIC TISSUE, PERCUTANEOUS APPROACH: ICD-10-PCS | Performed by: INTERNAL MEDICINE

## 2023-04-11 PROCEDURE — 250N000013 HC RX MED GY IP 250 OP 250 PS 637: Performed by: NURSE PRACTITIONER

## 2023-04-11 PROCEDURE — C1730 CATH, EP, 19 OR FEW ELECT: HCPCS | Performed by: INTERNAL MEDICINE

## 2023-04-11 PROCEDURE — 250N000013 HC RX MED GY IP 250 OP 250 PS 637: Performed by: INTERNAL MEDICINE

## 2023-04-11 PROCEDURE — 93308 TTE F-UP OR LMTD: CPT

## 2023-04-11 PROCEDURE — 99222 1ST HOSP IP/OBS MODERATE 55: CPT | Performed by: NURSE PRACTITIONER

## 2023-04-11 PROCEDURE — 99153 MOD SED SAME PHYS/QHP EA: CPT | Performed by: INTERNAL MEDICINE

## 2023-04-11 PROCEDURE — 210N000002 HC R&B HEART CARE

## 2023-04-11 PROCEDURE — 250N000009 HC RX 250: Performed by: INTERNAL MEDICINE

## 2023-04-11 PROCEDURE — 93321 DOPPLER ECHO F-UP/LMTD STD: CPT | Mod: 26 | Performed by: INTERNAL MEDICINE

## 2023-04-11 PROCEDURE — 86923 COMPATIBILITY TEST ELECTRIC: CPT | Performed by: INTERNAL MEDICINE

## 2023-04-11 PROCEDURE — C1769 GUIDE WIRE: HCPCS | Performed by: INTERNAL MEDICINE

## 2023-04-11 PROCEDURE — 93325 DOPPLER ECHO COLOR FLOW MAPG: CPT

## 2023-04-11 PROCEDURE — 93325 DOPPLER ECHO COLOR FLOW MAPG: CPT | Mod: 26 | Performed by: INTERNAL MEDICINE

## 2023-04-11 PROCEDURE — C1760 CLOSURE DEV, VASC: HCPCS | Performed by: INTERNAL MEDICINE

## 2023-04-11 PROCEDURE — 278N000051 HC OR IMPLANT GENERAL: Performed by: INTERNAL MEDICINE

## 2023-04-11 PROCEDURE — 258N000003 HC RX IP 258 OP 636: Performed by: INTERNAL MEDICINE

## 2023-04-11 PROCEDURE — 255N000002 HC RX 255 OP 636: Performed by: INTERNAL MEDICINE

## 2023-04-11 PROCEDURE — 250N000011 HC RX IP 250 OP 636: Performed by: INTERNAL MEDICINE

## 2023-04-11 PROCEDURE — P9016 RBC LEUKOCYTES REDUCED: HCPCS | Performed by: INTERNAL MEDICINE

## 2023-04-11 PROCEDURE — 85347 COAGULATION TIME ACTIVATED: CPT

## 2023-04-11 PROCEDURE — 84132 ASSAY OF SERUM POTASSIUM: CPT | Performed by: INTERNAL MEDICINE

## 2023-04-11 PROCEDURE — 250N000011 HC RX IP 250 OP 636: Performed by: NURSE PRACTITIONER

## 2023-04-11 PROCEDURE — 93010 ELECTROCARDIOGRAM REPORT: CPT | Mod: 76 | Performed by: INTERNAL MEDICINE

## 2023-04-11 PROCEDURE — 83735 ASSAY OF MAGNESIUM: CPT | Performed by: INTERNAL MEDICINE

## 2023-04-11 PROCEDURE — 272N000001 HC OR GENERAL SUPPLY STERILE: Performed by: INTERNAL MEDICINE

## 2023-04-11 PROCEDURE — 33361 REPLACE AORTIC VALVE PERQ: CPT | Mod: 62 | Performed by: SURGERY

## 2023-04-11 PROCEDURE — 36415 COLL VENOUS BLD VENIPUNCTURE: CPT | Performed by: INTERNAL MEDICINE

## 2023-04-11 PROCEDURE — 93005 ELECTROCARDIOGRAM TRACING: CPT

## 2023-04-11 PROCEDURE — C1894 INTRO/SHEATH, NON-LASER: HCPCS | Performed by: INTERNAL MEDICINE

## 2023-04-11 PROCEDURE — 99223 1ST HOSP IP/OBS HIGH 75: CPT | Mod: 25 | Performed by: NURSE PRACTITIONER

## 2023-04-11 DEVICE — VALVE AORTIC SAPEIN 3 UTLRA TAVR 26MM 9750TFX26A: Type: IMPLANTABLE DEVICE | Status: FUNCTIONAL

## 2023-04-11 RX ORDER — HYDRALAZINE HYDROCHLORIDE 20 MG/ML
10 INJECTION INTRAMUSCULAR; INTRAVENOUS
Status: DISCONTINUED | OUTPATIENT
Start: 2023-04-11 | End: 2023-04-12 | Stop reason: HOSPADM

## 2023-04-11 RX ORDER — CLINDAMYCIN PHOSPHATE 900 MG/50ML
900 INJECTION, SOLUTION INTRAVENOUS
Status: COMPLETED | OUTPATIENT
Start: 2023-04-11 | End: 2023-04-11

## 2023-04-11 RX ORDER — ONDANSETRON 2 MG/ML
4 INJECTION INTRAMUSCULAR; INTRAVENOUS EVERY 6 HOURS PRN
Status: DISCONTINUED | OUTPATIENT
Start: 2023-04-11 | End: 2023-04-12 | Stop reason: HOSPADM

## 2023-04-11 RX ORDER — ASPIRIN 325 MG
325 TABLET ORAL ONCE
Status: COMPLETED | OUTPATIENT
Start: 2023-04-11 | End: 2023-04-11

## 2023-04-11 RX ORDER — NITROGLYCERIN 0.4 MG/1
0.4 TABLET SUBLINGUAL EVERY 5 MIN PRN
Status: DISCONTINUED | OUTPATIENT
Start: 2023-04-11 | End: 2023-04-12 | Stop reason: HOSPADM

## 2023-04-11 RX ORDER — NITROGLYCERIN 5 MG/ML
VIAL (ML) INTRAVENOUS
Status: DISCONTINUED | OUTPATIENT
Start: 2023-04-11 | End: 2023-04-11 | Stop reason: HOSPADM

## 2023-04-11 RX ORDER — PROTAMINE SULFATE 10 MG/ML
INJECTION, SOLUTION INTRAVENOUS
Status: DISCONTINUED | OUTPATIENT
Start: 2023-04-11 | End: 2023-04-11 | Stop reason: HOSPADM

## 2023-04-11 RX ORDER — SODIUM CHLORIDE, SODIUM LACTATE, POTASSIUM CHLORIDE, CALCIUM CHLORIDE 600; 310; 30; 20 MG/100ML; MG/100ML; MG/100ML; MG/100ML
INJECTION, SOLUTION INTRAVENOUS CONTINUOUS
Status: DISCONTINUED | OUTPATIENT
Start: 2023-04-11 | End: 2023-04-11 | Stop reason: HOSPADM

## 2023-04-11 RX ORDER — SODIUM CHLORIDE 9 MG/ML
INJECTION, SOLUTION INTRAVENOUS CONTINUOUS
Status: DISCONTINUED | OUTPATIENT
Start: 2023-04-11 | End: 2023-04-11 | Stop reason: HOSPADM

## 2023-04-11 RX ORDER — LIDOCAINE 40 MG/G
CREAM TOPICAL
Status: DISCONTINUED | OUTPATIENT
Start: 2023-04-11 | End: 2023-04-11 | Stop reason: HOSPADM

## 2023-04-11 RX ORDER — ALLOPURINOL 100 MG/1
200 TABLET ORAL DAILY
Status: DISCONTINUED | OUTPATIENT
Start: 2023-04-11 | End: 2023-04-12 | Stop reason: HOSPADM

## 2023-04-11 RX ORDER — HEPARIN SODIUM 1000 [USP'U]/ML
INJECTION, SOLUTION INTRAVENOUS; SUBCUTANEOUS
Status: DISCONTINUED | OUTPATIENT
Start: 2023-04-11 | End: 2023-04-11 | Stop reason: HOSPADM

## 2023-04-11 RX ORDER — SPIRONOLACTONE 25 MG/1
25 TABLET ORAL DAILY
Status: DISCONTINUED | OUTPATIENT
Start: 2023-04-11 | End: 2023-04-12 | Stop reason: HOSPADM

## 2023-04-11 RX ORDER — FENTANYL CITRATE 50 UG/ML
INJECTION, SOLUTION INTRAMUSCULAR; INTRAVENOUS
Status: DISCONTINUED | OUTPATIENT
Start: 2023-04-11 | End: 2023-04-11 | Stop reason: HOSPADM

## 2023-04-11 RX ORDER — ONDANSETRON 4 MG/1
4 TABLET, ORALLY DISINTEGRATING ORAL EVERY 6 HOURS PRN
Status: DISCONTINUED | OUTPATIENT
Start: 2023-04-11 | End: 2023-04-12 | Stop reason: HOSPADM

## 2023-04-11 RX ORDER — AMLODIPINE BESYLATE 5 MG/1
5 TABLET ORAL DAILY
Status: DISCONTINUED | OUTPATIENT
Start: 2023-04-11 | End: 2023-04-12 | Stop reason: HOSPADM

## 2023-04-11 RX ORDER — ATORVASTATIN CALCIUM 20 MG/1
20 TABLET, FILM COATED ORAL EVERY EVENING
Status: DISCONTINUED | OUTPATIENT
Start: 2023-04-11 | End: 2023-04-12 | Stop reason: HOSPADM

## 2023-04-11 RX ORDER — ACETAMINOPHEN 325 MG/1
650 TABLET ORAL EVERY 4 HOURS PRN
Status: DISCONTINUED | OUTPATIENT
Start: 2023-04-11 | End: 2023-04-12 | Stop reason: HOSPADM

## 2023-04-11 RX ADMIN — ASPIRIN 325 MG ORAL TABLET 325 MG: 325 PILL ORAL at 11:56

## 2023-04-11 RX ADMIN — HYDRALAZINE HYDROCHLORIDE 10 MG: 20 INJECTION INTRAMUSCULAR; INTRAVENOUS at 21:13

## 2023-04-11 RX ADMIN — CLINDAMYCIN PHOSPHATE 900 MG: 900 INJECTION, SOLUTION INTRAVENOUS at 12:08

## 2023-04-11 RX ADMIN — SPIRONOLACTONE 25 MG: 25 TABLET ORAL at 17:25

## 2023-04-11 RX ADMIN — SODIUM CHLORIDE: 9 INJECTION, SOLUTION INTRAVENOUS at 11:56

## 2023-04-11 RX ADMIN — ACETAMINOPHEN 650 MG: 325 TABLET, FILM COATED ORAL at 15:22

## 2023-04-11 RX ADMIN — ALLOPURINOL 200 MG: 100 TABLET ORAL at 17:25

## 2023-04-11 RX ADMIN — APIXABAN 2.5 MG: 2.5 TABLET, FILM COATED ORAL at 21:07

## 2023-04-11 RX ADMIN — HYDRALAZINE HYDROCHLORIDE 10 MG: 20 INJECTION INTRAMUSCULAR; INTRAVENOUS at 18:29

## 2023-04-11 RX ADMIN — ATORVASTATIN CALCIUM 20 MG: 20 TABLET, FILM COATED ORAL at 21:07

## 2023-04-11 RX ADMIN — AMLODIPINE BESYLATE 5 MG: 5 TABLET ORAL at 17:25

## 2023-04-11 ASSESSMENT — ACTIVITIES OF DAILY LIVING (ADL)
ADLS_ACUITY_SCORE: 24

## 2023-04-11 NOTE — CONSULTS
[facility]  Consult Note - Hospitalist Service     Date of Admission:  4/11/2023  Consult Requested by: Cardiology   Reason for Consult: Post-op medical management  PRIMARY CARE PROVIDER:    Abdoulaye Redman    Assessment & Plan   Trevor JOSEY Soni is a 89 year old male with a past medical history significant for CKD stage IV, HTN, paroxysmal atrial fibrillation with bifascicular block, chronic diastolic heart failure, CAD and severe aortic stenosis who underwent a TAVR with Dr. Ramires on 4/11/23. Hospitalist service was consulted post-op for medical management.     POD # 0 s/p TAVR with 26 mm Nunes Elzbieta 3 Valve  Severe Aortic Stenosis  Hemodynamically stable with BP's in the 130's to 150's over 60's, heart rates 50's to 70's, and satting 97% on RA.  -Post-op management as per Cardiology  -Anticoagulation, diet, activity, IVF's, pain regimen per Cardiology   -Aggressive pulmonary hygiene post-op   -Wean oxygen to maintain sats >90%   -Incentive spirometry every hour while awake   -Cough and deep breathe  -PRN pain meds  -BM regimen  -Therapies  -Monitor VS per order  -Hgb in AM  -No focal deficits noted on exam; vitally stable  -Noted to have oozing of the right groin site with small amount of sanguinous drainage built up underneath tegaderm; no hematoma and site is soft with intact pulses. Discussed with nursing. Will change dressing and assess site    Chronic Diastolic Heart Failure  -Management as per Cardiology     CAD  S/P CABG x 3 (LIMA to diagonal branch, SVG-PDA, SVG-LCx) 2001  Hypertension  Hyperlipidemia  -Management as per Cardiology     Paroxysmal Atrial Fibrillation  Bifascicular block  -Telemetry  -Management as per Cardiology     CKD Stage IV  -Per chart review, baseline creatinine appears to be 1.8-2.0  -Creatinine prior to surgery 1.67  -Avoid nephrotoxic agents  -Renally dose all med's  -No NSAID's  -BMP in AM     Diet: Regular Diet Adult     DVT Prophylaxis: Defer to  primary service   Farooq Catheter: Not present  Code Status: Full Code     Disposition Plan       Expected Discharge Date: 04/12/2023              Entered: Felicitas Reveles NP 04/11/2023, 6:17 PM        The patient's care was discussed with the Patient.    The patient has been discussed with Dr. Redmond, who agrees with the assessment and plan at this time.        Felicitas Reveles NP  [facility]    ______________________________________________________________________    Chief Complaint   S/P TAVR    History is obtained from the patient and EMR.      History of Present Illness   Trevor Soni is a 89 year old male with a past medical history significant for CKD stage IV, HTN, paroxysmal atrial fibrillation with bifascicular block, chronic diastolic heart failure, CAD and severe aortic stenosis who underwent a TAVR with Dr. Ramires on 4/11/23. Hospitalist service was consulted post-op for medical management.     Patient is resting in bed with family at the bedside. He just ate dinner and is doing otherwise well. He initially had some chest pain earlier but was given some pain medication that relieved his pain. He denies any chest pain/pressure and SOB. Denies any fever, chills and flu like body aches. He is very pleasant and reports that he feels much improved after his surgery today. Denies further complaints.     Review of Systems   The 10 point Review of Systems is negative other than noted in the HPI.    Past Medical History    I have reviewed this patient's medical history and updated it with pertinent information if needed.   Past Medical History:   Diagnosis Date     Arthritis     rhuematoid     Coronary artery disease     triple bypass 2001     CRF (chronic renal failure)      Gastro-oesophageal reflux disease      Gout      Hyperlipidemia      Hypertension      Nocturia        Past Surgical History   I have reviewed this patient's surgical history and updated it with pertinent information  if needed.  Past Surgical History:   Procedure Laterality Date     CARDIAC SURGERY       CHOLECYSTECTOMY       COLONOSCOPY       CV CORONARY ANGIOGRAM N/A 2023    Procedure: Coronary Angiogram;  Surgeon: Sujata Ramires MD;  Location:  HEART CARDIAC CATH LAB     CV PCI N/A 2023    Procedure: Percutaneous Coronary Intervention;  Surgeon: Sujata Ramires MD;  Location:  HEART CARDIAC CATH LAB     LAPAROTOMY EXPLORATORY N/A 2020    Procedure: EXPLORATORY LAPAROTOMY, BOWEL RESECTION;  Surgeon: Bull Rachel MD;  Location:  OR     LAPAROTOMY, LYSIS ADHESIONS, COMBINED N/A 2020    Procedure: EXPLORATORY LAPAROTOMY WITH LYSIS OF ADHESIONS;  Surgeon: Jose Reed MD;  Location:  OR     ORTHOPEDIC SURGERY       PHACOEMULSIFICATION CLEAR CORNEA WITH TORIC INTRAOCULAR LENS IMPLANT  2012    Procedure:PHACOEMULSIFICATION CLEAR CORNEA WITH TORIC INTRAOCULAR LENS IMPLANT; LEFT PHACOEMULSIFICATION CLEAR CORNEA WITH DELUXE  TORIC INTRAOCULAR LENS IMPLANT ; Surgeon:BRANDO HIGHTOWER; Location:[facility]     PHACOEMULSIFICATION CLEAR CORNEA WITH TORIC INTRAOCULAR LENS IMPLANT  2012    Procedure:PHACOEMULSIFICATION CLEAR CORNEA WITH TORIC INTRAOCULAR LENS IMPLANT; RIGHT PHACOEMULSIFICATION CLEAR CORNEA WITH TORIC INTRAOCULAR LENS IMPLANT ; Surgeon:BRANDO HIGHTOWER; Location: [facility]     VASCULAR SURGERY         Social History   I have reviewed this patient's social history and updated it with pertinent information if needed.  Patient resides at home with family support.  Social History     Tobacco Use     Smoking status: Former     Types: Cigars     Quit date: 1980     Years since quittin.3     Smokeless tobacco: Never   Vaping Use     Vaping status: Never Used   Substance Use Topics     Alcohol use: Yes     Comment: occ.     Drug use: No       Family History   I have reviewed this patient's family history and updated it with pertinent information if needed.   Family History    Problem Relation Age of Onset     Myocardial Infarction Mother      Rheumatic fever Father      Cerebrovascular Disease Brother        Medications   Prior to Admission Medications   Prescriptions Last Dose Informant Patient Reported? Taking?   Cholecalciferol (VITAMIN D3) 25 MCG (1000 UT) CAPS 4/10/2023 at AM Daughter No Yes   Sig: Take 1 capsule by mouth daily OTC dose unknown   Multiple Vitamins-Minerals (PRESERVISION AREDS 2) CAPS 4/10/2023 at PM Daughter Yes Yes   Sig: Take 1 capsule by mouth 2 times daily   acetaminophen (TYLENOL) 325 MG tablet 4/10/2023 at PRN Daughter No Yes   Sig: Take 2 tablets (650 mg) by mouth every 6 hours as needed for mild pain or other (and adjunct with moderate or severe pain or per patient request)   allopurinol (ZYLOPRIM) 100 MG tablet 4/10/2023 at AM Daughter No Yes   Sig: TAKE 2 TABLETS BY MOUTH  DAILY   amLODIPine (NORVASC) 5 MG tablet 4/10/2023 at AM Daughter No Yes   Sig: Take 1 tablet (5 mg) by mouth daily   apixaban ANTICOAGULANT (ELIQUIS) 2.5 MG tablet 4/8/2023 at PM Daughter No Yes   Sig: Take 1 tablet (2.5 mg) by mouth 2 times daily   atorvastatin (LIPITOR) 20 MG tablet 4/10/2023 at PM Daughter No Yes   Sig: Take 1 tablet (20 mg) by mouth daily   fenofibrate (TRIGLIDE/LOFIBRA) 160 MG tablet 4/10/2023 at AM Daughter No Yes   Sig: TAKE 1 TABLET BY MOUTH  DAILY   ferrous gluconate (FERGON) 324 (38 Fe) MG tablet 4/10/2023 at AM Daughter Yes Yes   Sig: Take 324 mg by mouth daily   multivitamin w/minerals (MULTI-VITAMIN) tablet 4/10/2023 at AM Daughter Yes Yes   Sig: Take 1 tablet by mouth daily   spironolactone (ALDACTONE) 25 MG tablet 4/10/2023 at AM Daughter No Yes   Sig: TAKE 1 TABLET BY MOUTH  DAILY      Facility-Administered Medications: None     Allergies   Allergies   Allergen Reactions     Avocado      Ciprofloxacin Itching     Penicillins Itching       Physical Exam   Vital Signs: Temp: 97.2  F (36.2  C) Temp src: Temporal BP: (!) 142/66 Pulse: 59   Resp: 16 SpO2:  97 % O2 Device: None (Room air)    Weight: 144 lbs 8 oz    Constitutional: Awake, alert, cooperative, no apparent distress.    ENT: Normocephalic, without obvious abnormality, atraumatic, oral pharynx with moist mucus membranes, tonsils without erythema or exudates.  Eyes pupils are equal, round and reactive to light; extra occular movements intact.  Normal sclera.  [place]. Wears glasses  Neck: Supple, symmetrical, trachea midline, no adenopathy.  Pulmonary: No increased work of breathing, good air exchange, clear to auscultation bilaterally, no crackles or wheezing.  Cardiovascular: Irregularly, irregular rate and rhythm, normal S1 and S2, no S3 or S4, and no murmur noted. Right groin site with small amount of sanguinous drainage noted underneath tegaderm Soft without hematoma formation. Left groin dry and soft.   GI: Normal bowel sounds, soft, non-distended, non-tender.    Skin/Integumen: Clear.  Neuro: CN II-XII grossly intact.  Upper and lower extremities strength, coordination and sensation intact bilaterally.  No focal neurological deficits  Psych:  Alert and oriented x 3. Normal affect.  Extremities: 1+ bilateral lower extremity edema noted, and calves are non-tender to palpation bilaterally. Dorsal pedal pulses and posterior tibial pulses palpable.       Data   Results for orders placed or performed during the hospital encounter of 04/11/23 (from the past 24 hour(s))   Prepare red blood cells (unit)   Result Value Ref Range    Blood Component Type Red Blood Cells     Product Code P6617R04     Unit Status Returned     Unit Number [id number]     CROSSMATCH Compatible     CODING SYSTEM [id number]     ISSUE DATE AND TIME [id number]     UNIT ABO/RH O+     UNIT TYPE ISBT 5100    Prepare red blood cells (unit)   Result Value Ref Range    Blood Component Type Red Blood Cells     Product Code F5765U18     Unit Status Returned     Unit Number [id number]     CROSSMATCH Compatible     CODING SYSTEM [id number]     ISSUE  DATE AND TIME [id number]     UNIT ABO/RH O+     UNIT TYPE ISBT 5100    Activated clotting time celite, POCT   Result Value Ref Range    Activated Clotting Time (Celite) POCT 309 (H) 74 - 150 seconds   Activated clotting time celite, POCT   Result Value Ref Range    Activated Clotting Time (Celite) POCT 156 (H) 74 - 150 seconds   Cardiac Catheterization    Narrative       Successful transfemoral transcatheter aortic valve replacement with a   26mm Nunes Elzbieta 3 Ultra valve.     EKG 12-lead, tracing only   Result Value Ref Range    Systolic Blood Pressure  mmHg    Diastolic Blood Pressure  mmHg    Ventricular Rate 64 BPM    Atrial Rate 73 BPM    MI Interval  ms    QRS Duration 136 ms     ms    QTc 519 ms    P Axis  degrees    R AXIS -80 degrees    T Axis 70 degrees    Interpretation ECG       Atrial fibrillation  Right bundle branch block  Left anterior fascicular block   Bifascicular block   Moderate voltage criteria for LVH, may be normal variant  T wave abnormality, consider lateral ischemia  Abnormal ECG  When compared with ECG of 13-JAN-2023 07:09,  T wave inversion now evident in Lateral leads     EKG 12-lead, tracing only   Result Value Ref Range    Systolic Blood Pressure  mmHg    Diastolic Blood Pressure  mmHg    Ventricular Rate 69 BPM    Atrial Rate 76 BPM    MI Interval  ms    QRS Duration 136 ms     ms    QTc 546 ms    P Axis  degrees    R AXIS -76 degrees    T Axis 46 degrees    Interpretation ECG       Atrial fibrillation  Right bundle branch block  Left anterior fascicular block  Bifascicular block   Abnormal ECG  When compared with ECG of 11-APR-2023 15:15, (unconfirmed)  T wave inversion no longer evident in Lateral leads

## 2023-04-11 NOTE — PRE-PROCEDURE
GENERAL PRE-PROCEDURE:   Procedure:  TAVR  Date/Time:  4/11/2023 12:37 PM    Written consent obtained?: Yes    Risks and benefits: Risks, benefits and alternatives were discussed    Consent given by:  Patient  Patient states understanding of procedure being performed: Yes    Patient's understanding of procedure matches consent: Yes    Procedure consent matches procedure scheduled: Yes    Expected level of sedation:  Moderate  Appropriately NPO:  Yes  ASA Class:  4  Mallampati  :  Grade 1- soft palate, uvula, tonsillar pillars, and posterior pharyngeal wall visible  Lungs:  Lungs clear with good breath sounds bilaterally  Heart:  RRR and systolic murmur  History & Physical reviewed:  History and physical reviewed and no updates needed  Statement of review:  I have reviewed the lab findings, diagnostic data, medications, and the plan for sedation

## 2023-04-11 NOTE — DISCHARGE INSTRUCTIONS
TAVR Discharge Instructions  You have just had your aortic valve replaced with a new biological tissue valve. You should feel better after your surgery, but complete recovery may take several weeks. Please follow these instructions carefully and please call your valve coordinator with any questions or concerns.      CONTACT INFORMATION:   [facility]:  [phone] (7 days a week)  Scheduling and general questions - Kelsi ([phone])  Valve Coordinators - Ne RN ([phone]), Vanita RN ([phone]), and Jne RN ([phone])    AFTER YOU GO HOME:  Drink extra fluids for 2 days.  You may resume your normal diet.  Do not smoke.  Do not drink alcohol.  Relax and take it easy.  Do not make any important or legal decisions.    FOR 1 WEEK AFTER TAVR:  Do not drive or operate machines at home or at work. No driving until you return for your 1 week follow-up appointment. You and the provider will discuss this at the appointment.   Refrain from sexual intercourse for 1 week.  You may shower the day after your procedure. Do NOT take a bath, or use a hot tub or pool for at least 1 week. Do NOT scrub the site. Do not use lotion or powder near the puncture site.  Do not lift more than 10 pounds.   Do not do any heavy activity such as exercise, lifting, or straining.  No housework, yard work, or any activities that make you sweat.    CARE OF WRIST AND GROIN SITES:  For 2 days, when you cough, sneeze, laugh or move your bowels, hold your hand over the puncture site and press firmly on/above the site.  Remove the bandage after 24 hours. If there is minor oozing, apply another bandage and remove it after 12 hours.  It is normal to have bruising or pea size lump at the site.  If you have a wrist site puncture: Do not use your hand or arm to support your weight (such as rising from a chair) or bend your wrist (such as lifting a garage door) for 1 week.  No stooping or squatting for 1 week.      BLEEDING:  If you start bleeding from the site in your wrist:  Sit down and press firmly on/above the site with your fingers for 10 minutes.   Once bleeding stops, keep your arm still for 2 hours.   Call [facility] or valve coordinator as soon as you can.  If you start bleeding from the site in your groin:  Lie down flat and press firmly on/above the site for 10 minutes.  Once bleeding stops lay flat for 2 hours.   Call [facility] or valve coordinator as soon as you can.  Call 911 right away if you have heavy bleeding or bleeding that does not stop.    MEDICINES:  You may be started on an anti-platelet medication, such as Plavix or aspirin. Please call the [facility] with any questions regarding this medication.  If you have stopped any medicines, check with your provider about when to restart them.  You will require lifetime prophylactic antibiotics for dental cleanings and dental work after your TAVR, please inquire with the Heart Clinic prior to your scheduled cleanings.     FOLLOW-UP APPOINTMENTS:  Follow-up with a Structural Heart advanced practice provider (NP or PA) at [facility] in 7-10 days after your procedure.  You will also follow-up at [facility] 1 month after your procedure which will include a visit with an advanced practice provider an echocardiogram (echo), an electrocardiogram (ECG), and lab work. This follow-up should be scheduled for you prior to you leaving the hospital.  Cardiac Rehab will contact you for follow up care. You can enroll at a site convenient to you.  We will have you see your primary cardiologist about 6 months after your TAVR.  We will see you 1 year after your TAVR with a visit with an advanced practice provider (NP or PA) an echocardiogram (echo), electrocardiogram (ECG), and lab work.     CALL THE CLINIC IF:   You have increased pain or a large or growing hard lump  around the site.  The site is red, swollen, hot, or tender.  Blood or fluid is draining from the site.  You have chills or a fever greater than 101 F (38 C).  Your arm or leg feels numb, cool, or changes color.  You have hives, a rash, or unusual itching.  New pain in the back or belly that you cannot control with Tylenol.  Any questions or concerns.    OTHER INSTRUCTIONS:  You will receive a card with your new valve information in the mail, directly from the . Retain this card with your health care records.    DENTAL WORK:  You must have antibiotics before all dental work to prevent endocarditis, or an infection on your new heart valve. Please call the valve coordinators to get a prescription for this. Please do not schedule any dental cleanings for at least 3-6 months after your TAVR.

## 2023-04-11 NOTE — PLAN OF CARE
Goal Outcome Evaluation:      Plan of Care Reviewed With: patient, child        A+Ox4. VSS on RA. Prn hydralazine given x1 for SBP > 140. Afib CVR, had one episode of 3.2 sec pause, asymptomatic. NP notified- monitor. CMS intact- +1 pulses. Bilat groin soft, no hematoma or bruit. Right side has small amount of blood under dressing, unchanged. Has left upper back, shoulder pain baseline- one episode of chest pain mostly with deep breath, resolved. EKG done, NP notified. Due to void. Off bedrest at [id number].

## 2023-04-11 NOTE — OP NOTE
Procedure Date: 4/11/2023     PREOPERATIVE DIAGNOSES:   1.  Symptomatic severe aortic valve stenosis.      POSTOPERATIVE DIAGNOSES:   1.  Symptomatic severe aortic valve stenosis.      PROCEDURE PERFORMED:  Right transfemoral implantation of a 26 mm Nunes Elzbieta 3 ultra bioprosthesis.      INTERVENTIONAL CARDIOLOGISTS:  Sujata Ramires MD      CARDIAC SURGEON:  Nba Cornejo MD      ANESTHESIA:  Monitored anesthesia care with local.      INDICATIONS FOR PROCEDURE:  The patient is diagnosed with severe symptomatic aortic valve stenosis. The case was discussed with heart team and we presented the option for TAVR due to comorbidities and favorable candidacy for TAVR.  Following discussion of risks and benefits, patient elected to proceed.      DESCRIPTION OF PROCEDURE:  The patient was brought to the Cath Lab and placed supine on the table.  Appropriate monitoring lines were placed and monitored anesthesia care was delivered.  The patient was sterilely prepped and draped in the usual fashion and timeout was performed to confirm patient identity, procedure to be performed and the administration of preoperative antibiotics.      Combination of ultrasound and fluoroscopy was utilized to obtain bilateral common femoral arterial access and left common femoral venous access.  This was exchanged for sheath access.  A temporary pacemaker was advanced to the RV.  A pigtail catheter was advanced from the left-sided arterial access into the aortic root.  The large bore sheath was then placed on the right over a stiff wire.  The patient was systemically heparinized.      The native valve was crossed with an AL1 catheter and a straight wire.  This was exchanged for a J wire followed by a pigtail followed by a Safari.  The valve delivery system was advanced over the Safari wire into position within the aortic root.  Under rapid pacing, the valve was deployed with an excellent fluoroscopic result.  Upon cessation of pacing, the  patient's native rhythm and hemodynamics recovered promptly.      The valve delivery system was withdrawn from within the aortic root and root aortography and echocardiography confirmed excellent valve position and function without any perivalvular insufficiency.      The valve delivery system was fully withdrawn.  The Safari wire was recaptured within a pigtail and removed.   Protamine was administered to reverse the patient's heparinization.      The temporary pacemaker was removed.  The left-sided pigtail was withdrawn to the level of the iliac bifurcation.  The large bore sheath was removed over an access wire and the perclose device was deployed.  There appeared to be appropriate hemostasis.    Completion iliofemoral angiography revealed no extravasation or stenosis. The left-sided arterial access was managed with Angio-Seal and manual pressure was utilized for the venous puncture site.      At the end of the procedure, all counts were correct.  The patient was taken to recovery in stable condition.      Nba Cornejo MD   Cardiothoracic Surgery  P: [phone]  C: [phone]

## 2023-04-11 NOTE — H&P
[facility]      Cardiology H&P  Trevor Soni MRN: [id number]  9/3/1933  Date of Admission:4/11/2023  Primary care provider: Abdoulaye Redman      Assessment and Plan:     Trevor Soni is a pleasant 89 year old admitted on 4/11/2023 for elective TAVR.     # Severe aortic stenosis, now s/p Transcatheter Aortic Valve Replacement  Now s/p 26 mm Nunes Elzbieta 3 valve. Sheath sites soft without hematoma. No arrhythmias. Neuro's intact.    - Bedrest per protocol x 6 hours  - Neuro checks, per protocol  - Echocardiogram tomorrow  - Monitor groin sites  - EKG in morning   - Resume Eliquis tonight once off bedrest, if no bleeding concerns   - Cardiac rehab/PT/OT  - Continuous telemetry monitoring  - Lifelong antibiotic prophylaxis prior to all dental procedures.      # Chronic diastolic heart failure, NYHA class II  NTpBNP 4957. Appears euvolemic on exam. No PTA diuretics.   - Diurese as needed/able  - Daily weights  - Strict intake/output    # CAD s/p CABG x 3 in 2001 (LIMA-diagonal branch, SVG-PDA, SVG-LCx)  Pre-TAVR coronary angiogram showed patent grafts. He denies angina.   - Resume PTA atorvastatin 20 mg daily     # Paroxysmal atrial fibrillation   # Underlying RBBB/bifasicular block  No conduction issues post procedure.  -[id number] score of 5, resume PTA apixaban as noted above  -Recommend 30-day MCOT at discharge    # Hypertension  - Resume PTA amlodipine 5 mg daily and spironolactone 25 mg daily   - PRN hydral to keep SBP < 140     # Stage IV CKD  Baseline cr 1.8-2.0.   - Daily BMP   - Avoid nephrotoxins     # Infrarenal AAA measuring 3.7 cm x 3.5 cm       FEN: Cardiac diet  Disposition: Home in 1-2 days with cardiac rehab pending stable post-op period  Code Status: FULL CODE    JUAN C Palacios, CNP  [facility] Structural/Interventional Cardiology Team  Pager: [phone]    Clinically Significant Risk Factors Present on Admission               # Drug Induced Coagulation Defect: home  medication list includes an anticoagulant medication               Chief Complaint:   Planned TAVR         History of Present Illness:   Trevor Soni is a very pleasant 89-year-old gentleman with past medical history significant for coronary disease s/p CABG x3 (2001), paroxysmal atrial fibrillation, R CEA (2001), stage IV CKD, infrarenal abdominal AAA measuring 3.7 x 3.5 cm, chronic diastolic heart failure, and severe aortic stenosis who presents for elective transcatheter aortic valve replacement.     Patient with a history of severe aortic stenosis, characterized with an NANDA 0.8 cm2, mean PG 43 mmHg, peak V 3.4 m/s, dimensionless index of 0.25, and EF 60 to 65%. He reports symptoms of dyspnea with minimal exertion and worsening fatigue. Patient was evaluated in structural heart clinic where he was deemed an appropriate TAVR candidate.  TAVR guided CT showed favorable anatomy for transfemoral approach.  He does have significant iliofemoral calcification, though sizing appeared adequate.      Patient is now s/p 26 mm Nunes Elzbieta 3 valve. Procedure went well without complication. No conduction abnormalities.      At time of interview, patient reports no pain. He has some mild R should discomfort. Neuro's intact. He is hemodynamically stable.            Review of Systems:    10 point review of systems negative except for stated above in HPI.          Past Medical History:   Medical History reviewed.   Past Medical History:   Diagnosis Date     Arthritis     rhuematoid     Coronary artery disease     triple bypass 2001     CRF (chronic renal failure)      Gastro-oesophageal reflux disease      Gout      Hyperlipidemia      Hypertension      Nocturia              Past Surgical History:   Surgical History reviewed.   Past Surgical History:   Procedure Laterality Date     CARDIAC SURGERY       CHOLECYSTECTOMY       COLONOSCOPY       CV CORONARY ANGIOGRAM N/A 1/13/2023    Procedure: Coronary Angiogram;  Surgeon:  Sujata Ramires MD;  Location:  HEART CARDIAC CATH LAB     CV PCI N/A 2023    Procedure: Percutaneous Coronary Intervention;  Surgeon: Sujata Ramires MD;  Location:  HEART CARDIAC CATH LAB     LAPAROTOMY EXPLORATORY N/A 2020    Procedure: EXPLORATORY LAPAROTOMY, BOWEL RESECTION;  Surgeon: Bull Rachel MD;  Location:  OR     LAPAROTOMY, LYSIS ADHESIONS, COMBINED N/A 2020    Procedure: EXPLORATORY LAPAROTOMY WITH LYSIS OF ADHESIONS;  Surgeon: Jose Reed MD;  Location:  OR     ORTHOPEDIC SURGERY       PHACOEMULSIFICATION CLEAR CORNEA WITH TORIC INTRAOCULAR LENS IMPLANT  2012    Procedure:PHACOEMULSIFICATION CLEAR CORNEA WITH TORIC INTRAOCULAR LENS IMPLANT; LEFT PHACOEMULSIFICATION CLEAR CORNEA WITH DELUXE  TORIC INTRAOCULAR LENS IMPLANT ; Surgeon:BRANDO HIGHTOWER; Location: [facility]     PHACOEMULSIFICATION CLEAR CORNEA WITH TORIC INTRAOCULAR LENS IMPLANT  2012    Procedure:PHACOEMULSIFICATION CLEAR CORNEA WITH TORIC INTRAOCULAR LENS IMPLANT; RIGHT PHACOEMULSIFICATION CLEAR CORNEA WITH TORIC INTRAOCULAR LENS IMPLANT ; Surgeon:BRANDO HIGHTOWER; Location: [facility]     VASCULAR SURGERY               Social History:   Social History reviewed.  Social History     Tobacco Use     Smoking status: Former     Types: Cigars     Quit date: 1980     Years since quittin.3     Smokeless tobacco: Never   Vaping Use     Vaping status: Never Used   Substance Use Topics     Alcohol use: Yes     Comment: occ.             Family History:   Family History reviewed.   Family History   Problem Relation Age of Onset     Myocardial Infarction Mother      Rheumatic fever Father      Cerebrovascular Disease Brother              Allergies:     Allergies   Allergen Reactions     Avocado      Ciprofloxacin Itching     Penicillins Itching             Medications:   Medications Reviewed.   Current Facility-Administered Medications   Medication     allopurinol (ZYLOPRIM) tablet 200 mg      "amLODIPine (NORVASC) tablet 5 mg     apixaban ANTICOAGULANT (ELIQUIS) tablet 2.5 mg     atorvastatin (LIPITOR) tablet 20 mg     fentaNYL (PF) (SUBLIMAZE) injection     heparin (porcine) injection     HOLD: apixaban (ELIQUIS) 48 hours pre-procedure     lactated ringers infusion     lidocaine (LMX4) cream     lidocaine 1 % 0.1-1 mL     lidocaine 1 % 0.1-1 mL     lidocaine 1 %     midazolam (VERSED) injection     nitroGLYcerin in D5W injection     Patient is NOT allergic to contrast dye     protamine injection     sodium chloride (PF) 0.9% PF flush 3 mL     sodium chloride (PF) 0.9% PF flush 3 mL     sodium chloride (PF) 0.9% PF flush 3 mL     sodium chloride (PF) 0.9% PF flush 3 mL     sodium chloride 0.9% infusion     spironolactone (ALDACTONE) tablet 25 mg             Physical Exam:   Vitals were reviewed.  Blood pressure (!) 165/79, pulse 63, temperature 97.2  F (36.2  C), temperature source Temporal, resp. rate 13, height 1.753 m (5' 9\"), weight 65.5 kg (144 lb 8 oz), SpO2 99 %.    General: AAOx3, NAD  Skin: Not jaundiced, no rash, no ecchymoses; incision site CDI, soft, non-tender, no signs of hematoma. No bruit  HEENT: MMM, PERRLA, EOM intact  CV: irregularly irregular rhythm, normal S1S2, grade I ELANA, no clicks or rubs  Resp: Clear to auscultation bilaterally, no wheezes, rhonchi  Abd: Soft, non-tender, BS+, no masses appreciated  Extremities: warm and well perfused, palpable pulses, no edema  Neuro: No lateralizing symptoms or focal neurologic deficits          Labs:   Routine Labs:  Lab Results   Component Value Date    TROPI  09/09/2016     <0.015  The 99th percentile for upper reference range is 0.045 ug/L.  Troponin values in   the range of 0.045 - 0.120 ug/L may be associated with risks of adverse   clinical events.      TROPI <0.012 05/02/2010    TROPI <0.012 04/04/2010    TROPI <0.012 07/23/2008    TROPI <0.012 07/23/2008     CMP  Recent Labs   Lab 04/05/23  1526      POTASSIUM 5.0   CHLORIDE 109* "   CO2 22   ANIONGAP 11   *   BUN 30.0*   CR 1.67*   GFRESTIMATED 39*   AGUSTÍN 9.8   PROTTOTAL 7.0   ALBUMIN 4.1   BILITOTAL 0.5   ALKPHOS 102   AST 27   ALT 15     CBC  Recent Labs   Lab 04/05/23  1526   WBC 7.0   RBC 3.85*   HGB 12.0*   HCT 37.2*   MCV 97   MCH 31.2   MCHC 32.3   RDW 15.3*        INR  Recent Labs   Lab 04/05/23  1526   INR 1.26*           Diagnostics:    EKG 12Lead: Afib with CVR    Echo: 3/1/23  Interpretation Summary     The left ventricle is normal in size.  There is moderate concentric left ventricular hypertrophy.  Left ventricular systolic function is normal.  The visual ejection fraction is 55-60%.  Severe valvular aortic stenosis. The mean AoV pressure gradient is 43mmHg. The  peak AoV pressure gradient is 71mmHg. The calculated aortic valve area is  0.8cm2. DI 0.28.  There is mild (1+) aortic regurgitation.  The rhythm was atrial fibrillation.     Compared to the previous study, the AS is slightly more severe. Measurements  are variable due to afib.      Medical Decision Making       70 MINUTES SPENT BY ME on the date of service doing chart review, history, exam, documentation & further activities per the note.  MANAGEMENT DISCUSSED with the following over the past 24 hours: Dr. Ramires

## 2023-04-11 NOTE — PROVIDER NOTIFICATION
MD Notification    Notified Person: MD    Notified Person Name: Chanel Mares NP    Notification Date/Time: 4/11/23 1274    Notification Interaction: paged    Purpose of Notification: pt had 3.2 sec pause, asymptomatic. Is also having chest pain now. PACU EKG had T wave inversions that was new. Getting another EKG and giving BP meds as /64.    Orders Received: Ok to give SL nitroglycerin, EP consult for tomorrow.     Comments:

## 2023-04-11 NOTE — DISCHARGE SUMMARY
[address] [address]  p: [phone]    Discharge Summary: Cardiology Service    Trevor Soni MRN# [id number]   YOB: 1933 Age: 89 year old       Admission Date: 4/11/2023  Discharge Date: 04/12/2023    Brief HPI:  Trevor Soni is a very pleasant 89-year-old gentleman with past medical history significant for coronary disease s/p CABG x3 (2001), paroxysmal atrial fibrillation, R CEA (2001), stage IV CKD, infrarenal AAA measuring 3.7 x 3.5 cm, chronic diastolic heart failure, and severe aortic stenosis who underwent TAVR with 26 mm Nunes Elzbieta 3 valve on 4/11/23. His procedure went well without any conduction abnormalities or hemodynamic instability. Post-procedure echo showed well-seated prosthetic aortic valve with normal gradient (10 mmHg).     Hospital Course by Diagnosis:  # Severe aortic stenosis, now s/p Transcatheter Aortic Valve Replacement  Now s/p 26 mm Nunes Elzbieta 3 valve. Sheath sites soft without hematoma. No arrhythmias. Neuro's intact. Post-procedure echo showed well-seated prosthetic aortic valve with normal gradient (10 mmHg).      - Continue Eliquis 2.5 mg BID for anticoagulation, no indication for ASA  - Outpatient cardiac rehab/PT/OT  - Continuous telemetry monitoring  - Lifelong antibiotic prophylaxis prior to all dental procedures.  - Follow-up with structural clinic in 1 week.      # Chronic diastolic heart failure, NYHA class II  NTpBNP 4957. LVEDP 6 mmHg. Appears euvolemic on exam. No PTA diuretics.     # CAD s/p CABG x 3 in 2001 (LIMA-diagonal branch, SVG-PDA, SVG-LCx)  Pre-TAVR coronary angiogram showed patent grafts. He denies angina.   - Continue PTA atorvastatin 20 mg daily      # Paroxysmal atrial fibrillation   # Underlying RBBB/bifasicular block  No conduction issues post procedure. Telemetry reviewed - showed ongoing atrial fibrillation with controlled ventricular rates and RBBB. There  were pauses noted during waking hours with the longest lasting 3.2 seconds. EKG today shows atrial fibrillation with bifasicular block; VR 74. Unchanged from prior EKG. Discussed patient with Electrophysiology. Okay to discharge home on real time cardiac event monitor.   -[id number] score of 5, continue PTA apixaban 2.5 mg BID  - 30-day MCOT to monitor for advanced AV block     # Hypertension  Well-controlled  - Continue PTA amlodipine 5 mg daily and spironolactone 25 mg daily      # Stage IV CKD  Baseline cr 1.8-2.0.   - Repeat BMP in 1 week     # Infrarenal AAA measuring 3.7 cm x 3.5 cm     Pertinent Procedures:  1. TAVR       Medication Changes:  See below     Discharge medications:   Current Discharge Medication List      CONTINUE these medications which have NOT CHANGED    Details   acetaminophen (TYLENOL) 325 MG tablet Take 2 tablets (650 mg) by mouth every 6 hours as needed for mild pain or other (and adjunct with moderate or severe pain or per patient request)    Associated Diagnoses: Pain      allopurinol (ZYLOPRIM) 100 MG tablet TAKE 2 TABLETS BY MOUTH  DAILY  Qty: 180 tablet, Refills: 3    Comments: Requesting 1 year supply  Associated Diagnoses: Hyperlipidemia LDL goal <100; Benign essential hypertension      amLODIPine (NORVASC) 5 MG tablet Take 1 tablet (5 mg) by mouth daily  Qty: 90 tablet, Refills: 3    Comments: Profile Rx: patient will contact pharmacy when needed  Associated Diagnoses: Benign essential hypertension      apixaban ANTICOAGULANT (ELIQUIS) 2.5 MG tablet Take 1 tablet (2.5 mg) by mouth 2 times daily  Qty: 180 tablet, Refills: 3    Associated Diagnoses: Atrial flutter, unspecified type (H)      atorvastatin (LIPITOR) 20 MG tablet Take 1 tablet (20 mg) by mouth daily  Qty: 30 tablet, Refills: 0    Associated Diagnoses: Hyperlipidemia LDL goal <100; Benign essential hypertension      Cholecalciferol (VITAMIN D3) 25 MCG (1000 UT) CAPS Take 1 capsule by mouth daily OTC dose unknown     Associated Diagnoses: Osteoporosis with current pathological fracture with routine healing, unspecified osteoporosis type, subsequent encounter      fenofibrate (TRIGLIDE/LOFIBRA) 160 MG tablet TAKE 1 TABLET BY MOUTH  DAILY  Qty: 90 tablet, Refills: 3    Comments: Requesting 1 year supply  Associated Diagnoses: Hyperlipidemia LDL goal <100; Benign essential hypertension      ferrous gluconate (FERGON) 324 (38 Fe) MG tablet Take 324 mg by mouth daily      Multiple Vitamins-Minerals (PRESERVISION AREDS 2) CAPS Take 1 capsule by mouth 2 times daily      multivitamin w/minerals (MULTI-VITAMIN) tablet Take 1 tablet by mouth daily      spironolactone (ALDACTONE) 25 MG tablet TAKE 1 TABLET BY MOUTH  DAILY  Qty: 90 tablet, Refills: 3    Comments: Requesting 1 year supply  Associated Diagnoses: Secondary hypertension             Follow-up:  - Structural clinic in 1 week     Labs or imaging requiring follow-up after discharge:  -Repeat BMP in 1 week  -Repeat labs and echo in 1 month     Code status:  Full     Condition on discharge  Temp:  [97.2  F (36.2  C)-97.7  F (36.5  C)] 97.2  F (36.2  C)  Pulse:  [62-80] 62  Resp:  [13-38] 15  BP: (121-184)/(51-93) 134/67  MAP:  [69 mmHg-92 mmHg] 83 mmHg  Arterial Line BP: (131-164)/(33-55) 138/52  SpO2:  [95 %-100 %] 95 %  General: Alert, interactive, NAD  Eyes: sclera anicteric, EOMI  Neck: No JVD, carotid 2+ bilaterally  Cardiovascular: regular rate and rhythm, normal S1 and S2, no murmurs, gallops, or rubs  Resp: clear to auscultation bilaterally, no rales, wheezes, or rhonchi  GI: Soft, nontender, nondistended. +BS.  No HSM or masses, no rebound or guarding.  Extremities: no edema, no cyanosis or clubbing, dorsalis pedis and posterior tibialis pulses 2+ bilaterally  Skin: Warm and dry, no jaundice or rash  Neuro: CN 2-12 intact, moves all extremities equally  Psych: Alert & oriented x 3    Imaging with results:  Echocardiogram 4/12/2023:  Interpretation Summary  The left ventricle  is normal in size.  There is mild concentric left ventricular hypertrophy.  The visual ejection fraction is 60-65%.  No regional wall motion abnormalities noted.  There is trace aortic regurgitation at 2 o'clock position  There is a bioprosthetic aortic valve.  The prosthetic aortic valve is well-seated.  The prosthetic aortic valve appears to open well.  The gradient is normal for this prosthetic aortic valve (10mmHg).    TAVR: 4/11/23  Conclusion          Successful transfemoral transcatheter aortic valve replacement with a 26mm Nunes Elzbieta 3 Ultra valve.         Plan    PLAN:    1. Aspirin 81 mg po daily lifelong if tolerated.   2. Anticoagulation can be resumed tomorrow if access sites are stable without bleeding or hematoma.  He takes anticoagulation for embolic prophylaxis due to atrial fibrillation.   3. Bedrest per protocol.  4. Admit to the primary inpatient team for further evaluation and management.  5. Echocardiogram tomorrow.   6. Lifelong antibiotic prophylaxis prior to all dental procedures.           Patient Care Team:  Abdoulaye Redman MD as PCP - General (Internal Medicine)  Abdoulaye Redman MD as Assigned PCP  Afsaneh Thomas MD as Assigned Nephrology Provider  Chanel Mares CNP as Assigned Heart and Vascular Provider    Zuleyma Calhoun PA-C  [facility]  Pager: [phone]    Time Spent on this Encounter   I, Zuleyma Calhoun PA-C, personally saw the patient today and spent 20 minutes discharging this patient.

## 2023-04-12 ENCOUNTER — APPOINTMENT (OUTPATIENT)
Dept: OCCUPATIONAL THERAPY | Facility: CLINIC | Age: 88
DRG: 267 | End: 2023-04-12
Attending: NURSE PRACTITIONER
Payer: MEDICARE

## 2023-04-12 ENCOUNTER — TELEPHONE (OUTPATIENT)
Dept: NEPHROLOGY | Facility: CLINIC | Age: 88
End: 2023-04-12

## 2023-04-12 ENCOUNTER — APPOINTMENT (OUTPATIENT)
Dept: CARDIOLOGY | Facility: CLINIC | Age: 88
DRG: 267 | End: 2023-04-12
Attending: NURSE PRACTITIONER
Payer: MEDICARE

## 2023-04-12 VITALS
WEIGHT: 141.5 LBS | DIASTOLIC BLOOD PRESSURE: 65 MMHG | OXYGEN SATURATION: 98 % | BODY MASS INDEX: 20.96 KG/M2 | HEIGHT: 69 IN | RESPIRATION RATE: 19 BRPM | HEART RATE: 85 BPM | TEMPERATURE: 98.2 F | SYSTOLIC BLOOD PRESSURE: 141 MMHG

## 2023-04-12 DIAGNOSIS — Z95.2 S/P TAVR (TRANSCATHETER AORTIC VALVE REPLACEMENT): Primary | ICD-10-CM

## 2023-04-12 LAB
ANION GAP SERPL CALCULATED.3IONS-SCNC: 10 MMOL/L (ref 7–15)
BUN SERPL-MCNC: 31.5 MG/DL (ref 8–23)
CALCIUM SERPL-MCNC: 9 MG/DL (ref 8.8–10.2)
CHLORIDE SERPL-SCNC: 111 MMOL/L (ref 98–107)
CREAT SERPL-MCNC: 1.66 MG/DL (ref 0.67–1.17)
DEPRECATED HCO3 PLAS-SCNC: 19 MMOL/L (ref 22–29)
ERYTHROCYTE [DISTWIDTH] IN BLOOD BY AUTOMATED COUNT: 15.1 % (ref 10–15)
GFR SERPL CREATININE-BSD FRML MDRD: 39 ML/MIN/1.73M2
GLUCOSE SERPL-MCNC: 85 MG/DL (ref 70–99)
HCT VFR BLD AUTO: 32 % (ref 40–53)
HGB BLD-MCNC: 10.5 G/DL (ref 13.3–17.7)
LVEF ECHO: NORMAL
MAGNESIUM SERPL-MCNC: 2 MG/DL (ref 1.7–2.3)
MCH RBC QN AUTO: 31.2 PG (ref 26.5–33)
MCHC RBC AUTO-ENTMCNC: 32.8 G/DL (ref 31.5–36.5)
MCV RBC AUTO: 95 FL (ref 78–100)
PHOSPHATE SERPL-MCNC: 3.3 MG/DL (ref 2.5–4.5)
PLATELET # BLD AUTO: 167 10E3/UL (ref 150–450)
POTASSIUM SERPL-SCNC: 4.2 MMOL/L (ref 3.4–5.3)
RBC # BLD AUTO: 3.37 10E6/UL (ref 4.4–5.9)
SODIUM SERPL-SCNC: 140 MMOL/L (ref 136–145)
WBC # BLD AUTO: 7.9 10E3/UL (ref 4–11)

## 2023-04-12 PROCEDURE — 99232 SBSQ HOSP IP/OBS MODERATE 35: CPT | Performed by: INTERNAL MEDICINE

## 2023-04-12 PROCEDURE — 83735 ASSAY OF MAGNESIUM: CPT | Performed by: NURSE PRACTITIONER

## 2023-04-12 PROCEDURE — 93325 DOPPLER ECHO COLOR FLOW MAPG: CPT

## 2023-04-12 PROCEDURE — 80048 BASIC METABOLIC PNL TOTAL CA: CPT | Performed by: NURSE PRACTITIONER

## 2023-04-12 PROCEDURE — 97535 SELF CARE MNGMENT TRAINING: CPT | Mod: GO

## 2023-04-12 PROCEDURE — 93325 DOPPLER ECHO COLOR FLOW MAPG: CPT | Mod: 26 | Performed by: INTERNAL MEDICINE

## 2023-04-12 PROCEDURE — 99238 HOSP IP/OBS DSCHRG MGMT 30/<: CPT | Mod: FS | Performed by: INTERNAL MEDICINE

## 2023-04-12 PROCEDURE — 250N000013 HC RX MED GY IP 250 OP 250 PS 637: Performed by: NURSE PRACTITIONER

## 2023-04-12 PROCEDURE — 97165 OT EVAL LOW COMPLEX 30 MIN: CPT | Mod: GO

## 2023-04-12 PROCEDURE — 36415 COLL VENOUS BLD VENIPUNCTURE: CPT | Performed by: NURSE PRACTITIONER

## 2023-04-12 PROCEDURE — 93321 DOPPLER ECHO F-UP/LMTD STD: CPT

## 2023-04-12 PROCEDURE — 93005 ELECTROCARDIOGRAM TRACING: CPT

## 2023-04-12 PROCEDURE — 84100 ASSAY OF PHOSPHORUS: CPT | Performed by: NURSE PRACTITIONER

## 2023-04-12 PROCEDURE — 93308 TTE F-UP OR LMTD: CPT | Mod: 26 | Performed by: INTERNAL MEDICINE

## 2023-04-12 PROCEDURE — 97110 THERAPEUTIC EXERCISES: CPT | Mod: GO

## 2023-04-12 PROCEDURE — 85027 COMPLETE CBC AUTOMATED: CPT | Performed by: NURSE PRACTITIONER

## 2023-04-12 PROCEDURE — 93321 DOPPLER ECHO F-UP/LMTD STD: CPT | Mod: 26 | Performed by: INTERNAL MEDICINE

## 2023-04-12 PROCEDURE — 93010 ELECTROCARDIOGRAM REPORT: CPT | Performed by: INTERNAL MEDICINE

## 2023-04-12 RX ADMIN — ALLOPURINOL 200 MG: 100 TABLET ORAL at 08:39

## 2023-04-12 RX ADMIN — APIXABAN 2.5 MG: 2.5 TABLET, FILM COATED ORAL at 08:39

## 2023-04-12 RX ADMIN — SPIRONOLACTONE 25 MG: 25 TABLET ORAL at 08:40

## 2023-04-12 RX ADMIN — AMLODIPINE BESYLATE 5 MG: 5 TABLET ORAL at 08:39

## 2023-04-12 ASSESSMENT — ACTIVITIES OF DAILY LIVING (ADL)
ADLS_ACUITY_SCORE: 24
ADLS_ACUITY_SCORE: 30
ADLS_ACUITY_SCORE: 30
ADLS_ACUITY_SCORE: 24
ADLS_ACUITY_SCORE: 30
ADLS_ACUITY_SCORE: 24
ADLS_ACUITY_SCORE: 24
DEPENDENT_IADLS:: TRANSPORTATION

## 2023-04-12 NOTE — PROGRESS NOTES
[facility]    Hospitalist Progress Note    Date of Service (when I saw the patient): 04/12/2023    Assessment & Plan   Trevor Soni is a 89 year old male who was admitted on 4/11/2023.    Trevor Soni is a 89 year old male with a past medical history significant for CKD stage IV, HTN, paroxysmal atrial fibrillation with bifascicular block, chronic diastolic heart failure, CAD and severe aortic stenosis who underwent a TAVR with Dr. Ramires on 4/11/23. Hospitalist service was consulted post-op for medical management.      POD # 1 s/p TAVR with 26 mm Nunes Elzbieta 3 Valve  Severe Aortic Stenosis  Acute blood loss anemia from the above procedure hemoglobin stable at 10.5 from 12 prior to admission  Hemodynamically stable with BP's in the 130's to 150's over 60's, heart rates 50's to 70's, and satting 97% on RA.  -Post-op management as per Cardiology  -Anticoagulation, diet, activity, IVF's, pain regimen per Cardiology   -Aggressive pulmonary hygiene post-op              -Wean oxygen to maintain sats >90%              -Incentive spirometry every hour while awake              -Cough and deep breathe  -PRN pain meds  -BM regimen  -Therapies  -Monitor VS per order  -Hgb in AM  -No focal deficits noted on exam; vitally stable  On 4/12/2023 Mr. Sanchez is feeling well.  Denies any chest pain or shortness of breath  Echo showed EF of 60 to 65%.  Trace aortic regurgitation.  The by prosthetic arctic valve is well-seated and appears to open well gradient is normal    Okay to discharge as per cardiology today    Chronic Diastolic Heart Failure  -Management as per Cardiology      CAD  S/P CABG x 3 (LIMA to diagonal branch, SVG-PDA, SVG-LCx) 2001  Hypertension  Hyperlipidemia  -Management as per Cardiology      Paroxysmal Atrial Fibrillation  Bifascicular block  -Telemetry  -Management as per Cardiology      CKD Stage IV  -Per chart review, baseline creatinine appears to be 1.8-2.0  -Creatinine  prior to surgery 1.67  -Avoid nephrotoxic agents  -Renally dose all med's  -No NSAID's  Creatinine stable at 1.66 today from baseline of 1.67     Diet: Regular Diet Adult     DVT Prophylaxis: Defer to primary service   Farooq Catheter: Not present  Code Status: Full Code           Disposition Plan     Expected Discharge Date: 04/12/2023                      Bernadine Zhao MD, MD  [phone] (P)      Interval History   Patient is resting comfortably in bed.  Denies any chest pain or shortness of breath.  Bilateral groins feel well.  No lower extremity edema worked with PT earlier today.  No acute events in the last 24 hours    -Data reviewed today: I reviewed all new labs and imaging results over the last 24 hours. I personally reviewed echo results as noted above  Physical Exam   Temp: 98.2  F (36.8  C) Temp src: Oral BP: (!) 141/65 Pulse: 80   Resp: 19 SpO2: 99 % O2 Device: None (Room air)    Vitals:    04/11/23 1206 04/12/23 0150   Weight: 65.5 kg (144 lb 8 oz) 64.2 kg (141 lb 8 oz)     Vital Signs with Ranges  Temp:  [97.2  F (36.2  C)-98.4  F (36.9  C)] 98.2  F (36.8  C)  Pulse:  [57-96] 80  Resp:  [10-38] 19  BP: (109-184)/(50-93) 141/65  MAP:  [69 mmHg-92 mmHg] 83 mmHg  Arterial Line BP: (131-164)/(33-55) 138/52  SpO2:  [95 %-100 %] 99 %  I/O last 3 completed shifts:  In: 480 [P.O.:480]  Out: 425 [Urine:425]    Constitutional: Awake, alert, cooperative, no apparent distress  Respiratory: Clear to auscultation bilaterally, no crackles or wheezing  Cardiovascular: Regular rate and rhythm, normal S1 and S2, and no murmur noted  GI: Normal bowel sounds, soft, non-distended, non-tender  Skin/Integumen: No rashes, no cyanosis, no edema  Other:     Medications       allopurinol  200 mg Oral Daily     amLODIPine  5 mg Oral Daily     apixaban ANTICOAGULANT  2.5 mg Oral BID     atorvastatin  20 mg Oral QPM     spironolactone  25 mg Oral Daily       Data   Recent Labs   Lab 04/12/23  0614 04/11/23 2013 04/05/23  1526    WBC 7.9  --  7.0   HGB 10.5*  --  12.0*   MCV 95  --  97     --  201   INR  --   --  1.26*     --  142   POTASSIUM 4.2 4.3 5.0   CHLORIDE 111*  --  109*   CO2 19*  --  22   BUN 31.5*  --  30.0*   CR 1.66*  --  1.67*   ANIONGAP 10  --  11   AGUSTÍN 9.0  --  9.8   GLC 85  --  111*   ALBUMIN  --   --  4.1   PROTTOTAL  --   --  7.0   BILITOTAL  --   --  0.5   ALKPHOS  --   --  102   ALT  --   --  15   AST  --   --  27       Recent Results (from the past 24 hour(s))   Echocardiogram Complete   Result Value    LVEF  65-70%    Narrative    [id number]  [id number]  [id number]  [id number]^ADELAIDA^MILI     [facility]  Echocardiography Laboratory  [address] [place] [address]     Name: VIRGINIA LAMBERT  MRN: [id number]  : 1933  Study Date: 2023 12:02 PM  Age: 89 yrs  Gender: Male  Patient Location: [facility]  Reason For Study: TAVR  Ordering Physician: MILI SON  Referring Physician: Abdoulaye Redman  Performed By: Lynda Montero     BSA: 1.8 m2  Height: 66 in  Weight: 147 lb  HR: 81  BP: 184/71 mmHg  ______________________________________________________________________________  Procedure  Complete Portable Echo Adult.  ______________________________________________________________________________  Interpretation Summary     PROCEDURE  Intra-procedural transthoracic echocardiogram for transfemoral transcatheter  aortic valve replacement with a 26-mm Nunes Elzbieta valve.     PRE-PROCEDURAL FINDINGS:  1. Severe calcific aortic valve stenosis with moderate aortic regurgitation.  2. Normal left ventricular systolic function. Visually estimated LVEF 65-70%.     POST-PROCEDURAL SURVEY:  1. The valve was successfully deployed.  2. Valve is well seated. No megan-prosthetic regurgitation. Trivial central  regurgitation. Mean systolic gradient 3 mmHg.  3. No pericardial effusion.     ______________________________________________________________________________  Left Ventricle  The  left ventricle is normal in size. There is moderate concentric left  ventricular hypertrophy. Left ventricular systolic function is normal. The  visual ejection fraction is 65-70%. No regional wall motion abnormalities  noted.     Right Ventricle  The right ventricle is normal in size and function.     Atria  The left atrium is severely dilated. The right atrium is mildly dilated.     Mitral Valve  There is severe mitral annular calcification. The mitral valve leaflets are  mildly thickened. There is trace mitral regurgitation. There is mild mitral  stenosis.     Tricuspid Valve  Normal tricuspid valve. There is trace to mild tricuspid regurgitation.     Aortic Valve  There is moderate (2+) aortic regurgitation. Severe valvular aortic stenosis.     Pulmonic Valve  The pulmonic valve is not well seen, but is grossly normal. There is trace  pulmonic valvular regurgitation.     Vessels  The aortic root is normal size. Dilation of the inferior vena cava is present  with normal respiratory variation in diameter.     Pericardium  There is no pericardial effusion.     Rhythm  Sinus rhythm was noted.  ______________________________________________________________________________  Doppler Measurements & Calculations  Ao V2 max: 104.1 cm/sec  Ao max P.0 mmHg  Ao V2 mean: 71.6 cm/sec  Ao mean P.4 mmHg  Ao V2 VTI: 23.9 cm  TR max laurence: 247.0 cm/sec  TR max P.4 mmHg     ______________________________________________________________________________  Report approved by: Dr Angelina Ramirez 2023 02:57 PM         Cardiac Catheterization    Narrative       Successful transfemoral transcatheter aortic valve replacement with a   26mm Nunes Elzbieta 3 Ultra valve.     Echo Limited   Result Value    LVEF  60-65%    Narrative    [id number]  [id number]  [id number]  [id number]^BETTY^LUANNE^NED     [facility]  Echocardiography Laboratory  [address] [place] [id number]     Name: VIRGINIA LAMBERT  MRN:  [id number]  : 1933  Study Date: 2023 08:02 AM  Age: 89 yrs  Gender: Male  Patient Location: [facility]  Reason For Study: Aortic Valve Replacement  Ordering Physician: LUANNE HERNÁNDEZ  Referring Physician: Abdoulaye Redman  Performed By: Sydnie Dickinson RDCS     BSA: 1.8 m2  Height: 69 in  Weight: 144 lb  HR: 70  BP: 141/65 mmHg  ______________________________________________________________________________  ______________________________________________________________________________  Interpretation Summary     The left ventricle is normal in size.  There is mild concentric left ventricular hypertrophy.  The visual ejection fraction is 60-65%.  No regional wall motion abnormalities noted.  There is trace aortic regurgitation at 2 o'clock position  There is a bioprosthetic aortic valve.  The prosthetic aortic valve is well-seated.  The prosthetic aortic valve appears to open well.  The gradient is normal for this prosthetic aortic valve (10mmHg).  ______________________________________________________________________________  Left Ventricle  The left ventricle is normal in size. There is mild concentric left  ventricular hypertrophy. The visual ejection fraction is 60-65%. No regional  wall motion abnormalities noted.     Right Ventricle  The right ventricle is normal in size and function.     Atria  The left atrium is severely dilated. The right atrium is moderate to severely  dilated. There is no color Doppler evidence of an atrial shunt.     Mitral Valve  The mitral valve leaflets are moderately thickened. There is moderate mitral  annular calcification. There is trace mitral regurgitation.     Tricuspid Valve  There is trace tricuspid regurgitation.     Aortic Valve  There is trace aortic regurgitation. at 2 o'clock position. The mean AoV  pressure gradient is 10.0 mmHg. There is a bioprosthetic aortic valve. The  prosthetic aortic valve is well-seated. The prosthetic aortic valve appears to  open  well. The gradient is normal for this prosthetic aortic valve.     Pulmonic Valve  There is mild (1+) pulmonic valvular regurgitation.     Vessels  The ascending aorta is Mildly dilated.     Pericardium  There is no pericardial effusion.     ______________________________________________________________________________  MMode/2D Measurements & Calculations  asc Aorta Diam: 3.9 cm  LVOT diam: 2.0 cm  LVOT area: 3.1 cm2     Doppler Measurements & Calculations  MV max P.8 mmHg  MV mean P.0 mmHg  MV V2 VTI: 41.4 cm  MVA(VTI): 2.6 cm2  Ao V2 max: 210.3 cm/sec  Ao max P.0 mmHg  Ao V2 mean: 145.5 cm/sec  Ao mean PG: 10.0 mmHg  Ao V2 VTI: 43.9 cm  NANDA(I,D): 2.4 cm2  NANDA(V,D): 2.6 cm2  Ao acc time: 0.08 sec  LV V1 max P.3 mmHg  LV V1 max: 174.5 cm/sec  LV V1 VTI: 33.7 cm  SV(LVOT): 106.0 ml  SI(LVOT): 59.0 ml/m2  PI end-d dionicio: 140.5 cm/sec  TR max dionicio: 266.8 cm/sec  TR max P.1 mmHg  AV Dionicio Ratio (DI): 0.83  NANDA Index (cm2/m2): 1.3     ______________________________________________________________________________  Report approved by: Yvette Baum 2023 10:18 AM

## 2023-04-12 NOTE — PLAN OF CARE
Neuro- A/Ox4  Most Recent Vitals- Temp: 98.2  F (36.8  C) Temp src: Oral BP: 133/66 Pulse: 72   Resp: 20 SpO2: 97 % O2 Device: None (Room air)   Tele/Cardiac- Afib CVR w/ BBB; no pauses >2 sec overnight, did not eduarda <52 bpm  Resp- on RA, LS clear/diminished  Activity- 1A GB/W  Pain- denies  Drips- n/a  Drains/Tubes- PIV  Skin- R/L groin sites CDI  GI/- WDL  Aggression Color- Green  COVID status- Negative  Plan- cardiac rehab, echo, EKG, EP consult today  Misc- NPO since 0000    Maria M Carvalho RN

## 2023-04-12 NOTE — PROGRESS NOTES
04/12/23 0900   Appointment Info   Signing Clinician's Name / Credentials (OT) Felicitas Scruggs OTR/L   Living Environment   People in Home child(teto), adult   Current Living Arrangements house   Home Accessibility stairs to enter home;stairs within home   Number of Stairs, Main Entrance 3   Stair Railings, Main Entrance railing on right side (ascending)   Number of Stairs, Within Home, Primary greater than 10 stairs   Stair Railings, Within Home, Primary railing on right side (ascending);railing on left side (ascending)  (varies betwen floors but one rail only)   Transportation Anticipated family or friend will provide   Living Environment Comments Adult daughter has resided with patient since fall, pt has walk-in shower.   Self-Care   Usual Activity Tolerance moderate   Current Activity Tolerance fair   Regular Exercise No   Equipment Currently Used at Home cane, straight;walker, rolling   Fall history within last six months no   Activity/Exercise/Self-Care Comment Baseline indep mobilities using walker around house and cane on stairs. indep all self-cares including showering. Daughter and pt share HH tasks although she has been doing majority, daughter does driving. Pt manages his own meds.   General Information   Onset of Illness/Injury or Date of Surgery 04/11/23   Referring Physician Chanel Mares CNP   Patient/Family Therapy Goal Statement (OT) return home w/daughter's assist   Additional Occupational Profile Info/Pertinent History of Current Problem 89-year-old gentleman with past medical history significant for coronary disease s/p CABG x3 (2001), paroxysmal atrial fibrillation, R CEA (2001), stage IV CKD, infrarenal abdominal aortic aneurysm measuring 3.7 x 3.5 cm, chronic diastolic heart failure, and severe aortic stenosis now POD #1 elective TAVR.   Existing Precautions/Restrictions lifting  (10# limitt x 1 week post-op)   Cognitive Status Examination   Orientation Status orientation to person, place  and time   Follows Commands WFL   Pain Assessment   Patient Currently in Pain No   Range of Motion Comprehensive   Comment, General Range of Motion B alex impairments; B alex flex max 90o   Bed Mobility   Comment (Bed Mobility) Independent   Transfers   Transfer Comments independent w/ AD   Clinical Impression   Criteria for Skilled Therapeutic Interventions Met (OT) Yes, treatment indicated   OT Diagnosis decreased IADL performance   OT Problem List-Impairments impacting ADL problems related to;activity tolerance impaired;range of motion (ROM);strength;post-surgical precautions  (BUE ROM and strength impaired at baseline)   Assessment of Occupational Performance 1-3 Performance Deficits   Identified Performance Deficits Exercise and HH mgmt tasks,   Planned Therapy Interventions (OT) home program guidelines;progressive activity/exercise;risk factor education   Clinical Decision Making Complexity (OT) low complexity   Risk & Benefits of therapy have been explained care plan/treatment goals reviewed;evaluation/treatment results reviewed;risks/benefits reviewed;current/potential barriers reviewed;participants voiced agreement with care plan;participants included;patient   OT Total Evaluation Time   OT Eval, Low Complexity Minutes (60895) 15   OT Goals   Therapy Frequency (OT) One time eval and treatment   OT Predicted Duration/Target Date for Goal Attainment 04/12/23   OT Goals Cardiac Phase 1   OT: Understanding of cardiac education to maximize quality of life, condition management, and health outcomes Patient   OT: Perform aerobic activity with stable cardiovascular response 10 minutes;ambulation;intermittent   OT: Functional/aerobic ambulation tolerance with stable cardiovascular response in order to return to home and community environment Supervision/SBA;Greater than 300 feet;Rolling walker;Within precautions   OT: Navigation of stairs simulating home set up with stable cardiovascular response in order to return to  home and community environment Supervision/SBA;Greater than 10 stairs;Rail on left;Rail on right;Assistive device   Self-Care/Home Management   Self-Care/Home Mgmt/ADL, Compensatory, Meal Prep Minutes (70229) 14   Treatment Detail/Skilled Intervention Post-op education completed per below   Therapeutic Procedures/Exercise   Therapeutic Procedure: strength, endurance, ROM, flexibillity minutes (45849) 24   Symptoms Noted During/After Treatment fatigue   Treatment Detail/Skilled Intervention Pt amb with FWW x 10 min with 3min seated rest and one additional brief standing rest break before resumption.   Treatment Time Includes (CR Only) See specific exercise details intervention group(s);Monitoring of vital signs (see vital signs flowsheet for details);Extra time managing multiple lines/tubes;Extra time changing tele monitor   Ambulation   Duration (minutes) 10 minutes   Symptoms Fatigue   Cardiovascular Response Normal   Exercise Details Amb with FWW CGA initially for safety only and no LOB therefore completed with SBA and no LOB. Two rest breaks en route as noted above.   Vital Signs Details See VS flowsheet   Stairs   Workload 15 stairs   Symptoms Fatigue   Cardiovascular Response Normal   Exercise Details Completed stairs slow manner but no LOB using one rail each direction   Vital Signs Details see VS flowsheet   Cardiac Education   Education Provided Home exercise program;OMNI Scale;Outpatient Cardiac Rehab;Precautions;Resuming home activities   Education Packet Given to Patient Yes   All Patient Education Handouts Reviewed with Patient and/or Family Yes   Cardiac Rehab Phase II Plan   Phase II Order Received Yes   Phase II Appointment Status Scheduled   Date/Time Wed 5/3   7:45A   Location [facility]   OT Discharge Planning   OT Discharge Recommendation (DC Rec) home with assist;home with outpatient cardiac rehab   OT Rationale for DC Rec Recommend home w/daughter's assist for HH tasks and driving (she does all  the driving at baseline) and OPCR for progessive monitored exercise and education in lifestyle management

## 2023-04-12 NOTE — TELEPHONE ENCOUNTER
[facility] Call Center    Phone Message    May a detailed message be left on voicemail: yes     Reason for Call: Pt's daughter Bianka called to schedule 2 month f/u around 5/17/23. Writer unable to find appt within timeframe. Please give Bianka a call back to schedule. Thank you.    Action Taken: Other: NEPH    Travel Screening: Not Applicable

## 2023-04-12 NOTE — CONSULTS
Care Management Initial Consult    General Information  Assessment completed with: Patient, Children, Bianka  Type of CM/SW Visit: Initial Assessment    Writer met with patient and introduced self and role.  Patient states that he lives in a house alone with 3 stairs to enter and all needs met on main level except for laundry in basement with full flight of stairs with railing.  Patient states he sometimes can ambulate independently and other times he needs his cane or walker.  Patient states he plans to use his walker primarily once he returns home.  Patient manages his own meds and is able to do cooking and cleaning himself.  Patient states that he hasn't drove for a few months and his daughter Bianka transports him to appointments and errands.  Patient has had homecare services in the past and is agreeable to homecare post discharge.    Primary Care Provider verified and updated as needed: Yes   Readmission within the last 30 days: no previous admission in last 30 days      Reason for Consult: discharge planning  Advance Care Planning: Advance Care Planning Reviewed: no concerns identified          Communication Assessment  Patient's communication style: spoken language (English or Bilingual)    Hearing Difficulty or Deaf: no   Wear Glasses or Blind: yes    Cognitive  Cognitive/Neuro/Behavioral: WDL  Level of Consciousness: alert  Arousal Level: opens eyes spontaneously  Orientation: oriented x 4     Best Language: 0 - No aphasia  Speech: clear, spontaneous    Living Environment:   People in home: alone     Current living Arrangements: house      Able to return to prior arrangements: yes       Family/Social Support:  Care provided by: self, child(teto)  Provides care for: no one  Marital Status:   Children          Description of Support System:    Supportive/involved       Current Resources:   Patient receiving home care services: No     Community Resources:    Equipment currently used at home: cane,  straight, walker, rolling  Supplies currently used at home:      Employment/Financial:  Employment Status: retired        Financial Concerns: No concerns identified   Referral to Financial Worker: No       Lifestyle & Psychosocial Needs:  Social Determinants of Health     Tobacco Use: Medium Risk (4/12/2023)    Patient History      Smoking Tobacco Use: Former      Smokeless Tobacco Use: Never      Passive Exposure: Not on file   Alcohol Use: Not on file   Financial Resource Strain: Not on file   Food Insecurity: Not on file   Transportation Needs: Not on file   Physical Activity: Not on file   Stress: Not on file   Social Connections: Not on file   Intimate Partner Violence: Not on file   Depression: Not at risk (3/17/2023)    PHQ-2      PHQ-2 Score: 0   Housing Stability: Not on file       Functional Status:  Prior to admission patient needed assistance:   Dependent ADLs:: Ambulation-walker  Dependent IADLs:: Transportation       Values/Beliefs:  Spiritual, Cultural Beliefs, Sabianist Practices, Values that affect care: no             Care Management Discharge Note    Discharge Date: 04/12/2023       Discharge Disposition: Home, Home Care    Discharge Services: None    Discharge DME: None    Discharge Transportation: family or friend will provide    Private pay costs discussed: Not applicable    PAS Confirmation Code:    Patient/family educated on Medicare website which has current facility and service quality ratings:      Education Provided on the Discharge Plan:    Persons Notified of Discharge Plans: patient, dtr Bianka, bedside RN, [facility]  Patient/Family in Agreement with the Plan: yes    Handoff Referral Completed:yes    Additional Information:  Patient to discharge home with [facility] RN/PT/OT services.  Patient's daughter Bianka plans to stay with patient thru the weekend.  Lana Rocha RN  Care Coordinator  [facility]  [phone] (text or call)

## 2023-04-13 LAB
ATRIAL RATE - MUSE: 73 BPM
ATRIAL RATE - MUSE: 76 BPM
DIASTOLIC BLOOD PRESSURE - MUSE: NORMAL MMHG
DIASTOLIC BLOOD PRESSURE - MUSE: NORMAL MMHG
INTERPRETATION ECG - MUSE: NORMAL
INTERPRETATION ECG - MUSE: NORMAL
P AXIS - MUSE: NORMAL DEGREES
P AXIS - MUSE: NORMAL DEGREES
PR INTERVAL - MUSE: NORMAL MS
PR INTERVAL - MUSE: NORMAL MS
QRS DURATION - MUSE: 136 MS
QRS DURATION - MUSE: 136 MS
QT - MUSE: 504 MS
QT - MUSE: 510 MS
QTC - MUSE: 519 MS
QTC - MUSE: 546 MS
R AXIS - MUSE: -76 DEGREES
R AXIS - MUSE: -80 DEGREES
SYSTOLIC BLOOD PRESSURE - MUSE: NORMAL MMHG
SYSTOLIC BLOOD PRESSURE - MUSE: NORMAL MMHG
T AXIS - MUSE: 46 DEGREES
T AXIS - MUSE: 70 DEGREES
VENTRICULAR RATE- MUSE: 64 BPM
VENTRICULAR RATE- MUSE: 69 BPM

## 2023-04-13 NOTE — PROGRESS NOTES
[id number]: A&Ox4. VSS. Neuro's intact. L groin site ecchymotic & R groin site unchanged. Discharge instructions reviewed, understood, and signed by patient. VSS, PIV removed, new medications reviewed and understood, patient has all belongings. Patient left unit via wheelchair with family.

## 2023-04-14 ENCOUNTER — TELEPHONE (OUTPATIENT)
Dept: NEPHROLOGY | Facility: CLINIC | Age: 88
End: 2023-04-14
Payer: MEDICARE

## 2023-04-14 ENCOUNTER — TELEPHONE (OUTPATIENT)
Dept: CARDIOLOGY | Facility: CLINIC | Age: 88
End: 2023-04-14
Payer: MEDICARE

## 2023-04-14 ENCOUNTER — MYC MEDICAL ADVICE (OUTPATIENT)
Dept: CARDIOLOGY | Facility: CLINIC | Age: 88
End: 2023-04-14
Payer: MEDICARE

## 2023-04-14 LAB
ATRIAL RATE - MUSE: 84 BPM
DIASTOLIC BLOOD PRESSURE - MUSE: NORMAL MMHG
INTERPRETATION ECG - MUSE: NORMAL
P AXIS - MUSE: NORMAL DEGREES
PR INTERVAL - MUSE: NORMAL MS
QRS DURATION - MUSE: 130 MS
QT - MUSE: 516 MS
QTC - MUSE: 572 MS
R AXIS - MUSE: -79 DEGREES
SYSTOLIC BLOOD PRESSURE - MUSE: NORMAL MMHG
T AXIS - MUSE: 64 DEGREES
VENTRICULAR RATE- MUSE: 74 BPM

## 2023-04-14 NOTE — TELEPHONE ENCOUNTER
Called and scheduled the patient for 2  Month follow up per last checkout notes. Patient is aware of appt date and time. 4.14.23 AM

## 2023-04-14 NOTE — TELEPHONE ENCOUNTER
Spoke with patient and his daughter Bianka over the phone. He has a small lump at his groin site which he describes as being about the size of the tip of his pinky. It is not growing or changing. He does not have any pain at the site. Informed him that this is normal. Bianka states that he is doing very well since discharge. Asked that they call in the future with any questions or concerns.

## 2023-04-17 ENCOUNTER — TELEPHONE (OUTPATIENT)
Dept: FAMILY MEDICINE | Facility: CLINIC | Age: 88
End: 2023-04-17
Payer: MEDICARE

## 2023-04-17 ENCOUNTER — MEDICAL CORRESPONDENCE (OUTPATIENT)
Dept: HEALTH INFORMATION MANAGEMENT | Facility: CLINIC | Age: 88
End: 2023-04-17

## 2023-04-17 NOTE — TELEPHONE ENCOUNTER
Simona from [facility] homecare calling to request verbal orders for the followin. Skilled nursinx per week for 2 weeks, every other week for 6 weeks, and 3 prn visits     2. PT eval and treat     Routing to PCP to please review and advise on requested homecare orders     Callback to Simona [phone] - ok to leave detailed VM (confidential VM)     Larry Cruz RN  [facility]

## 2023-04-18 NOTE — TELEPHONE ENCOUNTER
Writer called and left VM for Simona from [facility] and notified of PCP's approval for requested home care orders on confidential VM.    Advised Simona to call back to further questions or concerns.    Signing encounter.    Larry Cruz RN  [facility]

## 2023-04-20 ENCOUNTER — OFFICE VISIT (OUTPATIENT)
Dept: CARDIOLOGY | Facility: CLINIC | Age: 88
End: 2023-04-20
Payer: MEDICARE

## 2023-04-20 VITALS
HEIGHT: 68 IN | OXYGEN SATURATION: 98 % | SYSTOLIC BLOOD PRESSURE: 146 MMHG | BODY MASS INDEX: 21.22 KG/M2 | WEIGHT: 140 LBS | HEART RATE: 87 BPM | DIASTOLIC BLOOD PRESSURE: 64 MMHG

## 2023-04-20 DIAGNOSIS — I45.2: ICD-10-CM

## 2023-04-20 DIAGNOSIS — Z95.2 S/P TAVR (TRANSCATHETER AORTIC VALVE REPLACEMENT): Primary | ICD-10-CM

## 2023-04-20 PROCEDURE — 99214 OFFICE O/P EST MOD 30 MIN: CPT | Performed by: NURSE PRACTITIONER

## 2023-04-20 NOTE — PATIENT INSTRUCTIONS
Today's Plan:     1) Follow-up with me in 1 month with repeat echocardiogram and labs.     Ne RN ([phone]), Vanita RN ([phone]), and Jen STRATTON ([phone])    Scheduling phone number: [phone]    Reminder: Please bring in all current medications, over the counter supplements and vitamin bottles to your next appointment.-    It was a pleasure seeing you today!     Chanel Mares, MEENA  4/20/2023    -

## 2023-04-20 NOTE — LETTER
4/20/2023    Abdoulaye Redman MD  [address]  [facility] [address]    RE: Trevor Soni       Dear Colleague,     I had the pleasure of seeing Trevor Soni in the [facility].  Cardiology Clinic Progress Note  Trevor Soni MRN# [id number]   YOB: 1933 Age: 89 year old     Primary cardiologist: Dr. Ravi     Reason for visit: s/p TAVR     History of presenting illness:    Trevor Soni is a pleasant 89 year old patient with past medical history significant for coronary disease s/p CABG x 3 (2001), paroxysmal atrial fibrillation, R CEA (2001), stage IV CKD, infrarenal AAA, chronic diastolic heart failure, chronic RBBB/bifasicular block, and severe aortic stenosis s/p TAVR with 26 mm Nunes ELZBIETA 3 valve on 4/11/2023, here today for his 1-week follow-up.     His procedure went well without any hemodynamic instability or conduction abnormalities.  Postoperatively, the patient had pauses noted on telemetry during waking hours, lasting up to 3.2 seconds.  EKG remained unchanged.  Findings were discussed with EP, who felt it was safe to discharge home with cardiac event monitor.  He was discharged on POD #1 in stable condition.     Today Trevor reports doing well from a cardiovascular standpoint.  He denies any chest pain, shortness of breath, syncope or near syncope, or palpitations.  He denies any pain or bleeding from groin sites.  Unfortunately, he was not placed on event monitor prior to discharge.  He is borderline hypertensive today, but his systolic blood pressures have been in the 130s at home.    I reviewed his most recent labs.  His BMP shows creatinine of 1.66, GFR 39.  His CBC shows hemoglobin of 10.5, with normal platelet levels.         Assessment and Plan:     ASSESSMENT:    Severe aortic stenosis s/p TAVR with 26 mm Nunes Elzbieta 3 valve.  Postprocedure echo outlined above.  He is recovering well and tolerating home therapy.  On apixaban 2.5 mg twice  daily for anticoagulation.  Chronic diastolic heart failure, NYHA class II.  Appears euvolemic on exam, not on diuretic therapy.  CAD s/p CABG x 3 in 2001 (LIMA-diagonal branch, SVG-PDA, SVG-LCx).  Pre-TAVR coronary angiogram showed patent grafts.  He denies angina.  He is on atorvastatin 20 mg daily.  Paroxysmal atrial fibrillation.  [id number] score 5, on apixaban 2.5 mg twice daily for stroke prophylaxis.  Chronic RBBB/bifasicular block.  Patient was noted to have pauses up to 3 seconds postoperatively, EP was consulted.  It was felt he was safe to discharge home with 30-day event monitor, unfortunately this was not placed prior to discharge.  Hypertension.  Borderline hypertensive with systolic blood pressures in the 140s today, though 130s at home.  On amlodipine 5 mg daily and spironolactone 25 mg daily.  Stage IV CKD.  Renal function stable, baseline creatinine appears to be 1.8-2.0.  Infrarenal AAA measuring 3.7 x 3.5 cm       PLAN:     Patient remains at high risk for advanced AV block, will need 30-day MCOT placed.  We discussed the need for lifelong antibiotic prophylaxis prior to any dental procedure.  Continue home PT/OT.  We discussed the importance of taking home blood pressure readings approximately 2 hours after morning medications.  If systolic blood pressures consistently above 140s, recommend increasing amlodipine to 10 mg daily.  Follow-up with me in approximately 1 month with repeat echo, labs, and EKG.         Chanel Mares DNP, APRN, CNP  Page: [phone] (8a-5p M-F)    Orders this Visit:  Orders Placed This Encounter   Procedures    Adult Cardiac Mobile Telemetry Monitor     No orders of the defined types were placed in this encounter.    There are no discontinued medications.    Today's clinic visit entailed:  Review of the result(s) of each unique test - echo, TAVR, labs, EKG  Ordering of each unique test  Prescription drug management  35 minutes spent by me on the date of the  "encounter doing chart review, review of test results, interpretation of tests, patient visit and documentation   Provider  Link to [facility] Help Grid     The level of medical decision making during this visit was of moderate complexity.           Review of Systems:     Review of Systems:  Skin:  not assessed     Eyes:  not assessed    ENT:  not assessed    Respiratory:  Negative    Cardiovascular:  Negative for;palpitations;chest pain;lightheadedness;dizziness;fatigue edema;Positive for  Gastroenterology: not assessed    Genitourinary:  not assessed    Musculoskeletal:  not assessed    Neurologic:  not assessed    Psychiatric:  not assessed    Heme/Lymph/Imm:  not assessed    Endocrine:  not assessed              Physical Exam:   Vitals: BP (!) 146/64   Pulse 87   Ht 1.727 m (5' 8\")   Wt 63.5 kg (140 lb)   SpO2 98%   BMI 21.29 kg/m    Constitutional:  cooperative        Skin:  warm and dry to the touch   groin sites healed well    Head:  normocephalic        Eyes:  pupils equal and round        ENT:           Neck:  JVP normal        Chest:  normal breath sounds, clear to auscultation, normal A-P diameter, normal symmetry, normal respiratory excursion, no use of accessory muscles        Cardiac:   irregularly irregular rhythm     systolic ejection murmur;grade 1          Abdomen:  abdomen soft        Vascular:                                        Extremities and Back:  no edema        Neurological:  affect appropriate;no gross motor deficits             Medications:     Current Outpatient Medications   Medication Sig Dispense Refill    acetaminophen (TYLENOL) 325 MG tablet Take 2 tablets (650 mg) by mouth every 6 hours as needed for mild pain or other (and adjunct with moderate or severe pain or per patient request)      allopurinol (ZYLOPRIM) 100 MG tablet TAKE 2 TABLETS BY MOUTH  DAILY 180 tablet 3    amLODIPine (NORVASC) 5 MG tablet Take 1 tablet (5 mg) by mouth daily 90 tablet 3    apixaban ANTICOAGULANT " (ELIQUIS) 2.5 MG tablet Take 1 tablet (2.5 mg) by mouth 2 times daily 180 tablet 3    atorvastatin (LIPITOR) 20 MG tablet Take 1 tablet (20 mg) by mouth daily 30 tablet 0    Cholecalciferol (VITAMIN D3) 25 MCG (1000 UT) CAPS Take 1 capsule by mouth daily OTC dose unknown      fenofibrate (TRIGLIDE/LOFIBRA) 160 MG tablet TAKE 1 TABLET BY MOUTH  DAILY 90 tablet 3    ferrous gluconate (FERGON) 324 (38 Fe) MG tablet Take 324 mg by mouth daily      Multiple Vitamins-Minerals (PRESERVISION AREDS 2) CAPS Take 1 capsule by mouth 2 times daily      multivitamin w/minerals (MULTI-VITAMIN) tablet Take 1 tablet by mouth daily      spironolactone (ALDACTONE) 25 MG tablet TAKE 1 TABLET BY MOUTH  DAILY 90 tablet 3       Family History   Problem Relation Age of Onset    Myocardial Infarction Mother     Rheumatic fever Father     Cerebrovascular Disease Brother        Social History     Socioeconomic History    Marital status:      Spouse name: Not on file    Number of children: Not on file    Years of education: Not on file    Highest education level: Not on file   Occupational History    Not on file   Tobacco Use    Smoking status: Former     Types: Cigars     Quit date: 1980     Years since quittin.3    Smokeless tobacco: Never   Vaping Use    Vaping status: Never Used   Substance and Sexual Activity    Alcohol use: Yes     Comment: once every 6 months    Drug use: No    Sexual activity: Not Currently     Partners: Female   Other Topics Concern    Parent/sibling w/ CABG, MI or angioplasty before 65F 55M? Yes   Social History Narrative    Not on file     Social Determinants of Health     Financial Resource Strain: Not on file   Food Insecurity: Not on file   Transportation Needs: Not on file   Physical Activity: Not on file   Stress: Not on file   Social Connections: Not on file   Intimate Partner Violence: Not on file   Housing Stability: Not on file            Past Medical History:     Past Medical History:    Diagnosis Date    Arthritis     rhuematoid    Coronary artery disease     triple bypass 2001    CRF (chronic renal failure)     Gastro-oesophageal reflux disease     Gout     Hyperlipidemia     Hypertension     Nocturia               Past Surgical History:     Past Surgical History:   Procedure Laterality Date    CARDIAC SURGERY      CHOLECYSTECTOMY      COLONOSCOPY      CV CORONARY ANGIOGRAM N/A 1/13/2023    Procedure: Coronary Angiogram;  Surgeon: Sujata Ramires MD;  Location:  [facility]    CV PCI N/A 1/13/2023    Procedure: Percutaneous Coronary Intervention;  Surgeon: Sujata Ramires MD;  Location:  [facility]    CV TRANSCATHETER AORTIC VALVE REPLACEMENT-FEMORAL APPROACH N/A 4/11/2023    Procedure: Transcatheter Aortic Valve Replacement-Femoral Approach;  Surgeon: Sujata Ramires MD;  Location:  [facility]    LAPAROTOMY EXPLORATORY N/A 6/30/2020    Procedure: EXPLORATORY LAPAROTOMY, BOWEL RESECTION;  Surgeon: Bull Rachel MD;  Location: [facility]    LAPAROTOMY, LYSIS ADHESIONS, COMBINED N/A 6/21/2020    Procedure: EXPLORATORY LAPAROTOMY WITH LYSIS OF ADHESIONS;  Surgeon: Jose Reed MD;  Location:  OR    ORTHOPEDIC SURGERY      PHACOEMULSIFICATION CLEAR CORNEA WITH TORIC INTRAOCULAR LENS IMPLANT  1/30/2012    Procedure:PHACOEMULSIFICATION CLEAR CORNEA WITH TORIC INTRAOCULAR LENS IMPLANT; LEFT PHACOEMULSIFICATION CLEAR CORNEA WITH DELUXE  TORIC INTRAOCULAR LENS IMPLANT ; Surgeon:BRANDO HIGHTOWER; Location:[facility]    PHACOEMULSIFICATION CLEAR CORNEA WITH TORIC INTRAOCULAR LENS IMPLANT  2/13/2012    Procedure:PHACOEMULSIFICATION CLEAR CORNEA WITH TORIC INTRAOCULAR LENS IMPLANT; RIGHT PHACOEMULSIFICATION CLEAR CORNEA WITH TORIC INTRAOCULAR LENS IMPLANT ; Surgeon:BRANDO HIGHTOWER; Location:[facility]    VASCULAR SURGERY                Allergies:   Avocado, Ciprofloxacin, and Penicillins       Data:   All laboratory data reviewed:    Recent Labs   Lab Test  04/05/23  1526 03/17/23  1257 10/11/22  1358 10/25/21  1417 01/27/21  1154 12/21/20  1442 07/08/20  1714 05/21/18  1042   LDL  --   --   --  50 58  --   --  43   HDL  --   --   --  24* 34*  --   --  33*   NHDL  --   --   --  72 73  --   --  55   CHOL  --   --   --  96 107  --   --  88   TRIG  --   --   --  111 76  --   --  59   TSH  --   --   --   --   --   --   --  2.07   NTBNP 4,957* 3,614*  --   --   --  3,415*   < >  --    IRON  --   --  45*  --   --  67  --   --    FEB  --   --  365  --   --  429  --   --    IRONSAT  --   --  12*  --   --  16  --   --    RICKEY  --   --  103  --   --   --   --   --     < > = values in this interval not displayed.       Lab Results   Component Value Date    WBC 7.9 04/12/2023    WBC 7.7 12/21/2020    RBC 3.37 (L) 04/12/2023    RBC 3.61 (L) 12/21/2020    HGB 10.5 (L) 04/12/2023    HGB 10.7 (L) 12/21/2020    HCT 32.0 (L) 04/12/2023    HCT 32.9 (L) 12/21/2020    MCV 95 04/12/2023    MCV 91 12/21/2020    MCH 31.2 04/12/2023    MCH 29.6 12/21/2020    MCHC 32.8 04/12/2023    MCHC 32.5 12/21/2020    RDW 15.1 (H) 04/12/2023    RDW 17.9 (H) 12/21/2020     04/12/2023     12/21/2020       Lab Results   Component Value Date     04/12/2023     12/21/2020    POTASSIUM 4.2 04/12/2023    POTASSIUM 3.7 01/16/2023    POTASSIUM 5.2 12/21/2020    CHLORIDE 111 (H) 04/12/2023    CHLORIDE 108 01/16/2023    CHLORIDE 111 (H) 12/21/2020    CO2 19 (L) 04/12/2023    CO2 24 01/16/2023    CO2 21 12/21/2020    ANIONGAP 10 04/12/2023    ANIONGAP 9 01/16/2023    ANIONGAP 8 12/21/2020    GLC 85 04/12/2023     (H) 01/16/2023     (H) 12/21/2020    BUN 31.5 (H) 04/12/2023    BUN 36 (H) 01/16/2023    BUN 51 (H) 12/21/2020    CR 1.66 (H) 04/12/2023    CR 2.35 (H) 12/21/2020    GFRESTIMATED 39 (L) 04/12/2023    GFRESTIMATED 30 (L) 12/13/2022    GFRESTIMATED 24 (L) 12/21/2020    GFRESTBLACK 28 (L) 12/21/2020    AGUSTÍN 9.0 04/12/2023    AGUSTÍN 9.5 12/21/2020      Lab Results   Component  Value Date    AST 27 04/05/2023    AST 24 07/02/2020    ALT 15 04/05/2023    ALT 21 07/02/2020       Lab Results   Component Value Date    A1C 5.6 10/28/2021    A1C 5.9 05/26/2016       Lab Results   Component Value Date    INR 1.26 (H) 04/05/2023    INR 1.24 (H) 03/17/2023    INR 1.06 05/06/2011    INR 0.98 05/02/2010             Thank you for allowing me to participate in the care of your patient.      Sincerely,     Chanel Mares, [facility]  cc:   Sujata Ramires MD  [address] [id number]

## 2023-04-20 NOTE — PROGRESS NOTES
Cardiology Clinic Progress Note  Trevor Soni MRN# [id number]   YOB: 1933 Age: 89 year old     Primary cardiologist: Dr. Ravi     Reason for visit: s/p TAVR     History of presenting illness:    Trevor Soni is a pleasant 89 year old patient with past medical history significant for coronary disease s/p CABG x 3 (2001), paroxysmal atrial fibrillation, R CEA (2001), stage IV CKD, infrarenal AAA, chronic diastolic heart failure, chronic RBBB/bifasicular block, and severe aortic stenosis s/p TAVR with 26 mm Nunes ELZBIETA 3 valve on 4/11/2023, here today for his 1-week follow-up.     His procedure went well without any hemodynamic instability or conduction abnormalities.  Postoperatively, the patient had pauses noted on telemetry during waking hours, lasting up to 3.2 seconds.  EKG remained unchanged.  Findings were discussed with EP, who felt it was safe to discharge home with cardiac event monitor.  He was discharged on POD #1 in stable condition.     Today Trevor reports doing well from a cardiovascular standpoint.  He denies any chest pain, shortness of breath, syncope or near syncope, or palpitations.  He denies any pain or bleeding from groin sites.  Unfortunately, he was not placed on event monitor prior to discharge.  He is borderline hypertensive today, but his systolic blood pressures have been in the 130s at home.    I reviewed his most recent labs.  His BMP shows creatinine of 1.66, GFR 39.  His CBC shows hemoglobin of 10.5, with normal platelet levels.         Assessment and Plan:     ASSESSMENT:    1. Severe aortic stenosis s/p TAVR with 26 mm Nunes Elzbieta 3 valve.  Postprocedure echo outlined above.  He is recovering well and tolerating home therapy.  On apixaban 2.5 mg twice daily for anticoagulation.  2. Chronic diastolic heart failure, NYHA class II.  Appears euvolemic on exam, not on diuretic therapy.  3. CAD s/p CABG x 3 in 2001 (LIMA-diagonal branch, SVG-PDA, SVG-LCx).   Pre-TAVR coronary angiogram showed patent grafts.  He denies angina.  He is on atorvastatin 20 mg daily.  4. Paroxysmal atrial fibrillation.  [id number] score 5, on apixaban 2.5 mg twice daily for stroke prophylaxis.  5. Chronic RBBB/bifasicular block.  Patient was noted to have pauses up to 3 seconds postoperatively, EP was consulted.  It was felt he was safe to discharge home with 30-day event monitor, unfortunately this was not placed prior to discharge.  6. Hypertension.  Borderline hypertensive with systolic blood pressures in the 140s today, though 130s at home.  On amlodipine 5 mg daily and spironolactone 25 mg daily.  7. Stage IV CKD.  Renal function stable, baseline creatinine appears to be 1.8-2.0.  8. Infrarenal AAA measuring 3.7 x 3.5 cm       PLAN:     1. Patient remains at high risk for advanced AV block, will need 30-day MCOT placed.  2. We discussed the need for lifelong antibiotic prophylaxis prior to any dental procedure.  3. Continue home PT/OT.  4. We discussed the importance of taking home blood pressure readings approximately 2 hours after morning medications.  5. If systolic blood pressures consistently above 140s, recommend increasing amlodipine to 10 mg daily.  6. Follow-up with me in approximately 1 month with repeat echo, labs, and EKG.         Chanel Mares DNP, APRN, CNP  Page: [phone] (8a-5p M-F)    Orders this Visit:  Orders Placed This Encounter   Procedures     Adult Cardiac Mobile Telemetry Monitor     No orders of the defined types were placed in this encounter.    There are no discontinued medications.    Today's clinic visit entailed:  Review of the result(s) of each unique test - echo, TAVR, labs, EKG  Ordering of each unique test  Prescription drug management  35 minutes spent by me on the date of the encounter doing chart review, review of test results, interpretation of tests, patient visit and documentation   Provider  Link to [facility] Help Grid     The level of medical  "decision making during this visit was of moderate complexity.           Review of Systems:     Review of Systems:  Skin:  not assessed     Eyes:  not assessed    ENT:  not assessed    Respiratory:  Negative    Cardiovascular:  Negative for;palpitations;chest pain;lightheadedness;dizziness;fatigue edema;Positive for  Gastroenterology: not assessed    Genitourinary:  not assessed    Musculoskeletal:  not assessed    Neurologic:  not assessed    Psychiatric:  not assessed    Heme/Lymph/Imm:  not assessed    Endocrine:  not assessed              Physical Exam:   Vitals: BP (!) 146/64   Pulse 87   Ht 1.727 m (5' 8\")   Wt 63.5 kg (140 lb)   SpO2 98%   BMI 21.29 kg/m    Constitutional:  cooperative        Skin:  warm and dry to the touch   groin sites healed well    Head:  normocephalic        Eyes:  pupils equal and round        ENT:           Neck:  JVP normal        Chest:  normal breath sounds, clear to auscultation, normal A-P diameter, normal symmetry, normal respiratory excursion, no use of accessory muscles        Cardiac:   irregularly irregular rhythm     systolic ejection murmur;grade 1          Abdomen:  abdomen soft        Vascular:                                        Extremities and Back:  no edema        Neurological:  affect appropriate;no gross motor deficits             Medications:     Current Outpatient Medications   Medication Sig Dispense Refill     acetaminophen (TYLENOL) 325 MG tablet Take 2 tablets (650 mg) by mouth every 6 hours as needed for mild pain or other (and adjunct with moderate or severe pain or per patient request)       allopurinol (ZYLOPRIM) 100 MG tablet TAKE 2 TABLETS BY MOUTH  DAILY 180 tablet 3     amLODIPine (NORVASC) 5 MG tablet Take 1 tablet (5 mg) by mouth daily 90 tablet 3     apixaban ANTICOAGULANT (ELIQUIS) 2.5 MG tablet Take 1 tablet (2.5 mg) by mouth 2 times daily 180 tablet 3     atorvastatin (LIPITOR) 20 MG tablet Take 1 tablet (20 mg) by mouth daily 30 tablet 0 "     Cholecalciferol (VITAMIN D3) 25 MCG (1000 UT) CAPS Take 1 capsule by mouth daily OTC dose unknown       fenofibrate (TRIGLIDE/LOFIBRA) 160 MG tablet TAKE 1 TABLET BY MOUTH  DAILY 90 tablet 3     ferrous gluconate (FERGON) 324 (38 Fe) MG tablet Take 324 mg by mouth daily       Multiple Vitamins-Minerals (PRESERVISION AREDS 2) CAPS Take 1 capsule by mouth 2 times daily       multivitamin w/minerals (MULTI-VITAMIN) tablet Take 1 tablet by mouth daily       spironolactone (ALDACTONE) 25 MG tablet TAKE 1 TABLET BY MOUTH  DAILY 90 tablet 3       Family History   Problem Relation Age of Onset     Myocardial Infarction Mother      Rheumatic fever Father      Cerebrovascular Disease Brother        Social History     Socioeconomic History     Marital status:      Spouse name: Not on file     Number of children: Not on file     Years of education: Not on file     Highest education level: Not on file   Occupational History     Not on file   Tobacco Use     Smoking status: Former     Types: Cigars     Quit date: 1980     Years since quittin.3     Smokeless tobacco: Never   Vaping Use     Vaping status: Never Used   Substance and Sexual Activity     Alcohol use: Yes     Comment: once every 6 months     Drug use: No     Sexual activity: Not Currently     Partners: Female   Other Topics Concern     Parent/sibling w/ CABG, MI or angioplasty before 65F 55M? Yes   Social History Narrative     Not on file     Social Determinants of Health     Financial Resource Strain: Not on file   Food Insecurity: Not on file   Transportation Needs: Not on file   Physical Activity: Not on file   Stress: Not on file   Social Connections: Not on file   Intimate Partner Violence: Not on file   Housing Stability: Not on file            Past Medical History:     Past Medical History:   Diagnosis Date     Arthritis     rhuematoid     Coronary artery disease     triple bypass      CRF (chronic renal failure)      Gastro-oesophageal  reflux disease      Gout      Hyperlipidemia      Hypertension      Nocturia               Past Surgical History:     Past Surgical History:   Procedure Laterality Date     CARDIAC SURGERY       CHOLECYSTECTOMY       COLONOSCOPY       CV CORONARY ANGIOGRAM N/A 1/13/2023    Procedure: Coronary Angiogram;  Surgeon: Sujata Ramires MD;  Location:  HEART CARDIAC CATH LAB     CV PCI N/A 1/13/2023    Procedure: Percutaneous Coronary Intervention;  Surgeon: Sujata Ramires MD;  Location:  HEART CARDIAC CATH LAB     CV TRANSCATHETER AORTIC VALVE REPLACEMENT-FEMORAL APPROACH N/A 4/11/2023    Procedure: Transcatheter Aortic Valve Replacement-Femoral Approach;  Surgeon: Sujata Ramires MD;  Location:  HEART CARDIAC CATH LAB     LAPAROTOMY EXPLORATORY N/A 6/30/2020    Procedure: EXPLORATORY LAPAROTOMY, BOWEL RESECTION;  Surgeon: Bull Rachel MD;  Location:  OR     LAPAROTOMY, LYSIS ADHESIONS, COMBINED N/A 6/21/2020    Procedure: EXPLORATORY LAPAROTOMY WITH LYSIS OF ADHESIONS;  Surgeon: Jose Reed MD;  Location:  OR     ORTHOPEDIC SURGERY       PHACOEMULSIFICATION CLEAR CORNEA WITH TORIC INTRAOCULAR LENS IMPLANT  1/30/2012    Procedure:PHACOEMULSIFICATION CLEAR CORNEA WITH TORIC INTRAOCULAR LENS IMPLANT; LEFT PHACOEMULSIFICATION CLEAR CORNEA WITH DELUXE  TORIC INTRAOCULAR LENS IMPLANT ; Surgeon:BRANDO HIGHTOWER; Location:[facility]     PHACOEMULSIFICATION CLEAR CORNEA WITH TORIC INTRAOCULAR LENS IMPLANT  2/13/2012    Procedure:PHACOEMULSIFICATION CLEAR CORNEA WITH TORIC INTRAOCULAR LENS IMPLANT; RIGHT PHACOEMULSIFICATION CLEAR CORNEA WITH TORIC INTRAOCULAR LENS IMPLANT ; Surgeon:BRANDO HIGHTOWER; Location: [facility]     VASCULAR SURGERY                Allergies:   Avocado, Ciprofloxacin, and Penicillins       Data:   All laboratory data reviewed:    Recent Labs   Lab Test 04/05/23  1526 03/17/23  1257 10/11/22  1358 10/25/21  1417 01/27/21  1154 12/21/20  1442 07/08/20  1714 05/21/18  1042   LDL  --   --    --  50 58  --   --  43   HDL  --   --   --  24* 34*  --   --  33*   NHDL  --   --   --  72 73  --   --  55   CHOL  --   --   --  96 107  --   --  88   TRIG  --   --   --  111 76  --   --  59   TSH  --   --   --   --   --   --   --  2.07   NTBNP 4,957* 3,614*  --   --   --  3,415*   < >  --    IRON  --   --  45*  --   --  67  --   --    FEB  --   --  365  --   --  429  --   --    IRONSAT  --   --  12*  --   --  16  --   --    RICKEY  --   --  103  --   --   --   --   --     < > = values in this interval not displayed.       Lab Results   Component Value Date    WBC 7.9 04/12/2023    WBC 7.7 12/21/2020    RBC 3.37 (L) 04/12/2023    RBC 3.61 (L) 12/21/2020    HGB 10.5 (L) 04/12/2023    HGB 10.7 (L) 12/21/2020    HCT 32.0 (L) 04/12/2023    HCT 32.9 (L) 12/21/2020    MCV 95 04/12/2023    MCV 91 12/21/2020    MCH 31.2 04/12/2023    MCH 29.6 12/21/2020    MCHC 32.8 04/12/2023    MCHC 32.5 12/21/2020    RDW 15.1 (H) 04/12/2023    RDW 17.9 (H) 12/21/2020     04/12/2023     12/21/2020       Lab Results   Component Value Date     04/12/2023     12/21/2020    POTASSIUM 4.2 04/12/2023    POTASSIUM 3.7 01/16/2023    POTASSIUM 5.2 12/21/2020    CHLORIDE 111 (H) 04/12/2023    CHLORIDE 108 01/16/2023    CHLORIDE 111 (H) 12/21/2020    CO2 19 (L) 04/12/2023    CO2 24 01/16/2023    CO2 21 12/21/2020    ANIONGAP 10 04/12/2023    ANIONGAP 9 01/16/2023    ANIONGAP 8 12/21/2020    GLC 85 04/12/2023     (H) 01/16/2023     (H) 12/21/2020    BUN 31.5 (H) 04/12/2023    BUN 36 (H) 01/16/2023    BUN 51 (H) 12/21/2020    CR 1.66 (H) 04/12/2023    CR 2.35 (H) 12/21/2020    GFRESTIMATED 39 (L) 04/12/2023    GFRESTIMATED 30 (L) 12/13/2022    GFRESTIMATED 24 (L) 12/21/2020    GFRESTBLACK 28 (L) 12/21/2020    AGUSTÍN 9.0 04/12/2023    AGUSTÍN 9.5 12/21/2020      Lab Results   Component Value Date    AST 27 04/05/2023    AST 24 07/02/2020    ALT 15 04/05/2023    ALT 21 07/02/2020       Lab Results   Component Value Date     A1C 5.6 10/28/2021    A1C 5.9 05/26/2016       Lab Results   Component Value Date    INR 1.26 (H) 04/05/2023    INR 1.24 (H) 03/17/2023    INR 1.06 05/06/2011    INR 0.98 05/02/2010

## 2023-04-21 ENCOUNTER — HOSPITAL ENCOUNTER (OUTPATIENT)
Dept: CARDIOLOGY | Facility: CLINIC | Age: 88
Discharge: HOME | End: 2023-04-21
Attending: NURSE PRACTITIONER
Payer: MEDICARE

## 2023-04-21 DIAGNOSIS — Z95.2 S/P TAVR (TRANSCATHETER AORTIC VALVE REPLACEMENT): ICD-10-CM

## 2023-04-21 DIAGNOSIS — I45.2: ICD-10-CM

## 2023-04-21 PROCEDURE — 999N000096 CARDIAC MOBILE TELEMETRY MONITOR

## 2023-05-01 DIAGNOSIS — Z53.9 DIAGNOSIS NOT YET DEFINED: Primary | ICD-10-CM

## 2023-05-01 PROCEDURE — G0180 MD CERTIFICATION HHA PATIENT: HCPCS | Performed by: INTERNAL MEDICINE

## 2023-05-09 ENCOUNTER — TELEPHONE (OUTPATIENT)
Dept: FAMILY MEDICINE | Facility: CLINIC | Age: 88
End: 2023-05-09
Payer: MEDICARE

## 2023-05-09 NOTE — TELEPHONE ENCOUNTER
Order/Referral Request    Who is requesting: [facility]    Orders being requested: Verbal orders for continue PT every other week for 6 weeks.     Reason service is needed/diagnosis: PT    When are orders needed by: asap    Has this been discussed with Provider: Yes    Does patient have a preference on a Group/Provider/Facility? n    Does patient have an appointment scheduled?: No    Where to send orders: Verbal orders    Could we send this information to you in [facility] or would you prefer to receive a phone call?:   Patient would prefer a phone call   Okay to leave a detailed message?: yes at Other phone number:  [phone]

## 2023-05-15 ENCOUNTER — LAB (OUTPATIENT)
Dept: LAB | Facility: CLINIC | Age: 88
End: 2023-05-15
Payer: MEDICARE

## 2023-05-15 DIAGNOSIS — N17.9 ACUTE KIDNEY INJURY (H): ICD-10-CM

## 2023-05-15 DIAGNOSIS — I35.0 SEVERE AORTIC STENOSIS: ICD-10-CM

## 2023-05-15 LAB
ALBUMIN SERPL BCG-MCNC: 3.8 G/DL (ref 3.5–5.2)
ALP SERPL-CCNC: 100 U/L (ref 40–129)
ALT SERPL W P-5'-P-CCNC: 16 U/L (ref 10–50)
ANION GAP SERPL CALCULATED.3IONS-SCNC: 12 MMOL/L (ref 7–15)
AST SERPL W P-5'-P-CCNC: 27 U/L (ref 10–50)
BILIRUB SERPL-MCNC: 0.4 MG/DL
BUN SERPL-MCNC: 30.2 MG/DL (ref 8–23)
CALCIUM SERPL-MCNC: 9.4 MG/DL (ref 8.8–10.2)
CHLORIDE SERPL-SCNC: 110 MMOL/L (ref 98–107)
CREAT SERPL-MCNC: 1.62 MG/DL (ref 0.67–1.17)
DEPRECATED HCO3 PLAS-SCNC: 21 MMOL/L (ref 22–29)
ERYTHROCYTE [DISTWIDTH] IN BLOOD BY AUTOMATED COUNT: 15.2 % (ref 10–15)
GFR SERPL CREATININE-BSD FRML MDRD: 40 ML/MIN/1.73M2
GLUCOSE SERPL-MCNC: 98 MG/DL (ref 70–99)
HCT VFR BLD AUTO: 34.4 % (ref 40–53)
HGB BLD-MCNC: 11 G/DL (ref 13.3–17.7)
MCH RBC QN AUTO: 31 PG (ref 26.5–33)
MCHC RBC AUTO-ENTMCNC: 32 G/DL (ref 31.5–36.5)
MCV RBC AUTO: 97 FL (ref 78–100)
PLATELET # BLD AUTO: 174 10E3/UL (ref 150–450)
POTASSIUM SERPL-SCNC: 4.3 MMOL/L (ref 3.4–5.3)
PROT SERPL-MCNC: 6.5 G/DL (ref 6.4–8.3)
RBC # BLD AUTO: 3.55 10E6/UL (ref 4.4–5.9)
SODIUM SERPL-SCNC: 143 MMOL/L (ref 136–145)
WBC # BLD AUTO: 7.9 10E3/UL (ref 4–11)

## 2023-05-15 PROCEDURE — 36415 COLL VENOUS BLD VENIPUNCTURE: CPT

## 2023-05-15 PROCEDURE — 85027 COMPLETE CBC AUTOMATED: CPT

## 2023-05-15 PROCEDURE — 80053 COMPREHEN METABOLIC PANEL: CPT

## 2023-05-22 ENCOUNTER — VIRTUAL VISIT (OUTPATIENT)
Dept: NEPHROLOGY | Facility: CLINIC | Age: 88
End: 2023-05-22
Attending: INTERNAL MEDICINE
Payer: MEDICARE

## 2023-05-22 DIAGNOSIS — N18.32 STAGE 3B CHRONIC KIDNEY DISEASE (H): Primary | ICD-10-CM

## 2023-05-22 DIAGNOSIS — N17.9 ACUTE KIDNEY INJURY (H): ICD-10-CM

## 2023-05-22 DIAGNOSIS — D64.9 ANEMIA, UNSPECIFIED TYPE: ICD-10-CM

## 2023-05-22 DIAGNOSIS — I50.30 HEART FAILURE WITH PRESERVED EJECTION FRACTION, NYHA CLASS I (H): ICD-10-CM

## 2023-05-22 DIAGNOSIS — I10 BENIGN ESSENTIAL HYPERTENSION: ICD-10-CM

## 2023-05-22 PROCEDURE — 99214 OFFICE O/P EST MOD 30 MIN: CPT | Mod: VID | Performed by: INTERNAL MEDICINE

## 2023-05-22 NOTE — NURSING NOTE
Is the patient currently in the state of [place]? YES    Visit mode:VIDEO    If the visit is dropped, the patient can be reconnected by: VIDEO VISIT: Send to e-mail at: [email or web address]    Will anyone else be joining the visit? NO      How would you like to obtain your AVS? MyChart    Are changes needed to the allergy or medication list? NO    Reason for visit: Follow Up

## 2023-05-22 NOTE — PROGRESS NOTES
Virtual Visit Details    Type of service:  Video Visit   Video Start Time: 10:01 AM  Video End Time:10:16 AM    Originating Location (pt. Location): Home  Distant Location (provider location):  Off-site  Platform used for Video Visit: [facility]       Nephrology Clinic Note  May 22, 2023    I was asked to see this patient by Dr. Redman     CC: CKD     HPI: Trevor Soni is a 89 year old male with PMH significant for HTN, CAD, HFpEF, AAA with out rupture, atrial flutter, aortic stenosis, gout who presents for evaluation and management of CKD.     Pt endorsed feeling fairly well in the recent past. No new symptoms today. Up on questioning, denied any recurrent fevers/chills, nausea/vomiting, diarrhea, abdominal pain, chest pain or SOB. Endorses compliant with his medications. No new medications. Does have some aches and pains intermittently, takes tylenol as needed. Denied any significant NSAID use.    - History of urinary symptoms: no, may wake up 2-3 times per night, feels completely empty the bladder  - History of Hematuria: no  - Swelling: no  - Hx of UTIs: no  - Hx of stones: one time 20 years ago and none since then  - Rashes/Joint pain: just on left upper arm after scratching,   - Family hx of kidney disease: no  - NSAID use: no    Allergies   Allergen Reactions     Avocado      Ciprofloxacin Itching     Penicillins Itching       acetaminophen (TYLENOL) 325 MG tablet, Take 2 tablets (650 mg) by mouth every 6 hours as needed for mild pain or other (and adjunct with moderate or severe pain or per patient request)  allopurinol (ZYLOPRIM) 100 MG tablet, TAKE 2 TABLETS BY MOUTH  DAILY  amLODIPine (NORVASC) 5 MG tablet, Take 1 tablet (5 mg) by mouth daily  apixaban ANTICOAGULANT (ELIQUIS) 2.5 MG tablet, Take 1 tablet (2.5 mg) by mouth 2 times daily  atorvastatin (LIPITOR) 20 MG tablet, Take 1 tablet (20 mg) by mouth daily  Cholecalciferol (VITAMIN D3) 25 MCG (1000 UT) CAPS, Take 1 capsule by mouth daily OTC dose  unknown  fenofibrate (TRIGLIDE/LOFIBRA) 160 MG tablet, TAKE 1 TABLET BY MOUTH  DAILY  ferrous gluconate (FERGON) 324 (38 Fe) MG tablet, Take 324 mg by mouth daily  Multiple Vitamins-Minerals (PRESERVISION AREDS 2) CAPS, Take 1 capsule by mouth 2 times daily  multivitamin w/minerals (MULTI-VITAMIN) tablet, Take 1 tablet by mouth daily  spironolactone (ALDACTONE) 25 MG tablet, TAKE 1 TABLET BY MOUTH  DAILY    No current facility-administered medications on file prior to visit.      Past Medical History:   Diagnosis Date     Arthritis     rhuematoid     Coronary artery disease     triple bypass 2001     CRF (chronic renal failure)      Gastro-oesophageal reflux disease      Gout      Hyperlipidemia      Hypertension      Nocturia        Past Surgical History:   Procedure Laterality Date     CARDIAC SURGERY       CHOLECYSTECTOMY       COLONOSCOPY       CV CORONARY ANGIOGRAM N/A 1/13/2023    Procedure: Coronary Angiogram;  Surgeon: Sujata Ramires MD;  Location:  HEART CARDIAC CATH LAB     CV PCI N/A 1/13/2023    Procedure: Percutaneous Coronary Intervention;  Surgeon: Sujata Ramires MD;  Location:  HEART CARDIAC CATH LAB     CV TRANSCATHETER AORTIC VALVE REPLACEMENT-FEMORAL APPROACH N/A 4/11/2023    Procedure: Transcatheter Aortic Valve Replacement-Femoral Approach;  Surgeon: Sujata Ramires MD;  Location:  HEART CARDIAC CATH LAB     LAPAROTOMY EXPLORATORY N/A 6/30/2020    Procedure: EXPLORATORY LAPAROTOMY, BOWEL RESECTION;  Surgeon: Bull Rachel MD;  Location:  OR     LAPAROTOMY, LYSIS ADHESIONS, COMBINED N/A 6/21/2020    Procedure: EXPLORATORY LAPAROTOMY WITH LYSIS OF ADHESIONS;  Surgeon: Jose Reed MD;  Location:  OR     ORTHOPEDIC SURGERY       PHACOEMULSIFICATION CLEAR CORNEA WITH TORIC INTRAOCULAR LENS IMPLANT  1/30/2012    Procedure:PHACOEMULSIFICATION CLEAR CORNEA WITH TORIC INTRAOCULAR LENS IMPLANT; LEFT PHACOEMULSIFICATION CLEAR CORNEA WITH DELUXE  TORIC INTRAOCULAR LENS  IMPLANT ; Surgeon:BRANDO HIGHTOWER; Location: [facility]     PHACOEMULSIFICATION CLEAR CORNEA WITH TORIC INTRAOCULAR LENS IMPLANT  2012    Procedure:PHACOEMULSIFICATION CLEAR CORNEA WITH TORIC INTRAOCULAR LENS IMPLANT; RIGHT PHACOEMULSIFICATION CLEAR CORNEA WITH TORIC INTRAOCULAR LENS IMPLANT ; Surgeon:BRANDO HIGHTOWER; Location:[facility]     VASCULAR SURGERY         Social History     Tobacco Use     Smoking status: Former     Types: Cigars     Quit date: 1980     Years since quittin.4     Smokeless tobacco: Never   Vaping Use     Vaping status: Never Used   Substance Use Topics     Alcohol use: Yes     Comment: once every 6 months     Drug use: No       Family History   Problem Relation Age of Onset     Myocardial Infarction Mother      Rheumatic fever Father      Cerebrovascular Disease Brother        ROS: A 12 system review of systems was negative other than noted here or above.     Exam:  There were no vitals taken for this visit.    GENERAL APPEARANCE: alert and no distress  EYES:  no scleral icterus  HENT: no gross abnormalities noted  RESP: able to speak in full sentences, no audible wheezing or accessory muscle usage   CV: denied significant edema  SKIN: no rash  NEURO: mentation intact and speech normal  PSYCH: affect normal/bright    Results:    No visits with results within 1 Day(s) from this visit.   Latest known visit with results is:   Lab on 05/15/2023   Component Date Value Ref Range Status     Sodium 05/15/2023 143  136 - 145 mmol/L Final     Potassium 05/15/2023 4.3  3.4 - 5.3 mmol/L Final     Chloride 05/15/2023 110 (H)  98 - 107 mmol/L Final     Carbon Dioxide (CO2) 05/15/2023 21 (L)  22 - 29 mmol/L Final     Anion Gap 05/15/2023 12  7 - 15 mmol/L Final     Urea Nitrogen 05/15/2023 30.2 (H)  8.0 - 23.0 mg/dL Final     Creatinine 05/15/2023 1.62 (H)  0.67 - 1.17 mg/dL Final     Calcium 05/15/2023 9.4  8.8 - 10.2 mg/dL Final     Glucose 05/15/2023 98  70 - 99 mg/dL Final     Alkaline  Phosphatase 05/15/2023 100  40 - 129 U/L Final     AST 05/15/2023 27  10 - 50 U/L Final     ALT 05/15/2023 16  10 - 50 U/L Final     Protein Total 05/15/2023 6.5  6.4 - 8.3 g/dL Final     Albumin 05/15/2023 3.8  3.5 - 5.2 g/dL Final     Bilirubin Total 05/15/2023 0.4  <=1.2 mg/dL Final     GFR Estimate 05/15/2023 40 (L)  >60 mL/min/1.73m2 Final    eGFR calculated using 2021 CKD-EPI equation.     WBC Count 05/15/2023 7.9  4.0 - 11.0 10e3/uL Final     RBC Count 05/15/2023 3.55 (L)  4.40 - 5.90 10e6/uL Final     Hemoglobin 05/15/2023 11.0 (L)  13.3 - 17.7 g/dL Final     Hematocrit 05/15/2023 34.4 (L)  40.0 - 53.0 % Final     MCV 05/15/2023 97  78 - 100 fL Final     MCH 05/15/2023 31.0  26.5 - 33.0 pg Final     MCHC 05/15/2023 32.0  31.5 - 36.5 g/dL Final     RDW 05/15/2023 15.2 (H)  10.0 - 15.0 % Final     Platelet Count 05/15/2023 174  150 - 450 10e3/uL Final       Assessment/Plan:     CKD stage IIIb/IV  Acute kidney injury, now improved  Pt with no clear baseline creatinine with recurrent SHANICE episodes. His creatinine varies any where between 1.7-2.7 with eGFR in 20's and 30's which put him in CKD stage III b vs stage IV.  Recurrent SHANICE episodes in setting of HFpEF and continued diuretic use.  UA with out sediment and UPCR was WNL. CKD likely 2/2 renovascular disease (known PAD, AAA) and progression is 2/2 recurrent SHANICE in setting of HFpEF. Renal US showed possible InStent stenosis but flow is stable at this point, but no hydronephrosis or nephrolithiasis noted.  - maintain BP >120 systolic   - good hydration   - low salt diet   - continue to avoid NSAID's.     Hypertension :  BP in clinic was WNL. No hypotensive symptoms today, continue current therapy. Recommended to Check BP at home.     Electrolytes :  Stable     BMD :  Ismael, phos, Vit D and PTH were WNL     Metabolic acidosis :  Bicarb is WNL     Anemia :  Hb is low but improved from July. Iron studies c/w iron def anemia, start on iron supplementation. Will  continue to monitor     Other problems  CAD  HFpEF, continue on spironolactone and lasix as prescribed.  Gout, no recent episodes, continue on allopurinol     Total time spent on the day of clinic visit was 40 min including face to face time of 15 min with pt and his daughter, chart and lab review, image review and documentation as above.    Afsaneh Thomas  Dept of Nephrology and Hypertension  [facility]

## 2023-05-22 NOTE — Clinical Note
5/22/2023       RE: Trevor Soni  [address] [facility] [address]     Dear Colleague,    Thank you for referring your patient, Trevor Soni, to the [facility] [place] at [facility]. Please see a copy of my visit note below.    Virtual Visit Details    Type of service:  Video Visit   Video Start Time: 10:01 AM  Video End Time:{video visit start/end time for provider to select:[id number]}    Originating Location (pt. Location): Home  {PROVIDER LOCATION On-site should be selected for visits conducted from your clinic location or adjoining [facility] hospital, academic office, or other nearby [facility] building. Off-site should be selected for all other provider locations, including home:[id number]}  Distant Location (provider location):  Off-site  Platform used for Video Visit: [facility]       Nephrology Clinic Note  May 22, 2023    I was asked to see this patient by  ***    CC: ***    HPI: Trevor Soni is a 89 year old male who presents for evaluation of ***.     - History of urinary symptoms: ***  - History of Hematuria: ***  - Swelling: ***  - Hx of UTIs: ***  - Hx of stones: ***  - Rashes/Joint pain: ***  - Family hx of kidney disease: ***  - NSAID use: ***       Allergies   Allergen Reactions     Avocado      Ciprofloxacin Itching     Penicillins Itching       acetaminophen (TYLENOL) 325 MG tablet, Take 2 tablets (650 mg) by mouth every 6 hours as needed for mild pain or other (and adjunct with moderate or severe pain or per patient request)  allopurinol (ZYLOPRIM) 100 MG tablet, TAKE 2 TABLETS BY MOUTH  DAILY  amLODIPine (NORVASC) 5 MG tablet, Take 1 tablet (5 mg) by mouth daily  apixaban ANTICOAGULANT (ELIQUIS) 2.5 MG tablet, Take 1 tablet (2.5 mg) by mouth 2 times daily  atorvastatin (LIPITOR) 20 MG tablet, Take 1 tablet (20 mg) by mouth daily  Cholecalciferol (VITAMIN D3) 25 MCG (1000 UT) CAPS, Take 1 capsule by mouth daily OTC  dose unknown  fenofibrate (TRIGLIDE/LOFIBRA) 160 MG tablet, TAKE 1 TABLET BY MOUTH  DAILY  ferrous gluconate (FERGON) 324 (38 Fe) MG tablet, Take 324 mg by mouth daily  Multiple Vitamins-Minerals (PRESERVISION AREDS 2) CAPS, Take 1 capsule by mouth 2 times daily  multivitamin w/minerals (MULTI-VITAMIN) tablet, Take 1 tablet by mouth daily  spironolactone (ALDACTONE) 25 MG tablet, TAKE 1 TABLET BY MOUTH  DAILY    No current facility-administered medications on file prior to visit.      Past Medical History:   Diagnosis Date     Arthritis     rhuematoid     Coronary artery disease     triple bypass 2001     CRF (chronic renal failure)      Gastro-oesophageal reflux disease      Gout      Hyperlipidemia      Hypertension      Nocturia        Past Surgical History:   Procedure Laterality Date     CARDIAC SURGERY       CHOLECYSTECTOMY       COLONOSCOPY       CV CORONARY ANGIOGRAM N/A 1/13/2023    Procedure: Coronary Angiogram;  Surgeon: Sujata Ramires MD;  Location:  HEART CARDIAC CATH LAB     CV PCI N/A 1/13/2023    Procedure: Percutaneous Coronary Intervention;  Surgeon: Sujata Ramires MD;  Location:  HEART CARDIAC CATH LAB     CV TRANSCATHETER AORTIC VALVE REPLACEMENT-FEMORAL APPROACH N/A 4/11/2023    Procedure: Transcatheter Aortic Valve Replacement-Femoral Approach;  Surgeon: Sujata Ramires MD;  Location:  HEART CARDIAC CATH LAB     LAPAROTOMY EXPLORATORY N/A 6/30/2020    Procedure: EXPLORATORY LAPAROTOMY, BOWEL RESECTION;  Surgeon: Bull Rachel MD;  Location:  OR     LAPAROTOMY, LYSIS ADHESIONS, COMBINED N/A 6/21/2020    Procedure: EXPLORATORY LAPAROTOMY WITH LYSIS OF ADHESIONS;  Surgeon: Jose Reed MD;  Location:  OR     ORTHOPEDIC SURGERY       PHACOEMULSIFICATION CLEAR CORNEA WITH TORIC INTRAOCULAR LENS IMPLANT  1/30/2012    Procedure:PHACOEMULSIFICATION CLEAR CORNEA WITH TORIC INTRAOCULAR LENS IMPLANT; LEFT PHACOEMULSIFICATION CLEAR CORNEA WITH DELUXE  TORIC INTRAOCULAR  LENS IMPLANT ; Surgeon:BRANDO HIGHTOWER; Location:[facility]     PHACOEMULSIFICATION CLEAR CORNEA WITH TORIC INTRAOCULAR LENS IMPLANT  2012    Procedure:PHACOEMULSIFICATION CLEAR CORNEA WITH TORIC INTRAOCULAR LENS IMPLANT; RIGHT PHACOEMULSIFICATION CLEAR CORNEA WITH TORIC INTRAOCULAR LENS IMPLANT ; Surgeon:BRANDO HIGHTOWER; Location:[facility]     VASCULAR SURGERY         Social History     Tobacco Use     Smoking status: Former     Types: Cigars     Quit date: 1980     Years since quittin.4     Smokeless tobacco: Never   Vaping Use     Vaping status: Never Used   Substance Use Topics     Alcohol use: Yes     Comment: once every 6 months     Drug use: No       Family History   Problem Relation Age of Onset     Myocardial Infarction Mother      Rheumatic fever Father      Cerebrovascular Disease Brother        ROS: A 12 system review of systems was negative other than noted here or above.     Exam:  There were no vitals taken for this visit.    GENERAL APPEARANCE: alert and no distress  EYES: PERRL, no scleral icterus  HENT: mouth without ulcers or lesions  NECK: supple, no adenopathy  RESP: lungs clear to auscultation   CV: regular rhythm, normal rate, no rub  Extremities: no clubbing, cyanosis, or edema  SKIN: no rash  NEURO: mentation intact and speech normal  PSYCH: affect normal/bright  ***    Results:    No visits with results within 1 Day(s) from this visit.   Latest known visit with results is:   Lab on 05/15/2023   Component Date Value Ref Range Status     Sodium 05/15/2023 143  136 - 145 mmol/L Final     Potassium 05/15/2023 4.3  3.4 - 5.3 mmol/L Final     Chloride 05/15/2023 110 (H)  98 - 107 mmol/L Final     Carbon Dioxide (CO2) 05/15/2023 21 (L)  22 - 29 mmol/L Final     Anion Gap 05/15/2023 12  7 - 15 mmol/L Final     Urea Nitrogen 05/15/2023 30.2 (H)  8.0 - 23.0 mg/dL Final     Creatinine 05/15/2023 1.62 (H)  0.67 - 1.17 mg/dL Final     Calcium 05/15/2023 9.4  8.8 - 10.2 mg/dL Final     Glucose  05/15/2023 98  70 - 99 mg/dL Final     Alkaline Phosphatase 05/15/2023 100  40 - 129 U/L Final     AST 05/15/2023 27  10 - 50 U/L Final     ALT 05/15/2023 16  10 - 50 U/L Final     Protein Total 05/15/2023 6.5  6.4 - 8.3 g/dL Final     Albumin 05/15/2023 3.8  3.5 - 5.2 g/dL Final     Bilirubin Total 05/15/2023 0.4  <=1.2 mg/dL Final     GFR Estimate 05/15/2023 40 (L)  >60 mL/min/1.73m2 Final    eGFR calculated using 2021 CKD-EPI equation.     WBC Count 05/15/2023 7.9  4.0 - 11.0 10e3/uL Final     RBC Count 05/15/2023 3.55 (L)  4.40 - 5.90 10e6/uL Final     Hemoglobin 05/15/2023 11.0 (L)  13.3 - 17.7 g/dL Final     Hematocrit 05/15/2023 34.4 (L)  40.0 - 53.0 % Final     MCV 05/15/2023 97  78 - 100 fL Final     MCH 05/15/2023 31.0  26.5 - 33.0 pg Final     MCHC 05/15/2023 32.0  31.5 - 36.5 g/dL Final     RDW 05/15/2023 15.2 (H)  10.0 - 15.0 % Final     Platelet Count 05/15/2023 174  150 - 450 10e3/uL Final       Assessment/Plan:   ***    CKD stage ***  ***    Hypertension :  ***    Electrolytes :  ***    BMD :  ***    Metabolic acidosis :  ***    Anemia :  ***    Other problems  ***    Afsaneh Thomas  Dept of Nephrology and Hypertension  [facility]        Again, thank you for allowing me to participate in the care of your patient.      Sincerely,    Afsaneh Thomas MD

## 2023-05-23 ENCOUNTER — MYC MEDICAL ADVICE (OUTPATIENT)
Dept: FAMILY MEDICINE | Facility: CLINIC | Age: 88
End: 2023-05-23
Payer: MEDICARE

## 2023-05-26 ENCOUNTER — TELEPHONE (OUTPATIENT)
Dept: MULTI SPECIALTY CLINIC | Facility: CLINIC | Age: 88
End: 2023-05-26

## 2023-05-26 ENCOUNTER — OFFICE VISIT (OUTPATIENT)
Dept: CARDIOLOGY | Facility: CLINIC | Age: 88
End: 2023-05-26
Attending: INTERNAL MEDICINE
Payer: MEDICARE

## 2023-05-26 ENCOUNTER — HOSPITAL ENCOUNTER (OUTPATIENT)
Dept: CARDIOLOGY | Facility: CLINIC | Age: 88
Discharge: HOME | End: 2023-05-26
Attending: INTERNAL MEDICINE | Admitting: INTERNAL MEDICINE
Payer: MEDICARE

## 2023-05-26 VITALS
DIASTOLIC BLOOD PRESSURE: 71 MMHG | BODY MASS INDEX: 22.1 KG/M2 | SYSTOLIC BLOOD PRESSURE: 133 MMHG | HEART RATE: 80 BPM | OXYGEN SATURATION: 97 % | WEIGHT: 149.2 LBS | HEIGHT: 69 IN

## 2023-05-26 DIAGNOSIS — N18.32 STAGE 3B CHRONIC KIDNEY DISEASE (H): ICD-10-CM

## 2023-05-26 DIAGNOSIS — I35.0 SEVERE AORTIC STENOSIS: ICD-10-CM

## 2023-05-26 DIAGNOSIS — Z95.2 S/P TAVR (TRANSCATHETER AORTIC VALVE REPLACEMENT): Primary | ICD-10-CM

## 2023-05-26 DIAGNOSIS — I50.32 CHRONIC DIASTOLIC HEART FAILURE (H): ICD-10-CM

## 2023-05-26 LAB — LVEF ECHO: NORMAL

## 2023-05-26 PROCEDURE — 93308 TTE F-UP OR LMTD: CPT | Mod: 26 | Performed by: INTERNAL MEDICINE

## 2023-05-26 PROCEDURE — 93000 ELECTROCARDIOGRAM COMPLETE: CPT | Performed by: NURSE PRACTITIONER

## 2023-05-26 PROCEDURE — 93308 TTE F-UP OR LMTD: CPT

## 2023-05-26 PROCEDURE — 99214 OFFICE O/P EST MOD 30 MIN: CPT | Mod: 25 | Performed by: NURSE PRACTITIONER

## 2023-05-26 PROCEDURE — 93325 DOPPLER ECHO COLOR FLOW MAPG: CPT | Mod: 26 | Performed by: INTERNAL MEDICINE

## 2023-05-26 PROCEDURE — 93321 DOPPLER ECHO F-UP/LMTD STD: CPT | Mod: 26 | Performed by: INTERNAL MEDICINE

## 2023-05-26 PROCEDURE — 93325 DOPPLER ECHO COLOR FLOW MAPG: CPT

## 2023-05-26 NOTE — PROGRESS NOTES
Cardiology Clinic Progress Note  Trevor Soni MRN# [id number]   YOB: 1933 Age: 89 year old     Primary cardiologist: Dr. Ravi     Reason for visit: s/p TAVR     History of presenting illness:    Treovr Soni is a pleasant 89 year old patient with past medical history significant for coronary disease s/p CABG x 3 (2001), paroxysmal atrial fibrillation, R CEA (2001), stage IV CKD, infrarenal AAA, chronic diastolic heart failure, chronic RBBB/bifasicular block, and severe aortic stenosis s/p TAVR with 26 mm Nunes ELZBIETA 3 valve on 4/11/2023, here today for his 1-month follow-up.     His procedure went well without any hemodynamic instability or conduction abnormalities.  Postoperatively, the patient had pauses noted on telemetry during waking hours, lasting up to 3.2 seconds.  EKG remained unchanged.  Findings were discussed with EP, who felt it was safe to discharge home with cardiac event monitor.  He was discharged on POD #1 in stable condition.     Trevor continues to do well from a cardiovascular standpoint.  He denies any chest pain, shortness of breath, syncope or near syncope, or palpitations.  He feels somewhat limited in his activity levels due to chronic knee pain.  He recently started cardiac rehab and is tolerating this well.    Repeat echocardiogram today shows well-seated bioprosthetic aortic valve with a mean gradient of 8.6 mmHg, trace AI, and LVEF 55-60%.     His BMP shows creatinine of 1.62 which is baseline and GFR 40, normal electrolytes.  His CBC shows hemoglobin of 11, and platelets of 174.         Assessment and Plan:     ASSESSMENT:    1. Severe aortic stenosis s/p TAVR with 26 mm Nunes Lezbieta 3 valve. Most recent echo outlined above, gradient remains normal.  He is recovering well and tolerating cardiac rehab.  On apixaban 2.5 mg twice daily for anticoagulation.  2. Chronic diastolic heart failure, NYHA class II.  Appears euvolemic on exam, not on diuretic  therapy.  3. CAD s/p CABG x 3 in 2001 (LIMA-diagonal branch, SVG-PDA, SVG-LCx).  Pre-TAVR coronary angiogram showed patent grafts.  He denies angina.   4. Paroxysmal atrial fibrillation.  [id number] score 5, on apixaban 2.5 mg twice daily for stroke prophylaxis.  5. Chronic RBBB/bifasicular block.  Patient was noted to have pauses up to 3 seconds postoperatively, EP was consulted.  It was felt he was safe to discharge home with 30-day event monitor, no alerts or evidence of advanced AV block on event monitor thus far. Not on any AV cole blocking agents.   6. Hypertension.  Well-controlled on amlodipine 5 mg daily and spironolactone 25 mg daily.  7. Stage IV CKD.  Renal function stable, baseline creatinine appears to be 1.8-2.0.  8. Infrarenal AAA measuring 3.7 x 3.5 cm       PLAN:     1. Patient appears doing well from cardiovascular standpoint, I have made no changes to his regimen today.  2. Continue outpatient cardiac rehab.  3. We again discussed the need for lifelong antibiotic prophylaxis prior to any dental procedure.  4. Follow-up with Dr. Ravi approximately 6 months, sooner if needed.       Chanel Mares, BEVERLEY, APRN, CNP  Page: [phone] (8a-5p M-F)    Orders this Visit:  No orders of the defined types were placed in this encounter.    No orders of the defined types were placed in this encounter.    There are no discontinued medications.    Today's clinic visit entailed:  Review of the result(s) of each unique test - echo, labs, EKG  Ordering of each unique test  Prescription drug management  35 minutes spent by me on the date of the encounter doing chart review, review of test results, interpretation of tests, patient visit and documentation   Provider  Link to [facility] Help Grid     The level of medical decision making during this visit was of moderate complexity.           Review of Systems:     Review of Systems:  Skin:  not assessed     Eyes:  not assessed    ENT:  not assessed    Respiratory:   "Negative    Cardiovascular:    lightheadedness;Positive for;fatigue  Gastroenterology: not assessed    Genitourinary:  not assessed    Musculoskeletal:  not assessed    Neurologic:  not assessed    Psychiatric:  not assessed    Heme/Lymph/Imm:  not assessed    Endocrine:  Negative              Physical Exam:   Vitals: /71 (BP Location: Right arm, Patient Position: Sitting, Cuff Size: Adult Regular)   Pulse 80   Ht 1.753 m (5' 9\")   Wt 67.7 kg (149 lb 3.2 oz)   SpO2 97%   BMI 22.03 kg/m    Constitutional:  cooperative        Skin:  warm and dry to the touch        Head:  normocephalic        Eyes:  pupils equal and round        ENT:  no pallor or cyanosis        Neck:  JVP normal        Chest:  normal breath sounds, clear to auscultation, normal A-P diameter, normal symmetry, normal respiratory excursion, no use of accessory muscles        Cardiac:   irregularly irregular rhythm     systolic ejection murmur;grade 1          Abdomen:  abdomen soft        Vascular: pulses full and equal                                      Extremities and Back:           Neurological:  no gross motor deficits;affect appropriate             Medications:     Current Outpatient Medications   Medication Sig Dispense Refill     acetaminophen (TYLENOL) 325 MG tablet Take 2 tablets (650 mg) by mouth every 6 hours as needed for mild pain or other (and adjunct with moderate or severe pain or per patient request)       allopurinol (ZYLOPRIM) 100 MG tablet TAKE 2 TABLETS BY MOUTH  DAILY 180 tablet 3     amLODIPine (NORVASC) 5 MG tablet Take 1 tablet (5 mg) by mouth daily 90 tablet 3     apixaban ANTICOAGULANT (ELIQUIS) 2.5 MG tablet Take 1 tablet (2.5 mg) by mouth 2 times daily 180 tablet 3     atorvastatin (LIPITOR) 20 MG tablet Take 1 tablet (20 mg) by mouth daily 30 tablet 0     Cholecalciferol (VITAMIN D3) 25 MCG (1000 UT) CAPS Take 1 capsule by mouth daily OTC dose unknown       fenofibrate (TRIGLIDE/LOFIBRA) 160 MG tablet TAKE 1 " TABLET BY MOUTH  DAILY 90 tablet 3     ferrous gluconate (FERGON) 324 (38 Fe) MG tablet Take 324 mg by mouth daily       Multiple Vitamins-Minerals (PRESERVISION AREDS 2) CAPS Take 1 capsule by mouth 2 times daily       multivitamin w/minerals (MULTI-VITAMIN) tablet Take 1 tablet by mouth daily       spironolactone (ALDACTONE) 25 MG tablet TAKE 1 TABLET BY MOUTH  DAILY 90 tablet 3       Family History   Problem Relation Age of Onset     Myocardial Infarction Mother      Rheumatic fever Father      Cerebrovascular Disease Brother        Social History     Socioeconomic History     Marital status:      Spouse name: Not on file     Number of children: Not on file     Years of education: Not on file     Highest education level: Not on file   Occupational History     Not on file   Tobacco Use     Smoking status: Former     Types: Cigars     Quit date: 1980     Years since quittin.4     Smokeless tobacco: Never   Vaping Use     Vaping status: Never Used   Substance and Sexual Activity     Alcohol use: Yes     Comment: once every 6 months     Drug use: No     Sexual activity: Not Currently     Partners: Female   Other Topics Concern     Parent/sibling w/ CABG, MI or angioplasty before 65F 55M? Yes   Social History Narrative     Not on file     Social Determinants of Health     Financial Resource Strain: Not on file   Food Insecurity: Not on file   Transportation Needs: Not on file   Physical Activity: Not on file   Stress: Not on file   Social Connections: Not on file   Intimate Partner Violence: Not on file   Housing Stability: Not on file            Past Medical History:     Past Medical History:   Diagnosis Date     Arthritis     rhuematoid     Coronary artery disease     triple bypass      CRF (chronic renal failure)      Gastro-oesophageal reflux disease      Gout      Hyperlipidemia      Hypertension      Nocturia               Past Surgical History:     Past Surgical History:   Procedure  Laterality Date     CARDIAC SURGERY       CHOLECYSTECTOMY       COLONOSCOPY       CV CORONARY ANGIOGRAM N/A 1/13/2023    Procedure: Coronary Angiogram;  Surgeon: Sujata Ramires MD;  Location:  HEART CARDIAC CATH LAB     CV PCI N/A 1/13/2023    Procedure: Percutaneous Coronary Intervention;  Surgeon: Sujata Ramires MD;  Location:  HEART CARDIAC CATH LAB     CV TRANSCATHETER AORTIC VALVE REPLACEMENT-FEMORAL APPROACH N/A 4/11/2023    Procedure: Transcatheter Aortic Valve Replacement-Femoral Approach;  Surgeon: Sujata Ramires MD;  Location:  HEART CARDIAC CATH LAB     LAPAROTOMY EXPLORATORY N/A 6/30/2020    Procedure: EXPLORATORY LAPAROTOMY, BOWEL RESECTION;  Surgeon: Bull Rachel MD;  Location:  OR     LAPAROTOMY, LYSIS ADHESIONS, COMBINED N/A 6/21/2020    Procedure: EXPLORATORY LAPAROTOMY WITH LYSIS OF ADHESIONS;  Surgeon: Jose Reed MD;  Location:  OR     ORTHOPEDIC SURGERY       PHACOEMULSIFICATION CLEAR CORNEA WITH TORIC INTRAOCULAR LENS IMPLANT  1/30/2012    Procedure:PHACOEMULSIFICATION CLEAR CORNEA WITH TORIC INTRAOCULAR LENS IMPLANT; LEFT PHACOEMULSIFICATION CLEAR CORNEA WITH DELUXE  TORIC INTRAOCULAR LENS IMPLANT ; Surgeon:BRANDO HIGHTOWER; Location:[facility]     PHACOEMULSIFICATION CLEAR CORNEA WITH TORIC INTRAOCULAR LENS IMPLANT  2/13/2012    Procedure:PHACOEMULSIFICATION CLEAR CORNEA WITH TORIC INTRAOCULAR LENS IMPLANT; RIGHT PHACOEMULSIFICATION CLEAR CORNEA WITH TORIC INTRAOCULAR LENS IMPLANT ; Surgeon:BRANDO HIGHTOWER; Location: [facility]     VASCULAR SURGERY                Allergies:   Avocado, Ciprofloxacin, and Penicillins       Data:   All laboratory data reviewed:    Recent Labs   Lab Test 04/05/23  1526 03/17/23  1257 10/11/22  1358 10/25/21  1417 01/27/21  1154 12/21/20  1442 07/08/20  1714 05/21/18  1042   LDL  --   --   --  50 58  --   --  43   HDL  --   --   --  24* 34*  --   --  33*   NHDL  --   --   --  72 73  --   --  55   CHOL  --   --   --  96 107  --   --  88    TRIG  --   --   --  111 76  --   --  59   TSH  --   --   --   --   --   --   --  2.07   NTBNP 4,957* 3,614*  --   --   --  3,415*   < >  --    IRON  --   --  45*  --   --  67  --   --    FEB  --   --  365  --   --  429  --   --    IRONSAT  --   --  12*  --   --  16  --   --    RICKEY  --   --  103  --   --   --   --   --     < > = values in this interval not displayed.       Lab Results   Component Value Date    WBC 7.9 05/15/2023    WBC 7.7 12/21/2020    RBC 3.55 (L) 05/15/2023    RBC 3.61 (L) 12/21/2020    HGB 11.0 (L) 05/15/2023    HGB 10.7 (L) 12/21/2020    HCT 34.4 (L) 05/15/2023    HCT 32.9 (L) 12/21/2020    MCV 97 05/15/2023    MCV 91 12/21/2020    MCH 31.0 05/15/2023    MCH 29.6 12/21/2020    MCHC 32.0 05/15/2023    MCHC 32.5 12/21/2020    RDW 15.2 (H) 05/15/2023    RDW 17.9 (H) 12/21/2020     05/15/2023     12/21/2020       Lab Results   Component Value Date     05/15/2023     12/21/2020    POTASSIUM 4.3 05/15/2023    POTASSIUM 3.7 01/16/2023    POTASSIUM 5.2 12/21/2020    CHLORIDE 110 (H) 05/15/2023    CHLORIDE 108 01/16/2023    CHLORIDE 111 (H) 12/21/2020    CO2 21 (L) 05/15/2023    CO2 24 01/16/2023    CO2 21 12/21/2020    ANIONGAP 12 05/15/2023    ANIONGAP 9 01/16/2023    ANIONGAP 8 12/21/2020    GLC 98 05/15/2023     (H) 01/16/2023     (H) 12/21/2020    BUN 30.2 (H) 05/15/2023    BUN 36 (H) 01/16/2023    BUN 51 (H) 12/21/2020    CR 1.62 (H) 05/15/2023    CR 2.35 (H) 12/21/2020    GFRESTIMATED 40 (L) 05/15/2023    GFRESTIMATED 30 (L) 12/13/2022    GFRESTIMATED 24 (L) 12/21/2020    GFRESTBLACK 28 (L) 12/21/2020    AGUSTÍN 9.4 05/15/2023    AGUSTÍN 9.5 12/21/2020      Lab Results   Component Value Date    AST 27 05/15/2023    AST 24 07/02/2020    ALT 16 05/15/2023    ALT 21 07/02/2020       Lab Results   Component Value Date    A1C 5.6 10/28/2021    A1C 5.9 05/26/2016       Lab Results   Component Value Date    INR 1.26 (H) 04/05/2023    INR 1.24 (H) 03/17/2023    INR 1.06  05/06/2011    INR 0.98 05/02/2010

## 2023-05-26 NOTE — PATIENT INSTRUCTIONS
Today's Plan:     1) Follow-up with Dr. Ravi in 6 months.     Ne RN ([phone]), Vanita RN ([phone]), and Jen STRATTON ([phone])    Scheduling phone number: [phone]    Reminder: Please bring in all current medications, over the counter supplements and vitamin bottles to your next appointment.-    It was a pleasure seeing you today!     Chanel Mares, MEENA  5/26/2023    -

## 2023-05-26 NOTE — TELEPHONE ENCOUNTER
Left Voicemail (1st Attempt) for the patient to call back and schedule the following:    Appointment type: Follow-up   Provider: KAY Thomas   Return date: 11/22/23  Specialty phone number: [phone]  Additional appointment(s) needed:  Additonal Notes:

## 2023-05-26 NOTE — LETTER
5/26/2023    Abdoulaye Redman MD  [address]  [facility] [address]    RE: Trevor Soni       Dear Colleague,     I had the pleasure of seeing Trevor Soni in the [facility].  Cardiology Clinic Progress Note  Trevor Soni MRN# [id number]   YOB: 1933 Age: 89 year old     Primary cardiologist: Dr. Ravi     Reason for visit: s/p TAVR     History of presenting illness:    Trevor Soni is a pleasant 89 year old patient with past medical history significant for coronary disease s/p CABG x 3 (2001), paroxysmal atrial fibrillation, R CEA (2001), stage IV CKD, infrarenal AAA, chronic diastolic heart failure, chronic RBBB/bifasicular block, and severe aortic stenosis s/p TAVR with 26 mm Nunes ELZBIETA 3 valve on 4/11/2023, here today for his 1-month follow-up.     His procedure went well without any hemodynamic instability or conduction abnormalities.  Postoperatively, the patient had pauses noted on telemetry during waking hours, lasting up to 3.2 seconds.  EKG remained unchanged.  Findings were discussed with EP, who felt it was safe to discharge home with cardiac event monitor.  He was discharged on POD #1 in stable condition.     Trevor continues to do well from a cardiovascular standpoint.  He denies any chest pain, shortness of breath, syncope or near syncope, or palpitations.  He feels somewhat limited in his activity levels due to chronic knee pain.  He recently started cardiac rehab and is tolerating this well.    Repeat echocardiogram today shows well-seated bioprosthetic aortic valve with a mean gradient of 8.6 mmHg, trace AI, and LVEF 55-60%.     His BMP shows creatinine of 1.62 which is baseline and GFR 40, normal electrolytes.  His CBC shows hemoglobin of 11, and platelets of 174.         Assessment and Plan:     ASSESSMENT:    Severe aortic stenosis s/p TAVR with 26 mm Nunes Elzbieta 3 valve. Most recent echo outlined above, gradient remains normal.  He  is recovering well and tolerating cardiac rehab.  On apixaban 2.5 mg twice daily for anticoagulation.  Chronic diastolic heart failure, NYHA class II.  Appears euvolemic on exam, not on diuretic therapy.  CAD s/p CABG x 3 in 2001 (LIMA-diagonal branch, SVG-PDA, SVG-LCx).  Pre-TAVR coronary angiogram showed patent grafts.  He denies angina.   Paroxysmal atrial fibrillation.  [id number] score 5, on apixaban 2.5 mg twice daily for stroke prophylaxis.  Chronic RBBB/bifasicular block.  Patient was noted to have pauses up to 3 seconds postoperatively, EP was consulted.  It was felt he was safe to discharge home with 30-day event monitor, no alerts or evidence of advanced AV block on event monitor thus far. Not on any AV cole blocking agents.   Hypertension.  Well-controlled on amlodipine 5 mg daily and spironolactone 25 mg daily.  Stage IV CKD.  Renal function stable, baseline creatinine appears to be 1.8-2.0.  Infrarenal AAA measuring 3.7 x 3.5 cm       PLAN:     Patient appears doing well from cardiovascular standpoint, I have made no changes to his regimen today.  Continue outpatient cardiac rehab.  We again discussed the need for lifelong antibiotic prophylaxis prior to any dental procedure.  Follow-up with Dr. Ravi approximately 6 months, sooner if needed.       Chanel Mares, BEVERLEY, APRN, CNP  Page: [phone] (8a-5p M-F)    Orders this Visit:  No orders of the defined types were placed in this encounter.    No orders of the defined types were placed in this encounter.    There are no discontinued medications.    Today's clinic visit entailed:  Review of the result(s) of each unique test - echo, labs, EKG  Ordering of each unique test  Prescription drug management  35 minutes spent by me on the date of the encounter doing chart review, review of test results, interpretation of tests, patient visit and documentation   Provider  Link to [facility] Help Grid     The level of medical decision making during this visit was  "of moderate complexity.           Review of Systems:     Review of Systems:  Skin:  not assessed     Eyes:  not assessed    ENT:  not assessed    Respiratory:  Negative    Cardiovascular:    lightheadedness;Positive for;fatigue  Gastroenterology: not assessed    Genitourinary:  not assessed    Musculoskeletal:  not assessed    Neurologic:  not assessed    Psychiatric:  not assessed    Heme/Lymph/Imm:  not assessed    Endocrine:  Negative              Physical Exam:   Vitals: /71 (BP Location: Right arm, Patient Position: Sitting, Cuff Size: Adult Regular)   Pulse 80   Ht 1.753 m (5' 9\")   Wt 67.7 kg (149 lb 3.2 oz)   SpO2 97%   BMI 22.03 kg/m    Constitutional:  cooperative        Skin:  warm and dry to the touch        Head:  normocephalic        Eyes:  pupils equal and round        ENT:  no pallor or cyanosis        Neck:  JVP normal        Chest:  normal breath sounds, clear to auscultation, normal A-P diameter, normal symmetry, normal respiratory excursion, no use of accessory muscles        Cardiac:   irregularly irregular rhythm     systolic ejection murmur;grade 1          Abdomen:  abdomen soft        Vascular: pulses full and equal                                      Extremities and Back:           Neurological:  no gross motor deficits;affect appropriate             Medications:     Current Outpatient Medications   Medication Sig Dispense Refill    acetaminophen (TYLENOL) 325 MG tablet Take 2 tablets (650 mg) by mouth every 6 hours as needed for mild pain or other (and adjunct with moderate or severe pain or per patient request)      allopurinol (ZYLOPRIM) 100 MG tablet TAKE 2 TABLETS BY MOUTH  DAILY 180 tablet 3    amLODIPine (NORVASC) 5 MG tablet Take 1 tablet (5 mg) by mouth daily 90 tablet 3    apixaban ANTICOAGULANT (ELIQUIS) 2.5 MG tablet Take 1 tablet (2.5 mg) by mouth 2 times daily 180 tablet 3    atorvastatin (LIPITOR) 20 MG tablet Take 1 tablet (20 mg) by mouth daily 30 tablet 0    " Cholecalciferol (VITAMIN D3) 25 MCG (1000 UT) CAPS Take 1 capsule by mouth daily OTC dose unknown      fenofibrate (TRIGLIDE/LOFIBRA) 160 MG tablet TAKE 1 TABLET BY MOUTH  DAILY 90 tablet 3    ferrous gluconate (FERGON) 324 (38 Fe) MG tablet Take 324 mg by mouth daily      Multiple Vitamins-Minerals (PRESERVISION AREDS 2) CAPS Take 1 capsule by mouth 2 times daily      multivitamin w/minerals (MULTI-VITAMIN) tablet Take 1 tablet by mouth daily      spironolactone (ALDACTONE) 25 MG tablet TAKE 1 TABLET BY MOUTH  DAILY 90 tablet 3       Family History   Problem Relation Age of Onset    Myocardial Infarction Mother     Rheumatic fever Father     Cerebrovascular Disease Brother        Social History     Socioeconomic History    Marital status:      Spouse name: Not on file    Number of children: Not on file    Years of education: Not on file    Highest education level: Not on file   Occupational History    Not on file   Tobacco Use    Smoking status: Former     Types: Cigars     Quit date: 1980     Years since quittin.4    Smokeless tobacco: Never   Vaping Use    Vaping status: Never Used   Substance and Sexual Activity    Alcohol use: Yes     Comment: once every 6 months    Drug use: No    Sexual activity: Not Currently     Partners: Female   Other Topics Concern    Parent/sibling w/ CABG, MI or angioplasty before 65F 55M? Yes   Social History Narrative    Not on file     Social Determinants of Health     Financial Resource Strain: Not on file   Food Insecurity: Not on file   Transportation Needs: Not on file   Physical Activity: Not on file   Stress: Not on file   Social Connections: Not on file   Intimate Partner Violence: Not on file   Housing Stability: Not on file            Past Medical History:     Past Medical History:   Diagnosis Date    Arthritis     rhuematoid    Coronary artery disease     triple bypass     CRF (chronic renal failure)     Gastro-oesophageal reflux disease     Gout      Hyperlipidemia     Hypertension     Nocturia               Past Surgical History:     Past Surgical History:   Procedure Laterality Date    CARDIAC SURGERY      CHOLECYSTECTOMY      COLONOSCOPY      CV CORONARY ANGIOGRAM N/A 1/13/2023    Procedure: Coronary Angiogram;  Surgeon: Sujata Ramires MD;  Location:  HEART CARDIAC CATH LAB    CV PCI N/A 1/13/2023    Procedure: Percutaneous Coronary Intervention;  Surgeon: Sujata Ramires MD;  Location:  HEART CARDIAC CATH LAB    CV TRANSCATHETER AORTIC VALVE REPLACEMENT-FEMORAL APPROACH N/A 4/11/2023    Procedure: Transcatheter Aortic Valve Replacement-Femoral Approach;  Surgeon: Sujata Ramires MD;  Location:  HEART CARDIAC CATH LAB    LAPAROTOMY EXPLORATORY N/A 6/30/2020    Procedure: EXPLORATORY LAPAROTOMY, BOWEL RESECTION;  Surgeon: Bull Rachel MD;  Location:  OR    LAPAROTOMY, LYSIS ADHESIONS, COMBINED N/A 6/21/2020    Procedure: EXPLORATORY LAPAROTOMY WITH LYSIS OF ADHESIONS;  Surgeon: Jose Reed MD;  Location:  OR    ORTHOPEDIC SURGERY      PHACOEMULSIFICATION CLEAR CORNEA WITH TORIC INTRAOCULAR LENS IMPLANT  1/30/2012    Procedure:PHACOEMULSIFICATION CLEAR CORNEA WITH TORIC INTRAOCULAR LENS IMPLANT; LEFT PHACOEMULSIFICATION CLEAR CORNEA WITH DELUXE  TORIC INTRAOCULAR LENS IMPLANT ; Surgeon:BRANDO HIGHTOWER; Location:[facility]    PHACOEMULSIFICATION CLEAR CORNEA WITH TORIC INTRAOCULAR LENS IMPLANT  2/13/2012    Procedure:PHACOEMULSIFICATION CLEAR CORNEA WITH TORIC INTRAOCULAR LENS IMPLANT; RIGHT PHACOEMULSIFICATION CLEAR CORNEA WITH TORIC INTRAOCULAR LENS IMPLANT ; Surgeon:BRANDO HIGHTOWER; Location: [facility]    VASCULAR SURGERY                Allergies:   Avocado, Ciprofloxacin, and Penicillins       Data:   All laboratory data reviewed:    Recent Labs   Lab Test 04/05/23  1526 03/17/23  1257 10/11/22  1358 10/25/21  1417 01/27/21  1154 12/21/20  1442 07/08/20  1714 05/21/18  1042   LDL  --   --   --  50 58  --   --  43   HDL  --   --    --  24* 34*  --   --  33*   NHDL  --   --   --  72 73  --   --  55   CHOL  --   --   --  96 107  --   --  88   TRIG  --   --   --  111 76  --   --  59   TSH  --   --   --   --   --   --   --  2.07   NTBNP 4,957* 3,614*  --   --   --  3,415*   < >  --    IRON  --   --  45*  --   --  67  --   --    FEB  --   --  365  --   --  429  --   --    IRONSAT  --   --  12*  --   --  16  --   --    RICKEY  --   --  103  --   --   --   --   --     < > = values in this interval not displayed.       Lab Results   Component Value Date    WBC 7.9 05/15/2023    WBC 7.7 12/21/2020    RBC 3.55 (L) 05/15/2023    RBC 3.61 (L) 12/21/2020    HGB 11.0 (L) 05/15/2023    HGB 10.7 (L) 12/21/2020    HCT 34.4 (L) 05/15/2023    HCT 32.9 (L) 12/21/2020    MCV 97 05/15/2023    MCV 91 12/21/2020    MCH 31.0 05/15/2023    MCH 29.6 12/21/2020    MCHC 32.0 05/15/2023    MCHC 32.5 12/21/2020    RDW 15.2 (H) 05/15/2023    RDW 17.9 (H) 12/21/2020     05/15/2023     12/21/2020       Lab Results   Component Value Date     05/15/2023     12/21/2020    POTASSIUM 4.3 05/15/2023    POTASSIUM 3.7 01/16/2023    POTASSIUM 5.2 12/21/2020    CHLORIDE 110 (H) 05/15/2023    CHLORIDE 108 01/16/2023    CHLORIDE 111 (H) 12/21/2020    CO2 21 (L) 05/15/2023    CO2 24 01/16/2023    CO2 21 12/21/2020    ANIONGAP 12 05/15/2023    ANIONGAP 9 01/16/2023    ANIONGAP 8 12/21/2020    GLC 98 05/15/2023     (H) 01/16/2023     (H) 12/21/2020    BUN 30.2 (H) 05/15/2023    BUN 36 (H) 01/16/2023    BUN 51 (H) 12/21/2020    CR 1.62 (H) 05/15/2023    CR 2.35 (H) 12/21/2020    GFRESTIMATED 40 (L) 05/15/2023    GFRESTIMATED 30 (L) 12/13/2022    GFRESTIMATED 24 (L) 12/21/2020    GFRESTBLACK 28 (L) 12/21/2020    AGUSTÍN 9.4 05/15/2023    AGUSTÍN 9.5 12/21/2020      Lab Results   Component Value Date    AST 27 05/15/2023    AST 24 07/02/2020    ALT 16 05/15/2023    ALT 21 07/02/2020       Lab Results   Component Value Date    A1C 5.6 10/28/2021    A1C 5.9 05/26/2016        Lab Results   Component Value Date    INR 1.26 (H) 04/05/2023    INR 1.24 (H) 03/17/2023    INR 1.06 05/06/2011    INR 0.98 05/02/2010           Thank you for allowing me to participate in the care of your patient.      Sincerely,     Chanel Mares, [facility]  cc:   Sujata Ramires MD  [address] [id number]

## 2023-06-05 ENCOUNTER — OFFICE VISIT (OUTPATIENT)
Dept: FAMILY MEDICINE | Facility: CLINIC | Age: 88
End: 2023-06-05
Payer: MEDICARE

## 2023-06-05 VITALS
WEIGHT: 147 LBS | HEIGHT: 69 IN | OXYGEN SATURATION: 98 % | BODY MASS INDEX: 21.77 KG/M2 | DIASTOLIC BLOOD PRESSURE: 76 MMHG | SYSTOLIC BLOOD PRESSURE: 133 MMHG | TEMPERATURE: 98.3 F | RESPIRATION RATE: 16 BRPM | HEART RATE: 82 BPM

## 2023-06-05 DIAGNOSIS — I71.40 ABDOMINAL AORTIC ANEURYSM (AAA) WITHOUT RUPTURE, UNSPECIFIED PART (H): ICD-10-CM

## 2023-06-05 DIAGNOSIS — R21 RASH: ICD-10-CM

## 2023-06-05 DIAGNOSIS — M17.0 PRIMARY OSTEOARTHRITIS OF BOTH KNEES: Primary | ICD-10-CM

## 2023-06-05 PROCEDURE — 99214 OFFICE O/P EST MOD 30 MIN: CPT | Mod: 25 | Performed by: INTERNAL MEDICINE

## 2023-06-05 PROCEDURE — 20610 DRAIN/INJ JOINT/BURSA W/O US: CPT | Performed by: INTERNAL MEDICINE

## 2023-06-05 RX ORDER — FERROUS GLUCONATE 324(37.5)
TABLET ORAL
COMMUNITY
End: 2023-10-12

## 2023-06-05 RX ORDER — LIDOCAINE HYDROCHLORIDE 10 MG/ML
3 INJECTION, SOLUTION INFILTRATION; PERINEURAL ONCE
Status: COMPLETED | OUTPATIENT
Start: 2023-06-05 | End: 2023-06-05

## 2023-06-05 RX ORDER — TRIAMCINOLONE ACETONIDE 1 MG/G
CREAM TOPICAL 2 TIMES DAILY
Qty: 453.6 G | Refills: 11 | Status: SHIPPED | OUTPATIENT
Start: 2023-06-05

## 2023-06-05 RX ADMIN — LIDOCAINE HYDROCHLORIDE 3 ML: 10 INJECTION, SOLUTION INFILTRATION; PERINEURAL at 15:55

## 2023-06-05 ASSESSMENT — PAIN SCALES - GENERAL: PAINLEVEL: MODERATE PAIN (5)

## 2023-06-05 NOTE — PROGRESS NOTES
"  Assessment & Plan     Primary osteoarthritis of both knees    Right knee cortisone injection     After discussion of risks and benefits of the procedure including bleeding and infection, verbal informed consent was obtained.  Area prepped and draped in sterile fashion.  Local anesthesia was obtained with 1% lidocaine.   The suprapatellar pouch was entered using a lateral approach.  1cc of K40 was injected into the joint space.  The procedure was tolerated well and after care was discussed.        - Large Joint/Bursa injection and/or drainage (Shoulder, Knee)  - triamcinolone acetonide (KENALOG-10) injection 20 mg  - lidocaine 1 % injection 3 mL      He has pain in left knee too  Avoided two shots in same day to prevent flushing  Can return in one week for injection in left knee as symptoms dictate     Abdominal aortic aneurysm (AAA) without rupture, unspecified part (H)  This was stable in November; recheck in November 2023    Rash  Refilled kenalog cream ; no current rash   - triamcinolone (KENALOG) 0.1 % external cream; Apply topically 2 times daily      31 minutes spent by me on the date of the encounter doing chart review, history and exam, documentation and further activities per the note           Abdoulaye Redman MD  [facility] Murray is a 89 year old, presenting for the following health issues:  Procedure (Bilateral knee injections)      HPI       Here requesting knee injection   Needs a refill for triamcinolone cream that he uses for dry itching skin along with Eucerin  Recently had TAVR     Review of Systems         Objective    /76 (BP Location: Left arm, Patient Position: Sitting, Cuff Size: Adult Regular)   Pulse 82   Temp 98.3  F (36.8  C) (Oral)   Resp 16   Ht 1.753 m (5' 9\")   Wt 66.7 kg (147 lb)   SpO2 98%   BMI 21.71 kg/m    Body mass index is 21.71 kg/m .  Physical Exam   GEN:  Well appearing, comfortable  KNEE:  No effusion, mild joint line " tenderness, symptoms improved after injection

## 2023-06-14 ENCOUNTER — TELEPHONE (OUTPATIENT)
Dept: FAMILY MEDICINE | Facility: CLINIC | Age: 88
End: 2023-06-14
Payer: MEDICARE

## 2023-06-14 NOTE — TELEPHONE ENCOUNTER
Laila PT with [facility] calling to request verbal orders for the following:     Requesting orders from: Abdoulaye Redman  Provider is following patient: Yes  Is this a 60-day recertification request?  No    Orders Requested    Physical Therapy  Request for continuation of care with increase in frequency  Frequency: 1x per week for 3 weeks, every other week for 4 weeks     Confirmed ok to leave a detailed message with call back.  Contact information confirmed and updated as needed.     Callback to Laila [phone] - ok to leave detailed VM     Larry Cruz RN  [facility]

## 2023-06-14 NOTE — TELEPHONE ENCOUNTER
Brandy WATERMAN from [facility] called requesting Dr. Redman log into physician portal to sign physician order from June 9th, order number [id number]. Brandy would like to know if Dr. Redman is having difficulties with this.     Call back number [phone] ext. [phone].

## 2023-06-15 ENCOUNTER — OFFICE VISIT (OUTPATIENT)
Dept: FAMILY MEDICINE | Facility: CLINIC | Age: 88
End: 2023-06-15
Payer: MEDICARE

## 2023-06-15 VITALS
OXYGEN SATURATION: 97 % | DIASTOLIC BLOOD PRESSURE: 66 MMHG | SYSTOLIC BLOOD PRESSURE: 104 MMHG | TEMPERATURE: 97.2 F | WEIGHT: 144.6 LBS | BODY MASS INDEX: 21.35 KG/M2 | HEART RATE: 76 BPM | RESPIRATION RATE: 18 BRPM

## 2023-06-15 DIAGNOSIS — M25.511 CHRONIC PAIN OF BOTH SHOULDERS: ICD-10-CM

## 2023-06-15 DIAGNOSIS — M17.10 PRIMARY LOCALIZED OSTEOARTHROSIS OF LOWER LEG, UNSPECIFIED LATERALITY: Primary | ICD-10-CM

## 2023-06-15 DIAGNOSIS — G89.29 CHRONIC PAIN OF BOTH SHOULDERS: ICD-10-CM

## 2023-06-15 DIAGNOSIS — M25.512 CHRONIC PAIN OF BOTH SHOULDERS: ICD-10-CM

## 2023-06-15 PROCEDURE — 99213 OFFICE O/P EST LOW 20 MIN: CPT | Mod: 25 | Performed by: INTERNAL MEDICINE

## 2023-06-15 PROCEDURE — 20610 DRAIN/INJ JOINT/BURSA W/O US: CPT | Performed by: INTERNAL MEDICINE

## 2023-06-15 RX ORDER — LIDOCAINE HYDROCHLORIDE 10 MG/ML
3 INJECTION, SOLUTION INFILTRATION; PERINEURAL ONCE
Status: COMPLETED | OUTPATIENT
Start: 2023-06-15 | End: 2023-06-15

## 2023-06-15 RX ORDER — TRIAMCINOLONE ACETONIDE 40 MG/ML
40 INJECTION, SUSPENSION INTRA-ARTICULAR; INTRAMUSCULAR ONCE
Status: COMPLETED | OUTPATIENT
Start: 2023-06-15 | End: 2023-06-15

## 2023-06-15 RX ADMIN — LIDOCAINE HYDROCHLORIDE 3 ML: 10 INJECTION, SOLUTION INFILTRATION; PERINEURAL at 16:32

## 2023-06-15 RX ADMIN — TRIAMCINOLONE ACETONIDE 40 MG: 40 INJECTION, SUSPENSION INTRA-ARTICULAR; INTRAMUSCULAR at 16:35

## 2023-06-15 ASSESSMENT — PAIN SCALES - GENERAL: PAINLEVEL: NO PAIN (0)

## 2023-06-15 NOTE — PROGRESS NOTES
Assessment & Plan     Primary localized osteoarthrosis of lower leg, unspecified laterality      Left knee cortisone injection  After discussion of risks and benefits of the procedure including bleeding and infection, verbal informed consent was obtained.  Area prepped and draped in sterile fashion.  Local anesthesia was obtained with 1% lidocaine.   The suprapatellar pouch was entered using a lateral approach.  1cc of K40 was injected into the joint space.  The procedure was tolerated well and after care was discussed.        - lidocaine 1 % injection 3 mL  - Large Joint/Bursa injection and/or drainage (Shoulder, Knee)  - triamcinolone (KENALOG-40) injection 40 mg    Chronic pain of both shoulders  He almost certainly has rotator cuff pathology and OA of both shoulders  I think his quality of life would improve with reverse total shoulder arthroplasty on right and perhaps a left ultrasound guided shoulder joint injection  I would recommend Dr. Mclean for this  I gave him and his daughter  contact information to schedule an appointment at [facility] with Dr. Mendez           28 minutes spent by me on the date of the encounter doing chart review, history and exam, documentation and further activities per the note           Abdoulaye Redman MD  [facility] Murray is a 89 year old, presenting for the following health issues:  Follow Up (Patient here for follow up visit from receiving knee injection.  )         View : No data to display.              History of Present Illness       Reason for visit:  Knee injection for his left knee.    He eats 2-3 servings of fruits and vegetables daily.He consumes 1 sweetened beverage(s) daily.He exercises with enough effort to increase his heart rate 9 or less minutes per day.  He exercises with enough effort to increase his heart rate 3 or less days per week.   He is taking medications regularly.     Right knee feels much better  Requests  injection for left knee  Wonders if he could get one for his shoulder too?  He has not been able to lift his right arm above his head for months to years  Left shoulder is bad too, but not as bad as right   No recent injury or trauma       Review of Systems         Objective    /66 (BP Location: Right arm, Patient Position: Sitting, Cuff Size: Adult Regular)   Pulse 76   Temp 97.2  F (36.2  C) (Temporal)   Resp 18   Wt 65.6 kg (144 lb 9.6 oz)   SpO2 97%   BMI 21.35 kg/m    Body mass index is 21.35 kg/m .  Physical Exam   Well appearing  He has trouble lifting his right arm to shake my hand  No knee effusion, joint line tenderness noted  Knee symptoms better after injection

## 2023-06-15 NOTE — PATIENT INSTRUCTIONS
Italo Mclean MD Orthopedic Surgeon.  Discuss reverse total shoulder arthroplasty.  Call  for an appointment.

## 2023-06-16 NOTE — TELEPHONE ENCOUNTER
Writer called and spoke with BASIL Ulloa with [facility] and notified of PCP's approval for requested home care orders.    Laila appreciative of callback. No further questions or concerns.    Signing encounter.    Larry Cruz RN  [facility]

## 2023-06-18 DIAGNOSIS — E78.5 HYPERLIPIDEMIA LDL GOAL <100: ICD-10-CM

## 2023-06-18 DIAGNOSIS — I10 BENIGN ESSENTIAL HYPERTENSION: ICD-10-CM

## 2023-06-19 RX ORDER — ATORVASTATIN CALCIUM 20 MG/1
TABLET, FILM COATED ORAL
Qty: 90 TABLET | Refills: 3 | Status: SHIPPED | OUTPATIENT
Start: 2023-06-19 | End: 2024-04-04

## 2023-07-26 ENCOUNTER — TRANSFERRED RECORDS (OUTPATIENT)
Dept: HEALTH INFORMATION MANAGEMENT | Facility: CLINIC | Age: 88
End: 2023-07-26
Payer: MEDICARE

## 2023-08-15 ENCOUNTER — TELEPHONE (OUTPATIENT)
Dept: NEPHROLOGY | Facility: CLINIC | Age: 88
End: 2023-08-15
Payer: MEDICARE

## 2023-08-15 DIAGNOSIS — I48.92 ATRIAL FLUTTER, UNSPECIFIED TYPE (H): ICD-10-CM

## 2023-08-16 RX ORDER — APIXABAN 2.5 MG/1
2.5 TABLET, FILM COATED ORAL 2 TIMES DAILY
Qty: 180 TABLET | Refills: 3 | Status: SHIPPED | OUTPATIENT
Start: 2023-08-16 | End: 2024-03-27

## 2023-08-25 DIAGNOSIS — I15.9 SECONDARY HYPERTENSION: ICD-10-CM

## 2023-08-25 DIAGNOSIS — E78.5 HYPERLIPIDEMIA LDL GOAL <100: ICD-10-CM

## 2023-08-25 DIAGNOSIS — I10 BENIGN ESSENTIAL HYPERTENSION: ICD-10-CM

## 2023-08-25 RX ORDER — SPIRONOLACTONE 25 MG/1
TABLET ORAL
Qty: 90 TABLET | Refills: 3 | Status: SHIPPED | OUTPATIENT
Start: 2023-08-25

## 2023-08-25 RX ORDER — ALLOPURINOL 100 MG/1
TABLET ORAL
Qty: 180 TABLET | Refills: 3 | Status: SHIPPED | OUTPATIENT
Start: 2023-08-25

## 2023-10-04 NOTE — PROGRESS NOTES
SUBJECTIVE:   Trevor Soni is a 85 year old male who presents to clinic today for the following health issues:      Bilateral knee Pain    Duration:     Since: chronic           Specific cause: arthritis of both knees    Description:      Location of pain: both knees     Character of pain: dull     Pain radiation: none    Intensity: moderate    History:      Pain interferes with job: N/A     History of similar pain problems: yes     Any previous MRI or X-rays: yes     Therapies tried without relief: none    Alleviating factors:      Improved by: rest    Precipitating factors:    Worsened by: walking long distnace    Accompanying Signs & Symptoms: chronic low back pains      Current Medications:     Current Outpatient Medications   Medication Sig Dispense Refill     acetaminophen-codeine (TYLENOL #3) 300-30 MG tablet Take 1 tablet by mouth 2 times daily as needed for severe pain 60 tablet 0     allopurinol (ZYLOPRIM) 100 MG tablet TAKE 2 TABLETS BY MOUTH  EVERY  tablet 3     amLODIPine (NORVASC) 5 MG tablet TAKE 1 TABLET BY MOUTH  TWICE A  tablet 3     ASPIRIN PO Take 325 mg by mouth daily.       atenolol (TENORMIN) 50 MG tablet TAKE 1 TABLET BY MOUTH  EVERY DAY 90 tablet 3     atorvastatin (LIPITOR) 20 MG tablet TAKE 1 TABLET BY MOUTH  EVERY DAY 90 tablet 1     Cholecalciferol (VITAMIN D3 PO) Take 1 tablet by mouth daily       cyclobenzaprine (FLEXERIL) 10 MG tablet Sig 1/2 po at bedtime prn 7 tablet 1     fenofibrate 160 MG tablet TAKE 1 TABLET BY MOUTH  EVERY DAY 90 tablet 3     hydrALAZINE (APRESOLINE) 50 MG tablet TAKE 1 TABLET BY MOUTH 3  TIMES A  tablet 3     Ranitidine HCl (ZANTAC PO) Take 150 mg by mouth daily as needed        triamcinolone (KENALOG) 0.1 % cream Apply sparingly to affected area three times daily for 14 days. 30 g 1         Allergies:      Allergies   Allergen Reactions     Avocado      Ciprofloxacin Itching     Penicillins Itching            Past Medical History:      Past Medical History:   Diagnosis Date     Arthritis     rhuematoid     Coronary artery disease     triple bypass      CRF (chronic renal failure)      Gastro-oesophageal reflux disease      Gout      Hyperlipidemia      Hypertension      Nocturia          Past Surgical History:     Past Surgical History:   Procedure Laterality Date     CARDIAC SURGERY       CHOLECYSTECTOMY       COLONOSCOPY       ORTHOPEDIC SURGERY       PHACOEMULSIFICATION CLEAR CORNEA WITH TORIC INTRAOCULAR LENS IMPLANT  2012    Procedure:PHACOEMULSIFICATION CLEAR CORNEA WITH TORIC INTRAOCULAR LENS IMPLANT; LEFT PHACOEMULSIFICATION CLEAR CORNEA WITH DELUXE  TORIC INTRAOCULAR LENS IMPLANT ; Surgeon:BRANDO HIGHTOWER; Location:[facility]     PHACOEMULSIFICATION CLEAR CORNEA WITH TORIC INTRAOCULAR LENS IMPLANT  2012    Procedure:PHACOEMULSIFICATION CLEAR CORNEA WITH TORIC INTRAOCULAR LENS IMPLANT; RIGHT PHACOEMULSIFICATION CLEAR CORNEA WITH TORIC INTRAOCULAR LENS IMPLANT ; Surgeon:BRANDO HIGHTOWER; Location:[facility]     VASCULAR SURGERY           Family Medical History:   No family history on file.      Social History:     Social History     Socioeconomic History     Marital status:      Spouse name: Not on file     Number of children: Not on file     Years of education: Not on file     Highest education level: Not on file   Social Needs     Financial resource strain: Not on file     Food insecurity - worry: Not on file     Food insecurity - inability: Not on file     Transportation needs - medical: Not on file     Transportation needs - non-medical: Not on file   Occupational History     Not on file   Tobacco Use     Smoking status: Former Smoker     Types: Cigars     Last attempt to quit: 1980     Years since quittin.1     Smokeless tobacco: Never Used   Substance and Sexual Activity     Alcohol use: Yes     Alcohol/week: 0.0 oz     Comment: rarely     Drug use: No     Sexual activity: Not Currently     Partners: Female  "  Other Topics Concern     Parent/sibling w/ CABG, MI or angioplasty before 65F 55M? Not Asked   Social History Narrative     Not on file           Review of System:     Constitutional: Negative for fever or chills  Skin: Negative for rashes  Ears/Nose/Throat: Negative for nasal congestion, sore throat  Respiratory: No shortness of breath, dyspnea on exertion, cough, or hemoptysis  Cardiovascular: Negative for chest pain  Gastrointestinal: Negative for nausea, vomiting  Genitourinary: Negative for dysuria, hematuria  Musculoskeletal: positive for mechanical low back pains, bilateral knee pains  Neurologic: Negative for headaches  Psychiatric: Negative for depression, anxiety  Hematologic/Lymphatic/Immunologic: Negative  Endocrine: Negative  Behavioral: Negative for tobacco use       Physical Exam:   /62 (BP Location: Left arm, Patient Position: Sitting, Cuff Size: Adult Regular)   Pulse 58   Temp 97.2  F (36.2  C) (Oral)   Ht 1.753 m (5' 9\")   Wt 75.8 kg (167 lb)   SpO2 97%   BMI 24.66 kg/m      GENERAL: alert and no distress  EYES: eyes grossly normal to inspection, and conjunctivae and sclerae normal  HENT: Normocephalic atraumatic. Nose and mouth without ulcers or lesions  NECK: supple  RESP: lungs clear to auscultation   CV: regular rate and rhythm, normal S1 S2  LYMPH: no peripheral edema   ABDOMEN: nondistended  MS: bilateral knee pains, diffuse low back pains noted  SKIN: no suspicious lesions or rashes  NEURO: Alert & Oriented x 3.   PSYCH: mentation appears normal, affect normal        Diagnostic Test Results:     Diagnostic Test Results:  Results for orders placed or performed in visit on 06/27/18   XR Shoulder Left G/E 3 Views    Narrative    XR SHOULDER LT G/E 3 VW 6/27/2018 1:22 PM    COMPARISON: None.    HISTORY: Left shoulder osteoarthritis.      Impression    IMPRESSION: No fracture or dislocation seen in the left shoulder.  Severe glenohumeral osteoarthritis and superior subluxation of " the  humeral head indicating full-thickness rotator cuff tear. Soft tissue  prominence overlying the acromioclavicular joint suggests ganglion  cyst.    LOW LEOS MD       ASSESSMENT/PLAN:     (M54.5,  G89.29) Chronic bilateral low back pain without sciatica  (M25.561,  M25.562,  G89.29) Chronic pain of both knees  (M25.561,  M25.562) Arthralgia of both knees  (primary encounter diagnosis)  Comment: chronic poorly controlled bilateral knee pains  Plan: acetaminophen-codeine (TYLENOL #3) 300-30 MG         Tablet, SPORTS MEDICINE REFERRAL    Follow Up Plan:     Patient is instructed to return to Internal Medicine clinic for follow-up visit in 1 week.        Crystal Nolen MD  Internal Medicine  [facility]     Aklief Pregnancy And Lactation Text: It is unknown if this medication is safe to use during pregnancy.  It is unknown if this medication is excreted in breast milk.  Breastfeeding women should use the topical cream on the smallest area of the skin for the shortest time needed while breastfeeding.  Do not apply to nipple and areola.

## 2023-10-12 ENCOUNTER — OFFICE VISIT (OUTPATIENT)
Dept: CARDIOLOGY | Facility: CLINIC | Age: 88
End: 2023-10-12
Attending: INTERNAL MEDICINE
Payer: MEDICARE

## 2023-10-12 VITALS
DIASTOLIC BLOOD PRESSURE: 54 MMHG | HEIGHT: 69 IN | BODY MASS INDEX: 21.73 KG/M2 | SYSTOLIC BLOOD PRESSURE: 136 MMHG | WEIGHT: 146.7 LBS | HEART RATE: 81 BPM

## 2023-10-12 DIAGNOSIS — I25.10 CORONARY ARTERY DISEASE INVOLVING NATIVE HEART WITHOUT ANGINA PECTORIS, UNSPECIFIED VESSEL OR LESION TYPE: Primary | ICD-10-CM

## 2023-10-12 DIAGNOSIS — I71.43 INFRARENAL ABDOMINAL AORTIC ANEURYSM (AAA) WITHOUT RUPTURE (H): ICD-10-CM

## 2023-10-12 DIAGNOSIS — I35.0 SEVERE AORTIC STENOSIS: ICD-10-CM

## 2023-10-12 DIAGNOSIS — I65.23 CAROTID STENOSIS, ASYMPTOMATIC, BILATERAL: ICD-10-CM

## 2023-10-12 DIAGNOSIS — I10 PRIMARY HYPERTENSION: ICD-10-CM

## 2023-10-12 DIAGNOSIS — E78.5 HYPERLIPIDEMIA LDL GOAL <100: ICD-10-CM

## 2023-10-12 PROCEDURE — 99214 OFFICE O/P EST MOD 30 MIN: CPT | Performed by: INTERNAL MEDICINE

## 2023-10-12 NOTE — PATIENT INSTRUCTIONS
Your blood pressure was elevated at your appointment today.  Elevated blood pressure can increase your risk of a heart attack, stroke and heart failure.  For this reason, we feel it is important to monitor your blood pressure closely.  If you have access to a home blood pressure monitor or are able to check your blood pressure at a local pharmacy in the next week we would like you to do so and call our clinic with those readings. Please call [phone] (Hetal) and leave a message with your name, date of birth, blood pressure reading, date and location it was completed. If your blood pressure remains elevated your care team will be notified.  We appreciate being a part of your healthcare team and look forward to hearing from you soon.

## 2023-10-12 NOTE — PROGRESS NOTES
HPI and Plan:   Trevor Soni is a pleasant 89 year old patient with past medical history significant for coronary disease s/p CABG x 3 (2001), paroxysmal atrial fibrillation, peripheral vascular disease with previous carotid endarterectomy in 2001, stage IV renal failure, severe aortic stenosis, s/p transfemoral aortic valve replacement with 26 mm Nunes AMI valve in April 2023.    He returns for follow-up accompanied by his daughter.  He was discharged from the structural heart clinic.  After his TAVR procedure, repeat echocardiogram in May revealed normal ejection fraction with a mean grain across aortic valve 9 mm which is adequate.    Patient feels well.  He has no chest pain or shortness of breath.  His leg edema has improved after the transfemoral aortic valve replacement.  He sometimes has elevated blood pressures in the morning up to 150 before he takes his pills.  After that, blood pressure is usually improved.  Today blood pressure was 147 systolic but I rechecked it and was 134 systolic when I rechecked it.    He denies any dizziness, no orthopnea or PND.  No chest pain.    Prior to his valve replacement, he had a coronary angiography which revealed patent grafts including LIMA to the LAD, vein grafts to the right coronary system and OM.    On exam, regular rate and rhythm with a 2 by suggest systolic murmur in the aortic area.  Chest was clear to auscultation.    Patient also had some conduction abnormalities after his TAVR procedure with a pause up to 3.2 seconds.  After consultation with EP, he was managed medically.  An event monitor was done which apparently has not shown any significant arrhythmias per last note by the structural heart team.  I am not able to yet see the results of the event monitor.  Of note, patient is not on AV cole blockers.     Impression    Severe aortic stenosis, s/p transfemoral aortic valve replacement, successful, in April 2023  2.    Paroxysmal atrial  fibrillation, in sinus rhythm on Eliquis.  3.  Asymptomatic 3.2-second pause after the TAVR procedure, managed conservatively.  Not on AV cole blockers.  No dizziness or syncope.  4.  Coronary artery disease with patent grafts prior to the valve replacement, no angina  5.  Hypertension, fairly well controlled  6.  Peripheral vascular disease, previous carotid endarterectomy  7.  Small infrarenal abdominal aortic aneurysm, will need a follow-up scan next year    Overall, doing well from cardiac perspective.  I will see him back in follow-up with me or my nurse practitioner in a year with a repeat echocardiogram prior to visit with us.      He will call if there is worsening chest pain shortness breath dizziness or syncope.    Sincerely,    Ivan Ravi MD      Today's clinic visit entailed:    33 minutes spent by me on the date of the encounter doing chart review, review of test results, patient visit, and documentation   Provider  Link to MDM Help Grid           No orders of the defined types were placed in this encounter.      No orders of the defined types were placed in this encounter.      Medications Discontinued During This Encounter   Medication Reason    Ferrous Gluconate 324 (37.5 Fe) MG TABS Duplicate Therapy (No eCancel)         Encounter Diagnosis   Name Primary?    Severe aortic stenosis        CURRENT MEDICATIONS:  Current Outpatient Medications   Medication Sig Dispense Refill    acetaminophen (TYLENOL) 325 MG tablet Take 2 tablets (650 mg) by mouth every 6 hours as needed for mild pain or other (and adjunct with moderate or severe pain or per patient request)      allopurinol (ZYLOPRIM) 100 MG tablet TAKE 2 TABLETS BY MOUTH  DAILY 180 tablet 3    amLODIPine (NORVASC) 5 MG tablet Take 1 tablet (5 mg) by mouth daily 90 tablet 3    atorvastatin (LIPITOR) 20 MG tablet TAKE 1 TABLET BY MOUTH  DAILY 90 tablet 3    Cholecalciferol (VITAMIN D3) 25 MCG (1000 UT) CAPS Take 1 capsule by mouth daily OTC dose  unknown      ELIQUIS ANTICOAGULANT 2.5 MG tablet TAKE 1 TABLET BY MOUTH  TWICE DAILY 180 tablet 3    fenofibrate (TRIGLIDE/LOFIBRA) 160 MG tablet TAKE 1 TABLET BY MOUTH  DAILY 90 tablet 3    ferrous gluconate (FERGON) 324 (38 Fe) MG tablet Take 324 mg by mouth daily      Multiple Vitamins-Minerals (PRESERVISION AREDS 2) CAPS Take 1 capsule by mouth 2 times daily      multivitamin w/minerals (MULTI-VITAMIN) tablet Take 1 tablet by mouth daily      spironolactone (ALDACTONE) 25 MG tablet TAKE 1 TABLET BY MOUTH  DAILY 90 tablet 3    triamcinolone (KENALOG) 0.1 % external cream Apply topically 2 times daily 453.6 g 11       ALLERGIES     Allergies   Allergen Reactions    Avocado     Ciprofloxacin Itching    Penicillins Itching       PAST MEDICAL HISTORY:  Past Medical History:   Diagnosis Date    Arthritis     rhuematoid    Coronary artery disease     triple bypass 2001    CRF (chronic renal failure)     Gastro-oesophageal reflux disease     Gout     Hyperlipidemia     Hypertension     Nocturia        PAST SURGICAL HISTORY:  Past Surgical History:   Procedure Laterality Date    CARDIAC SURGERY      CHOLECYSTECTOMY      COLONOSCOPY      CV CORONARY ANGIOGRAM N/A 1/13/2023    Procedure: Coronary Angiogram;  Surgeon: Sujata Ramires MD;  Location: [facility] CARDIAC CATH LAB    CV PCI N/A 1/13/2023    Procedure: Percutaneous Coronary Intervention;  Surgeon: Sujata Ramires MD;  Location: [facility] CARDIAC CATH LAB    CV TRANSCATHETER AORTIC VALVE REPLACEMENT-FEMORAL APPROACH N/A 4/11/2023    Procedure: Transcatheter Aortic Valve Replacement-Femoral Approach;  Surgeon: Sujata Ramires MD;  Location:  [facility] CARDIAC CATH LAB    LAPAROTOMY EXPLORATORY N/A 6/30/2020    Procedure: EXPLORATORY LAPAROTOMY, BOWEL RESECTION;  Surgeon: Bull Rachel MD;  Location:  OR    LAPAROTOMY, LYSIS ADHESIONS, COMBINED N/A 6/21/2020    Procedure: EXPLORATORY LAPAROTOMY WITH LYSIS OF ADHESIONS;  Surgeon: Jose Reed MD;   Location:  OR    ORTHOPEDIC SURGERY      PHACOEMULSIFICATION CLEAR CORNEA WITH TORIC INTRAOCULAR LENS IMPLANT  2012    Procedure:PHACOEMULSIFICATION CLEAR CORNEA WITH TORIC INTRAOCULAR LENS IMPLANT; LEFT PHACOEMULSIFICATION CLEAR CORNEA WITH DELUXE  TORIC INTRAOCULAR LENS IMPLANT ; Surgeon:BRANDO HIGHTOWER; Location:[facility]    PHACOEMULSIFICATION CLEAR CORNEA WITH TORIC INTRAOCULAR LENS IMPLANT  2012    Procedure:PHACOEMULSIFICATION CLEAR CORNEA WITH TORIC INTRAOCULAR LENS IMPLANT; RIGHT PHACOEMULSIFICATION CLEAR CORNEA WITH TORIC INTRAOCULAR LENS IMPLANT ; Surgeon:BRANDO HIGHTOWER; Location:[facility]    VASCULAR SURGERY         FAMILY HISTORY:  Family History   Problem Relation Age of Onset    Myocardial Infarction Mother     Rheumatic fever Father     Cerebrovascular Disease Brother        SOCIAL HISTORY:  Social History     Socioeconomic History    Marital status:      Spouse name: None    Number of children: None    Years of education: None    Highest education level: None   Tobacco Use    Smoking status: Former     Types: Cigars     Quit date: 1980     Years since quittin.8    Smokeless tobacco: Never   Vaping Use    Vaping Use: Never used   Substance and Sexual Activity    Alcohol use: Yes     Comment: once every 6 months    Drug use: No    Sexual activity: Not Currently     Partners: Female   Other Topics Concern    Parent/sibling w/ CABG, MI or angioplasty before 65F 55M? Yes       Review of Systems:  Skin:          Eyes:         ENT:         Respiratory:  Positive for   cough in the morning   Cardiovascular:  Negative;palpitations;chest pain;lightheadedness;dizziness;syncope or near-syncope;cyanosis;fatigue;exercise intolerance;edema      Gastroenterology:        Genitourinary:         Musculoskeletal:  Positive for joint pain    Neurologic:         Psychiatric:         Heme/Lymph/Imm:         Endocrine:  Negative        Physical Exam:  Vitals: BP (!) 147/56   Pulse 81   Ht 1.753 m  "(5' 9\")   Wt 66.5 kg (146 lb 11.2 oz)   BMI 21.66 kg/m             CC  Sujata Ramires MD  [id number] [id number]                "

## 2023-10-12 NOTE — LETTER
10/12/2023    Abdoulaye Redman MD  [address]  [place] [address]    RE: Trevor Soni       Dear Colleague,     I had the pleasure of seeing Trevor Soni in the [facility].  HPI and Plan:   Trevor Soni is a pleasant 89 year old patient with past medical history significant for coronary disease s/p CABG x 3 (2001), paroxysmal atrial fibrillation, peripheral vascular disease with previous carotid endarterectomy in 2001, stage IV renal failure, severe aortic stenosis, s/p transfemoral aortic valve replacement with 26 mm Nunes AMI valve in April 2023.    He returns for follow-up accompanied by his daughter.  He was discharged from the structural heart clinic.  After his TAVR procedure, repeat echocardiogram in May revealed normal ejection fraction with a mean grain across aortic valve 9 mm which is adequate.    Patient feels well.  He has no chest pain or shortness of breath.  His leg edema has improved after the transfemoral aortic valve replacement.  He sometimes has elevated blood pressures in the morning up to 150 before he takes his pills.  After that, blood pressure is usually improved.  Today blood pressure was 147 systolic but I rechecked it and was 134 systolic when I rechecked it.    He denies any dizziness, no orthopnea or PND.  No chest pain.    Prior to his valve replacement, he had a coronary angiography which revealed patent grafts including LIMA to the LAD, vein grafts to the right coronary system and OM.    On exam, regular rate and rhythm with a 2 by suggest systolic murmur in the aortic area.  Chest was clear to auscultation.    Patient also had some conduction abnormalities after his TAVR procedure with a pause up to 3.2 seconds.  After consultation with EP, he was managed medically.  An event monitor was done which apparently has not shown any significant arrhythmias per last note by the structural heart team.  I am not able to yet see the results of  the event monitor.  Of note, patient is not on AV cole blockers.     Impression    Severe aortic stenosis, s/p transfemoral aortic valve replacement, successful, in April 2023  2.    Paroxysmal atrial fibrillation, in sinus rhythm on Eliquis.  3.  Asymptomatic 3.2-second pause after the TAVR procedure, managed conservatively.  Not on AV cole blockers.  No dizziness or syncope.  4.  Coronary artery disease with patent grafts prior to the valve replacement, no angina  5.  Hypertension, fairly well controlled  6.  Peripheral vascular disease, previous carotid endarterectomy  7.  Small infrarenal abdominal aortic aneurysm, will need a follow-up scan next year    Overall, doing well from cardiac perspective.  I will see him back in follow-up with me or my nurse practitioner in a year with a repeat echocardiogram prior to visit with us.      He will call if there is worsening chest pain shortness breath dizziness or syncope.    Sincerely,    Ivan Ravi MD      Today's clinic visit entailed:    33 minutes spent by me on the date of the encounter doing chart review, review of test results, patient visit, and documentation   Provider  Link to [facility] Help Grid           No orders of the defined types were placed in this encounter.      No orders of the defined types were placed in this encounter.      Medications Discontinued During This Encounter   Medication Reason    Ferrous Gluconate 324 (37.5 Fe) MG TABS Duplicate Therapy (No eCancel)         Encounter Diagnosis   Name Primary?    Severe aortic stenosis        CURRENT MEDICATIONS:  Current Outpatient Medications   Medication Sig Dispense Refill    acetaminophen (TYLENOL) 325 MG tablet Take 2 tablets (650 mg) by mouth every 6 hours as needed for mild pain or other (and adjunct with moderate or severe pain or per patient request)      allopurinol (ZYLOPRIM) 100 MG tablet TAKE 2 TABLETS BY MOUTH  DAILY 180 tablet 3    amLODIPine (NORVASC) 5 MG tablet Take 1 tablet (5 mg)  by mouth daily 90 tablet 3    atorvastatin (LIPITOR) 20 MG tablet TAKE 1 TABLET BY MOUTH  DAILY 90 tablet 3    Cholecalciferol (VITAMIN D3) 25 MCG (1000 UT) CAPS Take 1 capsule by mouth daily OTC dose unknown      ELIQUIS ANTICOAGULANT 2.5 MG tablet TAKE 1 TABLET BY MOUTH  TWICE DAILY 180 tablet 3    fenofibrate (TRIGLIDE/LOFIBRA) 160 MG tablet TAKE 1 TABLET BY MOUTH  DAILY 90 tablet 3    ferrous gluconate (FERGON) 324 (38 Fe) MG tablet Take 324 mg by mouth daily      Multiple Vitamins-Minerals (PRESERVISION AREDS 2) CAPS Take 1 capsule by mouth 2 times daily      multivitamin w/minerals (MULTI-VITAMIN) tablet Take 1 tablet by mouth daily      spironolactone (ALDACTONE) 25 MG tablet TAKE 1 TABLET BY MOUTH  DAILY 90 tablet 3    triamcinolone (KENALOG) 0.1 % external cream Apply topically 2 times daily 453.6 g 11       ALLERGIES     Allergies   Allergen Reactions    Avocado     Ciprofloxacin Itching    Penicillins Itching       PAST MEDICAL HISTORY:  Past Medical History:   Diagnosis Date    Arthritis     rhuematoid    Coronary artery disease     triple bypass 2001    CRF (chronic renal failure)     Gastro-oesophageal reflux disease     Gout     Hyperlipidemia     Hypertension     Nocturia        PAST SURGICAL HISTORY:  Past Surgical History:   Procedure Laterality Date    CARDIAC SURGERY      CHOLECYSTECTOMY      COLONOSCOPY      CV CORONARY ANGIOGRAM N/A 1/13/2023    Procedure: Coronary Angiogram;  Surgeon: Sujata Ramires MD;  Location: [facility] CARDIAC CATH LAB    CV PCI N/A 1/13/2023    Procedure: Percutaneous Coronary Intervention;  Surgeon: Sujata Ramires MD;  Location: [facility] CARDIAC CATH LAB    CV TRANSCATHETER AORTIC VALVE REPLACEMENT-FEMORAL APPROACH N/A 4/11/2023    Procedure: Transcatheter Aortic Valve Replacement-Femoral Approach;  Surgeon: Sujata Ramires MD;  Location: [facility] CARDIAC CATH LAB    LAPAROTOMY EXPLORATORY N/A 6/30/2020    Procedure: EXPLORATORY LAPAROTOMY, BOWEL RESECTION;   Surgeon: Bull Rachel MD;  Location:  OR    LAPAROTOMY, LYSIS ADHESIONS, COMBINED N/A 2020    Procedure: EXPLORATORY LAPAROTOMY WITH LYSIS OF ADHESIONS;  Surgeon: Jose Reed MD;  Location:  OR    ORTHOPEDIC SURGERY      PHACOEMULSIFICATION CLEAR CORNEA WITH TORIC INTRAOCULAR LENS IMPLANT  2012    Procedure:PHACOEMULSIFICATION CLEAR CORNEA WITH TORIC INTRAOCULAR LENS IMPLANT; LEFT PHACOEMULSIFICATION CLEAR CORNEA WITH DELUXE  TORIC INTRAOCULAR LENS IMPLANT ; Surgeon:BRANDO HIGHTOWER; [facility]: [facility]    PHACOEMULSIFICATION CLEAR CORNEA WITH TORIC INTRAOCULAR LENS IMPLANT  2012    Procedure:PHACOEMULSIFICATION CLEAR CORNEA WITH TORIC INTRAOCULAR LENS IMPLANT; RIGHT PHACOEMULSIFICATION CLEAR CORNEA WITH TORIC INTRAOCULAR LENS IMPLANT ; Surgeon:BRANDO HIGHTOWER; Location:[facility]    VASCULAR SURGERY         FAMILY HISTORY:  Family History   Problem Relation Age of Onset    Myocardial Infarction Mother     Rheumatic fever Father     Cerebrovascular Disease Brother        SOCIAL HISTORY:  Social History     Socioeconomic History    Marital status:      Spouse name: None    Number of children: None    Years of education: None    Highest education level: None   Tobacco Use    Smoking status: Former     Types: Cigars     Quit date: 1980     Years since quittin.8    Smokeless tobacco: Never   Vaping Use    Vaping Use: Never used   Substance and Sexual Activity    Alcohol use: Yes     Comment: once every 6 months    Drug use: No    Sexual activity: Not Currently     Partners: Female   Other Topics Concern    Parent/sibling w/ CABG, MI or angioplasty before 65F 55M? Yes       Review of Systems:  Skin:          Eyes:         ENT:         Respiratory:  Positive for   cough in the morning   Cardiovascular:  Negative;palpitations;chest pain;lightheadedness;dizziness;syncope or near-syncope;cyanosis;fatigue;exercise intolerance;edema      Gastroenterology:        Genitourinary:        "  Musculoskeletal:  Positive for joint pain    Neurologic:         Psychiatric:         Heme/Lymph/Imm:         Endocrine:  Negative        Physical Exam:  Vitals: BP (!) 147/56   Pulse 81   Ht 1.753 m (5' 9\")   Wt 66.5 kg (146 lb 11.2 oz)   BMI 21.66 kg/m       CC  Sujata Ramires MD  [address]  [place] [address]      Thank you for allowing me to participate in the care of your patient.      Sincerely,     Ivan Ravi MD     [facility]  "

## 2023-10-16 ENCOUNTER — PATIENT OUTREACH (OUTPATIENT)
Dept: CARE COORDINATION | Facility: CLINIC | Age: 88
End: 2023-10-16
Payer: MEDICARE

## 2023-10-19 DIAGNOSIS — I10 BENIGN ESSENTIAL HYPERTENSION: ICD-10-CM

## 2023-10-19 DIAGNOSIS — E78.5 HYPERLIPIDEMIA LDL GOAL <100: ICD-10-CM

## 2023-10-20 RX ORDER — FENOFIBRATE 160 MG/1
TABLET ORAL
Qty: 90 TABLET | Refills: 3 | Status: SHIPPED | OUTPATIENT
Start: 2023-10-20 | End: 2024-09-26

## 2023-10-25 DIAGNOSIS — D64.9 ANEMIA, UNSPECIFIED TYPE: Primary | ICD-10-CM

## 2023-10-26 RX ORDER — FERROUS GLUCONATE 324(38)MG
324 TABLET ORAL DAILY
Qty: 90 TABLET | Refills: 3 | Status: SHIPPED | OUTPATIENT
Start: 2023-10-26

## 2023-11-07 DIAGNOSIS — N18.32 STAGE 3B CHRONIC KIDNEY DISEASE (H): Primary | ICD-10-CM

## 2023-11-09 ENCOUNTER — LAB (OUTPATIENT)
Dept: LAB | Facility: CLINIC | Age: 88
End: 2023-11-09
Payer: MEDICARE

## 2023-11-09 DIAGNOSIS — N18.32 STAGE 3B CHRONIC KIDNEY DISEASE (H): ICD-10-CM

## 2023-11-09 LAB
ALBUMIN MFR UR ELPH: 6.9 MG/DL
ALBUMIN SERPL BCG-MCNC: 4.1 G/DL (ref 3.5–5.2)
ALBUMIN UR-MCNC: 100 MG/DL
ANION GAP SERPL CALCULATED.3IONS-SCNC: 13 MMOL/L (ref 7–15)
APPEARANCE UR: CLEAR
BILIRUB UR QL STRIP: NEGATIVE
BUN SERPL-MCNC: 35.1 MG/DL (ref 8–23)
CALCIUM SERPL-MCNC: 9.4 MG/DL (ref 8.2–9.6)
CHLORIDE SERPL-SCNC: 110 MMOL/L (ref 98–107)
COLOR UR AUTO: YELLOW
CREAT SERPL-MCNC: 1.76 MG/DL (ref 0.67–1.17)
CREAT UR-MCNC: 115.9 MG/DL
DEPRECATED HCO3 PLAS-SCNC: 19 MMOL/L (ref 22–29)
EGFRCR SERPLBLD CKD-EPI 2021: 36 ML/MIN/1.73M2
ERYTHROCYTE [DISTWIDTH] IN BLOOD BY AUTOMATED COUNT: 15.1 % (ref 10–15)
FERRITIN SERPL-MCNC: 205 NG/ML (ref 31–409)
GLUCOSE SERPL-MCNC: 101 MG/DL (ref 70–99)
GLUCOSE UR STRIP-MCNC: NEGATIVE MG/DL
HCT VFR BLD AUTO: 38.2 % (ref 40–53)
HGB BLD-MCNC: 12.4 G/DL (ref 13.3–17.7)
HGB UR QL STRIP: NEGATIVE
IRON BINDING CAPACITY (ROCHE): 359 UG/DL (ref 240–430)
IRON SATN MFR SERPL: 30 % (ref 15–46)
IRON SERPL-MCNC: 108 UG/DL (ref 61–157)
KETONES UR STRIP-MCNC: NEGATIVE MG/DL
LEUKOCYTE ESTERASE UR QL STRIP: NEGATIVE
MCH RBC QN AUTO: 31.9 PG (ref 26.5–33)
MCHC RBC AUTO-ENTMCNC: 32.5 G/DL (ref 31.5–36.5)
MCV RBC AUTO: 98 FL (ref 78–100)
MUCOUS THREADS #/AREA URNS LPF: PRESENT /LPF
NITRATE UR QL: NEGATIVE
PH UR STRIP: 5.5 [PH] (ref 5–7)
PHOSPHATE SERPL-MCNC: 3.3 MG/DL (ref 2.5–4.5)
PLATELET # BLD AUTO: 167 10E3/UL (ref 150–450)
POTASSIUM SERPL-SCNC: 4.7 MMOL/L (ref 3.4–5.3)
PROT/CREAT 24H UR: 0.06 MG/MG CR (ref 0–0.2)
PTH-INTACT SERPL-MCNC: 54 PG/ML (ref 15–65)
RBC # BLD AUTO: 3.89 10E6/UL (ref 4.4–5.9)
RBC URINE: 1 /HPF
SODIUM SERPL-SCNC: 142 MMOL/L (ref 135–145)
SP GR UR STRIP: 1.02 (ref 1–1.03)
SQUAMOUS EPITHELIAL: <1 /HPF
UROBILINOGEN UR STRIP-MCNC: NORMAL MG/DL
VIT D+METAB SERPL-MCNC: 30 NG/ML (ref 20–50)
WBC # BLD AUTO: 8.3 10E3/UL (ref 4–11)
WBC URINE: 1 /HPF

## 2023-11-09 PROCEDURE — 81001 URINALYSIS AUTO W/SCOPE: CPT

## 2023-11-09 PROCEDURE — 83970 ASSAY OF PARATHORMONE: CPT

## 2023-11-09 PROCEDURE — 85014 HEMATOCRIT: CPT

## 2023-11-09 PROCEDURE — 80069 RENAL FUNCTION PANEL: CPT

## 2023-11-09 PROCEDURE — 82306 VITAMIN D 25 HYDROXY: CPT

## 2023-11-09 PROCEDURE — 82728 ASSAY OF FERRITIN: CPT

## 2023-11-09 PROCEDURE — 83550 IRON BINDING TEST: CPT

## 2023-11-09 PROCEDURE — 36415 COLL VENOUS BLD VENIPUNCTURE: CPT

## 2023-11-09 PROCEDURE — 84156 ASSAY OF PROTEIN URINE: CPT

## 2023-11-16 ENCOUNTER — VIRTUAL VISIT (OUTPATIENT)
Dept: NEPHROLOGY | Facility: CLINIC | Age: 88
End: 2023-11-16
Attending: INTERNAL MEDICINE
Payer: MEDICARE

## 2023-11-16 DIAGNOSIS — I35.0 SEVERE AORTIC STENOSIS: ICD-10-CM

## 2023-11-16 DIAGNOSIS — D63.1 ANEMIA IN STAGE 3B CHRONIC KIDNEY DISEASE (H): ICD-10-CM

## 2023-11-16 DIAGNOSIS — I25.10 CORONARY ARTERY DISEASE INVOLVING NATIVE HEART WITHOUT ANGINA PECTORIS, UNSPECIFIED VESSEL OR LESION TYPE: ICD-10-CM

## 2023-11-16 DIAGNOSIS — I10 PRIMARY HYPERTENSION: ICD-10-CM

## 2023-11-16 DIAGNOSIS — E87.20 METABOLIC ACIDOSIS: Primary | ICD-10-CM

## 2023-11-16 DIAGNOSIS — M1A.09X0 IDIOPATHIC CHRONIC GOUT OF MULTIPLE SITES WITHOUT TOPHUS: ICD-10-CM

## 2023-11-16 DIAGNOSIS — N18.32 STAGE 3B CHRONIC KIDNEY DISEASE (H): ICD-10-CM

## 2023-11-16 DIAGNOSIS — N18.32 ANEMIA IN STAGE 3B CHRONIC KIDNEY DISEASE (H): ICD-10-CM

## 2023-11-16 PROCEDURE — 99214 OFFICE O/P EST MOD 30 MIN: CPT | Mod: 95 | Performed by: INTERNAL MEDICINE

## 2023-11-16 RX ORDER — SODIUM BICARBONATE 650 MG/1
650 TABLET ORAL 2 TIMES DAILY
Qty: 180 TABLET | Refills: 3 | Status: SHIPPED | OUTPATIENT
Start: 2023-11-16 | End: 2024-05-09

## 2023-11-16 NOTE — PROGRESS NOTES
Virtual Visit Details    Type of service:  Video Visit   Video Start Time: 2:27 PM  Video End Time:2:39 PM    Originating Location (pt. Location): Home    Distant Location (provider location):  On-site  Platform used for Video Visit: [facility]       Nephrology Clinic Note  November 16, 2023    I was asked to see this patient by Dr. Redman     CC: CKD     HPI: Trevor Soni is a 89 year old male with PMH significant for HTN, CAD, HFpEF, AAA with out rupture, atrial flutter, aortic stenosis, gout who presents for evaluation and management of CKD.     Pt endorsed feeling fairly well in the recent past. No new symptoms today. Up on questioning, denied any recurrent fevers/chills, nausea/vomiting, diarrhea, abdominal pain, chest pain or SOB. Endorses compliant with his medications. No new medications. Does have some aches and pains intermittently, takes tylenol as needed. Denied any significant NSAID use.    11/16/2023   Pt presented to virtual visit with his significant other. Endorsed he is feeling fairly well since out last visit. No new symptoms. No hospitalizations since our last visit. mentioned that he did visit his cardiology and have follow up in a year. Denied other systemic symptoms including recurrent fevers/chills, N/V/D, abdominal pain, chest pain and or SOB. Denied dysuria or change in his urinary habits. Pt and his wife mentioned that he is trying to be hydrated better and has been better than before.    - History of urinary symptoms: no, may wake up 2-3 times per night, feels completely empty the bladder  - History of Hematuria: no  - Swelling: no  - Hx of UTIs: no  - Hx of stones: one time 20 years ago and none since then  - Rashes/Joint pain: just on left upper arm after scratching,   - Family hx of kidney disease: no  - NSAID use: no    Allergies   Allergen Reactions     Avocado      Ciprofloxacin Itching     Penicillins Itching       acetaminophen (TYLENOL) 325 MG tablet, Take 2 tablets (650 mg) by  mouth every 6 hours as needed for mild pain or other (and adjunct with moderate or severe pain or per patient request)  allopurinol (ZYLOPRIM) 100 MG tablet, TAKE 2 TABLETS BY MOUTH  DAILY  amLODIPine (NORVASC) 5 MG tablet, Take 1 tablet (5 mg) by mouth daily  atorvastatin (LIPITOR) 20 MG tablet, TAKE 1 TABLET BY MOUTH  DAILY  Cholecalciferol (VITAMIN D3) 25 MCG (1000 UT) CAPS, Take 1 capsule by mouth daily OTC dose unknown  ELIQUIS ANTICOAGULANT 2.5 MG tablet, TAKE 1 TABLET BY MOUTH  TWICE DAILY  fenofibrate (TRIGLIDE/LOFIBRA) 160 MG tablet, TAKE 1 TABLET BY MOUTH DAILY  ferrous gluconate (FERGON) 324 (38 Fe) MG tablet, Take 1 tablet (324 mg) by mouth daily  Multiple Vitamins-Minerals (PRESERVISION AREDS 2) CAPS, Take 1 capsule by mouth 2 times daily  multivitamin w/minerals (MULTI-VITAMIN) tablet, Take 1 tablet by mouth daily  spironolactone (ALDACTONE) 25 MG tablet, TAKE 1 TABLET BY MOUTH  DAILY  triamcinolone (KENALOG) 0.1 % external cream, Apply topically 2 times daily    No current facility-administered medications on file prior to visit.      Past Medical History:   Diagnosis Date     Arthritis     rhuematoid     Coronary artery disease     triple bypass 2001     CRF (chronic renal failure)      Gastro-oesophageal reflux disease      Gout      Hyperlipidemia      Hypertension      Nocturia        Past Surgical History:   Procedure Laterality Date     CARDIAC SURGERY       CHOLECYSTECTOMY       COLONOSCOPY       CV CORONARY ANGIOGRAM N/A 1/13/2023    Procedure: Coronary Angiogram;  Surgeon: Sujata Ramires MD;  Location: [facility] CARDIAC CATH LAB     CV PCI N/A 1/13/2023    Procedure: Percutaneous Coronary Intervention;  Surgeon: Sujata Ramires MD;  Location: [facility] CARDIAC CATH LAB     CV TRANSCATHETER AORTIC VALVE REPLACEMENT-FEMORAL APPROACH N/A 4/11/2023    Procedure: Transcatheter Aortic Valve Replacement-Femoral Approach;  Surgeon: Sujata Ramires MD;  Location: [facility] CARDIAC CATH LAB      LAPAROTOMY EXPLORATORY N/A 2020    Procedure: EXPLORATORY LAPAROTOMY, BOWEL RESECTION;  Surgeon: Bull Rachel MD;  Location:  OR     LAPAROTOMY, LYSIS ADHESIONS, COMBINED N/A 2020    Procedure: EXPLORATORY LAPAROTOMY WITH LYSIS OF ADHESIONS;  Surgeon: Jose Reed MD;  Location:  OR     ORTHOPEDIC SURGERY       PHACOEMULSIFICATION CLEAR CORNEA WITH TORIC INTRAOCULAR LENS IMPLANT  2012    Procedure:PHACOEMULSIFICATION CLEAR CORNEA WITH TORIC INTRAOCULAR LENS IMPLANT; LEFT PHACOEMULSIFICATION CLEAR CORNEA WITH DELUXE  TORIC INTRAOCULAR LENS IMPLANT ; Surgeon:BRANDO HIGHTOWER; [facility]     PHACOEMULSIFICATION CLEAR CORNEA WITH TORIC INTRAOCULAR LENS IMPLANT  2012    Procedure:PHACOEMULSIFICATION CLEAR CORNEA WITH TORIC INTRAOCULAR LENS IMPLANT; RIGHT PHACOEMULSIFICATION CLEAR CORNEA WITH TORIC INTRAOCULAR LENS IMPLANT ; Surgeon:BRANDO HIGHTOWER; Location:[facility]     VASCULAR SURGERY         Social History     Tobacco Use     Smoking status: Former     Types: Cigars     Quit date: 1980     Years since quittin.9     Smokeless tobacco: Never   Vaping Use     Vaping Use: Never used   Substance Use Topics     Alcohol use: Yes     Comment: once every 6 months     Drug use: No       Family History   Problem Relation Age of Onset     Myocardial Infarction Mother      Rheumatic fever Father      Cerebrovascular Disease Brother        ROS: A 12 system review of systems was negative other than noted here or above.     Exam:  There were no vitals taken for this visit.    GENERAL APPEARANCE: alert and no distress  EYES:  no scleral icterus  HENT: no gross abnormalities noted  RESP: able to speak in full sentences, no audible wheezing or accessory muscle usage   CV: denied significant edema  SKIN: no rash  NEURO: mentation intact and speech normal  PSYCH: affect normal/bright    Results:    No visits with results within 1 Day(s) from this visit.   Latest known visit with results  is:   Lab on 05/15/2023   Component Date Value Ref Range Status     Sodium 05/15/2023 143  136 - 145 mmol/L Final     Potassium 05/15/2023 4.3  3.4 - 5.3 mmol/L Final     Chloride 05/15/2023 110 (H)  98 - 107 mmol/L Final     Carbon Dioxide (CO2) 05/15/2023 21 (L)  22 - 29 mmol/L Final     Anion Gap 05/15/2023 12  7 - 15 mmol/L Final     Urea Nitrogen 05/15/2023 30.2 (H)  8.0 - 23.0 mg/dL Final     Creatinine 05/15/2023 1.62 (H)  0.67 - 1.17 mg/dL Final     Calcium 05/15/2023 9.4  8.8 - 10.2 mg/dL Final     Glucose 05/15/2023 98  70 - 99 mg/dL Final     Alkaline Phosphatase 05/15/2023 100  40 - 129 U/L Final     AST 05/15/2023 27  10 - 50 U/L Final     ALT 05/15/2023 16  10 - 50 U/L Final     Protein Total 05/15/2023 6.5  6.4 - 8.3 g/dL Final     Albumin 05/15/2023 3.8  3.5 - 5.2 g/dL Final     Bilirubin Total 05/15/2023 0.4  <=1.2 mg/dL Final     GFR Estimate 05/15/2023 40 (L)  >60 mL/min/1.73m2 Final    eGFR calculated using 2021 CKD-EPI equation.     WBC Count 05/15/2023 7.9  4.0 - 11.0 10e3/uL Final     RBC Count 05/15/2023 3.55 (L)  4.40 - 5.90 10e6/uL Final     Hemoglobin 05/15/2023 11.0 (L)  13.3 - 17.7 g/dL Final     Hematocrit 05/15/2023 34.4 (L)  40.0 - 53.0 % Final     MCV 05/15/2023 97  78 - 100 fL Final     MCH 05/15/2023 31.0  26.5 - 33.0 pg Final     MCHC 05/15/2023 32.0  31.5 - 36.5 g/dL Final     RDW 05/15/2023 15.2 (H)  10.0 - 15.0 % Final     Platelet Count 05/15/2023 174  150 - 450 10e3/uL Final       Assessment/Plan:     CKD stage IIIb/IV  H/o Recurrent Acute kidney injuries, now stabilized  Pt with no clear baseline creatinine with recurrent SHANICE episodes. His creatinine varies any where between 1.7-2.7 with eGFR in 20's and 30's which put him in CKD stage III b vs stage IV.  Recurrent SHANICE episodes in setting of HFpEF and continued diuretic use. Repeat was around 1.6-1.7 last couple checks with eGFR of 30's.  UA with out sediment and UPCR was WNL. CKD likely 2/2 renovascular disease (known PAD,  AAA) and progression is 2/2 recurrent SHANICE in setting of HFpEF. Renal US showed possible InStent stenosis but flow is stable at this point, but no hydronephrosis or nephrolithiasis noted.  - maintain BP >120 systolic   - good hydration   - low salt diet   - increase in fresh fruits and vegetables  - continue to avoid NSAID's.     Hypertension :  BP in clinic was WNL. No hypotensive symptoms today, continue current therapy. Recommended to Check BP at home.     Electrolytes :  Stable     BMD :  Ismael, phos, Vit D and PTH were WNL     Metabolic acidosis :  Bicarb started to trend low, started on sodium bicarb 650 mg BID to gaol of 24-27 meq.     Anemia :  Hb low but stable. Above goal of JOSEPH therapy. No gross bleeding per report. Continue to monitor for now.     Other problems  CAD  HFpEF, continue on spironolactone, following cardiology  Gout, no recent episodes, continue on allopurinol     Total time spent on the day of clinic visit was 30 min including face to face time of 13 min with pt and his daughter, chart and lab review, image review and documentation as above.    Afsaneh Thomas  Dept of Nephrology and Hypertension  [facility]

## 2023-11-16 NOTE — LETTER
11/16/2023       RE: Trevor Soni  [address] [place] [address]     Dear Colleague,    Thank you for referring your patient, Trevor Soni, to the [facility] [place] at [facility]. Please see a copy of my visit note below.      Nephrology Clinic Note  November 16, 2023    I was asked to see this patient by Dr. Redman     CC: CKD     HPI: Trevor Soni is a 89 year old male with PMH significant for HTN, CAD, HFpEF, AAA with out rupture, atrial flutter, aortic stenosis, gout who presents for evaluation and management of CKD.     Pt endorsed feeling fairly well in the recent past. No new symptoms today. Up on questioning, denied any recurrent fevers/chills, nausea/vomiting, diarrhea, abdominal pain, chest pain or SOB. Endorses compliant with his medications. No new medications. Does have some aches and pains intermittently, takes tylenol as needed. Denied any significant NSAID use.    11/16/2023   Pt presented to virtual visit with his significant other. Endorsed he is feeling fairly well since out last visit. No new symptoms. No hospitalizations since our last visit. mentioned that he did visit his cardiology and have follow up in a year. Denied other systemic symptoms including recurrent fevers/chills, N/V/D, abdominal pain, chest pain and or SOB. Denied dysuria or change in his urinary habits. Pt and his wife mentioned that he is trying to be hydrated better and has been better than before.    - History of urinary symptoms: no, may wake up 2-3 times per night, feels completely empty the bladder  - History of Hematuria: no  - Swelling: no  - Hx of UTIs: no  - Hx of stones: one time 20 years ago and none since then  - Rashes/Joint pain: just on left upper arm after scratching,   - Family hx of kidney disease: no  - NSAID use: no    Allergies   Allergen Reactions    Avocado     Ciprofloxacin Itching    Penicillins  Itching       acetaminophen (TYLENOL) 325 MG tablet, Take 2 tablets (650 mg) by mouth every 6 hours as needed for mild pain or other (and adjunct with moderate or severe pain or per patient request)  allopurinol (ZYLOPRIM) 100 MG tablet, TAKE 2 TABLETS BY MOUTH  DAILY  amLODIPine (NORVASC) 5 MG tablet, Take 1 tablet (5 mg) by mouth daily  atorvastatin (LIPITOR) 20 MG tablet, TAKE 1 TABLET BY MOUTH  DAILY  Cholecalciferol (VITAMIN D3) 25 MCG (1000 UT) CAPS, Take 1 capsule by mouth daily OTC dose unknown  ELIQUIS ANTICOAGULANT 2.5 MG tablet, TAKE 1 TABLET BY MOUTH  TWICE DAILY  fenofibrate (TRIGLIDE/LOFIBRA) 160 MG tablet, TAKE 1 TABLET BY MOUTH DAILY  ferrous gluconate (FERGON) 324 (38 Fe) MG tablet, Take 1 tablet (324 mg) by mouth daily  Multiple Vitamins-Minerals (PRESERVISION AREDS 2) CAPS, Take 1 capsule by mouth 2 times daily  multivitamin w/minerals (MULTI-VITAMIN) tablet, Take 1 tablet by mouth daily  spironolactone (ALDACTONE) 25 MG tablet, TAKE 1 TABLET BY MOUTH  DAILY  triamcinolone (KENALOG) 0.1 % external cream, Apply topically 2 times daily    No current facility-administered medications on file prior to visit.      Past Medical History:   Diagnosis Date    Arthritis     rhuematoid    Coronary artery disease     triple bypass 2001    CRF (chronic renal failure)     Gastro-oesophageal reflux disease     Gout     Hyperlipidemia     Hypertension     Nocturia        Past Surgical History:   Procedure Laterality Date    CARDIAC SURGERY      CHOLECYSTECTOMY      COLONOSCOPY      CV CORONARY ANGIOGRAM N/A 1/13/2023    Procedure: Coronary Angiogram;  Surgeon: Sujaat Ramires MD;  Location: [facility] CARDIAC CATH LAB    CV PCI N/A 1/13/2023    Procedure: Percutaneous Coronary Intervention;  Surgeon: Sujata Ramires MD;  Location: [facility] CARDIAC CATH LAB    CV TRANSCATHETER AORTIC VALVE REPLACEMENT-FEMORAL APPROACH N/A 4/11/2023    Procedure: Transcatheter Aortic Valve Replacement-Femoral Approach;  Surgeon:  Sujata Ramires MD;  Location:  HEART CARDIAC CATH LAB    LAPAROTOMY EXPLORATORY N/A 2020    Procedure: EXPLORATORY LAPAROTOMY, BOWEL RESECTION;  Surgeon: Bull Rachel MD;  Location:  OR    LAPAROTOMY, LYSIS ADHESIONS, COMBINED N/A 2020    Procedure: EXPLORATORY LAPAROTOMY WITH LYSIS OF ADHESIONS;  Surgeon: Jose Reed MD;  Location:  OR    ORTHOPEDIC SURGERY      PHACOEMULSIFICATION CLEAR CORNEA WITH TORIC INTRAOCULAR LENS IMPLANT  2012    Procedure:PHACOEMULSIFICATION CLEAR CORNEA WITH TORIC INTRAOCULAR LENS IMPLANT; LEFT PHACOEMULSIFICATION CLEAR CORNEA WITH DELUXE  TORIC INTRAOCULAR LENS IMPLANT ; Surgeon:BRANDO HIGHTOWER; [facility]: [facility]    PHACOEMULSIFICATION CLEAR CORNEA WITH TORIC INTRAOCULAR LENS IMPLANT  2012    Procedure:PHACOEMULSIFICATION CLEAR CORNEA WITH TORIC INTRAOCULAR LENS IMPLANT; RIGHT PHACOEMULSIFICATION CLEAR CORNEA WITH TORIC INTRAOCULAR LENS IMPLANT ; Surgeon:BRANDO HIGHTOWER; Location: [facility]    VASCULAR SURGERY         Social History     Tobacco Use    Smoking status: Former     Types: Cigars     Quit date: 1980     Years since quittin.9    Smokeless tobacco: Never   Vaping Use    Vaping Use: Never used   Substance Use Topics    Alcohol use: Yes     Comment: once every 6 months    Drug use: No       Family History   Problem Relation Age of Onset    Myocardial Infarction Mother     Rheumatic fever Father     Cerebrovascular Disease Brother        ROS: A 12 system review of systems was negative other than noted here or above.     Exam:  There were no vitals taken for this visit.    GENERAL APPEARANCE: alert and no distress  EYES:  no scleral icterus  HENT: no gross abnormalities noted  RESP: able to speak in full sentences, no audible wheezing or accessory muscle usage   CV: denied significant edema  SKIN: no rash  NEURO: mentation intact and speech normal  PSYCH: affect normal/bright    Results:    No visits with results within 1 Day(s)  from this visit.   Latest known visit with results is:   Lab on 05/15/2023   Component Date Value Ref Range Status    Sodium 05/15/2023 143  136 - 145 mmol/L Final    Potassium 05/15/2023 4.3  3.4 - 5.3 mmol/L Final    Chloride 05/15/2023 110 (H)  98 - 107 mmol/L Final    Carbon Dioxide (CO2) 05/15/2023 21 (L)  22 - 29 mmol/L Final    Anion Gap 05/15/2023 12  7 - 15 mmol/L Final    Urea Nitrogen 05/15/2023 30.2 (H)  8.0 - 23.0 mg/dL Final    Creatinine 05/15/2023 1.62 (H)  0.67 - 1.17 mg/dL Final    Calcium 05/15/2023 9.4  8.8 - 10.2 mg/dL Final    Glucose 05/15/2023 98  70 - 99 mg/dL Final    Alkaline Phosphatase 05/15/2023 100  40 - 129 U/L Final    AST 05/15/2023 27  10 - 50 U/L Final    ALT 05/15/2023 16  10 - 50 U/L Final    Protein Total 05/15/2023 6.5  6.4 - 8.3 g/dL Final    Albumin 05/15/2023 3.8  3.5 - 5.2 g/dL Final    Bilirubin Total 05/15/2023 0.4  <=1.2 mg/dL Final    GFR Estimate 05/15/2023 40 (L)  >60 mL/min/1.73m2 Final    eGFR calculated using 2021 CKD-EPI equation.    WBC Count 05/15/2023 7.9  4.0 - 11.0 10e3/uL Final    RBC Count 05/15/2023 3.55 (L)  4.40 - 5.90 10e6/uL Final    Hemoglobin 05/15/2023 11.0 (L)  13.3 - 17.7 g/dL Final    Hematocrit 05/15/2023 34.4 (L)  40.0 - 53.0 % Final    MCV 05/15/2023 97  78 - 100 fL Final    MCH 05/15/2023 31.0  26.5 - 33.0 pg Final    MCHC 05/15/2023 32.0  31.5 - 36.5 g/dL Final    RDW 05/15/2023 15.2 (H)  10.0 - 15.0 % Final    Platelet Count 05/15/2023 174  150 - 450 10e3/uL Final       Assessment/Plan:     CKD stage IIIb/IV  H/o Recurrent Acute kidney injuries, now stabilized  Pt with no clear baseline creatinine with recurrent SHANICE episodes. His creatinine varies any where between 1.7-2.7 with eGFR in 20's and 30's which put him in CKD stage III b vs stage IV.  Recurrent SHANICE episodes in setting of HFpEF and continued diuretic use. Repeat was around 1.6-1.7 last couple checks with eGFR of 30's.  UA with out sediment and UPCR was WNL. CKD likely 2/2  renovascular disease (known PAD, AAA) and progression is 2/2 recurrent SHANICE in setting of HFpEF. Renal US showed possible InStent stenosis but flow is stable at this point, but no hydronephrosis or nephrolithiasis noted.  - maintain BP >120 systolic   - good hydration   - low salt diet   - increase in fresh fruits and vegetables  - continue to avoid NSAID's.     Hypertension :  BP in clinic was WNL. No hypotensive symptoms today, continue current therapy. Recommended to Check BP at home.     Electrolytes :  Stable     BMD :  Ismael, phos, Vit D and PTH were WNL     Metabolic acidosis :  Bicarb started to trend low, started on sodium bicarb 650 mg BID to gaol of 24-27 meq.     Anemia :  Hb low but stable. Above goal of JOSEPH therapy. No gross bleeding per report. Continue to monitor for now.     Other problems  CAD  HFpEF, continue on spironolactone, following cardiology  Gout, no recent episodes, continue on allopurinol     Total time spent on the day of clinic visit was 30 min including face to face time of 13 min with pt and his daughter, chart and lab review, image review and documentation as above.    Afsaneh Thomas  Dept of Nephrology and Hypertension  [facility]

## 2023-11-16 NOTE — NURSING NOTE
Is the patient currently in the state of [place]? YES    Visit mode:VIDEO    If the visit is dropped, the patient can be reconnected by: VIDEO VISIT: Text to cell phone:   Telephone Information:   Mobile [phone]       Will anyone else be joining the visit? YES: How would they like to receive their invitation? Send to e-mail: PT will have daughter on camera  (If patient encounters technical issues they should call [phone])    How would you like to obtain your AVS? MyChart    Are changes needed to the allergy or medication list? No    Reason for visit: RECHECK    Anthony CRISOSTOMOF

## 2023-11-17 ENCOUNTER — TELEPHONE (OUTPATIENT)
Dept: NEPHROLOGY | Facility: CLINIC | Age: 88
End: 2023-11-17
Payer: MEDICARE

## 2023-11-17 NOTE — TELEPHONE ENCOUNTER
JORGE and sent [facility] to schedule follow up around 5.16.24 with Dr. Thomas// 11.17.23 KET

## 2023-11-27 ENCOUNTER — TELEPHONE (OUTPATIENT)
Dept: NEPHROLOGY | Facility: CLINIC | Age: 88
End: 2023-11-27
Payer: MEDICARE

## 2024-01-13 ENCOUNTER — HEALTH MAINTENANCE LETTER (OUTPATIENT)
Age: 89
End: 2024-01-13

## 2024-03-27 DIAGNOSIS — I48.92 ATRIAL FLUTTER, UNSPECIFIED TYPE (H): ICD-10-CM

## 2024-03-27 RX ORDER — APIXABAN 2.5 MG/1
2.5 TABLET, FILM COATED ORAL 2 TIMES DAILY
Qty: 180 TABLET | Refills: 3 | Status: ON HOLD | OUTPATIENT
Start: 2024-03-27 | End: 2024-08-14

## 2024-04-04 DIAGNOSIS — E78.5 HYPERLIPIDEMIA LDL GOAL <100: ICD-10-CM

## 2024-04-04 DIAGNOSIS — I10 BENIGN ESSENTIAL HYPERTENSION: ICD-10-CM

## 2024-04-04 RX ORDER — ATORVASTATIN CALCIUM 20 MG/1
20 TABLET, FILM COATED ORAL DAILY
Qty: 90 TABLET | Refills: 3 | Status: SHIPPED | OUTPATIENT
Start: 2024-04-04

## 2024-04-05 ENCOUNTER — LAB (OUTPATIENT)
Dept: LAB | Facility: CLINIC | Age: 89
End: 2024-04-05
Attending: INTERNAL MEDICINE
Payer: MEDICARE

## 2024-04-05 ENCOUNTER — HOSPITAL ENCOUNTER (OUTPATIENT)
Dept: CARDIOLOGY | Facility: CLINIC | Age: 89
Discharge: HOME | End: 2024-04-05
Attending: INTERNAL MEDICINE | Admitting: INTERNAL MEDICINE
Payer: MEDICARE

## 2024-04-05 ENCOUNTER — OFFICE VISIT (OUTPATIENT)
Dept: CARDIOLOGY | Facility: CLINIC | Age: 89
End: 2024-04-05
Attending: INTERNAL MEDICINE
Payer: MEDICARE

## 2024-04-05 VITALS
DIASTOLIC BLOOD PRESSURE: 62 MMHG | HEIGHT: 69 IN | BODY MASS INDEX: 21.36 KG/M2 | OXYGEN SATURATION: 97 % | WEIGHT: 144.2 LBS | SYSTOLIC BLOOD PRESSURE: 136 MMHG | HEART RATE: 83 BPM

## 2024-04-05 DIAGNOSIS — I35.0 SEVERE AORTIC STENOSIS: ICD-10-CM

## 2024-04-05 DIAGNOSIS — Z95.2 S/P TAVR (TRANSCATHETER AORTIC VALVE REPLACEMENT): Primary | ICD-10-CM

## 2024-04-05 DIAGNOSIS — I25.10 CORONARY ARTERY DISEASE INVOLVING NATIVE CORONARY ARTERY OF NATIVE HEART WITHOUT ANGINA PECTORIS: ICD-10-CM

## 2024-04-05 DIAGNOSIS — N18.32 STAGE 3B CHRONIC KIDNEY DISEASE (H): ICD-10-CM

## 2024-04-05 LAB
ANION GAP SERPL CALCULATED.3IONS-SCNC: 12 MMOL/L (ref 7–15)
BUN SERPL-MCNC: 31.8 MG/DL (ref 8–23)
CALCIUM SERPL-MCNC: 9.1 MG/DL (ref 8.2–9.6)
CHLORIDE SERPL-SCNC: 108 MMOL/L (ref 98–107)
CREAT SERPL-MCNC: 1.77 MG/DL (ref 0.67–1.17)
DEPRECATED HCO3 PLAS-SCNC: 22 MMOL/L (ref 22–29)
EGFRCR SERPLBLD CKD-EPI 2021: 36 ML/MIN/1.73M2
ERYTHROCYTE [DISTWIDTH] IN BLOOD BY AUTOMATED COUNT: 14.7 % (ref 10–15)
GLUCOSE SERPL-MCNC: 106 MG/DL (ref 70–99)
HCT VFR BLD AUTO: 35.4 % (ref 40–53)
HGB BLD-MCNC: 11.2 G/DL (ref 13.3–17.7)
LVEF ECHO: NORMAL
MCH RBC QN AUTO: 30.9 PG (ref 26.5–33)
MCHC RBC AUTO-ENTMCNC: 31.6 G/DL (ref 31.5–36.5)
MCV RBC AUTO: 98 FL (ref 78–100)
PLATELET # BLD AUTO: 322 10E3/UL (ref 150–450)
POTASSIUM SERPL-SCNC: 4.4 MMOL/L (ref 3.4–5.3)
RBC # BLD AUTO: 3.63 10E6/UL (ref 4.4–5.9)
SODIUM SERPL-SCNC: 142 MMOL/L (ref 135–145)
WBC # BLD AUTO: 7.9 10E3/UL (ref 4–11)

## 2024-04-05 PROCEDURE — 93306 TTE W/DOPPLER COMPLETE: CPT | Mod: 26 | Performed by: INTERNAL MEDICINE

## 2024-04-05 PROCEDURE — 93306 TTE W/DOPPLER COMPLETE: CPT

## 2024-04-05 PROCEDURE — 93000 ELECTROCARDIOGRAM COMPLETE: CPT | Mod: 59 | Performed by: INTERNAL MEDICINE

## 2024-04-05 PROCEDURE — 80048 BASIC METABOLIC PNL TOTAL CA: CPT | Performed by: INTERNAL MEDICINE

## 2024-04-05 PROCEDURE — 99214 OFFICE O/P EST MOD 30 MIN: CPT | Mod: 25 | Performed by: NURSE PRACTITIONER

## 2024-04-05 PROCEDURE — 36415 COLL VENOUS BLD VENIPUNCTURE: CPT | Performed by: INTERNAL MEDICINE

## 2024-04-05 PROCEDURE — 85027 COMPLETE CBC AUTOMATED: CPT | Performed by: INTERNAL MEDICINE

## 2024-04-05 NOTE — LETTER
4/5/2024    Abdoulaye Redman MD  [address]  [place] [address]    RE: Trevor Soni       Dear Colleague,     I had the pleasure of seeing Trevor Soni in the [facility].  Cardiology Clinic Progress Note  Trevor Soni MRN# [id number]   YOB: 1933 Age: 89 year old     Primary cardiologist: Dr. Ravi     Reason for visit: s/p TAVR     History of presenting illness:    Trevor Soni is a pleasant 89 year old patient with past medical history significant for coronary disease s/p CABG x 3 (2001), paroxysmal atrial fibrillation, R CEA (2001), stage IV CKD, infrarenal AAA, chronic diastolic heart failure, chronic RBBB/bifasicular block, and severe aortic stenosis s/p TAVR with 26 mm Nunes ELZBIETA 3 valve (4/11/2023).     He returns today for his 1-year TAVR follow-up.  He is accompanied by his daughter.  He is doing quite well from a cardiovascular standpoint.  He tries to stay active, but it remains quite limited due to chronic knee pain.  He ambulates with a walker.  He denies any chest pain, shortness of breath, syncope or near syncope, or palpitations.  He has no PND or orthopnea.  He has some mild peripheral edema.    I reviewed his most recent echocardiogram that was done today that shows well-seated valve with a mean gradient of 7 mmHg, mild PVL, and normal LV systolic function.  There is mild to moderate concentric LVH with speckled appearance of myocardium suggestive of possible infiltrative disease.    His blood pressure is well-controlled.  His weight is stable.  EKG today shows atrial fibrillation with RBBB/LAFB.     His most recent labs, which I also reviewed, showed creatinine of 1.77 and GFR 36.  His CBC demonstrates hemoglobin of 11.2.           Assessment and Plan:     ASSESSMENT:    s/p TAVR with 26 mm Nunes Elzbieta 3 valve. Recent echo shows well functioning valve.  No exertional symptoms.   Chronic diastolic heart failure, NYHA class II.   Appears euvolemic on exam, not on diuretic therapy.  CAD s/p CABG x 3 in 2001 (LIMA-diag, SVG-PDA, SVG-LCx).  No angina, on statin therapy.   Paroxysmal atrial fibrillation.  [id number] score 5, on apixaban 2.5 mg BID for stroke prophylaxis.  Chronic RBBB/bifasicular block.  No advanced AV block post-TAVR, not on any AV cole blocking agents.   Hypertension.  Well-controlled on amlodipine 5 mg daily and spironolactone 25 mg daily.  Stage IV CKD.  Renal function stable, baseline creatinine appears to be 1.8-2.0.  Moderate concentric LVH concerning for infiltrative disease.  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  Infrarenal AAA measuring 3.7 x 3.5 cm       PLAN:     Overall Trevor is doing well from a cardiovascular standpoint, I have made no changes to his regimen today.  He will continue Eliquis, amlodipine, atorvastatin, and spironolactone.  Given his age and the fact that he is doing well from a symptomatic standpoint, I would favor a more conservative approach regarding possible infiltrative disease.  No further workup recommended at this time.  He will need lifelong antibiotic prophylaxis prior to any dental procedure.  Repeat echocardiogram in 1 year.  Follow-up with Dr. Ravi in 6 months with repeat BMP and lipid panel.       Chanel Mares DNP, APRN, CNP  Page: [phone] (8a-5p M-F)    Orders this Visit:  Orders Placed This Encounter   Procedures    Basic metabolic panel    Lipid panel reflex to direct LDL Fasting    Follow-Up with Cardiology    Echocardiogram Complete     No orders of the defined types were placed in this encounter.    There are no discontinued medications.    Today's clinic visit entailed:  Review of the result(s) of each unique test - echo, labs, EKG, TAVR  Ordering of each unique test  Prescription drug management  35 minutes spent by me on the  "date of the encounter doing chart review, review of test results, interpretation of tests, patient visit and documentation   Provider  Link to [facility] Help Grid     The level of medical decision making during this visit was of moderate complexity.           Review of Systems:     Review of Systems:  Skin:  not assessed     Eyes:  not assessed    ENT:  not assessed    Respiratory:  Negative    Cardiovascular:    Positive for;edema  Gastroenterology: not assessed    Genitourinary:  not assessed    Musculoskeletal:  not assessed    Neurologic:  not assessed    Psychiatric:  not assessed    Heme/Lymph/Imm:  not assessed    Endocrine:  not assessed              Physical Exam:   Vitals: /62   Pulse 83   Ht 1.753 m (5' 9\")   Wt 65.4 kg (144 lb 3.2 oz)   SpO2 97%   BMI 21.29 kg/m    Constitutional:  cooperative        Skin:  not assessed this visit        Head:  not assessed this visit        Eyes:  pupils equal and round        ENT:  not assessed this visit        Neck:  JVP normal        Chest:  normal breath sounds, clear to auscultation, normal A-P diameter, normal symmetry, normal respiratory excursion, no use of accessory muscles        Cardiac:   irregularly irregular rhythm     systolic ejection murmur;grade 1          Abdomen:  abdomen soft        Vascular: pulses full and equal                                      Extremities and Back:           Neurological:  no gross motor deficits;affect appropriate             Medications:     Current Outpatient Medications   Medication Sig Dispense Refill    acetaminophen (TYLENOL) 325 MG tablet Take 2 tablets (650 mg) by mouth every 6 hours as needed for mild pain or other (and adjunct with moderate or severe pain or per patient request)      allopurinol (ZYLOPRIM) 100 MG tablet TAKE 2 TABLETS BY MOUTH  DAILY 180 tablet 3    amLODIPine (NORVASC) 5 MG tablet Take 1 tablet (5 mg) by mouth daily 90 tablet 3    atorvastatin (LIPITOR) 20 MG tablet TAKE 1 TABLET BY MOUTH " ONCE  DAILY 90 tablet 3    Cholecalciferol (VITAMIN D3) 25 MCG (1000 UT) CAPS Take 1 capsule by mouth daily OTC dose unknown      ELIQUIS ANTICOAGULANT 2.5 MG tablet TAKE 1 TABLET BY MOUTH TWICE  DAILY 180 tablet 3    fenofibrate (TRIGLIDE/LOFIBRA) 160 MG tablet TAKE 1 TABLET BY MOUTH DAILY 90 tablet 3    ferrous gluconate (FERGON) 324 (38 Fe) MG tablet Take 1 tablet (324 mg) by mouth daily 90 tablet 3    Multiple Vitamins-Minerals (PRESERVISION AREDS 2) CAPS Take 1 capsule by mouth 2 times daily      multivitamin w/minerals (MULTI-VITAMIN) tablet Take 1 tablet by mouth daily      sodium bicarbonate 650 MG tablet Take 1 tablet (650 mg) by mouth 2 times daily 180 tablet 3    spironolactone (ALDACTONE) 25 MG tablet TAKE 1 TABLET BY MOUTH  DAILY 90 tablet 3    triamcinolone (KENALOG) 0.1 % external cream Apply topically 2 times daily 453.6 g 11       Family History   Problem Relation Age of Onset    Myocardial Infarction Mother     Rheumatic fever Father     Cerebrovascular Disease Brother        Social History     Socioeconomic History    Marital status:      Spouse name: Not on file    Number of children: Not on file    Years of education: Not on file    Highest education level: Not on file   Occupational History    Not on file   Tobacco Use    Smoking status: Former     Types: Cigars     Quit date: 1980     Years since quittin.2    Smokeless tobacco: Never   Vaping Use    Vaping Use: Never used   Substance and Sexual Activity    Alcohol use: Yes     Comment: rare    Drug use: No    Sexual activity: Not Currently     Partners: Female   Other Topics Concern    Parent/sibling w/ CABG, MI or angioplasty before 65F 55M? Yes   Social History Narrative    Not on file     Social Determinants of Health     Financial Resource Strain: Not on file   Food Insecurity: Not on file   Transportation Needs: Not on file   Physical Activity: Not on file   Stress: Not on file   Social Connections: Not on file    Interpersonal Safety: Not on file   Housing Stability: Not on file            Past Medical History:     Past Medical History:   Diagnosis Date    Arthritis     rhuematoid    Coronary artery disease     triple bypass 2001    CRF (chronic renal failure)     Gastro-oesophageal reflux disease     Gout     Hyperlipidemia     Hypertension     Nocturia               Past Surgical History:     Past Surgical History:   Procedure Laterality Date    CARDIAC SURGERY      CHOLECYSTECTOMY      COLONOSCOPY      CV CORONARY ANGIOGRAM N/A 1/13/2023    Procedure: Coronary Angiogram;  Surgeon: Sujata Ramires MD;  Location:  HEART CARDIAC CATH LAB    CV PCI N/A 1/13/2023    Procedure: Percutaneous Coronary Intervention;  Surgeon: Sujata Ramires MD;  Location:  HEART CARDIAC CATH LAB    CV TRANSCATHETER AORTIC VALVE REPLACEMENT-FEMORAL APPROACH N/A 4/11/2023    Procedure: Transcatheter Aortic Valve Replacement-Femoral Approach;  Surgeon: Sujata Ramires MD;  Location:  HEART CARDIAC CATH LAB    LAPAROTOMY EXPLORATORY N/A 6/30/2020    Procedure: EXPLORATORY LAPAROTOMY, BOWEL RESECTION;  Surgeon: Bull Rachel MD;  Location:  OR    LAPAROTOMY, LYSIS ADHESIONS, COMBINED N/A 6/21/2020    Procedure: EXPLORATORY LAPAROTOMY WITH LYSIS OF ADHESIONS;  Surgeon: Jose Reed MD;  Location:  OR    ORTHOPEDIC SURGERY      PHACOEMULSIFICATION CLEAR CORNEA WITH TORIC INTRAOCULAR LENS IMPLANT  1/30/2012    Procedure:PHACOEMULSIFICATION CLEAR CORNEA WITH TORIC INTRAOCULAR LENS IMPLANT; LEFT PHACOEMULSIFICATION CLEAR CORNEA WITH DELUXE  TORIC INTRAOCULAR LENS IMPLANT ; Surgeon:BRANDO HIGHTOWER; Location:[facility]    PHACOEMULSIFICATION CLEAR CORNEA WITH TORIC INTRAOCULAR LENS IMPLANT  2/13/2012    Procedure:PHACOEMULSIFICATION CLEAR CORNEA WITH TORIC INTRAOCULAR LENS IMPLANT; RIGHT PHACOEMULSIFICATION CLEAR CORNEA WITH TORIC INTRAOCULAR LENS IMPLANT ; Surgeon:BRANDO HIGHTOWER; Location:[facility]    VASCULAR SURGERY                 Allergies:   Avocado, Ciprofloxacin, and Penicillins       Data:   All laboratory data reviewed:    Recent Labs   Lab Test 11/09/23  1215 04/05/23  1526 03/17/23  1257 10/11/22  1358 10/25/21  1417 01/27/21  1154 12/21/20  1442 07/08/20  1714 05/21/18  1042   LDL  --   --   --   --  50 58  --   --  43   HDL  --   --   --   --  24* 34*  --   --  33*   NHDL  --   --   --   --  72 73  --   --  55   CHOL  --   --   --   --  96 107  --   --  88   TRIG  --   --   --   --  111 76  --   --  59   TSH  --   --   --   --   --   --   --   --  2.07   NTBNP  --  4,957* 3,614*  --   --   --  3,415*   < >  --    IRON 108  --   --    < >  --   --  67  --   --      --   --    < >  --   --  429  --   --    IRONSAT 30  --   --    < >  --   --  16  --   --    RICKEY 205  --   --    < >  --   --   --   --   --     < > = values in this interval not displayed.       Lab Results   Component Value Date    WBC 7.9 04/05/2024    WBC 7.7 12/21/2020    RBC 3.63 (L) 04/05/2024    RBC 3.61 (L) 12/21/2020    HGB 11.2 (L) 04/05/2024    HGB 10.7 (L) 12/21/2020    HCT 35.4 (L) 04/05/2024    HCT 32.9 (L) 12/21/2020    MCV 98 04/05/2024    MCV 91 12/21/2020    MCH 30.9 04/05/2024    MCH 29.6 12/21/2020    MCHC 31.6 04/05/2024    MCHC 32.5 12/21/2020    RDW 14.7 04/05/2024    RDW 17.9 (H) 12/21/2020     04/05/2024     12/21/2020       Lab Results   Component Value Date     04/05/2024     12/21/2020    POTASSIUM 4.4 04/05/2024    POTASSIUM 3.7 01/16/2023    POTASSIUM 5.2 12/21/2020    CHLORIDE 108 (H) 04/05/2024    CHLORIDE 108 01/16/2023    CHLORIDE 111 (H) 12/21/2020    CO2 22 04/05/2024    CO2 24 01/16/2023    CO2 21 12/21/2020    ANIONGAP 12 04/05/2024    ANIONGAP 9 01/16/2023    ANIONGAP 8 12/21/2020     (H) 04/05/2024     (H) 01/16/2023     (H) 12/21/2020    BUN 31.8 (H) 04/05/2024    BUN 36 (H) 01/16/2023    BUN 51 (H) 12/21/2020    CR 1.77 (H) 04/05/2024    CR 2.35 (H) 12/21/2020     GFRESTIMATED 36 (L) 04/05/2024    GFRESTIMATED 30 (L) 12/13/2022    GFRESTIMATED 24 (L) 12/21/2020    GFRESTBLACK 28 (L) 12/21/2020    AGUSTÍN 9.1 04/05/2024    AGUSTÍN 9.5 12/21/2020      Lab Results   Component Value Date    AST 27 05/15/2023    AST 24 07/02/2020    ALT 16 05/15/2023    ALT 21 07/02/2020       Lab Results   Component Value Date    A1C 5.6 10/28/2021    A1C 5.9 05/26/2016       Lab Results   Component Value Date    INR 1.26 (H) 04/05/2023    INR 1.24 (H) 03/17/2023    INR 1.06 05/06/2011    INR 0.98 05/02/2010       KCCQ-12:   5  5  5  1  7  7  5  5  5  6  6  6        Thank you for allowing me to participate in the care of your patient.      Sincerely,     Chanel Mares, [facility]  cc:   Sujata Ramires MD  [address] [address]

## 2024-04-05 NOTE — PROGRESS NOTES
Cardiology Clinic Progress Note  Trevor Soni MRN# [id number]   YOB: 1933 Age: 89 year old     Primary cardiologist: Dr. Ravi     Reason for visit: s/p TAVR     History of presenting illness:    Trevor Soni is a pleasant 89 year old patient with past medical history significant for coronary disease s/p CABG x 3 (2001), paroxysmal atrial fibrillation, R CEA (2001), stage IV CKD, infrarenal AAA, chronic diastolic heart failure, chronic RBBB/bifasicular block, and severe aortic stenosis s/p TAVR with 26 mm Nunes ELZBIETA 3 valve (4/11/2023).     He returns today for his 1-year TAVR follow-up.  He is accompanied by his daughter.  He is doing quite well from a cardiovascular standpoint.  He tries to stay active, but it remains quite limited due to chronic knee pain.  He ambulates with a walker.  He denies any chest pain, shortness of breath, syncope or near syncope, or palpitations.  He has no PND or orthopnea.  He has some mild peripheral edema.    I reviewed his most recent echocardiogram that was done today that shows well-seated valve with a mean gradient of 7 mmHg, mild PVL, and normal LV systolic function.  There is mild to moderate concentric LVH with speckled appearance of myocardium suggestive of possible infiltrative disease.    His blood pressure is well-controlled.  His weight is stable.  EKG today shows atrial fibrillation with RBBB/LAFB.     His most recent labs, which I also reviewed, showed creatinine of 1.77 and GFR 36.  His CBC demonstrates hemoglobin of 11.2.           Assessment and Plan:     ASSESSMENT:    s/p TAVR with 26 mm Nunes Elzbieta 3 valve. Recent echo shows well functioning valve.  No exertional symptoms.   Chronic diastolic heart failure, NYHA class II.  Appears euvolemic on exam, not on diuretic therapy.  CAD s/p CABG x 3 in 2001 (LIMA-diag, SVG-PDA, SVG-LCx).  No angina, on statin therapy.   Paroxysmal atrial fibrillation.  [id number] score 5, on apixaban 2.5 mg  BID for stroke prophylaxis.  Chronic RBBB/bifasicular block.  No advanced AV block post-TAVR, not on any AV cole blocking agents.   Hypertension.  Well-controlled on amlodipine 5 mg daily and spironolactone 25 mg daily.  Stage IV CKD.  Renal function stable, baseline creatinine appears to be 1.8-2.0.  Moderate concentric LVH concerning for infiltrative disease.  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  Infrarenal AAA measuring 3.7 x 3.5 cm       PLAN:     Overall Trevor is doing well from a cardiovascular standpoint, I have made no changes to his regimen today.  He will continue Eliquis, amlodipine, atorvastatin, and spironolactone.  Given his age and the fact that he is doing well from a symptomatic standpoint, I would favor a more conservative approach regarding possible infiltrative disease.  No further workup recommended at this time.  He will need lifelong antibiotic prophylaxis prior to any dental procedure.  Repeat echocardiogram in 1 year.  Follow-up with Dr. Ravi in 6 months with repeat BMP and lipid panel.       Chanel Mares, DNP, APRN, CNP  Page: [phone] (8a-5p M-F)    Orders this Visit:  Orders Placed This Encounter   Procedures    Basic metabolic panel    Lipid panel reflex to direct LDL Fasting    Follow-Up with Cardiology    Echocardiogram Complete     No orders of the defined types were placed in this encounter.    There are no discontinued medications.    Today's clinic visit entailed:  Review of the result(s) of each unique test - echo, labs, EKG, TAVR  Ordering of each unique test  Prescription drug management  35 minutes spent by me on the date of the encounter doing chart review, review of test results, interpretation of tests, patient visit and documentation   Provider  Link to [facility] Help Grid     The level of medical decision making during this  "visit was of moderate complexity.           Review of Systems:     Review of Systems:  Skin:  not assessed     Eyes:  not assessed    ENT:  not assessed    Respiratory:  Negative    Cardiovascular:    Positive for;edema  Gastroenterology: not assessed    Genitourinary:  not assessed    Musculoskeletal:  not assessed    Neurologic:  not assessed    Psychiatric:  not assessed    Heme/Lymph/Imm:  not assessed    Endocrine:  not assessed              Physical Exam:   Vitals: /62   Pulse 83   Ht 1.753 m (5' 9\")   Wt 65.4 kg (144 lb 3.2 oz)   SpO2 97%   BMI 21.29 kg/m    Constitutional:  cooperative        Skin:  not assessed this visit        Head:  not assessed this visit        Eyes:  pupils equal and round        ENT:  not assessed this visit        Neck:  JVP normal        Chest:  normal breath sounds, clear to auscultation, normal A-P diameter, normal symmetry, normal respiratory excursion, no use of accessory muscles        Cardiac:   irregularly irregular rhythm     systolic ejection murmur;grade 1          Abdomen:  abdomen soft        Vascular: pulses full and equal                                      Extremities and Back:           Neurological:  no gross motor deficits;affect appropriate             Medications:     Current Outpatient Medications   Medication Sig Dispense Refill    acetaminophen (TYLENOL) 325 MG tablet Take 2 tablets (650 mg) by mouth every 6 hours as needed for mild pain or other (and adjunct with moderate or severe pain or per patient request)      allopurinol (ZYLOPRIM) 100 MG tablet TAKE 2 TABLETS BY MOUTH  DAILY 180 tablet 3    amLODIPine (NORVASC) 5 MG tablet Take 1 tablet (5 mg) by mouth daily 90 tablet 3    atorvastatin (LIPITOR) 20 MG tablet TAKE 1 TABLET BY MOUTH ONCE  DAILY 90 tablet 3    Cholecalciferol (VITAMIN D3) 25 MCG (1000 UT) CAPS Take 1 capsule by mouth daily OTC dose unknown      ELIQUIS ANTICOAGULANT 2.5 MG tablet TAKE 1 TABLET BY MOUTH TWICE  DAILY 180 tablet " 3    fenofibrate (TRIGLIDE/LOFIBRA) 160 MG tablet TAKE 1 TABLET BY MOUTH DAILY 90 tablet 3    ferrous gluconate (FERGON) 324 (38 Fe) MG tablet Take 1 tablet (324 mg) by mouth daily 90 tablet 3    Multiple Vitamins-Minerals (PRESERVISION AREDS 2) CAPS Take 1 capsule by mouth 2 times daily      multivitamin w/minerals (MULTI-VITAMIN) tablet Take 1 tablet by mouth daily      sodium bicarbonate 650 MG tablet Take 1 tablet (650 mg) by mouth 2 times daily 180 tablet 3    spironolactone (ALDACTONE) 25 MG tablet TAKE 1 TABLET BY MOUTH  DAILY 90 tablet 3    triamcinolone (KENALOG) 0.1 % external cream Apply topically 2 times daily 453.6 g 11       Family History   Problem Relation Age of Onset    Myocardial Infarction Mother     Rheumatic fever Father     Cerebrovascular Disease Brother        Social History     Socioeconomic History    Marital status:      Spouse name: Not on file    Number of children: Not on file    Years of education: Not on file    Highest education level: Not on file   Occupational History    Not on file   Tobacco Use    Smoking status: Former     Types: Cigars     Quit date: 1980     Years since quittin.2    Smokeless tobacco: Never   Vaping Use    Vaping Use: Never used   Substance and Sexual Activity    Alcohol use: Yes     Comment: rare    Drug use: No    Sexual activity: Not Currently     Partners: Female   Other Topics Concern    Parent/sibling w/ CABG, MI or angioplasty before 65F 55M? Yes   Social History Narrative    Not on file     Social Determinants of Health     Financial Resource Strain: Not on file   Food Insecurity: Not on file   Transportation Needs: Not on file   Physical Activity: Not on file   Stress: Not on file   Social Connections: Not on file   Interpersonal Safety: Not on file   Housing Stability: Not on file            Past Medical History:     Past Medical History:   Diagnosis Date    Arthritis     rhuematoid    Coronary artery disease     triple bypass      CRF (chronic renal failure)     Gastro-oesophageal reflux disease     Gout     Hyperlipidemia     Hypertension     Nocturia               Past Surgical History:     Past Surgical History:   Procedure Laterality Date    CARDIAC SURGERY      CHOLECYSTECTOMY      COLONOSCOPY      CV CORONARY ANGIOGRAM N/A 1/13/2023    Procedure: Coronary Angiogram;  Surgeon: Sujata Ramires MD;  Location:  [facility]    CV PCI N/A 1/13/2023    Procedure: Percutaneous Coronary Intervention;  Surgeon: Sujata Ramires MD;  Location:  [facility]    CV TRANSCATHETER AORTIC VALVE REPLACEMENT-FEMORAL APPROACH N/A 4/11/2023    Procedure: Transcatheter Aortic Valve Replacement-Femoral Approach;  Surgeon: Sujata Ramires MD;  Location:  [facility]    LAPAROTOMY EXPLORATORY N/A 6/30/2020    Procedure: EXPLORATORY LAPAROTOMY, BOWEL RESECTION;  Surgeon: Bull Rachel MD;  Location:  OR    LAPAROTOMY, LYSIS ADHESIONS, COMBINED N/A 6/21/2020    Procedure: EXPLORATORY LAPAROTOMY WITH LYSIS OF ADHESIONS;  Surgeon: Jose Reed MD;  Location:  OR    ORTHOPEDIC SURGERY      PHACOEMULSIFICATION CLEAR CORNEA WITH TORIC INTRAOCULAR LENS IMPLANT  1/30/2012    Procedure:PHACOEMULSIFICATION CLEAR CORNEA WITH TORIC INTRAOCULAR LENS IMPLANT; LEFT PHACOEMULSIFICATION CLEAR CORNEA WITH DELUXE  TORIC INTRAOCULAR LENS IMPLANT ; Surgeon:BRANDO HIGHTOWER; Location:[facility]    PHACOEMULSIFICATION CLEAR CORNEA WITH TORIC INTRAOCULAR LENS IMPLANT  2/13/2012    Procedure:PHACOEMULSIFICATION CLEAR CORNEA WITH TORIC INTRAOCULAR LENS IMPLANT; RIGHT PHACOEMULSIFICATION CLEAR CORNEA WITH TORIC INTRAOCULAR LENS IMPLANT ; Surgeon:BRANDO HIGHTOWER; Location:[facility]    VASCULAR SURGERY                Allergies:   Avocado, Ciprofloxacin, and Penicillins       Data:   All laboratory data reviewed:    Recent Labs   Lab Test 11/09/23  1215 04/05/23  1526 03/17/23  1257 10/11/22  1358 10/25/21  1417 01/27/21  1154  12/21/20  1442 07/08/20  1714 05/21/18  1042   LDL  --   --   --   --  50 58  --   --  43   HDL  --   --   --   --  24* 34*  --   --  33*   NHDL  --   --   --   --  72 73  --   --  55   CHOL  --   --   --   --  96 107  --   --  88   TRIG  --   --   --   --  111 76  --   --  59   TSH  --   --   --   --   --   --   --   --  2.07   NTBNP  --  4,957* 3,614*  --   --   --  3,415*   < >  --    IRON 108  --   --    < >  --   --  67  --   --      --   --    < >  --   --  429  --   --    IRONSAT 30  --   --    < >  --   --  16  --   --    RICKEY 205  --   --    < >  --   --   --   --   --     < > = values in this interval not displayed.       Lab Results   Component Value Date    WBC 7.9 04/05/2024    WBC 7.7 12/21/2020    RBC 3.63 (L) 04/05/2024    RBC 3.61 (L) 12/21/2020    HGB 11.2 (L) 04/05/2024    HGB 10.7 (L) 12/21/2020    HCT 35.4 (L) 04/05/2024    HCT 32.9 (L) 12/21/2020    MCV 98 04/05/2024    MCV 91 12/21/2020    MCH 30.9 04/05/2024    MCH 29.6 12/21/2020    MCHC 31.6 04/05/2024    MCHC 32.5 12/21/2020    RDW 14.7 04/05/2024    RDW 17.9 (H) 12/21/2020     04/05/2024     12/21/2020       Lab Results   Component Value Date     04/05/2024     12/21/2020    POTASSIUM 4.4 04/05/2024    POTASSIUM 3.7 01/16/2023    POTASSIUM 5.2 12/21/2020    CHLORIDE 108 (H) 04/05/2024    CHLORIDE 108 01/16/2023    CHLORIDE 111 (H) 12/21/2020    CO2 22 04/05/2024    CO2 24 01/16/2023    CO2 21 12/21/2020    ANIONGAP 12 04/05/2024    ANIONGAP 9 01/16/2023    ANIONGAP 8 12/21/2020     (H) 04/05/2024     (H) 01/16/2023     (H) 12/21/2020    BUN 31.8 (H) 04/05/2024    BUN 36 (H) 01/16/2023    BUN 51 (H) 12/21/2020    CR 1.77 (H) 04/05/2024    CR 2.35 (H) 12/21/2020    GFRESTIMATED 36 (L) 04/05/2024    GFRESTIMATED 30 (L) 12/13/2022    GFRESTIMATED 24 (L) 12/21/2020    GFRESTBLACK 28 (L) 12/21/2020    AGUSTÍN 9.1 04/05/2024    AGUSTÍN 9.5 12/21/2020      Lab Results   Component Value Date    AST 27  05/15/2023    AST 24 07/02/2020    ALT 16 05/15/2023    ALT 21 07/02/2020       Lab Results   Component Value Date    A1C 5.6 10/28/2021    A1C 5.9 05/26/2016       Lab Results   Component Value Date    INR 1.26 (H) 04/05/2023    INR 1.24 (H) 03/17/2023    INR 1.06 05/06/2011    INR 0.98 05/02/2010       KCCQ-12:   5  5  5  1  7  7  5  5  5  6  6  6

## 2024-04-05 NOTE — PATIENT INSTRUCTIONS
Today's Plan:     - Follow-up with Dr. Ravi with labs in 6 months.     If you have questions or concerns please call my nurse team:  Ne RN ([phone]) and Jen RN ([phone])    After Hours: [phone], option #2, ask for cardiologist on-call    Scheduling phone number: [phone]    Reminder: Please bring in all current medications, over the counter supplements and vitamin bottles to your next appointment.-    It was a pleasure seeing you today!     Chanel Mares, CNP  4/5/2024    -

## 2024-04-22 ENCOUNTER — OFFICE VISIT (OUTPATIENT)
Dept: FAMILY MEDICINE | Facility: CLINIC | Age: 89
End: 2024-04-22
Payer: MEDICARE

## 2024-04-22 VITALS
HEART RATE: 80 BPM | SYSTOLIC BLOOD PRESSURE: 124 MMHG | RESPIRATION RATE: 18 BRPM | DIASTOLIC BLOOD PRESSURE: 69 MMHG | WEIGHT: 142.6 LBS | OXYGEN SATURATION: 99 % | TEMPERATURE: 97.5 F | BODY MASS INDEX: 21.12 KG/M2 | HEIGHT: 69 IN

## 2024-04-22 DIAGNOSIS — I10 PRIMARY HYPERTENSION: ICD-10-CM

## 2024-04-22 DIAGNOSIS — G89.29 CHRONIC LEFT SHOULDER PAIN: ICD-10-CM

## 2024-04-22 DIAGNOSIS — I50.32 CHRONIC DIASTOLIC HEART FAILURE (H): ICD-10-CM

## 2024-04-22 DIAGNOSIS — Z00.00 ENCOUNTER FOR MEDICARE ANNUAL WELLNESS EXAM: Primary | ICD-10-CM

## 2024-04-22 DIAGNOSIS — I48.92 ATRIAL FLUTTER, UNSPECIFIED TYPE (H): ICD-10-CM

## 2024-04-22 DIAGNOSIS — M1A.09X0 IDIOPATHIC CHRONIC GOUT OF MULTIPLE SITES WITHOUT TOPHUS: ICD-10-CM

## 2024-04-22 DIAGNOSIS — M25.512 CHRONIC LEFT SHOULDER PAIN: ICD-10-CM

## 2024-04-22 DIAGNOSIS — I35.0 SEVERE AORTIC STENOSIS: ICD-10-CM

## 2024-04-22 DIAGNOSIS — E78.5 HYPERLIPIDEMIA LDL GOAL <100: ICD-10-CM

## 2024-04-22 PROBLEM — M54.50 CHRONIC BILATERAL LOW BACK PAIN: Status: RESOLVED | Noted: 2019-04-10 | Resolved: 2024-04-22

## 2024-04-22 PROBLEM — M17.0 PRIMARY OSTEOARTHRITIS OF BOTH KNEES: Status: RESOLVED | Noted: 2017-03-13 | Resolved: 2024-04-22

## 2024-04-22 PROBLEM — M17.32 POST-TRAUMATIC OSTEOARTHRITIS OF LEFT KNEE: Status: RESOLVED | Noted: 2017-10-18 | Resolved: 2024-04-22

## 2024-04-22 PROBLEM — I65.29 STENOSIS OF CAROTID ARTERY, UNSPECIFIED LATERALITY: Status: RESOLVED | Noted: 2017-08-22 | Resolved: 2024-04-22

## 2024-04-22 PROCEDURE — G0439 PPPS, SUBSEQ VISIT: HCPCS | Performed by: INTERNAL MEDICINE

## 2024-04-22 PROCEDURE — 99213 OFFICE O/P EST LOW 20 MIN: CPT | Mod: 25 | Performed by: INTERNAL MEDICINE

## 2024-04-22 SDOH — HEALTH STABILITY: PHYSICAL HEALTH: ON AVERAGE, HOW MANY DAYS PER WEEK DO YOU ENGAGE IN MODERATE TO STRENUOUS EXERCISE (LIKE A BRISK WALK)?: 1 DAY

## 2024-04-22 ASSESSMENT — PAIN SCALES - GENERAL: PAINLEVEL: MODERATE PAIN (5)

## 2024-04-22 ASSESSMENT — SOCIAL DETERMINANTS OF HEALTH (SDOH): HOW OFTEN DO YOU GET TOGETHER WITH FRIENDS OR RELATIVES?: TWICE A WEEK

## 2024-04-22 NOTE — PATIENT INSTRUCTIONS
You should get the RSV (Respiratory Syncytial Virus) vaccine at a pharmacy.       Preventive Care Advice   This is general advice given by our system to help you stay healthy. However, your care team may have specific advice just for you. Please talk to your care team about your preventive care needs.  Nutrition  Eat 5 or more servings of fruits and vegetables each day.  Try wheat bread, brown rice and whole grain pasta (instead of white bread, rice, and pasta).  Get enough calcium and vitamin D. Check the label on foods and aim for 100% of the RDA (recommended daily allowance).  Lifestyle  Exercise at least 150 minutes each week   (30 minutes a day, 5 days a week).  Do muscle strengthening activities 2 days a week. These help control your weight and prevent disease.  No smoking.  Wear sunscreen to prevent skin cancer.  Have a dental exam and cleaning every 6 months.  Yearly exams  See your health care team every year to talk about:  Any changes in your health.  Any medicines your care team has prescribed.  Preventive care, family planning, and ways to prevent chronic diseases.  Shots (vaccines)   HPV shots (up to age 26), if you've never had them before.  Hepatitis B shots (up to age 59), if you've never had them before.  COVID-19 shot: Get this shot when it's due.  Flu shot: Get a flu shot every year.  Tetanus shot: Get a tetanus shot every 10 years.  Pneumococcal, hepatitis A, and RSV shots: Ask your care team if you need these based on your risk.  Shingles shot (for age 50 and up).  General health tests  Diabetes screening:  Starting at age 35, Get screened for diabetes at least every 3 years.  If you are younger than age 35, ask your care team if you should be screened for diabetes.  Cholesterol test: At age 39, start having a cholesterol test every 5 years, or more often if advised.  Bone density scan (DEXA): At age 50, ask your care team if you should have this scan for osteoporosis (brittle  bones).  Hepatitis C: Get tested at least once in your life.  STIs (sexually transmitted infections)  Before age 24: Ask your care team if you should be screened for STIs.  After age 24: Get screened for STIs if you're at risk. You are at risk for STIs (including HIV) if:  You are sexually active with more than one person.  You don't use condoms every time.  You or a partner was diagnosed with a sexually transmitted infection.  If you are at risk for HIV, ask about PrEP medicine to prevent HIV.  Get tested for HIV at least once in your life, whether you are at risk for HIV or not.  Cancer screening tests  Cervical cancer screening: If you have a cervix, begin getting regular cervical cancer screening tests at age 21. Most people who have regular screenings with normal results can stop after age 65. Talk about this with your provider.  Breast cancer scan (mammogram): If you've ever had breasts, begin having regular mammograms starting at age 40. This is a scan to check for breast cancer.  Colon cancer screening: It is important to start screening for colon cancer at age 45.  Have a colonoscopy test every 10 years (or more often if you're at risk) Or, ask your provider about stool tests like a FIT test every year or Cologuard test every 3 years.  To learn more about your testing options, visit: [email or web address].  For help making a decision, visit: https://bit.ly/bt95034.  Prostate cancer screening test: If you have a prostate and are age 55 to 69, ask your provider if you would benefit from a yearly prostate cancer screening test.  Lung cancer screening: If you are a current or former smoker age 50 to 80, ask your care team if ongoing lung cancer screenings are right for you.  For informational purposes only. Not to replace the advice of your health care provider. Copyright   2023 [employer]. All rights reserved. Clinically reviewed by the [facility] Transitions Program.  Built In 890015 - REV 01/24.    Preventing Falls: Care Instructions  Injuries and health problems such as trouble walking or poor eyesight can increase your risk of falling. So can some medicines. But there are things you can do to help prevent falls. You can exercise to get stronger. You can also arrange your home to make it safer.    Talk to your doctor about the medicines you take. Ask if any of them increase the risk of falls and whether they can be changed or stopped.   Try to exercise regularly. It can help improve your strength and balance. This can help lower your risk of falling.     Practice fall safety and prevention.    Wear low-heeled shoes that fit well and give your feet good support. Talk to your doctor if you have foot problems that make this hard.  Carry a cellphone or wear a medical alert device that you can use to call for help.  Use stepladders instead of chairs to reach high objects. Don't climb if you're at risk for falls. Ask for help, if needed.  Wear the correct eyeglasses, if you need them.    Make your home safer.    Remove rugs, cords, clutter, and furniture from walkways.  Keep your house well lit. Use night-lights in hallways and bathrooms.  Install and use sturdy handrails on stairways.  Wear nonskid footwear, even inside. Don't walk barefoot or in socks without shoes.    Be safe outside.    Use handrails, curb cuts, and ramps whenever possible.  Keep your hands free by using a shoulder bag or backpack.  Try to walk in well-lit areas. Watch out for uneven ground, changes in pavement, and debris.  Be careful in the winter. Walk on the grass or gravel when sidewalks are slippery. Use de-icer on steps and walkways. Add non-slip devices to shoes.    Put grab bars and nonskid mats in your shower or tub and near the toilet. Try to use a shower chair or bath bench when bathing.   Get into a tub or shower by putting in your weaker leg first. Get out with your strong side first. Have a phone  "or medical alert device in the bathroom with you.   Where can you learn more?  Go to https://www.[employer]ling.net/patiented  Enter G117 in the search box to learn more about \"Preventing Falls: Care Instructions.\"  Current as of: July 17, 2023               Content Version: 14.0    4580-2497 [employer].   Care instructions adapted under license by your healthcare professional. If you have questions about a medical condition or this instruction, always ask your healthcare professional. [employer] disclaims any warranty or liability for your use of this information.      Hearing Loss: Care Instructions  Overview     Hearing loss is a sudden or slow decrease in how well you hear. It can range from slight to profound. Permanent hearing loss can occur with aging. It also can happen when you are exposed long-term to loud noise. Examples include listening to loud music, riding motorcycles, or being around other loud machines.  Hearing loss can affect your work and home life. It can make you feel lonely or depressed. You may feel that you have lost your independence. But hearing aids and other devices can help you hear better and feel connected to others.  Follow-up care is a key part of your treatment and safety. Be sure to make and go to all appointments, and call your doctor if you are having problems. It's also a good idea to know your test results and keep a list of the medicines you take.  How can you care for yourself at home?  Avoid loud noises whenever possible. This helps keep your hearing from getting worse.  Always wear hearing protection around loud noises.  Wear a hearing aid as directed.  A professional can help you pick a hearing aid that will work best for you.  You can also get hearing aids over the counter for mild to moderate hearing loss.  Have hearing tests as your doctor suggests. They can show whether your hearing has changed. Your hearing aid may need to be adjusted.  Use " "other devices as needed. These may include:  Telephone amplifiers and hearing aids that can connect to a television, stereo, radio, or microphone.  Devices that use lights or vibrations. These alert you to the doorbell, a ringing telephone, or a baby monitor.  Television closed-captioning. This shows the words at the bottom of the screen. Most new TVs can do this.  TTY (text telephone). This lets you type messages back and forth on the telephone instead of talking or listening. These devices are also called TDD. When messages are typed on the keyboard, they are sent over the phone line to a receiving TTY. The message is shown on a monitor.  Use text messaging, social media, and email if it is hard for you to communicate by telephone.  Try to learn a listening technique called speechreading. It is not lipreading. You pay attention to people's gestures, expressions, posture, and tone of voice. These clues can help you understand what a person is saying. Face the person you are talking to, and have them face you. Make sure the lighting is good. You need to see the other person's face clearly.  Think about counseling if you need help to adjust to your hearing loss.  When should you call for help?  Watch closely for changes in your health, and be sure to contact your doctor if:    You think your hearing is getting worse.     You have new symptoms, such as dizziness or nausea.   Where can you learn more?  Go to https://www.[employer].net/patiented  Enter R798 in the search box to learn more about \"Hearing Loss: Care Instructions.\"  Current as of: September 27, 2023               Content Version: 14.0    3779-6421 [employer].   Care instructions adapted under license by your healthcare professional. If you have questions about a medical condition or this instruction, always ask your healthcare professional. [employer] disclaims any warranty or liability for your use of this " information.      Learning About Stress  What is stress?     Stress is your body's response to a hard situation. Your body can have a physical, emotional, or mental response. Stress is a fact of life for most people, and it affects everyone differently. What causes stress for you may not be stressful for someone else.  A lot of things can cause stress. You may feel stress when you go on a job interview, take a test, or run a race. This kind of short-term stress is normal and even useful. It can help you if you need to work hard or react quickly. For example, stress can help you finish an important job on time.  Long-term stress is caused by ongoing stressful situations or events. Examples of long-term stress include long-term health problems, ongoing problems at work, or conflicts in your family. Long-term stress can harm your health.  How does stress affect your health?  When you are stressed, your body responds as though you are in danger. It makes hormones that speed up your heart, make you breathe faster, and give you a burst of energy. This is called the fight-or-flight stress response. If the stress is over quickly, your body goes back to normal and no harm is done.  But if stress happens too often or lasts too long, it can have bad effects. Long-term stress can make you more likely to get sick, and it can make symptoms of some diseases worse. If you tense up when you are stressed, you may develop neck, shoulder, or low back pain. Stress is linked to high blood pressure and heart disease.  Stress also harms your emotional health. It can make you clemons, tense, or depressed. Your relationships may suffer, and you may not do well at work or school.  What can you do to manage stress?  You can try these things to help manage stress:   Do something active. Exercise or activity can help reduce stress. Walking is a great way to get started. Even everyday activities such as housecleaning or yard work can help.  Try  yoga or rashad chi. These techniques combine exercise and meditation. You may need some training at first to learn them.  Do something you enjoy. For example, listen to music or go to a movie. Practice your hobby or do volunteer work.  Meditate. This can help you relax, because you are not worrying about what happened before or what may happen in the future.  Do guided imagery. Imagine yourself in any setting that helps you feel calm. You can use online videos, books, or a teacher to guide you.  Do breathing exercises. For example:  From a standing position, bend forward from the waist with your knees slightly bent. Let your arms dangle close to the floor.  Breathe in slowly and deeply as you return to a standing position. Roll up slowly and lift your head last.  Hold your breath for just a few seconds in the standing position.  Breathe out slowly and bend forward from the waist.  Let your feelings out. Talk, laugh, cry, and express anger when you need to. Talking with supportive friends or family, a counselor, or a karin leader about your feelings is a healthy way to relieve stress. Avoid discussing your feelings with people who make you feel worse.  Write. It may help to write about things that are bothering you. This helps you find out how much stress you feel and what is causing it. When you know this, you can find better ways to cope.  What can you do to prevent stress?  You might try some of these things to help prevent stress:  Manage your time. This helps you find time to do the things you want and need to do.  Get enough sleep. Your body recovers from the stresses of the day while you are sleeping.  Get support. Your family, friends, and community can make a difference in how you experience stress.  Limit your news feed. Avoid or limit time on social media or news that may make you feel stressed.  Do something active. Exercise or activity can help reduce stress. Walking is a great way to get started.  Where  "can you learn more?  Go to https://www.[employer].net/patiented  Enter N032 in the search box to learn more about \"Learning About Stress.\"  Current as of: October 24, 2023               Content Version: 14.0    8142-2718 [employer].   Care instructions adapted under license by your healthcare professional. If you have questions about a medical condition or this instruction, always ask your healthcare professional. [employer] disclaims any warranty or liability for your use of this information.      "

## 2024-04-22 NOTE — PROGRESS NOTES
Preventive Care Visit  [facility]  Abdoulaye Redman MD, Internal Medicine  Apr 22, 2024      Assessment & Plan     Encounter for Medicare annual wellness exam      Hyperlipidemia LDL goal <100  On statin therapy    Idiopathic chronic gout of multiple sites without tophus  Stable    Severe aortic stenosis  Post TAVR    Primary hypertension  Okay    Chronic diastolic CHF -- Grade 1-2 Dysfunction Echo 2016  Volume status looks okay today in clinic    Atrial flutter, unspecified type (H)  Rate okay, on Eliquis for prophylaxis, renally dosed    Chronic left shoulder pain  Referral to sports medicine to see if ultrasound-guided cortisone injection could help palliate left shoulder symptoms, he does not want to consider a shoulder arthroplasty, regarding his knee pain, I recommended using over-the-counter Voltaren gel as directed, continue turmeric supplement, consider cortisone injection if that does not help enough  - Orthopedic  Referral; Future        No LOS data to display   Time spent by me doing chart review, history and exam, documentation and further activities per the note      Counseling  Appropriate preventive services were discussed with this patient, including applicable screening as appropriate for fall prevention, nutrition, physical activity, Tobacco-use cessation, weight loss and cognition.  Checklist reviewing preventive services available has been given to the patient.  Reviewed patient's diet, addressing concerns and/or questions.   He is at risk for lack of exercise and has been provided with information to increase physical activity for the benefit of his well-being.   He is at risk for psychosocial distress and has been provided with information to reduce risk.   The patient was provided with written information regarding signs of hearing loss.     Recommended RSV shot at pharmacy  FUTURE APPOINTMENTS:       - Follow-up for annual visit or as needed    Subjective    Trevor is a 89 year old, presenting for the following:  Physical (Pain score:Knee sL 0 right 5, left shoulder 4 )        4/22/2024     2:56 PM   Additional Questions   Roomed by [facility] Care Directive  Patient does not have a Health Care Directive or Living Will: Discussed advance care planning with patient; information given to patient to review.    HPI    Complains of left shoulder and right knee pain  Known osteoarthritis in both joints  Tried using topical Voltaren but did not stick with it for very long  Has noted significant improvement in symptoms using a turmeric supplement          4/22/2024   General Health   How would you rate your overall physical health? Good   Feel stress (tense, anxious, or unable to sleep) Only a little   (!) STRESS CONCERN      4/22/2024   Nutrition   Diet: Regular (no restrictions)         4/22/2024   Exercise   Days per week of moderate/strenous exercise 1 day   (!) EXERCISE CONCERN      4/22/2024   Social Factors   Frequency of gathering with friends or relatives Twice a week   Worry food won't last until get money to buy more No   Food not last or not have enough money for food? No   Do you have housing?  Yes   Are you worried about losing your housing? No   Lack of transportation? No   Unable to get utilities (heat,electricity)? No         4/22/2024   Fall Risk   Fallen 2 or more times in the past year? No   Trouble with walking or balance? Yes   Gait Speed Test (Document in seconds) 7.1   Gait Speed Test Interpretation Greater than 5.01 seconds - ABNORMAL          4/22/2024   Activities of Daily Living- Home Safety   Needs help with the following daily activites None of the above   Safety concerns in the home None of the above         4/22/2024   Dental   Dentist two times every year? Yes         4/22/2024   Hearing Screening   Hearing concerns? (!) I FEEL THAT PEOPLE ARE MUMBLING OR NOT SPEAKING CLEARLY.    (!) I NEED TO ASK PEOPLE TO SPEAK UP OR REPEAT  THEMSELVES.    (!) IT'S HARDER TO UNDERSTAND WOMEN'S VOICES THAN MEN'S VOICES.    (!) IT'S HARD TO FOLLOW A CONVERSATION IN A NOISY RESTAURANT OR CROWDED ROOM.    (!) TROUBLE UNDESTANDING A SPEAKER IN A PUBLIC MEETING OR [employer] SERVICE.    (!) TROUBLE FOLLOWING DIALOGUE IN THE THEATHER.    (!) TROUBLE UNDERSTANDING SOFT OR WHISPERED SPEECH.    (!) TROUBLE UNDERSTANDING SPEECH ON THE TELEPHONE         2024   Driving Risk Screening   Patient/family members have concerns about driving No         2024   General Alertness/Fatigue Screening   Have you been more tired than usual lately? No         2024   Urinary Incontinence Screening   Bothered by leaking urine in past 6 months No            Today's PHQ-2 Score:       2024     2:47 PM   PHQ-2 (  [employer])   Q1: Little interest or pleasure in doing things 0   Q2: Feeling down, depressed or hopeless 0   PHQ-2 Score 0   Q1: Little interest or pleasure in doing things Not at all   Q2: Feeling down, depressed or hopeless Not at all   PHQ-2 Score 0           2024   Substance Use   Alcohol more than 3/day or more than 7/wk No   Do you have a current opioid prescription? No   How severe/bad is pain from 1 to 10? 3/10   Do you use any other substances recreationally? No     Social History     Tobacco Use    Smoking status: Former     Types: Cigars     Quit date: 1980     Years since quittin.3    Smokeless tobacco: Never   Vaping Use    Vaping status: Never Used   Substance Use Topics    Alcohol use: Yes     Comment: rare    Drug use: No             Reviewed and updated as needed this visit by Provider                    Past Medical History:   Diagnosis Date    Arthritis     rhuematoid    Coronary artery disease     triple bypass     CRF (chronic renal failure)     Gastro-oesophageal reflux disease     Gout     Hyperlipidemia     Hypertension     Nocturia      Past Surgical History:   Procedure Laterality Date    CARDIAC SURGERY       CHOLECYSTECTOMY      COLONOSCOPY      CV CORONARY ANGIOGRAM N/A 1/13/2023    Procedure: Coronary Angiogram;  Surgeon: Sujata Ramires MD;  Location:  [facility]    CV PCI N/A 1/13/2023    Procedure: Percutaneous Coronary Intervention;  Surgeon: Sujata Ramires MD;  Location:  [facility]    CV TRANSCATHETER AORTIC VALVE REPLACEMENT-FEMORAL APPROACH N/A 4/11/2023    Procedure: Transcatheter Aortic Valve Replacement-Femoral Approach;  Surgeon: uSjata Ramires MD;  Location:  [facility]    LAPAROTOMY EXPLORATORY N/A 6/30/2020    Procedure: EXPLORATORY LAPAROTOMY, BOWEL RESECTION;  Surgeon: Bull Rachel MD;  Location:  OR    LAPAROTOMY, LYSIS ADHESIONS, COMBINED N/A 6/21/2020    Procedure: EXPLORATORY LAPAROTOMY WITH LYSIS OF ADHESIONS;  Surgeon: Jose Reed MD;  Location:  OR    ORTHOPEDIC SURGERY      PHACOEMULSIFICATION CLEAR CORNEA WITH TORIC INTRAOCULAR LENS IMPLANT  1/30/2012    Procedure:PHACOEMULSIFICATION CLEAR CORNEA WITH TORIC INTRAOCULAR LENS IMPLANT; LEFT PHACOEMULSIFICATION CLEAR CORNEA WITH DELUXE  TORIC INTRAOCULAR LENS IMPLANT ; Surgeon:BRANDO HIGHTOWER; Location:[facility]    PHACOEMULSIFICATION CLEAR CORNEA WITH TORIC INTRAOCULAR LENS IMPLANT  2/13/2012    Procedure:PHACOEMULSIFICATION CLEAR CORNEA WITH TORIC INTRAOCULAR LENS IMPLANT; RIGHT PHACOEMULSIFICATION CLEAR CORNEA WITH TORIC INTRAOCULAR LENS IMPLANT ; Surgeon:BRANDO HIGHTOWER; Location:[facility]    VASCULAR SURGERY       Labs reviewed in EPIC  BP Readings from Last 3 Encounters:   04/22/24 124/69   04/05/24 136/62   10/12/23 136/54    Wt Readings from Last 3 Encounters:   04/22/24 64.7 kg (142 lb 9.6 oz)   04/05/24 65.4 kg (144 lb 3.2 oz)   10/12/23 66.5 kg (146 lb 11.2 oz)                  Patient Active Problem List   Diagnosis    Hyperlipidemia LDL goal <100    Chronic rhinitis    Idiopathic chronic gout of multiple sites without tophus    Abdominal aortic aneurysm (AAA) without  rupture (H24)    Severe aortic stenosis    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/21/20    CRF (chronic renal failure), stage 4 (severe) (H)    Gout    CAD -- S/P CABG x 3 in 2001    HTN (hypertension)    Chronic diastolic CHF -- Grade 1-2 Dysfunction Echo 2016    Status post exploratory laparotomy    Carotid stenosis, asymptomatic, bilateral    Osteoporosis with current pathological fracture with routine healing, unspecified osteoporosis type, subsequent encounter    Atrial flutter (H)    Anticoagulated    Slow transit constipation    Adjustment insomnia    Status post coronary angiogram    Aortic stenosis, severe     Past Surgical History:   Procedure Laterality Date    CARDIAC SURGERY      CHOLECYSTECTOMY      COLONOSCOPY      CV CORONARY ANGIOGRAM N/A 1/13/2023    Procedure: Coronary Angiogram;  Surgeon: Sujata Ramires MD;  Location:  [facility]    CV PCI N/A 1/13/2023    Procedure: Percutaneous Coronary Intervention;  Surgeon: Sujata Ramires MD;  Location: [facility] CARDIAC CATH LAB    CV TRANSCATHETER AORTIC VALVE REPLACEMENT-FEMORAL APPROACH N/A 4/11/2023    Procedure: Transcatheter Aortic Valve Replacement-Femoral Approach;  Surgeon: Sujata Ramires MD;  Location:  [facility]    LAPAROTOMY EXPLORATORY N/A 6/30/2020    Procedure: EXPLORATORY LAPAROTOMY, BOWEL RESECTION;  Surgeon: Bull Rachel MD;  Location:  OR    LAPAROTOMY, LYSIS ADHESIONS, COMBINED N/A 6/21/2020    Procedure: EXPLORATORY LAPAROTOMY WITH LYSIS OF ADHESIONS;  Surgeon: Jose Reed MD;  Location:  OR    ORTHOPEDIC SURGERY      PHACOEMULSIFICATION CLEAR CORNEA WITH TORIC INTRAOCULAR LENS IMPLANT  1/30/2012    Procedure:PHACOEMULSIFICATION CLEAR CORNEA WITH TORIC INTRAOCULAR LENS IMPLANT; LEFT PHACOEMULSIFICATION CLEAR CORNEA WITH DELUXE  TORIC INTRAOCULAR LENS IMPLANT ; Surgeon:BRANDO HIGHTOWER; Location: [facility]    PHACOEMULSIFICATION CLEAR CORNEA WITH TORIC INTRAOCULAR  LENS IMPLANT  2012    Procedure:PHACOEMULSIFICATION CLEAR CORNEA WITH TORIC INTRAOCULAR LENS IMPLANT; RIGHT PHACOEMULSIFICATION CLEAR CORNEA WITH TORIC INTRAOCULAR LENS IMPLANT ; Surgeon:CAMPBELL, BRANDO EVANGELISTA; Location:[facility]    VASCULAR SURGERY         Social History     Tobacco Use    Smoking status: Former     Types: Cigars     Quit date: 1980     Years since quittin.3    Smokeless tobacco: Never   Substance Use Topics    Alcohol use: Yes     Comment: rare     Family History   Problem Relation Age of Onset    Myocardial Infarction Mother     Rheumatic fever Father     Cerebrovascular Disease Brother          Current Outpatient Medications   Medication Sig Dispense Refill    acetaminophen (TYLENOL) 325 MG tablet Take 2 tablets (650 mg) by mouth every 6 hours as needed for mild pain or other (and adjunct with moderate or severe pain or per patient request)      allopurinol (ZYLOPRIM) 100 MG tablet TAKE 2 TABLETS BY MOUTH  DAILY 180 tablet 3    amLODIPine (NORVASC) 5 MG tablet Take 1 tablet (5 mg) by mouth daily 90 tablet 3    atorvastatin (LIPITOR) 20 MG tablet TAKE 1 TABLET BY MOUTH ONCE  DAILY 90 tablet 3    Cholecalciferol (VITAMIN D3) 25 MCG (1000 UT) CAPS Take 1 capsule by mouth daily OTC dose unknown      ELIQUIS ANTICOAGULANT 2.5 MG tablet TAKE 1 TABLET BY MOUTH TWICE  DAILY 180 tablet 3    fenofibrate (TRIGLIDE/LOFIBRA) 160 MG tablet TAKE 1 TABLET BY MOUTH DAILY 90 tablet 3    ferrous gluconate (FERGON) 324 (38 Fe) MG tablet Take 1 tablet (324 mg) by mouth daily 90 tablet 3    Multiple Vitamins-Minerals (PRESERVISION AREDS 2) CAPS Take 1 capsule by mouth 2 times daily      multivitamin w/minerals (MULTI-VITAMIN) tablet Take 1 tablet by mouth daily      sodium bicarbonate 650 MG tablet Take 1 tablet (650 mg) by mouth 2 times daily 180 tablet 3    spironolactone (ALDACTONE) 25 MG tablet TAKE 1 TABLET BY MOUTH  DAILY 90 tablet 3    triamcinolone (KENALOG) 0.1 % external cream Apply topically 2 times  "daily 453.6 g 11     Current providers sharing in care for this patient include:  Patient Care Team:  Abdoulaye Redman MD as PCP - General (Internal Medicine)  Abdoulaye Redman MD as Assigned PCP  Afsaneh Thomas MD as Assigned Nephrology Provider  Chanel Mares CNP as Assigned Heart and Vascular Provider    The following health maintenance items are reviewed in Epic and correct as of today:  Health Maintenance   Topic Date Due    HF ACTION PLAN  Never done    RSV VACCINE (Pregnancy & 60+) (1 - 1-dose 60+ series) Never done    MEDICARE ANNUAL WELLNESS VISIT  11/14/2023    ANNUAL REVIEW OF HM ORDERS  11/14/2023    ALT  05/15/2024    CBC  04/05/2025    FALL RISK ASSESSMENT  04/22/2025    DTAP/TDAP/TD IMMUNIZATION (2 - Td or Tdap) 11/01/2027    ADVANCE CARE PLANNING  11/14/2027    TSH W/FREE T4 REFLEX  Completed    PHQ-2 (once per calendar year)  Completed    INFLUENZA VACCINE  Completed    Pneumococcal Vaccine: 65+ Years  Completed    URINALYSIS  Completed    ALK PHOS  Completed    ZOSTER IMMUNIZATION  Completed    COVID-19 Vaccine  Completed    IPV IMMUNIZATION  Aged Out    HPV IMMUNIZATION  Aged Out    MENINGITIS IMMUNIZATION  Aged Out    RSV MONOCLONAL ANTIBODY  Aged Out    BMP  Discontinued    LIPID  Discontinued    MICROALBUMIN  Discontinued    PARATHYROID  Discontinued    PHOSPHORUS  Discontinued    URINE DRUG SCREEN  Discontinued    HEMOGLOBIN  Discontinued            Objective    Exam  /69   Pulse 80   Temp 97.5  F (36.4  C) (Temporal)   Resp 18   Ht 1.753 m (5' 9\")   Wt 64.7 kg (142 lb 9.6 oz)   SpO2 99%   BMI 21.06 kg/m     Estimated body mass index is 21.06 kg/m  as calculated from the following:    Height as of this encounter: 1.753 m (5' 9\").    Weight as of this encounter: 64.7 kg (142 lb 9.6 oz).    Physical Exam  General: This is a frail, elderly man in no acute distress.  He does not appear toxic.  HEENT: The bilateral tympanic membranes are normal, nasal exam is normal, the " posterior pharynx appears normal.  Neck is supple without adenopathy.  Cardiovascular: The heart has a regular rate and rhythm.  Pulmonary: The lungs are clear to auscultation bilaterally, breathing is not labored.  Abdomen: Soft, not distended, not tender, bowel sounds present, no masses, no organomegaly.  Extremities: No appreciable edema, well-perfused.  Musculoskeletal: There is pain with passive range of motion of the left shoulder joint consistent with his known diagnosis of osteoarthritis.  There is no right knee effusion, there is full right knee range of motion, there is joint line tenderness.  Neurological: Alert and oriented to person place and time, cranial nerves II to XII appear grossly intact, walks with a walker.  Mental state: Appropriate mood and affect, well-groomed, normal speech.         4/22/2024   Mini Cog   Clock Draw Score 2 Normal   3 Item Recall 3 objects recalled   Mini Cog Total Score 5              Signed Electronically by: Abdoulaye Redman MD

## 2024-05-01 DIAGNOSIS — N18.32 STAGE 3B CHRONIC KIDNEY DISEASE (H): Primary | ICD-10-CM

## 2024-05-02 ENCOUNTER — LAB (OUTPATIENT)
Dept: LAB | Facility: CLINIC | Age: 89
End: 2024-05-02
Payer: MEDICARE

## 2024-05-02 DIAGNOSIS — N18.32 STAGE 3B CHRONIC KIDNEY DISEASE (H): ICD-10-CM

## 2024-05-02 LAB
ALBUMIN MFR UR ELPH: 111.9 MG/DL
ALBUMIN SERPL BCG-MCNC: 4.2 G/DL (ref 3.5–5.2)
ALBUMIN UR-MCNC: 100 MG/DL
ANION GAP SERPL CALCULATED.3IONS-SCNC: 15 MMOL/L (ref 7–15)
APPEARANCE UR: CLEAR
BILIRUB UR QL STRIP: NEGATIVE
BUN SERPL-MCNC: 40.2 MG/DL (ref 8–23)
CALCIUM SERPL-MCNC: 9.8 MG/DL (ref 8.2–9.6)
CHLORIDE SERPL-SCNC: 105 MMOL/L (ref 98–107)
COLOR UR AUTO: YELLOW
CREAT SERPL-MCNC: 2 MG/DL (ref 0.67–1.17)
CREAT UR-MCNC: 112.4 MG/DL
DEPRECATED HCO3 PLAS-SCNC: 21 MMOL/L (ref 22–29)
EGFRCR SERPLBLD CKD-EPI 2021: 31 ML/MIN/1.73M2
ERYTHROCYTE [DISTWIDTH] IN BLOOD BY AUTOMATED COUNT: 15.9 % (ref 10–15)
GLUCOSE SERPL-MCNC: 112 MG/DL (ref 70–99)
GLUCOSE UR STRIP-MCNC: NEGATIVE MG/DL
HCT VFR BLD AUTO: 39.4 % (ref 40–53)
HGB BLD-MCNC: 12.6 G/DL (ref 13.3–17.7)
HGB UR QL STRIP: NEGATIVE
KETONES UR STRIP-MCNC: NEGATIVE MG/DL
LEUKOCYTE ESTERASE UR QL STRIP: NEGATIVE
MCH RBC QN AUTO: 31.1 PG (ref 26.5–33)
MCHC RBC AUTO-ENTMCNC: 32 G/DL (ref 31.5–36.5)
MCV RBC AUTO: 97 FL (ref 78–100)
MUCOUS THREADS #/AREA URNS LPF: PRESENT /LPF
NITRATE UR QL: NEGATIVE
PH UR STRIP: 5.5 [PH] (ref 5–7)
PHOSPHATE SERPL-MCNC: 3.8 MG/DL (ref 2.5–4.5)
PLATELET # BLD AUTO: 186 10E3/UL (ref 150–450)
POTASSIUM SERPL-SCNC: 4.6 MMOL/L (ref 3.4–5.3)
PROT/CREAT 24H UR: 1 MG/MG CR (ref 0–0.2)
RBC # BLD AUTO: 4.05 10E6/UL (ref 4.4–5.9)
RBC URINE: 1 /HPF
SODIUM SERPL-SCNC: 141 MMOL/L (ref 135–145)
SP GR UR STRIP: 1.02 (ref 1–1.03)
UROBILINOGEN UR STRIP-MCNC: NORMAL MG/DL
WBC # BLD AUTO: 8.3 10E3/UL (ref 4–11)
WBC URINE: 1 /HPF

## 2024-05-02 PROCEDURE — 80069 RENAL FUNCTION PANEL: CPT

## 2024-05-02 PROCEDURE — 85027 COMPLETE CBC AUTOMATED: CPT

## 2024-05-02 PROCEDURE — 36415 COLL VENOUS BLD VENIPUNCTURE: CPT

## 2024-05-02 PROCEDURE — 82043 UR ALBUMIN QUANTITATIVE: CPT

## 2024-05-02 PROCEDURE — 84156 ASSAY OF PROTEIN URINE: CPT

## 2024-05-02 PROCEDURE — 81001 URINALYSIS AUTO W/SCOPE: CPT

## 2024-05-03 LAB
CREAT UR-MCNC: 113 MG/DL
MICROALBUMIN UR-MCNC: 671 MG/L
MICROALBUMIN/CREAT UR: 593.81 MG/G CR (ref 0–17)

## 2024-05-08 NOTE — PROGRESS NOTES
CHIEF COMPLAINT:  Pain of the Left Shoulder, Pain of the Right Shoulder, and Consult       HISTORY OF PRESENT ILLNESS  Mr. Soni is a pleasant 89 year old year old male who presents to clinic today with chronic bilateral shoulder pain.  Trevor is companied by his daughter and they both state that he has been having increasing loss of motion, increasing weakness and difficulty with IADLs in regards to bilateral shoulder pain and stiffness.    He is having difficulty with reaching arms overhead to wash his hair, wash his face, and his arms for things like microwave often.    Pain has been worse and this is the reason they are here today as well.  He has a remote history of rotator cuff repair.      Additional history: Currently on Eliquis, history of osteoporosis, history of CKD    Review of Systems:  A 10-point review of systems was obtained and is negative except for as noted in the HPI.       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/21/20    CRF (chronic renal failure), stage 4 (severe) (H)    Gout    CAD -- S/P CABG x 3 in 2001    HTN (hypertension)    Chronic diastolic CHF -- Grade 1-2 Dysfunction Echo 2016    Status post exploratory laparotomy    Carotid stenosis, asymptomatic, bilateral    Osteoporosis with current pathological fracture with routine healing, unspecified osteoporosis type, subsequent encounter    Atrial flutter (H)    Anticoagulated    Slow transit constipation    Adjustment insomnia    Status post coronary angiogram    Aortic stenosis, severe       Current Outpatient Medications   Medication Sig Dispense Refill    acetaminophen (TYLENOL) 325 MG tablet Take 2 tablets (650 mg) by mouth every 6 hours as needed for mild pain or  "other (and adjunct with moderate or severe pain or per patient request)      allopurinol (ZYLOPRIM) 100 MG tablet TAKE 2 TABLETS BY MOUTH  DAILY 180 tablet 3    amLODIPine (NORVASC) 5 MG tablet Take 1 tablet (5 mg) by mouth daily 90 tablet 3    atorvastatin (LIPITOR) 20 MG tablet TAKE 1 TABLET BY MOUTH ONCE  DAILY 90 tablet 3    Cholecalciferol (VITAMIN D3) 25 MCG (1000 UT) CAPS Take 1 capsule by mouth daily OTC dose unknown      ELIQUIS ANTICOAGULANT 2.5 MG tablet TAKE 1 TABLET BY MOUTH TWICE  DAILY 180 tablet 3    fenofibrate (TRIGLIDE/LOFIBRA) 160 MG tablet TAKE 1 TABLET BY MOUTH DAILY 90 tablet 3    ferrous gluconate (FERGON) 324 (38 Fe) MG tablet Take 1 tablet (324 mg) by mouth daily 90 tablet 3    Multiple Vitamins-Minerals (PRESERVISION AREDS 2) CAPS Take 1 capsule by mouth 2 times daily      multivitamin w/minerals (MULTI-VITAMIN) tablet Take 1 tablet by mouth daily      sodium bicarbonate 650 MG tablet Take 1 tablet (650 mg) by mouth 2 times daily 180 tablet 3    spironolactone (ALDACTONE) 25 MG tablet TAKE 1 TABLET BY MOUTH  DAILY 90 tablet 3    triamcinolone (KENALOG) 0.1 % external cream Apply topically 2 times daily 453.6 g 11       Allergies   Allergen Reactions    Avocado     Ciprofloxacin Itching    Penicillins Itching       Family History   Problem Relation Age of Onset    Myocardial Infarction Mother     Rheumatic fever Father     Cerebrovascular Disease Brother        Additional medical/Social/Surgical histories reviewed in [facility] and updated as appropriate.       PHYSICAL EXAM  /68 (BP Location: Right arm, Patient Position: Sitting, Cuff Size: Adult Regular)   Ht 1.753 m (5' 9\")   Wt 65.3 kg (144 lb)   BMI 21.27 kg/m      General  - normal appearance, in no obvious distress  Musculoskeletal -bilateral shoulder  - inspection: Is apparent that his humeral heads have migrated superiorly based on the profile of the shoulders.  Atrophy of his rotator cuff in the scapular region bilaterally.  - " palpation: Tenderness anterior joint lines, normal clavicle, non-tender AC  - ROM:  Shoulder hiking with abduction, painful and limited forward elevation, abduction and internal rotation. External rotation limited to 0 degrees.  - strength: 5/5  strength, 4/5 shoulder strength in abduction due to pain.  5/5 ER and IR  - special tests:  (-) Speed's  (+) Neer  (+) Hawkin's  (+) Sonny's weakness and pain, unable to raise to 90 fully.  (-) Houghton's  (-) apprehension  (-) subscap lift-off  Neuro  - no sensory or motor deficit, grossly normal coordination, normal muscle tone  Skin  - no ecchymosis, erythema, warmth, or induration, no obvious rash  Psych  - interactive, appropriate, normal mood and affect       IMAGING : X-ray bilateral shoulder 4 views today. Final results and radiologist's interpretation, available in the [facility] health record. Images were reviewed with the patient/family members in the office today. My personal interpretation of the performed imaging is severe osteoarthritic changes of bilateral shoulders with high riding humeral heads consistent with severe rotator cuff arthropathy bilaterally.     ASSESSMENT & PLAN  Mr. Soni is a 89 year old year old male history of remote rotator cuff repair of bilateral shoulders, anticoagulant on Eliquis, CKD stage IV, aortic stenosis, osteoporosis, who presents to clinic today with bilateral shoulder pain.    Clinical examination, radiographs as well as history of rotator cuff repair.  Consistent with rotator cuff arthropathy with now severe glenohumeral osteoarthrosis.    Diagnosis: Severe rotator cuff arthropathy bilateral shoulders    Surgical nonsurgical treatment was discussed, briefly surgical is patient and daughter note he is not interested in elective surgery at this time given age.  We discussed nonsurgical measures which would be directed towards pain rather than any improvement in his range of motion.  We discussed unfortunately due to the anatomy  of his shoulders now, it would be almost impossible to regain overhead motion.    We have pivoted our discussion towards analgesia and at this time I recommended ultrasound-guided glenohumeral injections.  We also discussed suprascapular nerve blocks/ablations if this is not successful.  Patient and daughter would like to proceed with getting injections today.      He will continue using Tylenol arthritis strength as needed.  We also discussed modifying his IADLs when able to work lower than the level of his chest height.    It was a pleasure seeing Trevor today.    Glenohumeral Injection - Ultrasound Guided  The patient was informed of the risks and the benefits of the procedure and a written consent was signed.  The patient s left shoulder was prepped with chlorhexidine in sterile fashion.   Local anesthesia was performed using a 27-gauge 1.5-inch needle to administer 3 mL of 1% lidocaine without epi.  40 mg of methylprednisolone suspension was drawn up into a 5 mL syringe with 3 mL of 1% lidocaine w/o Epi.  Injection was performed using sterile technique.  Under ultrasound guidance a 3.5-inch 22-gauge needle was used to enter the left glenohumeral joint.  Posterior approach was used with the patient in lateral recumbent position, arm in neutral position at the side.  Needle placement was visualized and documented with ultrasound.  Ultrasound visualization was necessary due to the small joint space entered.  Injection performed long axis to the probe.  Injection solution visualized within the joint space.  Images were permanently stored for the patient's record.  Patient rolled over onto his left shoulder and patient s right shoulder was prepped with chlorhexidine in sterile fashion.   Local anesthesia was performed using a 27-gauge 1.5-inch needle to administer 3 mL of 1% lidocaine without epi.  40 mg of methylprednisolone suspension was drawn up into a 5 mL syringe with 3 mL of 1% lidocaine w/o Epi.  Injection  was performed using sterile technique.  Under ultrasound guidance a 3.5-inch 22-gauge needle was used to enter the right glenohumeral joint.  Posterior approach was used with the patient in lateral recumbent position, arm in neutral position at the side.  Needle placement was visualized and documented with ultrasound.  Ultrasound visualization was necessary due to the small joint space entered.  Injection performed long axis to the probe.  Injection solution visualized within the joint space.  Images were permanently stored for the patient's record.  There were no complications. The patient tolerated the procedure well. There was negligible bleeding.   The patient was instructed to call or go to the emergency room with any unusual pain, swelling, redness, or if otherwise concerned.  Follow-up for treatment of knee osteoarthritis and at least 4 weeks from today.  DO MARCIO [facility]  Primary Care Sports Medicine  [facility]      Large Joint Injection/Arthocentesis: bilateral glenohumeral    Date/Time: 5/16/2024 5:10 PM    Performed by: Darrick Desai DO  Authorized by: Darrick Desai DO    Indications:  Osteoarthritis and pain  Guidance: ultrasound    Location:  Shoulder  Laterality:  Bilateral      Site:  Bilateral glenohumeral  Medications (Right):  3 mL lidocaine 1 %; 4 mL lidocaine 1 %; 40 mg methylPREDNISolone 40 MG/ML  Medications (Left):  3 mL lidocaine 1 %; 4 mL lidocaine 1 %; 40 mg methylPREDNISolone 40 MG/ML   US images were taken and permanently stored in the medical record.           Darrick Desai DO, CAQSM  Primary Care Sports Medicine

## 2024-05-09 ENCOUNTER — VIRTUAL VISIT (OUTPATIENT)
Dept: NEPHROLOGY | Facility: CLINIC | Age: 89
End: 2024-05-09
Attending: INTERNAL MEDICINE
Payer: MEDICARE

## 2024-05-09 DIAGNOSIS — Z79.01 ANTICOAGULATED: ICD-10-CM

## 2024-05-09 DIAGNOSIS — E87.20 METABOLIC ACIDOSIS: ICD-10-CM

## 2024-05-09 DIAGNOSIS — I35.0 SEVERE AORTIC STENOSIS: ICD-10-CM

## 2024-05-09 DIAGNOSIS — I48.92 ATRIAL FLUTTER, UNSPECIFIED TYPE (H): ICD-10-CM

## 2024-05-09 DIAGNOSIS — I10 PRIMARY HYPERTENSION: ICD-10-CM

## 2024-05-09 DIAGNOSIS — N18.32 STAGE 3B CHRONIC KIDNEY DISEASE (H): ICD-10-CM

## 2024-05-09 DIAGNOSIS — M10.9 GOUT, UNSPECIFIED CAUSE, UNSPECIFIED CHRONICITY, UNSPECIFIED SITE: ICD-10-CM

## 2024-05-09 DIAGNOSIS — N17.9 ACUTE KIDNEY INJURY (H): Primary | ICD-10-CM

## 2024-05-09 DIAGNOSIS — R39.9 LOWER URINARY TRACT SYMPTOMS (LUTS): ICD-10-CM

## 2024-05-09 DIAGNOSIS — I35.0 AORTIC STENOSIS, SEVERE: ICD-10-CM

## 2024-05-09 PROCEDURE — 99214 OFFICE O/P EST MOD 30 MIN: CPT | Mod: 95 | Performed by: INTERNAL MEDICINE

## 2024-05-09 RX ORDER — SODIUM BICARBONATE 650 MG/1
650 TABLET ORAL 2 TIMES DAILY
Qty: 180 TABLET | Refills: 3 | Status: SHIPPED | OUTPATIENT
Start: 2024-05-09

## 2024-05-09 NOTE — PROGRESS NOTES
Virtual Visit Details    Type of service:  Video Visit   Video Start Time: 3:42 PM  Video End Time:3:55 PM    Originating Location (pt. Location): Home    Distant Location (provider location):  On-site  Platform used for Video Visit: [facility]       Nephrology Clinic Note    I was asked to see this patient by Dr. Redman     CC: CKD     HPI: Trevor Soni is a 89 year old male with PMH significant for HTN, CAD, HFpEF, AAA with out rupture, atrial flutter, aortic stenosis, gout who presents for evaluation and management of CKD.     Pt endorsed feeling fairly well in the recent past. No new symptoms today. Up on questioning, denied any recurrent fevers/chills, nausea/vomiting, diarrhea, abdominal pain, chest pain or SOB. Endorses compliant with his medications. No new medications. Does have some aches and pains intermittently, takes tylenol as needed. Denied any significant NSAID use.    11/16/2023   Pt presented to virtual visit with his significant other. Endorsed he is feeling fairly well since out last visit. No new symptoms. No hospitalizations since our last visit. mentioned that he did visit his cardiology and have follow up in a year. Denied other systemic symptoms including recurrent fevers/chills, N/V/D, abdominal pain, chest pain and or SOB. Denied dysuria or change in his urinary habits. Pt and his wife mentioned that he is trying to be hydrated better and has been better than before.    5/9/2024  Pt presented to nephrology virtual visit for CKD follow up. Endorsed feeling farily stable, but noted his urinary symptoms has been bothersome for past couple week if not slightly more. Once he urinate and felt he is done, still some urine dribbles at the end and also felt he had to go to bathroom soon enough making him worry that he did not empty this bladder that good. Otherwise denied any recent illness or hospitalizations. No change in meds or OTC med reported. Denied fevers/chills, N/V/D, abdominal pain,  chest pain or SOB.    - History of urinary symptoms: as mentioned above  - History of Hematuria: no  - Swelling: no  - Hx of UTIs: no  - Hx of stones: one time 20 years ago and none since then  - Rashes/Joint pain: just on left upper arm after scratching, but now slightly improved  - Family hx of kidney disease: no  - NSAID use: no    Allergies   Allergen Reactions     Avocado      Ciprofloxacin Itching     Penicillins Itching       Current Outpatient Medications   Medication Sig Dispense Refill     acetaminophen (TYLENOL) 325 MG tablet Take 2 tablets (650 mg) by mouth every 6 hours as needed for mild pain or other (and adjunct with moderate or severe pain or per patient request)       allopurinol (ZYLOPRIM) 100 MG tablet TAKE 2 TABLETS BY MOUTH  DAILY 180 tablet 3     amLODIPine (NORVASC) 5 MG tablet Take 1 tablet (5 mg) by mouth daily 90 tablet 3     atorvastatin (LIPITOR) 20 MG tablet TAKE 1 TABLET BY MOUTH ONCE  DAILY 90 tablet 3     Cholecalciferol (VITAMIN D3) 25 MCG (1000 UT) CAPS Take 1 capsule by mouth daily OTC dose unknown       ELIQUIS ANTICOAGULANT 2.5 MG tablet TAKE 1 TABLET BY MOUTH TWICE  DAILY 180 tablet 3     fenofibrate (TRIGLIDE/LOFIBRA) 160 MG tablet TAKE 1 TABLET BY MOUTH DAILY 90 tablet 3     ferrous gluconate (FERGON) 324 (38 Fe) MG tablet Take 1 tablet (324 mg) by mouth daily 90 tablet 3     Multiple Vitamins-Minerals (PRESERVISION AREDS 2) CAPS Take 1 capsule by mouth 2 times daily       multivitamin w/minerals (MULTI-VITAMIN) tablet Take 1 tablet by mouth daily       sodium bicarbonate 650 MG tablet Take 1 tablet (650 mg) by mouth 2 times daily 180 tablet 3     spironolactone (ALDACTONE) 25 MG tablet TAKE 1 TABLET BY MOUTH  DAILY 90 tablet 3     triamcinolone (KENALOG) 0.1 % external cream Apply topically 2 times daily 453.6 g 11     No current facility-administered medications for this visit.       Past Medical History:   Diagnosis Date     Arthritis     rhuematoid     Coronary artery  disease     triple bypass      CRF (chronic renal failure)      Gastro-oesophageal reflux disease      Gout      Hyperlipidemia      Hypertension      Nocturia        Past Surgical History:   Procedure Laterality Date     CARDIAC SURGERY       CHOLECYSTECTOMY       COLONOSCOPY       CV CORONARY ANGIOGRAM N/A 2023    Procedure: Coronary Angiogram;  Surgeon: Sujata Ramires MD;  Location:  [facility]     CV PCI N/A 2023    Procedure: Percutaneous Coronary Intervention;  Surgeon: Sujata Ramires MD;  Location:  HE[facility] CARDI[facility] CATH LAB     CV TRANSCATHETER AORTIC VALVE REPLACEMENT-FEMORAL APPROACH N/A 2023    Procedure: Transcatheter Aortic Valve Replacement-Femoral Approach;  Surgeon: Sujata Ramires MD;  Location:  HEART CARDI[facility] CATH LAB     LAPAROTOMY EXPLORATORY N/A 2020    Procedure: EXPLORATORY LAPAROTOMY, BOWEL RESECTION;  Surgeon: Bull Rachel MD;  Location:  OR     LAPAROTOMY, LYSIS ADHESIONS, COMBINED N/A 2020    Procedure: EXPLORATORY LAPAROTOMY WITH LYSIS OF ADHESIONS;  Surgeon: Jose Reed MD;  Location:  OR     ORTHOPEDIC SURGERY       PHACOEMULSIFICATION CLEAR CORNEA WITH TORIC INTRAOCULAR LENS IMPLANT  2012    Procedure:PHACOEMULSIFICATION CLEAR CORNEA WITH TORIC INTRAOCULAR LENS IMPLANT; LEFT PHACOEMULSIFICATION CLEAR CORNEA WITH DELUXE  TORIC INTRAOCULAR LENS IMPLANT ; Surgeon:BRANDO HIGHTOWER; Location[facility]     PHACOEMULSIFICATION CLEAR CORNEA WITH TORIC INTRAOCULAR LENS IMPLANT  2012    Procedure:PHACOEMULSIFICATION CLEAR CORNEA WITH TORIC INTRAOCULAR LENS IMPLANT; RIGHT PHACOEMULSIFICATION CLEAR CORNEA WITH TORIC INTRAOCULAR LENS IMPLANT ; Surgeon:BRANDO HIGHTOWER; Location: [facility]     VASCULAR SURGERY         Social History     Tobacco Use     Smoking status: Former     Types: Cigars     Quit date: 1980     Years since quittin.3     Smokeless tobacco: Never   Vaping Use     Vaping status: Never Used    Substance Use Topics     Alcohol use: Yes     Comment: rare     Drug use: No       Family History   Problem Relation Age of Onset     Myocardial Infarction Mother      Rheumatic fever Father      Cerebrovascular Disease Brother        ROS: A 12 system review of systems was negative other than noted here or above.     Exam:  There were no vitals taken for this visit.    GENERAL APPEARANCE: alert and no distress  EYES:  no scleral icterus  HENT: no gross abnormalities noted  RESP: able to speak in full sentences, no audible wheezing or accessory muscle usage   CV: denied significant edema  SKIN: no rash  NEURO: mentation intact and speech normal  PSYCH: affect normal/bright    Results:    No visits with results within 1 Day(s) from this visit.   Latest known visit with results is:   Lab on 05/15/2023   Component Date Value Ref Range Status     Sodium 05/15/2023 143  136 - 145 mmol/L Final     Potassium 05/15/2023 4.3  3.4 - 5.3 mmol/L Final     Chloride 05/15/2023 110 (H)  98 - 107 mmol/L Final     Carbon Dioxide (CO2) 05/15/2023 21 (L)  22 - 29 mmol/L Final     Anion Gap 05/15/2023 12  7 - 15 mmol/L Final     Urea Nitrogen 05/15/2023 30.2 (H)  8.0 - 23.0 mg/dL Final     Creatinine 05/15/2023 1.62 (H)  0.67 - 1.17 mg/dL Final     Calcium 05/15/2023 9.4  8.8 - 10.2 mg/dL Final     Glucose 05/15/2023 98  70 - 99 mg/dL Final     Alkaline Phosphatase 05/15/2023 100  40 - 129 U/L Final     AST 05/15/2023 27  10 - 50 U/L Final     ALT 05/15/2023 16  10 - 50 U/L Final     Protein Total 05/15/2023 6.5  6.4 - 8.3 g/dL Final     Albumin 05/15/2023 3.8  3.5 - 5.2 g/dL Final     Bilirubin Total 05/15/2023 0.4  <=1.2 mg/dL Final     GFR Estimate 05/15/2023 40 (L)  >60 mL/min/1.73m2 Final    eGFR calculated using 2021 CKD-EPI equation.     WBC Count 05/15/2023 7.9  4.0 - 11.0 10e3/uL Final     RBC Count 05/15/2023 3.55 (L)  4.40 - 5.90 10e6/uL Final     Hemoglobin 05/15/2023 11.0 (L)  13.3 - 17.7 g/dL Final     Hematocrit  05/15/2023 34.4 (L)  40.0 - 53.0 % Final     MCV 05/15/2023 97  78 - 100 fL Final     MCH 05/15/2023 31.0  26.5 - 33.0 pg Final     MCHC 05/15/2023 32.0  31.5 - 36.5 g/dL Final     RDW 05/15/2023 15.2 (H)  10.0 - 15.0 % Final     Platelet Count 05/15/2023 174  150 - 450 10e3/uL Final       Assessment/Plan:     CKD stage IIIb/IV  H/o Recurrent Acute kidney injuries, now with elevated creatinine again  B/l renal stenosis with stents  Pt with no clear baseline creatinine with recurrent SHANICE episodes. His creatinine varies any where between 1.7-2.7 with eGFR in 20's and 30's which put him in CKD stage III b vs stage IV. His repeat creatinine was to 2 (up from 1.7 a month ago) which is rasing concern another episode of SHANICE. Also considering CKD progression but with acute raise concern about SHANICE more.  UA with out sediment but noted to have some protein up to 500 mg of albumin per g of creatinine. CKD likely 2/2 renovascular disease (known PAD, AAA) and progression is 2/2 recurrent SHANICE in setting of HFpEF but now worried about obstruction as well with given urinary symptoms which are more for couple weeks. Renal US in 2022 showed possible InStent stenosis but flow is stable at this point, but no hydronephrosis or nephrolithiasis noted. Did not do bladder emptying test at that time.  - will get renal US with bladder voiding to rule out obstruction  - repeat labs in a month to assess stability of renal function  - maintain BP >120 systolic   - good hydration   - low salt diet   - increase in fresh fruits and vegetables  - continue to avoid NSAID's.     Hypertension :  BP in clinic was WNL and per pt been stable at home as well. No hypotensive symptoms today, continue current therapy with amlo and spironolactone     Electrolytes :  Stable     BMD :  Ismael, phos, Vit D and PTH were WNL     Metabolic acidosis :  Bicarb is low, continue on sodium bicarb 650 mg BID for now, but if repeat labs in a month still shows low, then will  increase as needed.     Anemia :  Hb low but stable. Above goal of JOSEPH therapy. No gross bleeding per report. Continue to monitor for now.     Other problems  CAD  HFpEF, continue on spironolactone, following cardiology  Gout, no recent episodes, continue on allopurinol     Total time spent on the day of clinic visit was 30 min including face to face time of 14 min with pt and his daughter, chart and lab review, image review and documentation as above.    Afsaneh Thomas  Dept of Nephrology and Hypertension  [facility]

## 2024-05-09 NOTE — LETTER
5/9/2024       RE: Trevor Soni  [address] [facility] [address]     Dear Colleague,    Thank you for referring your patient, Trevor Soni, to the [facility] [place] at [facility]. Please see a copy of my visit note below.      Virtual Visit Details    Type of service:  Video Visit   Video Start Time: 3:42 PM  Video End Time:3:55 PM    Originating Location (pt. Location): Home    Distant Location (provider location):  On-site  Platform used for Video Visit: [facility]       Nephrology Clinic Note    I was asked to see this patient by Dr. Redman     CC: CKD     HPI: Trevor Soni is a 89 year old male with PMH significant for HTN, CAD, HFpEF, AAA with out rupture, atrial flutter, aortic stenosis, gout who presents for evaluation and management of CKD.     Pt endorsed feeling fairly well in the recent past. No new symptoms today. Up on questioning, denied any recurrent fevers/chills, nausea/vomiting, diarrhea, abdominal pain, chest pain or SOB. Endorses compliant with his medications. No new medications. Does have some aches and pains intermittently, takes tylenol as needed. Denied any significant NSAID use.    11/16/2023   Pt presented to virtual visit with his significant other. Endorsed he is feeling fairly well since out last visit. No new symptoms. No hospitalizations since our last visit. mentioned that he did visit his cardiology and have follow up in a year. Denied other systemic symptoms including recurrent fevers/chills, N/V/D, abdominal pain, chest pain and or SOB. Denied dysuria or change in his urinary habits. Pt and his wife mentioned that he is trying to be hydrated better and has been better than before.    5/9/2024  Pt presented to nephrology virtual visit for CKD follow up. Endorsed feeling farily stable, but noted his urinary symptoms has been bothersome for past couple week if not slightly more. Once he  urinate and felt he is done, still some urine dribbles at the end and also felt he had to go to bathroom soon enough making him worry that he did not empty this bladder that good. Otherwise denied any recent illness or hospitalizations. No change in meds or OTC med reported. Denied fevers/chills, N/V/D, abdominal pain, chest pain or SOB.    - History of urinary symptoms: as mentioned above  - History of Hematuria: no  - Swelling: no  - Hx of UTIs: no  - Hx of stones: one time 20 years ago and none since then  - Rashes/Joint pain: just on left upper arm after scratching, but now slightly improved  - Family hx of kidney disease: no  - NSAID use: no    Allergies   Allergen Reactions     Avocado      Ciprofloxacin Itching     Penicillins Itching       Current Outpatient Medications   Medication Sig Dispense Refill     acetaminophen (TYLENOL) 325 MG tablet Take 2 tablets (650 mg) by mouth every 6 hours as needed for mild pain or other (and adjunct with moderate or severe pain or per patient request)       allopurinol (ZYLOPRIM) 100 MG tablet TAKE 2 TABLETS BY MOUTH  DAILY 180 tablet 3     amLODIPine (NORVASC) 5 MG tablet Take 1 tablet (5 mg) by mouth daily 90 tablet 3     atorvastatin (LIPITOR) 20 MG tablet TAKE 1 TABLET BY MOUTH ONCE  DAILY 90 tablet 3     Cholecalciferol (VITAMIN D3) 25 MCG (1000 UT) CAPS Take 1 capsule by mouth daily OTC dose unknown       ELIQUIS ANTICOAGULANT 2.5 MG tablet TAKE 1 TABLET BY MOUTH TWICE  DAILY 180 tablet 3     fenofibrate (TRIGLIDE/LOFIBRA) 160 MG tablet TAKE 1 TABLET BY MOUTH DAILY 90 tablet 3     ferrous gluconate (FERGON) 324 (38 Fe) MG tablet Take 1 tablet (324 mg) by mouth daily 90 tablet 3     Multiple Vitamins-Minerals (PRESERVISION AREDS 2) CAPS Take 1 capsule by mouth 2 times daily       multivitamin w/minerals (MULTI-VITAMIN) tablet Take 1 tablet by mouth daily       sodium bicarbonate 650 MG tablet Take 1 tablet (650 mg) by mouth 2 times daily 180 tablet 3      spironolactone (ALDACTONE) 25 MG tablet TAKE 1 TABLET BY MOUTH  DAILY 90 tablet 3     triamcinolone (KENALOG) 0.1 % external cream Apply topically 2 times daily 453.6 g 11     No current facility-administered medications for this visit.       Past Medical History:   Diagnosis Date     Arthritis     rhuematoid     Coronary artery disease     triple bypass 2001     CRF (chronic renal failure)      Gastro-oesophageal reflux disease      Gout      Hyperlipidemia      Hypertension      Nocturia        Past Surgical History:   Procedure Laterality Date     CARDIAC SURGERY       CHOLECYSTECTOMY       COLONOSCOPY       CV CORONARY ANGIOGRAM N/A 1/13/2023    Procedure: Coronary Angiogram;  Surgeon: Sujata Ramires MD;  Location:  HEART CARDIAC CATH LAB     CV PCI N/A 1/13/2023    Procedure: Percutaneous Coronary Intervention;  Surgeon: Sujata Ramires MD;  Location:  HEART CARDIAC CATH LAB     CV TRANSCATHETER AORTIC VALVE REPLACEMENT-FEMORAL APPROACH N/A 4/11/2023    Procedure: Transcatheter Aortic Valve Replacement-Femoral Approach;  Surgeon: Sujata Ramires MD;  Location:  HEART CARDIAC CATH LAB     LAPAROTOMY EXPLORATORY N/A 6/30/2020    Procedure: EXPLORATORY LAPAROTOMY, BOWEL RESECTION;  Surgeon: Bull Rachel MD;  Location:  OR     LAPAROTOMY, LYSIS ADHESIONS, COMBINED N/A 6/21/2020    Procedure: EXPLORATORY LAPAROTOMY WITH LYSIS OF ADHESIONS;  Surgeon: Jose Reed MD;  Location:  OR     ORTHOPEDIC SURGERY       PHACOEMULSIFICATION CLEAR CORNEA WITH TORIC INTRAOCULAR LENS IMPLANT  1/30/2012    Procedure:PHACOEMULSIFICATION CLEAR CORNEA WITH TORIC INTRAOCULAR LENS IMPLANT; LEFT PHACOEMULSIFICATION CLEAR CORNEA WITH DELUXE  TORIC INTRAOCULAR LENS IMPLANT ; Surgeon:BRANDO HIGHTOWER; Location: [facility]     PHACOEMULSIFICATION CLEAR CORNEA WITH TORIC INTRAOCULAR LENS IMPLANT  2/13/2012    Procedure:PHACOEMULSIFICATION CLEAR CORNEA WITH TORIC INTRAOCULAR LENS IMPLANT; RIGHT PHACOEMULSIFICATION  CLEAR CORNEA WITH TORIC INTRAOCULAR LENS IMPLANT ; Surgeon:BRANDO HIGHTOWER; Location:[facility]     VASCULAR SURGERY         Social History     Tobacco Use     Smoking status: Former     Types: Cigars     Quit date: 1980     Years since quittin.3     Smokeless tobacco: Never   Vaping Use     Vaping status: Never Used   Substance Use Topics     Alcohol use: Yes     Comment: rare     Drug use: No       Family History   Problem Relation Age of Onset     Myocardial Infarction Mother      Rheumatic fever Father      Cerebrovascular Disease Brother        ROS: A 12 system review of systems was negative other than noted here or above.     Exam:  There were no vitals taken for this visit.    GENERAL APPEARANCE: alert and no distress  EYES:  no scleral icterus  HENT: no gross abnormalities noted  RESP: able to speak in full sentences, no audible wheezing or accessory muscle usage   CV: denied significant edema  SKIN: no rash  NEURO: mentation intact and speech normal  PSYCH: affect normal/bright    Results:    No visits with results within 1 Day(s) from this visit.   Latest known visit with results is:   Lab on 05/15/2023   Component Date Value Ref Range Status     Sodium 05/15/2023 143  136 - 145 mmol/L Final     Potassium 05/15/2023 4.3  3.4 - 5.3 mmol/L Final     Chloride 05/15/2023 110 (H)  98 - 107 mmol/L Final     Carbon Dioxide (CO2) 05/15/2023 21 (L)  22 - 29 mmol/L Final     Anion Gap 05/15/2023 12  7 - 15 mmol/L Final     Urea Nitrogen 05/15/2023 30.2 (H)  8.0 - 23.0 mg/dL Final     Creatinine 05/15/2023 1.62 (H)  0.67 - 1.17 mg/dL Final     Calcium 05/15/2023 9.4  8.8 - 10.2 mg/dL Final     Glucose 05/15/2023 98  70 - 99 mg/dL Final     Alkaline Phosphatase 05/15/2023 100  40 - 129 U/L Final     AST 05/15/2023 27  10 - 50 U/L Final     ALT 05/15/2023 16  10 - 50 U/L Final     Protein Total 05/15/2023 6.5  6.4 - 8.3 g/dL Final     Albumin 05/15/2023 3.8  3.5 - 5.2 g/dL Final     Bilirubin Total 05/15/2023 0.4   <=1.2 mg/dL Final     GFR Estimate 05/15/2023 40 (L)  >60 mL/min/1.73m2 Final    eGFR calculated using 2021 CKD-EPI equation.     WBC Count 05/15/2023 7.9  4.0 - 11.0 10e3/uL Final     RBC Count 05/15/2023 3.55 (L)  4.40 - 5.90 10e6/uL Final     Hemoglobin 05/15/2023 11.0 (L)  13.3 - 17.7 g/dL Final     Hematocrit 05/15/2023 34.4 (L)  40.0 - 53.0 % Final     MCV 05/15/2023 97  78 - 100 fL Final     MCH 05/15/2023 31.0  26.5 - 33.0 pg Final     MCHC 05/15/2023 32.0  31.5 - 36.5 g/dL Final     RDW 05/15/2023 15.2 (H)  10.0 - 15.0 % Final     Platelet Count 05/15/2023 174  150 - 450 10e3/uL Final       Assessment/Plan:     CKD stage IIIb/IV  H/o Recurrent Acute kidney injuries, now with elevated creatinine again  B/l renal stenosis with stents  Pt with no clear baseline creatinine with recurrent SHANICE episodes. His creatinine varies any where between 1.7-2.7 with eGFR in 20's and 30's which put him in CKD stage III b vs stage IV. His repeat creatinine was to 2 (up from 1.7 a month ago) which is rasing concern another episode of SHANICE. Also considering CKD progression but with acute raise concern about SHANICE more.  UA with out sediment but noted to have some protein up to 500 mg of albumin per g of creatinine. CKD likely 2/2 renovascular disease (known PAD, AAA) and progression is 2/2 recurrent SHANICE in setting of HFpEF but now worried about obstruction as well with given urinary symptoms which are more for couple weeks. Renal US in 2022 showed possible InStent stenosis but flow is stable at this point, but no hydronephrosis or nephrolithiasis noted. Did not do bladder emptying test at that time.  - will get renal US with bladder voiding to rule out obstruction  - repeat labs in a month to assess stability of renal function  - maintain BP >120 systolic   - good hydration   - low salt diet   - increase in fresh fruits and vegetables  - continue to avoid NSAID's.     Hypertension :  BP in clinic was WNL and per pt been stable at  home as well. No hypotensive symptoms today, continue current therapy with amlo and spironolactone     Electrolytes :  Stable     BMD :  Ismael, phos, Vit D and PTH were WNL     Metabolic acidosis :  Bicarb is low, continue on sodium bicarb 650 mg BID for now, but if repeat labs in a month still shows low, then will increase as needed.     Anemia :  Hb low but stable. Above goal of JOSEPH therapy. No gross bleeding per report. Continue to monitor for now.     Other problems  CAD  HFpEF, continue on spironolactone, following cardiology  Gout, no recent episodes, continue on allopurinol     Total time spent on the day of clinic visit was 30 min including face to face time of 14 min with pt and his daughter, chart and lab review, image review and documentation as above.    Afsaneh Thomas  Dept of Nephrology and Hypertension  [facility]            Again, thank you for allowing me to participate in the care of your patient.      Sincerely,    Afsaneh Thomas MD

## 2024-05-09 NOTE — NURSING NOTE
Is the patient currently in the state of [place]? YES    Visit mode:VIDEO    If the visit is dropped, the patient can be reconnected by: VIDEO VISIT: Text to cell phone:   Telephone Information:   Mobile [phone]       Will anyone else be joining the visit? YES: How would they like to receive their invitation? Send to e-mail: PT's daughter will be on camera  (If patient encounters technical issues they should call [phone]    How would you like to obtain your AVS? MyChart    Are changes needed to the allergy or medication list? No    Are refills needed on medications prescribed by this physician? YES    Reason for visit: RECHECK    Anthony VIDALES

## 2024-05-16 ENCOUNTER — ANCILLARY PROCEDURE (OUTPATIENT)
Dept: GENERAL RADIOLOGY | Facility: CLINIC | Age: 89
End: 2024-05-16
Attending: FAMILY MEDICINE
Payer: MEDICARE

## 2024-05-16 ENCOUNTER — OFFICE VISIT (OUTPATIENT)
Dept: ORTHOPEDICS | Facility: CLINIC | Age: 89
End: 2024-05-16
Attending: INTERNAL MEDICINE
Payer: MEDICARE

## 2024-05-16 VITALS
BODY MASS INDEX: 21.33 KG/M2 | DIASTOLIC BLOOD PRESSURE: 68 MMHG | SYSTOLIC BLOOD PRESSURE: 128 MMHG | HEIGHT: 69 IN | WEIGHT: 144 LBS

## 2024-05-16 DIAGNOSIS — M25.512 CHRONIC LEFT SHOULDER PAIN: ICD-10-CM

## 2024-05-16 DIAGNOSIS — M12.811 ROTATOR CUFF ARTHROPATHY OF BOTH SHOULDERS: ICD-10-CM

## 2024-05-16 DIAGNOSIS — M25.511 CHRONIC RIGHT SHOULDER PAIN: Primary | ICD-10-CM

## 2024-05-16 DIAGNOSIS — G89.29 CHRONIC LEFT SHOULDER PAIN: ICD-10-CM

## 2024-05-16 DIAGNOSIS — M25.511 CHRONIC RIGHT SHOULDER PAIN: ICD-10-CM

## 2024-05-16 DIAGNOSIS — M12.812 ROTATOR CUFF ARTHROPATHY OF BOTH SHOULDERS: ICD-10-CM

## 2024-05-16 DIAGNOSIS — G89.29 CHRONIC RIGHT SHOULDER PAIN: Primary | ICD-10-CM

## 2024-05-16 DIAGNOSIS — G89.29 CHRONIC RIGHT SHOULDER PAIN: ICD-10-CM

## 2024-05-16 PROCEDURE — 20611 DRAIN/INJ JOINT/BURSA W/US: CPT | Mod: 50 | Performed by: FAMILY MEDICINE

## 2024-05-16 PROCEDURE — 73030 X-RAY EXAM OF SHOULDER: CPT | Mod: TC | Performed by: RADIOLOGY

## 2024-05-16 PROCEDURE — 99204 OFFICE O/P NEW MOD 45 MIN: CPT | Mod: 25 | Performed by: FAMILY MEDICINE

## 2024-05-16 RX ORDER — LIDOCAINE HYDROCHLORIDE 10 MG/ML
4 INJECTION, SOLUTION INFILTRATION; PERINEURAL
Status: SHIPPED | OUTPATIENT
Start: 2024-05-16

## 2024-05-16 RX ORDER — LIDOCAINE HYDROCHLORIDE 10 MG/ML
3 INJECTION, SOLUTION INFILTRATION; PERINEURAL
Status: SHIPPED | OUTPATIENT
Start: 2024-05-16

## 2024-05-16 RX ORDER — METHYLPREDNISOLONE ACETATE 40 MG/ML
40 INJECTION, SUSPENSION INTRA-ARTICULAR; INTRALESIONAL; INTRAMUSCULAR; SOFT TISSUE
Status: SHIPPED | OUTPATIENT
Start: 2024-05-16

## 2024-05-16 RX ADMIN — LIDOCAINE HYDROCHLORIDE 3 ML: 10 INJECTION, SOLUTION INFILTRATION; PERINEURAL at 17:10

## 2024-05-16 RX ADMIN — METHYLPREDNISOLONE ACETATE 40 MG: 40 INJECTION, SUSPENSION INTRA-ARTICULAR; INTRALESIONAL; INTRAMUSCULAR; SOFT TISSUE at 17:10

## 2024-05-16 RX ADMIN — LIDOCAINE HYDROCHLORIDE 4 ML: 10 INJECTION, SOLUTION INFILTRATION; PERINEURAL at 17:10

## 2024-05-16 NOTE — PATIENT INSTRUCTIONS
Problem: Patient Care Overview  Goal: Plan of Care Review  Outcome: Ongoing (interventions implemented as appropriate)  Infant remains in open crib; VSS on RA. 46ml feedings continued and are being tolerated well w/ no emesis. Infant didn't not complete either bottle attempts this shift.  Infant took taking 75% or more of the first incomplete feeding and less of the second. Vitamin A&D ointment applied to groin area. Mother updated on infants plan of care via phone. Will continue to monitor.                Thank you for choosing [facility]    [facility] LOCATIONS  [place] [address] [facility], [address]th [place]   [place], [place], [address] [place] [address]   [phone]       APPOINTMENTS: [phone]    CARE QUESTIONS: [phone],    BILLING QUESTIONS: [phone]    FAX NUMBER: [phone]        Follow up: in about 1 month for cortisone injection in your knees      1. Chronic right shoulder pain    2. Chronic left shoulder pain            Steroid Injection Information:    A corticosteroid injection was administered at your visit today.  The area of injection may be sore, slightly swollen for 1-2 days afterward.  Immediately after injection, you may have an area of numbness, which is caused by the local anesthetic, lidocaine (similar to novacaine) in the shot.  This medicine will wear off in about 4 hours.  In addition to the lidocaine, a steroid medication was injected, called triamcinolone acetate.  This medication is intended to provide long-acting antiinflammatory / pain relief.  It may take 2-5 days for this medication to provide noticeable relief.      After an injection, Dr. Desai recommends:    Protecting the area wearing a bandage or gauze pad for at least 24 hours.    Using ice; ice bag or frozen vegetables over the injection site every one to two hours when able (for 15 minutes at a time).      Avoid any strenuous activity even if the knee is already feeling better immediately afterward. Light stretching is encouraged but refrain from home exercise program and increasing activity level for about 48 hours.    Avoid soaking in a hot tub, bath, or pool for 48 hours after injection. Showering is fine!    Watch for signs of infection, including increasing pain, redness and swelling that last more than 48 hours after injection.

## 2024-05-16 NOTE — LETTER
5/16/2024         RE: Trevor Soni  [address] [place]. \[facility]"" [id number]        Dear Colleague,    Thank you for referring your patient, Trevor Soni, to the [facility] [place]. Please see a copy of my visit note below.    CHIEF COMPLAINT:  Pain of the Left Shoulder, Pain of the Right Shoulder, and Consult       HISTORY OF PRESENT ILLNESS  Mr. Soni is a pleasant 89 year old year old male who presents to clinic today with chronic bilateral shoulder pain.  Trevor is companied by his daughter and they both state that he has been having increasing loss of motion, increasing weakness and difficulty with IADLs in regards to bilateral shoulder pain and stiffness.    He is having difficulty with reaching arms overhead to wash his hair, wash his face, and his arms for things like microwave often.    Pain has been worse and this is the reason they are here today as well.  He has a remote history of rotator cuff repair.      Additional history: Currently on Eliquis, history of osteoporosis, history of CKD    Review of Systems:  A 10-point review of systems was obtained and is negative except for as noted in the HPI.       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/21/20     CRF (chronic renal failure), stage 4 (severe) (H)     Gout     CAD -- S/P CABG x 3 in 2001     HTN (hypertension)     Chronic diastolic CHF -- Grade 1-2 Dysfunction Echo 2016     Status post exploratory laparotomy     Carotid stenosis, asymptomatic, bilateral     Osteoporosis with current pathological fracture with routine healing, unspecified osteoporosis type, subsequent encounter     Atrial flutter (H)     Anticoagulated     Slow  "transit constipation     Adjustment insomnia     Status post coronary angiogram     Aortic stenosis, severe       Current Outpatient Medications   Medication Sig Dispense Refill     acetaminophen (TYLENOL) 325 MG tablet Take 2 tablets (650 mg) by mouth every 6 hours as needed for mild pain or other (and adjunct with moderate or severe pain or per patient request)       allopurinol (ZYLOPRIM) 100 MG tablet TAKE 2 TABLETS BY MOUTH  DAILY 180 tablet 3     amLODIPine (NORVASC) 5 MG tablet Take 1 tablet (5 mg) by mouth daily 90 tablet 3     atorvastatin (LIPITOR) 20 MG tablet TAKE 1 TABLET BY MOUTH ONCE  DAILY 90 tablet 3     Cholecalciferol (VITAMIN D3) 25 MCG (1000 UT) CAPS Take 1 capsule by mouth daily OTC dose unknown       ELIQUIS ANTICOAGULANT 2.5 MG tablet TAKE 1 TABLET BY MOUTH TWICE  DAILY 180 tablet 3     fenofibrate (TRIGLIDE/LOFIBRA) 160 MG tablet TAKE 1 TABLET BY MOUTH DAILY 90 tablet 3     ferrous gluconate (FERGON) 324 (38 Fe) MG tablet Take 1 tablet (324 mg) by mouth daily 90 tablet 3     Multiple Vitamins-Minerals (PRESERVISION AREDS 2) CAPS Take 1 capsule by mouth 2 times daily       multivitamin w/minerals (MULTI-VITAMIN) tablet Take 1 tablet by mouth daily       sodium bicarbonate 650 MG tablet Take 1 tablet (650 mg) by mouth 2 times daily 180 tablet 3     spironolactone (ALDACTONE) 25 MG tablet TAKE 1 TABLET BY MOUTH  DAILY 90 tablet 3     triamcinolone (KENALOG) 0.1 % external cream Apply topically 2 times daily 453.6 g 11       Allergies   Allergen Reactions     Avocado      Ciprofloxacin Itching     Penicillins Itching       Family History   Problem Relation Age of Onset     Myocardial Infarction Mother      Rheumatic fever Father      Cerebrovascular Disease Brother        Additional medical/Social/Surgical histories reviewed in [facility] and updated as appropriate.       PHYSICAL EXAM  /68 (BP Location: Right arm, Patient Position: Sitting, Cuff Size: Adult Regular)   Ht 1.753 m (5' 9\")   Wt " 65.3 kg (144 lb)   BMI 21.27 kg/m      General  - normal appearance, in no obvious distress  Musculoskeletal -bilateral shoulder  - inspection: Is apparent that his humeral heads have migrated superiorly based on the profile of the shoulders.  Atrophy of his rotator cuff in the scapular region bilaterally.  - palpation: Tenderness anterior joint lines, normal clavicle, non-tender AC  - ROM:  Shoulder hiking with abduction, painful and limited forward elevation, abduction and internal rotation. External rotation limited to 0 degrees.  - strength: 5/5  strength, 4/5 shoulder strength in abduction due to pain.  5/5 ER and IR  - special tests:  (-) Speed's  (+) Neer  (+) Hawkin's  (+) Sonny's weakness and pain, unable to raise to 90 fully.  (-) [place]'s  (-) apprehension  (-) subscap lift-off  Neuro  - no sensory or motor deficit, grossly normal coordination, normal muscle tone  Skin  - no ecchymosis, erythema, warmth, or induration, no obvious rash  Psych  - interactive, appropriate, normal mood and affect       IMAGING : X-ray bilateral shoulder 4 views today. Final results and radiologist's interpretation, available in the [facility] health record. Images were reviewed with the patient/family members in the office today. My personal interpretation of the performed imaging is severe osteoarthritic changes of bilateral shoulders with high riding humeral heads consistent with severe rotator cuff arthropathy bilaterally.     ASSESSMENT & PLAN  Mr. Soni is a 89 year old year old male history of remote rotator cuff repair of bilateral shoulders, anticoagulant on Eliquis, CKD stage IV, aortic stenosis, osteoporosis, who presents to clinic today with bilateral shoulder pain.    Clinical examination, radiographs as well as history of rotator cuff repair.  Consistent with rotator cuff arthropathy with now severe glenohumeral osteoarthrosis.    Diagnosis: Severe rotator cuff arthropathy bilateral shoulders    Surgical  nonsurgical treatment was discussed, briefly surgical is patient and daughter note he is not interested in elective surgery at this time given age.  We discussed nonsurgical measures which would be directed towards pain rather than any improvement in his range of motion.  We discussed unfortunately due to the anatomy of his shoulders now, it would be almost impossible to regain overhead motion.    We have pivoted our discussion towards analgesia and at this time I recommended ultrasound-guided glenohumeral injections.  We also discussed suprascapular nerve blocks/ablations if this is not successful.  Patient and daughter would like to proceed with getting injections today.      He will continue using Tylenol arthritis strength as needed.  We also discussed modifying his IADLs when able to work lower than the level of his chest height.    It was a pleasure seeing Trevor today.    Glenohumeral Injection - Ultrasound Guided  The patient was informed of the risks and the benefits of the procedure and a written consent was signed.  The patient s left shoulder was prepped with chlorhexidine in sterile fashion.   Local anesthesia was performed using a 27-gauge 1.5-inch needle to administer 3 mL of 1% lidocaine without epi.  40 mg of methylprednisolone suspension was drawn up into a 5 mL syringe with 3 mL of 1% lidocaine w/o Epi.  Injection was performed using sterile technique.  Under ultrasound guidance a 3.5-inch 22-gauge needle was used to enter the left glenohumeral joint.  Posterior approach was used with the patient in lateral recumbent position, arm in neutral position at the side.  Needle placement was visualized and documented with ultrasound.  Ultrasound visualization was necessary due to the small joint space entered.  Injection performed long axis to the probe.  Injection solution visualized within the joint space.  Images were permanently stored for the patient's record.  Patient rolled over onto his left  shoulder and patient s right shoulder was prepped with chlorhexidine in sterile fashion.   Local anesthesia was performed using a 27-gauge 1.5-inch needle to administer 3 mL of 1% lidocaine without epi.  40 mg of methylprednisolone suspension was drawn up into a 5 mL syringe with 3 mL of 1% lidocaine w/o Epi.  Injection was performed using sterile technique.  Under ultrasound guidance a 3.5-inch 22-gauge needle was used to enter the right glenohumeral joint.  Posterior approach was used with the patient in lateral recumbent position, arm in neutral position at the side.  Needle placement was visualized and documented with ultrasound.  Ultrasound visualization was necessary due to the small joint space entered.  Injection performed long axis to the probe.  Injection solution visualized within the joint space.  Images were permanently stored for the patient's record.  There were no complications. The patient tolerated the procedure well. There was negligible bleeding.   The patient was instructed to call or go to the emergency room with any unusual pain, swelling, redness, or if otherwise concerned.  Follow-up for treatment of knee osteoarthritis and at least 4 weeks from today.  Darrick Desai DO [facility]  Primary Care Sports Medicine  [facility]      Large Joint Injection/Arthocentesis: bilateral glenohumeral    Date/Time: 5/16/2024 5:10 PM    Performed by: Darrick Desai DO  Authorized by: Darrick Desai DO    Indications:  Osteoarthritis and pain  Guidance: ultrasound    Location:  Shoulder  Laterality:  Bilateral      Site:  Bilateral glenohumeral  Medications (Right):  3 mL lidocaine 1 %; 4 mL lidocaine 1 %; 40 mg methylPREDNISolone 40 MG/ML  Medications (Left):  3 mL lidocaine 1 %; 4 mL lidocaine 1 %; 40 mg methylPREDNISolone 40 MG/ML   US images were taken and permanently stored in the medical record.           Darrick Desai DO, [facility]  Primary Care Sports Medicine       Again, thank you  for allowing me to participate in the care of your patient.        Sincerely,        Darrick Desai, DO

## 2024-06-25 DIAGNOSIS — E78.5 HYPERLIPIDEMIA LDL GOAL <100: ICD-10-CM

## 2024-06-25 DIAGNOSIS — I15.9 SECONDARY HYPERTENSION: ICD-10-CM

## 2024-06-25 DIAGNOSIS — I10 BENIGN ESSENTIAL HYPERTENSION: ICD-10-CM

## 2024-06-25 RX ORDER — SPIRONOLACTONE 25 MG/1
TABLET ORAL
Qty: 90 TABLET | Refills: 3 | OUTPATIENT
Start: 2024-06-25

## 2024-06-25 RX ORDER — ALLOPURINOL 100 MG/1
TABLET ORAL
Qty: 180 TABLET | Refills: 3 | OUTPATIENT
Start: 2024-06-25

## 2024-07-30 ENCOUNTER — TRANSFERRED RECORDS (OUTPATIENT)
Dept: HEALTH INFORMATION MANAGEMENT | Facility: CLINIC | Age: 89
End: 2024-07-30
Payer: MEDICARE

## 2024-07-30 ENCOUNTER — TELEPHONE (OUTPATIENT)
Dept: FAMILY MEDICINE | Facility: CLINIC | Age: 89
End: 2024-07-30
Payer: MEDICARE

## 2024-07-30 DIAGNOSIS — I65.21 CAROTID STENOSIS, RIGHT: ICD-10-CM

## 2024-07-30 DIAGNOSIS — G45.3 AMAUROSIS FUGAX: Primary | ICD-10-CM

## 2024-07-30 NOTE — TELEPHONE ENCOUNTER
Can you call Trevor and tell him that based on Dr. Rasmussen's concerns, I think he should update us carotid artery ultrasound because it has been over a year since those vessels have been checked.  I ordered the ultrasound please ask him to schedule the test in the next 7-10 days.

## 2024-07-30 NOTE — TELEPHONE ENCOUNTER
----- Message from Charisse Rasmussen sent at 7/30/2024  5:11 PM CDT -----  Regarding: Amaurosis  Hi Dr. Redman,    I saw Mr. Soni today at [facility]. He had an episode of right eye vision loss, consistent with amaurosis, that occurred 2 days ago. His eye exam is stable - he has advanced glaucoma followed by colleague, Dr. David, and this appears controlled. I did not see any plaques in the the vessels.    Per his record, he's got a complex cardiac history (CAD s/p CABG x 3, atrial fibrillation, carotid disease s/p endarterectomy, chronic diastolic heart failure, and severe aortic stenosis s/p TAVR).     His daughter was with him, and she tells me he is followed regularly by you and by his cardiologist. He is anticoagulated on Eliquis. We discussed ED evaluation, but they declined given that he has these known issues which are being actively managed.    Per the patient and his daughter, it sounds as though his embolic risk factors are controlled, but I wanted to confirm this with you and see if he is up to date on his monitoring/imaging or if he should have additional work-up at this point.     Thank you! Appreciate your thoughts!    Simona Rasmussen  Ophthalmology

## 2024-08-01 ENCOUNTER — MYC MEDICAL ADVICE (OUTPATIENT)
Dept: FAMILY MEDICINE | Facility: CLINIC | Age: 89
End: 2024-08-01
Payer: MEDICARE

## 2024-08-01 NOTE — TELEPHONE ENCOUNTER
Writer contacted Bianka and relayed PCP's message. Bianka agrees to schedule appointment for carotid US.    Reference:

## 2024-08-01 NOTE — TELEPHONE ENCOUNTER
"Called patients daughter. She states this was the third time she has been called today regarding this. She then stated, \"How can I trust that the appointment was even made for sure now because I did that earlier too!?\"    Writer advised that the appointment was in fact made and apologized for any confusion.  "

## 2024-08-06 ENCOUNTER — ANCILLARY PROCEDURE (OUTPATIENT)
Dept: ULTRASOUND IMAGING | Facility: CLINIC | Age: 89
End: 2024-08-06
Attending: INTERNAL MEDICINE
Payer: MEDICARE

## 2024-08-06 DIAGNOSIS — G45.3 AMAUROSIS FUGAX: ICD-10-CM

## 2024-08-06 PROCEDURE — 93880 EXTRACRANIAL BILAT STUDY: CPT

## 2024-08-06 NOTE — RESULT ENCOUNTER NOTE
The following letter pertains to your most recent diagnostic tests:    As we discussed by phone, there has been some progression of narrowing of the right carotid artery since your last check.  Because of the episode of vision changes that Dr. Rasmussen was concerned about and the observed progression of the blockage in the right carotid artery that we see on this ultrasound, I think you should consult with a vascular surgeon about whether to intervene upon this blockage to reduce your risk of potential stroke in the future.  Until you see the vascular surgeon, I think you should add a baby aspirin to your current medication regimen.  I have sent them a consult request on your behalf, you can call them at the number that I shared with you by telephone to schedule an appointment.  Please try to schedule an appointment as soon as possible.  If vision symptoms recur between then and now, please go to the emergency department.    Sincerely,    Dr. Redman

## 2024-08-07 ENCOUNTER — TELEPHONE (OUTPATIENT)
Dept: OTHER | Facility: CLINIC | Age: 89
End: 2024-08-07
Payer: MEDICARE

## 2024-08-07 DIAGNOSIS — I65.29 CAROTID STENOSIS: Primary | ICD-10-CM

## 2024-08-07 NOTE — TELEPHONE ENCOUNTER
Referral received via [employer] on 8/6/24.    Pt referred to [facility] by Abdoulaye Redman MD for Amaurosis fugax and right carotid stenosis.    Routing to scheduling to coordinate the following:    NEW VASCULAR PATIENT consult with Vascular Surgery  Please schedule this within 1-2 days    Appt note:  Pt referred to [facility] by Abdoulaye Redman MD for Amaurosis fugax and right carotid stenosis.   NO CTA ORDERED DUE TO PATIENTS RENAL DISEASE.     Aviva LASSITER, RN    [facility]  Office: [phone]  Fax: [phone]

## 2024-08-09 ENCOUNTER — OFFICE VISIT (OUTPATIENT)
Dept: OTHER | Facility: CLINIC | Age: 89
End: 2024-08-09
Attending: SURGERY
Payer: MEDICARE

## 2024-08-09 ENCOUNTER — TELEPHONE (OUTPATIENT)
Dept: OTHER | Facility: CLINIC | Age: 89
End: 2024-08-09
Payer: MEDICARE

## 2024-08-09 VITALS — SYSTOLIC BLOOD PRESSURE: 158 MMHG | HEART RATE: 80 BPM | DIASTOLIC BLOOD PRESSURE: 72 MMHG

## 2024-08-09 DIAGNOSIS — N18.32 STAGE 3B CHRONIC KIDNEY DISEASE (H): ICD-10-CM

## 2024-08-09 DIAGNOSIS — G45.3 AMAUROSIS FUGAX: ICD-10-CM

## 2024-08-09 DIAGNOSIS — I48.20 CHRONIC ATRIAL FIBRILLATION (H): ICD-10-CM

## 2024-08-09 DIAGNOSIS — I65.21 SYMPTOMATIC STENOSIS OF RIGHT CAROTID ARTERY: Primary | ICD-10-CM

## 2024-08-09 DIAGNOSIS — Z95.2 HISTORY OF TRANSCATHETER AORTIC VALVE REPLACEMENT (TAVR): ICD-10-CM

## 2024-08-09 DIAGNOSIS — Z95.1 HISTORY OF CORONARY ARTERY BYPASS GRAFT: ICD-10-CM

## 2024-08-09 DIAGNOSIS — I65.21 CAROTID STENOSIS, RIGHT: ICD-10-CM

## 2024-08-09 DIAGNOSIS — Z98.890 HISTORY OF RIGHT-SIDED CAROTID ENDARTERECTOMY: ICD-10-CM

## 2024-08-09 PROCEDURE — G0463 HOSPITAL OUTPT CLINIC VISIT: HCPCS | Performed by: SURGERY

## 2024-08-09 PROCEDURE — 99205 OFFICE O/P NEW HI 60 MIN: CPT | Performed by: SURGERY

## 2024-08-09 RX ORDER — ASPIRIN 81 MG/1
81 TABLET ORAL DAILY
Status: ON HOLD | COMMUNITY
End: 2024-08-14

## 2024-08-09 RX ORDER — DORZOLAMIDE HCL 20 MG/ML
1 SOLUTION/ DROPS OPHTHALMIC 2 TIMES DAILY
COMMUNITY

## 2024-08-09 ASSESSMENT — ENCOUNTER SYMPTOMS
ALLERGIC/IMMUNOLOGIC NEGATIVE: 1
IRREGULAR HEARTBEAT: 1
NEUROLOGICAL NEGATIVE: 1
CONSTITUTIONAL NEGATIVE: 1
EYES NEGATIVE: 1
PSYCHIATRIC NEGATIVE: 1
RESPIRATORY NEGATIVE: 1
BRUISES/BLEEDS EASILY: 1
ENDOCRINE NEGATIVE: 1
GASTROINTESTINAL NEGATIVE: 1

## 2024-08-09 NOTE — NURSING NOTE
Patient education completed with patient and daughter in clinic on 8/9/24.    Procedure: Redo right carotid endarterectomy with EEG  Diagnosis: Symptomatic right carotid artery stenosis  Anticoagulation Instruction: hold Eliquis 1 day prior, continue ASA  GLP-1 Agonists Instruction: n/a  Pre-Operative Physical Exam: Patient instructed they will need to have a pre-op physical exam within 30 days of your procedure.   Allergies:  Updated in Epic  Bowel Prep: NPO for solid 8 hours prior to arrival time and NPO for clear liquids 2 hours prior to arrival time   Post Procedure Education: [facility] post-procedure fact sheet reviewed with patient.  Showering instructions provided: Yes  Learner(s): patient and daughter  Method: Listening, Reading  Barriers to Learning: No Barrier  Outcome: Patient did verbalize understanding of above education.    [x] Written stroke educational materials given to patient and family including:   - Learning about BE FAST: Stroke Warning Signs and Learning about Risk Factors for Stroke (Healthwise)        Nidhi Mosher, MELON, RN, CV-BC  [facility] [place]

## 2024-08-09 NOTE — PATIENT INSTRUCTIONS
"  Carotid Artery Disease      What is carotid artery disease?  A carotid artery on each side of the neck supplies blood to the brain. Carotid artery disease occurs when a substance called plaque builds up in either or both arteries. The buildup may narrow the artery and limit blood flow to the brain. If this plaque breaks open, it may form a blood clot. Or pieces of the plaque may break off. A piece of plaque or a blood clot could move to the brain and cause a stroke or transient ischemic attack (TIA).    The narrowing in an artery is called stenosis. The more narrow an artery becomes, the greater the risk of stroke or TIA.        What causes it?  Carotid artery disease is caused by a process called hardening of the arteries, or atherosclerosis. Plaque builds up inside the carotid arteries. Things that can lead to this buildup include:    Smoking.  High blood pressure.  High cholesterol.  Diabetes.  A family history of hardening of the arteries.    What are the symptoms?  Many people have no symptoms. In some people, a doctor can hear a sound in their neck called a bruit (pronounced \"broo-EE\"). This is a whooshing sound that happens when a carotid artery is narrowed.    For some people, a transient ischemic attack (TIA) or stroke is the first sign of the disease.    Know the warning signs and symptoms of stroke: BE FAST     B = Balance loss   E = Eyesight changes   F = Facial droop or numbness   A = Arm or leg weakness   S = Speech difficulty, slurred speech   T = Time to call 911 for help    How is carotid artery disease treated?  The goal of treatment is to lower your risk of a stroke. Treatment depends on whether you have symptoms and how narrow your arteries are. You probably will take medicine. You also will be encouraged to have a heart-healthy lifestyle. Some people also have a procedure to lower their risk.    Medicines  You may take aspirin or another medicine to prevent blood clots. You will likely also " take medicine to lower cholesterol. You may also take medicine to help manage blood pressure.    Heart-healthy lifestyle  A heart-healthy lifestyle can help lower your risk of stroke.    Don't smoke. Avoid secondhand smoke too.  Eat heart-healthy foods.  Be active. Ask your doctor what type of exercise is safe for you.  Stay at a healthy weight. Lose weight if you need to.  Manage other health problems, such as diabetes. If you think you may have a problem with alcohol or drug use, talk to your doctor.  Get vaccinated against COVID-19, the flu, and pneumonia.    Surgery or stenting  Surgery in the arteries is called carotid endarterectomy. The doctor makes a cut in the neck and takes the plaque out of the artery.    Some people have a stent placed inside a carotid artery. A stent may be inserted during a catheter procedure. In this procedure, a doctor puts a thin tube, called a catheter, into a blood vessel in your groin or arm. The doctor threads the tube up to the carotid artery in the neck. The catheter is used to place the stent. In another type of procedure, a stent is placed in the artery through a cut in the neck.    Surgery and stenting may help lower your risk of a stroke. But they also have a risk of serious problems. You and your doctor can decide together if you want to have surgery or stenting.    Current as of: June 24, 2023  Author: [employer] / [employer] StaffYou are leaving this website for information purposes only  Clinical Review BoardYou are leaving this website for information purposes only  All [employer] education is reviewed by a team that includes physicians, nurses, advanced practitioners, registered dieticians, and other healthcare professionals.    Your risk factors for stroke or TIA (transient ischemic attack):     Your Risk Factors Your Results Goals Additional education   Please scan QR code   [x] High blood pressure BP (!) 158/72 (BP Location: Right arm, Patient Position: Chair, Cuff Size: Adult  "Regular)   Pulse 80    Less than 120/80    [x] Cholesterol          Total Cholesterol   Date Value Ref Range Status   10/25/2021 96 <200 mg/dL Final   2021 107 <200 mg/dL Final    Less than 150     Triglycerides   Triglycerides   Date Value Ref Range Status   10/25/2021 111 <150 mg/dL Final   2021 76 <150 mg/dL Final     Comment:     Fasting specimen    Less than 150     LDL LDL Cholesterol Calculated   Date Value Ref Range Status   10/25/2021 50 <=100 mg/dL Final   2021 58 <100 mg/dL Final     Comment:     Desirable:       <100 mg/dl      Less than 70     HDL HDL Cholesterol   Date Value Ref Range Status   2021 34 (L) >39 mg/dL Final     Direct Measure HDL   Date Value Ref Range Status   10/25/2021 24 (L) >=40 mg/dL Final    Greater than 40 (men)  Greater than 50 (women)    [x] Diabetes                A1C Hemoglobin A1C   Date Value Ref Range Status   10/28/2021 5.6 0.0 - 5.6 % Final     Comment:     Normal <5.7%   Prediabetes 5.7-6.4%    Diabetes 6.5% or higher     Note: Adopted from ADA consensus guidelines.   2016 5.9 4.4 - 6.2 % Final    Less than 5.7    [] Smoking/tobacco use   Tobacco Use      Smoking status: Former        Types: Cigars        Quit date: 1980        Years since quittin.6      Smokeless tobacco: Never   Quit smoking and tobacco    [] Overweight Estimated body mass index is 21.27 kg/m  as calculated from the following:    Height as of 24: 5' 9\" (1.753 m).    Weight as of 24: 144 lb (65.3 kg).  Less than 25                 "

## 2024-08-09 NOTE — PROGRESS NOTES
PTA medications updated by Medication Scribe prior to surgery via phone call with patient (last doses completed by Nurse)     Medication history sources: Patient, Patient's family/friend (Daughter: Bianka), Surescripts, and H&P  In the past week, patient estimated taking medication this percent of the time: Greater than 90%      Significant changes made to the medication list:  None      Additional medication history information:   None    Medication reconciliation completed by provider prior to medication history? No    Time spent in this activity: 25 minutes    The information provided in this note is only as accurate as the sources available at the time of update(s)      Prior to Admission medications    Medication Sig Last Dose Taking? Auth Provider Long Term End Date   acetaminophen (TYLENOL) 325 MG tablet Take 2 tablets (650 mg) by mouth every 6 hours as needed for mild pain or other (and adjunct with moderate or severe pain or per patient request)  at PRN Yes Lisandro Redmond MD     allopurinol (ZYLOPRIM) 100 MG tablet TAKE 2 TABLETS BY MOUTH  DAILY  at AM Yes Abdoulaye Redman MD     amLODIPine (NORVASC) 5 MG tablet Take 1 tablet (5 mg) by mouth daily  at AM Yes Abdoulaye Redman MD Yes    aspirin 81 MG EC tablet Take 81 mg by mouth daily  at AM Yes Reported, Patient     atorvastatin (LIPITOR) 20 MG tablet TAKE 1 TABLET BY MOUTH ONCE  DAILY  at PM Yes Abdoulaye Redman MD Yes    Cholecalciferol (VITAMIN D3) 25 MCG (1000 UT) CAPS Take 1 capsule by mouth daily OTC dose unknown  at PM Yes Abdoulaye Redman MD     dorzolamide (TRUSOPT) 2 % ophthalmic solution Place 1 drop into both eyes 2 times daily  at AM Yes Reported, Patient     ELIQUIS ANTICOAGULANT 2.5 MG tablet TAKE 1 TABLET BY MOUTH TWICE  DAILY  at PM Yes Abdoulaye Redman MD     fenofibrate (TRIGLIDE/LOFIBRA) 160 MG tablet TAKE 1 TABLET BY MOUTH DAILY  at PM Yes Abdoulaye Redman MD Yes    ferrous gluconate (FERGON) 324 (38 Fe) MG tablet Take 1  tablet (324 mg) by mouth daily  at PM Yes Afsaneh Thomas MD     Multiple Vitamins-Minerals (PRESERVISION AREDS 2) CAPS Take 1 capsule by mouth 2 times daily  at PM Yes Reported, Patient     multivitamin w/minerals (MULTI-VITAMIN) tablet Take 1 tablet by mouth daily  at PM Yes Reported, Patient     sodium bicarbonate 650 MG tablet Take 1 tablet (650 mg) by mouth 2 times daily  at PM Yes Afsaneh Thomas MD     spironolactone (ALDACTONE) 25 MG tablet TAKE 1 TABLET BY MOUTH  DAILY  at AM Yes Abdoulaye Redman MD Yes    triamcinolone (KENALOG) 0.1 % external cream Apply topically 2 times daily  at PRN Yes Abdoulaye Redman MD         Medication history completed by: Raina Serrano

## 2024-08-09 NOTE — TELEPHONE ENCOUNTER
Spoke to Bianka reviewed surgery information, patient is scheduled for pre-op on 08/12/24. Informed to hold eliquis 1 day prior to surgery and to continue ASA, post-op scheduled for 08/28/24 @11am with Mechelle Coker.

## 2024-08-09 NOTE — TELEPHONE ENCOUNTER
CASE RECEIVED ON 08/09/24 FOR:REDO RIGHT CAROTID ENDARTERECTOMY WITH ELECTROENCEPHALOGRAM     CASE ID:[id number]

## 2024-08-09 NOTE — PROGRESS NOTES
[facility]        Patient is here for a consult to discuss Carotid stenosis.     Pt is currently taking Aspirin, Statin, and Eliquis.    BP (!) 158/72 (BP Location: Right arm, Patient Position: Chair, Cuff Size: Adult Regular)   Pulse 80     The provider has been notified that the patient has no concerns.     Questions patient would like addressed today are: N/A.    Refills are needed: N/A    Has homecare services and agency name:  Jenny Bridges MA

## 2024-08-12 ENCOUNTER — OFFICE VISIT (OUTPATIENT)
Dept: FAMILY MEDICINE | Facility: CLINIC | Age: 89
End: 2024-08-12
Payer: MEDICARE

## 2024-08-12 ENCOUNTER — ANESTHESIA EVENT (OUTPATIENT)
Dept: SURGERY | Facility: CLINIC | Age: 89
DRG: 039 | End: 2024-08-12
Payer: MEDICARE

## 2024-08-12 VITALS
RESPIRATION RATE: 16 BRPM | DIASTOLIC BLOOD PRESSURE: 61 MMHG | TEMPERATURE: 97.7 F | HEART RATE: 77 BPM | SYSTOLIC BLOOD PRESSURE: 116 MMHG | WEIGHT: 140 LBS | HEIGHT: 69 IN | OXYGEN SATURATION: 98 % | BODY MASS INDEX: 20.73 KG/M2

## 2024-08-12 DIAGNOSIS — I10 PRIMARY HYPERTENSION: ICD-10-CM

## 2024-08-12 DIAGNOSIS — Z79.01 ANTICOAGULATED: ICD-10-CM

## 2024-08-12 DIAGNOSIS — E78.5 HYPERLIPIDEMIA LDL GOAL <100: ICD-10-CM

## 2024-08-12 DIAGNOSIS — Z01.818 PRE-OPERATIVE GENERAL PHYSICAL EXAMINATION: Primary | ICD-10-CM

## 2024-08-12 DIAGNOSIS — I65.21 STENOSIS OF RIGHT CAROTID ARTERY: ICD-10-CM

## 2024-08-12 DIAGNOSIS — I77.1 SUBCLAVIAN ARTERY STENOSIS (H): ICD-10-CM

## 2024-08-12 DIAGNOSIS — I48.92 ATRIAL FLUTTER, UNSPECIFIED TYPE (H): ICD-10-CM

## 2024-08-12 DIAGNOSIS — I71.43 INFRARENAL ABDOMINAL AORTIC ANEURYSM (AAA) WITHOUT RUPTURE (H): ICD-10-CM

## 2024-08-12 DIAGNOSIS — I50.32 CHRONIC DIASTOLIC HEART FAILURE (H): ICD-10-CM

## 2024-08-12 DIAGNOSIS — I25.10 CORONARY ARTERY DISEASE INVOLVING NATIVE HEART WITHOUT ANGINA PECTORIS, UNSPECIFIED VESSEL OR LESION TYPE: ICD-10-CM

## 2024-08-12 LAB
ALBUMIN SERPL BCG-MCNC: 4.1 G/DL (ref 3.5–5.2)
ALP SERPL-CCNC: 89 U/L (ref 40–150)
ALT SERPL W P-5'-P-CCNC: 15 U/L (ref 0–70)
ANION GAP SERPL CALCULATED.3IONS-SCNC: 11 MMOL/L (ref 7–15)
AST SERPL W P-5'-P-CCNC: 31 U/L (ref 0–45)
BILIRUB SERPL-MCNC: 0.4 MG/DL
BUN SERPL-MCNC: 39.2 MG/DL (ref 8–23)
CALCIUM SERPL-MCNC: 9.3 MG/DL (ref 8.8–10.4)
CHLORIDE SERPL-SCNC: 111 MMOL/L (ref 98–107)
CREAT SERPL-MCNC: 1.97 MG/DL (ref 0.67–1.17)
EGFRCR SERPLBLD CKD-EPI 2021: 32 ML/MIN/1.73M2
ERYTHROCYTE [DISTWIDTH] IN BLOOD BY AUTOMATED COUNT: 14.9 % (ref 10–15)
GLUCOSE SERPL-MCNC: 113 MG/DL (ref 70–99)
HCO3 SERPL-SCNC: 18 MMOL/L (ref 22–29)
HCT VFR BLD AUTO: 40.2 % (ref 40–53)
HGB BLD-MCNC: 12.7 G/DL (ref 13.3–17.7)
MCH RBC QN AUTO: 31.1 PG (ref 26.5–33)
MCHC RBC AUTO-ENTMCNC: 31.6 G/DL (ref 31.5–36.5)
MCV RBC AUTO: 99 FL (ref 78–100)
PLATELET # BLD AUTO: 186 10E3/UL (ref 150–450)
POTASSIUM SERPL-SCNC: 4.7 MMOL/L (ref 3.4–5.3)
PROT SERPL-MCNC: 6.8 G/DL (ref 6.4–8.3)
RBC # BLD AUTO: 4.08 10E6/UL (ref 4.4–5.9)
SODIUM SERPL-SCNC: 140 MMOL/L (ref 135–145)
WBC # BLD AUTO: 6.9 10E3/UL (ref 4–11)

## 2024-08-12 PROCEDURE — 93000 ELECTROCARDIOGRAM COMPLETE: CPT | Performed by: INTERNAL MEDICINE

## 2024-08-12 PROCEDURE — 80053 COMPREHEN METABOLIC PANEL: CPT | Performed by: INTERNAL MEDICINE

## 2024-08-12 PROCEDURE — 85027 COMPLETE CBC AUTOMATED: CPT | Performed by: INTERNAL MEDICINE

## 2024-08-12 PROCEDURE — 36415 COLL VENOUS BLD VENIPUNCTURE: CPT | Performed by: INTERNAL MEDICINE

## 2024-08-12 PROCEDURE — 99215 OFFICE O/P EST HI 40 MIN: CPT | Performed by: INTERNAL MEDICINE

## 2024-08-12 ASSESSMENT — PAIN SCALES - GENERAL: PAINLEVEL: NO PAIN (0)

## 2024-08-12 NOTE — PROGRESS NOTES
Preoperative Evaluation  [facility]  [address] [facility], [address]  [facility] [address]  Phone: [phone]  Primary Provider: Abdoulaye Redman MD  Pre-op Performing Provider: Stanley Clark MD  Aug 12, 2024             8/12/2024   Surgical Information   What procedure is being done? right carotid endarterectomy   Facility or Hospital where procedure/surgery will be performed: [facility]   Who is doing the procedure / surgery? anyi barraza   Date of surgery / procedure: august 13   Time of surgery / procedure: arrive at 1030 with surgery at 1230   Where do you plan to recover after surgery? at home with family        Fax number for surgical facility: Note does not need to be faxed, will be available electronically in Epic.    Assessment & Plan     The proposed surgical procedure is considered HIGH risk.    Problem List Items Addressed This Visit       Hyperlipidemia LDL goal <100    Abdominal aortic aneurysm (AAA) without rupture (H24)    CAD -- S/P CABG x 3 in 2001    HTN (hypertension)    Chronic diastolic CHF -- Grade 1-2 Dysfunction Echo 2016    Atrial flutter (H)    Anticoagulated     Other Visit Diagnoses       Pre-operative general physical examination    -  Primary    Relevant Orders    EKG 12-lead complete w/read - Clinics (Completed)    CBC with platelets (Completed)    Comprehensive metabolic panel (BMP + Alb, Alk Phos, ALT, AST, Total. Bili, TP)    Stenosis of right carotid artery        Subclavian artery stenosis (H24)                   Implanted Device  Status post-TAVR procedure.      Risks and Recommendations  The patient has the following additional risks and recommendations for perioperative complications:   - Consult Hospitalist / IM to assist with post-op medical management  Consult cardiology postop.  Anticoagulation to be resumed postop by surgery.  Blood pressure hemodynamics are stable.  He held his Eliquis only in the evening of Sunday.  His surgery is on  Tuesday at noon.  He would be off anticoagulation for 48 hours prior to surgery.  He was advised by vascular surgery as per patient to continue taking aspirin in the perioperative period.  If labs are stable patient is cleared for surgery.CBC shows normal white blood cell count hemoglobin hematocrit stable and normal platelet count.  EKG shows probable atrial fibrillation rate controlled, slightly prolonged QT.  Right bundle branch block with left axis deviation, no significant change from prior EKG.  Antiplatelet or Anticoagulation Medication Instructions   - apixaban (Eliquis), edoxaban (Savaysa), rivaroxaban (Xarelto): CrCl < 50 mL/min. HOLD 48 hours before procedure.     Additional Medication Instructions   - Beta Blockers: Continue taking on the day of surgery.   - Calcium Channel Blockers: May be continued on the day of surgery.   - Statins: Continue taking on the day of surgery.     Recommendation  Approval given to proceed with proposed procedure pending review of diagnostic evaluation.      Total time spent was 42 minutes review of records addressing multiple health concerns and documentation and preop physical  Subjective   Trevor is a 89 year old, presenting for the following:  Pre-Op Exam          4/22/2024     2:56 PM   Additional Questions   Roomed by israel   Accompanied by allen pappas     HPI related to upcoming procedure: Presenting for preop clearance for right carotid endarterectomy.  Status post CABG and status post TAVR procedure.  On chronic anticoagulation due to history of A-fib.  Denies currently any chest pain shortness of breath or difficulty breathing.  No GI or  symptoms.  No neurologic deficit.  No history of blood clots or bleeding disorder.  Is maintained on chronic anticoagulation low-dose Eliquis.  Has chronic kidney disease stage IIIb.  Hemodynamics have been stable.  His physical endurance has been limited by bilateral knee pain.  He can do daily activities at home.  Uses a  walker for ambulation at home and outdoors.  His METS level cannot be accurately assessed due to knee pain and limited physical activity.        8/12/2024   Pre-Op Questionnaire   Have you ever had a heart attack or stroke? No   Have you ever had surgery on your heart or blood vessels, such as a stent placement, a coronary artery bypass, or surgery on an artery in your head, neck, heart, or legs? (!) YES, status post-TAVR.   Do you have chest pain with activity? No   Do you have a history of heart failure? (!) UNKNOWN    Do you currently have a cold, bronchitis or symptoms of other infection? No   Do you have a cough, shortness of breath, or wheezing? No   Do you or anyone in your family have previous history of blood clots? No   Do you or does anyone in your family have a serious bleeding problem such as prolonged bleeding following surgeries or cuts? No   Have you ever had problems with anemia or been told to take iron pills? (!) YES    Have you had any abnormal blood loss such as black, tarry or bloody stools? No   Have you ever had a blood transfusion? (!) UNKNOWN   Are you willing to have a blood transfusion if it is medically needed before, during, or after your surgery? Yes   Have you or any of your relatives ever had problems with anesthesia? No   Do you have sleep apnea, excessive snoring or daytime drowsiness? No   Do you have any artifical heart valves or other implanted medical devices like a pacemaker, defibrillator, or continuous glucose monitor? (!) YES   What type of device do you have? tavr   Name of the clinic that manages your device [facility]   Do you have artificial joints? No   Are you allergic to latex? No        Health Care Directive  Patient does not have a Health Care Directive or Living Will: Patient states has Advance Directive and will bring in a copy to clinic.    Preoperative Review of    reviewed - no record of controlled substances prescribed.      Status of Chronic  Conditions:  See problem list for active medical problems.  Problems all longstanding and stable, except as noted/documented.  See ROS for pertinent symptoms related to these conditions.    Patient Active Problem List    Diagnosis Date Noted    Aortic stenosis, severe 04/11/2023     Priority: Medium    Status post coronary angiogram 01/13/2023     Priority: Medium    Adjustment insomnia 07/21/2022     Priority: Medium    Atrial flutter (H) 07/19/2022     Priority: Medium    Anticoagulated 07/19/2022     Priority: Medium    Slow transit constipation 07/19/2022     Priority: Medium    Osteoporosis with current pathological fracture with routine healing, unspecified osteoporosis type, subsequent encounter 05/03/2022     Priority: Medium    Carotid stenosis, asymptomatic, bilateral 04/28/2021     Priority: Medium    Status post exploratory laparotomy 06/30/2020     Priority: Medium    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/21/20 06/21/2020     Priority: Medium    CRF (chronic renal failure), stage 4 (severe) (H) 06/21/2020     Priority: Medium    Gout 06/21/2020     Priority: Medium    CAD -- S/P CABG x 3 in 2001 06/21/2020     Priority: Medium    HTN (hypertension) 06/21/2020     Priority: Medium    Chronic diastolic CHF -- Grade 1-2 Dysfunction Echo 2016 06/21/2020     Priority: Medium    Severe aortic stenosis 05/22/2019     Priority: Medium    Abdominal aortic aneurysm (AAA) without rupture (H24) 08/22/2017     Priority: Medium    Idiopathic chronic gout of multiple sites without tophus 07/18/2017     Priority: Medium    Chronic rhinitis 12/04/2016     Priority: Medium    Hyperlipidemia LDL goal <100 11/18/2016     Priority: Medium      Past Medical History:   Diagnosis Date    Arthritis     rhuematoid    Coronary artery disease     triple bypass 2001    CRF (chronic renal failure)     Gastro-oesophageal reflux disease     Gout     Hyperlipidemia     Hypertension     Nocturia      Past Surgical  History:   Procedure Laterality Date    CARDIAC SURGERY      CHOLECYSTECTOMY      COLONOSCOPY      CV CORONARY ANGIOGRAM N/A 1/13/2023    Procedure: Coronary Angiogram;  Surgeon: Sujata Ramires MD;  Location:  [facility]    CV PCI N/A 1/13/2023    Procedure: Percutaneous Coronary Intervention;  Surgeon: Sujata Ramires MD;  Location:  [facility]    CV TRANSCATHETER AORTIC VALVE REPLACEMENT-FEMORAL APPROACH N/A 4/11/2023    Procedure: Transcatheter Aortic Valve Replacement-Femoral Approach;  Surgeon: Sujata Ramires MD;  Location:  [facility]    LAPAROTOMY EXPLORATORY N/A 6/30/2020    Procedure: EXPLORATORY LAPAROTOMY, BOWEL RESECTION;  Surgeon: Bull Rachel MD;  Location:  OR    LAPAROTOMY, LYSIS ADHESIONS, COMBINED N/A 6/21/2020    Procedure: EXPLORATORY LAPAROTOMY WITH LYSIS OF ADHESIONS;  Surgeon: Jose Reed MD;  Location:  OR    ORTHOPEDIC SURGERY      PHACOEMULSIFICATION CLEAR CORNEA WITH TORIC INTRAOCULAR LENS IMPLANT  1/30/2012    Procedure:PHACOEMULSIFICATION CLEAR CORNEA WITH TORIC INTRAOCULAR LENS IMPLANT; LEFT PHACOEMULSIFICATION CLEAR CORNEA WITH DELUXE  TORIC INTRAOCULAR LENS IMPLANT ; Surgeon:BRANDO HIGHTOWER; Location: [facility]    PHACOEMULSIFICATION CLEAR CORNEA WITH TORIC INTRAOCULAR LENS IMPLANT  2/13/2012    Procedure:PHACOEMULSIFICATION CLEAR CORNEA WITH TORIC INTRAOCULAR LENS IMPLANT; RIGHT PHACOEMULSIFICATION CLEAR CORNEA WITH TORIC INTRAOCULAR LENS IMPLANT ; Surgeon:BRANDO HIGHTOWER; Location: [facility]    VASCULAR SURGERY       Current Outpatient Medications   Medication Sig Dispense Refill    acetaminophen (TYLENOL) 325 MG tablet Take 2 tablets (650 mg) by mouth every 6 hours as needed for mild pain or other (and adjunct with moderate or severe pain or per patient request)      allopurinol (ZYLOPRIM) 100 MG tablet TAKE 2 TABLETS BY MOUTH  DAILY 180 tablet 3    amLODIPine (NORVASC) 5 MG tablet Take 1 tablet (5 mg) by mouth daily  "90 tablet 3    aspirin 81 MG EC tablet Take 81 mg by mouth daily      atorvastatin (LIPITOR) 20 MG tablet TAKE 1 TABLET BY MOUTH ONCE  DAILY 90 tablet 3    Cholecalciferol (VITAMIN D3) 25 MCG (1000 UT) CAPS Take 1 capsule by mouth daily OTC dose unknown      dorzolamide (TRUSOPT) 2 % ophthalmic solution Place 1 drop into both eyes 2 times daily      ELIQUIS ANTICOAGULANT 2.5 MG tablet TAKE 1 TABLET BY MOUTH TWICE  DAILY 180 tablet 3    fenofibrate (TRIGLIDE/LOFIBRA) 160 MG tablet TAKE 1 TABLET BY MOUTH DAILY 90 tablet 3    ferrous gluconate (FERGON) 324 (38 Fe) MG tablet Take 1 tablet (324 mg) by mouth daily 90 tablet 3    Multiple Vitamins-Minerals (PRESERVISION AREDS 2) CAPS Take 1 capsule by mouth 2 times daily      multivitamin w/minerals (MULTI-VITAMIN) tablet Take 1 tablet by mouth daily      sodium bicarbonate 650 MG tablet Take 1 tablet (650 mg) by mouth 2 times daily 180 tablet 3    spironolactone (ALDACTONE) 25 MG tablet TAKE 1 TABLET BY MOUTH  DAILY 90 tablet 3    triamcinolone (KENALOG) 0.1 % external cream Apply topically 2 times daily 453.6 g 11       Allergies   Allergen Reactions    Avocado     Ciprofloxacin Itching    Penicillins Itching        Social History     Tobacco Use    Smoking status: Former     Types: Cigars     Quit date: 1980     Years since quittin.6    Smokeless tobacco: Never   Substance Use Topics    Alcohol use: Yes     Comment: rare     Family History   Problem Relation Age of Onset    Myocardial Infarction Mother     Rheumatic fever Father     Cerebrovascular Disease Brother      History   Drug Use No             Review of Systems  Constitutional, neuro, ENT, endocrine, pulmonary, cardiac, gastrointestinal, genitourinary, musculoskeletal, integument and psychiatric systems are negative, except as otherwise noted.    Objective    /61   Pulse 77   Temp 97.7  F (36.5  C) (Temporal)   Resp 16   Ht 1.753 m (5' 9\")   Wt 63.5 kg (140 lb)   SpO2 98%   BMI 20.67 kg/m  " "   Estimated body mass index is 20.67 kg/m  as calculated from the following:    Height as of this encounter: 1.753 m (5' 9\").    Weight as of this encounter: 63.5 kg (140 lb).  Physical Exam  GENERAL: alert and no distress, pleasant  EYES: Eyes grossly normal to inspection, PERRL and conjunctivae and sclerae normal  HENT: ear canals and TM's normal, nose and mouth without ulcers or lesions  NECK: no adenopathy, no asymmetry, masses, or scars, positive bruit right carotid  RESP: lungs clear to auscultation - no rales, rhonchi or wheezes  CV: regular rate and rhythm, normal S1 S2, no S3 or S4, grade 1 diastolic murmur, no click or rub, slight pitting edema lateral lower extremity  ABDOMEN: soft, nontender, no hepatosplenomegaly, no masses and bowel sounds normal  MS: no gross musculoskeletal defects noted, no edema  SKIN: no suspicious lesions or rashes  NEURO: Normal strength and tone, mentation intact and speech normal  PSYCH: mentation appears normal, affect normal/bright    Recent Labs   Lab Test 05/02/24  1300 04/05/24  1210   HGB 12.6* 11.2*    322    142   POTASSIUM 4.6 4.4   CR 2.00* 1.77*        Diagnostics  Labs pending at this time.  Results will be reviewed when available.   EKG required for known coronary heart disease, peripheral arterial disease, and structural heart disease and not completed in the last 90 days.     Revised Cardiac Risk Index (RCRI)  The patient has the following serious cardiovascular risks for perioperative complications:   - High risk surgery (>5% cardiac complication risk) = 1 point   - Coronary Artery Disease (MI, positive stress test, angina, Qs on EKG) = 1 point   - Serum Creatinine >2.0 mg/dl = 1 point     RCRI Interpretation: 3 points: Class IV (high risk - >11% complication rate)    Estimated Functional Capacity: CANNOT perform 4 METS without symptoms patient has knee pain that limits his activities.   patient has knee pain that limits his activities.     "     Signed Electronically by: Stanley Clark MD  A copy of this evaluation report is provided to the requesting physician.

## 2024-08-13 ENCOUNTER — ANESTHESIA (OUTPATIENT)
Dept: SURGERY | Facility: CLINIC | Age: 89
DRG: 039 | End: 2024-08-13
Payer: MEDICARE

## 2024-08-13 ENCOUNTER — APPOINTMENT (OUTPATIENT)
Dept: SURGERY | Facility: PHYSICIAN GROUP | Age: 89
End: 2024-08-13
Payer: MEDICARE

## 2024-08-13 ENCOUNTER — HOSPITAL ENCOUNTER (INPATIENT)
Facility: CLINIC | Age: 89
LOS: 1 days | Discharge: HOME | DRG: 039 | End: 2024-08-14
Attending: SURGERY | Admitting: SURGERY
Payer: MEDICARE

## 2024-08-13 ENCOUNTER — TELEPHONE (OUTPATIENT)
Dept: FAMILY MEDICINE | Facility: CLINIC | Age: 89
End: 2024-08-13

## 2024-08-13 ENCOUNTER — TRANSFERRED RECORDS (OUTPATIENT)
Dept: MEDSURG UNIT | Facility: CLINIC | Age: 89
End: 2024-08-13

## 2024-08-13 DIAGNOSIS — I48.92 ATRIAL FLUTTER, UNSPECIFIED TYPE (H): ICD-10-CM

## 2024-08-13 DIAGNOSIS — I65.23 CAROTID STENOSIS, ASYMPTOMATIC, BILATERAL: ICD-10-CM

## 2024-08-13 DIAGNOSIS — G89.18 ACUTE POST-OPERATIVE PAIN: ICD-10-CM

## 2024-08-13 DIAGNOSIS — Z79.2 NEED FOR ANTIBIOTIC PROPHYLAXIS FOR SURGICAL PROCEDURE: Primary | ICD-10-CM

## 2024-08-13 LAB
ABO/RH(D): NORMAL
ANION GAP SERPL CALCULATED.3IONS-SCNC: 14 MMOL/L (ref 7–15)
ANTIBODY SCREEN: NEGATIVE
ATRIAL RATE - MUSE: 83 BPM
BUN SERPL-MCNC: 34.4 MG/DL (ref 8–23)
CALCIUM SERPL-MCNC: 9.5 MG/DL (ref 8.8–10.4)
CHLORIDE SERPL-SCNC: 106 MMOL/L (ref 98–107)
CHOLEST SERPL-MCNC: 99 MG/DL
CREAT SERPL-MCNC: 2.06 MG/DL (ref 0.67–1.17)
DIASTOLIC BLOOD PRESSURE - MUSE: NORMAL MMHG
EGFRCR SERPLBLD CKD-EPI 2021: 30 ML/MIN/1.73M2
GLUCOSE SERPL-MCNC: 99 MG/DL (ref 70–99)
HBA1C MFR BLD: 5.8 %
HCO3 SERPL-SCNC: 18 MMOL/L (ref 22–29)
HDLC SERPL-MCNC: 32 MG/DL
INTERPRETATION ECG - MUSE: NORMAL
LDLC SERPL CALC-MCNC: 50 MG/DL
NONHDLC SERPL-MCNC: 67 MG/DL
P AXIS - MUSE: NORMAL DEGREES
POTASSIUM SERPL-SCNC: 4.3 MMOL/L (ref 3.4–5.3)
PR INTERVAL - MUSE: 168 MS
QRS DURATION - MUSE: 140 MS
QT - MUSE: 466 MS
QTC - MUSE: 547 MS
R AXIS - MUSE: -78 DEGREES
SODIUM SERPL-SCNC: 138 MMOL/L (ref 135–145)
SPECIMEN EXPIRATION DATE: NORMAL
SYSTOLIC BLOOD PRESSURE - MUSE: NORMAL MMHG
T AXIS - MUSE: 37 DEGREES
TRIGL SERPL-MCNC: 85 MG/DL
VENTRICULAR RATE- MUSE: 83 BPM

## 2024-08-13 PROCEDURE — 258N000003 HC RX IP 258 OP 636

## 2024-08-13 PROCEDURE — 370N000017 HC ANESTHESIA TECHNICAL FEE, PER MIN: Performed by: SURGERY

## 2024-08-13 PROCEDURE — 258N000003 HC RX IP 258 OP 636: Performed by: ANESTHESIOLOGY

## 2024-08-13 PROCEDURE — 250N000013 HC RX MED GY IP 250 OP 250 PS 637: Performed by: SURGERY

## 2024-08-13 PROCEDURE — 250N000011 HC RX IP 250 OP 636: Performed by: SURGERY

## 2024-08-13 PROCEDURE — 710N000010 HC RECOVERY PHASE 1, LEVEL 2, PER MIN: Performed by: SURGERY

## 2024-08-13 PROCEDURE — 250N000009 HC RX 250: Performed by: ANESTHESIOLOGY

## 2024-08-13 PROCEDURE — 86900 BLOOD TYPING SEROLOGIC ABO: CPT | Performed by: SURGERY

## 2024-08-13 PROCEDURE — 250N000009 HC RX 250: Performed by: SURGERY

## 2024-08-13 PROCEDURE — 99100 ANES PT EXTEME AGE<1 YR&>70: CPT

## 2024-08-13 PROCEDURE — 360N000077 HC SURGERY LEVEL 4, PER MIN: Performed by: SURGERY

## 2024-08-13 PROCEDURE — 120N000013 HC R&B IMCU

## 2024-08-13 PROCEDURE — 250N000013 HC RX MED GY IP 250 OP 250 PS 637

## 2024-08-13 PROCEDURE — 272N000001 HC OR GENERAL SUPPLY STERILE: Performed by: SURGERY

## 2024-08-13 PROCEDURE — 250N000011 HC RX IP 250 OP 636: Performed by: NURSE ANESTHETIST, CERTIFIED REGISTERED

## 2024-08-13 PROCEDURE — 250N000011 HC RX IP 250 OP 636: Performed by: ANESTHESIOLOGY

## 2024-08-13 PROCEDURE — 83036 HEMOGLOBIN GLYCOSYLATED A1C: CPT | Performed by: SURGERY

## 2024-08-13 PROCEDURE — 35301 RECHANNELING OF ARTERY: CPT

## 2024-08-13 PROCEDURE — 35390 REOPERATION CAROTID ADD-ON: CPT | Performed by: SURGERY

## 2024-08-13 PROCEDURE — 82465 ASSAY BLD/SERUM CHOLESTEROL: CPT | Performed by: SURGERY

## 2024-08-13 PROCEDURE — 35301 RECHANNELING OF ARTERY: CPT | Mod: 80 | Performed by: SURGERY

## 2024-08-13 PROCEDURE — 36415 COLL VENOUS BLD VENIPUNCTURE: CPT | Performed by: SURGERY

## 2024-08-13 PROCEDURE — 250N000025 HC SEVOFLURANE, PER MIN: Performed by: SURGERY

## 2024-08-13 PROCEDURE — 35301 RECHANNELING OF ARTERY: CPT | Performed by: ANESTHESIOLOGY

## 2024-08-13 PROCEDURE — 250N000009 HC RX 250: Performed by: NURSE ANESTHETIST, CERTIFIED REGISTERED

## 2024-08-13 PROCEDURE — 272N000002 HC OR SUPPLY OTHER OPNP: Performed by: SURGERY

## 2024-08-13 PROCEDURE — 922N000001 HC NEURO MONITORING SERVICE, UP TO 7 HOURS (T1FEE): Performed by: SURGERY

## 2024-08-13 PROCEDURE — 93005 ELECTROCARDIOGRAM TRACING: CPT

## 2024-08-13 PROCEDURE — 80048 BASIC METABOLIC PNL TOTAL CA: CPT | Performed by: SURGERY

## 2024-08-13 PROCEDURE — C1763 CONN TISS, NON-HUMAN: HCPCS | Performed by: SURGERY

## 2024-08-13 PROCEDURE — 999N000141 HC STATISTIC PRE-PROCEDURE NURSING ASSESSMENT: Performed by: SURGERY

## 2024-08-13 PROCEDURE — 35301 RECHANNELING OF ARTERY: CPT | Mod: RT | Performed by: SURGERY

## 2024-08-13 PROCEDURE — 258N000003 HC RX IP 258 OP 636: Performed by: SURGERY

## 2024-08-13 DEVICE — DECELLULARIZED BOVINE PERICARDIUM
Type: IMPLANTABLE DEVICE | Site: NECK | Status: FUNCTIONAL
Brand: PHOTOFIX DECELLULARIZED BOVINE PERICARDIUM

## 2024-08-13 RX ORDER — HYDROMORPHONE HCL IN WATER/PF 6 MG/30 ML
0.2 PATIENT CONTROLLED ANALGESIA SYRINGE INTRAVENOUS EVERY 5 MIN PRN
Status: DISCONTINUED | OUTPATIENT
Start: 2024-08-13 | End: 2024-08-13 | Stop reason: HOSPADM

## 2024-08-13 RX ORDER — MAGNESIUM HYDROXIDE 1200 MG/15ML
LIQUID ORAL PRN
Status: DISCONTINUED | OUTPATIENT
Start: 2024-08-13 | End: 2024-08-13 | Stop reason: HOSPADM

## 2024-08-13 RX ORDER — AMOXICILLIN 250 MG
1 CAPSULE ORAL 2 TIMES DAILY
Status: DISCONTINUED | OUTPATIENT
Start: 2024-08-13 | End: 2024-08-14 | Stop reason: HOSPADM

## 2024-08-13 RX ORDER — ONDANSETRON 4 MG/1
4 TABLET, ORALLY DISINTEGRATING ORAL EVERY 30 MIN PRN
Status: DISCONTINUED | OUTPATIENT
Start: 2024-08-13 | End: 2024-08-13 | Stop reason: HOSPADM

## 2024-08-13 RX ORDER — AMLODIPINE BESYLATE 5 MG/1
5 TABLET ORAL DAILY
Status: DISCONTINUED | OUTPATIENT
Start: 2024-08-14 | End: 2024-08-14 | Stop reason: HOSPADM

## 2024-08-13 RX ORDER — ASPIRIN 81 MG/1
81 TABLET ORAL DAILY
Status: DISCONTINUED | OUTPATIENT
Start: 2024-08-14 | End: 2024-08-14 | Stop reason: HOSPADM

## 2024-08-13 RX ORDER — NITROGLYCERIN 10 MG/100ML
INJECTION INTRAVENOUS PRN
Status: DISCONTINUED | OUTPATIENT
Start: 2024-08-13 | End: 2024-08-13

## 2024-08-13 RX ORDER — CEFAZOLIN SODIUM/WATER 2 G/20 ML
2 SYRINGE (ML) INTRAVENOUS SEE ADMIN INSTRUCTIONS
Status: DISCONTINUED | OUTPATIENT
Start: 2024-08-13 | End: 2024-08-13 | Stop reason: HOSPADM

## 2024-08-13 RX ORDER — HEPARIN SODIUM 1000 [USP'U]/ML
INJECTION, SOLUTION INTRAVENOUS; SUBCUTANEOUS PRN
Status: DISCONTINUED | OUTPATIENT
Start: 2024-08-13 | End: 2024-08-13

## 2024-08-13 RX ORDER — SODIUM CHLORIDE, SODIUM LACTATE, POTASSIUM CHLORIDE, CALCIUM CHLORIDE 600; 310; 30; 20 MG/100ML; MG/100ML; MG/100ML; MG/100ML
INJECTION, SOLUTION INTRAVENOUS CONTINUOUS PRN
Status: DISCONTINUED | OUTPATIENT
Start: 2024-08-13 | End: 2024-08-13

## 2024-08-13 RX ORDER — NALOXONE HYDROCHLORIDE 0.4 MG/ML
0.4 INJECTION, SOLUTION INTRAMUSCULAR; INTRAVENOUS; SUBCUTANEOUS
Status: DISCONTINUED | OUTPATIENT
Start: 2024-08-13 | End: 2024-08-14 | Stop reason: HOSPADM

## 2024-08-13 RX ORDER — DORZOLAMIDE HCL 20 MG/ML
1 SOLUTION/ DROPS OPHTHALMIC 2 TIMES DAILY
Status: DISCONTINUED | OUTPATIENT
Start: 2024-08-14 | End: 2024-08-14 | Stop reason: HOSPADM

## 2024-08-13 RX ORDER — SODIUM CHLORIDE, SODIUM LACTATE, POTASSIUM CHLORIDE, CALCIUM CHLORIDE 600; 310; 30; 20 MG/100ML; MG/100ML; MG/100ML; MG/100ML
INJECTION, SOLUTION INTRAVENOUS CONTINUOUS
Status: DISCONTINUED | OUTPATIENT
Start: 2024-08-13 | End: 2024-08-13 | Stop reason: HOSPADM

## 2024-08-13 RX ORDER — SODIUM BICARBONATE 650 MG/1
650 TABLET ORAL 2 TIMES DAILY
Status: DISCONTINUED | OUTPATIENT
Start: 2024-08-13 | End: 2024-08-14 | Stop reason: HOSPADM

## 2024-08-13 RX ORDER — DEXAMETHASONE SODIUM PHOSPHATE 4 MG/ML
4 INJECTION, SOLUTION INTRA-ARTICULAR; INTRALESIONAL; INTRAMUSCULAR; INTRAVENOUS; SOFT TISSUE
Status: DISCONTINUED | OUTPATIENT
Start: 2024-08-13 | End: 2024-08-13 | Stop reason: HOSPADM

## 2024-08-13 RX ORDER — HYDROMORPHONE HCL IN WATER/PF 6 MG/30 ML
0.4 PATIENT CONTROLLED ANALGESIA SYRINGE INTRAVENOUS EVERY 5 MIN PRN
Status: DISCONTINUED | OUTPATIENT
Start: 2024-08-13 | End: 2024-08-13 | Stop reason: HOSPADM

## 2024-08-13 RX ORDER — POLYETHYLENE GLYCOL 3350 17 G/17G
17 POWDER, FOR SOLUTION ORAL DAILY
Status: DISCONTINUED | OUTPATIENT
Start: 2024-08-14 | End: 2024-08-14 | Stop reason: HOSPADM

## 2024-08-13 RX ORDER — PROCHLORPERAZINE MALEATE 5 MG
5 TABLET ORAL EVERY 6 HOURS PRN
Status: DISCONTINUED | OUTPATIENT
Start: 2024-08-13 | End: 2024-08-14 | Stop reason: HOSPADM

## 2024-08-13 RX ORDER — NALOXONE HYDROCHLORIDE 0.4 MG/ML
0.1 INJECTION, SOLUTION INTRAMUSCULAR; INTRAVENOUS; SUBCUTANEOUS
Status: DISCONTINUED | OUTPATIENT
Start: 2024-08-13 | End: 2024-08-13 | Stop reason: HOSPADM

## 2024-08-13 RX ORDER — PROTAMINE SULFATE 10 MG/ML
INJECTION, SOLUTION INTRAVENOUS PRN
Status: DISCONTINUED | OUTPATIENT
Start: 2024-08-13 | End: 2024-08-13

## 2024-08-13 RX ORDER — SPIRONOLACTONE 25 MG/1
25 TABLET ORAL DAILY
Status: DISCONTINUED | OUTPATIENT
Start: 2024-08-14 | End: 2024-08-14 | Stop reason: HOSPADM

## 2024-08-13 RX ORDER — ACETAMINOPHEN 325 MG/1
650 TABLET ORAL EVERY 4 HOURS PRN
Status: DISCONTINUED | OUTPATIENT
Start: 2024-08-16 | End: 2024-08-14 | Stop reason: HOSPADM

## 2024-08-13 RX ORDER — PROPOFOL 10 MG/ML
INJECTION, EMULSION INTRAVENOUS PRN
Status: DISCONTINUED | OUTPATIENT
Start: 2024-08-13 | End: 2024-08-13

## 2024-08-13 RX ORDER — OXYCODONE HYDROCHLORIDE 5 MG/1
5 TABLET ORAL EVERY 4 HOURS PRN
Status: DISCONTINUED | OUTPATIENT
Start: 2024-08-13 | End: 2024-08-14 | Stop reason: HOSPADM

## 2024-08-13 RX ORDER — ASPIRIN 81 MG/1
81 TABLET, CHEWABLE ORAL
Status: COMPLETED | OUTPATIENT
Start: 2024-08-13 | End: 2024-08-13

## 2024-08-13 RX ORDER — ONDANSETRON 2 MG/ML
4 INJECTION INTRAMUSCULAR; INTRAVENOUS EVERY 30 MIN PRN
Status: DISCONTINUED | OUTPATIENT
Start: 2024-08-13 | End: 2024-08-13 | Stop reason: HOSPADM

## 2024-08-13 RX ORDER — NALOXONE HYDROCHLORIDE 0.4 MG/ML
0.2 INJECTION, SOLUTION INTRAMUSCULAR; INTRAVENOUS; SUBCUTANEOUS
Status: DISCONTINUED | OUTPATIENT
Start: 2024-08-13 | End: 2024-08-14 | Stop reason: HOSPADM

## 2024-08-13 RX ORDER — LIDOCAINE 40 MG/G
CREAM TOPICAL
Status: DISCONTINUED | OUTPATIENT
Start: 2024-08-13 | End: 2024-08-14 | Stop reason: HOSPADM

## 2024-08-13 RX ORDER — LIDOCAINE HYDROCHLORIDE 20 MG/ML
INJECTION, SOLUTION INFILTRATION; PERINEURAL PRN
Status: DISCONTINUED | OUTPATIENT
Start: 2024-08-13 | End: 2024-08-13

## 2024-08-13 RX ORDER — ACETAMINOPHEN 325 MG/1
975 TABLET ORAL EVERY 8 HOURS
Status: DISCONTINUED | OUTPATIENT
Start: 2024-08-13 | End: 2024-08-14 | Stop reason: HOSPADM

## 2024-08-13 RX ORDER — ASPIRIN 300 MG/1
600 SUPPOSITORY RECTAL ONCE
Status: COMPLETED | OUTPATIENT
Start: 2024-08-13 | End: 2024-08-13

## 2024-08-13 RX ORDER — ATORVASTATIN CALCIUM 20 MG/1
20 TABLET, FILM COATED ORAL EVERY EVENING
Status: DISCONTINUED | OUTPATIENT
Start: 2024-08-13 | End: 2024-08-14 | Stop reason: HOSPADM

## 2024-08-13 RX ORDER — ALLOPURINOL 100 MG/1
200 TABLET ORAL DAILY
Status: DISCONTINUED | OUTPATIENT
Start: 2024-08-14 | End: 2024-08-14 | Stop reason: HOSPADM

## 2024-08-13 RX ORDER — FENTANYL CITRATE 50 UG/ML
25 INJECTION, SOLUTION INTRAMUSCULAR; INTRAVENOUS EVERY 5 MIN PRN
Status: DISCONTINUED | OUTPATIENT
Start: 2024-08-13 | End: 2024-08-13 | Stop reason: HOSPADM

## 2024-08-13 RX ORDER — ACETAMINOPHEN 650 MG/1
650 SUPPOSITORY RECTAL EVERY 4 HOURS PRN
Status: DISCONTINUED | OUTPATIENT
Start: 2024-08-13 | End: 2024-08-13 | Stop reason: HOSPADM

## 2024-08-13 RX ORDER — FERROUS GLUCONATE 324(38)MG
324 TABLET ORAL AT BEDTIME
Status: DISCONTINUED | OUTPATIENT
Start: 2024-08-13 | End: 2024-08-14 | Stop reason: HOSPADM

## 2024-08-13 RX ORDER — FENTANYL CITRATE 50 UG/ML
INJECTION, SOLUTION INTRAMUSCULAR; INTRAVENOUS PRN
Status: DISCONTINUED | OUTPATIENT
Start: 2024-08-13 | End: 2024-08-13

## 2024-08-13 RX ORDER — LIDOCAINE HYDROCHLORIDE 10 MG/ML
INJECTION, SOLUTION INFILTRATION; PERINEURAL PRN
Status: DISCONTINUED | OUTPATIENT
Start: 2024-08-13 | End: 2024-08-13 | Stop reason: HOSPADM

## 2024-08-13 RX ORDER — FENTANYL CITRATE 50 UG/ML
50 INJECTION, SOLUTION INTRAMUSCULAR; INTRAVENOUS EVERY 5 MIN PRN
Status: DISCONTINUED | OUTPATIENT
Start: 2024-08-13 | End: 2024-08-13 | Stop reason: HOSPADM

## 2024-08-13 RX ORDER — CEFAZOLIN SODIUM/WATER 2 G/20 ML
2 SYRINGE (ML) INTRAVENOUS
Status: COMPLETED | OUTPATIENT
Start: 2024-08-13 | End: 2024-08-13

## 2024-08-13 RX ORDER — BISACODYL 10 MG
10 SUPPOSITORY, RECTAL RECTAL DAILY PRN
Status: DISCONTINUED | OUTPATIENT
Start: 2024-08-16 | End: 2024-08-14 | Stop reason: HOSPADM

## 2024-08-13 RX ORDER — LABETALOL HYDROCHLORIDE 5 MG/ML
10 INJECTION, SOLUTION INTRAVENOUS EVERY 10 MIN PRN
Status: DISCONTINUED | OUTPATIENT
Start: 2024-08-13 | End: 2024-08-14 | Stop reason: HOSPADM

## 2024-08-13 RX ORDER — SODIUM CHLORIDE, SODIUM LACTATE, POTASSIUM CHLORIDE, CALCIUM CHLORIDE 600; 310; 30; 20 MG/100ML; MG/100ML; MG/100ML; MG/100ML
INJECTION, SOLUTION INTRAVENOUS CONTINUOUS
Status: DISCONTINUED | OUTPATIENT
Start: 2024-08-13 | End: 2024-08-14 | Stop reason: HOSPADM

## 2024-08-13 RX ORDER — FENOFIBRATE 160 MG/1
160 TABLET ORAL AT BEDTIME
Status: DISCONTINUED | OUTPATIENT
Start: 2024-08-13 | End: 2024-08-14 | Stop reason: HOSPADM

## 2024-08-13 RX ORDER — ONDANSETRON 2 MG/ML
4 INJECTION INTRAMUSCULAR; INTRAVENOUS EVERY 6 HOURS PRN
Status: DISCONTINUED | OUTPATIENT
Start: 2024-08-13 | End: 2024-08-13

## 2024-08-13 RX ORDER — ACETAMINOPHEN 325 MG/1
650 TABLET ORAL EVERY 4 HOURS PRN
Status: DISCONTINUED | OUTPATIENT
Start: 2024-08-13 | End: 2024-08-13 | Stop reason: HOSPADM

## 2024-08-13 RX ORDER — ONDANSETRON 2 MG/ML
INJECTION INTRAMUSCULAR; INTRAVENOUS PRN
Status: DISCONTINUED | OUTPATIENT
Start: 2024-08-13 | End: 2024-08-13

## 2024-08-13 RX ORDER — ONDANSETRON 4 MG/1
4 TABLET, ORALLY DISINTEGRATING ORAL EVERY 6 HOURS PRN
Status: DISCONTINUED | OUTPATIENT
Start: 2024-08-13 | End: 2024-08-13

## 2024-08-13 RX ADMIN — PHENYLEPHRINE HYDROCHLORIDE 100 MCG: 10 INJECTION INTRAVENOUS at 16:23

## 2024-08-13 RX ADMIN — PHENYLEPHRINE HYDROCHLORIDE 50 MCG: 10 INJECTION INTRAVENOUS at 12:37

## 2024-08-13 RX ADMIN — Medication 2 G: at 12:42

## 2024-08-13 RX ADMIN — NITROGLYCERIN 5 MCG: 10 INJECTION INTRAVENOUS at 16:41

## 2024-08-13 RX ADMIN — ACETAMINOPHEN 975 MG: 325 TABLET, FILM COATED ORAL at 20:58

## 2024-08-13 RX ADMIN — ATORVASTATIN CALCIUM 20 MG: 20 TABLET, FILM COATED ORAL at 21:53

## 2024-08-13 RX ADMIN — FENTANYL CITRATE 50 MCG: 50 INJECTION INTRAMUSCULAR; INTRAVENOUS at 12:37

## 2024-08-13 RX ADMIN — PHENYLEPHRINE HYDROCHLORIDE 100 MCG: 10 INJECTION INTRAVENOUS at 16:50

## 2024-08-13 RX ADMIN — SODIUM CHLORIDE, SODIUM LACTATE, POTASSIUM CHLORIDE, CALCIUM CHLORIDE: 600; 310; 30; 20 INJECTION, SOLUTION INTRAVENOUS at 12:42

## 2024-08-13 RX ADMIN — FERROUS GLUCONATE 324 MG: 324 TABLET ORAL at 21:57

## 2024-08-13 RX ADMIN — ROCURONIUM BROMIDE 10 MG: 50 INJECTION, SOLUTION INTRAVENOUS at 14:47

## 2024-08-13 RX ADMIN — FENOFIBRATE 160 MG: 160 TABLET ORAL at 21:53

## 2024-08-13 RX ADMIN — ROCURONIUM BROMIDE 5 MG: 50 INJECTION, SOLUTION INTRAVENOUS at 15:33

## 2024-08-13 RX ADMIN — ASPIRIN 81 MG CHEWABLE TABLET 81 MG: 81 TABLET CHEWABLE at 11:09

## 2024-08-13 RX ADMIN — PHENYLEPHRINE HYDROCHLORIDE 100 MCG: 10 INJECTION INTRAVENOUS at 16:28

## 2024-08-13 RX ADMIN — FENTANYL CITRATE 25 MCG: 50 INJECTION INTRAMUSCULAR; INTRAVENOUS at 16:44

## 2024-08-13 RX ADMIN — PHENYLEPHRINE HYDROCHLORIDE 100 MCG: 10 INJECTION INTRAVENOUS at 16:51

## 2024-08-13 RX ADMIN — PHENYLEPHRINE HYDROCHLORIDE 100 MCG: 10 INJECTION INTRAVENOUS at 16:53

## 2024-08-13 RX ADMIN — PROTAMINE SULFATE 10 MG: 10 INJECTION, SOLUTION INTRAVENOUS at 16:27

## 2024-08-13 RX ADMIN — PROPOFOL 20 MG: 10 INJECTION, EMULSION INTRAVENOUS at 13:05

## 2024-08-13 RX ADMIN — SODIUM BICARBONATE 650 MG TABLET 650 MG: at 21:53

## 2024-08-13 RX ADMIN — FENTANYL CITRATE 25 MCG: 50 INJECTION INTRAMUSCULAR; INTRAVENOUS at 16:46

## 2024-08-13 RX ADMIN — PHENYLEPHRINE HYDROCHLORIDE 0.3 MCG/KG/MIN: 10 INJECTION INTRAVENOUS at 12:43

## 2024-08-13 RX ADMIN — ASPIRIN 600 MG: 300 SUPPOSITORY RECTAL at 17:44

## 2024-08-13 RX ADMIN — PHENYLEPHRINE HYDROCHLORIDE 100 MCG: 10 INJECTION INTRAVENOUS at 16:02

## 2024-08-13 RX ADMIN — HEPARIN SODIUM 6000 UNITS: 1000 INJECTION, SOLUTION INTRAVENOUS; SUBCUTANEOUS at 14:11

## 2024-08-13 RX ADMIN — SODIUM CHLORIDE, POTASSIUM CHLORIDE, SODIUM LACTATE AND CALCIUM CHLORIDE: 600; 310; 30; 20 INJECTION, SOLUTION INTRAVENOUS at 20:08

## 2024-08-13 RX ADMIN — PHENYLEPHRINE HYDROCHLORIDE 50 MCG: 10 INJECTION INTRAVENOUS at 13:29

## 2024-08-13 RX ADMIN — HEPARIN SODIUM 1000 UNITS: 1000 INJECTION, SOLUTION INTRAVENOUS; SUBCUTANEOUS at 15:14

## 2024-08-13 RX ADMIN — ROCURONIUM BROMIDE 50 MG: 50 INJECTION, SOLUTION INTRAVENOUS at 12:38

## 2024-08-13 RX ADMIN — PROPOFOL 100 MG: 10 INJECTION, EMULSION INTRAVENOUS at 12:37

## 2024-08-13 RX ADMIN — PHENYLEPHRINE HYDROCHLORIDE 50 MCG: 10 INJECTION INTRAVENOUS at 14:14

## 2024-08-13 RX ADMIN — PHENYLEPHRINE HYDROCHLORIDE 100 MCG: 10 INJECTION INTRAVENOUS at 15:52

## 2024-08-13 RX ADMIN — ONDANSETRON 4 MG: 2 INJECTION INTRAMUSCULAR; INTRAVENOUS at 15:12

## 2024-08-13 RX ADMIN — PROTAMINE SULFATE 10 MG: 10 INJECTION, SOLUTION INTRAVENOUS at 16:23

## 2024-08-13 RX ADMIN — SENNOSIDES AND DOCUSATE SODIUM 1 TABLET: 50; 8.6 TABLET ORAL at 21:53

## 2024-08-13 RX ADMIN — SODIUM CHLORIDE, POTASSIUM CHLORIDE, SODIUM LACTATE AND CALCIUM CHLORIDE: 600; 310; 30; 20 INJECTION, SOLUTION INTRAVENOUS at 11:23

## 2024-08-13 RX ADMIN — HYDROMORPHONE HYDROCHLORIDE 0.3 MG: 1 INJECTION, SOLUTION INTRAMUSCULAR; INTRAVENOUS; SUBCUTANEOUS at 15:24

## 2024-08-13 RX ADMIN — ROCURONIUM BROMIDE 20 MG: 50 INJECTION, SOLUTION INTRAVENOUS at 13:56

## 2024-08-13 RX ADMIN — SODIUM CHLORIDE, POTASSIUM CHLORIDE, SODIUM LACTATE AND CALCIUM CHLORIDE: 600; 310; 30; 20 INJECTION, SOLUTION INTRAVENOUS at 12:31

## 2024-08-13 RX ADMIN — LIDOCAINE HYDROCHLORIDE 100 MG: 20 INJECTION, SOLUTION INFILTRATION; PERINEURAL at 12:37

## 2024-08-13 RX ADMIN — PHENYLEPHRINE HYDROCHLORIDE 100 MCG: 10 INJECTION INTRAVENOUS at 12:41

## 2024-08-13 RX ADMIN — Medication 2 G: at 16:42

## 2024-08-13 RX ADMIN — PROPOFOL 40 MG: 10 INJECTION, EMULSION INTRAVENOUS at 12:40

## 2024-08-13 RX ADMIN — FENTANYL CITRATE 50 MCG: 50 INJECTION INTRAMUSCULAR; INTRAVENOUS at 13:05

## 2024-08-13 RX ADMIN — HYDROMORPHONE HYDROCHLORIDE 0.2 MG: 1 INJECTION, SOLUTION INTRAMUSCULAR; INTRAVENOUS; SUBCUTANEOUS at 15:11

## 2024-08-13 RX ADMIN — PHENYLEPHRINE HYDROCHLORIDE 50 MCG: 10 INJECTION INTRAVENOUS at 14:53

## 2024-08-13 RX ADMIN — PROTAMINE SULFATE 10 MG: 10 INJECTION, SOLUTION INTRAVENOUS at 16:28

## 2024-08-13 RX ADMIN — PHENYLEPHRINE HYDROCHLORIDE 100 MCG: 10 INJECTION INTRAVENOUS at 16:16

## 2024-08-13 RX ADMIN — PROTAMINE SULFATE 10 MG: 10 INJECTION, SOLUTION INTRAVENOUS at 16:22

## 2024-08-13 RX ADMIN — SUGAMMADEX 150 MG: 100 INJECTION, SOLUTION INTRAVENOUS at 17:04

## 2024-08-13 RX ADMIN — ROCURONIUM BROMIDE 20 MG: 50 INJECTION, SOLUTION INTRAVENOUS at 13:08

## 2024-08-13 RX ADMIN — SODIUM CHLORIDE, SODIUM LACTATE, POTASSIUM CHLORIDE, CALCIUM CHLORIDE: 600; 310; 30; 20 INJECTION, SOLUTION INTRAVENOUS at 16:27

## 2024-08-13 ASSESSMENT — ENCOUNTER SYMPTOMS
DYSRHYTHMIAS: 0
SEIZURES: 0

## 2024-08-13 ASSESSMENT — ACTIVITIES OF DAILY LIVING (ADL)
ADLS_ACUITY_SCORE: 29
ADLS_ACUITY_SCORE: 29
ADLS_ACUITY_SCORE: 30
ADLS_ACUITY_SCORE: 29
ADLS_ACUITY_SCORE: 30
ADLS_ACUITY_SCORE: 29
ADLS_ACUITY_SCORE: 38
ADLS_ACUITY_SCORE: 29
ADLS_ACUITY_SCORE: 29

## 2024-08-13 ASSESSMENT — LIFESTYLE VARIABLES: TOBACCO_USE: 1

## 2024-08-13 NOTE — TELEPHONE ENCOUNTER
Stanley Clark MD [facility] [place] [facility]  Neftali Murray,  reviewed rest of your labs,  CO2 is slightly low at 18, you have chronically low CO2 please keep well-hydrated.  You have chronic kidney disease that is stable ,stage IIIb.  Sodium and potassium are normal ,  Liver enzymes are normal,  Calcium level is normal.  You are currently taking sodium bicarbonate for the chronic acid buildup, I recommend you take 3 tablets a day instead of twice a day.    Regards,  Dr. Clark

## 2024-08-13 NOTE — ANESTHESIA PROCEDURE NOTES
Arterial Line Procedure Note    Pre-Procedure   Staff -        Anesthesiologist:  Jojo Angeles MD       Performed By: anesthesiologist       Location: pre-op       Pre-Anesthestic Checklist: patient identified, IV checked, site marked, risks and benefits discussed, informed consent, monitors and equipment checked, pre-op evaluation and at physician/surgeon's request  Timeout:       Correct Patient: Yes        Correct Procedure: Yes        Correct Site: Yes        Correct Position: Yes   Line Placement:   This line was placed Pre Induction starting at 8/13/2024 11:53 AM and ending at 8/13/2024 11:58 AM  Procedure   Procedure: arterial line       Laterality: right       Insertion Site: radial.  Sterile Prep        All elements of maximal sterile barrier technique followed       Patient Prep/Sterile Barriers: hand hygiene, sterile gloves, hat, mask, draped, sterile gown, draped       Skin prep: Chloraprep  Insertion/Injection        Technique: Seldinger Technique and ultrasound guided (no image saved)        1. Ultrasound was used to evaluate the access site.       2. Artery evaluated via ultrasound for patency/adequacy.       3. Using real-time ultrasound the needle/catheter was observed entering the artery/vein.       5. The visualized structures were anatomically normal.       6. There were no apparent abnormal pathologic findings.       Catheter Type/Size: 20 gauge, 1.75 in/4.5 cm quick cath (integral wire)  Narrative        Tegaderm dressing used.       Complications: None apparent,        Arterial waveform: Yes        IBP within 10% of NIBP: Yes

## 2024-08-13 NOTE — ANESTHESIA PREPROCEDURE EVALUATION
Anesthesia Pre-Procedure Evaluation    Patient: Trevor Soni   MRN: [id number] : 9/3/1933        Procedure : Procedure(s):  REDO RIGHT CAROTID ENDARTERECTOMY WITH ELECTROENCEPHALOGRAM          Past Medical History:   Diagnosis Date    Arthritis     rhuematoid    Coronary artery disease     triple bypass     CRF (chronic renal failure)     Gastro-oesophageal reflux disease     Gout     Hyperlipidemia     Hypertension     Nocturia       Past Surgical History:   Procedure Laterality Date    CARDIAC SURGERY      CHOLECYSTECTOMY      COLONOSCOPY      CV CORONARY ANGIOGRAM N/A 2023    Procedure: Coronary Angiogram;  Surgeon: Sujata Ramires MD;  [facility]:  [facility]    CV PCI N/A 2023    Procedure: Percutaneous Coronary Intervention;  Surgeon: Sujata Ramires MD;  Location:  [facility]    CV TRANSCATHETER AORTIC VALVE REPLACEMENT-FEMORAL APPROACH N/A 2023    Procedure: Transcatheter Aortic Valve Replacement-Femoral Approach;  Surgeon: Sujata Ramires MD;  Location:  [facility]    LAPAROTOMY EXPLORATORY N/A 2020    Procedure: EXPLORATORY LAPAROTOMY, BOWEL RESECTION;  Surgeon: Bull Rachel MD;  Location:  OR    LAPAROTOMY, LYSIS ADHESIONS, COMBINED N/A 2020    Procedure: EXPLORATORY LAPAROTOMY WITH LYSIS OF ADHESIONS;  Surgeon: Jose Reed MD;  Location:  OR    ORTHOPEDIC SURGERY      PHACOEMULSIFICATION CLEAR CORNEA WITH TORIC INTRAOCULAR LENS IMPLANT  2012    Procedure:PHACOEMULSIFICATION CLEAR CORNEA WITH TORIC INTRAOCULAR LENS IMPLANT; LEFT PHACOEMULSIFICATION CLEAR CORNEA WITH DELUXE  TORIC INTRAOCULAR LENS IMPLANT ; Surgeon:BRANDO HIGHTOWER; Location:[facility]    PHACOEMULSIFICATION CLEAR CORNEA WITH TORIC INTRAOCULAR LENS IMPLANT  2012    Procedure:PHACOEMULSIFICATION CLEAR CORNEA WITH TORIC INTRAOCULAR LENS IMPLANT; RIGHT PHACOEMULSIFICATION CLEAR CORNEA WITH TORIC INTRAOCULAR LENS IMPLANT ;  Surgeon:BRANDO HIGHTOWER; Location:[facility]    VASCULAR SURGERY        Allergies   Allergen Reactions    Avocado     Ciprofloxacin Itching    Penicillins Itching      Social History     Tobacco Use    Smoking status: Former     Types: Cigars     Quit date: 1980     Years since quittin.6    Smokeless tobacco: Never   Substance Use Topics    Alcohol use: Yes     Comment: rare      Wt Readings from Last 1 Encounters:   24 63.5 kg (140 lb)        Prior to Admission medications    Medication Sig Start Date End Date Taking? Authorizing Provider   acetaminophen (TYLENOL) 325 MG tablet Take 2 tablets (650 mg) by mouth every 6 hours as needed for mild pain or other (and adjunct with moderate or severe pain or per patient request) 7/15/22  Yes Lisandro Redmond MD   allopurinol (ZYLOPRIM) 100 MG tablet TAKE 2 TABLETS BY MOUTH  DAILY 23  Yes Abdoulaye Redman MD   amLODIPine (NORVASC) 5 MG tablet Take 1 tablet (5 mg) by mouth daily 22  Yes Abdoulaye Redman MD   aspirin 81 MG EC tablet Take 81 mg by mouth daily   Yes Reported, Patient   atorvastatin (LIPITOR) 20 MG tablet TAKE 1 TABLET BY MOUTH ONCE  DAILY 24  Yes Abdoulaye Redman MD   Cholecalciferol (VITAMIN D3) 25 MCG (1000 UT) CAPS Take 1 capsule by mouth daily OTC dose unknown 22  Yes Abdoulaye Redman MD   dorzolamide (TRUSOPT) 2 % ophthalmic solution Place 1 drop into both eyes 2 times daily   Yes Reported, Patient   ELIQUIS ANTICOAGULANT 2.5 MG tablet TAKE 1 TABLET BY MOUTH TWICE  DAILY 3/27/24  Yes Abdoulaye Redman MD   fenofibrate (TRIGLIDE/LOFIBRA) 160 MG tablet TAKE 1 TABLET BY MOUTH DAILY 10/20/23  Yes Abdoulaye Redman MD   ferrous gluconate (FERGON) 324 (38 Fe) MG tablet Take 1 tablet (324 mg) by mouth daily 10/26/23  Yes Afsaneh Thomas MD   Multiple Vitamins-Minerals (PRESERVISION AREDS 2) CAPS Take 1 capsule by mouth 2 times daily   Yes Reported, Patient   multivitamin w/minerals (MULTI-VITAMIN) tablet Take 1 tablet by mouth  daily   Yes Reported, Patient   sodium bicarbonate 650 MG tablet Take 1 tablet (650 mg) by mouth 2 times daily 5/9/24  Yes Afsaneh Thomas MD   spironolactone (ALDACTONE) 25 MG tablet TAKE 1 TABLET BY MOUTH  DAILY 8/25/23  Yes Abdoulaye Redman MD   triamcinolone (KENALOG) 0.1 % external cream Apply topically 2 times daily 6/5/23  Yes Abdoulaye Redman MD     ECG 8/12/24: Atrial Rhythm -First degree A-V block   P:QRS - 1:1, Abnormal P axis, H Rate 68   Parvez = 314  -Right bundle branch block with left axis -bifascicular block.    ECHO 4/5/24: Interpretation Summary     s/p TAVR-26-mm Nunes Elzbieta valve  MIld megan-valvular aortic regurgitation  mean gradient 7mmHg  There is moderate concentric left ventricular hypertrophy, thickened/calcified  valves, speckled appearance of myocardium suggest possible infiltrative  disease ie) amyloidosis     ______________________________________________________________________________  Left Ventricle  The left ventricle is normal in size. There is moderate concentric left  ventricular hypertrophy. Left ventricular systolic function is normal. The  visual ejection fraction is 55-60%. Diastolic function not assessed due to  significant mitral annular calcification. Inferoseptal hypokinesis.     Right Ventricle  The right ventricle is grossly normal size. Thickened right ventricle free  wall, mild. The right ventricular systolic function is normal.     Atria  The left atrium is severely dilated. Right atrium not well visualized.     Mitral Valve  There is severe mitral annular calcification. Calcified mitral apparatus.  There is trace mitral regurgitation. The mean mitral valve gradient is 4.0  mmHg. The peak mitral valve gradient is 14.0 mmHg. Calcified mitral apparatus  causing mitral stenosis.     Tricuspid Valve  The tricuspid valve is not well visualized, but is grossly normal. The right  ventricular systolic pressure is approximated at 28.3 mmHg plus the right  atrial  pressure.     Pulmonic Valve  The pulmonic valve is not well visualized. Normal pulmonic valve velocity.     Vessels  Normal size aorta. The inferior vena cava is normal.     Pericardium  There is no pericardial effusion.    Anesthesia Evaluation   Pt has had prior anesthetic. Type: General.    No history of anesthetic complications       ROS/MED HX  ENT/Pulmonary:     (+)                tobacco use, Past use,                    (-) asthma and sleep apnea   Neurologic:    (-) no seizures, no CVA and migraines   Cardiovascular:     (+) Dyslipidemia hypertension- -  CAD -  CABG- -                           valvular problems/murmurs  s/p TAVR.      (-) ALFONSO, arrhythmias and murmur   METS/Exercise Tolerance:     Hematologic:    (-) history of blood clots and anemia   Musculoskeletal:    (-) arthritis   GI/Hepatic:     (+) GERD,                (-) liver disease   Renal/Genitourinary:     (+) renal disease, type: CRI,         (-) nephrolithiasis   Endo:    (-) Type I DM, Type II DM, thyroid disease and obesity   Psychiatric/Substance Use:    (-) psychiatric history   Infectious Disease:    (-) Recent Fever   Malignancy:       Other:            Physical Exam    Airway        Mallampati: III   TM distance: > 3 FB   Neck ROM: full   Mouth opening: > 3 cm    Respiratory Devices and Support         Dental       (+) Edentulous      Cardiovascular          Rhythm and rate: regular and normal (-) no murmur    Pulmonary   pulmonary exam normal        breath sounds clear to auscultation           OUTSIDE LABS:  CBC:   Lab Results   Component Value Date    WBC 6.9 08/12/2024    WBC 8.3 05/02/2024    HGB 12.7 (L) 08/12/2024    HGB 12.6 (L) 05/02/2024    HCT 40.2 08/12/2024    HCT 39.4 (L) 05/02/2024     08/12/2024     05/02/2024     BMP:   Lab Results   Component Value Date     08/12/2024     05/02/2024    POTASSIUM 4.7 08/12/2024    POTASSIUM 4.6 05/02/2024    CHLORIDE 111 (H) 08/12/2024    CHLORIDE 105  05/02/2024    CO2 18 (L) 08/12/2024    CO2 21 (L) 05/02/2024    BUN 39.2 (H) 08/12/2024    BUN 40.2 (H) 05/02/2024    CR 1.97 (H) 08/12/2024    CR 2.00 (H) 05/02/2024     (H) 08/12/2024     (H) 05/02/2024     COAGS:   Lab Results   Component Value Date    PTT 33 01/13/2023    INR 1.26 (H) 04/05/2023     POC:   Lab Results   Component Value Date     (H) 06/22/2020     HEPATIC:   Lab Results   Component Value Date    ALBUMIN 4.1 08/12/2024    PROTTOTAL 6.8 08/12/2024    ALT 15 08/12/2024    AST 31 08/12/2024    ALKPHOS 89 08/12/2024    BILITOTAL 0.4 08/12/2024     OTHER:   Lab Results   Component Value Date    PH 7.47 (H) 07/11/2022    LACT 1.9 07/11/2022    A1C 5.6 10/28/2021    AGUSTÍN 9.3 08/12/2024    PHOS 3.8 05/02/2024    MAG 2.0 04/12/2023    LIPASE 219 06/30/2020    TSH 2.07 05/21/2018       Anesthesia Plan    ASA Status:  3    NPO Status:  NPO Appropriate    Anesthesia Type: General.     - Airway: ETT   Induction: Propofol.   Maintenance: Balanced.   Techniques and Equipment:     - Lines/Monitors: 2nd IV, Arterial Line     - Drips/Meds: Phenylephrine     Consents    Anesthesia Plan(s) and associated risks, benefits, and realistic alternatives discussed. Questions answered and patient/representative(s) expressed understanding.     - Discussed:     - Discussed with:  Patient            Postoperative Care    Pain management: Multi-modal analgesia.   PONV prophylaxis: Ondansetron (or other 5HT-3), Dexamethasone or Solumedrol, Background Propofol Infusion     Comments:               oJjo Angeles MD    I have reviewed the pertinent notes and labs in the chart from the past 30 days and (re)examined the patient.  Any updates or changes from those notes are reflected in this note.

## 2024-08-13 NOTE — RESULT ENCOUNTER NOTE
Neftali Murray,  reviewed rest of your labs,  CO2 is slightly low at 18, you have chronically low CO2 please keep well-hydrated.  You have chronic kidney disease that is stable ,stage IIIb.  Sodium and potassium are normal ,  Liver enzymes are normal,  Calcium level is normal.  You are currently taking sodium bicarbonate for the chronic acid buildup, I recommend you take 3 tablets a day instead of twice a day.    Regards,  Dr. Clark

## 2024-08-13 NOTE — ANESTHESIA CARE TRANSFER NOTE
Patient: Trevor Soni    Procedure: Procedure(s):  REDO RIGHT CAROTID ENDARTERECTOMY with bovine patch and ELECTROENCEPHALOGRAM       Diagnosis: Symptomatic stenosis of right carotid artery [I65.21]  Diagnosis Additional Information: No value filed.    Anesthesia Type:   General     Note:    Oropharynx: oropharynx clear of all foreign objects and spontaneously breathing  Level of Consciousness: awake  Oxygen Supplementation: face mask  Level of Supplemental Oxygen (L/min / FiO2): 6  Independent Airway: airway patency satisfactory and stable  Dentition: dentition unchanged  Vital Signs Stable: post-procedure vital signs reviewed and stable  Report to RN Given: handoff report given  Patient transferred to: PACU  Comments:     Neuromuscular blockade reversed with sugammadex, spontaneous respirations, adequate tidal volumes, followed commands to voice, oropharynx suctioned with soft flexible catheter, extubated atraumatically, extubated with suction, airway patent after extubation. Followed verbal commands to move all four extremities and stick out tongue. Oxygen via facemask at 6 liters per minute to PACU. Oxygen tubing connected to wall O2 in PACU, SpO2, NiBP, and EKG monitors and alarms on and functioning, report on patient's clinical status given to PACU RN, RN questions answered.               Handoff Report: Identifed the Patient, Identified the Reponsible Provider, Reviewed the pertinent medical history, Discussed the surgical course, Reviewed Intra-OP anesthesia mangement and issues during anesthesia, Set expectations for post-procedure period and Allowed opportunity for questions and acknowledgement of understanding      Vitals:  Vitals Value Taken Time   /46    Temp     Pulse 86    Resp 18    SpO2 99 % 08/13/24 1725   Vitals shown include unfiled device data.    Electronically Signed By: JUAN C Perkins CRNA  August 13, 2024  5:26 PM

## 2024-08-13 NOTE — PLAN OF CARE
"/49   Pulse 74   Temp 97.2  F (36.2  C) (Temporal)   Resp 13   Ht 1.753 m (5' 9\")   Wt 63.5 kg (140 lb)   SpO2 96%   BMI 20.67 kg/m      Patient name: Trevor Soni    Summary: Patient here with right CEA. Dressing mildly saturated with drainage, TASHA in place, nunez in place, neurologically intact, very pleasant.    Darrick Rodriguez, RN  [facility]  [phone]    "

## 2024-08-14 VITALS
RESPIRATION RATE: 17 BRPM | HEART RATE: 72 BPM | TEMPERATURE: 98.4 F | DIASTOLIC BLOOD PRESSURE: 94 MMHG | WEIGHT: 140 LBS | HEIGHT: 69 IN | BODY MASS INDEX: 20.73 KG/M2 | OXYGEN SATURATION: 96 % | SYSTOLIC BLOOD PRESSURE: 141 MMHG

## 2024-08-14 PROCEDURE — 250N000013 HC RX MED GY IP 250 OP 250 PS 637

## 2024-08-14 PROCEDURE — 258N000003 HC RX IP 258 OP 636

## 2024-08-14 PROCEDURE — 03CM0ZZ EXTIRPATION OF MATTER FROM RIGHT EXTERNAL CAROTID ARTERY, OPEN APPROACH: ICD-10-PCS | Performed by: SURGERY

## 2024-08-14 PROCEDURE — 03PY0KZ REMOVAL OF NONAUTOLOGOUS TISSUE SUBSTITUTE FROM UPPER ARTERY, OPEN APPROACH: ICD-10-PCS | Performed by: SURGERY

## 2024-08-14 PROCEDURE — 03UK0KZ SUPPLEMENT RIGHT INTERNAL CAROTID ARTERY WITH NONAUTOLOGOUS TISSUE SUBSTITUTE, OPEN APPROACH: ICD-10-PCS | Performed by: SURGERY

## 2024-08-14 RX ORDER — ASPIRIN 81 MG/1
81 TABLET ORAL DAILY
Qty: 2 TABLET | Refills: 0 | Status: SHIPPED | OUTPATIENT
Start: 2024-08-14 | End: 2024-08-16

## 2024-08-14 RX ADMIN — ACETAMINOPHEN 975 MG: 325 TABLET, FILM COATED ORAL at 12:30

## 2024-08-14 RX ADMIN — SODIUM CHLORIDE, POTASSIUM CHLORIDE, SODIUM LACTATE AND CALCIUM CHLORIDE: 600; 310; 30; 20 INJECTION, SOLUTION INTRAVENOUS at 03:07

## 2024-08-14 RX ADMIN — SPIRONOLACTONE 25 MG: 25 TABLET ORAL at 08:11

## 2024-08-14 RX ADMIN — SENNOSIDES AND DOCUSATE SODIUM 1 TABLET: 50; 8.6 TABLET ORAL at 08:11

## 2024-08-14 RX ADMIN — ASPIRIN 81 MG: 81 TABLET, COATED ORAL at 08:11

## 2024-08-14 RX ADMIN — ACETAMINOPHEN 975 MG: 325 TABLET, FILM COATED ORAL at 05:03

## 2024-08-14 RX ADMIN — ALLOPURINOL 200 MG: 100 TABLET ORAL at 08:11

## 2024-08-14 RX ADMIN — SODIUM BICARBONATE 650 MG TABLET 650 MG: at 08:11

## 2024-08-14 RX ADMIN — DORZOLAMIDE HCL 1 DROP: 20 SOLUTION/ DROPS OPHTHALMIC at 08:44

## 2024-08-14 RX ADMIN — POLYETHYLENE GLYCOL 3350 17 G: 17 POWDER, FOR SOLUTION ORAL at 08:11

## 2024-08-14 RX ADMIN — AMLODIPINE BESYLATE 5 MG: 5 TABLET ORAL at 08:11

## 2024-08-14 ASSESSMENT — ACTIVITIES OF DAILY LIVING (ADL)
ADLS_ACUITY_SCORE: 32
ADLS_ACUITY_SCORE: 31
ADLS_ACUITY_SCORE: 31
ADLS_ACUITY_SCORE: 32
ADLS_ACUITY_SCORE: 30
ADLS_ACUITY_SCORE: 31
ADLS_ACUITY_SCORE: 32
ADLS_ACUITY_SCORE: 31
ADLS_ACUITY_SCORE: 31
ADLS_ACUITY_SCORE: 32
ADLS_ACUITY_SCORE: 31
ADLS_ACUITY_SCORE: 30
ADLS_ACUITY_SCORE: 30
ADLS_ACUITY_SCORE: 32

## 2024-08-14 NOTE — PLAN OF CARE
Goal Outcome Evaluation:  Pt here with R CEA. A&Ox4. Neuros intact. VSS. Tele SR w/ BBB. Regular diet, thin liquids. Takes pills whole. Up with 2A/GB/W. Denies pain. R neck drsg with dried blood-marked, no extension. TASHA in place drains serosanguinous output. Pt scoring green on the Aggression Stop Light Tool. Plan for discharge pending clinical improvement..

## 2024-08-14 NOTE — OP NOTE
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    PROCEDURES PERFORMED: Redo right carotid endarterectomy with bovine pericardial patch angioplasty.    SURGEON: Mateusz Zelaya MD    ASSISTANT: Jose Reed MD.  Dr. Reed's assistance in the case was necessary secondary to the redo nature of this procedure.  Scar tissue made exposure of the artery and avoidance of cranial nerve injury challenging.  He assisted with the carotid exposure, performance of the endarterectomy and the bovine pericardial patch placement.    : Tracy Cruz MD    SECOND ASSISTANT: Jocelyn Diez CST    ANESTHESIA: GETA    EBL: 100 cc    DRAINS: 15F round TASHA drain.    OPERATIVE INDICATIONS: This patient is a 89-year-old gentleman with a significant cardiac history who still lives independently.  He is status post right carotid endarterectomy in 2001.  He recently experienced an episode of right eye amaurosis with near complete loss of his right eye visual field lasting roughly 5 minutes.  Ultrasound has demonstrated a significant recurrent right carotid stenosis with impressive progression compared to a prior ultrasound 2 years ago.  He has chronic kidney disease stage IIIb and we chose not to obtain a CTA of the neck for that reason.  After a thoughtful discussion he has decided to proceed with redo right carotid endarterectomy.    OPERATIVE FINDINGS: Moderate scar tissue noted encasing the previously endarterectomized right carotid artery with prior placement of a Dacron patch.  The vagus nerve was identified posterior to the carotid and the hypoglossal nerve was identified crossing over the internal carotid distal to our disease.  Each of these structures was carefully avoided.  There was an irregular soft  plaque removed which certainly  could have been the source for his amaurosis.  We resected the old Dacron patch and replaced it with a bovine pericardial patch at completion.  Moderate diffuse oozing noted throughout the wound bed for which we placed a 15 round Reza-Morgan drain at completion.  The patient awoke neurologically intact.    OPERATIVE DESCRIPTION: After informed consent was obtained the patient was brought to the operating room and placed on the table in a supine position.  General endotracheal anesthesia was achieved without incident.  A Farooq catheter and EEG leads were placed.  His right neck was prepped and draped in the usual sterile fashion.  Timeout was called and we verified the patient's identity, the operative site, and the proposed procedure.  I reincised his prior right carotid endarterectomy scar.  Dissection proceeded sharply downward along the medial border of the sternocleidomastoid muscle.  I encountered moderate scar tissue encasing the common carotid artery.  This was dissected free until I identified the mid cervical common carotid just above the omohyoid muscle.  This was dissected free and encircled with a vessel loop.  I identified the vagus nerve coursing posterior to the common carotid and that structure was carefully avoided.  Dissection continued distally to the carotid bifurcation.  There was a plexus of small veins coursing over the carotid bifurcation and those structures were individually ligated between silk ties.  Dissection continued to identify the internal and external carotid arteries.  The superior thyroidal and proximal external carotid arteries were dissected free and encircled with Vesseloops.  Dissection continued distally up the internal carotid.  We identified the previously placed Dacron patch and continued a circumferential dissection of the internal carotid to the distal extent of the patch.  We identified the crossing hypoglossal nerve just above this point and carefully avoided that  structure.  We divided a few small tethering venous branches to more fully mobilize the hypoglossal nerve.  During our dissection of the more distal internal carotid a small tear was noted in the anterior aspect of the distal ICA.  This was repaired with a few 7-0 Prolene sutures.  Distal ICA control was achieved with a vessel loop.    The patient was systemically heparinized with 6000 units of intravenous heparin.  After a 5-minute circulation time distal and proximal control was achieved with our Vesseloops.  An arteriotomy was made on the native common carotid artery and extended up the internal carotid through the Dacron patch to the distal extent of that patch.  No EEG changes were noted.  Given the tenuous nature of the repaired distal ICA I chose not to place a shunt.  The endarterectomy plane was developed using a black spatula.  Intima was scored distally and proximally using a [place] blade.  We performed eversion endarterectomy on the external carotid.  The plaque was removed from the field.  We resected out the old Dacron patch.  Residual strands of fibrinous debris was removed from the endarterectomized surface until we were satisfied with his quality.  The distal taper point was tacked back down with a few interrupted 7-0 Prolene sutures.  A bovine pericardial patch was selected.  This was secured circumferentially with running 6-0 Prolene suture.  Prior to placing the final stitches in the patch angioplasty suture line all Vesseloops were briefly released to flush any debris out of the endarterectomized lumen.  It was copiously irrigated with heparinized saline and observed to be clear.  The remaining sutures were placed in the patch angioplasty suture line and flow was preferentially directed up the external carotid for several heartbeats before releasing control of the distal ICA.  Immediately noted was a palpable pulse in the native ICA distal to the patch.  There was some persistent oozing coming  from the previously repaired distal right ICA.  This was repaired with 2 pledgeted 7-0 Prolene sutures.  Diffuse oozing was noted coming from the wound bed.  The patient had been bolused with a total of 7000 units of heparin.  This was partially reversed with 40 mg of protamine.  Surgicel gauze and gentle compression was held over the wound bed for 10 minutes.  Following this we had satisfactory hemostasis.  A 15 round Reza-Morgan drain was brought in from an inferolateral stab incision beneath the sternocleidomastoid muscle.  The deep layer was closed with interrupted 2-0 Vicryl suture.  The drain was secured in place.  Platysma was closed with a running 3-0 Vicryl suture.  Skin was closed with a 4-0 Monocryl running subcuticular stitch.  Steri-Strips and a sterile dressing were applied.  Final sponge and needle count were reported as correct.  The patient tolerated the procedure without incident.  He was extubated and observed to follow commands with all 4 extremities.  His tongue was in the midline.  He was transported awake and hemodynamically stable to the recovery room.    Mateusz Zelaya MD

## 2024-08-14 NOTE — PLAN OF CARE
Pt here with POD 1 R CEA. A&Ox4. Neuros intact. VSS, SBP <160. Tele SR w/BBB. Regular diet, thin liquids. Takes pills whole. Up with SBA, GB and walker. Denies pain.Incision covered with dry gauze and steri strips. Voiding, PVR 50 mL. Pt scoring green on the Aggression Stop Light Tool. Plan to discharge to home. Reviewed all discharge instructions.

## 2024-08-14 NOTE — ANESTHESIA POSTPROCEDURE EVALUATION
Patient: Trevor Soni    Procedure: Procedure(s):  REDO RIGHT CAROTID ENDARTERECTOMY with bovine patch and ELECTROENCEPHALOGRAM       Anesthesia Type:  General    Note:  Disposition: Admission   Postop Pain Control: Uneventful            Sign Out: Well controlled pain   PONV: No   Neuro/Psych: Uneventful            Sign Out: Acceptable/Baseline neuro status   Airway/Respiratory: Uneventful            Sign Out: Acceptable/Baseline resp. status   CV/Hemodynamics: Uneventful            Sign Out: Acceptable CV status; No obvious hypovolemia; No obvious fluid overload   Other NRE: NONE   DID A NON-ROUTINE EVENT OCCUR? No           Last vitals:  Vitals Value Taken Time   /62 08/13/24 1830   Temp 36.2  C (97.2  F) 08/13/24 1730   Pulse 76 08/13/24 1836   Resp 11 08/13/24 1836   SpO2 93 % 08/13/24 1836   Vitals shown include unfiled device data.    Electronically Signed By: Jojo Angeles MD  August 13, 2024  7:10 PM

## 2024-08-14 NOTE — DISCHARGE SUMMARY
[facility]  Discharge Summary  Vascular Surgery     Trevor Soni MRN# [id number]   YOB: 1933 Age: 89 year old     Date of Admission:  8/13/2024  Date of Discharge::  8/14/2024  Admitting Physician:  Brian Zelaya MD  Discharge Physician:  Brian Zelaya MD  Primary Care Physician:        Abdoulaye Redman          Admission Diagnoses:   Symptomatic stenosis of right carotid artery [I65.21]            Discharge Diagnosis:   Same as above         Procedures:     Procedure(s):  REDO RIGHT CAROTID ENDARTERECTOMY with bovine patch and ELECTROENCEPHALOGRAM          Consultations:   ANESTHESIOLOGY IP CONSULT          Brief History of Illness:   This patient is a 89-year-old gentleman with a significant cardiac history who still lives independently. He is status post right carotid endarterectomy in 2001. He recently experienced an episode of right eye amaurosis with near complete loss of his right eye visual field lasting roughly 5 minutes. Ultrasound has demonstrated a significant recurrent right carotid stenosis with impressive progression compared to a prior ultrasound 2 years ago. He has chronic kidney disease stage IIIb and we chose not to obtain a CTA of the neck for that reason. After a thoughtful discussion he has decided to proceed with redo right carotid endarterectomy.  He was admitted for planned R CEA on 8/13.            Hospital Course:       The patient underwent right carotid endarterectomy on 8/13/24, which he tolerated well without immediate complications. He was transferred to the PACU and then floor for routine postoperative care. The remainder of his postoperative course was unremarkable. Farooq was removed on POD#1. On the day of discharge, he was tolerating regular diet, his pain was well controlled with oral pain medications, he was voiding spontaneously, and ambulating independently. Patient with follow up with vascular surgery team in  clinic in 2  weeks.           Imaging Studies:     Results for orders placed or performed in visit on 08/06/24   US Carotid Bilateral    Narrative    EXAM: US CAROTID BILATERAL  LOCATION: [facility]  DATE: 8/6/2024    INDICATION:  Amaurosis fugax  COMPARISON: 11/23/2022  TECHNIQUE: Duplex exam performed utilizing 2D gray-scale imaging, Doppler interrogation with color-flow and spectral waveform analysis. The percent diameter stenosis is determined using Updated Recommendations for Carotid Stenosis Interpretation Criteria   from [employer] [facility].    FINDINGS: Bulky calcified plaque with associated shadowing may underestimate degree of stenosis.    RIGHT: Severe plaque at the bifurcation. The peak systolic velocity in the ICA is greater than 230 cm/sec, consistent with greater than 70% stenosis. Elevated velocities in the ECA consistent with a stenosis. Antegrade flow within the vertebral artery.   Subclavian artery velocity is 230 cm/s.    LEFT: Moderate plaque at the bifurcation. The peak systolic velocity in the ICA is 180-230 cm/sec, consistent with 50-69% stenosis. Elevated velocities in the ECA consistent with a stenosis. Antegrade flow within the vertebral artery. Subclavian artery   velocity is 220 cm/s    VELOCITY CHART:  CCA   Right: 82/14 cm/s   Left: 76/2 cm/s  ICA   Right: 412/41 cm/s   Left: 207/45 cm/s  ECA   Right: 147 cm/s   Left: 202 cm/s  ICA/CCA PSV Ratio   Right: 5.02   Left: 2.27      Impression    IMPRESSION:  1.  Severe plaque formation, velocities consistent with greater than 70% stenosis in the right internal carotid artery.  2.  Moderate plaque formation, velocities consistent with 50-69% stenosis in the left internal carotid artery. Bulky calcified plaque. Again  3.  Flow within the vertebral arteries is antegrade.  4.  Bilateral external carotid artery stenosis.  5.  Elevated bilateral subclavian artery velocities, likely just below the 70% luminal narrowing range.               Medications Prior to Admission:     Facility-Administered Medications Prior to Admission   Medication Dose Route Frequency Provider Last Rate Last Admin    lidocaine 1 % injection 3 mL  3 mL      3 mL at 05/16/24 1710    lidocaine 1 % injection 3 mL  3 mL      3 mL at 05/16/24 1710    lidocaine 1 % injection 4 mL  4 mL      4 mL at 05/16/24 1710    lidocaine 1 % injection 4 mL  4 mL      4 mL at 05/16/24 1710    methylPREDNISolone (DEPO-Medrol) injection 40 mg  40 mg      40 mg at 05/16/24 1710    methylPREDNISolone (DEPO-Medrol) injection 40 mg  40 mg      40 mg at 05/16/24 1710     Medications Prior to Admission   Medication Sig Dispense Refill Last Dose    acetaminophen (TYLENOL) 325 MG tablet Take 2 tablets (650 mg) by mouth every 6 hours as needed for mild pain or other (and adjunct with moderate or severe pain or per patient request)    at PRN    allopurinol (ZYLOPRIM) 100 MG tablet TAKE 2 TABLETS BY MOUTH  DAILY 180 tablet 3  at AM    amLODIPine (NORVASC) 5 MG tablet Take 1 tablet (5 mg) by mouth daily 90 tablet 3 8/12/2024 at AM    atorvastatin (LIPITOR) 20 MG tablet TAKE 1 TABLET BY MOUTH ONCE  DAILY 90 tablet 3  at PM    Cholecalciferol (VITAMIN D3) 25 MCG (1000 UT) CAPS Take 1 capsule by mouth daily OTC dose unknown    at PM    dorzolamide (TRUSOPT) 2 % ophthalmic solution Place 1 drop into both eyes 2 times daily    at AM    fenofibrate (TRIGLIDE/LOFIBRA) 160 MG tablet TAKE 1 TABLET BY MOUTH DAILY 90 tablet 3  at PM    ferrous gluconate (FERGON) 324 (38 Fe) MG tablet Take 1 tablet (324 mg) by mouth daily 90 tablet 3  at PM    Multiple Vitamins-Minerals (PRESERVISION AREDS 2) CAPS Take 1 capsule by mouth 2 times daily    at PM    multivitamin w/minerals (MULTI-VITAMIN) tablet Take 1 tablet by mouth daily    at PM    sodium bicarbonate 650 MG tablet Take 1 tablet (650 mg) by mouth 2 times daily 180 tablet 3  at PM    spironolactone (ALDACTONE) 25 MG tablet TAKE 1 TABLET BY MOUTH  DAILY 90  tablet 3  at AM    triamcinolone (KENALOG) 0.1 % external cream Apply topically 2 times daily 453.6 g 11  at PRN    [DISCONTINUED] aspirin 81 MG EC tablet Take 81 mg by mouth daily   8/12/2024 at AM    [DISCONTINUED] ELIQUIS ANTICOAGULANT 2.5 MG tablet TAKE 1 TABLET BY MOUTH TWICE  DAILY 180 tablet 3  at PM              Discharge Medications:     Current Discharge Medication List        CONTINUE these medications which have CHANGED    Details   apixaban ANTICOAGULANT (ELIQUIS ANTICOAGULANT) 2.5 MG tablet Take 1 tablet (2.5 mg) by mouth 2 times daily Restart taking am 8/16  Qty: 180 tablet, Refills: 3    Associated Diagnoses: Atrial flutter, unspecified type (H)      aspirin 81 MG EC tablet Take 1 tablet (81 mg) by mouth daily for 2 days Take on 8/15, do not need to take 8/16 once restart eliquis.  Qty: 2 tablet, Refills: 0    Associated Diagnoses: Carotid stenosis, asymptomatic, bilateral           CONTINUE these medications which have NOT CHANGED    Details   acetaminophen (TYLENOL) 325 MG tablet Take 2 tablets (650 mg) by mouth every 6 hours as needed for mild pain or other (and adjunct with moderate or severe pain or per patient request)    Associated Diagnoses: Pain      allopurinol (ZYLOPRIM) 100 MG tablet TAKE 2 TABLETS BY MOUTH  DAILY  Qty: 180 tablet, Refills: 3    Comments: Please send a replace/new response with 90-Day Supply if appropriate to maximize member benefit. Requesting 1 year supply.  Associated Diagnoses: Hyperlipidemia LDL goal <100; Benign essential hypertension      amLODIPine (NORVASC) 5 MG tablet Take 1 tablet (5 mg) by mouth daily  Qty: 90 tablet, Refills: 3    Comments: Profile Rx: patient will contact pharmacy when needed  Associated Diagnoses: Benign essential hypertension      atorvastatin (LIPITOR) 20 MG tablet TAKE 1 TABLET BY MOUTH ONCE  DAILY  Qty: 90 tablet, Refills: 3    Comments: Please send a replace/new response with 90-Day Supply if appropriate to maximize member benefit.  Requesting 1 year supply.  Associated Diagnoses: Hyperlipidemia LDL goal <100; Benign essential hypertension      Cholecalciferol (VITAMIN D3) 25 MCG (1000 UT) CAPS Take 1 capsule by mouth daily OTC dose unknown    Associated Diagnoses: Osteoporosis with current pathological fracture with routine healing, unspecified osteoporosis type, subsequent encounter      dorzolamide (TRUSOPT) 2 % ophthalmic solution Place 1 drop into both eyes 2 times daily      fenofibrate (TRIGLIDE/LOFIBRA) 160 MG tablet TAKE 1 TABLET BY MOUTH DAILY  Qty: 90 tablet, Refills: 3    Comments: Please send a replace/new response with 90-Day Supply if appropriate to maximize member benefit. Requesting 1 year supply.  Associated Diagnoses: Hyperlipidemia LDL goal <100; Benign essential hypertension      ferrous gluconate (FERGON) 324 (38 Fe) MG tablet Take 1 tablet (324 mg) by mouth daily  Qty: 90 tablet, Refills: 3    Associated Diagnoses: Anemia, unspecified type      Multiple Vitamins-Minerals (PRESERVISION AREDS 2) CAPS Take 1 capsule by mouth 2 times daily      multivitamin w/minerals (MULTI-VITAMIN) tablet Take 1 tablet by mouth daily      sodium bicarbonate 650 MG tablet Take 1 tablet (650 mg) by mouth 2 times daily  Qty: 180 tablet, Refills: 3    Associated Diagnoses: Metabolic acidosis      spironolactone (ALDACTONE) 25 MG tablet TAKE 1 TABLET BY MOUTH  DAILY  Qty: 90 tablet, Refills: 3    Comments: Please send a replace/new response with 90-Day Supply if appropriate to maximize member benefit. Requesting 1 year supply.  Associated Diagnoses: Secondary hypertension      triamcinolone (KENALOG) 0.1 % external cream Apply topically 2 times daily  Qty: 453.6 g, Refills: 11    Associated Diagnoses: Rash                     Medications Discontinued or Adjusted During This Hospitalization:   Restart eliquis Friday 8/16.   Take ASA daily until restarting eliquis on Fraiday 8/16. Do not need to take ASA 81 on 8/16.            Antibiotics  Prescribed at Discharge:   None prescribed           Day of Discharge Physical Exam:   Temp:  [96  F (35.6  C)-98.6  F (37  C)] 98.4  F (36.9  C)  Pulse:  [68-85] 72  Resp:  [12-18] 17  BP: ()/() 141/94  MAP:  [75 mmHg-81 mmHg] 81 mmHg  Arterial Line BP: (121-144)/(28-50) 132/50  SpO2:  [92 %-100 %] 96 %    General: awake, alert, no acute distress, laying comfortably in bed   CV: warm, well perfused   Pulm: breathing comfortably on room air       Extremities: no edema, moving all extremities spontaneously and without apparent deficit      Incision:  C/d/I.   Neuro:    Strength 5/5 BUE, BLE. CN ii-xii in tact         Discharge Instructions and Follow-Up:     Discharge Procedure Orders   Reason for your hospital stay   Order Comments: Carotid endarterectomy     Follow-up and recommended labs and tests    Order Comments: Follow up as scheduled in 2 weeks with vascular surgery clinic.     Activity   Order Comments: Your activity upon discharge: Activity as tolerated.     Order Specific Question Answer Comments   Is discharge order? Yes      Discharge Instructions   Order Comments: Carotid Endarterectomy     Post-Operative Instructions     Typical length of stay in the hospital is 1-2 days.  On occasion, an evening in the Intensive Care Unit may be required for blood pressure regulation.     Activity   Most people can resume normal activities 1-2 weeks after surgery.  The key is to gradually increase your level of activity as you are able.  It is not uncommon to feel easily fatigued - this will improve with time.   You should avoid heavy lifting more than 30 lbs for 1 week.   You may return to work when you feel able - typically 1-2 weeks after surgery, depending on the type of work you do.   You should not drive a car for 1 week after surgery.  In addition, you can not be taking prescription strength pain medication and you must feel strong enough to drive safely.     Incision Care   You can shower or bathe  "2 days after surgery - avoid rubbing and soaking your incision.   You may have strips of white tape called \"steri-strips\" across your incision; they should stay on for 5-7 days or until the ends start to curl up.  You can then remove the steri-strips, or we will remove them at your post-operative appointment.  Avoid shaving directly over the incision for 2-3 weeks.     Common Concerns   You may notice mild skin numbness on the side of your surgery in your neck, chin, face, and earlobe.  This is due to nerve \"irritation\" and will gradually resolve over the next several months.  Call our office if you experience any short-lasting episode of numbness or weakness affecting one side of your body.   Some bruising and firmness around your incision can be expected.  This will soften and resolve over the next few weeks.  You may notice that some discoloration spreads to an area lower than the incision, such as the shoulder, neck or upper chest.  Likewise, swelling can occur near the incision or below the chin.  Call our office if the swelling or bruising worsens, or if you have difficulty swallowing.     Diet   You may resume a regular diet before you leave the hospital.  You may find that it is best to try smaller, more frequent meals until your appetite returns.     Medications   You may be started on a blood thinner after surgery - typically Aspirin and / or Plavix.   You may be given a prescription for pain medication after surgery.  If you need to have this refilled, please call your pharmacy where you had it filled.  They will then call us for approval.  Please do not call after hours for a refill on pain medication.     Post-Operative Appointments   You need to see your surgeon in the clinic approximately 1-2 weeks after surgery.   You will have an ultrasound test and evaluation with your surgeon 90 days (3 months) after surgery.   We will notify you by mail when you are due to schedule further ultrasound testing and " evaluation; typically this is done annually.      When You Should Call Our Office   Body aches, chills or temperature greater than 101   Incision redness or drainage   Loss of vision in one eye   Loss of strength / weakness in one side of your body   Severe headache   Difficulty with speech       If you will be having an invasive procedure, such as a colonoscopy or dental work within one year of your surgery, you may need to take an antibiotic prior to the procedure if you had a Dacron patch with your surgery; please call us so we can assist you with this.     Call our main number, [phone], for assistance with any concerns or questions.    [facility]  [address]  [place] [address]     Diet   Order Comments: Follow this diet upon discharge: Regular     Order Specific Question Answer Comments   Is discharge order? Yes               Home Health Care:     Not needed           Discharge Disposition:     Discharged to home      Condition at discharge: Good    Patient was discussed with staff, Dr. Zelaya, on the day of discharge.    Abdoulaye Cruz MD  Surgery Resident

## 2024-08-14 NOTE — BRIEF OP NOTE
[facility]    Brief Operative Note    Pre-operative diagnosis: Symptomatic stenosis of right carotid artery [I65.21]  Post-operative diagnosis Same as pre-operative diagnosis    Procedure: REDO RIGHT CAROTID ENDARTERECTOMY with bovine patch and ELECTROENCEPHALOGRAM, Right - Neck    Surgeon: Surgeons and Role:     * Brian Zelaya MD - Primary     * Jose Reed MD - Assisting     * Tracy Cruz MD - Assisting  Anesthesia: General   Estimated Blood Loss: 100 mL from 8/13/2024 12:30 PM to 8/13/2024  5:21 PM      Drains: Reza-Morgan  Specimens: * No specimens in log *  Findings:   None.  Complications: None.  Implants:   Implant Name Type Inv. Item Serial No.  Lot No. LRB No. Used Action   IMP PATCH PERICARDIUM PHOTOFIX BOVINE 0.8X8CM [id number] - [id number] Bone/Tissue/Biologic IMP PATCH PERICARDIUM PHOTOFIX BOVINE 0.8X8CM [id number] [facility] [id number] Right 1 Implanted

## 2024-08-14 NOTE — TELEPHONE ENCOUNTER
Per chart review, patient is currently admitted to hospital.    Will postpone message to call patient once discharged.    Staci STRATTON  [facility]

## 2024-08-14 NOTE — PROGRESS NOTES
Vascular Surgery Progress Note  08/14/2024       Subjective:  This morning doing very well ate applesauce without issue. .  Just some soreness however otherwise pain well-controlled     Objective:  Temp:  [96  F (35.6  C)-97.9  F (36.6  C)] 97.9  F (36.6  C)  Pulse:  [68-85] 73  Resp:  [12-18] 17  BP: ()/(40-76) 124/52  MAP:  [75 mmHg-81 mmHg] 81 mmHg  Arterial Line BP: (121-144)/(28-50) 132/50  SpO2:  [92 %-100 %] 96 %    I/O last 3 completed shifts:  In: 1750 [I.V.:1750]  Out: 565 [Urine:425; Drains:40; Blood:100]      Gen: Awake, alert, NAD  CV: RRR per radial pulse]  Resp: NLB on room air  Incision: c/d/I  Ext: WWP, no edema  Neuro: Moving all extremities equal strength 5 out of 5 bilateral upper and lower extremities CN II through XII intact     Labs:  Recent Labs   Lab 08/12/24  1521   WBC 6.9   HGB 12.7*          Recent Labs   Lab 08/13/24  1108 08/12/24  1521    140   POTASSIUM 4.3 4.7   CHLORIDE 106 111*   CO2 18* 18*   BUN 34.4* 39.2*   CR 2.06* 1.97*   GLC 99 113*   AGUSTÍN 9.5 9.3       Imaging:  Imaging reviewed     Assessment/Plan:   89 year old male with PMH of .  Symptomatic right carotid artery stenosis s/p right CEA on 8/13.     Doing very well  -Remove Farooq today   - remove drain already  -Hold Eliquis this morning  -Will revisit this afternoon to discuss with patient possible discharge does need to be able to urinate on own and walk around without issue and eat food in order to discharge, .   -May keep today pending progress on the above will discuss with patient later today     Discussed with vascular surgery staff, who is in agreement with the above.  - - - - - - - - - - - - - - - - - -  Abdoulaye Cruz, PGY-4  Vascular Surgery Resident    VASCULAR SURGICAL STAFF:  As noted above.  Sitting up in a chair eating lunch and without complaints.  He is visiting with his daughter.  He denies dysphagia or neck fullness.  On exam he has moderate ecchymosis around the incision and  dependently in his neck.  His neck is soft with no evidence whatsoever of underlying hematoma.  His smile is symmetric and his tongue is in the midline.  His neurologic exam is nonfocal.  I am comfortable with his request for discharge to home today.  I asked that he hold his Eliquis until Friday.  He will take aspirin 81 mg daily until starting his Eliquis on Friday.  Once he starts Eliquis he may discontinue the aspirin.  We reviewed postprocedural care instructions.  He should utilize ice packs to the right neck incision.  Schedule vascular surgical follow-up in approximately 2 weeks.  All of their questions were answered and they verbalized full understanding to the above and agreement with this plan.    Mateusz Zelaya MD

## 2024-08-15 NOTE — TELEPHONE ENCOUNTER
Per chart review- pt was admitted to the hospital and had more labs done. Is the interpretation from below still the recommendations?

## 2024-08-28 ENCOUNTER — OFFICE VISIT (OUTPATIENT)
Dept: OTHER | Facility: CLINIC | Age: 89
End: 2024-08-28
Payer: MEDICARE

## 2024-08-28 VITALS — SYSTOLIC BLOOD PRESSURE: 106 MMHG | DIASTOLIC BLOOD PRESSURE: 67 MMHG | HEART RATE: 75 BPM | OXYGEN SATURATION: 97 %

## 2024-08-28 DIAGNOSIS — I65.21 SYMPTOMATIC STENOSIS OF RIGHT CAROTID ARTERY: Primary | ICD-10-CM

## 2024-08-28 PROCEDURE — G0463 HOSPITAL OUTPT CLINIC VISIT: HCPCS

## 2024-08-28 PROCEDURE — 99024 POSTOP FOLLOW-UP VISIT: CPT

## 2024-08-28 NOTE — PROGRESS NOTES
[facility]        Patient is here for a post-op to discuss carotid endarterectomy     Pt is currently taking Statin and Eliquis.    There were no vitals taken for this visit.    The provider has been notified that the patient has no concerns.     Questions patient would like addressed today are: N/A.    Refills are needed: No    Has homecare services and agency name:  Jenny Tan RN on 8/28/2024 at 11:01 AM

## 2024-08-28 NOTE — PROGRESS NOTES
VASCULAR SURGERY PROGRESS NOTE    LOCATION: Vascular [facility]    Trevor Soni  Medical Record #: [id number]  YOB: 1933  Age: 89 year old     Date of Service: 8/28/2024    PRIMARY CARE PROVIDER: Abdoulaye Redman    Reason for visit:  post-operative appointment    Trevor Soni is a 89 year old male status post redo right carotid endarterectomy with Dr. Zelaya on 08/13/02024 for symptomatic recurrent right carotid stenosis. He comes into clinic today for post-operative examination.     On examination Trevor is doing well, denies stroke symptoms, TIA  Denies amaurosis fugax, denies any vision changes  Incision is well healed, minimal swelling. Non-tender and soft  Denies numbness    RECOMMENDATION/RISKS: Continue on Eliquis. Follow-up in 3 months with a right carotid duplex. LDL is less than 70 with Cholesterol within range.       REVIEW OF SYSTEMS:    A 12 point ROS was reviewed and is negative except for what is listed above in HPI.    PHH:    Past Medical History:   Diagnosis Date    Arthritis     rhuematoid    Coronary artery disease     triple bypass 2001    CRF (chronic renal failure)     Gastro-oesophageal reflux disease     Gout     Hyperlipidemia     Hypertension     Nocturia           Past Surgical History:   Procedure Laterality Date    CARDIAC SURGERY      CHOLECYSTECTOMY      COLONOSCOPY      CV CORONARY ANGIOGRAM N/A 1/13/2023    Procedure: Coronary Angiogram;  Surgeon: Sujata Ramires MD;  Location: [facility] CARDIAC CATH LAB    CV PCI N/A 1/13/2023    Procedure: Percutaneous Coronary Intervention;  Surgeon: Sujata Ramires MD;  Location: [facility] CARDIAC CATH LAB    CV TRANSCATHETER AORTIC VALVE REPLACEMENT-FEMORAL APPROACH N/A 4/11/2023    Procedure: Transcatheter Aortic Valve Replacement-Femoral Approach;  Surgeon: Sujata Ramires MD;  Location: [facility] CARDIAC CATH LAB    ENDARTERECTOMY CAROTID Right 8/13/2024    Procedure: REDO RIGHT CAROTID ENDARTERECTOMY with bovine  patch and ELECTROENCEPHALOGRAM;  Surgeon: Brian Zelaya MD;  Location:  OR    LAPAROTOMY EXPLORATORY N/A 6/30/2020    Procedure: EXPLORATORY LAPAROTOMY, BOWEL RESECTION;  Surgeon: Bull Rachel MD;  Location:  OR    LAPAROTOMY, LYSIS ADHESIONS, COMBINED N/A 6/21/2020    Procedure: EXPLORATORY LAPAROTOMY WITH LYSIS OF ADHESIONS;  Surgeon: Jose Reed MD;  Location:  OR    ORTHOPEDIC SURGERY      PHACOEMULSIFICATION CLEAR CORNEA WITH TORIC INTRAOCULAR LENS IMPLANT  1/30/2012    Procedure:PHACOEMULSIFICATION CLEAR CORNEA WITH TORIC INTRAOCULAR LENS IMPLANT; LEFT PHACOEMULSIFICATION CLEAR CORNEA WITH DELUXE  TORIC INTRAOCULAR LENS IMPLANT ; Surgeon:BRANDO HIGHTOWER; Location: [facility]    PHACOEMULSIFICATION CLEAR CORNEA WITH TORIC INTRAOCULAR LENS IMPLANT  2/13/2012    Procedure:PHACOEMULSIFICATION CLEAR CORNEA WITH TORIC INTRAOCULAR LENS IMPLANT; RIGHT PHACOEMULSIFICATION CLEAR CORNEA WITH TORIC INTRAOCULAR LENS IMPLANT ; Surgeon:BRANDO HIGHTOWER; Location:[facility]    VASCULAR SURGERY         ALLERGIES:  Avocado, Ciprofloxacin, and Penicillins    MEDS:    Current Outpatient Medications:     acetaminophen (TYLENOL) 325 MG tablet, Take 2 tablets (650 mg) by mouth every 6 hours as needed for mild pain or other (and adjunct with moderate or severe pain or per patient request), Disp: , Rfl:     allopurinol (ZYLOPRIM) 100 MG tablet, TAKE 2 TABLETS BY MOUTH  DAILY, Disp: 180 tablet, Rfl: 3    amLODIPine (NORVASC) 5 MG tablet, Take 1 tablet (5 mg) by mouth daily, Disp: 90 tablet, Rfl: 3    apixaban ANTICOAGULANT (ELIQUIS ANTICOAGULANT) 2.5 MG tablet, Take 1 tablet (2.5 mg) by mouth 2 times daily Restart taking am 8/16, Disp: 180 tablet, Rfl: 3    atorvastatin (LIPITOR) 20 MG tablet, TAKE 1 TABLET BY MOUTH ONCE  DAILY, Disp: 90 tablet, Rfl: 3    Cholecalciferol (VITAMIN D3) 25 MCG (1000 UT) CAPS, Take 1 capsule by mouth daily OTC dose unknown, Disp: , Rfl:     dorzolamide (TRUSOPT) 2 % ophthalmic solution,  Place 1 drop into both eyes 2 times daily, Disp: , Rfl:     fenofibrate (TRIGLIDE/LOFIBRA) 160 MG tablet, TAKE 1 TABLET BY MOUTH DAILY, Disp: 90 tablet, Rfl: 3    ferrous gluconate (FERGON) 324 (38 Fe) MG tablet, Take 1 tablet (324 mg) by mouth daily, Disp: 90 tablet, Rfl: 3    Multiple Vitamins-Minerals (PRESERVISION AREDS 2) CAPS, Take 1 capsule by mouth 2 times daily, Disp: , Rfl:     multivitamin w/minerals (MULTI-VITAMIN) tablet, Take 1 tablet by mouth daily, Disp: , Rfl:     sodium bicarbonate 650 MG tablet, Take 1 tablet (650 mg) by mouth 2 times daily, Disp: 180 tablet, Rfl: 3    spironolactone (ALDACTONE) 25 MG tablet, TAKE 1 TABLET BY MOUTH  DAILY, Disp: 90 tablet, Rfl: 3    triamcinolone (KENALOG) 0.1 % external cream, Apply topically 2 times daily, Disp: 453.6 g, Rfl: 11    Current Facility-Administered Medications:     lidocaine 1 % injection 3 mL, 3 mL, , , , 3 mL at 05/16/24 1710    lidocaine 1 % injection 3 mL, 3 mL, , , , 3 mL at 05/16/24 1710    lidocaine 1 % injection 4 mL, 4 mL, , , , 4 mL at 05/16/24 1710    lidocaine 1 % injection 4 mL, 4 mL, , , , 4 mL at 05/16/24 1710    methylPREDNISolone (DEPO-Medrol) injection 40 mg, 40 mg, , , , 40 mg at 05/16/24 1710    methylPREDNISolone (DEPO-Medrol) injection 40 mg, 40 mg, , , , 40 mg at 05/16/24 1710    SOCIAL HABITS:    History   Smoking Status    Former    Types: Cigars   Smokeless Tobacco    Never     Social History    Substance and Sexual Activity      Alcohol use: Yes        Comment: rare      History   Drug Use No       FAMILY HISTORY:    Family History   Problem Relation Age of Onset    Myocardial Infarction Mother     Rheumatic fever Father     Cerebrovascular Disease Brother        PE:  There were no vitals taken for this visit.  Wt Readings from Last 1 Encounters:   08/13/24 140 lb (63.5 kg)     There is no height or weight on file to calculate BMI.    DIAGNOSTIC STUDIES:     Images:  US Carotid Bilateral    Result Date: 8/6/2024  EXAM: US  CAROTID BILATERAL LOCATION: [facility] DATE: 8/6/2024 INDICATION:  Amaurosis fugax COMPARISON: 11/23/2022 TECHNIQUE: Duplex exam performed utilizing 2D gray-scale imaging, Doppler interrogation with color-flow and spectral waveform analysis. The percent diameter stenosis is determined using Updated Recommendations for Carotid Stenosis Interpretation Criteria  from [facility]. FINDINGS: Bulky calcified plaque with associated shadowing may underestimate degree of stenosis. RIGHT: Severe plaque at the bifurcation. The peak systolic velocity in the ICA is greater than 230 cm/sec, consistent with greater than 70% stenosis. Elevated velocities in the ECA consistent with a stenosis. Antegrade flow within the vertebral artery. Subclavian artery velocity is 230 cm/s. LEFT: Moderate plaque at the bifurcation. The peak systolic velocity in the ICA is 180-230 cm/sec, consistent with 50-69% stenosis. Elevated velocities in the ECA consistent with a stenosis. Antegrade flow within the vertebral artery. Subclavian artery velocity is 220 cm/s VELOCITY CHART: CCA   Right: 82/14 cm/s   Left: 76/2 cm/s ICA   Right: 412/41 cm/s   Left: 207/45 cm/s ECA   Right: 147 cm/s   Left: 202 cm/s ICA/CCA PSV Ratio   Right: 5.02   Left: 2.27     IMPRESSION: 1.  Severe plaque formation, velocities consistent with greater than 70% stenosis in the right internal carotid artery. 2.  Moderate plaque formation, velocities consistent with 50-69% stenosis in the left internal carotid artery. Bulky calcified plaque. Again 3.  Flow within the vertebral arteries is antegrade. 4.  Bilateral external carotid artery stenosis. 5.  Elevated bilateral subclavian artery velocities, likely just below the 70% luminal narrowing range.    LABS:      Sodium   Date Value Ref Range Status   08/13/2024 138 135 - 145 mmol/L Final   08/12/2024 140 135 - 145 mmol/L Final   05/02/2024 141 135 - 145 mmol/L Final     Comment:     Reference  intervals for this test were updated on 09/26/2023 to more accurately reflect our healthy population. There may be differences in the flagging of prior results with similar values performed with this method. Interpretation of those prior results can be made in the context of the updated reference intervals.    12/21/2020 140 133 - 144 mmol/L Final   11/05/2020 141 133 - 144 mmol/L Final   10/08/2020 139 133 - 144 mmol/L Final     Urea Nitrogen   Date Value Ref Range Status   08/13/2024 34.4 (H) 8.0 - 23.0 mg/dL Final   08/12/2024 39.2 (H) 8.0 - 23.0 mg/dL Final   05/02/2024 40.2 (H) 8.0 - 23.0 mg/dL Final   01/16/2023 36 (H) 7 - 30 mg/dL Final   01/13/2023 47 (H) 7 - 30 mg/dL Final   11/23/2022 37 (H) 7 - 30 mg/dL Final   12/21/2020 51 (H) 7 - 30 mg/dL Final   11/05/2020 41 (H) 7 - 30 mg/dL Final   10/08/2020 33 (H) 7 - 30 mg/dL Final     Hemoglobin   Date Value Ref Range Status   08/12/2024 12.7 (L) 13.3 - 17.7 g/dL Final   05/02/2024 12.6 (L) 13.3 - 17.7 g/dL Final   04/05/2024 11.2 (L) 13.3 - 17.7 g/dL Final   12/21/2020 10.7 (L) 13.3 - 17.7 g/dL Final   11/05/2020 9.7 (L) 13.3 - 17.7 g/dL Final   10/08/2020 9.6 (L) 13.3 - 17.7 g/dL Final     Platelet Count   Date Value Ref Range Status   08/12/2024 186 150 - 450 10e3/uL Final   05/02/2024 186 150 - 450 10e3/uL Final   04/05/2024 322 150 - 450 10e3/uL Final   12/21/2020 212 150 - 450 10e9/L Final   11/05/2020 219 150 - 450 10e9/L Final   10/08/2020 217 150 - 450 10e9/L Final     INR   Date Value Ref Range Status   04/05/2023 1.26 (H) 0.85 - 1.15 Final   03/17/2023 1.24 (H) 0.85 - 1.15 Final   01/13/2023 1.11 0.85 - 1.15 Final   05/06/2011 1.06 0.86 - 1.14 Final   05/02/2010 0.98 0.86 - 1.14 Final       20 minutes spent on the day of encounter doing chart review, history and exam, documentation, and further activities as noted.       Mechelle Coker, NP  VASCULAR SURGERY

## 2024-09-12 ENCOUNTER — HOSPITAL ENCOUNTER (EMERGENCY)
Facility: CLINIC | Age: 89
Discharge: HOME | End: 2024-09-12
Attending: EMERGENCY MEDICINE | Admitting: EMERGENCY MEDICINE
Payer: MEDICARE

## 2024-09-12 ENCOUNTER — APPOINTMENT (OUTPATIENT)
Dept: CT IMAGING | Facility: CLINIC | Age: 89
End: 2024-09-12
Attending: EMERGENCY MEDICINE
Payer: MEDICARE

## 2024-09-12 ENCOUNTER — LAB (OUTPATIENT)
Dept: LAB | Facility: CLINIC | Age: 89
End: 2024-09-12
Payer: MEDICARE

## 2024-09-12 VITALS
HEIGHT: 69 IN | OXYGEN SATURATION: 97 % | BODY MASS INDEX: 20.73 KG/M2 | RESPIRATION RATE: 14 BRPM | HEART RATE: 78 BPM | TEMPERATURE: 98.2 F | SYSTOLIC BLOOD PRESSURE: 148 MMHG | WEIGHT: 140 LBS | DIASTOLIC BLOOD PRESSURE: 59 MMHG

## 2024-09-12 DIAGNOSIS — Z79.01 ANTICOAGULATED: ICD-10-CM

## 2024-09-12 DIAGNOSIS — N18.32 STAGE 3B CHRONIC KIDNEY DISEASE (H): ICD-10-CM

## 2024-09-12 DIAGNOSIS — I25.10 CORONARY ARTERY DISEASE INVOLVING NATIVE CORONARY ARTERY OF NATIVE HEART WITHOUT ANGINA PECTORIS: ICD-10-CM

## 2024-09-12 DIAGNOSIS — S32.009A CLOSED FRACTURE OF TRANSVERSE PROCESS OF LUMBAR VERTEBRA, INITIAL ENCOUNTER (H): ICD-10-CM

## 2024-09-12 DIAGNOSIS — R10.9 RIGHT FLANK PAIN: ICD-10-CM

## 2024-09-12 LAB
ANION GAP SERPL CALCULATED.3IONS-SCNC: 13 MMOL/L (ref 7–15)
BUN SERPL-MCNC: 32.2 MG/DL (ref 8–23)
CALCIUM SERPL-MCNC: 9.6 MG/DL (ref 8.8–10.4)
CHLORIDE SERPL-SCNC: 108 MMOL/L (ref 98–107)
CHOLEST SERPL-MCNC: 113 MG/DL
CREAT SERPL-MCNC: 1.77 MG/DL (ref 0.67–1.17)
EGFRCR SERPLBLD CKD-EPI 2021: 36 ML/MIN/1.73M2
FASTING STATUS PATIENT QL REPORTED: YES
GLUCOSE SERPL-MCNC: 103 MG/DL (ref 70–99)
HCO3 SERPL-SCNC: 19 MMOL/L (ref 22–29)
HDLC SERPL-MCNC: 34 MG/DL
LDLC SERPL CALC-MCNC: 59 MG/DL
NONHDLC SERPL-MCNC: 79 MG/DL
POTASSIUM SERPL-SCNC: 4.6 MMOL/L (ref 3.4–5.3)
SODIUM SERPL-SCNC: 140 MMOL/L (ref 135–145)
TRIGL SERPL-MCNC: 102 MG/DL

## 2024-09-12 PROCEDURE — 71250 CT THORAX DX C-: CPT | Mod: MA

## 2024-09-12 PROCEDURE — 99284 EMERGENCY DEPT VISIT MOD MDM: CPT | Mod: 25

## 2024-09-12 PROCEDURE — 36415 COLL VENOUS BLD VENIPUNCTURE: CPT | Performed by: NURSE PRACTITIONER

## 2024-09-12 PROCEDURE — 80061 LIPID PANEL: CPT | Performed by: NURSE PRACTITIONER

## 2024-09-12 PROCEDURE — 80048 BASIC METABOLIC PNL TOTAL CA: CPT | Performed by: NURSE PRACTITIONER

## 2024-09-12 PROCEDURE — 250N000013 HC RX MED GY IP 250 OP 250 PS 637: Performed by: EMERGENCY MEDICINE

## 2024-09-12 RX ORDER — TRAMADOL HYDROCHLORIDE 50 MG/1
50 TABLET ORAL ONCE
Status: COMPLETED | OUTPATIENT
Start: 2024-09-12 | End: 2024-09-12

## 2024-09-12 RX ORDER — ACETAMINOPHEN 500 MG
1000 TABLET ORAL ONCE
Status: COMPLETED | OUTPATIENT
Start: 2024-09-12 | End: 2024-09-12

## 2024-09-12 RX ORDER — TRAMADOL HYDROCHLORIDE 50 MG/1
50 TABLET ORAL EVERY 6 HOURS PRN
Qty: 10 TABLET | Refills: 0 | Status: SHIPPED | OUTPATIENT
Start: 2024-09-12 | End: 2024-09-15

## 2024-09-12 RX ADMIN — TRAMADOL HYDROCHLORIDE 50 MG: 50 TABLET, COATED ORAL at 20:17

## 2024-09-12 RX ADMIN — ACETAMINOPHEN 1000 MG: 500 TABLET, FILM COATED ORAL at 18:23

## 2024-09-12 ASSESSMENT — COLUMBIA-SUICIDE SEVERITY RATING SCALE - C-SSRS
6. HAVE YOU EVER DONE ANYTHING, STARTED TO DO ANYTHING, OR PREPARED TO DO ANYTHING TO END YOUR LIFE?: NO
1. IN THE PAST MONTH, HAVE YOU WISHED YOU WERE DEAD OR WISHED YOU COULD GO TO SLEEP AND NOT WAKE UP?: NO
2. HAVE YOU ACTUALLY HAD ANY THOUGHTS OF KILLING YOURSELF IN THE PAST MONTH?: NO

## 2024-09-12 ASSESSMENT — ACTIVITIES OF DAILY LIVING (ADL)
ADLS_ACUITY_SCORE: 38

## 2024-09-12 NOTE — ED TRIAGE NOTES
Patient had cane got tangled and fell backward on a 2 step staircase, fell backward and hit mid back into the corner of the step. Denies of head strike. On Eliquis. Having back pain and left arm pain since the fall.      Triage Assessment (Adult)       Row Name 09/12/24 1557          Triage Assessment    Airway WDL WDL        Respiratory WDL    Respiratory WDL WDL        Skin Circulation/Temperature WDL    Skin Circulation/Temperature WDL WDL        Cardiac WDL    Cardiac WDL WDL        Peripheral/Neurovascular WDL    Peripheral Neurovascular WDL WDL        Cognitive/Neuro/Behavioral WDL    Cognitive/Neuro/Behavioral WDL WDL

## 2024-09-12 NOTE — ED PROVIDER NOTES
"  Emergency Department Note      History of Present Illness     Chief Complaint   Fall and Back Pain    HPI   Trevor Soni is a 89 year old male, on Eliquis, with a history of atrial flutter, CAD, AAA, and aortic stenosis status post transcatheter aortic valve replacement who presents for evaluation of back pain after a fall. The patient reports that earlier today he was walking on a staircase when his cane got caught in his path and caused him to fall. States that he fell backwards into the staircase and landed on his mid back. Notes that he sustained some abrasions to his left elbow and left anterior knee. He denies head trauma, loss of consciousness, and abdominal pain.     Independent Historian   None    Review of External Notes   I reviewed routine blood work from earlier today including BMP.     Component      Latest Ref Rng 9/12/2024  10:04 AM   Sodium      135 - 145 mmol/L 140    Anion Gap      7 - 15 mmol/L 13    Creatinine      0.67 - 1.17 mg/dL 1.77 (H)    GFR Estimate      >60 mL/min/1.73m2 36 (L)    Calcium      8.8 - 10.4 mg/dL 9.6    Potassium      3.4 - 5.3 mmol/L 4.6    Chloride      98 - 107 mmol/L 108 (H)    Carbon Dioxide (CO2)      22 - 29 mmol/L 19 (L)    Glucose      70 - 99 mg/dL 103 (H)    Urea Nitrogen      8.0 - 23.0 mg/dL 32.2 (H)      Past Medical History     Medical History and Problem List   Arthritis  CAD  Chronic renal failure  GERD  Gout  Hyperlipidemia  Hypertension  Nocturia   AAA  Aortic stenosis  Atrial flutter    Medications   Eliquis  Allopurinol  Atorvastatin  Fenofibrate  Spironolactone    Surgical History   CABG  Cholecystectomy  Coronary angiogram  TAVR  Carotid endarterectomy, right  Exploratory laparotomy with bowel resection  Cataract  Vascular surgery     Physical Exam     Patient Vitals for the past 24 hrs:   BP Temp Pulse Resp SpO2 Height Weight   09/12/24 1555 (!) 148/59 98.2  F (36.8  C) 78 14 97 % 1.753 m (5' 9\") 63.5 kg (140 lb)     Physical Exam  Eye:  " Pupils are equal, round, and reactive.  Extraocular movements intact.    ENT:  No rhinorrhea.  Moist mucus membranes.  Normal tongue and tonsil.    Cardiac:  Regular rate and rhythm.  No murmurs, gallops, or rubs.    Pulmonary:  Clear to auscultation bilaterally.  No wheezes, rales, or rhonchi.    Abdomen:  Positive bowel sounds.  Abdomen is soft and non-distended, without focal tenderness.    Musculoskeletal:  Normal movement of all extremities without evidence for deficit. F ocused exams of the left elbow and knee show no pain on full range of motion. No midline tenderness to the neck or back. Contusion and tenderness noted to the right posterior chest wall, ribs 8-10.     Skin:  Warm and dry without rashes.  Superficial abrasions to the left elbow and knee    Neurologic:  Non-focal exam without asymmetric weakness or numbness.     Psychiatric:  Normal affect with appropriate interaction with examiner.    Diagnostics     Lab Results   Labs Ordered and Resulted from Time of ED Arrival to Time of ED Departure - No data to display    Imaging   Chest CT w/o contrast   Final Result   IMPRESSION:       1.  Acute, minimally displaced fracture of the right L1 transverse process. No acute rib fractures.   2.  No lung contusion, pleural effusion or pneumothorax.   3.  Symmetric fibrosis in the paradiaphragmatic lungs greatest in the posterior sulci associated with bronchiectasis and bronchiolectasis. 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              Independent Interpretation   None    ED Course      Medications Administered   Medications   acetaminophen (TYLENOL) tablet 1,000 mg (1,000 mg Oral $Given 9/12/24 1823)   traMADol (ULTRAM) tablet 50 mg (50 mg Oral $Given 9/12/24 2017)       Procedures   Procedures     Discussion of Management   None    ED Course   ED Course as of 09/13/24 0201   Thu Sep 12, 2024   1819 I obtained history and examined the patient as noted above.     2004 I rechecked and updated the patient.        Additional Documentation  None    Medical Decision Making / Diagnosis     CMS Diagnoses: None    MIPS       None    MDM   Trevor Soni is a 89 year old male presenting with pain to his back after suffering a mechanical fall outdoors today.  He is anticoagulated, who is adamant that he did not strike his head or neck.  He has some abrasions to his knee and arm but his main concern is discomfort to his back.  There is generalized tenderness to the flank on this side without obvious midline tenderness.  I was chiefly concerned about rib fractures, though CT of the chest does not show these, but rather a nondisplaced L1 transverse process fracture.  This is consistent with his pain and presentation.  The rest of his discomfort is likely related to contusion without evidence of a large hematoma.  There is no intra-abdominal pain or evidence of trauma.  At this juncture, I feel he is safe for discharge home.  I spoke with him at length of how to proceed.  His daughter is at the bedside and notes that she will stay with him for the next several days.  We did discuss admission to the hospital for pain control and possible placement.  I will prescribe a limited number of tramadol which he can use for severe pain but he will otherwise use Tylenol.  Questions were answered and they understand they can return to us at any time for worsening of his pain, abdominal pain, failure to thrive, or other emergent concerns.      Disposition   The patient was discharged.     Diagnosis     ICD-10-CM    1. Closed fracture of transverse process of lumbar vertebra, initial encounter (H)  S32.009A     L1      2. Anticoagulated  Z79.01       3. Right flank pain  R10.9            Scribe Disclosure:  I, Tracy Hernandez, am serving as a scribe at 6:12 PM on 9/12/2024 to document services personally performed by Trierweiler, Chad A, MD based on my observations and the provider's statements  to me.        Trierweiler, Chad A, MD  09/13/24 0201

## 2024-09-13 ENCOUNTER — TELEPHONE (OUTPATIENT)
Dept: FAMILY MEDICINE | Facility: CLINIC | Age: 89
End: 2024-09-13
Payer: MEDICARE

## 2024-09-13 NOTE — DISCHARGE INSTRUCTIONS
As discussed, you have fractured a piece of your lumbar vertebrae.  This is a nonoperative fracture and should heal on its own over the next several weeks.  Expect to be stiff and sore.  I recommend taking Tylenol, 1000 mg 3 times a day.  You may use the prescribed tramadol for severe pain.  Return to the ER at any point if you are having significant worsening of your pain, difficulty breathing, abdominal discomfort, or other emergent concerns.  We discussed admission to the hospital, though you prefer a trial at home which I support.

## 2024-09-13 NOTE — TELEPHONE ENCOUNTER
Patient Contact    Attempt # 2    Was call answered?  No.  Mailbox full. Unable to leave message.     Elin WILKS RN,BSN    September 13, 2024  1:09 PM

## 2024-09-13 NOTE — TELEPHONE ENCOUNTER
Patient Contact    Attempt # 1    Was call answered?  No.  Mailbox full. Unable to leave message.   Will try to reach patient again later today.         Elin WILKS RN,BSN    September 13, 2024  11:14 AM

## 2024-09-16 NOTE — TELEPHONE ENCOUNTER
Triage outreach    Attempt number 1    Left message to call back at [phone].    Eileen RN  [facility]

## 2024-09-16 NOTE — TELEPHONE ENCOUNTER
Transitions of Care Outreach  Chief Complaint   Patient presents with    Hospital F/U       Most Recent Admission Date: 9/12/2024   Most Recent Admission Diagnosis:      Most Recent Discharge Date: 9/12/2024   Most Recent Discharge Diagnosis: Closed fracture of transverse process of lumbar vertebra, initial encounter (H) - S32.009A  Anticoagulated - Z79.01  Right flank pain - R10.9     Transitions of Care Assessment    Discharge Assessment  How are you doing now that you are home?: He's doing good. He's sore but is taking pain medication at night. But he's up and making sure he's moving around.  How are your symptoms? (Red Flag symptoms escalate to triage hotline per guidelines): Improved  Do you know how to contact your clinic care team if you have future questions or changes to your health status? : Yes  Does the patient have their discharge instructions? : Yes  Does the patient have questions regarding their discharge instructions? : No  Were you started on any new medications or were there changes to any of your previous medications? : Yes  Does the patient have all of their medications?: Yes  Do you have questions regarding any of your medications? : No  Do you have all of your needed medical supplies or equipment (DME)?  (i.e. oxygen tank, CPAP, cane, etc.): Yes    Follow up Plan          Future Appointments   Date Time Provider Department Center   9/17/2024 11:15 AM Ivan Ravi MD [facility]   9/23/2024  3:30 PM Abdoulaye Redman MD [facility]   4/22/2025  3:30 PM Abdoulaye Redman MD [facility]       Outpatient Plan as outlined on AVS reviewed with patient.    For any urgent concerns, please contact our 24 hour nurse triage line: [phone] ([phone])       Jocelyn Castillo RN

## 2024-09-19 ENCOUNTER — MYC REFILL (OUTPATIENT)
Dept: FAMILY MEDICINE | Facility: CLINIC | Age: 89
End: 2024-09-19
Payer: MEDICARE

## 2024-09-19 DIAGNOSIS — I10 BENIGN ESSENTIAL HYPERTENSION: ICD-10-CM

## 2024-09-19 RX ORDER — AMLODIPINE BESYLATE 5 MG/1
5 TABLET ORAL DAILY
Qty: 14 TABLET | Refills: 0 | Status: SHIPPED | OUTPATIENT
Start: 2024-09-19 | End: 2024-09-23

## 2024-09-23 ENCOUNTER — OFFICE VISIT (OUTPATIENT)
Dept: FAMILY MEDICINE | Facility: CLINIC | Age: 89
End: 2024-09-23
Payer: MEDICARE

## 2024-09-23 VITALS
WEIGHT: 138.5 LBS | SYSTOLIC BLOOD PRESSURE: 128 MMHG | HEIGHT: 69 IN | DIASTOLIC BLOOD PRESSURE: 63 MMHG | OXYGEN SATURATION: 97 % | TEMPERATURE: 97 F | RESPIRATION RATE: 16 BRPM | HEART RATE: 81 BPM | BODY MASS INDEX: 20.51 KG/M2

## 2024-09-23 DIAGNOSIS — I10 BENIGN ESSENTIAL HYPERTENSION: ICD-10-CM

## 2024-09-23 DIAGNOSIS — S32.009S LUMBAR TRANSVERSE PROCESS FRACTURE, SEQUELA: Primary | ICD-10-CM

## 2024-09-23 PROCEDURE — 99213 OFFICE O/P EST LOW 20 MIN: CPT | Mod: 25 | Performed by: INTERNAL MEDICINE

## 2024-09-23 PROCEDURE — 90480 ADMN SARSCOV2 VAC 1/ONLY CMP: CPT | Performed by: INTERNAL MEDICINE

## 2024-09-23 PROCEDURE — 90662 IIV NO PRSV INCREASED AG IM: CPT | Performed by: INTERNAL MEDICINE

## 2024-09-23 PROCEDURE — 91320 SARSCV2 VAC 30MCG TRS-SUC IM: CPT | Performed by: INTERNAL MEDICINE

## 2024-09-23 PROCEDURE — G0008 ADMIN INFLUENZA VIRUS VAC: HCPCS | Performed by: INTERNAL MEDICINE

## 2024-09-23 RX ORDER — TRAMADOL HYDROCHLORIDE 50 MG/1
50 TABLET ORAL EVERY 6 HOURS PRN
Qty: 20 TABLET | Refills: 0 | Status: SHIPPED | OUTPATIENT
Start: 2024-09-23 | End: 2024-09-26

## 2024-09-23 ASSESSMENT — PAIN SCALES - GENERAL: PAINLEVEL: EXTREME PAIN (8)

## 2024-09-23 NOTE — PROGRESS NOTES
"  Assessment & Plan     Lumbar transverse process fracture, sequela  Unfortunately, his pain could last for another month to 6 weeks, should slowly improve with time, okay to take more tramadol if it helps him get restful sleep.      - traMADol (ULTRAM) 50 MG tablet; Take 1 tablet (50 mg) by mouth every 6 hours as needed for severe pain.        MED REC REQUIRED  Post Medication Reconciliation Status: discharge medications reconciled, continue medications without change    FUTURE APPOINTMENTS:       - Follow-up for annual visit or as needed    Subjective   Trevor is a 89 year old, presenting for the following health issues:  No chief complaint on file.    HPI       ED/UC Followup:    Facility:   ED  Date of visit: 9-  Reason for visit: Fall, Back pain  Current Status: Improved      Fell down back stairs in his house, seen in the ER with CT showing L1 transverse process fracture  Severe pain, improved with tramadol which helps him get comfortable to sleep at night, asks for refill, no side effects reported  Overall, he feels his pain is slowly improving  Pain does not radiate down legs and no new leg numbness or weakness or new bowel or bladder symptoms reported        Objective    /63 (BP Location: Left arm, Patient Position: Sitting, Cuff Size: Adult Regular)   Pulse 81   Temp 97  F (36.1  C) (Tympanic)   Resp 16   Ht 1.753 m (5' 9\")   Wt 62.8 kg (138 lb 8 oz)   SpO2 97%   BMI 20.45 kg/m    Body mass index is 20.45 kg/m .  Physical Exam   General: Well-appearing, comfortable appearing elderly man in no acute distress.  Back: Tender around the upper lumbar area.  Cardiovascular: The heart has an irregularly irregular rhythm with a normal rate.  Pulmonary: The lungs are clear to auscultation bilaterally, breathing is not labored.  Neurological: Alert and oriented to person place and time, cranial nerves II to XII appear grossly intact, walks with a walker.            Signed Electronically by: " Abdoulaye Redman MD

## 2024-09-23 NOTE — TELEPHONE ENCOUNTER
Short term supply recently sent to local [facility] pharmacy while pt waits for mail order pharmacy to send.  Faxed request received from [facility] for Amlodipine.

## 2024-09-24 RX ORDER — AMLODIPINE BESYLATE 5 MG/1
5 TABLET ORAL DAILY
Qty: 14 TABLET | Refills: 0 | Status: SHIPPED | OUTPATIENT
Start: 2024-09-24 | End: 2024-09-26

## 2024-09-26 ENCOUNTER — MYC MEDICAL ADVICE (OUTPATIENT)
Dept: FAMILY MEDICINE | Facility: CLINIC | Age: 89
End: 2024-09-26
Payer: MEDICARE

## 2024-09-26 DIAGNOSIS — E78.5 HYPERLIPIDEMIA LDL GOAL <100: ICD-10-CM

## 2024-09-26 DIAGNOSIS — I10 BENIGN ESSENTIAL HYPERTENSION: ICD-10-CM

## 2024-09-26 RX ORDER — FENOFIBRATE 160 MG/1
TABLET ORAL
Qty: 90 TABLET | Refills: 3 | OUTPATIENT
Start: 2024-09-26

## 2024-09-26 RX ORDER — AMLODIPINE BESYLATE 5 MG/1
5 TABLET ORAL DAILY
Qty: 14 TABLET | Refills: 0 | Status: SHIPPED | OUTPATIENT
Start: 2024-09-26 | End: 2024-10-07

## 2024-09-26 RX ORDER — AMLODIPINE BESYLATE 5 MG/1
5 TABLET ORAL DAILY
Qty: 14 TABLET | Refills: 0 | Status: SHIPPED | OUTPATIENT
Start: 2024-09-26 | End: 2024-09-26

## 2024-09-26 RX ORDER — FENOFIBRATE 160 MG/1
TABLET ORAL
Qty: 90 TABLET | Refills: 3 | Status: SHIPPED | OUTPATIENT
Start: 2024-09-26

## 2024-09-26 NOTE — TELEPHONE ENCOUNTER
Noted short term supply was not sent to [facility] as stated.  It was sent to [facility].  Resent short supply to [facility].     Myc message sent to patient with update

## 2024-10-05 ENCOUNTER — MYC MEDICAL ADVICE (OUTPATIENT)
Dept: FAMILY MEDICINE | Facility: CLINIC | Age: 89
End: 2024-10-05
Payer: MEDICARE

## 2024-10-05 DIAGNOSIS — I10 BENIGN ESSENTIAL HYPERTENSION: ICD-10-CM

## 2024-10-07 RX ORDER — AMLODIPINE BESYLATE 5 MG/1
5 TABLET ORAL DAILY
Qty: 14 TABLET | Refills: 0 | Status: SHIPPED | OUTPATIENT
Start: 2024-10-07 | End: 2024-10-15

## 2024-10-07 NOTE — TELEPHONE ENCOUNTER
Dr. Redman, please see pt [facility] and advise.    Spoke to pt's daughter (C2C on file) who said her dad is out of his amlodipine at home. Daughter clarified that she'd like a short term order to be available to pickup today, but ultimately is requesting a 90-day order be sent to pt's [facility] service.    Pended the short-term refill and pharmacy for review.    Yanely Champion RN

## 2024-10-15 ENCOUNTER — TELEPHONE (OUTPATIENT)
Dept: FAMILY MEDICINE | Facility: CLINIC | Age: 89
End: 2024-10-15
Payer: MEDICARE

## 2024-10-15 DIAGNOSIS — I10 BENIGN ESSENTIAL HYPERTENSION: ICD-10-CM

## 2024-10-15 RX ORDER — AMLODIPINE BESYLATE 5 MG/1
5 TABLET ORAL DAILY
Qty: 90 TABLET | Refills: 3 | Status: SHIPPED | OUTPATIENT
Start: 2024-10-15 | End: 2024-10-17

## 2024-10-15 NOTE — TELEPHONE ENCOUNTER
Short course supply has been sent to [facility] as [facility] does not have 90 day Rx to send to patient  Urgent fax from [facility] mail order received    Pended #90 R3 for patient    LOV 9- Юлия    Appointments in Next Year      Oct 17, 2024 1:45 PM  (Arrive by 1:30 PM)  ECHO COMPLETE with [id number]   [facility] ([facility] ) [phone]     Oct 25, 2024 10:15 AM  (Arrive by 10:05 AM)  Return Cardiology with Ivan Ravi MD  [facility] ([facility] ) [phone]     Apr 22, 2025 3:30 PM  (Arrive by 3:10 PM)  Annual Wellness Visit with Abdoulaye Redman MD  [facility] ([facility] ) [phone]          Maura Clark RT (R)

## 2024-10-16 ENCOUNTER — MYC MEDICAL ADVICE (OUTPATIENT)
Dept: FAMILY MEDICINE | Facility: CLINIC | Age: 89
End: 2024-10-16
Payer: MEDICARE

## 2024-10-16 DIAGNOSIS — I10 BENIGN ESSENTIAL HYPERTENSION: ICD-10-CM

## 2024-10-17 ENCOUNTER — HOSPITAL ENCOUNTER (OUTPATIENT)
Dept: CARDIOLOGY | Facility: CLINIC | Age: 89
Discharge: HOME | End: 2024-10-17
Attending: INTERNAL MEDICINE | Admitting: INTERNAL MEDICINE
Payer: MEDICARE

## 2024-10-17 DIAGNOSIS — I35.0 SEVERE AORTIC STENOSIS: ICD-10-CM

## 2024-10-17 DIAGNOSIS — I48.92 ATRIAL FLUTTER, UNSPECIFIED TYPE (H): ICD-10-CM

## 2024-10-17 LAB — LVEF ECHO: NORMAL

## 2024-10-17 PROCEDURE — 93306 TTE W/DOPPLER COMPLETE: CPT

## 2024-10-17 PROCEDURE — 93306 TTE W/DOPPLER COMPLETE: CPT | Mod: 26 | Performed by: INTERNAL MEDICINE

## 2024-10-17 RX ORDER — AMLODIPINE BESYLATE 5 MG/1
5 TABLET ORAL DAILY
Qty: 10 TABLET | Refills: 0 | Status: SHIPPED | OUTPATIENT
Start: 2024-10-17 | End: 2024-11-01

## 2024-10-17 RX ORDER — AMLODIPINE BESYLATE 5 MG/1
5 TABLET ORAL DAILY
Qty: 10 TABLET | Refills: 0 | Status: SHIPPED | OUTPATIENT
Start: 2024-10-17 | End: 2024-10-17

## 2024-10-17 NOTE — TELEPHONE ENCOUNTER
Patients daughter is requesting written script for Amlodipine. Pt will get 90 days supply from mail order pharmacy, but he would need a week supply script to fill at local pharmacy.  Rx pended. Please print, sign and route to TC to follow up with daughter. Pt's daughter plans to  written rx at clinic  today.

## 2024-10-25 ENCOUNTER — OFFICE VISIT (OUTPATIENT)
Dept: CARDIOLOGY | Facility: CLINIC | Age: 89
End: 2024-10-25
Payer: MEDICARE

## 2024-10-25 VITALS
WEIGHT: 141.4 LBS | OXYGEN SATURATION: 98 % | HEART RATE: 78 BPM | BODY MASS INDEX: 20.94 KG/M2 | HEIGHT: 69 IN | DIASTOLIC BLOOD PRESSURE: 68 MMHG | SYSTOLIC BLOOD PRESSURE: 113 MMHG

## 2024-10-25 DIAGNOSIS — J84.112 IPF (IDIOPATHIC PULMONARY FIBROSIS) (H): ICD-10-CM

## 2024-10-25 DIAGNOSIS — I35.0 AORTIC STENOSIS, SEVERE: ICD-10-CM

## 2024-10-25 DIAGNOSIS — I48.3 TYPICAL ATRIAL FLUTTER (H): ICD-10-CM

## 2024-10-25 DIAGNOSIS — I71.43 INFRARENAL ABDOMINAL AORTIC ANEURYSM (AAA) WITHOUT RUPTURE (H): ICD-10-CM

## 2024-10-25 DIAGNOSIS — I25.10 CORONARY ARTERY DISEASE INVOLVING NATIVE CORONARY ARTERY OF NATIVE HEART WITHOUT ANGINA PECTORIS: Primary | ICD-10-CM

## 2024-10-25 DIAGNOSIS — Z95.2 HISTORY OF TRANSCATHETER AORTIC VALVE REPLACEMENT (TAVR): ICD-10-CM

## 2024-10-25 PROCEDURE — 99214 OFFICE O/P EST MOD 30 MIN: CPT | Mod: 24 | Performed by: INTERNAL MEDICINE

## 2024-10-25 NOTE — PROGRESS NOTES
HPI and Plan:   Trevor Soni is a pleasant 89 year old patient with past medical history significant for coronary disease s/p CABG x 3 (2001), paroxysmal atrial fibrillation, peripheral vascular disease with previous carotid endarterectomy in 2001, stage IV renal failure, severe aortic stenosis, s/p transfemoral aortic valve replacement with 26 mm Nunes AMI valve in April 2023.     He returns for follow-up accompanied by his daughter.  He had a recent fall while getting out of his backyard and his cane got stuck and he tripped.  He had a fracture of his lower back and he was treated conservatively.  He has had no chest pain or shortness of breath.    We discussed results of his recent echocardiogram from October 17 which revealed normal ejection fraction with normal function of the bioprosthetic valve with a mean gradient of 8 mm.    He has not had any lightheadedness or syncope.  He had a transient conduction abnormality after his TAVR procedure but was managed conservatively.  He is not on any AV cole blockers for that reason.    On exam, regular rate and rhythm with a 2 x 6 ejection systolic murmur in the aortic area.  There are fine crackles in the lower zone bilaterally.    Recent CT chest results were reviewed and showed lung fibrosis at the base and lower zones.       Impression     Severe aortic stenosis, s/p transfemoral aortic valve replacement, successful, in April 2023, echo revealed stable gradients  2.    Paroxysmal atrial fibrillation/flutter, in sinus rhythm on Eliquis.  3.  Asymptomatic 3.2-second pause after the TAVR procedure, managed conservatively.  Not on AV cole blockers.  No dizziness or syncope.  4.  Coronary artery disease with patent grafts prior to the valve replacement, no angina  5.  Hypertension, fairly well controlled  6.  Peripheral vascular disease, previous carotid endarterectomy  7.  Small infrarenal abdominal aortic aneurysm, will need a follow-up scan next year  8.   Lung fibrosis on CT scan, confirmed by exam.  I have asked him to discuss with her primary care physician to see if he needs pulmonary consult and PFTs.     Overall, doing well from cardiac perspective.  I will see him back in follow-up  my nurse practitioner in a year with a repeat echocardiogram prior to visit with us.       He will call if there is worsening chest pain shortness breath dizziness or syncope.     Sincerely,     Ivan Ravi MD    Today's clinic visit entailed:    31 minutes spent by me on the date of the encounter doing chart review, review of test results, patient visit, and documentation   Provider  Link to [facility] Help Grid           No orders of the defined types were placed in this encounter.      No orders of the defined types were placed in this encounter.      There are no discontinued medications.      Encounter Diagnosis   Name Primary?    Coronary artery disease involving native coronary artery of native heart without angina pectoris        CURRENT MEDICATIONS:  Current Outpatient Medications   Medication Sig Dispense Refill    acetaminophen (TYLENOL) 325 MG tablet Take 2 tablets (650 mg) by mouth every 6 hours as needed for mild pain or other (and adjunct with moderate or severe pain or per patient request)      allopurinol (ZYLOPRIM) 100 MG tablet TAKE 2 TABLETS BY MOUTH  DAILY 180 tablet 3    amLODIPine (NORVASC) 5 MG tablet Take 1 tablet (5 mg) by mouth daily. 10 tablet 0    apixaban ANTICOAGULANT (ELIQUIS ANTICOAGULANT) 2.5 MG tablet Take 1 tablet (2.5 mg) by mouth 2 times daily. Restart taking am 8/16 180 tablet 1    atorvastatin (LIPITOR) 20 MG tablet TAKE 1 TABLET BY MOUTH ONCE  DAILY 90 tablet 3    Cholecalciferol (VITAMIN D3) 25 MCG (1000 UT) CAPS Take 1 capsule by mouth daily OTC dose unknown      dorzolamide (TRUSOPT) 2 % ophthalmic solution Place 1 drop into both eyes 2 times daily      fenofibrate (TRIGLIDE/LOFIBRA) 160 MG tablet TAKE 1 TABLET BY MOUTH DAILY 90 tablet 3     ferrous gluconate (FERGON) 324 (38 Fe) MG tablet Take 1 tablet (324 mg) by mouth daily 90 tablet 3    Multiple Vitamins-Minerals (PRESERVISION AREDS 2) CAPS Take 1 capsule by mouth 2 times daily      multivitamin w/minerals (MULTI-VITAMIN) tablet Take 1 tablet by mouth daily      sodium bicarbonate 650 MG tablet Take 1 tablet (650 mg) by mouth 2 times daily 180 tablet 3    spironolactone (ALDACTONE) 25 MG tablet TAKE 1 TABLET BY MOUTH  DAILY 90 tablet 3    triamcinolone (KENALOG) 0.1 % external cream Apply topically 2 times daily 453.6 g 11       ALLERGIES     Allergies   Allergen Reactions    Avocado     Ciprofloxacin Itching    Penicillins Itching       PAST MEDICAL HISTORY:  Past Medical History:   Diagnosis Date    Arthritis     rhuematoid    Coronary artery disease     triple bypass 2001    CRF (chronic renal failure)     Gastro-oesophageal reflux disease     Gout     Hyperlipidemia     Hypertension     Nocturia        PAST SURGICAL HISTORY:  Past Surgical History:   Procedure Laterality Date    CARDIAC SURGERY      CHOLECYSTECTOMY      COLONOSCOPY      CV CORONARY ANGIOGRAM N/A 1/13/2023    Procedure: Coronary Angiogram;  Surgeon: Sujata Ramires MD;  Location: [facility] CARDIAC CATH LAB    CV PCI N/A 1/13/2023    Procedure: Percutaneous Coronary Intervention;  Surgeon: Sujata Ramires MD;  Location: [facility] CARDIAC CATH LAB    CV TRANSCATHETER AORTIC VALVE REPLACEMENT-FEMORAL APPROACH N/A 4/11/2023    Procedure: Transcatheter Aortic Valve Replacement-Femoral Approach;  Surgeon: Sujata Ramires MD;  Location: [facility] CARDIAC CATH LAB    ENDARTERECTOMY CAROTID Right 8/13/2024    Procedure: REDO RIGHT CAROTID ENDARTERECTOMY with bovine patch and ELECTROENCEPHALOGRAM;  Surgeon: Brian Zelaya MD;  Location:  OR    LAPAROTOMY EXPLORATORY N/A 6/30/2020    Procedure: EXPLORATORY LAPAROTOMY, BOWEL RESECTION;  Surgeon: Bull Rachel MD;  Location:  OR    LAPAROTOMY, LYSIS ADHESIONS, COMBINED N/A  2020    Procedure: EXPLORATORY LAPAROTOMY WITH LYSIS OF ADHESIONS;  Surgeon: Jose Reed MD;  Location:  OR    ORTHOPEDIC SURGERY      PHACOEMULSIFICATION CLEAR CORNEA WITH TORIC INTRAOCULAR LENS IMPLANT  2012    Procedure:PHACOEMULSIFICATION CLEAR CORNEA WITH TORIC INTRAOCULAR LENS IMPLANT; LEFT PHACOEMULSIFICATION CLEAR CORNEA WITH DELUXE  TORIC INTRAOCULAR LENS IMPLANT ; Surgeon:BRANDO HIGHTOWER; [facility]: [facility]    PHACOEMULSIFICATION CLEAR CORNEA WITH TORIC INTRAOCULAR LENS IMPLANT  2012    Procedure:PHACOEMULSIFICATION CLEAR CORNEA WITH TORIC INTRAOCULAR LENS IMPLANT; RIGHT PHACOEMULSIFICATION CLEAR CORNEA WITH TORIC INTRAOCULAR LENS IMPLANT ; Surgeon:BRANDO HIGHTOWER; Location: [facility]    VASCULAR SURGERY         FAMILY HISTORY:  Family History   Problem Relation Age of Onset    Myocardial Infarction Mother     Rheumatic fever Father     Cerebrovascular Disease Brother        SOCIAL HISTORY:  Social History     Socioeconomic History    Marital status:      Spouse name: None    Number of children: None    Years of education: None    Highest education level: None   Tobacco Use    Smoking status: Former     Types: Cigars     Quit date: 1980     Years since quittin.8    Smokeless tobacco: Never   Vaping Use    Vaping status: Never Used   Substance and Sexual Activity    Alcohol use: Yes     Comment: rare    Drug use: No    Sexual activity: Not Currently     Partners: Female   Other Topics Concern    Parent/sibling w/ CABG, MI or angioplasty before 65F 55M? Yes     Social Drivers of Health     Financial Resource Strain: Low Risk  (2024)    Financial Resource Strain     Within the past 12 months, have you or your family members you live with been unable to get utilities (heat, electricity) when it was really needed?: No   Food Insecurity: Low Risk  (2024)    Food Insecurity     Within the past 12 months, did you worry that your food would run out before you got  "money to buy more?: No     Within the past 12 months, did the food you bought just not last and you didn t have money to get more?: No   Transportation Needs: Low Risk  (4/22/2024)    Transportation Needs     Within the past 12 months, has lack of transportation kept you from medical appointments, getting your medicines, non-medical meetings or appointments, work, or from getting things that you need?: No   Physical Activity: Unknown (4/22/2024)    Exercise Vital Sign     Days of Exercise per Week: 1 day   Stress: No Stress Concern Present (4/22/2024)    [employer] - Occupational Stress Questionnaire     Feeling of Stress : Only a little   Social Connections: Unknown (4/22/2024)    Social Connection and Isolation Panel [NHANES]     Frequency of Social Gatherings with Friends and Family: Twice a week   Housing Stability: Low Risk  (4/22/2024)    Housing Stability     Do you have housing? : Yes     Are you worried about losing your housing?: No       Review of Systems:  Skin:          Eyes:         ENT:         Respiratory:          Cardiovascular:         Gastroenterology:        Genitourinary:         Musculoskeletal:         Neurologic:         Psychiatric:         Heme/Lymph/Imm:         Endocrine:           Physical Exam:  Vitals: Pulse 78   Ht 1.753 m (5' 9\")   Wt 64.1 kg (141 lb 6.4 oz)   SpO2 98%   BMI 20.88 kg/m      Constitutional:           Skin:             Head:           Eyes:           Lymph:      ENT:           Neck:           Respiratory:            Cardiac:                                                           GI:           Extremities and Muscular Skeletal:                 Neurological:           Psych:           CC  Abdoulaye Redman MD  [address]  [place] [address]                "

## 2024-10-25 NOTE — LETTER
10/25/2024    Abdoulaye Redman MD  [address]  [facility] [address]    RE: Trevor Soni       Dear Colleague,     I had the pleasure of seeing Trevor Soni in the [facility].  HPI and Plan:   Trevor Soni is a pleasant 89 year old patient with past medical history significant for coronary disease s/p CABG x 3 (2001), paroxysmal atrial fibrillation, peripheral vascular disease with previous carotid endarterectomy in 2001, stage IV renal failure, severe aortic stenosis, s/p transfemoral aortic valve replacement with 26 mm Nunes AMI valve in April 2023.     He returns for follow-up accompanied by his daughter.  He had a recent fall while getting out of his backyard and his cane got stuck and he tripped.  He had a fracture of his lower back and he was treated conservatively.  He has had no chest pain or shortness of breath.    We discussed results of his recent echocardiogram from October 17 which revealed normal ejection fraction with normal function of the bioprosthetic valve with a mean gradient of 8 mm.    He has not had any lightheadedness or syncope.  He had a transient conduction abnormality after his TAVR procedure but was managed conservatively.  He is not on any AV cole blockers for that reason.    On exam, regular rate and rhythm with a 2 x 6 ejection systolic murmur in the aortic area.  There are fine crackles in the lower zone bilaterally.    Recent CT chest results were reviewed and showed lung fibrosis at the base and lower zones.       Impression     Severe aortic stenosis, s/p transfemoral aortic valve replacement, successful, in April 2023, echo revealed stable gradients  2.    Paroxysmal atrial fibrillation/flutter, in sinus rhythm on Eliquis.  3.  Asymptomatic 3.2-second pause after the TAVR procedure, managed conservatively.  Not on AV cole blockers.  No dizziness or syncope.  4.  Coronary artery disease with patent grafts prior to the valve  replacement, no angina  5.  Hypertension, fairly well controlled  6.  Peripheral vascular disease, previous carotid endarterectomy  7.  Small infrarenal abdominal aortic aneurysm, will need a follow-up scan next year  8.  Lung fibrosis on CT scan, confirmed by exam.  I have asked him to discuss with her primary care physician to see if he needs pulmonary consult and PFTs.     Overall, doing well from cardiac perspective.  I will see him back in follow-up  my nurse practitioner in a year with a repeat echocardiogram prior to visit with us.       He will call if there is worsening chest pain shortness breath dizziness or syncope.     Sincerely,     Ivan Ravi MD    Today's clinic visit entailed:    31 minutes spent by me on the date of the encounter doing chart review, review of test results, patient visit, and documentation   Provider  Link to [facility] Help Grid           No orders of the defined types were placed in this encounter.      No orders of the defined types were placed in this encounter.      There are no discontinued medications.      Encounter Diagnosis   Name Primary?     Coronary artery disease involving native coronary artery of native heart without angina pectoris        CURRENT MEDICATIONS:  Current Outpatient Medications   Medication Sig Dispense Refill     acetaminophen (TYLENOL) 325 MG tablet Take 2 tablets (650 mg) by mouth every 6 hours as needed for mild pain or other (and adjunct with moderate or severe pain or per patient request)       allopurinol (ZYLOPRIM) 100 MG tablet TAKE 2 TABLETS BY MOUTH  DAILY 180 tablet 3     amLODIPine (NORVASC) 5 MG tablet Take 1 tablet (5 mg) by mouth daily. 10 tablet 0     apixaban ANTICOAGULANT (ELIQUIS ANTICOAGULANT) 2.5 MG tablet Take 1 tablet (2.5 mg) by mouth 2 times daily. Restart taking am 8/16 180 tablet 1     atorvastatin (LIPITOR) 20 MG tablet TAKE 1 TABLET BY MOUTH ONCE  DAILY 90 tablet 3     Cholecalciferol (VITAMIN D3) 25 MCG (1000 UT) CAPS Take 1  capsule by mouth daily OTC dose unknown       dorzolamide (TRUSOPT) 2 % ophthalmic solution Place 1 drop into both eyes 2 times daily       fenofibrate (TRIGLIDE/LOFIBRA) 160 MG tablet TAKE 1 TABLET BY MOUTH DAILY 90 tablet 3     ferrous gluconate (FERGON) 324 (38 Fe) MG tablet Take 1 tablet (324 mg) by mouth daily 90 tablet 3     Multiple Vitamins-Minerals (PRESERVISION AREDS 2) CAPS Take 1 capsule by mouth 2 times daily       multivitamin w/minerals (MULTI-VITAMIN) tablet Take 1 tablet by mouth daily       sodium bicarbonate 650 MG tablet Take 1 tablet (650 mg) by mouth 2 times daily 180 tablet 3     spironolactone (ALDACTONE) 25 MG tablet TAKE 1 TABLET BY MOUTH  DAILY 90 tablet 3     triamcinolone (KENALOG) 0.1 % external cream Apply topically 2 times daily 453.6 g 11       ALLERGIES     Allergies   Allergen Reactions     Avocado      Ciprofloxacin Itching     Penicillins Itching       PAST MEDICAL HISTORY:  Past Medical History:   Diagnosis Date     Arthritis     rhuematoid     Coronary artery disease     triple bypass 2001     CRF (chronic renal failure)      Gastro-oesophageal reflux disease      Gout      Hyperlipidemia      Hypertension      Nocturia        PAST SURGICAL HISTORY:  Past Surgical History:   Procedure Laterality Date     CARDIAC SURGERY       CHOLECYSTECTOMY       COLONOSCOPY       CV CORONARY ANGIOGRAM N/A 1/13/2023    Procedure: Coronary Angiogram;  Surgeon: Sujata Ramires MD;  Location: [facility] CARDIAC CATH LAB     CV PCI N/A 1/13/2023    Procedure: Percutaneous Coronary Intervention;  Surgeon: Sujata Ramires MD;  Location: [facility] CARDIAC CATH LAB     CV TRANSCATHETER AORTIC VALVE REPLACEMENT-FEMORAL APPROACH N/A 4/11/2023    Procedure: Transcatheter Aortic Valve Replacement-Femoral Approach;  Surgeon: Sujata Ramires MD;  Location: [facility] CARDIAC CATH LAB     ENDARTERECTOMY CAROTID Right 8/13/2024    Procedure: REDO RIGHT CAROTID ENDARTERECTOMY with bovine patch and  ELECTROENCEPHALOGRAM;  Surgeon: Brian Zelaya MD;  Location:  OR     LAPAROTOMY EXPLORATORY N/A 2020    Procedure: EXPLORATORY LAPAROTOMY, BOWEL RESECTION;  Surgeon: Bull Rachel MD;  Location:  OR     LAPAROTOMY, LYSIS ADHESIONS, COMBINED N/A 2020    Procedure: EXPLORATORY LAPAROTOMY WITH LYSIS OF ADHESIONS;  Surgeon: Jose Reed MD;  Location:  OR     ORTHOPEDIC SURGERY       PHACOEMULSIFICATION CLEAR CORNEA WITH TORIC INTRAOCULAR LENS IMPLANT  2012    Procedure:PHACOEMULSIFICATION CLEAR CORNEA WITH TORIC INTRAOCULAR LENS IMPLANT; LEFT PHACOEMULSIFICATION CLEAR CORNEA WITH DELUXE  TORIC INTRAOCULAR LENS IMPLANT ; Surgeon:BRANDO HIGHTOWER; [facility]: [facility]     PHACOEMULSIFICATION CLEAR CORNEA WITH TORIC INTRAOCULAR LENS IMPLANT  2012    Procedure:PHACOEMULSIFICATION CLEAR CORNEA WITH TORIC INTRAOCULAR LENS IMPLANT; RIGHT PHACOEMULSIFICATION CLEAR CORNEA WITH TORIC INTRAOCULAR LENS IMPLANT ; Surgeon:BRANDO HIGHTOWER; Location: [facility]     VASCULAR SURGERY         FAMILY HISTORY:  Family History   Problem Relation Age of Onset     Myocardial Infarction Mother      Rheumatic fever Father      Cerebrovascular Disease Brother        SOCIAL HISTORY:  Social History     Socioeconomic History     Marital status:      Spouse name: None     Number of children: None     Years of education: None     Highest education level: None   Tobacco Use     Smoking status: Former     Types: Cigars     Quit date: 1980     Years since quittin.8     Smokeless tobacco: Never   Vaping Use     Vaping status: Never Used   Substance and Sexual Activity     Alcohol use: Yes     Comment: rare     Drug use: No     Sexual activity: Not Currently     Partners: Female   Other Topics Concern     Parent/sibling w/ CABG, MI or angioplasty before 65F 55M? Yes     Social Drivers of Health     Financial Resource Strain: Low Risk  (2024)    Financial Resource Strain      Within the past 12  "months, have you or your family members you live with been unable to get utilities (heat, electricity) when it was really needed?: No   Food Insecurity: Low Risk  (4/22/2024)    Food Insecurity      Within the past 12 months, did you worry that your food would run out before you got money to buy more?: No      Within the past 12 months, did the food you bought just not last and you didn t have money to get more?: No   Transportation Needs: Low Risk  (4/22/2024)    Transportation Needs      Within the past 12 months, has lack of transportation kept you from medical appointments, getting your medicines, non-medical meetings or appointments, work, or from getting things that you need?: No   Physical Activity: Unknown (4/22/2024)    Exercise Vital Sign      Days of Exercise per Week: 1 day   Stress: No Stress Concern Present (4/22/2024)    [employer] - Occupational Stress Questionnaire      Feeling of Stress : Only a little   Social Connections: Unknown (4/22/2024)    Social Connection and Isolation Panel [NHANES]      Frequency of Social Gatherings with Friends and Family: Twice a week   Housing Stability: Low Risk  (4/22/2024)    Housing Stability      Do you have housing? : Yes      Are you worried about losing your housing?: No       Review of Systems:  Skin:          Eyes:         ENT:         Respiratory:          Cardiovascular:         Gastroenterology:        Genitourinary:         Musculoskeletal:         Neurologic:         Psychiatric:         Heme/Lymph/Imm:         Endocrine:           Physical Exam:  Vitals: Pulse 78   Ht 1.753 m (5' 9\")   Wt 64.1 kg (141 lb 6.4 oz)   SpO2 98%   BMI 20.88 kg/m      Constitutional:           Skin:             Head:           Eyes:           Lymph:      ENT:           Neck:           Respiratory:            Cardiac:                                                           GI:           Extremities and Muscular Skeletal:             "     Neurological:           Psych:           CC  Abdoulaye Redman MD  [address]  [place] [address]                  Thank you for allowing me to participate in the care of your patient.      Sincerely,     Ivan Ravi MD      Heart Care  cc:   Abdoulaye Redman MD  [address]  [place] [address]

## 2024-10-31 ENCOUNTER — MYC MEDICAL ADVICE (OUTPATIENT)
Dept: FAMILY MEDICINE | Facility: CLINIC | Age: 89
End: 2024-10-31
Payer: MEDICARE

## 2024-10-31 DIAGNOSIS — I48.92 ATRIAL FLUTTER, UNSPECIFIED TYPE (H): ICD-10-CM

## 2024-10-31 DIAGNOSIS — I10 BENIGN ESSENTIAL HYPERTENSION: ICD-10-CM

## 2024-10-31 NOTE — TELEPHONE ENCOUNTER
Medication sent to [facility] on 10/18/2024. Please contact pharmacy directly.    Thank you,  Francisco Javier Espinoza, Triage RN [place]  2:58 PM 10/31/2024

## 2024-11-01 RX ORDER — AMLODIPINE BESYLATE 5 MG/1
5 TABLET ORAL DAILY
Qty: 10 TABLET | Refills: 0 | Status: SHIPPED | OUTPATIENT
Start: 2024-11-01 | End: 2024-11-04

## 2024-11-01 NOTE — TELEPHONE ENCOUNTER
Please see  message and verify which pharmacy.    Rosalba Merchant RN, BSN  [facility]

## 2024-11-04 RX ORDER — AMLODIPINE BESYLATE 5 MG/1
5 TABLET ORAL DAILY
Qty: 90 TABLET | Refills: 0 | Status: SHIPPED | OUTPATIENT
Start: 2024-11-04

## 2024-11-11 ENCOUNTER — TELEPHONE (OUTPATIENT)
Dept: FAMILY MEDICINE | Facility: CLINIC | Age: 89
End: 2024-11-11
Payer: MEDICARE

## 2024-11-11 NOTE — TELEPHONE ENCOUNTER
Outgoing call to [facility]. Confirmed Amlodapine prescription for 90 tab ready for  at [facility]. RN to send [employer] message back to Pt informing them of this.    Soraya STRAUSS,  Albertina RN  [facility]

## 2024-11-27 ENCOUNTER — OFFICE VISIT (OUTPATIENT)
Dept: OTHER | Facility: CLINIC | Age: 89
End: 2024-11-27
Payer: MEDICARE

## 2024-11-27 ENCOUNTER — HOSPITAL ENCOUNTER (OUTPATIENT)
Dept: ULTRASOUND IMAGING | Facility: CLINIC | Age: 89
Discharge: HOME | End: 2024-11-27
Payer: MEDICARE

## 2024-11-27 VITALS — SYSTOLIC BLOOD PRESSURE: 149 MMHG | HEART RATE: 70 BPM | DIASTOLIC BLOOD PRESSURE: 76 MMHG

## 2024-11-27 DIAGNOSIS — I65.21 SYMPTOMATIC STENOSIS OF RIGHT CAROTID ARTERY: ICD-10-CM

## 2024-11-27 PROCEDURE — G0463 HOSPITAL OUTPT CLINIC VISIT: HCPCS

## 2024-11-27 PROCEDURE — 93882 EXTRACRANIAL UNI/LTD STUDY: CPT | Mod: RT

## 2024-11-27 PROCEDURE — 99214 OFFICE O/P EST MOD 30 MIN: CPT

## 2024-11-27 NOTE — PROGRESS NOTES
VASCULAR SURGERY PROGRESS NOTE    LOCATION: [facility]    Trevor Soni  Medical Record #: [id number]  YOB: 1933  Age: 89 year old     Date of Service: 11/27/2024    PRIMARY CARE PROVIDER: Abdoulaye Redman    Reason for visit:  Carotid Surveillance    Trevor Soni is a 89 year old male who had recurrent symptomatic right carotid stenosis(with amaurosis fugax), subsequently underwent redo right carotid endarterectomy with Dr. Zelaya on 08/13/2024.     Incision is fully healed.   Trveor is alert and oriented x4, no focal deficits, denies any stroke, TIA, or amaurosis fugax.       R Carotid duplex shows shows proximal ICA velocities at 445/91, and ratio of 4.08.     RECOMMENDATION/RISKS: Continue on current medications. Discussed with Dr. Zelaya, concern for ongoing stenosis or clot given duplex findings. We will proceed with CTA head and neck(with hydration given kidney disease) and for Trevor to follow-up with Dr. Zelaya.     HPI:  Trevor Soni is a pleasant 89-year-old gentleman with the significant cardiac history noted above.  In July, he was turning his head to the right when he experienced loss of his entire right eye visual field.  This lasted for about 10 seconds followed by blurry vision which eventually normalized over about 5 minutes.  He has had no further recurrent visual deficits.  He denies any other neurologic focality.  He does have history of right carotid endarterectomy performed in 2001 prior to his CABG.    He remains a fairly functional individual who very much enjoys interacting with his grandchildren.  He answered all of my questions appropriately and had no other complaints.     REVIEW OF SYSTEMS:    A 12 point ROS was reviewed and is negative except for what is listed above in HPI.    PHH:    Past Medical History:   Diagnosis Date    Arthritis     rhuematoid    Coronary artery disease     triple bypass 2001    CRF (chronic renal failure)      Gastro-oesophageal reflux disease     Gout     Hyperlipidemia     Hypertension     Nocturia           Past Surgical History:   Procedure Laterality Date    CARDIAC SURGERY      CHOLECYSTECTOMY      COLONOSCOPY      CV CORONARY ANGIOGRAM N/A 1/13/2023    Procedure: Coronary Angiogram;  Surgeon: Sujata Ramires MD;  Location:  HEART CARDIAC CATH LAB    CV PCI N/A 1/13/2023    Procedure: Percutaneous Coronary Intervention;  Surgeon: Sujata Ramires MD;  Location:  HEART CARDIAC CATH LAB    CV TRANSCATHETER AORTIC VALVE REPLACEMENT-FEMORAL APPROACH N/A 4/11/2023    Procedure: Transcatheter Aortic Valve Replacement-Femoral Approach;  Surgeon: Sujata Ramires MD;  Location:  HEART CARDIAC CATH LAB    ENDARTERECTOMY CAROTID Right 8/13/2024    Procedure: REDO RIGHT CAROTID ENDARTERECTOMY with bovine patch and ELECTROENCEPHALOGRAM;  Surgeon: Brian Zelaya MD;  Location:  OR    LAPAROTOMY EXPLORATORY N/A 6/30/2020    Procedure: EXPLORATORY LAPAROTOMY, BOWEL RESECTION;  Surgeon: Bull Rachel MD;  Location:  OR    LAPAROTOMY, LYSIS ADHESIONS, COMBINED N/A 6/21/2020    Procedure: EXPLORATORY LAPAROTOMY WITH LYSIS OF ADHESIONS;  Surgeon: Jose Reed MD;  Location:  OR    ORTHOPEDIC SURGERY      PHACOEMULSIFICATION CLEAR CORNEA WITH TORIC INTRAOCULAR LENS IMPLANT  1/30/2012    Procedure:PHACOEMULSIFICATION CLEAR CORNEA WITH TORIC INTRAOCULAR LENS IMPLANT; LEFT PHACOEMULSIFICATION CLEAR CORNEA WITH DELUXE  TORIC INTRAOCULAR LENS IMPLANT ; Surgeon:BRANDO HIGHTOWER; Location:[facility]    PHACOEMULSIFICATION CLEAR CORNEA WITH TORIC INTRAOCULAR LENS IMPLANT  2/13/2012    Procedure:PHACOEMULSIFICATION CLEAR CORNEA WITH TORIC INTRAOCULAR LENS IMPLANT; RIGHT PHACOEMULSIFICATION CLEAR CORNEA WITH TORIC INTRAOCULAR LENS IMPLANT ; Surgeon:BRANDO HIGHTOWER; Location:[facility]    VASCULAR SURGERY         ALLERGIES:  Avocado, Ciprofloxacin, and Penicillins    MEDS:    Current Outpatient Medications:      acetaminophen (TYLENOL) 325 MG tablet, Take 2 tablets (650 mg) by mouth every 6 hours as needed for mild pain or other (and adjunct with moderate or severe pain or per patient request), Disp: , Rfl:     allopurinol (ZYLOPRIM) 100 MG tablet, TAKE 2 TABLETS BY MOUTH  DAILY, Disp: 180 tablet, Rfl: 3    amLODIPine (NORVASC) 5 MG tablet, Take 1 tablet (5 mg) by mouth daily., Disp: 90 tablet, Rfl: 0    apixaban ANTICOAGULANT (ELIQUIS ANTICOAGULANT) 2.5 MG tablet, Take 1 tablet (2.5 mg) by mouth 2 times daily. Restart taking am 8/16, Disp: 180 tablet, Rfl: 1    atorvastatin (LIPITOR) 20 MG tablet, TAKE 1 TABLET BY MOUTH ONCE  DAILY, Disp: 90 tablet, Rfl: 3    Cholecalciferol (VITAMIN D3) 25 MCG (1000 UT) CAPS, Take 1 capsule by mouth daily OTC dose unknown, Disp: , Rfl:     dorzolamide (TRUSOPT) 2 % ophthalmic solution, Place 1 drop into both eyes 2 times daily, Disp: , Rfl:     fenofibrate (TRIGLIDE/LOFIBRA) 160 MG tablet, TAKE 1 TABLET BY MOUTH DAILY, Disp: 90 tablet, Rfl: 3    ferrous gluconate (FERGON) 324 (38 Fe) MG tablet, Take 1 tablet (324 mg) by mouth daily, Disp: 90 tablet, Rfl: 3    Multiple Vitamins-Minerals (PRESERVISION AREDS 2) CAPS, Take 1 capsule by mouth 2 times daily, Disp: , Rfl:     multivitamin w/minerals (MULTI-VITAMIN) tablet, Take 1 tablet by mouth daily, Disp: , Rfl:     sodium bicarbonate 650 MG tablet, Take 1 tablet (650 mg) by mouth 2 times daily, Disp: 180 tablet, Rfl: 3    spironolactone (ALDACTONE) 25 MG tablet, TAKE 1 TABLET BY MOUTH  DAILY, Disp: 90 tablet, Rfl: 3    triamcinolone (KENALOG) 0.1 % external cream, Apply topically 2 times daily, Disp: 453.6 g, Rfl: 11    Current Facility-Administered Medications:     lidocaine 1 % injection 3 mL, 3 mL, , , , 3 mL at 05/16/24 1710    lidocaine 1 % injection 3 mL, 3 mL, , , , 3 mL at 05/16/24 1710    lidocaine 1 % injection 4 mL, 4 mL, , , , 4 mL at 05/16/24 1710    lidocaine 1 % injection 4 mL, 4 mL, , , , 4 mL at 05/16/24 1710     methylPREDNISolone (DEPO-Medrol) injection 40 mg, 40 mg, , , , 40 mg at 05/16/24 1710    methylPREDNISolone (DEPO-Medrol) injection 40 mg, 40 mg, , , , 40 mg at 05/16/24 1710    SOCIAL HABITS:    History   Smoking Status    Former    Types: Cigars   Smokeless Tobacco    Never     Social History    Substance and Sexual Activity      Alcohol use: Yes        Comment: rare      History   Drug Use No       FAMILY HISTORY:    Family History   Problem Relation Age of Onset    Myocardial Infarction Mother     Rheumatic fever Father     Cerebrovascular Disease Brother        PE:  There were no vitals taken for this visit.  Wt Readings from Last 1 Encounters:   10/25/24 141 lb 6.4 oz (64.1 kg)     There is no height or weight on file to calculate BMI.    LABS:      Sodium   Date Value Ref Range Status   09/12/2024 140 135 - 145 mmol/L Final   08/13/2024 138 135 - 145 mmol/L Final   08/12/2024 140 135 - 145 mmol/L Final   12/21/2020 140 133 - 144 mmol/L Final   11/05/2020 141 133 - 144 mmol/L Final   10/08/2020 139 133 - 144 mmol/L Final     Urea Nitrogen   Date Value Ref Range Status   09/12/2024 32.2 (H) 8.0 - 23.0 mg/dL Final   08/13/2024 34.4 (H) 8.0 - 23.0 mg/dL Final   08/12/2024 39.2 (H) 8.0 - 23.0 mg/dL Final   01/16/2023 36 (H) 7 - 30 mg/dL Final   01/13/2023 47 (H) 7 - 30 mg/dL Final   11/23/2022 37 (H) 7 - 30 mg/dL Final   12/21/2020 51 (H) 7 - 30 mg/dL Final   11/05/2020 41 (H) 7 - 30 mg/dL Final   10/08/2020 33 (H) 7 - 30 mg/dL Final     Hemoglobin   Date Value Ref Range Status   08/12/2024 12.7 (L) 13.3 - 17.7 g/dL Final   05/02/2024 12.6 (L) 13.3 - 17.7 g/dL Final   04/05/2024 11.2 (L) 13.3 - 17.7 g/dL Final   12/21/2020 10.7 (L) 13.3 - 17.7 g/dL Final   11/05/2020 9.7 (L) 13.3 - 17.7 g/dL Final   10/08/2020 9.6 (L) 13.3 - 17.7 g/dL Final     Platelet Count   Date Value Ref Range Status   08/12/2024 186 150 - 450 10e3/uL Final   05/02/2024 186 150 - 450 10e3/uL Final   04/05/2024 322 150 - 450 10e3/uL Final    12/21/2020 212 150 - 450 10e9/L Final   11/05/2020 219 150 - 450 10e9/L Final   10/08/2020 217 150 - 450 10e9/L Final     INR   Date Value Ref Range Status   04/05/2023 1.26 (H) 0.85 - 1.15 Final   03/17/2023 1.24 (H) 0.85 - 1.15 Final   01/13/2023 1.11 0.85 - 1.15 Final   05/06/2011 1.06 0.86 - 1.14 Final   05/02/2010 0.98 0.86 - 1.14 Final       30 minutes spent on the day of encounter doing chart review, history and exam, documentation, and further activities as noted.     Mechelle Coker NP  VASCULAR SURGERY

## 2024-11-27 NOTE — PROGRESS NOTES
[facility]        Patient is here for a follow up     Pt is currently taking Eliquis.    BP (!) 149/76 (BP Location: Right arm, Patient Position: Sitting, Cuff Size: Adult Regular)   Pulse 70     The provider has been notified that the patient has no concerns.     Questions patient would like addressed today are: N/A.    Refills are needed: N/A    Has homecare services and agency name:  No     Patient has been identified as a fall risk.    Interventions were completed as noted below:  [x]Exam tables/chairs remained in the lowest position.   []Patient remained in wheelchair.    []Provider requested patient in exam chair.  Patient required assist and use of transfer belt.  [x]Exam room door remained open unless with a provider.  []A bell provided to patient to notify staff if assistance was needed.  [x]Provider notified patient was identified as a fall risk.    Susie Salas MA

## 2024-11-29 ENCOUNTER — TELEPHONE (OUTPATIENT)
Dept: OTHER | Facility: CLINIC | Age: 89
End: 2024-11-29
Payer: MEDICARE

## 2024-11-29 DIAGNOSIS — I65.21 SYMPTOMATIC STENOSIS OF RIGHT CAROTID ARTERY: Primary | ICD-10-CM

## 2024-11-29 NOTE — TELEPHONE ENCOUNTER
Per 11/27/24 visit with JUAN C Klein CNP, pt needs the following:     CTA head and neck with contrast prior to in clinic visit with Dr. Zelaya  In clinic appt with Dr. Zelaya to discuss results  Please schedule this within 1-2 weeks    Routing to scheduling to contact patient to coordinate above.    Appt note in order comments.    Renee Traore RN  [facility]

## 2024-12-02 NOTE — TELEPHONE ENCOUNTER
"RN reviewed chart further and noted per office visit 11/27/24: \"RECOMMENDATION/RISKS: Continue on current medications. Discussed with Dr. Zelaya, concern for ongoing stenosis or clot given duplex findings. We will proceed with CTA head and neck(with hydration given kidney disease) and for Trevor to follow-up with Dr. Zelaya.\"    Pt's last Creatinine and GFR:  Component      Latest Ref Rng 9/12/2024  10:04 AM   Creatinine      0.67 - 1.17 mg/dL 1.77 (H)    GFR Estimate      >60 mL/min/1.73m2 36 (L)         Routing to Mechelle to advise if pt needs 4 hour hydration, 6 hour hydration, or an alternate test.     Renee Traore RN  [facility] [place]    "

## 2024-12-02 NOTE — TELEPHONE ENCOUNTER
[facility]    Who is the name of the provider?:  BRANDON LAUREN   What is the location you see this provider at/preferred location?: [place]  Person calling / Facility: Bianka Soni (daughter of) Trevor Soni  Phone number:  [phone] (home)   Nurse call back needed:  ?     Reason for call:    I spoke with the patient's daughter, Bianka.  I did not schedule the CTA or the Dr. Zelaya appointment as Bianka said that her Dad is to have IV fluids for his CTA.      Informed her we would clarify the IV needs with Nursing and call her back.    Please review and advise if IV pre-hydration is needed.      Pharmacy location:     [facility] - [place], [place] - [address], [facility] [address]  [id number] - [place], [place] - [address] - [address]  WRITTEN PRESCRIPTION REQUESTED  Outside Imaging: n/a   Can we leave a detailed message on this number?  YES     12/2/2024, 9:10 AM

## 2024-12-03 NOTE — TELEPHONE ENCOUNTER
RN huddled with Mechelle and she advised that pt needs 4 hour IV hydration.    Renee Traore RN  [facility]

## 2024-12-03 NOTE — TELEPHONE ENCOUNTER
RN spoke with DaughterBianka. Consent to communicate is on file.   RN apologized to Daughter and informed her that she is correct, that pt should have IV hydration and a message will be sent to our scheduling team to reach out to her. Daughter appreciated the phone call.   Additional call placed to Daughter and RN informed her that pt is to receive 4 hours of IV hydration- 1 hour pre-test, have imaging, then 3 hours after test. Daughter again appreciated the follow up call.    Routing to scheduling to please coordinate the following:  Schedule CTA head and neck with contrast  Create care suites encounter and arrange care suites x 4 hours  Re-route to RN triage to place IV hydration orders.       Renee Traore RN  [facility] [place]

## 2024-12-03 NOTE — TELEPHONE ENCOUNTER
Spoke with patient's daughter, Bianka, and scheduled appointments.    Routing to Nursing to place IV hydration orders.

## 2024-12-04 ENCOUNTER — TELEPHONE (OUTPATIENT)
Dept: MEDSURG UNIT | Facility: CLINIC | Age: 89
End: 2024-12-04
Payer: MEDICARE

## 2024-12-11 ENCOUNTER — HOSPITAL ENCOUNTER (OUTPATIENT)
Facility: CLINIC | Age: 89
Discharge: HOME | End: 2024-12-11
Admitting: INTERNAL MEDICINE
Payer: MEDICARE

## 2024-12-11 ENCOUNTER — HOSPITAL ENCOUNTER (OUTPATIENT)
Dept: CT IMAGING | Facility: CLINIC | Age: 89
Discharge: HOME | End: 2024-12-11
Admitting: SURGERY
Payer: MEDICARE

## 2024-12-11 VITALS
RESPIRATION RATE: 16 BRPM | WEIGHT: 140 LBS | BODY MASS INDEX: 20.67 KG/M2 | OXYGEN SATURATION: 98 % | TEMPERATURE: 98.2 F | HEART RATE: 84 BPM | DIASTOLIC BLOOD PRESSURE: 66 MMHG | SYSTOLIC BLOOD PRESSURE: 156 MMHG

## 2024-12-11 DIAGNOSIS — N18.4 CRF (CHRONIC RENAL FAILURE), STAGE 4 (SEVERE) (H): Primary | ICD-10-CM

## 2024-12-11 DIAGNOSIS — I65.21 SYMPTOMATIC STENOSIS OF RIGHT CAROTID ARTERY: ICD-10-CM

## 2024-12-11 LAB
CREAT BLD-MCNC: 2.1 MG/DL (ref 0.7–1.3)
EGFRCR SERPLBLD CKD-EPI 2021: 29 ML/MIN/1.73M2

## 2024-12-11 PROCEDURE — 96360 HYDRATION IV INFUSION INIT: CPT

## 2024-12-11 PROCEDURE — 70496 CT ANGIOGRAPHY HEAD: CPT | Mod: MG

## 2024-12-11 PROCEDURE — 250N000011 HC RX IP 250 OP 636

## 2024-12-11 PROCEDURE — 258N000003 HC RX IP 258 OP 636

## 2024-12-11 PROCEDURE — G1010 CDSM STANSON: HCPCS

## 2024-12-11 PROCEDURE — 82565 ASSAY OF CREATININE: CPT

## 2024-12-11 PROCEDURE — 250N000009 HC RX 250

## 2024-12-11 PROCEDURE — 96361 HYDRATE IV INFUSION ADD-ON: CPT

## 2024-12-11 RX ORDER — IOPAMIDOL 755 MG/ML
67 INJECTION, SOLUTION INTRAVASCULAR ONCE
Status: COMPLETED | OUTPATIENT
Start: 2024-12-11 | End: 2024-12-11

## 2024-12-11 RX ORDER — SODIUM CHLORIDE 9 MG/ML
INJECTION, SOLUTION INTRAVENOUS CONTINUOUS
Status: DISCONTINUED | OUTPATIENT
Start: 2024-12-11 | End: 2024-12-11 | Stop reason: HOSPADM

## 2024-12-11 RX ADMIN — SODIUM CHLORIDE 80 ML: 9 INJECTION, SOLUTION INTRAVENOUS at 14:55

## 2024-12-11 RX ADMIN — SODIUM CHLORIDE: 9 INJECTION, SOLUTION INTRAVENOUS at 13:37

## 2024-12-11 RX ADMIN — IOPAMIDOL 67 ML: 755 INJECTION, SOLUTION INTRAVENOUS at 14:55

## 2024-12-11 ASSESSMENT — ACTIVITIES OF DAILY LIVING (ADL)
ADLS_ACUITY_SCORE: 57

## 2024-12-11 NOTE — PROGRESS NOTES
Patients IV infiltrated- remaining amount to be infused is 63 ML.  Spoke with Abdirahman Edwards and ok to discharge patient.  Patient drank a full glass of water and will continue to hydrate over next 2 days.

## 2024-12-11 NOTE — PROGRESS NOTES
Care Suites Admission Nursing Note    Patient Information  Name: Trevor Soni  Age: 89 year old  Reason for admission: IV hydration, CT   Care Suites arrival time: 1330    Visitor Information  Name: Bianka - luh      Patient Admission/Assessment   Pre-procedure assessment complete: Yes  If abnormal assessment/labs, provider notified: N/A  NPO: N/A  Medications held per instructions/orders: N/A  Consent: IV hydration   If applicable, pregnancy test status: deferred  Patient oriented to room: Yes  Education/questions answered: Yes  Plan/other: proceed with current orders     Discharge Planning  Discharge name/phone number: bianka  [phone]  Overnight post sedation caregiver: STIVEN  Discharge location: home with daughter     Angle Valenzuela RN

## 2024-12-17 ENCOUNTER — OFFICE VISIT (OUTPATIENT)
Dept: OTHER | Facility: CLINIC | Age: 89
End: 2024-12-17
Attending: SURGERY
Payer: MEDICARE

## 2024-12-17 VITALS — SYSTOLIC BLOOD PRESSURE: 171 MMHG | DIASTOLIC BLOOD PRESSURE: 70 MMHG | HEART RATE: 101 BPM

## 2024-12-17 NOTE — PROGRESS NOTES
[facility]        Patient is here for a  follow up.    Pt is currently taking Statin and Eliquis.    BP (!) 171/70 (BP Location: Right arm, Patient Position: Chair, Cuff Size: Adult Regular)   Pulse 101     The provider has been notified that the patient has high blood pressure.    Questions patient would like addressed today are: N/A.    Refills are needed: N/A    Has homecare services and agency name:  Jenny Bridges MA

## 2024-12-17 NOTE — PROGRESS NOTES
Trevor Soni is a 89-year-old gentleman who is status post TAVR and CABG who is now 5 months status post redo right carotid endarterectomy for a symptomatic (right eye amaurosis) greater than 70% stenosis.  His original right CEA was in 2001 with placement of a Dacron patch.  At his redo surgery this past August he had moderate scar tissue encasing the previously endarterectomized right carotid with an irregular soft plaque felt to be the probable source for his right eye amaurosis.  The entire Dacron patch was removed and replaced with bovine pericardium.  He did well with that procedure.    3-month postoperative right carotid ultrasound was notable for a PSV of 445 and a ratio of 4.08.  Carotid CTA was obtained on 12/11/2024.  He returns today to discuss those results and my treatment recommendations.

## 2025-01-30 DIAGNOSIS — I10 BENIGN ESSENTIAL HYPERTENSION: ICD-10-CM

## 2025-01-30 RX ORDER — AMLODIPINE BESYLATE 5 MG/1
5 TABLET ORAL DAILY
Qty: 90 TABLET | Refills: 0 | Status: SHIPPED | OUTPATIENT
Start: 2025-01-30

## 2025-02-12 DIAGNOSIS — E78.5 HYPERLIPIDEMIA LDL GOAL <100: ICD-10-CM

## 2025-02-12 DIAGNOSIS — I10 BENIGN ESSENTIAL HYPERTENSION: ICD-10-CM

## 2025-02-13 RX ORDER — ATORVASTATIN CALCIUM 20 MG/1
20 TABLET, FILM COATED ORAL DAILY
Qty: 90 TABLET | Refills: 3 | OUTPATIENT
Start: 2025-02-13

## 2025-02-20 DIAGNOSIS — E78.5 HYPERLIPIDEMIA LDL GOAL <100: ICD-10-CM

## 2025-02-20 DIAGNOSIS — I10 BENIGN ESSENTIAL HYPERTENSION: ICD-10-CM

## 2025-02-20 RX ORDER — ALLOPURINOL 100 MG/1
TABLET ORAL
Qty: 180 TABLET | Refills: 3 | Status: SHIPPED | OUTPATIENT
Start: 2025-02-20

## 2025-03-04 DIAGNOSIS — I48.92 ATRIAL FLUTTER, UNSPECIFIED TYPE (H): ICD-10-CM

## 2025-03-05 RX ORDER — APIXABAN 2.5 MG/1
TABLET, FILM COATED ORAL
Qty: 180 TABLET | Refills: 3 | Status: SHIPPED | OUTPATIENT
Start: 2025-03-05

## 2025-03-22 ENCOUNTER — HEALTH MAINTENANCE LETTER (OUTPATIENT)
Age: OVER 89
End: 2025-03-22

## 2025-04-21 ENCOUNTER — NURSE TRIAGE (OUTPATIENT)
Dept: FAMILY MEDICINE | Facility: CLINIC | Age: OVER 89
End: 2025-04-21
Payer: MEDICARE

## 2025-04-21 NOTE — TELEPHONE ENCOUNTER
He could try taking over the counter imodium as directed for his diarrhea until he sees me tomorrow, if that does not help enough, recommend ER for IVF's if can't hold out for our clinic visit

## 2025-04-21 NOTE — TELEPHONE ENCOUNTER
Spoke with pt's daughter about PCP recommendations. Pt's daughter verbalized understanding and has no further questions or concerns.     Hiren Devlin RN  [facility]

## 2025-04-21 NOTE — TELEPHONE ENCOUNTER
"Nurse Triage SBAR    Is this a 2nd Level Triage? YES, LICENSED PRACTITIONER REVIEW IS REQUIRED    Situation: Patient has been having diarrhea for a few days now.     Background: Patient has been eating bland foods and hydrating well, however this has not helped. The diarrhea started a few days ago.    Assessment: Was just like water coming out, then wasn't so watery. More oatmeal like now. No blood in the stool. Denies abdominal pain, dizziness, decreased urine output. Reports diarrhea as moderate. Denies antibiotic use.     Protocol Recommended Disposition:   See in Office Today    Recommendation: Patient is being seen tomorrow 04/22 for his annual wellness visit. Does PCP have recommendations prior to that or would PCP like to wait for this appt? Please advise     Routed to provider    Does the patient meet one of the following criteria for ADS visit consideration? No  -------  1. DIARRHEA SEVERITY: \"How bad is the diarrhea?\" \"How many more stools have you had in the past 24 hours than normal?\"       moderate  2. ONSET: \"When did the diarrhea begin?\"       Few days ago  3. STOOL DESCRIPTION:  \"How loose or watery is the diarrhea?\" \"What is the stool color?\" \"Is there any blood or mucous in the stool?\"      Was just like water coming out, then wasn't so watery. More oatmeal like now. No blood in the stool.  4. VOMITING: \"Are you also vomiting?\" If Yes, ask: \"How many times in the past 24 hours?\"       denies  5. ABDOMEN PAIN: \"Are you having any abdomen pain?\" If Yes, ask: \"What does it feel like?\" (e.g., crampy, dull, intermittent, constant)       denies  6. ABDOMEN PAIN SEVERITY: If present, ask: \"How bad is the pain?\"  (e.g., Scale 1-10; mild, moderate, or severe)      denies  7. ORAL INTAKE: If vomiting, \"Have you been able to drink liquids?\" \"How much liquids have you had in the past 24 hours?\"      Confirms drinking water  8. HYDRATION: \"Any signs of dehydration?\" (e.g., dry mouth [not just dry lips], too " "weak to stand, dizziness, new weight loss) \"When did you last urinate?\"      denies  9. EXPOSURE: \"Have you traveled to a foreign country recently?\" \"Have you been exposed to anyone with diarrhea?\" \"Could you have eaten any food that was spoiled?\"      denies  10. ANTIBIOTIC USE: \"Are you taking antibiotics now or have you taken antibiotics in the past 2 months?\"        denies  11. OTHER SYMPTOMS: \"Do you have any other symptoms?\" (e.g., fever, blood in stool)        Denies      Reason for Disposition   MODERATE diarrhea (e.g., 4-6 times / day more than normal) and present > 48 hours (2 days)    Additional Information   Negative: Shock suspected (e.g., cold/pale/clammy skin, too weak to stand, low BP, rapid pulse)   Negative: Difficult to awaken or acting confused (e.g., disoriented, slurred speech)   Negative: Sounds like a life-threatening emergency to the triager   Negative: Vomiting also present and worse than the diarrhea   Negative: Blood in stool and without diarrhea   Negative: Diarrhea begins while taking an antibiotic by mouth (oral antibiotic)   Negative: SEVERE abdominal pain (e.g., excruciating) and present > 1 hour   Negative: SEVERE abdominal pain and age > 60 years   Negative: Bloody, black, or tarry bowel movements  (Exception: Chronic-unchanged black-grey bowel movements and is taking iron pills or Pepto-Bismol.)   Negative: SEVERE diarrhea (e.g., 7 or more times / day more than normal) and age > 60 years   Negative: Constant abdominal pain lasting > 2 hours   Negative: Drinking very little and dehydration suspected (e.g., no urine > 12 hours, very dry mouth, very lightheaded)   Negative: Patient sounds very sick or weak to the triager   Negative: SEVERE diarrhea (e.g., 7 or more times / day more than normal) and present > 24 hours (1 day)    Protocols used: Diarrhea-A-OH    Soraya STRAUSS,  Triage RN  [facility]    "

## 2025-04-22 ENCOUNTER — OFFICE VISIT (OUTPATIENT)
Dept: FAMILY MEDICINE | Facility: CLINIC | Age: OVER 89
End: 2025-04-22
Payer: MEDICARE

## 2025-04-22 VITALS — SYSTOLIC BLOOD PRESSURE: 119 MMHG | DIASTOLIC BLOOD PRESSURE: 69 MMHG

## 2025-04-22 DIAGNOSIS — N18.32 STAGE 3B CHRONIC KIDNEY DISEASE (H): ICD-10-CM

## 2025-04-22 DIAGNOSIS — Z23 NEED FOR VACCINATION: ICD-10-CM

## 2025-04-22 DIAGNOSIS — N18.4 CRF (CHRONIC RENAL FAILURE), STAGE 4 (SEVERE) (H): ICD-10-CM

## 2025-04-22 DIAGNOSIS — E78.5 HYPERLIPIDEMIA LDL GOAL <70: ICD-10-CM

## 2025-04-22 DIAGNOSIS — I10 PRIMARY HYPERTENSION: ICD-10-CM

## 2025-04-22 DIAGNOSIS — J84.112 IPF (IDIOPATHIC PULMONARY FIBROSIS) (H): ICD-10-CM

## 2025-04-22 DIAGNOSIS — Z13.1 SCREENING FOR DIABETES MELLITUS: ICD-10-CM

## 2025-04-22 DIAGNOSIS — Z00.00 ENCOUNTER FOR MEDICARE ANNUAL WELLNESS EXAM: Primary | ICD-10-CM

## 2025-04-22 DIAGNOSIS — I48.3 TYPICAL ATRIAL FLUTTER (H): ICD-10-CM

## 2025-04-22 DIAGNOSIS — N17.9 ACUTE KIDNEY INJURY: ICD-10-CM

## 2025-04-22 DIAGNOSIS — R39.9 LOWER URINARY TRACT SYMPTOMS (LUTS): ICD-10-CM

## 2025-04-22 DIAGNOSIS — M1A.09X0 IDIOPATHIC CHRONIC GOUT OF MULTIPLE SITES WITHOUT TOPHUS: ICD-10-CM

## 2025-04-22 DIAGNOSIS — I71.43 INFRARENAL ABDOMINAL AORTIC ANEURYSM (AAA) WITHOUT RUPTURE: ICD-10-CM

## 2025-04-22 PROBLEM — G89.18 ACUTE POST-OPERATIVE PAIN: Status: RESOLVED | Noted: 2024-08-13 | Resolved: 2025-04-22

## 2025-04-22 PROBLEM — Z98.890 STATUS POST CORONARY ANGIOGRAM: Status: RESOLVED | Noted: 2023-01-13 | Resolved: 2025-04-22

## 2025-04-22 PROBLEM — Z98.890 STATUS POST EXPLORATORY LAPAROTOMY: Status: RESOLVED | Noted: 2020-06-30 | Resolved: 2025-04-22

## 2025-04-22 PROBLEM — M10.9 GOUT: Status: RESOLVED | Noted: 2020-06-21 | Resolved: 2025-04-22

## 2025-04-22 PROBLEM — I35.0 SEVERE AORTIC STENOSIS: Status: RESOLVED | Noted: 2019-05-22 | Resolved: 2025-04-22

## 2025-04-22 PROBLEM — I50.32 CHRONIC DIASTOLIC HEART FAILURE (H): Status: RESOLVED | Noted: 2020-06-21 | Resolved: 2025-04-22

## 2025-04-22 PROBLEM — I35.0 AORTIC STENOSIS, SEVERE: Status: RESOLVED | Noted: 2023-04-11 | Resolved: 2025-04-22

## 2025-04-22 LAB
ERYTHROCYTE [DISTWIDTH] IN BLOOD BY AUTOMATED COUNT: 15.3 % (ref 10–15)
EST. AVERAGE GLUCOSE BLD GHB EST-MCNC: 111 MG/DL
HBA1C MFR BLD: 5.5 % (ref 0–5.6)
HCT VFR BLD AUTO: 38 % (ref 40–53)
HGB BLD-MCNC: 12.2 G/DL (ref 13.3–17.7)
MCH RBC QN AUTO: 31.1 PG (ref 26.5–33)
MCHC RBC AUTO-ENTMCNC: 32.1 G/DL (ref 31.5–36.5)
MCV RBC AUTO: 97 FL (ref 78–100)
PLATELET # BLD AUTO: 201 10E3/UL (ref 150–450)
RBC # BLD AUTO: 3.92 10E6/UL (ref 4.4–5.9)
WBC # BLD AUTO: 6.6 10E3/UL (ref 4–11)

## 2025-04-22 PROCEDURE — 36415 COLL VENOUS BLD VENIPUNCTURE: CPT | Performed by: INTERNAL MEDICINE

## 2025-04-22 PROCEDURE — 80048 BASIC METABOLIC PNL TOTAL CA: CPT | Performed by: INTERNAL MEDICINE

## 2025-04-22 PROCEDURE — 80061 LIPID PANEL: CPT | Performed by: INTERNAL MEDICINE

## 2025-04-22 PROCEDURE — 99214 OFFICE O/P EST MOD 30 MIN: CPT | Mod: 25 | Performed by: INTERNAL MEDICINE

## 2025-04-22 PROCEDURE — 84550 ASSAY OF BLOOD/URIC ACID: CPT | Performed by: INTERNAL MEDICINE

## 2025-04-22 PROCEDURE — 3078F DIAST BP <80 MM HG: CPT | Performed by: INTERNAL MEDICINE

## 2025-04-22 PROCEDURE — G0439 PPPS, SUBSEQ VISIT: HCPCS | Performed by: INTERNAL MEDICINE

## 2025-04-22 PROCEDURE — 3074F SYST BP LT 130 MM HG: CPT | Performed by: INTERNAL MEDICINE

## 2025-04-22 PROCEDURE — 90480 ADMN SARSCOV2 VAC 1/ONLY CMP: CPT | Performed by: INTERNAL MEDICINE

## 2025-04-22 PROCEDURE — 91320 SARSCV2 VAC 30MCG TRS-SUC IM: CPT | Performed by: INTERNAL MEDICINE

## 2025-04-22 PROCEDURE — 85027 COMPLETE CBC AUTOMATED: CPT | Performed by: INTERNAL MEDICINE

## 2025-04-22 PROCEDURE — 83036 HEMOGLOBIN GLYCOSYLATED A1C: CPT | Performed by: INTERNAL MEDICINE

## 2025-04-22 SDOH — HEALTH STABILITY: PHYSICAL HEALTH: ON AVERAGE, HOW MANY DAYS PER WEEK DO YOU ENGAGE IN MODERATE TO STRENUOUS EXERCISE (LIKE A BRISK WALK)?: 0 DAYS

## 2025-04-22 ASSESSMENT — SOCIAL DETERMINANTS OF HEALTH (SDOH): HOW OFTEN DO YOU GET TOGETHER WITH FRIENDS OR RELATIVES?: MORE THAN THREE TIMES A WEEK

## 2025-04-22 NOTE — PATIENT INSTRUCTIONS
See Dr. Reed from vascular surgery in June.         Patient Education   Preventive Care Advice   This is general advice given by our system to help you stay healthy. However, your care team may have specific advice just for you. Please talk to your care team about your preventive care needs.  Nutrition  Eat 5 or more servings of fruits and vegetables each day.  Try wheat bread, brown rice and whole grain pasta (instead of white bread, rice, and pasta).  Get enough calcium and vitamin D. Check the label on foods and aim for 100% of the RDA (recommended daily allowance).  Lifestyle  Exercise at least 150 minutes each week  (30 minutes a day, 5 days a week).  Do muscle strengthening activities 2 days a week. These help control your weight and prevent disease.  No smoking.  Wear sunscreen to prevent skin cancer.  Have a dental exam and cleaning every 6 months.  Yearly exams  See your health care team every year to talk about:  Any changes in your health.  Any medicines your care team has prescribed.  Preventive care, family planning, and ways to prevent chronic diseases.  Shots (vaccines)   HPV shots (up to age 26), if you've never had them before.  Hepatitis B shots (up to age 59), if you've never had them before.  COVID-19 shot: Get this shot when it's due.  Flu shot: Get a flu shot every year.  Tetanus shot: Get a tetanus shot every 10 years.  Pneumococcal, hepatitis A, and RSV shots: Ask your care team if you need these based on your risk.  Shingles shot (for age 50 and up)  General health tests  Diabetes screening:  Starting at age 35, Get screened for diabetes at least every 3 years.  If you are younger than age 35, ask your care team if you should be screened for diabetes.  Cholesterol test: At age 39, start having a cholesterol test every 5 years, or more often if advised.  Bone density scan (DEXA): At age 50, ask your care team if you should have this scan for osteoporosis (brittle bones).  Hepatitis C: Get  tested at least once in your life.  STIs (sexually transmitted infections)  Before age 24: Ask your care team if you should be screened for STIs.  After age 24: Get screened for STIs if you're at risk. You are at risk for STIs (including HIV) if:  You are sexually active with more than one person.  You don't use condoms every time.  You or a partner was diagnosed with a sexually transmitted infection.  If you are at risk for HIV, ask about PrEP medicine to prevent HIV.  Get tested for HIV at least once in your life, whether you are at risk for HIV or not.  Cancer screening tests  Cervical cancer screening: If you have a cervix, begin getting regular cervical cancer screening tests starting at age 21.  Breast cancer scan (mammogram): If you've ever had breasts, begin having regular mammograms starting at age 40. This is a scan to check for breast cancer.  Colon cancer screening: It is important to start screening for colon cancer at age 45.  Have a colonoscopy test every 10 years (or more often if you're at risk) Or, ask your provider about stool tests like a FIT test every year or Cologuard test every 3 years.  To learn more about your testing options, visit:   .  For help making a decision, visit:   https://bit.ly/av67021.  Prostate cancer screening test: If you have a prostate, ask your care team if a prostate cancer screening test (PSA) at age 55 is right for you.  Lung cancer screening: If you are a current or former smoker ages 50 to 80, ask your care team if ongoing lung cancer screenings are right for you.  For informational purposes only. Not to replace the advice of your health care provider. Copyright   2023 [facility]. All rights reserved. Clinically reviewed by the [facility]. [employer] [id number] - REV 01/24.  Learning About Activities of Daily Living  What are activities of daily living?     Activities of daily living (ADLs) are the basic self-care tasks you do  every day. These include eating, bathing, dressing, and moving around.  As you age, and if you have health problems, you may find that it's harder to do some of these tasks. If so, your doctor can suggest ideas that may help.  To measure what kind of help you may need, your doctor will ask how well you are able to do ADLs. Let your doctor know if there are any tasks that you are having trouble doing. This is an important first step to getting help. And when you have the help you need, you can stay as independent as possible.  How will a doctor assess your ADLs?  Asking about ADLs is part of a routine health checkup your doctor will likely do as you age. Your health check might be done in a doctor's office, in your home, or at a hospital. The goal is to find out if you are having any problems that could make it hard to care for yourself or that make it unsafe for you to be on your own.  To measure your ADLs, your doctor will ask how hard it is for you to do routine tasks. Your doctor may also want to know if you have changed the way you do a task because of a health problem. Your doctor may watch how you:  Walk back and forth.  Keep your balance while you stand or walk.  Move from sitting to standing or from a bed to a chair.  Button or unbutton a shirt or sweater.  Remove and put on your shoes.  It's common to feel a little worried or anxious if you find you can't do all the things you used to be able to do. Talking with your doctor about ADLs is a way to make sure you're as safe as possible and able to care for yourself as well as you can. You may want to bring a caregiver, friend, or family member to your checkup. They can help you talk to your doctor.  Follow-up care is a key part of your treatment and safety. Be sure to make and go to all appointments, and call your doctor if you are having problems. It's also a good idea to know your test results and keep a list of the medicines you take.  Current as of:  October 24, 2024  Content Version: 14.4    9312-0650 [employer].   Care instructions adapted under license by your healthcare professional. If you have questions about a medical condition or this instruction, always ask your healthcare professional. [employer] disclaims any warranty or liability for your use of this information.    Preventing Falls: Care Instructions  Injuries and health problems such as trouble walking or poor eyesight can increase your risk of falling. So can some medicines. But there are things you can do to help prevent falls. You can exercise to get stronger. You can also arrange your home to make it safer.    Talk to your doctor about the medicines you take. Ask if any of them increase the risk of falls and whether they can be changed or stopped.   Try to exercise regularly. It can help improve your strength and balance. This can help lower your risk of falling.         Practice fall safety and prevention.   Wear low-heeled shoes that fit well and give your feet good support. Talk to your doctor if you have foot problems that make this hard.  Carry a cellphone or wear a medical alert device that you can use to call for help.  Use stepladders instead of chairs to reach high objects. Don't climb if you're at risk for falls. Ask for help, if needed.  Wear the correct eyeglasses, if you need them.        Make your home safer.   Remove rugs, cords, clutter, and furniture from walkways.  Keep your house well lit. Use night-lights in hallways and bathrooms.  Install and use sturdy handrails on stairways.  Wear nonskid footwear, even inside. Don't walk barefoot or in socks without shoes.        Be safe outside.   Use handrails, curb cuts, and ramps whenever possible.  Keep your hands free by using a shoulder bag or backpack.  Try to walk in well-lit areas. Watch out for uneven ground, changes in pavement, and debris.  Be careful in the winter. Walk on the grass or gravel when  "sidewalks are slippery. Use de-icer on steps and walkways. Add non-slip devices to shoes.    Put grab bars and nonskid mats in your shower or tub and near the toilet. Try to use a shower chair or bath bench when bathing.   Get into a tub or shower by putting in your weaker leg first. Get out with your strong side first. Have a phone or medical alert device in the bathroom with you.   Where can you learn more?  Go to https://www."[employer].net/patiented  Enter G117 in the search box to learn more about \"Preventing Falls: Care Instructions.\"  Current as of: July 31, 2024  Content Version: 14.4 2024-2025 [employer].   Care instructions adapted under license by your healthcare professional. If you have questions about a medical condition or this instruction, always ask your healthcare professional. [employer] disclaims any warranty or liability for your use of this information.    Hearing Loss: Care Instructions  Overview     Hearing loss is a sudden or slow decrease in how well you hear. It can range from slight to profound. Permanent hearing loss can occur with aging. It also can happen when you are exposed long-term to loud noise. Examples include listening to loud music, riding motorcycles, or being around other loud machines.  Hearing loss can affect your work and home life. It can make you feel lonely or depressed. You may feel that you have lost your independence. But hearing aids and other devices can help you hear better and feel connected to others.  Follow-up care is a key part of your treatment and safety. Be sure to make and go to all appointments, and call your doctor if you are having problems. It's also a good idea to know your test results and keep a list of the medicines you take.  How can you care for yourself at home?  Avoid loud noises whenever possible. This helps keep your hearing from getting worse.  Always wear hearing protection around loud noises.  Wear a hearing aid " "as directed.  A professional can help you pick a hearing aid that will work best for you.  You can also get hearing aids over the counter for mild to moderate hearing loss.  Have hearing tests as your doctor suggests. They can show whether your hearing has changed. Your hearing aid may need to be adjusted.  Use other devices as needed. These may include:  Telephone amplifiers and hearing aids that can connect to a television, stereo, radio, or microphone.  Devices that use lights or vibrations. These alert you to the doorbell, a ringing telephone, or a baby monitor.  Television closed-captioning. This shows the words at the bottom of the screen. Most new TVs can do this.  TTY (text telephone). This lets you type messages back and forth on the telephone instead of talking or listening. These devices are also called TDD. When messages are typed on the keyboard, they are sent over the phone line to a receiving TTY. The message is shown on a monitor.  Use text messaging, social media, and email if it is hard for you to communicate by telephone.  Try to learn a listening technique called speechreading. It is not lipreading. You pay attention to people's gestures, expressions, posture, and tone of voice. These clues can help you understand what a person is saying. Face the person you are talking to, and have them face you. Make sure the lighting is good. You need to see the other person's face clearly.  Think about counseling if you need help to adjust to your hearing loss.  When should you call for help?  Watch closely for changes in your health, and be sure to contact your doctor if:    You think your hearing is getting worse.     You have new symptoms, such as dizziness or nausea.   Where can you learn more?  Go to https://www.healthwise.net/patiented  Enter R798 in the search box to learn more about \"Hearing Loss: Care Instructions.\"  Current as of: October 27, 2024  Content Version: 14.4 2024-2025 [employer] " [employer].   Care instructions adapted under license by your healthcare professional. If you have questions about a medical condition or this instruction, always ask your healthcare professional. [facility] [employer] disclaims any warranty or liability for your use of this information.

## 2025-04-22 NOTE — PROGRESS NOTES
Preventive Care Visit  [facility]  Abdoulaye Remdan MD, Internal Medicine  Apr 22, 2025      Assessment & Plan     Encounter for Medicare annual wellness exam      CRF (chronic renal failure), stage 4 (severe) (H)  Check renal labs, blood pressure at goal, ideally would like to have him on an ARB or ACE although today does not seem like he has been much blood pressure room for that, consider that in the future  - Basic metabolic panel; Future  - CBC with platelets; Future  - CBC with platelets    Typical atrial flutter (H)  Heart rate when I examined him was about 80 bpm, on Eliquis for stroke prophylaxis    IPF (idiopathic pulmonary fibrosis) (H)  Discussed this incidental finding with patient and his daughter in detail.  Discussed that the standard approach would be to see a pulmonary medicine physician after pulmonary function testing to discuss potential treatments and prognosis.  However, in the absence of symptoms, they would like to monitor clinically for now and not see new doctors since he has multiple specialists.    Primary hypertension  Blood pressure is under good control    Hyperlipidemia LDL goal <70  On statin therapy, recheck lipids  - Lipid panel reflex to direct LDL Fasting; Future  - Lipid panel reflex to direct LDL Fasting    Infrarenal abdominal aortic aneurysm (AAA) without rupture  Recommended follow-up surveillance imaging of known aortic aneurysm.  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  Checklist reviewing preventive services available has been given to the patient.  Reviewed patient's diet, addressing concerns and/or questions.   The patient was instructed to see the dentist every 6 months.   Updated plan of care.  Patient reported difficulty with activities of daily living were addressed today.The patient was provided with written information regarding signs of hearing loss.       Follow-up    Follow-up Visit   Expected date:  Apr 29, 2026 (Approximate)      Follow Up Appointment Details:     Follow-up with whom?: PCP    Follow-Up for what?: Medicare Wellness    Welcome or Annual?: Annual Wellness    How?: In Person                 Neal Murray is a 89 year old, presenting for the following:  Physical        4/22/2025     3:23 PM   Additional Questions   Roomed by Jennifer   Accompanied by luh Carlton       Overall he feels well and has no new specific complaints today.  He and his daughter who lives with him have had a diarrheal illness for the past 3 days although he feels much better today.  The Imodium that was recommended over the phone last night seem to help.    He was noted to have incidental pulmonary fibrosis on a CT scan obtained in the emergency department for trauma evaluation.  He denies new dyspnea or cough.    Advance Care Planning    Discussed advance care planning with patient; however, patient declined at this time.        4/22/2025   General Health   How would you rate your overall physical health? Good   Feel stress (tense, anxious, or unable to sleep) Not at all         4/22/2025   Nutrition   Diet: Regular (no restrictions)         4/22/2025   Exercise   Days per week of moderate/strenous exercise 0 days   (!) EXERCISE CONCERN      4/22/2025   Social Factors   Frequency of gathering with friends or relatives More than  three times a week   Worry food won't last until get money to buy more No   Food not last or not have enough money for food? No   Do you have housing? (Housing is defined as stable permanent housing and does not include staying ouside in a car, in a tent, in an abandoned building, in an overnight shelter, or couch-surfing.) Yes   Are you worried about losing your housing? No   Lack of transportation? No   Unable to get utilities (heat,electricity)? No         4/22/2025   Fall Risk   Fallen 2 or more times in the past year? No   Trouble with walking or balance? Yes   Gait Speed Test (Document in seconds) 9.03   Gait Speed Test Interpretation Greater than 5.01 seconds - ABNORMAL          4/22/2025   Activities of Daily Living- Home Safety   Needs help with the following daily activites Shopping    Housework    Laundry   Safety concerns in the home None of the above       Multiple values from one day are sorted in reverse-chronological order         4/22/2025   Dental   Dentist two times every year? (!) NO         4/22/2025   Hearing Screening   Hearing concerns? (!) I NEED TO ASK PEOPLE TO SPEAK UP OR REPEAT THEMSELVES.    (!) IT'S HARD TO FOLLOW A CONVERSATION IN A NOISY RESTAURANT OR CROWDED ROOM.    (!) TROUBLE UNDERSTANDING SPEECH ON THE TELEPHONE       Multiple values from one day are sorted in reverse-chronological order         4/22/2025   Driving Risk Screening   Patient/family members have concerns about driving No         4/22/2025   General Alertness/Fatigue Screening   Have you been more tired than usual lately? No         4/22/2025   Urinary Incontinence Screening   Bothered by leaking urine in past 6 months No         Today's PHQ-2 Score:       4/22/2025     3:09 PM   PHQ-2 ( 1999 [employer])   Q1: Little interest or pleasure in doing things 0   Q2: Feeling down, depressed or hopeless 0   PHQ-2 Score 0    Q1: Little interest or pleasure in doing things Not at all   Q2: Feeling down, depressed or hopeless Not  at all   PHQ-2 Score 0       Patient-reported           2025   Substance Use   Alcohol more than 3/day or more than 7/wk No   Do you have a current opioid prescription? No   How severe/bad is pain from 1 to 10? 3/10   Do you use any other substances recreationally? No     Social History     Tobacco Use    Smoking status: Former     Types: Cigars     Quit date: 1980     Years since quittin.3    Smokeless tobacco: Never   Vaping Use    Vaping status: Never Used   Substance Use Topics    Alcohol use: Yes     Comment: rare    Drug use: No             Reviewed and updated as needed this visit by Provider      Problems               Past Medical History:   Diagnosis Date    Arthritis     rhuematoid    Coronary artery disease     triple bypass     CRF (chronic renal failure)     Gastro-oesophageal reflux disease     Gout     Hyperlipidemia     Hypertension     Nocturia      Past Surgical History:   Procedure Laterality Date    CARDIAC SURGERY      CHOLECYSTECTOMY      COLONOSCOPY      CV CORONARY ANGIOGRAM N/A 2023    Procedure: Coronary Angiogram;  Surgeon: Sujata Ramires MD;  Location: [facility] CARDIAC CATH LAB    CV PCI N/A 2023    Procedure: Percutaneous Coronary Intervention;  Surgeon: Sujata Ramires MD;  Location: [facility] CARDIAC CATH LAB    CV TRANSCATHETER AORTIC VALVE REPLACEMENT-FEMORAL APPROACH N/A 2023    Procedure: Transcatheter Aortic Valve Replacement-Femoral Approach;  Surgeon: Sujata Ramires MD;  Location: [facility] CARDIAC CATH LAB    ENDARTERECTOMY CAROTID Right 2024    Procedure: REDO RIGHT CAROTID ENDARTERECTOMY with bovine patch and ELECTROENCEPHALOGRAM;  Surgeon: Brian Zelaya MD;  Location:  OR    LAPAROTOMY EXPLORATORY N/A 2020    Procedure: EXPLORATORY LAPAROTOMY, BOWEL RESECTION;  Surgeon: Bull Rachel MD;  Location:  OR    LAPAROTOMY, LYSIS ADHESIONS, COMBINED N/A 2020    Procedure: EXPLORATORY LAPAROTOMY WITH LYSIS OF  ADHESIONS;  Surgeon: Jose Reed MD;  Location:  OR    ORTHOPEDIC SURGERY      PHACOEMULSIFICATION CLEAR CORNEA WITH TORIC INTRAOCULAR LENS IMPLANT  1/30/2012    Procedure:PHACOEMULSIFICATION CLEAR CORNEA WITH TORIC INTRAOCULAR LENS IMPLANT; LEFT PHACOEMULSIFICATION CLEAR CORNEA WITH DELUXE  TORIC INTRAOCULAR LENS IMPLANT ; Surgeon:BRANDO HIGHTOWER; [facility]: [facility]    PHACOEMULSIFICATION CLEAR CORNEA WITH TORIC INTRAOCULAR LENS IMPLANT  2/13/2012    Procedure:PHACOEMULSIFICATION CLEAR CORNEA WITH TORIC INTRAOCULAR LENS IMPLANT; RIGHT PHACOEMULSIFICATION CLEAR CORNEA WITH TORIC INTRAOCULAR LENS IMPLANT ; Surgeon:BRANDO HIGHTOWER; Location: [facility]    VASCULAR SURGERY       BP Readings from Last 3 Encounters:   04/22/25 119/69   12/17/24 (!) 171/70   12/11/24 (!) 156/66    Wt Readings from Last 3 Encounters:   12/11/24 63.5 kg (140 lb)   10/25/24 64.1 kg (141 lb 6.4 oz)   09/23/24 62.8 kg (138 lb 8 oz)                  Patient Active Problem List   Diagnosis    Hyperlipidemia LDL goal <70    Chronic rhinitis    Idiopathic chronic gout of multiple sites without tophus    Abdominal aortic aneurysm (AAA) without rupture    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/21/20    CRF (chronic renal failure), stage 4 (severe) (H)    CAD -- S/P CABG x 3 in 2001    Primary hypertension    Chronic diastolic CHF -- Grade 1-2 Dysfunction Echo 2016    Carotid stenosis, asymptomatic, bilateral    Osteoporosis with current pathological fracture with routine healing, unspecified osteoporosis type, subsequent encounter    Atrial flutter (H)    Anticoagulated    Slow transit constipation    Adjustment insomnia    Need for antibiotic prophylaxis for surgical procedure    History of transcatheter aortic valve replacement (TAVR)    IPF (idiopathic pulmonary fibrosis) (H)     Past Surgical History:   Procedure Laterality Date    CARDIAC SURGERY      CHOLECYSTECTOMY      COLONOSCOPY      CV CORONARY ANGIOGRAM N/A  2023    Procedure: Coronary Angiogram;  Surgeon: Sujata Ramires MD;  [facility]    CV PCI N/A 2023    Procedure: Percutaneous Coronary Intervention;  Surgeon: Sujata Ramires MD;  [facility]    CV TRANSCATHETER AORTIC VALVE REPLACEMENT-FEMORAL APPROACH N/A 2023    Procedure: Transcatheter Aortic Valve Replacement-Femoral Approach;  Surgeon: Sujata Ramires MD;  [facility]  [facility]    ENDARTERECTOMY CAROTID Right 2024    Procedure: REDO RIGHT CAROTID ENDARTERECTOMY with bovine patch and ELECTROENCEPHALOGRAM;  Surgeon: Brian Zelaya MD;  Location:  OR    LAPAROTOMY EXPLORATORY N/A 2020    Procedure: EXPLORATORY LAPAROTOMY, BOWEL RESECTION;  Surgeon: Bull Rachel MD;  Location:  OR    LAPAROTOMY, LYSIS ADHESIONS, COMBINED N/A 2020    Procedure: EXPLORATORY LAPAROTOMY WITH LYSIS OF ADHESIONS;  Surgeon: Jose Reed MD;  Location:  OR    ORTHOPEDIC SURGERY      PHACOEMULSIFICATION CLEAR CORNEA WITH TORIC INTRAOCULAR LENS IMPLANT  2012    Procedure:PHACOEMULSIFICATION CLEAR CORNEA WITH TORIC INTRAOCULAR LENS IMPLANT; LEFT PHACOEMULSIFICATION CLEAR CORNEA WITH DELUXE  TORIC INTRAOCULAR LENS IMPLANT ; Surgeon:BRANDO HIGHTOWER; Location:[facility]    PHACOEMULSIFICATION CLEAR CORNEA WITH TORIC INTRAOCULAR LENS IMPLANT  2012    Procedure:PHACOEMULSIFICATION CLEAR CORNEA WITH TORIC INTRAOCULAR LENS IMPLANT; RIGHT PHACOEMULSIFICATION CLEAR CORNEA WITH TORIC INTRAOCULAR LENS IMPLANT ; Surgeon:BRANDO HIGHTOWER; Location:[facility]    VASCULAR SURGERY         Social History     Tobacco Use    Smoking status: Former     Types: Cigars     Quit date: 1980     Years since quittin.3    Smokeless tobacco: Never   Substance Use Topics    Alcohol use: Yes     Comment: rare     Family History   Problem Relation Age of Onset    Myocardial Infarction Mother     Rheumatic fever Father     Cerebrovascular  Disease Brother          Current Outpatient Medications   Medication Sig Dispense Refill    acetaminophen (TYLENOL) 325 MG tablet Take 2 tablets (650 mg) by mouth every 6 hours as needed for mild pain or other (and adjunct with moderate or severe pain or per patient request)      allopurinol (ZYLOPRIM) 100 MG tablet TAKE 2 TABLETS BY MOUTH DAILY 180 tablet 3    amLODIPine (NORVASC) 5 MG tablet TAKE 1 TABLET BY MOUTH EVERY DAY 90 tablet 0    atorvastatin (LIPITOR) 20 MG tablet TAKE 1 TABLET BY MOUTH ONCE  DAILY 90 tablet 3    Cholecalciferol (VITAMIN D3) 25 MCG (1000 UT) CAPS Take 1 capsule by mouth daily OTC dose unknown      dorzolamide (TRUSOPT) 2 % ophthalmic solution Place 1 drop into both eyes 2 times daily      ELIQUIS ANTICOAGULANT 2.5 MG tablet TAKE 1 TABLET BY MOUTH TWICE  DAILY (RESTART TAKING IN THE  MORNING 8/16) 180 tablet 3    fenofibrate (TRIGLIDE/LOFIBRA) 160 MG tablet TAKE 1 TABLET BY MOUTH DAILY 90 tablet 3    ferrous gluconate (FERGON) 324 (38 Fe) MG tablet Take 1 tablet (324 mg) by mouth daily 90 tablet 3    Multiple Vitamins-Minerals (PRESERVISION AREDS 2) CAPS Take 1 capsule by mouth 2 times daily      multivitamin w/minerals (MULTI-VITAMIN) tablet Take 1 tablet by mouth daily      sodium bicarbonate 650 MG tablet Take 1 tablet (650 mg) by mouth 2 times daily 180 tablet 3    spironolactone (ALDACTONE) 25 MG tablet TAKE 1 TABLET BY MOUTH  DAILY 90 tablet 3    triamcinolone (KENALOG) 0.1 % external cream Apply topically 2 times daily 453.6 g 11     Current providers sharing in care for this patient include:  Patient Care Team:  Abdoulaye Redman MD as PCP - General (Internal Medicine)  Abdoulaye Redman MD as Assigned PCP  Afsaneh Thomas MD as Assigned Nephrology Provider  Darrick Desai DO as Assigned Musculoskeletal Provider  Brian Zelaya MD as Assigned Heart and Vascular Surgical Provider  Mechelle Coker NP as Assigned Heart and Vascular Provider    The following health maintenance  "items are reviewed in Epic and correct as of today:  Health Maintenance   Topic Date Due    URIC ACID  10/11/2023    CBC  08/12/2025    MEDICARE ANNUAL WELLNESS VISIT  04/22/2026    ANNUAL REVIEW OF HM ORDERS  04/22/2026    FALL RISK ASSESSMENT  04/22/2026    DTAP/TDAP/TD IMMUNIZATION (2 - Td or Tdap) 11/01/2027    ADVANCE CARE PLANNING  04/22/2030    HF ACTION PLAN  04/22/2123    TSH W/FREE T4 REFLEX  Completed    PHQ-2 (once per calendar year)  Completed    INFLUENZA VACCINE  Completed    Pneumococcal Vaccine: 50+ Years  Completed    URINALYSIS  Completed    ALK PHOS  Completed    ZOSTER IMMUNIZATION  Completed    RSV VACCINE  Completed    COVID-19 Vaccine  Completed    HPV IMMUNIZATION  Aged Out    MENINGITIS IMMUNIZATION  Aged Out    ALT  Discontinued    BMP  Discontinued    LIPID  Discontinued    MICROALBUMIN  Discontinued    PARATHYROID  Discontinued    PHOSPHORUS  Discontinued    URINE DRUG SCREEN  Discontinued    HEMOGLOBIN  Discontinued       A 10 organ systems ROS is negative other than any pertinent positives or negatives previously stated.      Objective    Exam  /69    Estimated body mass index is 20.67 kg/m  as calculated from the following:    Height as of 10/25/24: 1.753 m (5' 9\").    Weight as of 12/11/24: 63.5 kg (140 lb).    Physical Exam  GENERAL: alert and no distress  EYES: Eyes grossly normal to inspection, PERRL and conjunctivae and sclerae normal  HENT: ear canals and TM's normal, nose and mouth without ulcers or lesions  NECK: Carotid endarterectomy scars noted  RESP: lungs clear to auscultation - no rales, rhonchi or wheezes  CV: The heart has an irregularly irregular rhythm with a normal rate  ABDOMEN: soft, nontender, no hepatosplenomegaly, no masses and bowel sounds normal  RECTAL: normal sphincter tone, no rectal masses, prostate normal size, smooth, nontender without nodules or masses  MS: no gross musculoskeletal defects noted, no edema  SKIN: no suspicious lesions or " soledad  NEURO: Normal strength and tone, mentation intact and speech normal  PSYCH: mentation appears normal, affect normal/bright         4/22/2025   Mini Cog   Clock Draw Score 2 Normal   3 Item Recall 2 objects recalled   Mini Cog Total Score 4              Signed Electronically by: Abdoulaye Redman MD

## 2025-04-23 LAB
ANION GAP SERPL CALCULATED.3IONS-SCNC: 12 MMOL/L (ref 7–15)
BUN SERPL-MCNC: 34.1 MG/DL (ref 8–23)
CALCIUM SERPL-MCNC: 9.4 MG/DL (ref 8.8–10.4)
CHLORIDE SERPL-SCNC: 104 MMOL/L (ref 98–107)
CHOLEST SERPL-MCNC: 92 MG/DL
CREAT SERPL-MCNC: 2.04 MG/DL (ref 0.67–1.17)
EGFRCR SERPLBLD CKD-EPI 2021: 30 ML/MIN/1.73M2
FASTING STATUS PATIENT QL REPORTED: NO
FASTING STATUS PATIENT QL REPORTED: NO
GLUCOSE SERPL-MCNC: 109 MG/DL (ref 70–99)
HCO3 SERPL-SCNC: 22 MMOL/L (ref 22–29)
HDLC SERPL-MCNC: 32 MG/DL
LDLC SERPL CALC-MCNC: 43 MG/DL
NONHDLC SERPL-MCNC: 60 MG/DL
POTASSIUM SERPL-SCNC: 4.5 MMOL/L (ref 3.4–5.3)
SODIUM SERPL-SCNC: 138 MMOL/L (ref 135–145)
TRIGL SERPL-MCNC: 85 MG/DL
URATE SERPL-MCNC: 4.9 MG/DL (ref 3.4–7)

## 2025-04-23 NOTE — RESULT ENCOUNTER NOTE
The following letter pertains to your most recent diagnostic tests:    -Your cholesterol panel looks at goal for you.     -The uric acid level gout test is OK for you.    -The metabolic panel shows stable kidney function for you.      -The blood counts show a stable hemoglobin and other blood counts.    -Your hemoglobin A1c test which averages your blood sugars over the last 3 months returned normal.  No evidence for diabetes or prediabetes.             Bottom line:  Overall, the labs look stable for you.          Sincerely,    Dr. Redman

## 2025-05-01 DIAGNOSIS — I10 BENIGN ESSENTIAL HYPERTENSION: ICD-10-CM

## 2025-05-01 RX ORDER — AMLODIPINE BESYLATE 5 MG/1
5 TABLET ORAL DAILY
Qty: 90 TABLET | Refills: 0 | Status: SHIPPED | OUTPATIENT
Start: 2025-05-01

## 2025-06-05 ENCOUNTER — RESULTS FOLLOW-UP (OUTPATIENT)
Dept: FAMILY MEDICINE | Facility: CLINIC | Age: OVER 89
End: 2025-06-05

## 2025-06-05 ENCOUNTER — ANCILLARY PROCEDURE (OUTPATIENT)
Dept: ULTRASOUND IMAGING | Facility: CLINIC | Age: OVER 89
End: 2025-06-05
Attending: INTERNAL MEDICINE
Payer: MEDICARE

## 2025-06-05 DIAGNOSIS — I71.43 INFRARENAL ABDOMINAL AORTIC ANEURYSM (AAA) WITHOUT RUPTURE: ICD-10-CM

## 2025-06-05 PROCEDURE — 76775 US EXAM ABDO BACK WALL LIM: CPT

## 2025-06-11 ENCOUNTER — DOCUMENTATION ONLY (OUTPATIENT)
Dept: LAB | Facility: CLINIC | Age: OVER 89
End: 2025-06-11
Payer: MEDICARE

## 2025-06-11 NOTE — PROGRESS NOTES
Trevor Soni has an upcoming lab appointment:    Future Appointments   Date Time Provider Department [facility]   7/2/2025 11:00 AM [facility]    7/9/2025 10:50 AM Armida Dale PA-C [facility]   4/30/2026  4:30 PM [facility]     Patient is scheduled for the following lab(s): Per appointment notes, labs for [place]     There is no order available. Please review and place either future orders or HMPO (Review of Health Maintenance Protocol Orders), as appropriate.    There are no preventive care reminders to display for this patient.  Janelle Ornelas

## 2025-07-01 ENCOUNTER — DOCUMENTATION ONLY (OUTPATIENT)
Dept: LAB | Facility: CLINIC | Age: OVER 89
End: 2025-07-01

## 2025-07-02 ENCOUNTER — LAB (OUTPATIENT)
Dept: LAB | Facility: CLINIC | Age: OVER 89
End: 2025-07-02
Payer: MEDICARE

## 2025-07-02 DIAGNOSIS — D63.1 ANEMIA IN STAGE 3B CHRONIC KIDNEY DISEASE (H): ICD-10-CM

## 2025-07-02 DIAGNOSIS — N18.32 ANEMIA IN STAGE 3B CHRONIC KIDNEY DISEASE (H): ICD-10-CM

## 2025-07-02 DIAGNOSIS — E55.9 VITAMIN D DEFICIENCY: ICD-10-CM

## 2025-07-02 DIAGNOSIS — N18.32 STAGE 3B CHRONIC KIDNEY DISEASE (H): Primary | ICD-10-CM

## 2025-07-02 DIAGNOSIS — N18.32 STAGE 3B CHRONIC KIDNEY DISEASE (H): ICD-10-CM

## 2025-07-02 LAB
HGB BLD-MCNC: 11.4 G/DL (ref 13.3–17.7)
MCV RBC AUTO: 100 FL (ref 78–100)

## 2025-07-02 PROCEDURE — 85018 HEMOGLOBIN: CPT

## 2025-07-02 PROCEDURE — 36415 COLL VENOUS BLD VENIPUNCTURE: CPT

## 2025-07-02 PROCEDURE — 82306 VITAMIN D 25 HYDROXY: CPT

## 2025-07-02 PROCEDURE — 82728 ASSAY OF FERRITIN: CPT

## 2025-07-02 PROCEDURE — 80069 RENAL FUNCTION PANEL: CPT

## 2025-07-02 PROCEDURE — 83550 IRON BINDING TEST: CPT

## 2025-07-02 PROCEDURE — 83540 ASSAY OF IRON: CPT

## 2025-07-02 PROCEDURE — 83970 ASSAY OF PARATHORMONE: CPT

## 2025-07-03 ENCOUNTER — MYC REFILL (OUTPATIENT)
Dept: FAMILY MEDICINE | Facility: CLINIC | Age: OVER 89
End: 2025-07-03
Payer: MEDICARE

## 2025-07-03 DIAGNOSIS — I15.9 SECONDARY HYPERTENSION: ICD-10-CM

## 2025-07-03 LAB
ALBUMIN SERPL BCG-MCNC: 3.9 G/DL (ref 3.5–5.2)
ANION GAP SERPL CALCULATED.3IONS-SCNC: 10 MMOL/L (ref 7–15)
BUN SERPL-MCNC: 40 MG/DL (ref 8–23)
CALCIUM SERPL-MCNC: 9.6 MG/DL (ref 8.8–10.4)
CHLORIDE SERPL-SCNC: 109 MMOL/L (ref 98–107)
CREAT SERPL-MCNC: 2.04 MG/DL (ref 0.67–1.17)
EGFRCR SERPLBLD CKD-EPI 2021: 30 ML/MIN/1.73M2
FERRITIN SERPL-MCNC: 226 NG/ML (ref 31–409)
GLUCOSE SERPL-MCNC: 94 MG/DL (ref 70–99)
HCO3 SERPL-SCNC: 20 MMOL/L (ref 22–29)
IRON BINDING CAPACITY (ROCHE): 321 UG/DL (ref 240–430)
IRON SATN MFR SERPL: 21 % (ref 15–46)
IRON SERPL-MCNC: 68 UG/DL (ref 61–157)
PHOSPHATE SERPL-MCNC: 3.5 MG/DL (ref 2.5–4.5)
POTASSIUM SERPL-SCNC: 5.1 MMOL/L (ref 3.4–5.3)
PTH-INTACT SERPL-MCNC: 42 PG/ML (ref 15–65)
SODIUM SERPL-SCNC: 139 MMOL/L (ref 135–145)
VIT D+METAB SERPL-MCNC: 31 NG/ML (ref 20–50)

## 2025-07-03 RX ORDER — SPIRONOLACTONE 25 MG/1
25 TABLET ORAL DAILY
Qty: 90 TABLET | Refills: 3 | Status: SHIPPED | OUTPATIENT
Start: 2025-07-03

## 2025-07-09 ENCOUNTER — VIRTUAL VISIT (OUTPATIENT)
Dept: NEPHROLOGY | Facility: CLINIC | Age: OVER 89
End: 2025-07-09
Payer: MEDICARE

## 2025-07-09 VITALS
SYSTOLIC BLOOD PRESSURE: 124 MMHG | BODY MASS INDEX: 19.26 KG/M2 | HEIGHT: 69 IN | DIASTOLIC BLOOD PRESSURE: 58 MMHG | WEIGHT: 130 LBS

## 2025-07-09 DIAGNOSIS — E87.20 METABOLIC ACIDOSIS: ICD-10-CM

## 2025-07-09 DIAGNOSIS — N18.32 STAGE 3B CHRONIC KIDNEY DISEASE (H): Primary | ICD-10-CM

## 2025-07-09 DIAGNOSIS — N18.32 ANEMIA IN STAGE 3B CHRONIC KIDNEY DISEASE (H): ICD-10-CM

## 2025-07-09 DIAGNOSIS — D63.1 ANEMIA IN STAGE 3B CHRONIC KIDNEY DISEASE (H): ICD-10-CM

## 2025-07-09 DIAGNOSIS — I10 PRIMARY HYPERTENSION: ICD-10-CM

## 2025-07-09 RX ORDER — SODIUM BICARBONATE 650 MG/1
650 TABLET ORAL 2 TIMES DAILY
Qty: 180 TABLET | Refills: 3 | Status: SHIPPED | OUTPATIENT
Start: 2025-07-09

## 2025-07-09 ASSESSMENT — PAIN SCALES - GENERAL: PAINLEVEL_OUTOF10: NO PAIN (0)

## 2025-07-09 NOTE — PATIENT INSTRUCTIONS
Renewed prescription for sodium bicarb tablets.   No changes today.   Continue good hydration, low sodium diet.   Avoid ibuprofen/aleve/advil.   Check blood pressure daily, keep log.   Follow up with Dr. Reed. Please call [phone] to schedule your appointment.   Labs and follow up in 6 months with Armida Dale.

## 2025-07-09 NOTE — PROGRESS NOTES
Virtual Visit Details    Type of service:  Video Visit   Video Start Time: 11:04 am  Video End Time: 11:18 am    Originating Location (pt. Location): Home    Distant Location (provider location):  On-site  Platform used for Video Visit: [facility]        Nephrology Clinic Note  7/9/2025         CC: CKD     HPI: Trevor Soni is a 89 year old male with PMH significant for HTN, CAD, HFpEF, AAA with out rupture, atrial flutter, aortic stenosis, gout who presents for evaluation and management of CKD.     Pt endorsed feeling fairly well in the recent past. No new symptoms today. Up on questioning, denied any recurrent fevers/chills, nausea/vomiting, diarrhea, abdominal pain, chest pain or SOB. Endorses compliant with his medications. No new medications. Does have some aches and pains intermittently, takes tylenol as needed. Denied any significant NSAID use.    11/16/2023   Pt presented to virtual visit with his significant other. Endorsed he is feeling fairly well since out last visit. No new symptoms. No hospitalizations since our last visit. mentioned that he did visit his cardiology and have follow up in a year. Denied other systemic symptoms including recurrent fevers/chills, N/V/D, abdominal pain, chest pain and or SOB. Denied dysuria or change in his urinary habits. Pt and his wife mentioned that he is trying to be hydrated better and has been better than before.    7/9/2025  He has been doing okay overall. He had recent second carotid artery repair and CTA in December 2024. No changes recommended but was supposed to follow up in June with vascular which he has not done. His BP's have been stable, 120s/ systolic but notes his heart rates have high normal. He has no LE edema, no shortness of breath. He  has some longstanding nocturia x 3. He has a good appetite, no n/v/d.     - History of urinary symptoms: no, may wake up 2-3 times per night, feels completely empty the bladder  - History of Hematuria: no  - Swelling:  no  - Hx of UTIs: no  - Hx of stones: one time 20 years ago and none since then  - Rashes/Joint pain: just on left upper arm after scratching,   - Family hx of kidney disease: no  - NSAID use: no    Allergies   Allergen Reactions    Avocado     Ciprofloxacin Itching    Penicillins Itching       Current Outpatient Medications   Medication Sig Dispense Refill    acetaminophen (TYLENOL) 325 MG tablet Take 2 tablets (650 mg) by mouth every 6 hours as needed for mild pain or other (and adjunct with moderate or severe pain or per patient request)      allopurinol (ZYLOPRIM) 100 MG tablet TAKE 2 TABLETS BY MOUTH DAILY 180 tablet 3    amLODIPine (NORVASC) 5 MG tablet TAKE 1 TABLET BY MOUTH EVERY DAY 90 tablet 0    atorvastatin (LIPITOR) 20 MG tablet TAKE 1 TABLET BY MOUTH ONCE  DAILY 90 tablet 3    Cholecalciferol (VITAMIN D3) 25 MCG (1000 UT) CAPS Take 1 capsule by mouth daily OTC dose unknown      dorzolamide (TRUSOPT) 2 % ophthalmic solution Place 1 drop into both eyes 2 times daily      ELIQUIS ANTICOAGULANT 2.5 MG tablet TAKE 1 TABLET BY MOUTH TWICE  DAILY (RESTART TAKING IN THE  MORNING 8/16) 180 tablet 3    fenofibrate (TRIGLIDE/LOFIBRA) 160 MG tablet TAKE 1 TABLET BY MOUTH DAILY 90 tablet 3    ferrous gluconate (FERGON) 324 (38 Fe) MG tablet Take 1 tablet (324 mg) by mouth daily 90 tablet 3    Multiple Vitamins-Minerals (PRESERVISION AREDS 2) CAPS Take 1 capsule by mouth 2 times daily      multivitamin w/minerals (MULTI-VITAMIN) tablet Take 1 tablet by mouth daily      sodium bicarbonate 650 MG tablet Take 1 tablet (650 mg) by mouth 2 times daily 180 tablet 3    spironolactone (ALDACTONE) 25 MG tablet Take 1 tablet (25 mg) by mouth daily. 90 tablet 3    triamcinolone (KENALOG) 0.1 % external cream Apply topically 2 times daily 453.6 g 11     No current facility-administered medications for this visit.       Past Medical History:   Diagnosis Date    Arthritis     rhuematoid    Coronary artery disease     triple  bypass 2001    CRF (chronic renal failure)     Gastro-oesophageal reflux disease     Gout     Hyperlipidemia     Hypertension     Nocturia        Past Surgical History:   Procedure Laterality Date    CARDIAC SURGERY      CHOLECYSTECTOMY      COLONOSCOPY      CV CORONARY ANGIOGRAM N/A 1/13/2023    Procedure: Coronary Angiogram;  Surgeon: Sujata Ramires MD;  Location:  [facility]    CV PCI N/A 1/13/2023    Procedure: Percutaneous Coronary Intervention;  Surgeon: Sujata Ramires MD;  Location:  [facility]    CV TRANSCATHETER AORTIC VALVE REPLACEMENT-FEMORAL APPROACH N/A 4/11/2023    Procedure: Transcatheter Aortic Valve Replacement-Femoral Approach;  Surgeon: Sujata Ramires MD;  Location:  [facility]    ENDARTERECTOMY CAROTID Right 8/13/2024    Procedure: REDO RIGHT CAROTID ENDARTERECTOMY with bovine patch and ELECTROENCEPHALOGRAM;  Surgeon: Brian Zelaya MD;  Location:  OR    LAPAROTOMY EXPLORATORY N/A 6/30/2020    Procedure: EXPLORATORY LAPAROTOMY, BOWEL RESECTION;  Surgeon: Bull Rachel MD;  Location:  OR    LAPAROTOMY, LYSIS ADHESIONS, COMBINED N/A 6/21/2020    Procedure: EXPLORATORY LAPAROTOMY WITH LYSIS OF ADHESIONS;  Surgeon: Jose Reed MD;  Location:  OR    ORTHOPEDIC SURGERY      PHACOEMULSIFICATION CLEAR CORNEA WITH TORIC INTRAOCULAR LENS IMPLANT  1/30/2012    Procedure:PHACOEMULSIFICATION CLEAR CORNEA WITH TORIC INTRAOCULAR LENS IMPLANT; LEFT PHACOEMULSIFICATION CLEAR CORNEA WITH DELUXE  TORIC INTRAOCULAR LENS IMPLANT ; Surgeon:BRANDO HIGHTOWER; Location[facility]    PHACOEMULSIFICATION CLEAR CORNEA WITH TORIC INTRAOCULAR LENS IMPLANT  2/13/2012    Procedure:PHACOEMULSIFICATION CLEAR CORNEA WITH TORIC INTRAOCULAR LENS IMPLANT; RIGHT PHACOEMULSIFICATION CLEAR CORNEA WITH TORIC INTRAOCULAR LENS IMPLANT ; Surgeon:BRANDO HIGHTOWER; Location: [facility]    VASCULAR SURGERY         Social History     Tobacco Use    Smoking status: Former      "Types: Cigars     Quit date: 1980     Years since quittin.5    Smokeless tobacco: Never   Vaping Use    Vaping status: Never Used   Substance Use Topics    Alcohol use: Yes     Comment: rare    Drug use: No       Family History   Problem Relation Age of Onset    Myocardial Infarction Mother     Rheumatic fever Father     Cerebrovascular Disease Brother        ROS: A comprehensive review of systems was negative other than noted here or above.     Exam:  /58   Ht 1.753 m (5' 9\")   Wt 59 kg (130 lb)   BMI 19.20 kg/m      GENERAL APPEARANCE: alert and no distress    Results:    No visits with results within 1 Day(s) from this visit.   Latest known visit with results is:   Lab on 05/15/2023   Component Date Value Ref Range Status    Sodium 05/15/2023 143  136 - 145 mmol/L Final    Potassium 05/15/2023 4.3  3.4 - 5.3 mmol/L Final    Chloride 05/15/2023 110 (H)  98 - 107 mmol/L Final    Carbon Dioxide (CO2) 05/15/2023 21 (L)  22 - 29 mmol/L Final    Anion Gap 05/15/2023 12  7 - 15 mmol/L Final    Urea Nitrogen 05/15/2023 30.2 (H)  8.0 - 23.0 mg/dL Final    Creatinine 05/15/2023 1.62 (H)  0.67 - 1.17 mg/dL Final    Calcium 05/15/2023 9.4  8.8 - 10.2 mg/dL Final    Glucose 05/15/2023 98  70 - 99 mg/dL Final    Alkaline Phosphatase 05/15/2023 100  40 - 129 U/L Final    AST 05/15/2023 27  10 - 50 U/L Final    ALT 05/15/2023 16  10 - 50 U/L Final    Protein Total 05/15/2023 6.5  6.4 - 8.3 g/dL Final    Albumin 05/15/2023 3.8  3.5 - 5.2 g/dL Final    Bilirubin Total 05/15/2023 0.4  <=1.2 mg/dL Final    GFR Estimate 05/15/2023 40 (L)  >60 mL/min/1.73m2 Final    eGFR calculated using  CKD-EPI equation.    WBC Count 05/15/2023 7.9  4.0 - 11.0 10e3/uL Final    RBC Count 05/15/2023 3.55 (L)  4.40 - 5.90 10e6/uL Final    Hemoglobin 05/15/2023 11.0 (L)  13.3 - 17.7 g/dL Final    Hematocrit 05/15/2023 34.4 (L)  40.0 - 53.0 % Final    MCV 05/15/2023 97  78 - 100 fL Final    MCH 05/15/2023 31.0  26.5 - 33.0 pg Final "    MCHC 05/15/2023 32.0  31.5 - 36.5 g/dL Final    RDW 05/15/2023 15.2 (H)  10.0 - 15.0 % Final    Platelet Count 05/15/2023 174  150 - 450 10e3/uL Final       Assessment/Plan:     CKD stage IIIb/IV  H/o Recurrent Acute kidney injuries, now stabilized  Pt with no clear baseline creatinine with recurrent SHANICE episodes. His creatinine varies any where between 1.7-2.7 with eGFR in 20's and 30's which put him in CKD stage III b vs stage IV.    CKD likely 2/2 renovascular disease (known PAD, AAA) and progression is 2/2 recurrent SHANICE in setting of HFpEF, Recurrent SHANICE episodes in setting of HFpEF and continued diuretic use.   Repeat was around 1.6-1.7 last couple checks with eGFR of 30's.    Recent Scr 2, eGFR 30  UA and UACR not done - did not leave specimen  Renal US showed possible InStent stenosis but flow is stable at this point, but no hydronephrosis or nephrolithiasis noted.  - maintain BP >120 systolic   - good hydration   - low salt diet   - increase in fresh fruits and vegetables  - continue to avoid NSAID's.     Hypertension :  Amlodipine 5 mg daily, spironolactone 25 mg daily,   BP recently in clinic was WNL. No hypotensive symptoms today, continue current therapy.   Continue to  Check BP at home.     Electrolytes :  Stable     BMD :  Ismael, phos, Vit D and PTH were WNL     Metabolic acidosis :  Bicarb started to trend low, started on sodium bicarb 650 mg BID to gaol of 24-27 meq.  - he ran out of this and bicarb has been low  - renewed prescription, no changes     Anemia :  Hb low but stable. Above goal of JOSEPH therapy. No gross bleeding per report. Continue to monitor for now.     Other problems  CAD - takes fenofibrate and atorvastatin- will monitor kidney function  HFpEF, continue on spironolactone, following with cardiology  Gout, no recent episodes, continue on allopurinol    #Disposition:  labs and follow up in 6 months.       Armida Dale PA-C    Visit length 14 minutes. An additional 20 minutes were  spent on date of service in chart review, documentation, and other activities as noted.

## 2025-07-09 NOTE — NURSING NOTE
Current patient location: [address] [facility] [address]    Is the patient currently in the state of [place]? YES    Visit mode: VIDEO    If the visit is dropped, the patient can be reconnected by:VIDEO VISIT: Text to cell phone:   Telephone Information:   Mobile [phone]       Will anyone else be joining the visit? NO  (If patient encounters technical issues they should call [phone]    Are changes needed to the allergy or medication list? No    Are refills needed on medications prescribed by this physician? NO    Rooming Documentation:  Not applicable    Reason for visit: GABE VIDALES

## 2025-07-15 DIAGNOSIS — E78.5 HYPERLIPIDEMIA LDL GOAL <100: ICD-10-CM

## 2025-07-15 DIAGNOSIS — I10 BENIGN ESSENTIAL HYPERTENSION: ICD-10-CM

## 2025-07-16 RX ORDER — FENOFIBRATE 160 MG/1
160 TABLET ORAL DAILY
Qty: 90 TABLET | Refills: 3 | OUTPATIENT
Start: 2025-07-16

## 2025-07-31 DIAGNOSIS — I10 BENIGN ESSENTIAL HYPERTENSION: ICD-10-CM

## 2025-07-31 RX ORDER — AMLODIPINE BESYLATE 5 MG/1
5 TABLET ORAL DAILY
Qty: 90 TABLET | Refills: 1 | Status: SHIPPED | OUTPATIENT
Start: 2025-07-31

## (undated) DEVICE — WIRE GUIDE 0.035"X150CM EMERALD STR 502542

## (undated) DEVICE — PACK UNIVERSAL SPLIT 29131

## (undated) DEVICE — CATH TRAY FOLEY COUDE 16FR SILVER W/URINE METER 350ML 304716

## (undated) DEVICE — TOTE ANGIO CORP PC15AT SAN32CC83O

## (undated) DEVICE — DECANTER BAG 2002S

## (undated) DEVICE — DRAIN JACKSON PRATT 15FR ROUND SU130-1323

## (undated) DEVICE — DRAIN JACKSON PRATT RESERVOIR 100ML SU130-1305

## (undated) DEVICE — SU VICRYL 0 TIE 12X18" J906G

## (undated) DEVICE — VALVE DELIVERY SYSTEM 26MM SAPIEN3 ULTRA COMMANDER 9750CM26

## (undated) DEVICE — SHEATH PINNACLE DESTINATION 6FRX45CM ST

## (undated) DEVICE — Device

## (undated) DEVICE — TUBING SUCTION 12"X1/4" N612

## (undated) DEVICE — GLOVE GAMMEX DERMAPRENE ULTRA SZ 8.5 LF 8517

## (undated) DEVICE — SURGICEL HEMOSTAT 2X14" 1951

## (undated) DEVICE — GLOVE BIOGEL PI ORTHOPRO SZ 8.0 47680

## (undated) DEVICE — DRSG PREVENA NEGATIVE PRESSURE WND 13CM SGL LF PRE1101US

## (undated) DEVICE — SYR 10ML LL W/O NDL 302995

## (undated) DEVICE — SU VICRYL 0 CT-1 27" J340H

## (undated) DEVICE — SYR ANGIOGRAPHY MULTIUSE KIT ACIST 014612

## (undated) DEVICE — SU SILK 2-0 TIE 24" SA75H

## (undated) DEVICE — SU MONOCRYL 4-0 PS-2 18" UND Y496G

## (undated) DEVICE — SOL NACL 0.9% IRRIG 1000ML BOTTLE 2F7124

## (undated) DEVICE — INTRO SHEATH 6FRX10CM PINNACLE RSS602

## (undated) DEVICE — CLOSURE DEVICE 6FR VASC PROGLIDE MEDICATED SUTURE 12673-03

## (undated) DEVICE — BARRIER SEPRAFILM 3X5" X6 SHEETS 5086-02

## (undated) DEVICE — DRAPE LAP W/ARMBOARD 29410

## (undated) DEVICE — NDL BLUNT 19GA 1.5"

## (undated) DEVICE — SU SILK 3-0 TIE 24" SA74H

## (undated) DEVICE — INTRODUCER CATH VASC 5FRX10CM  MPIS-501-NT-U-SST

## (undated) DEVICE — SYR 03ML LL W/O NDL 309657

## (undated) DEVICE — CATH ANGIO JUDKINS R4 6FRX100CM INFINITI 534621T

## (undated) DEVICE — PREP CHLORAPREP 26ML TINTED ORANGE  260815

## (undated) DEVICE — SUCTION CANISTER MEDIVAC LINER 3000ML W/LID 65651-530

## (undated) DEVICE — PACK MAJOR SBA15MAFSI

## (undated) DEVICE — STPL SKIN 35W 6.9MM  PXW35

## (undated) DEVICE — INTRO TERUMO 6FRX25CM W/MARKER RSB603

## (undated) DEVICE — ESU ELEC BLADE 6" COATED E1450-6

## (undated) DEVICE — SU VICRYL 3-0 SH 27" J316H

## (undated) DEVICE — SYR 01ML 27GA 0.5" NDL TBC 309623

## (undated) DEVICE — SU PROLENE 6-0 C-1DA 30" 8706H

## (undated) DEVICE — CATH ANGIO INFINITI INTERNAL MAMMARY 6FRX100CM 534-660T

## (undated) DEVICE — WIRE GUIDE LUNDERQUIST 0.035"X260CM DBL CVD

## (undated) DEVICE — STPL RELOAD LINEAR CUT 75MM TCR75

## (undated) DEVICE — INFLATION DEVICE ATRION QL2530

## (undated) DEVICE — SU VICRYL 3-0 TIE 12X18" J904T

## (undated) DEVICE — CATH ANGIO INFINITI 3DRC 6FRX100CM 534676T

## (undated) DEVICE — SU VICRYL 2-0 SH 27" J317H

## (undated) DEVICE — WIRE GUIDE 0.035"X260CM SAFE-T-J EXCHANGE G00517

## (undated) DEVICE — CATH ANGIO SUPERTORQUE AL1 6FRX100CM 532-645

## (undated) DEVICE — SU PROLENE 7-0 BV-1DA 24" 8702H

## (undated) DEVICE — GLOVE PROTEXIS W/NEU-THERA 7.5  2D73TE75

## (undated) DEVICE — INTRO TERUMO 7FRX25CM W/MARKER RSB703

## (undated) DEVICE — LINEN TOWEL PACK X5 5464

## (undated) DEVICE — DRAPE IOBAN INCISE 23X17" 6650EZ

## (undated) DEVICE — CATH ANGIO INFINITI PIGTAIL 145 6 SH 6FRX110CM  534-652S

## (undated) DEVICE — CLIP ETHICON LIGACLIP SM BLUE LT100

## (undated) DEVICE — DRSG GAUZE 4X4" 3033

## (undated) DEVICE — CATH EP PACEL 5FRX110CM 1MM RIGHT HEART CURVE 401763

## (undated) DEVICE — ESU GROUND PAD UNIVERSAL W/O CORD

## (undated) DEVICE — MANIFOLD KIT ANGIO AUTOMATED 014613

## (undated) DEVICE — CABLE PCNG 12FT REMINGTON PACI

## (undated) DEVICE — INTRO SHEATH 45CM  7FR PNCL DEST

## (undated) DEVICE — DEFIB PRO-PADZ LVP LQD GEL ADULT 8900-2105-01

## (undated) DEVICE — CATH TRAY FOLEY COUDE SURESTEP 16FR W/URNE MTR STLK A304716A

## (undated) DEVICE — SU SILK 3-0 SH CR 8X18" C013D

## (undated) DEVICE — KIT HAND CONTROL ANGIOTOUCH ACIST 65CM AT-P65

## (undated) DEVICE — BNDG ABDOMINAL BINDER 9X30-45" 79-89070

## (undated) DEVICE — SU VICRYL 2-0 TIE 12X18" J905T

## (undated) DEVICE — INTRO SHEATH 6FRX25CM PINNACLE RSS606

## (undated) DEVICE — SU ETHILON 3-0 FS-1 18" 669H

## (undated) DEVICE — STPL LINEAR CUT 75MM TLC75

## (undated) DEVICE — SOL WATER IRRIG 1000ML BOTTLE 2F7114

## (undated) DEVICE — BLADE KNIFE BEAVER MINI BEAVER6400

## (undated) DEVICE — SU SILK 4-0 TIE 24" SA73H

## (undated) DEVICE — WIPES FOLEY CARE SURESTEP PROVON DFC100

## (undated) DEVICE — SOL NACL 0.9% INJ 250ML BAG 2B1322Q

## (undated) DEVICE — SU PLEDGET SOFT TFE 3/8"X3/26"X1/16" PCP40

## (undated) DEVICE — RAD CLOSURE ANGIOSEAL 8FR  610131

## (undated) DEVICE — DRSG TELFA ISLAND 4X8" 7541

## (undated) DEVICE — GUIDEWIRE VASC SAFARI2 0.035X275CM H74939406XS1

## (undated) DEVICE — GOWN IMPERVIOUS SPECIALTY XL/XLONG 39049

## (undated) DEVICE — GLIDEWIRE TERUMO .035X180CM 1.5,, J-TIP GR3525

## (undated) DEVICE — SU PROLENE 7-0 BV-1DA 4X30" M8703

## (undated) DEVICE — NDL 19GA 1.5"

## (undated) DEVICE — GUIDEWIRE VERSACORE 0.035"X300CM  J-TIP 1012068-07

## (undated) DEVICE — PREP CHLORAPREP W/ORANGE TINT 10.5ML 930715

## (undated) DEVICE — SU PDS II 0 CTX 60" Z990G

## (undated) DEVICE — DRSG STERI STRIP 1/2X4" R1547

## (undated) DEVICE — SU VICRYL 2-0 CT-1 27" J339H

## (undated) DEVICE — SPONGE RAY-TEC 4X8" 7318

## (undated) DEVICE — RAD INTRODUCER KIT MICRO 5FRX10CM .018 NITINOL G/W

## (undated) DEVICE — PACK VASCULAR SCV15VAFSB

## (undated) DEVICE — CATH ANGIO JUDKINS JL4 6FRX100CM INFINITI 534620T

## (undated) RX ORDER — BUPIVACAINE HYDROCHLORIDE AND EPINEPHRINE 5; 5 MG/ML; UG/ML
INJECTION, SOLUTION EPIDURAL; INTRACAUDAL; PERINEURAL
Status: DISPENSED
Start: 2023-04-10

## (undated) RX ORDER — DEXAMETHASONE SODIUM PHOSPHATE 4 MG/ML
INJECTION, SOLUTION INTRA-ARTICULAR; INTRALESIONAL; INTRAMUSCULAR; INTRAVENOUS; SOFT TISSUE
Status: DISPENSED
Start: 2024-08-13

## (undated) RX ORDER — HYDROMORPHONE HYDROCHLORIDE 1 MG/ML
INJECTION, SOLUTION INTRAMUSCULAR; INTRAVENOUS; SUBCUTANEOUS
Status: DISPENSED
Start: 2020-06-21

## (undated) RX ORDER — CLINDAMYCIN PHOSPHATE 900 MG/50ML
INJECTION, SOLUTION INTRAVENOUS
Status: DISPENSED
Start: 2023-04-11

## (undated) RX ORDER — HEPARIN SODIUM 1000 [USP'U]/ML
INJECTION, SOLUTION INTRAVENOUS; SUBCUTANEOUS
Status: DISPENSED
Start: 2024-08-13

## (undated) RX ORDER — HEPARIN SODIUM 1000 [USP'U]/ML
INJECTION, SOLUTION INTRAVENOUS; SUBCUTANEOUS
Status: DISPENSED
Start: 2023-04-11

## (undated) RX ORDER — DEXAMETHASONE SODIUM PHOSPHATE 4 MG/ML
INJECTION, SOLUTION INTRA-ARTICULAR; INTRALESIONAL; INTRAMUSCULAR; INTRAVENOUS; SOFT TISSUE
Status: DISPENSED
Start: 2020-06-21

## (undated) RX ORDER — ASPIRIN 81 MG/1
TABLET, CHEWABLE ORAL
Status: DISPENSED
Start: 2024-08-13

## (undated) RX ORDER — BUPIVACAINE HYDROCHLORIDE 5 MG/ML
INJECTION, SOLUTION EPIDURAL; INTRACAUDAL
Status: DISPENSED
Start: 2020-06-21

## (undated) RX ORDER — HEPARIN SODIUM 1000 [USP'U]/ML
INJECTION, SOLUTION INTRAVENOUS; SUBCUTANEOUS
Status: DISPENSED
Start: 2023-01-13

## (undated) RX ORDER — ONDANSETRON 2 MG/ML
INJECTION INTRAMUSCULAR; INTRAVENOUS
Status: DISPENSED
Start: 2020-06-30

## (undated) RX ORDER — FENTANYL CITRATE 50 UG/ML
INJECTION, SOLUTION INTRAMUSCULAR; INTRAVENOUS
Status: DISPENSED
Start: 2020-06-30

## (undated) RX ORDER — ASPIRIN 325 MG
TABLET ORAL
Status: DISPENSED
Start: 2023-01-13

## (undated) RX ORDER — PROTAMINE SULFATE 10 MG/ML
INJECTION, SOLUTION INTRAVENOUS
Status: DISPENSED
Start: 2024-08-13

## (undated) RX ORDER — ASPIRIN 325 MG
TABLET ORAL
Status: DISPENSED
Start: 2023-04-11

## (undated) RX ORDER — DEXAMETHASONE SODIUM PHOSPHATE 4 MG/ML
INJECTION, SOLUTION INTRA-ARTICULAR; INTRALESIONAL; INTRAMUSCULAR; INTRAVENOUS; SOFT TISSUE
Status: DISPENSED
Start: 2020-06-30

## (undated) RX ORDER — PROPOFOL 10 MG/ML
INJECTION, EMULSION INTRAVENOUS
Status: DISPENSED
Start: 2024-08-13

## (undated) RX ORDER — NITROGLYCERIN 5 MG/ML
VIAL (ML) INTRAVENOUS
Status: DISPENSED
Start: 2023-04-11

## (undated) RX ORDER — HEPARIN SODIUM 200 [USP'U]/100ML
INJECTION, SOLUTION INTRAVENOUS
Status: DISPENSED
Start: 2023-01-13

## (undated) RX ORDER — FENTANYL CITRATE 50 UG/ML
INJECTION, SOLUTION INTRAMUSCULAR; INTRAVENOUS
Status: DISPENSED
Start: 2023-04-11

## (undated) RX ORDER — ONDANSETRON 2 MG/ML
INJECTION INTRAMUSCULAR; INTRAVENOUS
Status: DISPENSED
Start: 2020-06-21

## (undated) RX ORDER — HYDROMORPHONE HYDROCHLORIDE 1 MG/ML
INJECTION, SOLUTION INTRAMUSCULAR; INTRAVENOUS; SUBCUTANEOUS
Status: DISPENSED
Start: 2024-08-13

## (undated) RX ORDER — PROPOFOL 10 MG/ML
INJECTION, EMULSION INTRAVENOUS
Status: DISPENSED
Start: 2020-06-21

## (undated) RX ORDER — FENTANYL CITRATE 50 UG/ML
INJECTION, SOLUTION INTRAMUSCULAR; INTRAVENOUS
Status: DISPENSED
Start: 2024-08-13

## (undated) RX ORDER — BUPIVACAINE HYDROCHLORIDE AND EPINEPHRINE 5; 5 MG/ML; UG/ML
INJECTION, SOLUTION EPIDURAL; INTRACAUDAL; PERINEURAL
Status: DISPENSED
Start: 2020-06-30

## (undated) RX ORDER — NEOSTIGMINE METHYLSULFATE 1 MG/ML
VIAL (ML) INJECTION
Status: DISPENSED
Start: 2020-06-30

## (undated) RX ORDER — FENTANYL CITRATE 50 UG/ML
INJECTION, SOLUTION INTRAMUSCULAR; INTRAVENOUS
Status: DISPENSED
Start: 2023-01-13

## (undated) RX ORDER — ACETAMINOPHEN 325 MG/1
TABLET ORAL
Status: DISPENSED
Start: 2023-04-11

## (undated) RX ORDER — HYDROMORPHONE HYDROCHLORIDE 1 MG/ML
INJECTION, SOLUTION INTRAMUSCULAR; INTRAVENOUS; SUBCUTANEOUS
Status: DISPENSED
Start: 2020-06-30

## (undated) RX ORDER — FENTANYL CITRATE 50 UG/ML
INJECTION, SOLUTION INTRAMUSCULAR; INTRAVENOUS
Status: DISPENSED
Start: 2020-06-21

## (undated) RX ORDER — CEFAZOLIN SODIUM/WATER 2 G/20 ML
SYRINGE (ML) INTRAVENOUS
Status: DISPENSED
Start: 2024-08-13

## (undated) RX ORDER — NITROGLYCERIN 5 MG/ML
VIAL (ML) INTRAVENOUS
Status: DISPENSED
Start: 2023-01-13

## (undated) RX ORDER — BUPIVACAINE HYDROCHLORIDE AND EPINEPHRINE 2.5; 5 MG/ML; UG/ML
INJECTION, SOLUTION EPIDURAL; INFILTRATION; INTRACAUDAL; PERINEURAL
Status: DISPENSED
Start: 2024-08-13

## (undated) RX ORDER — LIDOCAINE HYDROCHLORIDE 10 MG/ML
INJECTION, SOLUTION INFILTRATION; PERINEURAL
Status: DISPENSED
Start: 2024-08-13

## (undated) RX ORDER — LIDOCAINE HYDROCHLORIDE 10 MG/ML
INJECTION, SOLUTION EPIDURAL; INFILTRATION; INTRACAUDAL; PERINEURAL
Status: DISPENSED
Start: 2023-01-13

## (undated) RX ORDER — GLYCOPYRROLATE 0.2 MG/ML
INJECTION, SOLUTION INTRAMUSCULAR; INTRAVENOUS
Status: DISPENSED
Start: 2020-06-30

## (undated) RX ORDER — LIDOCAINE HYDROCHLORIDE 20 MG/ML
INJECTION, SOLUTION EPIDURAL; INFILTRATION; INTRACAUDAL; PERINEURAL
Status: DISPENSED
Start: 2020-06-30

## (undated) RX ORDER — PROTAMINE SULFATE 10 MG/ML
INJECTION, SOLUTION INTRAVENOUS
Status: DISPENSED
Start: 2023-04-11

## (undated) RX ORDER — LIDOCAINE HYDROCHLORIDE 20 MG/ML
INJECTION, SOLUTION EPIDURAL; INFILTRATION; INTRACAUDAL; PERINEURAL
Status: DISPENSED
Start: 2020-06-21